# Patient Record
Sex: FEMALE | Race: WHITE | NOT HISPANIC OR LATINO | Employment: FULL TIME | URBAN - NONMETROPOLITAN AREA
[De-identification: names, ages, dates, MRNs, and addresses within clinical notes are randomized per-mention and may not be internally consistent; named-entity substitution may affect disease eponyms.]

---

## 2021-03-02 ENCOUNTER — OFFICE VISIT (OUTPATIENT)
Dept: PULMONOLOGY | Facility: CLINIC | Age: 46
End: 2021-03-02

## 2021-03-02 VITALS
TEMPERATURE: 97.3 F | HEART RATE: 113 BPM | BODY MASS INDEX: 44.15 KG/M2 | HEIGHT: 65 IN | OXYGEN SATURATION: 96 % | WEIGHT: 265 LBS | SYSTOLIC BLOOD PRESSURE: 120 MMHG | DIASTOLIC BLOOD PRESSURE: 88 MMHG

## 2021-03-02 DIAGNOSIS — G47.33 OSA (OBSTRUCTIVE SLEEP APNEA): ICD-10-CM

## 2021-03-02 DIAGNOSIS — E66.01 MORBIDLY OBESE (HCC): ICD-10-CM

## 2021-03-02 DIAGNOSIS — J44.9 CHRONIC OBSTRUCTIVE PULMONARY DISEASE, UNSPECIFIED COPD TYPE (HCC): Primary | ICD-10-CM

## 2021-03-02 PROCEDURE — 99203 OFFICE O/P NEW LOW 30 MIN: CPT | Performed by: NURSE PRACTITIONER

## 2021-03-02 PROCEDURE — 94664 DEMO&/EVAL PT USE INHALER: CPT | Performed by: NURSE PRACTITIONER

## 2021-03-02 RX ORDER — DULAGLUTIDE 4.5 MG/.5ML
4.5 INJECTION, SOLUTION SUBCUTANEOUS WEEKLY
COMMUNITY
Start: 2021-02-01 | End: 2022-10-24

## 2021-03-02 RX ORDER — SERTRALINE HYDROCHLORIDE 100 MG/1
100 TABLET, FILM COATED ORAL 2 TIMES DAILY
COMMUNITY
Start: 2021-02-03

## 2021-03-02 RX ORDER — SIMVASTATIN 20 MG
TABLET ORAL
COMMUNITY
Start: 2021-01-31 | End: 2022-01-10

## 2021-03-02 RX ORDER — HYDROCHLOROTHIAZIDE 25 MG/1
25 TABLET ORAL DAILY
COMMUNITY
Start: 2021-01-31

## 2021-03-02 RX ORDER — LISINOPRIL 5 MG/1
2.5 TABLET ORAL DAILY
COMMUNITY
Start: 2021-01-31

## 2021-03-02 RX ORDER — INSULIN GLARGINE 100 [IU]/ML
INJECTION, SOLUTION SUBCUTANEOUS
COMMUNITY
Start: 2020-12-21 | End: 2022-01-10

## 2021-03-02 RX ORDER — OXCARBAZEPINE 600 MG/1
600 TABLET, FILM COATED ORAL 2 TIMES DAILY
COMMUNITY
Start: 2021-02-03

## 2021-03-02 RX ORDER — OMEPRAZOLE 20 MG/1
CAPSULE, DELAYED RELEASE ORAL
COMMUNITY
Start: 2021-01-31 | End: 2022-01-10

## 2021-03-02 RX ORDER — ARIPIPRAZOLE 10 MG/1
10 TABLET ORAL DAILY
COMMUNITY
Start: 2021-02-03

## 2021-03-02 RX ORDER — GABAPENTIN 800 MG/1
800 TABLET ORAL 4 TIMES DAILY
COMMUNITY
Start: 2021-02-03

## 2021-03-02 RX ORDER — CLONAZEPAM 1 MG/1
1 TABLET ORAL 4 TIMES DAILY PRN
COMMUNITY
Start: 2021-02-03

## 2021-03-02 NOTE — PROGRESS NOTES
Do you smoke? Vape    Quit date? 2 weeks ago, but still vaping    Lung Function Test? yes  Chest X-Ray? yes    CT Chest? no Allergy Test? no    Family hx of Lung disease or Lung Cancer?no    If FHx is posivitive for lung cancer, what is the relationship of the family member? NA    Shortness of breath? yes    How far can you walk without getting short of breath? On any kind of exertion    Any coughing? yes    Any wheezing? yes    Acid Reflux? yes    Do you snore? yes    Daytime Fatigue? yes     Occupation? [employer]     Have you been exposed to any chemicals at your job? no    What inhalers are you currently using? Pro Air    Have you had the Influenza Vaccine? yes    Would you like to receive this Vaccine today? no    Have you had the Pneumonia Vaccine?  no  Would you like to receive this Vaccine today? no      Subjective    Ana Ortiz presents for the following COPD and Sleep Apnea  .    History of Present Illness     Ms. Ortiz is a 45 year old female with a medical history significant for diabetes, hyperlipidemia, COPD, hypertension and JOANN.    She reports that she was diagnosed with sleep apnea about 4 years ago and started on CPAP.  She states that she has not used her CPAP in several years as her insurance ran out.  She states that she was also diagnosed with COPD about 5 years ago.  Reports that she is currently using albuterol about 4 times a day.  She states she is experiencing shortness of breath, cough and intermittent wheezing.  She also complains of fatigue and daytime sleepiness.  She states that she quit smoking about 2 weeks ago.  Previously she smoked about 2 packs/day for 25 years.        Review of Systems   Constitutional: Positive for fatigue. Negative for activity change, appetite change, chills and unexpected weight change.   HENT: Negative for congestion, postnasal drip and rhinorrhea.    Respiratory: Positive for cough, shortness of breath and wheezing. Negative for apnea  and chest tightness.    Cardiovascular: Negative for chest pain, palpitations and leg swelling.   Gastrointestinal: Negative for nausea.   Musculoskeletal: Negative for gait problem.   Skin: Negative for pallor.   Allergic/Immunologic: Negative for environmental allergies.   Neurological: Negative for syncope.   Psychiatric/Behavioral: Negative for confusion. The patient is not nervous/anxious.        Active Problems:  Problem List Items Addressed This Visit        Endocrine and Metabolic    Morbidly obese (CMS/HCC)      Other Visit Diagnoses     Chronic obstructive pulmonary disease, unspecified COPD type (CMS/HCC)    -  Primary    Relevant Medications    ProAir  (90 Base) MCG/ACT inhaler    JOANN (obstructive sleep apnea)              Past Medical History:  Past Medical History:   Diagnosis Date   • Diabetes mellitus (CMS/HCC)    • Hyperlipemia    • Hypertension    • JOANN (obstructive sleep apnea)    • Vulvar cancer (CMS/HCC)        Family History:  Family History   Problem Relation Age of Onset   • Cancer Mother    • Parkinsonism Father    • Diabetes Father    • Cancer Maternal Aunt    • Diabetes Paternal Aunt    • Diabetes Paternal Grandmother        Social History:  Social History     Tobacco Use   • Smoking status: Former Smoker     Packs/day: 2.00     Types: Cigarettes     Quit date: 2021     Years since quittin.0   • Smokeless tobacco: Never Used   Substance Use Topics   • Alcohol use: Never       Current Medications:  Current Outpatient Medications   Medication Sig Dispense Refill   • ARIPiprazole (ABILIFY) 10 MG tablet      • clonazePAM (KlonoPIN) 1 MG tablet      • gabapentin (NEURONTIN) 800 MG tablet      • hydroCHLOROthiazide (HYDRODIURIL) 25 MG tablet      • Insulin Glargine (BASAGLAR KWIKPEN) 100 UNIT/ML injection pen INJECT 10 UNITS SUBCUTANEOUSLY EVERY DAY AT BEDTIME     • lisinopril (PRINIVIL,ZESTRIL) 5 MG tablet      • omeprazole (priLOSEC) 20 MG capsule      • OXcarbazepine  "(TRILEPTAL) 600 MG tablet      • ProAir  (90 Base) MCG/ACT inhaler      • sertraline (ZOLOFT) 100 MG tablet      • simvastatin (ZOCOR) 20 MG tablet      • Trulicity 4.5 MG/0.5ML solution pen-injector        No current facility-administered medications for this visit.       Allergies:  Allergies   Allergen Reactions   • Codeine Nausea Only       Vitals:  /88   Pulse 113   Temp 97.3 °F (36.3 °C) (Temporal)   Ht 165.1 cm (65\")   Wt 120 kg (265 lb)   SpO2 96%   BMI 44.10 kg/m²     Imaging:    Imaging Results (Most Recent)     None          Pulmonary Functions Testing Results:    No results found for: FEV1, FVC, BVM9VSG, TLC, DLCO    No results found for this or any previous visit.    Objective   Physical Exam     GENERAL APPEARANCE: Well developed, well nourished, alert and cooperative, and appears to be in no acute distress.    HEAD: normocephalic. Atraumatic.    EYES: PERRL, EOMI. Vision is grossly intact.    THROAT: Oral cavity and pharynx normal. No inflammation, swelling, exudate, or lesions.     NECK: Neck supple.  No thyromegaly.    CARDIAC: Normal S1 and S2. No S3, S4 or murmurs. Rhythm is regular.     RESPIRATORY:Bilateral air entry positive. Bilateral diminished breath sounds. No wheezing, crackles or rhonchi noted.    GI: Positive bowel sounds. Soft, nondistended, nontender.     MUSCULOSKELETAL: No significant deformity or joint abnormality. No edema. Peripheral pulses intact. No varicosities.    NEUROLOGICAL: Strength and sensation symmetric and intact throughout.     PSYCHIATRIC: The mental examination revealed the patient was oriented to person, place, and time.     Assessment/Plan        COPD, unspecified:  -Ordered PFT to assess lung function.  -Will start her on Breztri, 2 puff once daily.  Inhaler education provided.       - Inhaler technique demonstration/discussion:  I have extensively discussed the steps.  In summary, the steps were discussed in the following order.Patient was " advised to rinse the mouth after steroid inhalation to prevent fungal mucositis.  · Remove the cap from the inhaler and shake well.    · Breathe out all the way.    · Place the mouthpiece of the inhaler between your teeth and seal your lips tightly around it.    · As you start to breathe in slowly, press down on the canister one time.   · Keep breathing in as slowly and deeply as you can.    · It should take about 5 seconds for you to completely breathe in.    · Hold your breath for 10 seconds(count to 10 slowly) to allow the medication to reach the airways of the lung.    · Repeat the above steps for each puff.    · Wait about 1 minute between the puffs.    · Replaced the cap on the inhaler when finished.      -Continue albuterol every 4 hours as needed.          JOANN:  -Will order a Home sleep study valuate for sleep apnea.  -Patient's Body mass index is 44.1 kg/m². BMI is above normal parameters. Recommendations include: exercise counseling and nutrition counseling.  - Patient was educated on positive airway pressure treatment.  As per CMS guidelines, more than 4 hours on 70% of observed nights is considered adherence. Patient was strongly encouraged to use CPAP as much as possible during sleep as more CPAP use is equal to more benefit. Use of heated humidification in positive airway pressure treatment to improve the adherence to the device.  In case of claustrophobia, we will provide the patient cognitive behavioral therapy and desensitization. Oral appliances use will be discussed with the patient in case of mild to moderate sleep apnea or if the patient with severe disease fail positive airway pressure treatment.       The patient was extensively educated on the consequences of untreated obstructive sleep apnea namely cardiovascular/metabolic disorder, neurocognitive deficit, daytime sleepiness, motor vehicle accidents, depression, mood disorders and reduced quality of life.  At the end of conversation, the  patient voices understanding of the disease process and treatment modality.  Patient also understands the risk of untreated obstructive sleep apnea and benefit benefits of the treatment.    Counseling time was greater than 10 minutes.            She reports that she quit smoking about 2 weeks ago.  Congratulated her on this achievement.                    ICD-10-CM ICD-9-CM   1. Chronic obstructive pulmonary disease, unspecified COPD type (CMS/[facility])  J44.9 496   2. Morbidly obese (CMS/[facility])  E66.01 278.01   3. JOANN (obstructive sleep apnea)  G47.33 327.23       Return in about 3 months (around 6/2/2021).

## 2021-03-08 PROBLEM — E66.01 MORBIDLY OBESE: Status: ACTIVE | Noted: 2021-03-08

## 2021-03-29 DIAGNOSIS — Z01.818 OTHER SPECIFIED PRE-OPERATIVE EXAMINATION: Primary | ICD-10-CM

## 2021-03-30 ENCOUNTER — APPOINTMENT (OUTPATIENT)
Dept: RESPIRATORY THERAPY | Facility: HOSPITAL | Age: 46
End: 2021-03-30

## 2021-03-31 DIAGNOSIS — G47.33 OSA (OBSTRUCTIVE SLEEP APNEA): Primary | ICD-10-CM

## 2021-04-09 ENCOUNTER — APPOINTMENT (OUTPATIENT)
Dept: LAB | Facility: HOSPITAL | Age: 46
End: 2021-04-09

## 2021-04-09 ENCOUNTER — APPOINTMENT (OUTPATIENT)
Dept: RESPIRATORY THERAPY | Facility: HOSPITAL | Age: 46
End: 2021-04-09

## 2021-04-20 ENCOUNTER — APPOINTMENT (OUTPATIENT)
Dept: RESPIRATORY THERAPY | Facility: HOSPITAL | Age: 46
End: 2021-04-20

## 2021-04-28 ENCOUNTER — APPOINTMENT (OUTPATIENT)
Dept: RESPIRATORY THERAPY | Facility: HOSPITAL | Age: 46
End: 2021-04-28

## 2021-05-17 ENCOUNTER — APPOINTMENT (OUTPATIENT)
Dept: RESPIRATORY THERAPY | Facility: HOSPITAL | Age: 46
End: 2021-05-17

## 2021-06-07 ENCOUNTER — HOSPITAL ENCOUNTER (EMERGENCY)
Facility: HOSPITAL | Age: 46
Discharge: HOME | End: 2021-06-07
Attending: EMERGENCY MEDICINE | Admitting: EMERGENCY MEDICINE

## 2021-06-07 VITALS
HEIGHT: 65 IN | HEART RATE: 103 BPM | SYSTOLIC BLOOD PRESSURE: 129 MMHG | TEMPERATURE: 98 F | BODY MASS INDEX: 41.65 KG/M2 | DIASTOLIC BLOOD PRESSURE: 86 MMHG | RESPIRATION RATE: 20 BRPM | OXYGEN SATURATION: 94 % | WEIGHT: 250 LBS

## 2021-06-07 DIAGNOSIS — R73.9 HYPERGLYCEMIA: Primary | ICD-10-CM

## 2021-06-07 DIAGNOSIS — R53.1 GENERAL WEAKNESS: ICD-10-CM

## 2021-06-07 LAB
6-ACETYL MORPHINE: NEGATIVE
ALBUMIN SERPL-MCNC: 4.13 G/DL (ref 3.5–5.2)
ALBUMIN/GLOB SERPL: 1.1 G/DL
ALP SERPL-CCNC: 134 U/L (ref 39–117)
ALT SERPL W P-5'-P-CCNC: 19 U/L (ref 1–33)
AMPHET+METHAMPHET UR QL: NEGATIVE
ANION GAP SERPL CALCULATED.3IONS-SCNC: 14 MMOL/L (ref 5–15)
AST SERPL-CCNC: 15 U/L (ref 1–32)
BARBITURATES UR QL SCN: NEGATIVE
BASOPHILS # BLD AUTO: 0.04 10*3/MM3 (ref 0–0.2)
BASOPHILS NFR BLD AUTO: 0.3 % (ref 0–1.5)
BENZODIAZ UR QL SCN: NEGATIVE
BILIRUB SERPL-MCNC: 0.2 MG/DL (ref 0–1.2)
BILIRUB UR QL STRIP: NEGATIVE
BILIRUB UR QL STRIP: NEGATIVE
BUN SERPL-MCNC: 14 MG/DL (ref 6–20)
BUN/CREAT SERPL: 16.7 (ref 7–25)
BUPRENORPHINE SERPL-MCNC: NEGATIVE NG/ML
CALCIUM SPEC-SCNC: 9.4 MG/DL (ref 8.6–10.5)
CANNABINOIDS SERPL QL: POSITIVE
CHLORIDE SERPL-SCNC: 92 MMOL/L (ref 98–107)
CK SERPL-CCNC: 60 U/L (ref 20–180)
CLARITY UR: CLEAR
CLARITY UR: CLEAR
CO2 SERPL-SCNC: 27 MMOL/L (ref 22–29)
COCAINE UR QL: NEGATIVE
COLOR UR: YELLOW
COLOR UR: YELLOW
CREAT SERPL-MCNC: 0.84 MG/DL (ref 0.57–1)
CRP SERPL-MCNC: 1.79 MG/DL (ref 0–0.5)
DEPRECATED RDW RBC AUTO: 44.4 FL (ref 37–54)
EOSINOPHIL # BLD AUTO: 0.13 10*3/MM3 (ref 0–0.4)
EOSINOPHIL NFR BLD AUTO: 1.1 % (ref 0.3–6.2)
ERYTHROCYTE [DISTWIDTH] IN BLOOD BY AUTOMATED COUNT: 13.2 % (ref 12.3–15.4)
ERYTHROCYTE [SEDIMENTATION RATE] IN BLOOD: 13 MM/HR (ref 0–20)
ETHANOL BLD-MCNC: <10 MG/DL (ref 0–10)
ETHANOL UR QL: <0.01 %
GFR SERPL CREATININE-BSD FRML MDRD: 73 ML/MIN/1.73
GLOBULIN UR ELPH-MCNC: 3.7 GM/DL
GLUCOSE BLDC GLUCOMTR-MCNC: 310 MG/DL (ref 70–130)
GLUCOSE BLDC GLUCOMTR-MCNC: 456 MG/DL (ref 70–130)
GLUCOSE SERPL-MCNC: 333 MG/DL (ref 65–99)
GLUCOSE UR STRIP-MCNC: ABNORMAL MG/DL
GLUCOSE UR STRIP-MCNC: ABNORMAL MG/DL
HCT VFR BLD AUTO: 47.3 % (ref 34–46.6)
HGB BLD-MCNC: 15.6 G/DL (ref 12–15.9)
HGB UR QL STRIP.AUTO: NEGATIVE
HGB UR QL STRIP.AUTO: NEGATIVE
HOLD SPECIMEN: NORMAL
HOLD SPECIMEN: NORMAL
IMM GRANULOCYTES # BLD AUTO: 0.06 10*3/MM3 (ref 0–0.05)
IMM GRANULOCYTES NFR BLD AUTO: 0.5 % (ref 0–0.5)
KETONES UR QL STRIP: NEGATIVE
KETONES UR QL STRIP: NEGATIVE
LEUKOCYTE ESTERASE UR QL STRIP.AUTO: NEGATIVE
LEUKOCYTE ESTERASE UR QL STRIP.AUTO: NEGATIVE
LIPASE SERPL-CCNC: 30 U/L (ref 13–60)
LYMPHOCYTES # BLD AUTO: 2.83 10*3/MM3 (ref 0.7–3.1)
LYMPHOCYTES NFR BLD AUTO: 24.3 % (ref 19.6–45.3)
MAGNESIUM SERPL-MCNC: 1.5 MG/DL (ref 1.6–2.6)
MCH RBC QN AUTO: 30.3 PG (ref 26.6–33)
MCHC RBC AUTO-ENTMCNC: 33 G/DL (ref 31.5–35.7)
MCV RBC AUTO: 91.8 FL (ref 79–97)
METHADONE UR QL SCN: NEGATIVE
MONOCYTES # BLD AUTO: 0.6 10*3/MM3 (ref 0.1–0.9)
MONOCYTES NFR BLD AUTO: 5.1 % (ref 5–12)
NEUTROPHILS NFR BLD AUTO: 68.7 % (ref 42.7–76)
NEUTROPHILS NFR BLD AUTO: 8 10*3/MM3 (ref 1.7–7)
NITRITE UR QL STRIP: NEGATIVE
NITRITE UR QL STRIP: NEGATIVE
NRBC BLD AUTO-RTO: 0 /100 WBC (ref 0–0.2)
NT-PROBNP SERPL-MCNC: 16.6 PG/ML (ref 0–450)
OPIATES UR QL: NEGATIVE
OXYCODONE UR QL SCN: NEGATIVE
PCP UR QL SCN: NEGATIVE
PH UR STRIP.AUTO: 6 [PH] (ref 5–8)
PH UR STRIP.AUTO: 6 [PH] (ref 5–8)
PHOSPHATE SERPL-MCNC: 3.7 MG/DL (ref 2.5–4.5)
PLATELET # BLD AUTO: 248 10*3/MM3 (ref 140–450)
PMV BLD AUTO: 10.3 FL (ref 6–12)
POTASSIUM SERPL-SCNC: 3.5 MMOL/L (ref 3.5–5.2)
PROT SERPL-MCNC: 7.8 G/DL (ref 6–8.5)
PROT UR QL STRIP: NEGATIVE
PROT UR QL STRIP: NEGATIVE
RBC # BLD AUTO: 5.15 10*6/MM3 (ref 3.77–5.28)
SODIUM SERPL-SCNC: 133 MMOL/L (ref 136–145)
SP GR UR STRIP: >1.03 (ref 1–1.03)
SP GR UR STRIP: >1.03 (ref 1–1.03)
T4 FREE SERPL-MCNC: 0.98 NG/DL (ref 0.93–1.7)
TROPONIN T SERPL-MCNC: <0.01 NG/ML (ref 0–0.03)
TSH SERPL DL<=0.05 MIU/L-ACNC: 1.61 UIU/ML (ref 0.27–4.2)
UROBILINOGEN UR QL STRIP: ABNORMAL
UROBILINOGEN UR QL STRIP: ABNORMAL
WBC # BLD AUTO: 11.66 10*3/MM3 (ref 3.4–10.8)
WHOLE BLOOD HOLD SPECIMEN: NORMAL

## 2021-06-07 PROCEDURE — 84443 ASSAY THYROID STIM HORMONE: CPT | Performed by: EMERGENCY MEDICINE

## 2021-06-07 PROCEDURE — 84100 ASSAY OF PHOSPHORUS: CPT | Performed by: EMERGENCY MEDICINE

## 2021-06-07 PROCEDURE — 85652 RBC SED RATE AUTOMATED: CPT | Performed by: EMERGENCY MEDICINE

## 2021-06-07 PROCEDURE — 85025 COMPLETE CBC W/AUTO DIFF WBC: CPT | Performed by: EMERGENCY MEDICINE

## 2021-06-07 PROCEDURE — 99283 EMERGENCY DEPT VISIT LOW MDM: CPT

## 2021-06-07 PROCEDURE — 82550 ASSAY OF CK (CPK): CPT | Performed by: EMERGENCY MEDICINE

## 2021-06-07 PROCEDURE — 82962 GLUCOSE BLOOD TEST: CPT

## 2021-06-07 PROCEDURE — 80307 DRUG TEST PRSMV CHEM ANLYZR: CPT | Performed by: EMERGENCY MEDICINE

## 2021-06-07 PROCEDURE — 86140 C-REACTIVE PROTEIN: CPT | Performed by: EMERGENCY MEDICINE

## 2021-06-07 PROCEDURE — 80053 COMPREHEN METABOLIC PANEL: CPT | Performed by: EMERGENCY MEDICINE

## 2021-06-07 PROCEDURE — 83690 ASSAY OF LIPASE: CPT | Performed by: EMERGENCY MEDICINE

## 2021-06-07 PROCEDURE — 83735 ASSAY OF MAGNESIUM: CPT | Performed by: EMERGENCY MEDICINE

## 2021-06-07 PROCEDURE — 81003 URINALYSIS AUTO W/O SCOPE: CPT | Performed by: EMERGENCY MEDICINE

## 2021-06-07 PROCEDURE — 82077 ASSAY SPEC XCP UR&BREATH IA: CPT | Performed by: EMERGENCY MEDICINE

## 2021-06-07 PROCEDURE — 84439 ASSAY OF FREE THYROXINE: CPT | Performed by: EMERGENCY MEDICINE

## 2021-06-07 PROCEDURE — 83880 ASSAY OF NATRIURETIC PEPTIDE: CPT | Performed by: EMERGENCY MEDICINE

## 2021-06-07 PROCEDURE — 84484 ASSAY OF TROPONIN QUANT: CPT | Performed by: EMERGENCY MEDICINE

## 2021-06-07 RX ORDER — SODIUM CHLORIDE 0.9 % (FLUSH) 0.9 %
10 SYRINGE (ML) INJECTION AS NEEDED
Status: DISCONTINUED | OUTPATIENT
Start: 2021-06-07 | End: 2021-06-08 | Stop reason: HOSPADM

## 2021-06-08 NOTE — ED NOTES
Report to Juliane Brand triage nurse at this time, pt remains awaiting in er lobby while awaiting er room placement due to high pt volume in er, pt stable, nad noted, pt was instructed to notify staff of any changes and pt verb understanding     Arelis Wilder RN  06/07/21 2015

## 2021-06-20 NOTE — ED PROVIDER NOTES
Subjective     History provided by:  Patient   used: No    Weakness - Generalized  Severity:  Mild  Onset quality:  Gradual  Timing:  Constant  Progression:  Worsening  Chronicity:  New  Context: not alcohol use, not allergies, not change in medication, not decreased sleep, not dehydration, not drug use, not increased activity, not pinched nerve, not recent infection, not stress and not urinary tract infection    Context comment:  A glucose reading at home, cannot get it to come down  Relieved by:  Nothing  Worsened by:  Nothing  Ineffective treatments:  None tried  Associated symptoms: no abdominal pain, no anorexia, no aphasia, no arthralgias, no ataxia, no chest pain, no cough, no diarrhea, no difficulty walking, no dizziness, no drooling, no dysphagia, no dysuria, no numbness in extremities, no falls, no fever, no foul-smelling urine, no frequency, no headaches, no hematochezia, no lethargy, no loss of consciousness, no melena, no myalgias, no nausea, no near-syncope, no seizures, no sensory-motor deficit, no shortness of breath, no stroke symptoms, no syncope, no urgency, no vision change and no vomiting    Risk factors: diabetes    Risk factors: no anemia, no congestive heart failure, no coronary artery disease, no excessive menstruation, no family hx of stroke, no heart disease, no neurologic disease, no new medications and no recent stressors        Review of Systems   Constitutional: Negative for activity change, appetite change, chills, diaphoresis, fatigue and fever.   HENT: Negative for congestion, drooling, ear pain and sore throat.    Eyes: Negative for redness.   Respiratory: Negative for cough, chest tightness, shortness of breath and wheezing.    Cardiovascular: Negative for chest pain, palpitations, leg swelling, syncope and near-syncope.   Gastrointestinal: Negative for abdominal pain, anorexia, diarrhea, dysphagia, hematochezia, melena, nausea and vomiting.   Genitourinary:  Negative for dysuria, frequency and urgency.   Musculoskeletal: Negative for arthralgias, back pain, falls, myalgias and neck pain.   Skin: Negative for pallor, rash and wound.   Neurological: Positive for light-headedness. Negative for dizziness, seizures, loss of consciousness, speech difficulty, weakness and headaches.   Psychiatric/Behavioral: Negative for agitation, behavioral problems, confusion and decreased concentration.   All other systems reviewed and are negative.      Past Medical History:   Diagnosis Date   • Diabetes mellitus (CMS/HCC)    • Hyperlipemia    • Hypertension    • JOANN (obstructive sleep apnea)    • Vulvar cancer (CMS/HCC)        Allergies   Allergen Reactions   • Codeine Nausea Only       Past Surgical History:   Procedure Laterality Date   • HYSTERECTOMY     • MASTECTOMY         Family History   Problem Relation Age of Onset   • Cancer Mother    • Parkinsonism Father    • Diabetes Father    • Cancer Maternal Aunt    • Diabetes Paternal Aunt    • Diabetes Paternal Grandmother        Social History     Socioeconomic History   • Marital status:      Spouse name: Not on file   • Number of children: Not on file   • Years of education: Not on file   • Highest education level: Not on file   Tobacco Use   • Smoking status: Former Smoker     Packs/day: 2.00     Types: Cigarettes     Quit date: 2021     Years since quittin.3   • Smokeless tobacco: Never Used   Vaping Use   • Vaping Use: Every day   • Substances: Nicotine   • Devices: Refillable tank   Substance and Sexual Activity   • Alcohol use: Never   • Drug use: Never   • Sexual activity: Defer           Objective   Physical Exam  Vitals and nursing note reviewed.   Constitutional:       General: She is not in acute distress.     Appearance: Normal appearance. She is well-developed. She is not toxic-appearing or diaphoretic.   HENT:      Head: Normocephalic and atraumatic.      Right Ear: External ear normal.      Left Ear:  External ear normal.      Nose: Nose normal.      Mouth/Throat:      Pharynx: No oropharyngeal exudate.      Tonsils: No tonsillar exudate.   Eyes:      General: Lids are normal.      Conjunctiva/sclera: Conjunctivae normal.      Pupils: Pupils are equal, round, and reactive to light.   Neck:      Thyroid: No thyromegaly.   Cardiovascular:      Rate and Rhythm: Normal rate and regular rhythm.      Pulses: Normal pulses.      Heart sounds: Normal heart sounds, S1 normal and S2 normal.   Pulmonary:      Effort: Pulmonary effort is normal. No tachypnea or respiratory distress.      Breath sounds: Normal breath sounds. No decreased breath sounds, wheezing or rales.   Chest:      Chest wall: No tenderness.   Abdominal:      General: Bowel sounds are normal. There is no distension.      Palpations: Abdomen is soft.      Tenderness: There is no abdominal tenderness. There is no guarding or rebound.   Musculoskeletal:         General: No tenderness or deformity. Normal range of motion.      Cervical back: Full passive range of motion without pain, normal range of motion and neck supple.   Lymphadenopathy:      Cervical: No cervical adenopathy.   Skin:     General: Skin is warm and dry.      Coloration: Skin is not pale.      Findings: No erythema or rash.   Neurological:      Mental Status: She is alert and oriented to person, place, and time.      GCS: GCS eye subscore is 4. GCS verbal subscore is 5. GCS motor subscore is 6.      Cranial Nerves: No cranial nerve deficit.      Sensory: No sensory deficit.   Psychiatric:         Speech: Speech normal.         Behavior: Behavior normal.         Thought Content: Thought content normal.         Judgment: Judgment normal.         Procedures           ED Course  ED Course as of Jun 19 2154   Sat Jun 19, 2021   2153 Patient eloped from the emergency department.    [ES]      ED Course User Index  [ES] Faustino Ellis MD                                            MDM  Number of Diagnoses or Management Options  General weakness: new and requires workup  Hyperglycemia: new and requires workup     Amount and/or Complexity of Data Reviewed  Clinical lab tests: reviewed and ordered  Tests in the radiology section of CPT®: ordered and reviewed  Tests in the medicine section of CPT®: ordered and reviewed  Review and summarize past medical records: yes  Independent visualization of images, tracings, or specimens: yes    Risk of Complications, Morbidity, and/or Mortality  Presenting problems: moderate  Diagnostic procedures: moderate  Management options: moderate    Patient Progress  Patient progress: stable      Final diagnoses:   Hyperglycemia   General weakness       ED Disposition  ED Disposition     ED Disposition Condition Comment    Eloped            No follow-up provider specified.       Medication List      No changes were made to your prescriptions during this visit.          Faustino Ellis MD  06/19/21 [id number]

## 2021-06-24 ENCOUNTER — HOSPITAL ENCOUNTER (OUTPATIENT)
Dept: RESPIRATORY THERAPY | Facility: HOSPITAL | Age: 46
Discharge: HOME | End: 2021-06-24
Admitting: NURSE PRACTITIONER

## 2021-06-24 DIAGNOSIS — J44.9 CHRONIC OBSTRUCTIVE PULMONARY DISEASE, UNSPECIFIED COPD TYPE (HCC): ICD-10-CM

## 2021-06-24 PROCEDURE — 94729 DIFFUSING CAPACITY: CPT

## 2021-06-24 PROCEDURE — 94729 DIFFUSING CAPACITY: CPT | Performed by: INTERNAL MEDICINE

## 2021-06-24 PROCEDURE — 94726 PLETHYSMOGRAPHY LUNG VOLUMES: CPT | Performed by: INTERNAL MEDICINE

## 2021-06-24 PROCEDURE — 94060 EVALUATION OF WHEEZING: CPT

## 2021-06-24 PROCEDURE — 94060 EVALUATION OF WHEEZING: CPT | Performed by: INTERNAL MEDICINE

## 2021-06-24 PROCEDURE — 94640 AIRWAY INHALATION TREATMENT: CPT

## 2021-06-24 PROCEDURE — 94726 PLETHYSMOGRAPHY LUNG VOLUMES: CPT

## 2021-06-24 RX ORDER — ALBUTEROL SULFATE 2.5 MG/3ML
2.5 SOLUTION RESPIRATORY (INHALATION) ONCE
Status: COMPLETED | OUTPATIENT
Start: 2021-06-24 | End: 2021-06-24

## 2021-06-24 RX ADMIN — ALBUTEROL SULFATE 2.5 MG: 2.5 SOLUTION RESPIRATORY (INHALATION) at 10:29

## 2021-07-07 NOTE — PROGRESS NOTES
Notified the patient of her PFT results.  Will start her on stiolto as Breztri is not covered on her insurance.

## 2021-07-29 ENCOUNTER — OFFICE VISIT (OUTPATIENT)
Dept: PULMONOLOGY | Facility: CLINIC | Age: 46
End: 2021-07-29

## 2021-07-29 VITALS
DIASTOLIC BLOOD PRESSURE: 82 MMHG | OXYGEN SATURATION: 90 % | BODY MASS INDEX: 40.48 KG/M2 | SYSTOLIC BLOOD PRESSURE: 128 MMHG | HEIGHT: 65 IN | HEART RATE: 101 BPM | TEMPERATURE: 97.5 F | WEIGHT: 243 LBS

## 2021-07-29 DIAGNOSIS — F17.210 CIGARETTE NICOTINE DEPENDENCE WITHOUT COMPLICATION: ICD-10-CM

## 2021-07-29 DIAGNOSIS — E66.01 MORBIDLY OBESE (HCC): ICD-10-CM

## 2021-07-29 DIAGNOSIS — J44.9 CHRONIC OBSTRUCTIVE PULMONARY DISEASE, UNSPECIFIED COPD TYPE (HCC): Primary | ICD-10-CM

## 2021-07-29 DIAGNOSIS — G47.33 OSA (OBSTRUCTIVE SLEEP APNEA): ICD-10-CM

## 2021-07-29 PROCEDURE — 99407 BEHAV CHNG SMOKING > 10 MIN: CPT | Performed by: NURSE PRACTITIONER

## 2021-07-29 PROCEDURE — 99214 OFFICE O/P EST MOD 30 MIN: CPT | Performed by: NURSE PRACTITIONER

## 2021-07-29 PROCEDURE — 94618 PULMONARY STRESS TESTING: CPT | Performed by: NURSE PRACTITIONER

## 2021-07-29 RX ORDER — BUDESONIDE, GLYCOPYRROLATE, AND FORMOTEROL FUMARATE 160; 9; 4.8 UG/1; UG/1; UG/1
2 AEROSOL, METERED RESPIRATORY (INHALATION) 2 TIMES DAILY
Qty: 10.2 G | Refills: 11 | Status: SHIPPED | OUTPATIENT
Start: 2021-07-29 | End: 2022-01-10

## 2021-07-29 NOTE — PROGRESS NOTES
"Chief Complaint  COPD and Sleep Apnea    Subjective          Ana Ortiz presents to [facility]  History of Present Illness     Ms. Ortiz is a 45 year old female with a medical history significant for diabetes, hyperlipidemia, hypertension, COPD, and sleep apnea.    She presents today for a routine follow up on COPD and sleep apnea.  She states that she has been doing well since her last visit.  She reports that she has been complain with her cpap nightly.  She states that she has felt much better since she started using the cpap.  She also reports improvement in her shortness of breath with use of Breztri. He is also using albuterol every 4 hours as needed. She does tell me that she started back smoking and is currently smoking about half pack per day.      Objective   Vital Signs:   /82 (BP Location: Left arm, Patient Position: Sitting)   Pulse 101   Temp 97.5 °F (36.4 °C)   Ht 165.1 cm (65\")   Wt 110 kg (243 lb)   SpO2 90%   BMI 40.44 kg/m²     Physical Exam     GENERAL APPEARANCE: Well developed, well nourished, alert and cooperative, and appears to be in no acute distress.    HEAD: normocephalic. Atraumatic.    EYES: PERRL, EOMI. Vision is grossly intact.    THROAT: Oral cavity and pharynx normal. No inflammation, swelling, exudate, or lesions.     NECK: Neck supple.  No thyromegaly.    CARDIAC: Normal S1 and S2. No S3, S4 or murmurs. Rhythm is regular.     RESPIRATORY:Bilateral air entry positive. Bilateral diminished breath sounds. No wheezing, crackles or rhonchi noted.    GI: Positive bowel sounds. Soft, nondistended, nontender.     MUSCULOSKELETAL: No significant deformity or joint abnormality. No edema. Peripheral pulses intact. No varicosities.    NEUROLOGICAL: Strength and sensation symmetric and intact throughout.     PSYCHIATRIC: The mental examination revealed the patient was oriented to person, place, and time.     Result Review " :   The following data was reviewed by: CHAZ Erwin on 07/29/2021:  Common labs    Common Labsle 6/7/21 6/7/21    2107 2107   Glucose  333 (A)   BUN  14   Creatinine  0.84   eGFR Non African Am  73   Sodium  133 (A)   Potassium  3.5   Chloride  92 (A)   Calcium  9.4   Albumin  4.13   Total Bilirubin  0.2   Alkaline Phosphatase  134 (A)   AST (SGOT)  15   ALT (SGPT)  19   WBC 11.66 (A)    Hemoglobin 15.6    Hematocrit 47.3 (A)    Platelets 248    (A) Abnormal value            Data reviewed: PFT and sleep study          Assessment and Plan    Diagnoses and all orders for this visit:    1. Chronic obstructive pulmonary disease, unspecified COPD type (CMS/HCC) (Primary)    2. Morbidly obese (CMS/HCC)    3. JOANN (obstructive sleep apnea)    4. Cigarette nicotine dependence without complication    Other orders  -     Budeson-Glycopyrrol-Formoterol (Breztri Aerosphere) 160-9-4.8 MCG/ACT aerosol inhaler; Inhale 2 puffs 2 (Two) Times a Day.  Dispense: 10.2 g; Refill: 11        COPD, unspecified:  -PFT showed no obstruction with no significant bronchodilator response. Significant air trapping was noted.  -We will continue Breztri twice daily.  -We will continue albuterol every 4 hours.    -6-minute walk test was completed in office. Oxygen saturation did not drop below 90%. No indication for supplemental oxygen during ambulation at this time.    JOANN:  -Sleep study showed severe sleep apnea.  -She was started on AutoPap at night in has been compliant with use nightly. She states that she is feeling much better since starting to use Pap therapy.  -Patient's Body mass index is 40.44 kg/m². indicating that she is morbidly obese (BMI > 40 or > 35 with obesity - related health condition). Obesity-related health conditions include the following: obstructive sleep apnea, hypertension, diabetes mellitus and dyslipidemias. Obesity is unchanged. BMI is is above average; BMI management plan is completed. We discussed  portion control and increasing exercise..  - Patient was educated on positive airway pressure treatment.  As per CMS guidelines, more than 4 hours on 70% of observed nights is considered adherence. Patient was strongly encouraged to use CPAP as much as possible during sleep as more CPAP use is equal to more benefit. Use of heated humidification in positive airway pressure treatment to improve the adherence to the device.  In case of claustrophobia, we will provide the patient cognitive behavioral therapy and desensitization. Oral appliances use will be discussed with the patient in case of mild to moderate sleep apnea or if the patient with severe disease fail positive airway pressure treatment.       The patient was extensively educated on the consequences of untreated obstructive sleep apnea namely cardiovascular/metabolic disorder, neurocognitive deficit, daytime sleepiness, motor vehicle accidents, depression, mood disorders and reduced quality of life.  At the end of conversation, the patient voices understanding of the disease process and treatment modality.  Patient also understands the risk of untreated obstructive sleep apnea and benefit benefits of the treatment.    Counseling time was greater than 10 minutes.      Current smoker:  She is currently smoking about half pack per day.    Ana Ortiz  reports that she has been smoking cigarettes. She has been smoking about 0.50 packs per day. She has never used smokeless tobacco.. I have educated her on the risk of diseases from using tobacco products such as cancer, COPD and heart disease.     I advised her to quit and she is willing to quit. We have discussed the following method/s for tobacco cessation:  Counseling OTC Cessation Products.  Together we have set a quit date for 1 month from today.  She will follow up with me in a few months or sooner to check on her progress.    I spent >10 minutes counseling the patient.           Follow Up   Return in  about 6 months (around 1/29/2022).  Patient was given instructions and counseling regarding her condition or for health maintenance advice. Please see specific information pulled into the AVS if appropriate.

## 2021-11-10 ENCOUNTER — TRANSCRIBE ORDERS (OUTPATIENT)
Dept: ADMINISTRATIVE | Facility: HOSPITAL | Age: 46
End: 2021-11-10

## 2021-11-10 ENCOUNTER — HOSPITAL ENCOUNTER (OUTPATIENT)
Dept: GENERAL RADIOLOGY | Facility: HOSPITAL | Age: 46
Discharge: HOME | End: 2021-11-10
Admitting: PHYSICIAN ASSISTANT

## 2021-11-10 DIAGNOSIS — M25.562 LEFT KNEE PAIN, UNSPECIFIED CHRONICITY: ICD-10-CM

## 2021-11-10 DIAGNOSIS — M25.562 LEFT KNEE PAIN, UNSPECIFIED CHRONICITY: Primary | ICD-10-CM

## 2021-11-10 PROCEDURE — 73562 X-RAY EXAM OF KNEE 3: CPT

## 2021-11-10 PROCEDURE — 73562 X-RAY EXAM OF KNEE 3: CPT | Performed by: RADIOLOGY

## 2022-01-06 DIAGNOSIS — J44.9 CHRONIC OBSTRUCTIVE PULMONARY DISEASE, UNSPECIFIED COPD TYPE: Primary | ICD-10-CM

## 2022-01-06 RX ORDER — TIOTROPIUM BROMIDE AND OLODATEROL 3.124; 2.736 UG/1; UG/1
2 SPRAY, METERED RESPIRATORY (INHALATION) DAILY
COMMUNITY
Start: 2021-11-27 | End: 2022-01-06 | Stop reason: SDUPTHER

## 2022-01-06 RX ORDER — TIOTROPIUM BROMIDE AND OLODATEROL 3.124; 2.736 UG/1; UG/1
2 SPRAY, METERED RESPIRATORY (INHALATION) DAILY
Qty: 1 EACH | Refills: 5 | Status: SHIPPED | OUTPATIENT
Start: 2022-01-06 | End: 2022-07-27

## 2022-01-10 ENCOUNTER — APPOINTMENT (OUTPATIENT)
Dept: GENERAL RADIOLOGY | Facility: HOSPITAL | Age: 47
End: 2022-01-10

## 2022-01-10 ENCOUNTER — APPOINTMENT (OUTPATIENT)
Dept: CT IMAGING | Facility: HOSPITAL | Age: 47
End: 2022-01-10

## 2022-01-10 ENCOUNTER — HOSPITAL ENCOUNTER (INPATIENT)
Facility: HOSPITAL | Age: 47
LOS: 3 days | Discharge: HOME | End: 2022-01-13
Attending: EMERGENCY MEDICINE | Admitting: INTERNAL MEDICINE

## 2022-01-10 DIAGNOSIS — J96.02 ACUTE RESPIRATORY FAILURE WITH HYPOXIA AND HYPERCAPNIA: Primary | ICD-10-CM

## 2022-01-10 DIAGNOSIS — J96.01 ACUTE RESPIRATORY FAILURE WITH HYPOXIA AND HYPERCAPNIA: Primary | ICD-10-CM

## 2022-01-10 DIAGNOSIS — J44.1 COPD EXACERBATION: ICD-10-CM

## 2022-01-10 DIAGNOSIS — L02.212 CUTANEOUS ABSCESS OF BACK EXCLUDING BUTTOCKS: ICD-10-CM

## 2022-01-10 LAB
A-A DO2: 96.1 MMHG (ref 0–300)
ALBUMIN SERPL-MCNC: 3.14 G/DL (ref 3.5–5.2)
ALBUMIN SERPL-MCNC: 3.21 G/DL (ref 3.5–5.2)
ALBUMIN/GLOB SERPL: 0.8 G/DL
ALBUMIN/GLOB SERPL: 0.8 G/DL
ALP SERPL-CCNC: 123 U/L (ref 39–117)
ALP SERPL-CCNC: 137 U/L (ref 39–117)
ALT SERPL W P-5'-P-CCNC: 18 U/L (ref 1–33)
ALT SERPL W P-5'-P-CCNC: 18 U/L (ref 1–33)
ANION GAP SERPL CALCULATED.3IONS-SCNC: 13.4 MMOL/L (ref 5–15)
ANION GAP SERPL CALCULATED.3IONS-SCNC: 15.7 MMOL/L (ref 5–15)
APTT PPP: 34.3 SECONDS (ref 25.5–35.4)
ARTERIAL PATENCY WRIST A: ABNORMAL
AST SERPL-CCNC: 17 U/L (ref 1–32)
AST SERPL-CCNC: 17 U/L (ref 1–32)
ATMOSPHERIC PRESS: 737 MMHG
B PARAPERT DNA SPEC QL NAA+PROBE: NOT DETECTED
B PERT DNA SPEC QL NAA+PROBE: NOT DETECTED
BASE EXCESS BLDA CALC-SCNC: 7 MMOL/L (ref 0–2)
BASOPHILS # BLD AUTO: 0.02 10*3/MM3 (ref 0–0.2)
BASOPHILS # BLD AUTO: 0.05 10*3/MM3 (ref 0–0.2)
BASOPHILS NFR BLD AUTO: 0.1 % (ref 0–1.5)
BASOPHILS NFR BLD AUTO: 0.3 % (ref 0–1.5)
BDY SITE: ABNORMAL
BILIRUB SERPL-MCNC: 0.2 MG/DL (ref 0–1.2)
BILIRUB SERPL-MCNC: <0.2 MG/DL (ref 0–1.2)
BODY TEMPERATURE: 0 C
BUN SERPL-MCNC: 14 MG/DL (ref 6–20)
BUN SERPL-MCNC: 14 MG/DL (ref 6–20)
BUN/CREAT SERPL: 19.2 (ref 7–25)
BUN/CREAT SERPL: 19.4 (ref 7–25)
C PNEUM DNA NPH QL NAA+NON-PROBE: NOT DETECTED
CALCIUM SPEC-SCNC: 9.1 MG/DL (ref 8.6–10.5)
CALCIUM SPEC-SCNC: 9.1 MG/DL (ref 8.6–10.5)
CHLORIDE SERPL-SCNC: 93 MMOL/L (ref 98–107)
CHLORIDE SERPL-SCNC: 94 MMOL/L (ref 98–107)
CO2 BLDA-SCNC: 32.6 MMOL/L (ref 22–33)
CO2 SERPL-SCNC: 26.3 MMOL/L (ref 22–29)
CO2 SERPL-SCNC: 27.6 MMOL/L (ref 22–29)
COHGB MFR BLD: 7.9 % (ref 0–5)
CREAT SERPL-MCNC: 0.72 MG/DL (ref 0.57–1)
CREAT SERPL-MCNC: 0.73 MG/DL (ref 0.57–1)
CRP SERPL-MCNC: 33.31 MG/DL (ref 0–0.5)
D DIMER PPP FEU-MCNC: 2.3 MCGFEU/ML (ref 0–0.5)
D-LACTATE SERPL-SCNC: 1.5 MMOL/L (ref 0.5–2)
DEPRECATED RDW RBC AUTO: 46.5 FL (ref 37–54)
DEPRECATED RDW RBC AUTO: 47.3 FL (ref 37–54)
EOSINOPHIL # BLD AUTO: 0.01 10*3/MM3 (ref 0–0.4)
EOSINOPHIL # BLD AUTO: 0.1 10*3/MM3 (ref 0–0.4)
EOSINOPHIL NFR BLD AUTO: 0.1 % (ref 0.3–6.2)
EOSINOPHIL NFR BLD AUTO: 0.6 % (ref 0.3–6.2)
ERYTHROCYTE [DISTWIDTH] IN BLOOD BY AUTOMATED COUNT: 14.5 % (ref 12.3–15.4)
ERYTHROCYTE [DISTWIDTH] IN BLOOD BY AUTOMATED COUNT: 14.5 % (ref 12.3–15.4)
FLUAV SUBTYP SPEC NAA+PROBE: NOT DETECTED
FLUBV RNA ISLT QL NAA+PROBE: NOT DETECTED
GAS FLOW AIRWAY: 2 LPM
GFR SERPL CREATININE-BSD FRML MDRD: 86 ML/MIN/1.73
GFR SERPL CREATININE-BSD FRML MDRD: 87 ML/MIN/1.73
GLOBULIN UR ELPH-MCNC: 4.2 GM/DL
GLOBULIN UR ELPH-MCNC: 4.2 GM/DL
GLUCOSE BLDC GLUCOMTR-MCNC: 356 MG/DL (ref 70–130)
GLUCOSE SERPL-MCNC: 308 MG/DL (ref 65–99)
GLUCOSE SERPL-MCNC: 335 MG/DL (ref 65–99)
HADV DNA SPEC NAA+PROBE: NOT DETECTED
HCO3 BLDA-SCNC: 31.3 MMOL/L (ref 20–26)
HCOV 229E RNA SPEC QL NAA+PROBE: NOT DETECTED
HCOV HKU1 RNA SPEC QL NAA+PROBE: NOT DETECTED
HCOV NL63 RNA SPEC QL NAA+PROBE: NOT DETECTED
HCOV OC43 RNA SPEC QL NAA+PROBE: NOT DETECTED
HCT VFR BLD AUTO: 35.6 % (ref 34–46.6)
HCT VFR BLD AUTO: 37.8 % (ref 34–46.6)
HCT VFR BLD CALC: 38.7 % (ref 38–51)
HGB BLD-MCNC: 11.7 G/DL (ref 12–15.9)
HGB BLD-MCNC: 12.6 G/DL (ref 12–15.9)
HGB BLDA-MCNC: 12.6 G/DL (ref 13.5–17.5)
HMPV RNA NPH QL NAA+NON-PROBE: NOT DETECTED
HOLD SPECIMEN: NORMAL
HOLD SPECIMEN: NORMAL
HPIV1 RNA ISLT QL NAA+PROBE: NOT DETECTED
HPIV2 RNA SPEC QL NAA+PROBE: NOT DETECTED
HPIV3 RNA NPH QL NAA+PROBE: NOT DETECTED
HPIV4 P GENE NPH QL NAA+PROBE: NOT DETECTED
IMM GRANULOCYTES # BLD AUTO: 0.08 10*3/MM3 (ref 0–0.05)
IMM GRANULOCYTES # BLD AUTO: 0.11 10*3/MM3 (ref 0–0.05)
IMM GRANULOCYTES NFR BLD AUTO: 0.6 % (ref 0–0.5)
IMM GRANULOCYTES NFR BLD AUTO: 0.7 % (ref 0–0.5)
INHALED O2 CONCENTRATION: 28 %
INR PPP: 1.06 (ref 0.9–1.1)
LYMPHOCYTES # BLD AUTO: 1.05 10*3/MM3 (ref 0.7–3.1)
LYMPHOCYTES # BLD AUTO: 1.67 10*3/MM3 (ref 0.7–3.1)
LYMPHOCYTES NFR BLD AUTO: 10.1 % (ref 19.6–45.3)
LYMPHOCYTES NFR BLD AUTO: 7.7 % (ref 19.6–45.3)
Lab: ABNORMAL
Lab: ABNORMAL
M PNEUMO IGG SER IA-ACNC: NOT DETECTED
MAGNESIUM SERPL-MCNC: 1.3 MG/DL (ref 1.6–2.6)
MCH RBC QN AUTO: 29.5 PG (ref 26.6–33)
MCH RBC QN AUTO: 29.5 PG (ref 26.6–33)
MCHC RBC AUTO-ENTMCNC: 32.9 G/DL (ref 31.5–35.7)
MCHC RBC AUTO-ENTMCNC: 33.3 G/DL (ref 31.5–35.7)
MCV RBC AUTO: 88.5 FL (ref 79–97)
MCV RBC AUTO: 89.9 FL (ref 79–97)
METHGB BLD QL: 0.1 % (ref 0–3)
MODALITY: ABNORMAL
MONOCYTES # BLD AUTO: 0.43 10*3/MM3 (ref 0.1–0.9)
MONOCYTES # BLD AUTO: 0.58 10*3/MM3 (ref 0.1–0.9)
MONOCYTES NFR BLD AUTO: 3.1 % (ref 5–12)
MONOCYTES NFR BLD AUTO: 3.5 % (ref 5–12)
MYOGLOBIN SERPL-MCNC: <21 NG/ML (ref 25–58)
NEUTROPHILS NFR BLD AUTO: 12.08 10*3/MM3 (ref 1.7–7)
NEUTROPHILS NFR BLD AUTO: 14.07 10*3/MM3 (ref 1.7–7)
NEUTROPHILS NFR BLD AUTO: 84.8 % (ref 42.7–76)
NEUTROPHILS NFR BLD AUTO: 88.4 % (ref 42.7–76)
NOTE: ABNORMAL
NOTIFIED BY: ABNORMAL
NOTIFIED WHO: ABNORMAL
NRBC BLD AUTO-RTO: 0 /100 WBC (ref 0–0.2)
NRBC BLD AUTO-RTO: 0.1 /100 WBC (ref 0–0.2)
NT-PROBNP SERPL-MCNC: 2439 PG/ML (ref 0–450)
OXYHGB MFR BLDV: 81.1 % (ref 94–99)
PCO2 BLDA: 42.2 MM HG (ref 35–45)
PCO2 TEMP ADJ BLD: ABNORMAL MM[HG]
PH BLDA: 7.48 PH UNITS (ref 7.35–7.45)
PH, TEMP CORRECTED: ABNORMAL
PLATELET # BLD AUTO: 306 10*3/MM3 (ref 140–450)
PLATELET # BLD AUTO: 353 10*3/MM3 (ref 140–450)
PMV BLD AUTO: 9.7 FL (ref 6–12)
PMV BLD AUTO: 9.8 FL (ref 6–12)
PO2 BLDA: 49.8 MM HG (ref 83–108)
PO2 TEMP ADJ BLD: ABNORMAL MM[HG]
POTASSIUM SERPL-SCNC: 3.3 MMOL/L (ref 3.5–5.2)
POTASSIUM SERPL-SCNC: 3.6 MMOL/L (ref 3.5–5.2)
PROT SERPL-MCNC: 7.3 G/DL (ref 6–8.5)
PROT SERPL-MCNC: 7.4 G/DL (ref 6–8.5)
PROTHROMBIN TIME: 14.2 SECONDS (ref 12.8–14.5)
QT INTERVAL: 360 MS
QTC INTERVAL: 480 MS
RBC # BLD AUTO: 3.96 10*6/MM3 (ref 3.77–5.28)
RBC # BLD AUTO: 4.27 10*6/MM3 (ref 3.77–5.28)
RHINOVIRUS RNA SPEC NAA+PROBE: NOT DETECTED
RSV RNA NPH QL NAA+NON-PROBE: NOT DETECTED
SAO2 % BLDCOA: 88.1 % (ref 94–99)
SARS-COV-2 RNA NPH QL NAA+NON-PROBE: NOT DETECTED
SODIUM SERPL-SCNC: 135 MMOL/L (ref 136–145)
SODIUM SERPL-SCNC: 135 MMOL/L (ref 136–145)
TROPONIN T SERPL-MCNC: <0.01 NG/ML (ref 0–0.03)
TROPONIN T SERPL-MCNC: <0.01 NG/ML (ref 0–0.03)
TSH SERPL DL<=0.05 MIU/L-ACNC: 3.55 UIU/ML (ref 0.27–4.2)
VENTILATOR MODE: ABNORMAL
WBC NRBC COR # BLD: 13.67 10*3/MM3 (ref 3.4–10.8)
WBC NRBC COR # BLD: 16.58 10*3/MM3 (ref 3.4–10.8)
WHOLE BLOOD HOLD SPECIMEN: NORMAL
WHOLE BLOOD HOLD SPECIMEN: NORMAL

## 2022-01-10 PROCEDURE — 25010000002 CEFTRIAXONE PER 250 MG: Performed by: EMERGENCY MEDICINE

## 2022-01-10 PROCEDURE — 82962 GLUCOSE BLOOD TEST: CPT

## 2022-01-10 PROCEDURE — 0 IOPAMIDOL PER 1 ML: Performed by: EMERGENCY MEDICINE

## 2022-01-10 PROCEDURE — 82805 BLOOD GASES W/O2 SATURATION: CPT

## 2022-01-10 PROCEDURE — 0202U NFCT DS 22 TRGT SARS-COV-2: CPT | Performed by: EMERGENCY MEDICINE

## 2022-01-10 PROCEDURE — 25010000002 PROCHLORPERAZINE 10 MG/2ML SOLUTION: Performed by: EMERGENCY MEDICINE

## 2022-01-10 PROCEDURE — 85025 COMPLETE CBC W/AUTO DIFF WBC: CPT | Performed by: EMERGENCY MEDICINE

## 2022-01-10 PROCEDURE — 83880 ASSAY OF NATRIURETIC PEPTIDE: CPT | Performed by: EMERGENCY MEDICINE

## 2022-01-10 PROCEDURE — 25010000002 FUROSEMIDE PER 20 MG: Performed by: EMERGENCY MEDICINE

## 2022-01-10 PROCEDURE — 99223 1ST HOSP IP/OBS HIGH 75: CPT | Performed by: INTERNAL MEDICINE

## 2022-01-10 PROCEDURE — 85025 COMPLETE CBC W/AUTO DIFF WBC: CPT | Performed by: INTERNAL MEDICINE

## 2022-01-10 PROCEDURE — 71275 CT ANGIOGRAPHY CHEST: CPT | Performed by: RADIOLOGY

## 2022-01-10 PROCEDURE — 94640 AIRWAY INHALATION TREATMENT: CPT

## 2022-01-10 PROCEDURE — 84443 ASSAY THYROID STIM HORMONE: CPT | Performed by: EMERGENCY MEDICINE

## 2022-01-10 PROCEDURE — 71045 X-RAY EXAM CHEST 1 VIEW: CPT

## 2022-01-10 PROCEDURE — 93005 ELECTROCARDIOGRAM TRACING: CPT | Performed by: EMERGENCY MEDICINE

## 2022-01-10 PROCEDURE — 80053 COMPREHEN METABOLIC PANEL: CPT | Performed by: INTERNAL MEDICINE

## 2022-01-10 PROCEDURE — 85379 FIBRIN DEGRADATION QUANT: CPT | Performed by: EMERGENCY MEDICINE

## 2022-01-10 PROCEDURE — 82375 ASSAY CARBOXYHB QUANT: CPT

## 2022-01-10 PROCEDURE — 80053 COMPREHEN METABOLIC PANEL: CPT | Performed by: EMERGENCY MEDICINE

## 2022-01-10 PROCEDURE — 83605 ASSAY OF LACTIC ACID: CPT | Performed by: EMERGENCY MEDICINE

## 2022-01-10 PROCEDURE — 83874 ASSAY OF MYOGLOBIN: CPT | Performed by: EMERGENCY MEDICINE

## 2022-01-10 PROCEDURE — 36600 WITHDRAWAL OF ARTERIAL BLOOD: CPT

## 2022-01-10 PROCEDURE — 71045 X-RAY EXAM CHEST 1 VIEW: CPT | Performed by: RADIOLOGY

## 2022-01-10 PROCEDURE — 83050 HGB METHEMOGLOBIN QUAN: CPT

## 2022-01-10 PROCEDURE — 25010000002 DEXAMETHASONE PER 1 MG: Performed by: EMERGENCY MEDICINE

## 2022-01-10 PROCEDURE — 94799 UNLISTED PULMONARY SVC/PX: CPT

## 2022-01-10 PROCEDURE — 84484 ASSAY OF TROPONIN QUANT: CPT | Performed by: EMERGENCY MEDICINE

## 2022-01-10 PROCEDURE — 99284 EMERGENCY DEPT VISIT MOD MDM: CPT

## 2022-01-10 PROCEDURE — 85610 PROTHROMBIN TIME: CPT | Performed by: EMERGENCY MEDICINE

## 2022-01-10 PROCEDURE — 63710000001 INSULIN ASPART PER 5 UNITS: Performed by: INTERNAL MEDICINE

## 2022-01-10 PROCEDURE — 36415 COLL VENOUS BLD VENIPUNCTURE: CPT

## 2022-01-10 PROCEDURE — 71275 CT ANGIOGRAPHY CHEST: CPT

## 2022-01-10 PROCEDURE — 93010 ELECTROCARDIOGRAM REPORT: CPT | Performed by: INTERNAL MEDICINE

## 2022-01-10 PROCEDURE — 85730 THROMBOPLASTIN TIME PARTIAL: CPT | Performed by: EMERGENCY MEDICINE

## 2022-01-10 PROCEDURE — 86140 C-REACTIVE PROTEIN: CPT | Performed by: EMERGENCY MEDICINE

## 2022-01-10 PROCEDURE — 84484 ASSAY OF TROPONIN QUANT: CPT | Performed by: INTERNAL MEDICINE

## 2022-01-10 PROCEDURE — 83735 ASSAY OF MAGNESIUM: CPT | Performed by: INTERNAL MEDICINE

## 2022-01-10 PROCEDURE — 87040 BLOOD CULTURE FOR BACTERIA: CPT | Performed by: EMERGENCY MEDICINE

## 2022-01-10 RX ORDER — POTASSIUM CHLORIDE 20 MEQ/1
40 TABLET, EXTENDED RELEASE ORAL ONCE
Status: COMPLETED | OUTPATIENT
Start: 2022-01-11 | End: 2022-01-11

## 2022-01-10 RX ORDER — GABAPENTIN 400 MG/1
800 CAPSULE ORAL 4 TIMES DAILY
Status: CANCELLED | OUTPATIENT
Start: 2022-01-10

## 2022-01-10 RX ORDER — GUAIFENESIN 600 MG/1
600 TABLET, EXTENDED RELEASE ORAL EVERY 12 HOURS SCHEDULED
Status: DISCONTINUED | OUTPATIENT
Start: 2022-01-11 | End: 2022-01-13 | Stop reason: HOSPADM

## 2022-01-10 RX ORDER — OMEPRAZOLE 40 MG/1
40 CAPSULE, DELAYED RELEASE ORAL DAILY
COMMUNITY

## 2022-01-10 RX ORDER — FUROSEMIDE 10 MG/ML
40 INJECTION INTRAMUSCULAR; INTRAVENOUS ONCE
Status: COMPLETED | OUTPATIENT
Start: 2022-01-10 | End: 2022-01-10

## 2022-01-10 RX ORDER — IBUPROFEN 800 MG/1
800 TABLET ORAL EVERY 8 HOURS PRN
COMMUNITY

## 2022-01-10 RX ORDER — CLONAZEPAM 0.5 MG/1
1 TABLET ORAL 4 TIMES DAILY PRN
Status: CANCELLED | OUTPATIENT
Start: 2022-01-10 | End: 2022-01-17

## 2022-01-10 RX ORDER — POTASSIUM CHLORIDE 20 MEQ/1
40 TABLET, EXTENDED RELEASE ORAL ONCE
Status: COMPLETED | OUTPATIENT
Start: 2022-01-10 | End: 2022-01-10

## 2022-01-10 RX ORDER — DEXAMETHASONE SODIUM PHOSPHATE 10 MG/ML
10 INJECTION INTRAMUSCULAR; INTRAVENOUS ONCE
Status: COMPLETED | OUTPATIENT
Start: 2022-01-10 | End: 2022-01-10

## 2022-01-10 RX ORDER — ARIPIPRAZOLE 10 MG/1
10 TABLET ORAL DAILY
Status: CANCELLED | OUTPATIENT
Start: 2022-01-11

## 2022-01-10 RX ORDER — TRAZODONE HYDROCHLORIDE 50 MG/1
100 TABLET ORAL NIGHTLY
COMMUNITY

## 2022-01-10 RX ORDER — NICOTINE 21 MG/24HR
1 PATCH, TRANSDERMAL 24 HOURS TRANSDERMAL
Status: DISCONTINUED | OUTPATIENT
Start: 2022-01-11 | End: 2022-01-13 | Stop reason: HOSPADM

## 2022-01-10 RX ORDER — SODIUM CHLORIDE 0.9 % (FLUSH) 0.9 %
10 SYRINGE (ML) INJECTION AS NEEDED
Status: DISCONTINUED | OUTPATIENT
Start: 2022-01-10 | End: 2022-01-13 | Stop reason: HOSPADM

## 2022-01-10 RX ORDER — NITROGLYCERIN 0.4 MG/1
0.4 TABLET SUBLINGUAL
Status: DISCONTINUED | OUTPATIENT
Start: 2022-01-10 | End: 2022-01-13 | Stop reason: HOSPADM

## 2022-01-10 RX ORDER — INSULIN GLARGINE 100 [IU]/ML
50 INJECTION, SOLUTION SUBCUTANEOUS NIGHTLY
COMMUNITY

## 2022-01-10 RX ORDER — ATORVASTATIN CALCIUM 20 MG/1
20 TABLET, FILM COATED ORAL NIGHTLY
Status: CANCELLED | OUTPATIENT
Start: 2022-01-10

## 2022-01-10 RX ORDER — TRAZODONE HYDROCHLORIDE 50 MG/1
100 TABLET ORAL NIGHTLY
Status: CANCELLED | OUTPATIENT
Start: 2022-01-10

## 2022-01-10 RX ORDER — SODIUM CHLORIDE 0.9 % (FLUSH) 0.9 %
10 SYRINGE (ML) INJECTION EVERY 12 HOURS SCHEDULED
Status: DISCONTINUED | OUTPATIENT
Start: 2022-01-10 | End: 2022-01-13 | Stop reason: HOSPADM

## 2022-01-10 RX ORDER — ALBUTEROL SULFATE 90 UG/1
2 AEROSOL, METERED RESPIRATORY (INHALATION)
Status: COMPLETED | OUTPATIENT
Start: 2022-01-10 | End: 2022-01-10

## 2022-01-10 RX ORDER — PROCHLORPERAZINE EDISYLATE 5 MG/ML
5 INJECTION INTRAMUSCULAR; INTRAVENOUS EVERY 8 HOURS PRN
Status: DISCONTINUED | OUTPATIENT
Start: 2022-01-10 | End: 2022-01-13 | Stop reason: HOSPADM

## 2022-01-10 RX ORDER — HEPARIN SODIUM 5000 [USP'U]/ML
5000 INJECTION, SOLUTION INTRAVENOUS; SUBCUTANEOUS EVERY 12 HOURS SCHEDULED
Status: DISCONTINUED | OUTPATIENT
Start: 2022-01-11 | End: 2022-01-13 | Stop reason: HOSPADM

## 2022-01-10 RX ORDER — OXCARBAZEPINE 300 MG/1
600 TABLET, FILM COATED ORAL 2 TIMES DAILY
Status: CANCELLED | OUTPATIENT
Start: 2022-01-10

## 2022-01-10 RX ORDER — PROCHLORPERAZINE EDISYLATE 5 MG/ML
2.5 INJECTION INTRAMUSCULAR; INTRAVENOUS ONCE
Status: COMPLETED | OUTPATIENT
Start: 2022-01-10 | End: 2022-01-10

## 2022-01-10 RX ORDER — IPRATROPIUM BROMIDE AND ALBUTEROL SULFATE 2.5; .5 MG/3ML; MG/3ML
3 SOLUTION RESPIRATORY (INHALATION) ONCE
Status: COMPLETED | OUTPATIENT
Start: 2022-01-10 | End: 2022-01-10

## 2022-01-10 RX ORDER — SIMVASTATIN 40 MG
40 TABLET ORAL NIGHTLY
COMMUNITY

## 2022-01-10 RX ORDER — IPRATROPIUM BROMIDE AND ALBUTEROL SULFATE 2.5; .5 MG/3ML; MG/3ML
3 SOLUTION RESPIRATORY (INHALATION)
Status: DISCONTINUED | OUTPATIENT
Start: 2022-01-11 | End: 2022-01-13 | Stop reason: HOSPADM

## 2022-01-10 RX ORDER — HYDROCHLOROTHIAZIDE 25 MG/1
25 TABLET ORAL DAILY
Status: CANCELLED | OUTPATIENT
Start: 2022-01-11

## 2022-01-10 RX ORDER — DEXTROSE MONOHYDRATE 25 G/50ML
25 INJECTION, SOLUTION INTRAVENOUS
Status: DISCONTINUED | OUTPATIENT
Start: 2022-01-10 | End: 2022-01-13 | Stop reason: HOSPADM

## 2022-01-10 RX ORDER — NICOTINE POLACRILEX 4 MG
15 LOZENGE BUCCAL
Status: DISCONTINUED | OUTPATIENT
Start: 2022-01-10 | End: 2022-01-13 | Stop reason: HOSPADM

## 2022-01-10 RX ORDER — IBUPROFEN 400 MG/1
800 TABLET ORAL EVERY 8 HOURS PRN
Status: CANCELLED | OUTPATIENT
Start: 2022-01-10

## 2022-01-10 RX ORDER — PANTOPRAZOLE SODIUM 40 MG/1
40 TABLET, DELAYED RELEASE ORAL EVERY MORNING
Status: CANCELLED | OUTPATIENT
Start: 2022-01-11

## 2022-01-10 RX ORDER — ALBUTEROL SULFATE 90 UG/1
2 AEROSOL, METERED RESPIRATORY (INHALATION) EVERY 4 HOURS PRN
Status: CANCELLED | OUTPATIENT
Start: 2022-01-10

## 2022-01-10 RX ORDER — LISINOPRIL 2.5 MG/1
2.5 TABLET ORAL DAILY
Status: CANCELLED | OUTPATIENT
Start: 2022-01-10

## 2022-01-10 RX ADMIN — IOPAMIDOL 85 ML: 755 INJECTION, SOLUTION INTRAVENOUS at 17:44

## 2022-01-10 RX ADMIN — HEPARIN SODIUM 5000 UNITS: 5000 INJECTION INTRAVENOUS; SUBCUTANEOUS at 22:57

## 2022-01-10 RX ADMIN — POTASSIUM CHLORIDE 40 MEQ: 20 TABLET, EXTENDED RELEASE ORAL at 16:17

## 2022-01-10 RX ADMIN — ALBUTEROL SULFATE 2 PUFF: 90 AEROSOL, METERED RESPIRATORY (INHALATION) at 13:20

## 2022-01-10 RX ADMIN — INSULIN ASPART 8 UNITS: 100 INJECTION, SOLUTION INTRAVENOUS; SUBCUTANEOUS at 22:57

## 2022-01-10 RX ADMIN — PROCHLORPERAZINE EDISYLATE 2.5 MG: 5 INJECTION, SOLUTION INTRAMUSCULAR; INTRAVENOUS at 18:37

## 2022-01-10 RX ADMIN — SODIUM CHLORIDE 2 G: 9 INJECTION, SOLUTION INTRAVENOUS at 16:17

## 2022-01-10 RX ADMIN — DOXYCYCLINE 100 MG: 100 INJECTION, POWDER, LYOPHILIZED, FOR SOLUTION INTRAVENOUS at 16:18

## 2022-01-10 RX ADMIN — ALBUTEROL SULFATE 2 PUFF: 90 AEROSOL, METERED RESPIRATORY (INHALATION) at 13:40

## 2022-01-10 RX ADMIN — FUROSEMIDE 40 MG: 10 INJECTION, SOLUTION INTRAVENOUS at 16:17

## 2022-01-10 RX ADMIN — SODIUM CHLORIDE, PRESERVATIVE FREE 10 ML: 5 INJECTION INTRAVENOUS at 22:57

## 2022-01-10 RX ADMIN — DEXAMETHASONE SODIUM PHOSPHATE 10 MG: 10 INJECTION INTRAMUSCULAR; INTRAVENOUS at 13:23

## 2022-01-10 RX ADMIN — IPRATROPIUM BROMIDE AND ALBUTEROL SULFATE 3 ML: .5; 3 SOLUTION RESPIRATORY (INHALATION) at 15:45

## 2022-01-10 RX ADMIN — ALBUTEROL SULFATE 2 PUFF: 90 AEROSOL, METERED RESPIRATORY (INHALATION) at 13:30

## 2022-01-10 NOTE — ED PROVIDER NOTES
"Subjective   46-year-old female who reports history of COPD, not on home oxygen, presents complaining with 3 to 4-day history of worsening shortness of breath, has become severe and intolerable today.  Her cough has not been productive.  She reports an abscess on her mid back that has been present for 3 to 4 days as well.  Her family has been \"squeezing pus out of it\".  She denies fever, chills, chest pain, hemoptysis, abdominal pain, vomiting, syncope or near syncope, focal numbness or weakness, other symptoms or other complaints.          Review of Systems   All other systems reviewed and are negative.      Past Medical History:   Diagnosis Date   • Diabetes mellitus (CMS/HCC)    • Hyperlipemia    • Hypertension    • JOANN (obstructive sleep apnea)    • Vulvar cancer (CMS/HCC)        Allergies   Allergen Reactions   • Codeine Nausea Only       Past Surgical History:   Procedure Laterality Date   • HYSTERECTOMY     • MASTECTOMY         Family History   Problem Relation Age of Onset   • Cancer Mother    • Parkinsonism Father    • Diabetes Father    • Cancer Maternal Aunt    • Diabetes Paternal Aunt    • Diabetes Paternal Grandmother        Social History     Socioeconomic History   • Marital status:    Tobacco Use   • Smoking status: Current Every Day Smoker     Packs/day: 0.50     Types: Cigarettes     Last attempt to quit: 2021     Years since quittin.8   • Smokeless tobacco: Never Used   Vaping Use   • Vaping Use: Some days   • Substances: Nicotine, Flavoring   • Devices: Refillable tank   Substance and Sexual Activity   • Alcohol use: Never   • Drug use: Never   • Sexual activity: Defer           Objective   Physical Exam  Vitals and nursing note reviewed.   Constitutional:       General: She is in acute distress.      Appearance: Normal appearance. She is well-developed. She is ill-appearing.      Comments: Patient is in mild to moderate respiratory distress.   HENT:      Head: Normocephalic and " atraumatic.   Eyes:      General: No scleral icterus.     Pupils: Pupils are equal, round, and reactive to light.   Neck:      Trachea: No tracheal deviation.   Cardiovascular:      Rate and Rhythm: Regular rhythm. Tachycardia present.   Pulmonary:      Effort: Respiratory distress present.      Breath sounds: Decreased breath sounds, wheezing and rhonchi present.      Comments: Mildly diminished breath sounds throughout, wheezes and rhonchi in all fields.  Chest:      Chest wall: No tenderness.   Abdominal:      General: Bowel sounds are normal.      Palpations: Abdomen is soft.      Tenderness: There is no abdominal tenderness. There is no guarding or rebound.   Musculoskeletal:         General: No tenderness. Normal range of motion.      Cervical back: Normal range of motion and neck supple. No rigidity or tenderness.      Right lower leg: No tenderness. No edema.      Left lower leg: No tenderness. No edema.   Skin:     General: Skin is warm and dry.      Capillary Refill: Capillary refill takes less than 2 seconds.      Coloration: Skin is not pale.      Comments: Mid back, lower thoracic midline, approximately 4 x 6 cm area of erythema, mild swelling and induration.  This is not fluctuant.  There is no drainage.   Neurological:      General: No focal deficit present.      Mental Status: She is alert and oriented to person, place, and time.      GCS: GCS eye subscore is 4. GCS verbal subscore is 5. GCS motor subscore is 6.      Motor: No abnormal muscle tone.   Psychiatric:         Behavior: Behavior normal.         Procedures           ED Course  ED Course as of 01/10/22 1936   Mon Don 10, 2022   1322 ABG noted.  Oxygen increased to 4 L per nasal cannula. [CM]   1409 O2 sat currently 93%, on 4 L of oxygen per nasal cannula.  Work-up in progress. [CM]   1936 XR Chest 1 View  IMPRESSION:  Bilateral airspace disease, likely representing a combination of  pneumonia and CHF [ES]   1936 CT Chest Pulmonary  Embolism  IMPRESSION:  1. No evidence of a pulmonary embolus  2. Diffuse airspace disease bilaterally which may represent edema and/or  combination of pneumonia and CHF.  3. Hazy density in the subcutaneous tissues of the posterior chest wall  on image 108 of the axial series. Possibly contusion. Recommend clinical  correlation.. [ES]   1936 ECG 12 Lead  Vent. Rate : 107 BPM     Atrial Rate : 107 BPM     P-R Int : 142 ms          QRS Dur :  92 ms      QT Int : 360 ms       P-R-T Axes :  48  -6  76 degrees     QTc Int : 480 ms     Sinus tachycardia  Possible Anterior infarct , age undetermined  Left atrial enlargement  Abnormal ECG  No previous ECGs available [ES]      ED Course User Index  [CM] David Dumont MD  [ES] Faustino Ellis MD                                                 MDM  Number of Diagnoses or Management Options  Acute respiratory failure with hypoxia and hypercapnia (HCC): new and requires workup  COPD exacerbation (HCC): new and requires workup     Amount and/or Complexity of Data Reviewed  Clinical lab tests: ordered and reviewed  Tests in the radiology section of CPT®: ordered and reviewed  Tests in the medicine section of CPT®: ordered and reviewed  Review and summarize past medical records: yes  Discuss the patient with other providers: yes  Independent visualization of images, tracings, or specimens: yes    Risk of Complications, Morbidity, and/or Mortality  Presenting problems: moderate  Diagnostic procedures: moderate  Management options: moderate    Patient Progress  Patient progress: stable      Final diagnoses:   Acute respiratory failure with hypoxia and hypercapnia (HCC)   COPD exacerbation (HCC)       ED Disposition  ED Disposition     ED Disposition Condition Comment    Decision to Admit            No follow-up provider specified.       Medication List      ASK your doctor about these medications    insulin glargine 100 UNIT/ML injection  Commonly known as: LANTUS,  SEMGLEE  Ask about: Which instructions should I use?     omeprazole 40 MG capsule  Commonly known as: priLOSEC  Ask about: Which instructions should I use?     simvastatin 40 MG tablet  Commonly known as: ZOCOR  Ask about: Which instructions should I use?             Faustino Ellis MD  01/10/22 1936

## 2022-01-11 ENCOUNTER — APPOINTMENT (OUTPATIENT)
Dept: CARDIOLOGY | Facility: HOSPITAL | Age: 47
End: 2022-01-11

## 2022-01-11 ENCOUNTER — ANESTHESIA EVENT (OUTPATIENT)
Dept: PERIOP | Facility: HOSPITAL | Age: 47
End: 2022-01-11

## 2022-01-11 ENCOUNTER — ANESTHESIA (OUTPATIENT)
Dept: PERIOP | Facility: HOSPITAL | Age: 47
End: 2022-01-11

## 2022-01-11 PROBLEM — L02.212 CUTANEOUS ABSCESS OF BACK EXCLUDING BUTTOCKS: Status: ACTIVE | Noted: 2022-01-10

## 2022-01-11 LAB
BH CV ECHO MEAS - % IVS THICK: -4.5 %
BH CV ECHO MEAS - % LVPW THICK: -10.6 %
BH CV ECHO MEAS - ACS: 2.3 CM
BH CV ECHO MEAS - AO MAX PG: 9.7 MMHG
BH CV ECHO MEAS - AO MEAN PG: 5 MMHG
BH CV ECHO MEAS - AO ROOT AREA (BSA CORRECTED): 1.5
BH CV ECHO MEAS - AO ROOT AREA: 8.6 CM^2
BH CV ECHO MEAS - AO ROOT DIAM: 3.3 CM
BH CV ECHO MEAS - AO V2 MAX: 156 CM/SEC
BH CV ECHO MEAS - AO V2 MEAN: 101 CM/SEC
BH CV ECHO MEAS - AO V2 VTI: 29.5 CM
BH CV ECHO MEAS - BSA(HAYCOCK): 2.4 M^2
BH CV ECHO MEAS - BSA: 2.2 M^2
BH CV ECHO MEAS - BZI_BMI: 44.1 KILOGRAMS/M^2
BH CV ECHO MEAS - BZI_METRIC_HEIGHT: 165.1 CM
BH CV ECHO MEAS - BZI_METRIC_WEIGHT: 120.2 KG
BH CV ECHO MEAS - EDV(CUBED): 81.2 ML
BH CV ECHO MEAS - EDV(MOD-SP4): 102 ML
BH CV ECHO MEAS - EDV(TEICH): 84.4 ML
BH CV ECHO MEAS - EF(CUBED): 69.8 %
BH CV ECHO MEAS - EF(MOD-SP4): 72.9 %
BH CV ECHO MEAS - EF(TEICH): 61.7 %
BH CV ECHO MEAS - ESV(CUBED): 24.5 ML
BH CV ECHO MEAS - ESV(MOD-SP4): 27.6 ML
BH CV ECHO MEAS - ESV(TEICH): 32.3 ML
BH CV ECHO MEAS - FS: 32.9 %
BH CV ECHO MEAS - IVS/LVPW: 0.84
BH CV ECHO MEAS - IVSD: 1.1 CM
BH CV ECHO MEAS - IVSS: 1.1 CM
BH CV ECHO MEAS - LA DIMENSION: 3.7 CM
BH CV ECHO MEAS - LA/AO: 1.1
BH CV ECHO MEAS - LV DIASTOLIC VOL/BSA (35-75): 45.8 ML/M^2
BH CV ECHO MEAS - LV MASS(C)D: 190.1 GRAMS
BH CV ECHO MEAS - LV MASS(C)DI: 85.3 GRAMS/M^2
BH CV ECHO MEAS - LV MASS(C)S: 93.5 GRAMS
BH CV ECHO MEAS - LV MASS(C)SI: 41.9 GRAMS/M^2
BH CV ECHO MEAS - LV SYSTOLIC VOL/BSA (12-30): 12.4 ML/M^2
BH CV ECHO MEAS - LVIDD: 4.3 CM
BH CV ECHO MEAS - LVIDS: 2.9 CM
BH CV ECHO MEAS - LVLD AP4: 7.2 CM
BH CV ECHO MEAS - LVLS AP4: 5.8 CM
BH CV ECHO MEAS - LVOT AREA (M): 4.2 CM^2
BH CV ECHO MEAS - LVOT AREA: 4.2 CM^2
BH CV ECHO MEAS - LVOT DIAM: 2.3 CM
BH CV ECHO MEAS - LVPWD: 1.3 CM
BH CV ECHO MEAS - LVPWS: 1.2 CM
BH CV ECHO MEAS - MV A MAX VEL: 62.1 CM/SEC
BH CV ECHO MEAS - MV E MAX VEL: 92.5 CM/SEC
BH CV ECHO MEAS - MV E/A: 1.5
BH CV ECHO MEAS - PA ACC TIME: 0.09 SEC
BH CV ECHO MEAS - PA PR(ACCEL): 37.6 MMHG
BH CV ECHO MEAS - SI(AO): 113.2 ML/M^2
BH CV ECHO MEAS - SI(CUBED): 25.4 ML/M^2
BH CV ECHO MEAS - SI(MOD-SP4): 33.4 ML/M^2
BH CV ECHO MEAS - SI(TEICH): 23.4 ML/M^2
BH CV ECHO MEAS - SV(AO): 252.3 ML
BH CV ECHO MEAS - SV(CUBED): 56.7 ML
BH CV ECHO MEAS - SV(MOD-SP4): 74.4 ML
BH CV ECHO MEAS - SV(TEICH): 52.1 ML
GLUCOSE BLDC GLUCOMTR-MCNC: 158 MG/DL (ref 70–130)
GLUCOSE BLDC GLUCOMTR-MCNC: 196 MG/DL (ref 70–130)
GLUCOSE BLDC GLUCOMTR-MCNC: 234 MG/DL (ref 70–130)
GLUCOSE BLDC GLUCOMTR-MCNC: 239 MG/DL (ref 70–130)
GLUCOSE BLDC GLUCOMTR-MCNC: 264 MG/DL (ref 70–130)
QT INTERVAL: 422 MS
QTC INTERVAL: 493 MS
S PNEUM AG SPEC QL LA: NEGATIVE
TROPONIN T SERPL-MCNC: <0.01 NG/ML (ref 0–0.03)
TROPONIN T SERPL-MCNC: <0.01 NG/ML (ref 0–0.03)

## 2022-01-11 PROCEDURE — 25010000002 FENTANYL CITRATE (PF) 50 MCG/ML SOLUTION: Performed by: NURSE ANESTHETIST, CERTIFIED REGISTERED

## 2022-01-11 PROCEDURE — 63710000001 INSULIN DETEMIR PER 5 UNITS: Performed by: SURGERY

## 2022-01-11 PROCEDURE — 93005 ELECTROCARDIOGRAM TRACING: CPT | Performed by: INTERNAL MEDICINE

## 2022-01-11 PROCEDURE — 87147 CULTURE TYPE IMMUNOLOGIC: CPT | Performed by: SURGERY

## 2022-01-11 PROCEDURE — 93010 ELECTROCARDIOGRAM REPORT: CPT | Performed by: INTERNAL MEDICINE

## 2022-01-11 PROCEDURE — 25010000002 CEFTRIAXONE PER 250 MG: Performed by: SURGERY

## 2022-01-11 PROCEDURE — 63710000001 INSULIN DETEMIR PER 5 UNITS: Performed by: INTERNAL MEDICINE

## 2022-01-11 PROCEDURE — 94640 AIRWAY INHALATION TREATMENT: CPT

## 2022-01-11 PROCEDURE — 63710000001 INSULIN ASPART PER 5 UNITS: Performed by: INTERNAL MEDICINE

## 2022-01-11 PROCEDURE — 25010000002 HEPARIN (PORCINE) PER 1000 UNITS: Performed by: SURGERY

## 2022-01-11 PROCEDURE — 25010000002 ONDANSETRON PER 1 MG: Performed by: NURSE ANESTHETIST, CERTIFIED REGISTERED

## 2022-01-11 PROCEDURE — 84484 ASSAY OF TROPONIN QUANT: CPT | Performed by: INTERNAL MEDICINE

## 2022-01-11 PROCEDURE — 0W9K0ZZ DRAINAGE OF UPPER BACK, OPEN APPROACH: ICD-10-PCS | Performed by: SURGERY

## 2022-01-11 PROCEDURE — 87186 SC STD MICRODIL/AGAR DIL: CPT | Performed by: SURGERY

## 2022-01-11 PROCEDURE — 63710000001 INSULIN ASPART PER 5 UNITS: Performed by: SURGERY

## 2022-01-11 PROCEDURE — 87205 SMEAR GRAM STAIN: CPT | Performed by: SURGERY

## 2022-01-11 PROCEDURE — 93306 TTE W/DOPPLER COMPLETE: CPT

## 2022-01-11 PROCEDURE — 82962 GLUCOSE BLOOD TEST: CPT

## 2022-01-11 PROCEDURE — 25010000002 HEPARIN (PORCINE) PER 1000 UNITS: Performed by: INTERNAL MEDICINE

## 2022-01-11 PROCEDURE — 87070 CULTURE OTHR SPECIMN AEROBIC: CPT | Performed by: SURGERY

## 2022-01-11 PROCEDURE — 25010000002 PROPOFOL 10 MG/ML EMULSION: Performed by: NURSE ANESTHETIST, CERTIFIED REGISTERED

## 2022-01-11 PROCEDURE — 94799 UNLISTED PULMONARY SVC/PX: CPT

## 2022-01-11 PROCEDURE — 25010000002 MIDAZOLAM PER 1 MG: Performed by: NURSE ANESTHETIST, CERTIFIED REGISTERED

## 2022-01-11 PROCEDURE — 93306 TTE W/DOPPLER COMPLETE: CPT | Performed by: SPECIALIST

## 2022-01-11 PROCEDURE — 99221 1ST HOSP IP/OBS SF/LOW 40: CPT | Performed by: SURGERY

## 2022-01-11 PROCEDURE — 10060 I&D ABSCESS SIMPLE/SINGLE: CPT | Performed by: SURGERY

## 2022-01-11 PROCEDURE — 99232 SBSQ HOSP IP/OBS MODERATE 35: CPT | Performed by: STUDENT IN AN ORGANIZED HEALTH CARE EDUCATION/TRAINING PROGRAM

## 2022-01-11 PROCEDURE — 87899 AGENT NOS ASSAY W/OPTIC: CPT | Performed by: INTERNAL MEDICINE

## 2022-01-11 RX ORDER — KETOROLAC TROMETHAMINE 30 MG/ML
30 INJECTION, SOLUTION INTRAMUSCULAR; INTRAVENOUS EVERY 6 HOURS PRN
Status: DISCONTINUED | OUTPATIENT
Start: 2022-01-11 | End: 2022-01-11

## 2022-01-11 RX ORDER — ARIPIPRAZOLE 10 MG/1
10 TABLET ORAL DAILY
Status: DISCONTINUED | OUTPATIENT
Start: 2022-01-11 | End: 2022-01-13 | Stop reason: HOSPADM

## 2022-01-11 RX ORDER — ALBUTEROL SULFATE 2.5 MG/3ML
2.5 SOLUTION RESPIRATORY (INHALATION) EVERY 6 HOURS PRN
Status: DISCONTINUED | OUTPATIENT
Start: 2022-01-11 | End: 2022-01-13 | Stop reason: HOSPADM

## 2022-01-11 RX ORDER — KETAMINE HYDROCHLORIDE 100 MG/ML
INJECTION INTRAMUSCULAR; INTRAVENOUS AS NEEDED
Status: DISCONTINUED | OUTPATIENT
Start: 2022-01-11 | End: 2022-01-11 | Stop reason: SURG

## 2022-01-11 RX ORDER — PROMETHAZINE HYDROCHLORIDE 25 MG/1
25 TABLET ORAL EVERY 6 HOURS PRN
Status: DISCONTINUED | OUTPATIENT
Start: 2022-01-11 | End: 2022-01-13 | Stop reason: HOSPADM

## 2022-01-11 RX ORDER — ONDANSETRON 2 MG/ML
INJECTION INTRAMUSCULAR; INTRAVENOUS AS NEEDED
Status: DISCONTINUED | OUTPATIENT
Start: 2022-01-11 | End: 2022-01-11 | Stop reason: SURG

## 2022-01-11 RX ORDER — BUPIVACAINE HYDROCHLORIDE AND EPINEPHRINE 2.5; 5 MG/ML; UG/ML
INJECTION, SOLUTION EPIDURAL; INFILTRATION; INTRACAUDAL; PERINEURAL AS NEEDED
Status: DISCONTINUED | OUTPATIENT
Start: 2022-01-11 | End: 2022-01-11 | Stop reason: HOSPADM

## 2022-01-11 RX ORDER — SODIUM CHLORIDE, SODIUM LACTATE, POTASSIUM CHLORIDE, CALCIUM CHLORIDE 600; 310; 30; 20 MG/100ML; MG/100ML; MG/100ML; MG/100ML
100 INJECTION, SOLUTION INTRAVENOUS ONCE AS NEEDED
Status: DISCONTINUED | OUTPATIENT
Start: 2022-01-11 | End: 2022-01-11

## 2022-01-11 RX ORDER — PROPOFOL 10 MG/ML
VIAL (ML) INTRAVENOUS AS NEEDED
Status: DISCONTINUED | OUTPATIENT
Start: 2022-01-11 | End: 2022-01-11 | Stop reason: SURG

## 2022-01-11 RX ORDER — MEPERIDINE HYDROCHLORIDE 25 MG/ML
12.5 INJECTION INTRAMUSCULAR; INTRAVENOUS; SUBCUTANEOUS
Status: DISCONTINUED | OUTPATIENT
Start: 2022-01-11 | End: 2022-01-11

## 2022-01-11 RX ORDER — PANTOPRAZOLE SODIUM 40 MG/1
40 TABLET, DELAYED RELEASE ORAL EVERY MORNING
Status: DISCONTINUED | OUTPATIENT
Start: 2022-01-11 | End: 2022-01-13 | Stop reason: HOSPADM

## 2022-01-11 RX ORDER — MIDAZOLAM HYDROCHLORIDE 1 MG/ML
INJECTION INTRAMUSCULAR; INTRAVENOUS AS NEEDED
Status: DISCONTINUED | OUTPATIENT
Start: 2022-01-11 | End: 2022-01-11 | Stop reason: SURG

## 2022-01-11 RX ORDER — LISINOPRIL 2.5 MG/1
2.5 TABLET ORAL DAILY
Status: DISCONTINUED | OUTPATIENT
Start: 2022-01-11 | End: 2022-01-13 | Stop reason: HOSPADM

## 2022-01-11 RX ORDER — IBUPROFEN 800 MG/1
800 TABLET ORAL EVERY 8 HOURS PRN
Status: DISCONTINUED | OUTPATIENT
Start: 2022-01-11 | End: 2022-01-13 | Stop reason: HOSPADM

## 2022-01-11 RX ORDER — FAMOTIDINE 10 MG/ML
INJECTION, SOLUTION INTRAVENOUS AS NEEDED
Status: DISCONTINUED | OUTPATIENT
Start: 2022-01-11 | End: 2022-01-11 | Stop reason: SURG

## 2022-01-11 RX ORDER — OXYCODONE HYDROCHLORIDE AND ACETAMINOPHEN 5; 325 MG/1; MG/1
1 TABLET ORAL ONCE AS NEEDED
Status: DISCONTINUED | OUTPATIENT
Start: 2022-01-11 | End: 2022-01-11

## 2022-01-11 RX ORDER — OXCARBAZEPINE 300 MG/1
600 TABLET, FILM COATED ORAL 2 TIMES DAILY
Status: DISCONTINUED | OUTPATIENT
Start: 2022-01-11 | End: 2022-01-13 | Stop reason: HOSPADM

## 2022-01-11 RX ORDER — MAGNESIUM HYDROXIDE 1200 MG/15ML
LIQUID ORAL AS NEEDED
Status: DISCONTINUED | OUTPATIENT
Start: 2022-01-11 | End: 2022-01-11 | Stop reason: HOSPADM

## 2022-01-11 RX ORDER — SODIUM CHLORIDE, SODIUM LACTATE, POTASSIUM CHLORIDE, CALCIUM CHLORIDE 600; 310; 30; 20 MG/100ML; MG/100ML; MG/100ML; MG/100ML
125 INJECTION, SOLUTION INTRAVENOUS ONCE
Status: COMPLETED | OUTPATIENT
Start: 2022-01-11 | End: 2022-01-11

## 2022-01-11 RX ORDER — GLYCOPYRROLATE 0.2 MG/ML
INJECTION INTRAMUSCULAR; INTRAVENOUS AS NEEDED
Status: DISCONTINUED | OUTPATIENT
Start: 2022-01-11 | End: 2022-01-11 | Stop reason: SURG

## 2022-01-11 RX ORDER — SODIUM CHLORIDE 0.9 % (FLUSH) 0.9 %
10 SYRINGE (ML) INJECTION AS NEEDED
Status: DISCONTINUED | OUTPATIENT
Start: 2022-01-11 | End: 2022-01-11 | Stop reason: HOSPADM

## 2022-01-11 RX ORDER — MIDAZOLAM HYDROCHLORIDE 1 MG/ML
1 INJECTION INTRAMUSCULAR; INTRAVENOUS
Status: DISCONTINUED | OUTPATIENT
Start: 2022-01-11 | End: 2022-01-11 | Stop reason: HOSPADM

## 2022-01-11 RX ORDER — LIDOCAINE HYDROCHLORIDE 20 MG/ML
INJECTION, SOLUTION INFILTRATION; PERINEURAL AS NEEDED
Status: DISCONTINUED | OUTPATIENT
Start: 2022-01-11 | End: 2022-01-11 | Stop reason: SURG

## 2022-01-11 RX ORDER — DROPERIDOL 2.5 MG/ML
0.62 INJECTION, SOLUTION INTRAMUSCULAR; INTRAVENOUS ONCE AS NEEDED
Status: DISCONTINUED | OUTPATIENT
Start: 2022-01-11 | End: 2022-01-11

## 2022-01-11 RX ORDER — IPRATROPIUM BROMIDE AND ALBUTEROL SULFATE 2.5; .5 MG/3ML; MG/3ML
3 SOLUTION RESPIRATORY (INHALATION) ONCE AS NEEDED
Status: DISCONTINUED | OUTPATIENT
Start: 2022-01-11 | End: 2022-01-11

## 2022-01-11 RX ORDER — ATORVASTATIN CALCIUM 20 MG/1
20 TABLET, FILM COATED ORAL DAILY
Status: DISCONTINUED | OUTPATIENT
Start: 2022-01-11 | End: 2022-01-13 | Stop reason: HOSPADM

## 2022-01-11 RX ORDER — ONDANSETRON 2 MG/ML
4 INJECTION INTRAMUSCULAR; INTRAVENOUS AS NEEDED
Status: DISCONTINUED | OUTPATIENT
Start: 2022-01-11 | End: 2022-01-11

## 2022-01-11 RX ORDER — FENTANYL CITRATE 50 UG/ML
50 INJECTION, SOLUTION INTRAMUSCULAR; INTRAVENOUS
Status: DISCONTINUED | OUTPATIENT
Start: 2022-01-11 | End: 2022-01-11

## 2022-01-11 RX ORDER — SODIUM CHLORIDE, SODIUM LACTATE, POTASSIUM CHLORIDE, CALCIUM CHLORIDE 600; 310; 30; 20 MG/100ML; MG/100ML; MG/100ML; MG/100ML
INJECTION, SOLUTION INTRAVENOUS CONTINUOUS PRN
Status: DISCONTINUED | OUTPATIENT
Start: 2022-01-11 | End: 2022-01-11 | Stop reason: SURG

## 2022-01-11 RX ORDER — SODIUM CHLORIDE 0.9 % (FLUSH) 0.9 %
10 SYRINGE (ML) INJECTION EVERY 12 HOURS SCHEDULED
Status: DISCONTINUED | OUTPATIENT
Start: 2022-01-11 | End: 2022-01-11 | Stop reason: HOSPADM

## 2022-01-11 RX ORDER — HYDROCODONE BITARTRATE AND ACETAMINOPHEN 5; 325 MG/1; MG/1
1 TABLET ORAL EVERY 6 HOURS PRN
Status: DISCONTINUED | OUTPATIENT
Start: 2022-01-11 | End: 2022-01-13 | Stop reason: HOSPADM

## 2022-01-11 RX ORDER — GABAPENTIN 400 MG/1
800 CAPSULE ORAL EVERY 6 HOURS SCHEDULED
Status: DISCONTINUED | OUTPATIENT
Start: 2022-01-11 | End: 2022-01-13 | Stop reason: HOSPADM

## 2022-01-11 RX ORDER — ACETAMINOPHEN 160 MG
TABLET,DISINTEGRATING ORAL AS NEEDED
Status: DISCONTINUED | OUTPATIENT
Start: 2022-01-11 | End: 2022-01-11 | Stop reason: HOSPADM

## 2022-01-11 RX ORDER — TRAZODONE HYDROCHLORIDE 50 MG/1
100 TABLET ORAL NIGHTLY
Status: DISCONTINUED | OUTPATIENT
Start: 2022-01-11 | End: 2022-01-13 | Stop reason: HOSPADM

## 2022-01-11 RX ORDER — CLONAZEPAM 1 MG/1
1 TABLET ORAL 4 TIMES DAILY PRN
Status: DISCONTINUED | OUTPATIENT
Start: 2022-01-11 | End: 2022-01-13 | Stop reason: HOSPADM

## 2022-01-11 RX ORDER — FENTANYL CITRATE 50 UG/ML
INJECTION, SOLUTION INTRAMUSCULAR; INTRAVENOUS AS NEEDED
Status: DISCONTINUED | OUTPATIENT
Start: 2022-01-11 | End: 2022-01-11 | Stop reason: SURG

## 2022-01-11 RX ADMIN — SODIUM CHLORIDE 2 G: 9 INJECTION, SOLUTION INTRAVENOUS at 16:01

## 2022-01-11 RX ADMIN — TRAZODONE HYDROCHLORIDE 100 MG: 50 TABLET ORAL at 01:56

## 2022-01-11 RX ADMIN — ATORVASTATIN CALCIUM 20 MG: 20 TABLET, FILM COATED ORAL at 12:01

## 2022-01-11 RX ADMIN — KETAMINE HYDROCHLORIDE 5 MG: 100 INJECTION INTRAMUSCULAR; INTRAVENOUS at 10:21

## 2022-01-11 RX ADMIN — HYDROCODONE BITARTRATE AND ACETAMINOPHEN 1 TABLET: 5; 325 TABLET ORAL at 12:04

## 2022-01-11 RX ADMIN — IBUPROFEN 800 MG: 800 TABLET, FILM COATED ORAL at 19:57

## 2022-01-11 RX ADMIN — GLYCOPYRROLATE 0.2 MG: 0.2 INJECTION, SOLUTION INTRAMUSCULAR; INTRAVENOUS at 10:21

## 2022-01-11 RX ADMIN — PROPOFOL 25 MG: 10 INJECTION, EMULSION INTRAVENOUS at 10:38

## 2022-01-11 RX ADMIN — KETAMINE HYDROCHLORIDE 5 MG: 100 INJECTION INTRAMUSCULAR; INTRAVENOUS at 10:38

## 2022-01-11 RX ADMIN — SODIUM CHLORIDE, POTASSIUM CHLORIDE, SODIUM LACTATE AND CALCIUM CHLORIDE: 600; 310; 30; 20 INJECTION, SOLUTION INTRAVENOUS at 10:16

## 2022-01-11 RX ADMIN — HEPARIN SODIUM 5000 UNITS: 5000 INJECTION INTRAVENOUS; SUBCUTANEOUS at 19:50

## 2022-01-11 RX ADMIN — PANTOPRAZOLE SODIUM 40 MG: 40 TABLET, DELAYED RELEASE ORAL at 01:57

## 2022-01-11 RX ADMIN — PROPOFOL 25 MG: 10 INJECTION, EMULSION INTRAVENOUS at 10:48

## 2022-01-11 RX ADMIN — IPRATROPIUM BROMIDE AND ALBUTEROL SULFATE 3 ML: .5; 3 SOLUTION RESPIRATORY (INHALATION) at 06:48

## 2022-01-11 RX ADMIN — MIDAZOLAM 2 MG: 1 INJECTION INTRAMUSCULAR; INTRAVENOUS at 10:21

## 2022-01-11 RX ADMIN — INSULIN DETEMIR 50 UNITS: 100 INJECTION, SOLUTION SUBCUTANEOUS at 19:56

## 2022-01-11 RX ADMIN — GUAIFENESIN 600 MG: 600 TABLET, EXTENDED RELEASE ORAL at 00:54

## 2022-01-11 RX ADMIN — IPRATROPIUM BROMIDE AND ALBUTEROL SULFATE 3 ML: .5; 3 SOLUTION RESPIRATORY (INHALATION) at 01:01

## 2022-01-11 RX ADMIN — OXCARBAZEPINE 600 MG: 300 TABLET, FILM COATED ORAL at 19:57

## 2022-01-11 RX ADMIN — LIDOCAINE HYDROCHLORIDE 60 MG: 20 INJECTION, SOLUTION INFILTRATION; PERINEURAL at 10:21

## 2022-01-11 RX ADMIN — IBUPROFEN 800 MG: 800 TABLET, FILM COATED ORAL at 01:56

## 2022-01-11 RX ADMIN — GABAPENTIN 800 MG: 400 CAPSULE ORAL at 12:01

## 2022-01-11 RX ADMIN — POTASSIUM CHLORIDE 40 MEQ: 1500 TABLET, EXTENDED RELEASE ORAL at 00:54

## 2022-01-11 RX ADMIN — INSULIN DETEMIR 50 UNITS: 100 INJECTION, SOLUTION SUBCUTANEOUS at 02:06

## 2022-01-11 RX ADMIN — INSULIN ASPART 2 UNITS: 100 INJECTION, SOLUTION INTRAVENOUS; SUBCUTANEOUS at 12:03

## 2022-01-11 RX ADMIN — INSULIN ASPART 2 UNITS: 100 INJECTION, SOLUTION INTRAVENOUS; SUBCUTANEOUS at 17:07

## 2022-01-11 RX ADMIN — GABAPENTIN 800 MG: 400 CAPSULE ORAL at 23:48

## 2022-01-11 RX ADMIN — SERTRALINE 50 MG: 50 TABLET, FILM COATED ORAL at 19:58

## 2022-01-11 RX ADMIN — CLONAZEPAM 1 MG: 1 TABLET ORAL at 19:51

## 2022-01-11 RX ADMIN — KETAMINE HYDROCHLORIDE 5 MG: 100 INJECTION INTRAMUSCULAR; INTRAVENOUS at 10:45

## 2022-01-11 RX ADMIN — GUAIFENESIN 600 MG: 600 TABLET, EXTENDED RELEASE ORAL at 19:57

## 2022-01-11 RX ADMIN — OXCARBAZEPINE 600 MG: 300 TABLET, FILM COATED ORAL at 01:56

## 2022-01-11 RX ADMIN — DOXYCYCLINE 100 MG: 100 INJECTION, POWDER, LYOPHILIZED, FOR SOLUTION INTRAVENOUS at 01:57

## 2022-01-11 RX ADMIN — DOXYCYCLINE 100 MG: 100 INJECTION, POWDER, LYOPHILIZED, FOR SOLUTION INTRAVENOUS at 16:01

## 2022-01-11 RX ADMIN — SODIUM CHLORIDE, PRESERVATIVE FREE 10 ML: 5 INJECTION INTRAVENOUS at 19:51

## 2022-01-11 RX ADMIN — PROPOFOL 25 MG: 10 INJECTION, EMULSION INTRAVENOUS at 10:45

## 2022-01-11 RX ADMIN — FAMOTIDINE 20 MG: 10 INJECTION INTRAVENOUS at 10:21

## 2022-01-11 RX ADMIN — PROPOFOL 25 MG: 10 INJECTION, EMULSION INTRAVENOUS at 10:41

## 2022-01-11 RX ADMIN — GABAPENTIN 800 MG: 400 CAPSULE ORAL at 17:07

## 2022-01-11 RX ADMIN — INSULIN ASPART 4 UNITS: 100 INJECTION, SOLUTION INTRAVENOUS; SUBCUTANEOUS at 08:00

## 2022-01-11 RX ADMIN — Medication 1 PATCH: at 00:54

## 2022-01-11 RX ADMIN — GABAPENTIN 800 MG: 400 CAPSULE ORAL at 01:56

## 2022-01-11 RX ADMIN — KETAMINE HYDROCHLORIDE 5 MG: 100 INJECTION INTRAMUSCULAR; INTRAVENOUS at 10:41

## 2022-01-11 RX ADMIN — FENTANYL CITRATE 50 MCG: 50 INJECTION INTRAMUSCULAR; INTRAVENOUS at 10:44

## 2022-01-11 RX ADMIN — SODIUM CHLORIDE, POTASSIUM CHLORIDE, SODIUM LACTATE AND CALCIUM CHLORIDE 125 ML/HR: 600; 310; 30; 20 INJECTION, SOLUTION INTRAVENOUS at 09:59

## 2022-01-11 RX ADMIN — TRAZODONE HYDROCHLORIDE 100 MG: 50 TABLET ORAL at 19:58

## 2022-01-11 RX ADMIN — KETAMINE HYDROCHLORIDE 5 MG: 100 INJECTION INTRAMUSCULAR; INTRAVENOUS at 10:32

## 2022-01-11 RX ADMIN — PROPOFOL 25 MG: 10 INJECTION, EMULSION INTRAVENOUS at 10:21

## 2022-01-11 RX ADMIN — PROPOFOL 25 MG: 10 INJECTION, EMULSION INTRAVENOUS at 10:32

## 2022-01-11 RX ADMIN — INSULIN ASPART 6 UNITS: 100 INJECTION, SOLUTION INTRAVENOUS; SUBCUTANEOUS at 19:51

## 2022-01-11 RX ADMIN — ONDANSETRON 4 MG: 2 INJECTION INTRAMUSCULAR; INTRAVENOUS at 10:52

## 2022-01-11 RX ADMIN — KETAMINE HYDROCHLORIDE 5 MG: 100 INJECTION INTRAMUSCULAR; INTRAVENOUS at 10:48

## 2022-01-11 RX ADMIN — ARIPIPRAZOLE 10 MG: 10 TABLET ORAL at 12:00

## 2022-01-11 RX ADMIN — Medication 1 PATCH: at 21:23

## 2022-01-11 RX ADMIN — FENTANYL CITRATE 50 MCG: 50 INJECTION INTRAMUSCULAR; INTRAVENOUS at 10:21

## 2022-01-11 RX ADMIN — IPRATROPIUM BROMIDE AND ALBUTEROL SULFATE 3 ML: .5; 3 SOLUTION RESPIRATORY (INHALATION) at 12:51

## 2022-01-11 RX ADMIN — IPRATROPIUM BROMIDE AND ALBUTEROL SULFATE 3 ML: .5; 3 SOLUTION RESPIRATORY (INHALATION) at 18:33

## 2022-01-11 RX ADMIN — HYDROCODONE BITARTRATE AND ACETAMINOPHEN 1 TABLET: 5; 325 TABLET ORAL at 17:43

## 2022-01-11 NOTE — ANESTHESIA POSTPROCEDURE EVALUATION
Patient: Ana Ortiz    Procedure Summary     Date: 01/11/22 Room / Location:  [facility] 01 /  [facility]    Anesthesia Start: 1016 Anesthesia Stop: 1058    Procedure: INCISION AND DRAINAGE BACK (N/A Abdomen) Diagnosis:       Cutaneous abscess of back excluding buttocks      (Cutaneous abscess of back excluding buttocks [L02.212])    Surgeons: Tamera Saez MD Provider: Prabhjot Maguire MD    Anesthesia Type: general ASA Status: 4          Anesthesia Type: general    Vitals  Vitals Value Taken Time   /72 01/11/22 1059   Temp 97 °F (36.1 °C) 01/11/22 1059   Pulse 99 01/11/22 1059   Resp 19 01/11/22 1059   SpO2 92 % 01/11/22 1059           Post Anesthesia Care and Evaluation    Patient location during evaluation: PACU  Patient participation: complete - patient participated  Level of consciousness: awake  Pain score: 0  Pain management: adequate  Airway patency: patent  Anesthetic complications: No anesthetic complications  PONV Status: none  Cardiovascular status: acceptable  Respiratory status: acceptable  Hydration status: acceptable

## 2022-01-11 NOTE — OP NOTE
Incision and Drainage back abscess    Surgeon:  Tamera Saez M.D., F.A.CJ CarlosS.    Assistant:  none    Pre-op:  Abscess (    Post-op:  Same    Anesthesia: Gen.    Indications: Back abscess       Procedure Details   After obtaining informed consent and receiving preoperative antibiotics and with venous compression boots in place, the patient was taken to the operating room and placed under anesthesia.  Incision and drainage was performed and the wound was irrigated with peroxide.  Location: Mid back just to the right   of midline  Findings: Large abscess cavity with greater than 50 cc of pus  Culture: Done in OR  Drain/Packing: Quarter inch Penrose drain and half-inch Nu Gauze      Estimated Blood Loss:  minimal    Blood administered:  None    Drains: see above    Grafts and Implants: None    Specimens:   ID Type Source Tests Collected by Time   1 (Not marked as sent) : mid back abscess Wound Back, Upper WOUND CULTURE Tamera Saez MD 1/11/2022 1046              Complications:  none           Disposition: PACU - hemodynamically stable.           Condition: stable

## 2022-01-11 NOTE — ANESTHESIA PREPROCEDURE EVALUATION
Anesthesia Evaluation     Patient summary reviewed and Nursing notes reviewed   no history of anesthetic complications:  NPO Solid Status: > 8 hours  NPO Liquid Status: > 8 hours           Airway   Mallampati: III  TM distance: >3 FB  Neck ROM: full  Difficult intubation highly probable  Dental    (+) poor dentition    Pulmonary     breath sounds clear to auscultation  (+) pneumonia stable , a smoker Current Abstained day of surgery, COPD (Currently requiring supplemental oxygen via NC ), sleep apnea (Severe Sleep Apnea), decreased breath sounds,   (-) rhonchi  Cardiovascular - normal exam    ECG reviewed    (+) hypertension, hyperlipidemia,     ROS comment: Prolonged QT    Neuro/Psych- negative ROS  GI/Hepatic/Renal/Endo    (+) obesity, morbid obesity,  diabetes mellitus type 2 poorly controlled using insulin,     Musculoskeletal (-) negative ROS    Abdominal   (+) obese,    Substance History - negative use     OB/GYN negative ob/gyn ROS         Other      history of cancer remission        Phys Exam Other: Mid Thoracic level abscess              Anesthesia Plan    ASA 4     general     intravenous induction     Anesthetic plan, all risks, benefits, and alternatives have been provided, discussed and informed consent has been obtained with: patient.  Use of blood products discussed with patient  Consented to blood products.       Lab Results   Component Value Date    WBC 13.67 (H) 01/10/2022    HGB 11.7 (L) 01/10/2022    HCT 35.6 01/10/2022    MCV 89.9 01/10/2022     01/10/2022     \  Lab Results   Component Value Date    GLUCOSE 335 (H) 01/10/2022    BUN 14 01/10/2022    CREATININE 0.73 01/10/2022    EGFRIFNONA 86 01/10/2022    BCR 19.2 01/10/2022    K 3.6 01/10/2022    CO2 27.6 01/10/2022    CALCIUM 9.1 01/10/2022    ALBUMIN 3.14 (L) 01/10/2022    AST 17 01/10/2022    ALT 18 01/10/2022

## 2022-01-11 NOTE — CONSULTS
Consulting physician:  Dr. Saez    Referring physician: hospitalist    Date of consultation: 22     Chief complaint back abscess    Subjective     Patient is a morbidly obese 46 y.o. female who was admitted on 1/10/2022 with pneumonia.  She was found to have a back abscess and surgical consult was placed.  Patient states that has been present for 2 weeks and it is sore.  It did drain for about 3 days but has subsequently stopped      Review of Systems  Review of Systems - General ROS: negative for - weight loss  Psychological ROS: negative for - behavioral disorder  Ophthalmic ROS: negative for - dry eyes  ENT ROS: negative for - vertigo or vocal changes  Hematological and Lymphatic ROS: negative for - swollen lymph nodes, DVT, PE.   Respiratory ROS: Positive for sputum changes  Cardiovascular ROS: negative for - irregular heartbeat or murmur  Gastrointestinal ROS: negative for - blood in stools or change in stools  Genitourinary ROS: negative for - hematuria or incontinence  Musculoskeletal ROS: negative for - gait disturbance      History  Past Medical History:   Diagnosis Date   • COPD (chronic obstructive pulmonary disease) (HCC)    • Diabetes mellitus (HCC)    • Hyperlipemia    • Hypertension    • JOANN (obstructive sleep apnea)    • Vulvar cancer (HCC)      Past Surgical History:   Procedure Laterality Date   • HYSTERECTOMY     • MASTECTOMY       Family History   Problem Relation Age of Onset   • Cancer Mother    • Parkinsonism Father    • Diabetes Father    • Cancer Maternal Aunt    • Diabetes Paternal Aunt    • Diabetes Paternal Grandmother      Social History     Tobacco Use   • Smoking status: Current Every Day Smoker     Packs/day: 0.50     Types: Cigarettes     Last attempt to quit: 2021     Years since quittin.9   • Smokeless tobacco: Never Used   Vaping Use   • Vaping Use: Some days   • Substances: Nicotine, Flavoring   • Devices: Kanari   Substance Use Topics   • Alcohol use:  Never   • Drug use: Never     Medications Prior to Admission   Medication Sig Dispense Refill Last Dose   • ARIPiprazole (ABILIFY) 10 MG tablet Take 10 mg by mouth Daily.   1/10/2022 at Unknown time   • clonazePAM (KlonoPIN) 1 MG tablet Take 1 mg by mouth 4 (Four) Times a Day As Needed for Anxiety.   1/10/2022 at Unknown time   • gabapentin (NEURONTIN) 800 MG tablet Take 800 mg by mouth 4 (Four) Times a Day.   1/10/2022 at Unknown time   • hydroCHLOROthiazide (HYDRODIURIL) 25 MG tablet Take 25 mg by mouth Daily.   1/10/2022 at Unknown time   • insulin glargine (LANTUS, SEMGLEE) 100 UNIT/ML injection Inject 50 Units under the skin into the appropriate area as directed Every Night.   1/9/2022 at Unknown time   • lisinopril (PRINIVIL,ZESTRIL) 5 MG tablet Take 2.5 mg by mouth Daily.   1/10/2022 at Unknown time   • omeprazole (priLOSEC) 40 MG capsule Take 40 mg by mouth Daily.   1/10/2022 at Unknown time   • OXcarbazepine (TRILEPTAL) 600 MG tablet Take 600 mg by mouth 2 (Two) Times a Day.   1/10/2022 at Unknown time   • sertraline (ZOLOFT) 100 MG tablet Take 100 mg by mouth 2 (Two) Times a Day.   1/10/2022 at Unknown time   • simvastatin (ZOCOR) 40 MG tablet Take 40 mg by mouth Every Night.   1/9/2022 at Unknown time   • Stiolto Respimat 2.5-2.5 MCG/ACT aerosol solution inhaler Inhale 2 puffs Daily. 1 each 5 1/10/2022 at Unknown time   • traZODone (DESYREL) 50 MG tablet Take 100 mg by mouth Every Night.   1/9/2022 at Unknown time   • ibuprofen (ADVIL,MOTRIN) 800 MG tablet Take 800 mg by mouth Every 8 (Eight) Hours As Needed for Mild Pain .   Unknown at Unknown time   • ProAir  (90 Base) MCG/ACT inhaler Inhale 2 puffs Every 4 (Four) Hours As Needed for Wheezing or Shortness of Air.   Unknown at Unknown time   • Trulicity 4.5 MG/0.5ML solution pen-injector Inject 4.5 mg under the skin into the appropriate area as directed 1 (One) Time Per Week. Prior to [facility] Admission, Patient was on:  Takes on mondays    1/10/2022     Allergies:  Codeine    Objective     Vital Signs  Temp:  [97.6 °F (36.4 °C)-98.9 °F (37.2 °C)] 97.6 °F (36.4 °C)  Heart Rate:  [] 93  Resp:  [18-24] 22  BP: (101-137)/(49-89) 121/73    Physical Exam:  General:  This is a WD WN morbidly obese in no acute distress  Vital signs: Stable, afebrile  HEENT exam:  WNL. Sclerae are anicteric.  EOMI  Neck:  Supple, FROM.  No JVD.  Trachea midline  Lungs:  Respiratory effort normal.  Left-sided expiratory wheezes  Skin: Upper back just to the right of the midline is a large indurated mass consistent with an infected sebaceous cyst  Results Review:   Results from last 7 days   Lab Units 01/11/22  0439 01/10/22  2258 01/10/22  1357   TROPONIN T ng/mL <0.010 <0.010 <0.010     Results from last 7 days   Lab Units 01/10/22  2258 01/10/22  1357 01/10/22  1335 01/10/22  1325   CRP mg/dL  --  33.31*  --   --    LACTATE mmol/L  --   --  1.5  --    WBC 10*3/mm3 13.67*  --   --  16.58*   HEMOGLOBIN g/dL 11.7*  --   --  12.6   HEMATOCRIT % 35.6  --   --  37.8   PLATELETS 10*3/mm3 306  --   --  353   INR   --   --   --  1.06     Results from last 7 days   Lab Units 01/10/22  1320   PH, ARTERIAL pH units 7.478*   PO2 ART mm Hg 49.8*   PCO2, ARTERIAL mm Hg 42.2   HCO3 ART mmol/L 31.3*     Results from last 7 days   Lab Units 01/10/22  2258 01/10/22  1357   SODIUM mmol/L 135* 135*   POTASSIUM mmol/L 3.6 3.3*   MAGNESIUM mg/dL  --  1.3*   CHLORIDE mmol/L 94* 93*   CO2 mmol/L 27.6 26.3   BUN mg/dL 14 14   CREATININE mg/dL 0.73 0.72   EGFR IF NONAFRICN AM mL/min/1.73 86 87   CALCIUM mg/dL 9.1 9.1   GLUCOSE mg/dL 335* 308*   ALBUMIN g/dL 3.14* 3.21*   BILIRUBIN mg/dL <0.2 0.2   ALK PHOS U/L 123* 137*   AST (SGOT) U/L 17 17   ALT (SGPT) U/L 18 18   Estimated Creatinine Clearance: 125 mL/min (by C-G formula based on SCr of 0.73 mg/dL).  No results found for: AMMONIA      No results found for: BLOODCX  No results found for: URINECX  No results found for: WOUNDCX  No results found  for: STOOLCX    Imaging:  Imaging Results (Last 24 Hours)     Procedure Component Value Units Date/Time    CT Chest Pulmonary Embolism [[id number]] Collected: 01/10/22 1759     Updated: 01/10/22 1803    Narrative:      CT CHEST PULMONARY EMBOLISM-     CLINICAL INDICATION: Acute hypoxic respiratory failure, elevated D-dimer        COMPARISON: Chest x-ray 01/10/2022      PROCEDURE: Thin cut axial images were acquired through the pulmonary  vessels during the rapid infusion of IV contrast.     Additional 3-D reformatted images obtained via post-processing for  improved diagnostic accuracy and procedural planning.     Radiation dose reduction techniques were utilized per ALARA protocol.  Automated exposure control was initiated through either or Zumper or  Press software packages by  protocol.           FINDINGS: Today's study demonstrates opacification of the central  pulmonary vessels.   There are no filling defects.   There is no truncation.     No evidence of a pulmonary embolus.     There is diffuse bilateral airspace disease which may represent a  combination of congestion and pneumonia. Covid pneumonia may certainly  have this appearance..     There is no mediastinal lymph node enlargement     Trace left pleural effusion     Subcutaneous tissues in the midline of the posterior inferior chest wall  shows abnormal hazy density which could represent contusion or hematoma.  Recommend clinical correlation.       Impression:      1. No evidence of a pulmonary embolus  2. Diffuse airspace disease bilaterally which may represent edema and/or  combination of pneumonia and CHF.  3. Hazy density in the subcutaneous tissues of the posterior chest wall  on image 108 of the axial series. Possibly contusion. Recommend clinical  correlation..     This report was finalized on 1/10/2022 6:01 PM by Dr. Filemon Ellsworth MD.       XR Chest 1 View [[id number]] Collected: 01/10/22 1449     Updated: 01/10/22 1452     Narrative:      XR CHEST 1 VW-     CLINICAL INDICATION: soa        COMPARISON: None available      TECHNIQUE: Single frontal view of the chest.     FINDINGS:     Bilateral airspace disease  The cardiac silhouette is normal. The pulmonary vasculature is  unremarkable.  There is no evidence of an acute osseous abnormality.   There are no suspicious-appearing parenchymal soft tissue nodules.          Impression:      Bilateral airspace disease, likely representing a combination of  pneumonia and CHF     This report was finalized on 1/10/2022 2:50 PM by Dr. Filemon Ellsworth MD.               Impression:  Patient Active Problem List   Diagnosis Code   • Morbidly obese ([facility]) E66.01   • Acute respiratory failure with hypoxia and hypercapnia ([facility]) J96.01, J96.02     Impression: Infected sebaceous cyst of back    Plan:  Proceed with surgical incision and drainage      Discussion:  Due to respiratory issues anticipate drainage could be done under MAC anesthesia    Tamera Saez MD  01/11/22  08:32 EST    Time: Time spent:    Please note that portions of this note were completed with a voice recognition program.

## 2022-01-11 NOTE — CASE MANAGEMENT/SOCIAL WORK
Discharge Planning Assessment   [place]     Patient Name: Ana Ortiz  MRN: [id number]  Today's Date: 1/11/2022    Admit Date: 1/10/2022       Discharge Plan     Row Name 01/11/22 1604       Plan    Plan Pt admitted on 1/10/21.  Case Management placed consult for SS.  SS attempted to contact pt without success.  SS will continue attempts at contact to assess needs as per CM consult request.             BORA GardnerW

## 2022-01-11 NOTE — H&P
[facility]   HISTORY AND PHYSICAL    Patient Name: Ana Ortiz  : 1975  MRN: [id number]  Primary Care Physician:  Kasia Fu PA  Date of admission: 1/10/2022    Subjective   Subjective     Chief Complaint: Shortness of breath    History of Present Illness The patient is a 47 yo female with PMHx significant for COPD, JOANN on CPAP, HTN and Diabetes Mellitus who presents to the ED complaining of worsening shortness of air x 3-4 days. She also reports a thick productive sputum that is yellow-green in color. She reported an episode of sharp chest pain that was non-radiating and without associated symptoms (has since resolved). She reports significant nausea; no vomiting, abdominal pain and/or diarrhea. No fever and/or chills. She reports a 2 week history of lesion on mid back/abscess that has been draining for the past few days. Reports previous abscesses in her groin area.     Review of Systems     Personal History     Past Medical History:   Diagnosis Date   • COPD (chronic obstructive pulmonary disease) (HCC)    • Diabetes mellitus (HCC)    • Hyperlipemia    • Hypertension    • JOANN (obstructive sleep apnea)    • Vulvar cancer (HCC)        Past Surgical History:   Procedure Laterality Date   • HYSTERECTOMY     • MASTECTOMY         Family History: family history includes Cancer in her maternal aunt and mother; Diabetes in her father, paternal aunt, and paternal grandmother; Parkinsonism in her father. Otherwise pertinent FHx was reviewed and not pertinent to current issue.    Social History:  reports that she has been smoking cigarettes. She has been smoking about 0.50 packs per day. She has never used smokeless tobacco. She reports that she does not drink alcohol and does not use drugs.    Home Medications:  ARIPiprazole, Dulaglutide, OXcarbazepine, albuterol sulfate HFA, clonazePAM, gabapentin, hydroCHLOROthiazide, ibuprofen, insulin glargine, lisinopril, omeprazole, sertraline,  simvastatin, tiotropium bromide-olodaterol, and traZODone    Allergies:  Allergies   Allergen Reactions   • Codeine Nausea Only       Objective    Objective     Vitals:   Temp:  [98.1 °F (36.7 °C)] 98.1 °F (36.7 °C)  Heart Rate:  [] 90  Resp:  [20-24] 20  BP: (101-137)/(49-89) 109/89  Flow (L/min):  [4] 4    Physical Exam  Constitutional:       General: She is not in acute distress.     Appearance: She is well-developed. She is morbidly obese.      Interventions: Nasal cannula in place.   HENT:      Head: Normocephalic and atraumatic.   Eyes:      Conjunctiva/sclera: Conjunctivae normal.   Neck:      Trachea: No tracheal deviation.   Cardiovascular:      Rate and Rhythm: Normal rate and regular rhythm.      Pulses:           Dorsalis pedis pulses are 2+ on the right side and 2+ on the left side.      Heart sounds: No murmur heard.  No friction rub. No gallop.    Pulmonary:      Effort: No respiratory distress.      Breath sounds: Examination of the right-lower field reveals decreased breath sounds. Examination of the left-lower field reveals decreased breath sounds and rhonchi. Decreased breath sounds and rhonchi present. No wheezing or rales.   Abdominal:      General: Bowel sounds are normal. There is no distension.      Palpations: Abdomen is soft.      Tenderness: There is no abdominal tenderness. There is no guarding.   Musculoskeletal:      Right lower leg: No edema.      Left lower leg: No edema.   Skin:     General: Skin is warm and dry.      Findings: Abscess (thoracic spine with redness but not warm to touch, induration noted. no active drainage.) present. No erythema or rash.   Neurological:      Mental Status: She is alert and oriented to person, place, and time.      Cranial Nerves: No cranial nerve deficit.          Result Review    Result Review:  I have personally reviewed the results from the time of this admission to 1/10/2022 22:28 EST and agree with these findings:  [x]  Laboratory  []   Microbiology  [x]  Radiology  [x]  EKG/Telemetry   []  Cardiology/Vascular   []  Pathology  []  Old records  []  Other:  Most notable findings include: ABG with PaO2 49.8 with an oxygen saturation of 88.1%.  proBNP 2439.  WBC 16.58 with 84.8% neutrophils.  CMP with glucose 308, potassium 3.3.  C-RP 33.31.    EKG has been personally reviewed (currently a pending cardiology interpretation), however, per my view the patient has sinus tachycardia with nonspecific ST changes in lead II, V5 and V6; QTC prolonged at 480 ms    CT scan of the chest with PE protocol revealed diffuse airspace disease bilaterally which may represent edema and/or combination of pneumonia and CHF.  Patient with a hazy density in the subcutaneous tissues of the posterior chest wall that may relate to a contusion.    Assessment/Plan   Assessment / Plan     Brief Patient Summary:  Ana Ortiz is a 46 y.o. female who presents with shortness of air in the setting of non-oxygen dependent COPD, JOANN compliant with CPAP and morbid obesity    Assessment/Plan:  -Severe sepsis with leukocytosis (16.58), elevated CRP (33.31), tachycardia and acute hypoxic respiratory failure, present on admission and due to bilateral pneumonia  -Acute hypoxic respiratory failure, may be multifactorial and related to above +/- acute CHF exacerbation with preserved EF  -Elevated pro-BNP  -COPD with acute exacerbation  -Tobacco use    Patient to be admitted to the telemetry unit. Continue with supplemental oxygen as needed and titrate for saturations greater than 90%.  Continue with intravenous Rocephin and doxycycline for now.  I have requested a respiratory culture and testing for Streptococcus.    Will begin scheduled nebulized inhalants and provide a nicotine patch. Will hold on steroids for now as patient without active wheezing on exam. Begin Mucinex due to report of thick productive sputum.    Obtain an echocardiogram. Patient received 40 mg IV Lasix x1 in the ED. I  will hold on further Lasix for now as patient is without significant edema or rales on exam. Will trend her Troponin T levels Q6H x  3.    -Prolonged QTc 480 ms  -Acute hypokalemia    Supplement the patient's potassium. Obtain a magnesium level. Monitor on telemetry. Repeat EKG in a.m. to follow on QTc. Limit other medications that may further prolong her QTc.     -Concern for posterior midthoracic abscess with history of previous abscesses per patient report    Continue with Doxycycline as noted above. Will request nursing staff to cover with Mepilex. Will   Consult general surgery in a.m. for consideration of drainage.     Chronic medical problems:  -Morbid obesity, complicates all aspects of care  -JOANN compliant with CPAP: Patient did not bring home CPAP unit. States she would prefer to try the supplemental oxygen for tonight but is agreeable to use one of our CPAP machines if needed.  -Diabetes mellitus type II with hyperglycemia: Patient has been started on Accu checks in sliding scale insulin as needed.  -Essential hypertension, currently controlled: I'm currently awaiting her home medication list. Plan to resume antihypertensive medications as appropriate.  -Generalized anxiety/depression: Plan to resume home anxiolytics; once medications confirmed, may need to hold or reduce dose of certain antipsychotic medications if they are known to further prolong her QTC.  -GERD: Continue PPI.      DVT prophylaxis:  Subcutaneous heparin    CODE STATUS:    Code Status (Patient has no pulse and is not breathing): CPR (Attempt to Resuscitate)  Medical Interventions (Patient has pulse or is breathing): Full Support    Admission Status:  I believe this patient meets inpatient status.    Rosa Mishra,

## 2022-01-11 NOTE — PLAN OF CARE
Problem: Adult Inpatient Plan of Care  Goal: Plan of Care Review  Outcome: Ongoing, Progressing  Goal: Patient-Specific Goal (Individualized)  Outcome: Ongoing, Progressing  Goal: Absence of Hospital-Acquired Illness or Injury  Outcome: Ongoing, Progressing  Intervention: Prevent Skin Injury  Recent Flowsheet Documentation  Taken 1/10/2022 2255 by Kym Painter, RN  Skin Protection: adhesive use limited  Intervention: Prevent Infection  Recent Flowsheet Documentation  Taken 1/10/2022 2255 by Kym Painter RN  Infection Prevention: rest/sleep promoted  Goal: Optimal Comfort and Wellbeing  Outcome: Ongoing, Progressing  Intervention: Provide Person-Centered Care  Recent Flowsheet Documentation  Taken 1/10/2022 2255 by Kym Painter, RN  Trust Relationship/Rapport:   care explained   choices provided   emotional support provided   empathic listening provided   questions answered   questions encouraged   reassurance provided   thoughts/feelings acknowledged  Goal: Readiness for Transition of Care  Outcome: Ongoing, Progressing  Intervention: Mutually Develop Transition Plan  Recent Flowsheet Documentation  Taken 1/10/2022 2250 by Kym Painter, RN  Transportation Anticipated: family or friend will provide  Patient/Family Anticipated Services at Transition: none  Patient/Family Anticipates Transition to: home  Taken 1/10/2022 2249 by Kym Painter, RN  Equipment Currently Used at Home: cpap

## 2022-01-11 NOTE — PLAN OF CARE
Goal Outcome Evaluation:           Progress: improving   Pt resting in bed with no acute distress noted. Abscess on pt's back drained by surgical staff this AM. Dressing remains in place, dry and intact. VSS. Pt voices no concerns or complaints at this time

## 2022-01-12 LAB
ANION GAP SERPL CALCULATED.3IONS-SCNC: 13.6 MMOL/L (ref 5–15)
BASOPHILS # BLD AUTO: 0.02 10*3/MM3 (ref 0–0.2)
BASOPHILS NFR BLD AUTO: 0.2 % (ref 0–1.5)
BUN SERPL-MCNC: 20 MG/DL (ref 6–20)
BUN/CREAT SERPL: 23.8 (ref 7–25)
CALCIUM SPEC-SCNC: 9.2 MG/DL (ref 8.6–10.5)
CHLORIDE SERPL-SCNC: 99 MMOL/L (ref 98–107)
CO2 SERPL-SCNC: 27.4 MMOL/L (ref 22–29)
CREAT SERPL-MCNC: 0.84 MG/DL (ref 0.57–1)
DEPRECATED RDW RBC AUTO: 50.9 FL (ref 37–54)
EOSINOPHIL # BLD AUTO: 0.19 10*3/MM3 (ref 0–0.4)
EOSINOPHIL NFR BLD AUTO: 2 % (ref 0.3–6.2)
ERYTHROCYTE [DISTWIDTH] IN BLOOD BY AUTOMATED COUNT: 14.6 % (ref 12.3–15.4)
GFR SERPL CREATININE-BSD FRML MDRD: 73 ML/MIN/1.73
GLUCOSE BLDC GLUCOMTR-MCNC: 142 MG/DL (ref 70–130)
GLUCOSE BLDC GLUCOMTR-MCNC: 169 MG/DL (ref 70–130)
GLUCOSE BLDC GLUCOMTR-MCNC: 200 MG/DL (ref 70–130)
GLUCOSE BLDC GLUCOMTR-MCNC: 214 MG/DL (ref 70–130)
GLUCOSE BLDC GLUCOMTR-MCNC: 221 MG/DL (ref 70–130)
GLUCOSE SERPL-MCNC: 179 MG/DL (ref 65–99)
HBA1C MFR BLD: 10.4 % (ref 4.8–5.6)
HCT VFR BLD AUTO: 33.3 % (ref 34–46.6)
HGB BLD-MCNC: 10.5 G/DL (ref 12–15.9)
IMM GRANULOCYTES # BLD AUTO: 0.1 10*3/MM3 (ref 0–0.05)
IMM GRANULOCYTES NFR BLD AUTO: 1 % (ref 0–0.5)
LYMPHOCYTES # BLD AUTO: 2.49 10*3/MM3 (ref 0.7–3.1)
LYMPHOCYTES NFR BLD AUTO: 25.9 % (ref 19.6–45.3)
MAGNESIUM SERPL-MCNC: 1.6 MG/DL (ref 1.6–2.6)
MCH RBC QN AUTO: 29.7 PG (ref 26.6–33)
MCHC RBC AUTO-ENTMCNC: 31.5 G/DL (ref 31.5–35.7)
MCV RBC AUTO: 94.1 FL (ref 79–97)
MONOCYTES # BLD AUTO: 0.51 10*3/MM3 (ref 0.1–0.9)
MONOCYTES NFR BLD AUTO: 5.3 % (ref 5–12)
NEUTROPHILS NFR BLD AUTO: 6.3 10*3/MM3 (ref 1.7–7)
NEUTROPHILS NFR BLD AUTO: 65.6 % (ref 42.7–76)
NRBC BLD AUTO-RTO: 0 /100 WBC (ref 0–0.2)
PLATELET # BLD AUTO: 300 10*3/MM3 (ref 140–450)
PMV BLD AUTO: 10.2 FL (ref 6–12)
POTASSIUM SERPL-SCNC: 3.3 MMOL/L (ref 3.5–5.2)
POTASSIUM SERPL-SCNC: 4.4 MMOL/L (ref 3.5–5.2)
RBC # BLD AUTO: 3.54 10*6/MM3 (ref 3.77–5.28)
SODIUM SERPL-SCNC: 140 MMOL/L (ref 136–145)
WBC NRBC COR # BLD: 9.61 10*3/MM3 (ref 3.4–10.8)

## 2022-01-12 PROCEDURE — 83735 ASSAY OF MAGNESIUM: CPT | Performed by: PHYSICIAN ASSISTANT

## 2022-01-12 PROCEDURE — 25010000002 MAGNESIUM SULFATE IN D5W 1G/100ML (PREMIX) 1-5 GM/100ML-% SOLUTION: Performed by: PHYSICIAN ASSISTANT

## 2022-01-12 PROCEDURE — 99024 POSTOP FOLLOW-UP VISIT: CPT | Performed by: SURGERY

## 2022-01-12 PROCEDURE — 80048 BASIC METABOLIC PNL TOTAL CA: CPT | Performed by: STUDENT IN AN ORGANIZED HEALTH CARE EDUCATION/TRAINING PROGRAM

## 2022-01-12 PROCEDURE — 63710000001 INSULIN ASPART PER 5 UNITS: Performed by: SURGERY

## 2022-01-12 PROCEDURE — 83036 HEMOGLOBIN GLYCOSYLATED A1C: CPT | Performed by: PHYSICIAN ASSISTANT

## 2022-01-12 PROCEDURE — 99232 SBSQ HOSP IP/OBS MODERATE 35: CPT | Performed by: PHYSICIAN ASSISTANT

## 2022-01-12 PROCEDURE — 82962 GLUCOSE BLOOD TEST: CPT

## 2022-01-12 PROCEDURE — 25010000002 HEPARIN (PORCINE) PER 1000 UNITS: Performed by: SURGERY

## 2022-01-12 PROCEDURE — 94799 UNLISTED PULMONARY SVC/PX: CPT

## 2022-01-12 PROCEDURE — 63710000001 INSULIN DETEMIR PER 5 UNITS: Performed by: SURGERY

## 2022-01-12 PROCEDURE — 63710000001 INSULIN ASPART PER 5 UNITS: Performed by: PHYSICIAN ASSISTANT

## 2022-01-12 PROCEDURE — 85025 COMPLETE CBC W/AUTO DIFF WBC: CPT | Performed by: STUDENT IN AN ORGANIZED HEALTH CARE EDUCATION/TRAINING PROGRAM

## 2022-01-12 PROCEDURE — 25010000002 CEFTRIAXONE PER 250 MG: Performed by: SURGERY

## 2022-01-12 PROCEDURE — 84132 ASSAY OF SERUM POTASSIUM: CPT | Performed by: STUDENT IN AN ORGANIZED HEALTH CARE EDUCATION/TRAINING PROGRAM

## 2022-01-12 RX ORDER — POTASSIUM CHLORIDE 7.45 MG/ML
10 INJECTION INTRAVENOUS
Status: DISCONTINUED | OUTPATIENT
Start: 2022-01-12 | End: 2022-01-13 | Stop reason: HOSPADM

## 2022-01-12 RX ORDER — MAGNESIUM SULFATE 1 G/100ML
1 INJECTION INTRAVENOUS AS NEEDED
Status: DISCONTINUED | OUTPATIENT
Start: 2022-01-12 | End: 2022-01-13 | Stop reason: HOSPADM

## 2022-01-12 RX ORDER — POTASSIUM CHLORIDE 1.5 G/1.77G
40 POWDER, FOR SOLUTION ORAL AS NEEDED
Status: DISCONTINUED | OUTPATIENT
Start: 2022-01-12 | End: 2022-01-13 | Stop reason: HOSPADM

## 2022-01-12 RX ORDER — MAGNESIUM SULFATE HEPTAHYDRATE 40 MG/ML
2 INJECTION, SOLUTION INTRAVENOUS AS NEEDED
Status: DISCONTINUED | OUTPATIENT
Start: 2022-01-12 | End: 2022-01-13 | Stop reason: HOSPADM

## 2022-01-12 RX ORDER — POTASSIUM CHLORIDE 750 MG/1
40 CAPSULE, EXTENDED RELEASE ORAL AS NEEDED
Status: DISCONTINUED | OUTPATIENT
Start: 2022-01-12 | End: 2022-01-13 | Stop reason: HOSPADM

## 2022-01-12 RX ORDER — POTASSIUM CHLORIDE 20 MEQ/1
40 TABLET, EXTENDED RELEASE ORAL EVERY 4 HOURS
Status: COMPLETED | OUTPATIENT
Start: 2022-01-12 | End: 2022-01-12

## 2022-01-12 RX ORDER — MAGNESIUM SULFATE 1 G/100ML
1 INJECTION INTRAVENOUS ONCE
Status: COMPLETED | OUTPATIENT
Start: 2022-01-12 | End: 2022-01-12

## 2022-01-12 RX ADMIN — ATORVASTATIN CALCIUM 20 MG: 20 TABLET, FILM COATED ORAL at 08:30

## 2022-01-12 RX ADMIN — INSULIN ASPART 2 UNITS: 100 INJECTION, SOLUTION INTRAVENOUS; SUBCUTANEOUS at 08:31

## 2022-01-12 RX ADMIN — ARIPIPRAZOLE 10 MG: 10 TABLET ORAL at 08:30

## 2022-01-12 RX ADMIN — INSULIN ASPART 4 UNITS: 100 INJECTION, SOLUTION INTRAVENOUS; SUBCUTANEOUS at 19:51

## 2022-01-12 RX ADMIN — IBUPROFEN 800 MG: 800 TABLET, FILM COATED ORAL at 03:01

## 2022-01-12 RX ADMIN — DOXYCYCLINE 100 MG: 100 INJECTION, POWDER, LYOPHILIZED, FOR SOLUTION INTRAVENOUS at 03:01

## 2022-01-12 RX ADMIN — IPRATROPIUM BROMIDE AND ALBUTEROL SULFATE 3 ML: .5; 3 SOLUTION RESPIRATORY (INHALATION) at 18:47

## 2022-01-12 RX ADMIN — SERTRALINE 50 MG: 50 TABLET, FILM COATED ORAL at 08:30

## 2022-01-12 RX ADMIN — OXCARBAZEPINE 600 MG: 300 TABLET, FILM COATED ORAL at 08:30

## 2022-01-12 RX ADMIN — IBUPROFEN 800 MG: 800 TABLET, FILM COATED ORAL at 15:22

## 2022-01-12 RX ADMIN — HYDROCODONE BITARTRATE AND ACETAMINOPHEN 1 TABLET: 5; 325 TABLET ORAL at 20:11

## 2022-01-12 RX ADMIN — OXCARBAZEPINE 600 MG: 300 TABLET, FILM COATED ORAL at 19:50

## 2022-01-12 RX ADMIN — GUAIFENESIN 600 MG: 600 TABLET, EXTENDED RELEASE ORAL at 08:30

## 2022-01-12 RX ADMIN — GABAPENTIN 800 MG: 400 CAPSULE ORAL at 05:27

## 2022-01-12 RX ADMIN — GUAIFENESIN 600 MG: 600 TABLET, EXTENDED RELEASE ORAL at 19:49

## 2022-01-12 RX ADMIN — INSULIN ASPART 4 UNITS: 100 INJECTION, SOLUTION INTRAVENOUS; SUBCUTANEOUS at 17:33

## 2022-01-12 RX ADMIN — IPRATROPIUM BROMIDE AND ALBUTEROL SULFATE 3 ML: .5; 3 SOLUTION RESPIRATORY (INHALATION) at 13:20

## 2022-01-12 RX ADMIN — GABAPENTIN 800 MG: 400 CAPSULE ORAL at 11:13

## 2022-01-12 RX ADMIN — Medication 1 PATCH: at 08:32

## 2022-01-12 RX ADMIN — CLONAZEPAM 1 MG: 1 TABLET ORAL at 18:16

## 2022-01-12 RX ADMIN — TRAZODONE HYDROCHLORIDE 100 MG: 50 TABLET ORAL at 19:50

## 2022-01-12 RX ADMIN — PANTOPRAZOLE SODIUM 40 MG: 40 TABLET, DELAYED RELEASE ORAL at 05:27

## 2022-01-12 RX ADMIN — DOXYCYCLINE 100 MG: 100 INJECTION, POWDER, LYOPHILIZED, FOR SOLUTION INTRAVENOUS at 18:15

## 2022-01-12 RX ADMIN — POTASSIUM CHLORIDE 40 MEQ: 1500 TABLET, EXTENDED RELEASE ORAL at 15:21

## 2022-01-12 RX ADMIN — IPRATROPIUM BROMIDE AND ALBUTEROL SULFATE 3 ML: .5; 3 SOLUTION RESPIRATORY (INHALATION) at 06:56

## 2022-01-12 RX ADMIN — INSULIN ASPART 4 UNITS: 100 INJECTION, SOLUTION INTRAVENOUS; SUBCUTANEOUS at 11:13

## 2022-01-12 RX ADMIN — MAGNESIUM SULFATE HEPTAHYDRATE 1 G: 1 INJECTION, SOLUTION INTRAVENOUS at 15:21

## 2022-01-12 RX ADMIN — IPRATROPIUM BROMIDE AND ALBUTEROL SULFATE 3 ML: .5; 3 SOLUTION RESPIRATORY (INHALATION) at 00:40

## 2022-01-12 RX ADMIN — SERTRALINE 50 MG: 50 TABLET, FILM COATED ORAL at 19:50

## 2022-01-12 RX ADMIN — CLONAZEPAM 1 MG: 1 TABLET ORAL at 03:01

## 2022-01-12 RX ADMIN — GABAPENTIN 800 MG: 400 CAPSULE ORAL at 23:51

## 2022-01-12 RX ADMIN — LISINOPRIL 2.5 MG: 2.5 TABLET ORAL at 08:30

## 2022-01-12 RX ADMIN — INSULIN DETEMIR 50 UNITS: 100 INJECTION, SOLUTION SUBCUTANEOUS at 19:51

## 2022-01-12 RX ADMIN — HEPARIN SODIUM 5000 UNITS: 5000 INJECTION INTRAVENOUS; SUBCUTANEOUS at 19:51

## 2022-01-12 RX ADMIN — SODIUM CHLORIDE, PRESERVATIVE FREE 10 ML: 5 INJECTION INTRAVENOUS at 08:31

## 2022-01-12 RX ADMIN — POTASSIUM CHLORIDE 40 MEQ: 1500 TABLET, EXTENDED RELEASE ORAL at 11:13

## 2022-01-12 RX ADMIN — SODIUM CHLORIDE 2 G: 9 INJECTION, SOLUTION INTRAVENOUS at 17:33

## 2022-01-12 RX ADMIN — GABAPENTIN 800 MG: 400 CAPSULE ORAL at 17:33

## 2022-01-12 RX ADMIN — HEPARIN SODIUM 5000 UNITS: 5000 INJECTION INTRAVENOUS; SUBCUTANEOUS at 08:31

## 2022-01-12 RX ADMIN — INSULIN ASPART 5 UNITS: 100 INJECTION, SOLUTION INTRAVENOUS; SUBCUTANEOUS at 17:33

## 2022-01-12 NOTE — CASE MANAGEMENT/SOCIAL WORK
"Discharge Planning Assessment  [facility]     Patient Name: Ana Ortiz  MRN: [id number]  Today's Date: 1/12/2022    Admit Date: 1/10/2022     Discharge Needs Assessment     Row Name 01/12/22 0732       Living Environment    Lives With alone    Current Living Arrangements home/apartment/condo    Duration at Residence lives @ [address] [place], [place]    Primary Care Provided by self    Family Caregiver if Needed child(peri), adult; friend(s)    Family Caregiver Names Dtr-Gail Stevenson and friendRochelle    Quality of Family Relationships supportive    Able to Return to Prior Arrangements yes       Resource/Environmental Concerns    Transportation Concerns car, none       Transition Planning    Patient/Family Anticipates Transition to home    Transportation Anticipated family or friend will provide       Discharge Needs Assessment    Readmission Within the Last 30 Days no previous admission in last 30 days    Equipment Currently Used at Home glucometer    Concerns to be Addressed no discharge needs identified; denies needs/concerns at this time    Equipment Needed After Discharge none               Discharge Plan     Row Name 01/12/22 0738       Plan    Plan CM spoke with pt via phone. She is very pleasant to speak with. Pt lives at home alone, indep with care, employed at [employer] in [place], [place]. She has glucometer at home and no other DME. Pt reports she had cpap in past but it was recently picked up in Sept/Oct 2021 by unknown DME provider d/t  \"not enough hours used at night\" pt follows up with Cassi Morales.office and has f/u appt. in Feb,2022. Pt follows with Kasia Fu-PCP,   Pt has [employer] Medicaid and denies any issues with med copays. Pt  feels friendRochelle, may be able  to assist her with any wound care needed post dc. Pt enc. to report to staff if HH is felt needed at [place]. Pt plans to return back home with friendShlomo, providing transportation at [place]. CM will " follow & assist as needed.    Patient/Family in Agreement with Plan yes    Row Name 01/12/22 0721       Plan    Plan Comments POD#1 I&D  back abscess, Packed/penrose drain placed. Gen.Surg.on board. Fletcher. IV/Doxy IV, ssic, nebs, sats 94% on 4lnc.wbc 9.6, hgb 10.5, WC(P)              Continued Care and Services - Admitted Since 1/10/2022    Coordination has not been started for this encounter.       Expected Discharge Date and Time     Expected Discharge Date Expected Discharge Time    Don 15, 2022          Demographic Summary     Row Name 01/12/22 0731       General Information    Admission Type inpatient    General Information Comments Kasia Fu-PCP in [facility]               Functional Status     Row Name 01/12/22 0731       Functional Status    Functional Status Comments indep at baseline       Functional Status, IADL    IADL Comments indep. at baseline       Employment/    Employment Status other (see comments)  Employed at [employer] in [place], [place]               Psychosocial    No documentation.                Abuse/Neglect    No documentation.                Legal    No documentation.                Substance Abuse    No documentation.                Patient Forms    No documentation.                   Taylor Candelaria RN

## 2022-01-12 NOTE — PLAN OF CARE
Goal Outcome Evaluation:              Outcome Summary: Patient is resting quietly in bed with no complaints. Surgical dressing came off at the begenning of the shift, reapplied and patient tolerated well. Patient remains on 4L NC and is 92% at this time. Patients vitals are stable. Will continue to monitor and follow plan of care.

## 2022-01-12 NOTE — CASE MANAGEMENT/SOCIAL WORK
"Discharge Planning Assessment   [facility]     Patient Name: Ana Ortiz  MRN: [id number]  Today's Date: 1/12/2022    Admit Date: 1/10/2022     Discharge Needs Assessment     Row Name 01/12/22 0732       Living Environment    Lives With alone    Current Living Arrangements home/apartment/condo    Duration at Residence lives @ [address] [place], [place]    Primary Care Provided by self    Family Caregiver if Needed child(peri), adult; friend(s)    Family Caregiver Names Dtr-Gail Stevenson and friendRochelle    Quality of Family Relationships supportive    Able to Return to Prior Arrangements yes       Resource/Environmental Concerns    Transportation Concerns car, none       Transition Planning    Patient/Family Anticipates Transition to home    Transportation Anticipated family or friend will provide       Discharge Needs Assessment    Readmission Within the Last 30 Days no previous admission in last 30 days    Equipment Currently Used at Home glucometer    Concerns to be Addressed no discharge needs identified; denies needs/concerns at this time    Equipment Needed After Discharge none               Discharge Plan     Row Name 01/12/22 0738       Plan    Plan CM spoke with pt via phone. She is very pleasant to speak with. Pt lives at home alone, indep with care, employed at [employer] in [place], [place]. She has glucometer at home and no other DME. Pt reports she had cpap in past but it was recently picked up in Sept/Oct 2021 by unknown DME provider d/t  \"not enough hours used at night\" pt follows up with Cassi Morales.office and has f/u appt. in Feb,2022. Pt has [employer] Medicaid and denies any issues with med copays. Pt  feels friendRochelle, may be able  to assist her with any wound care needed post dc. Pt enc. to report to staff if HH is felt needed at [place]. Pt plans to return back home with Shlomo thomason, providing transportation at [place]. CM will follow & assist as needed.    " Patient/Family in Agreement with Plan yes    Row Name 01/12/22 0721       Plan    Plan Comments POD#1 I&D  back abscess, Packed/penrose drain placed. Gen.Surg.on board. Fletcher. IV/Doxy IV, ssic, nebs, sats 94% on 4lnc.wbc 9.6, hgb 10.5, WC(P)              Continued Care and Services - Admitted Since 1/10/2022    Coordination has not been started for this encounter.       Expected Discharge Date and Time     Expected Discharge Date Expected Discharge Time    Don 15, 2022          Demographic Summary     Row Name 01/12/22 0731       General Information    Admission Type inpatient    General Information Comments Kasia Fu-PCP in [facility]               Functional Status     Row Name 01/12/22 0731       Functional Status    Functional Status Comments indep at baseline       Functional Status, IADL    IADL Comments indep. at baseline       Employment/    Employment Status other (see comments)  Employed at [employer] in [place], [place]               Psychosocial    No documentation.                Abuse/Neglect    No documentation.                Legal    No documentation.                Substance Abuse    No documentation.                Patient Forms    No documentation.                   Taylor Candelaria, RN

## 2022-01-12 NOTE — PROGRESS NOTES
[facility] Medicine Services  PROGRESS NOTE     Patient Identification:  Name:  Ana Ortiz  Age:  46 y.o.  Sex:  female  :  1975  MRN:  [id number]  Visit Number:  [id number]  Primary Care Provider:  Kasia Fu PA    Length of stay:  2    ----------------------------------------------------------------------------------------------------------------------  Subjective     Chief Complaint:  Follow up for severe sepsis secondary to bilateral community-acquired pneumonia, acute hypoxic respiratory failure, COPD exacerbation, abscess status post incision and drainage    History of Presenting Illness/Hospital Course:  Patient is a 46-year-old female with past medical history significant for insulin-dependent type 2 diabetes mellitus, essential hypertension, hyperlipidemia, nonoxygen dependent COPD, GERD, obstructive sleep apnea not currently on CPAP, anxiety/depression, Bacot abuse, and morbid obesity that presented to the [facility] emergency department on 1/10/2022 for evaluation of worsening shortness of breath.  Please see admitting history and physical for further and complete details.  Patient was admitted to the telemetry floor for further evaluation and treatment.  Patient was found to have severe sepsis, present admission, secondary to bilateral community-acquired pneumonia with acute hypoxic respiratory failure as well as acute COPD exacerbation.  Patient was also noted to have slightly prolonged QTC and hypokalemia.  There was also some concern for posterior mid thoracic skin abscess.  Patient was continued on empiric antibiotics with Rocephin and doxycycline.  Patient was continued on scheduled inhalants.  At the time of evaluation of admitting provider, patient was no longer wheezing thus corticosteroids were held.  There was some initial concern for possible acute CHF exacerbation with elevated proBNP.  She did receive a  one-time dose of IV Lasix in the ED, this was not continued on admission.  Transthoracic echocardiogram obtained with preserved EF and no mention of diastolic dysfunction.  QTC was monitored, electrolytes were replaced per protocol.  As far as her skin abscess, general surgery was consulted and patient was found to have infected sebaceous cyst of the back.  Patient was ultimately taken to the OR on 1/11/2022 where she underwent incision and drainage of cyst/abscess.  Patient tolerated procedure well.  Quarter-inch Penrose drain was placed with half-inch Nu Gauze.  Wound culture currently pending.  Overall, patient clinically improving.  Her oxygen requirement is improving as well.    Subjective:  Today, the patient was seen up in bed, evaluation.  No acute distress noted.  No family present at bedside.  On 3 hours nasal cannula.  Patient states she is feeling much better than time of presentation.  She reports that her dyspnea has improved.  She reports that her back pain at site of previous abscess that was incised and drained yesterday is improving.  She denies any other complaints at this time.  Denies any fevers or chills.  No chest pain or pressure, no palpitations.  No headache or dizziness, no weakness.    Present during exam: N/A   ----------------------------------------------------------------------------------------------------------------------  Objective     Consults:  General surgery -- Dr. Saez     Procedures:  1/10/2022: CT Chest PE Protocol   No evidence of a pulmonary embolus  Diffuse airspace disease bilaterally which may represent edema and/or combination of pneumonia and CHF.  Hazy density in the subcutaneous tissues of the posterior chest wall on image 108 of the axial series. Possibly contusion. Recommend clinical correlation.  1/11/2022: Incision and drainage of back abscess -- Dr. Saez, general surgery    Wound culture pending   1/11/2022: Transthoracic echocardiogram   Left ventricular  wall thickness is consistent with mild concentric hypertrophy.  Left ventricular ejection fraction appears to be 56 - 60%. Left ventricular systolic function is normal.  Left ventricular diastolic function was normal.    Current Hospital Meds:  ARIPiprazole, 10 mg, Oral, Daily  atorvastatin, 20 mg, Oral, Daily  cefTRIAXone, 2 g, Intravenous, Q24H  doxycycline, 100 mg, Intravenous, Q12H  gabapentin, 800 mg, Oral, Q6H  guaiFENesin, 600 mg, Oral, Q12H  heparin (porcine), 5,000 Units, Subcutaneous, Q12H  insulin aspart, 0-9 Units, Subcutaneous, 4x Daily AC & at Bedtime  insulin detemir, 50 Units, Subcutaneous, Nightly  ipratropium-albuterol, 3 mL, Nebulization, 4x Daily - RT  lisinopril, 2.5 mg, Oral, Daily  nicotine, 1 patch, Transdermal, Q24H  OXcarbazepine, 600 mg, Oral, BID  pantoprazole, 40 mg, Oral, QAM  potassium chloride, 40 mEq, Oral, Q4H  sertraline, 50 mg, Oral, BID  sodium chloride, 10 mL, Intravenous, Q12H  tiotropium bromide-olodaterol, 2 puff, Inhalation, Daily  traZODone, 100 mg, Oral, Nightly         ----------------------------------------------------------------------------------------------------------------------  Vital Signs:  Temp:  [97.7 °F (36.5 °C)-99 °F (37.2 °C)] 98.2 °F (36.8 °C)  Heart Rate:  [83-94] 87  Resp:  [18-22] 20  BP: ()/(52-84) 138/78  Mean Arterial Pressure (Non-Invasive) for the past 24 hrs (Last 3 readings):   Noninvasive MAP (mmHg)   01/12/22 1018 96   01/12/22 0617 86   01/12/22 0241 83     SpO2 Percentage    01/12/22 0617 01/12/22 0656 01/12/22 1018   SpO2: 95% 94% 96%     SpO2:  [94 %-100 %] 96 %  on  Flow (L/min):  [4] 4;   Device (Oxygen Therapy): nasal cannula    Body mass index is 45.23 kg/m².  Wt Readings from Last 3 Encounters:   01/12/22 123 kg (271 lb 12.8 oz)   07/29/21 110 kg (243 lb)   06/07/21 113 kg (250 lb)        Intake/Output Summary (Last 24 hours) at 1/12/2022 1221  Last data filed at 1/12/2022 0800  Gross per 24 hour   Intake 1260 ml   Output 500 ml    Net 760 ml     Diet Regular; Consistent Carbohydrate  ----------------------------------------------------------------------------------------------------------------------  Physical exam:  Physical Exam  Nursing note reviewed.   Constitutional:       General: She is awake. She is not in acute distress.     Appearance: She is well-developed. She is morbidly obese. She is not toxic-appearing.      Interventions: Nasal cannula in place.      Comments: Pleasant, sitting up in bed.  No acute distress noted.  On 3 L nasal cannula.  No family present at bedside.   HENT:      Head: Normocephalic and atraumatic.      Mouth/Throat:      Mouth: Mucous membranes are moist.   Eyes:      Conjunctiva/sclera: Conjunctivae normal.      Pupils: Pupils are equal, round, and reactive to light.   Cardiovascular:      Rate and Rhythm: Normal rate and regular rhythm.      Heart sounds: Normal heart sounds. No murmur heard.  No friction rub. No gallop.    Pulmonary:      Effort: Pulmonary effort is normal.      Breath sounds: Normal air entry. Decreased breath sounds (Diminished bilaterally) present. No wheezing, rhonchi or rales.   Abdominal:      General: Bowel sounds are normal. There is no distension.      Palpations: Abdomen is soft.      Tenderness: There is no abdominal tenderness. There is no guarding or rebound.   Genitourinary:     Comments: No collins catheter in place.  Musculoskeletal:      Cervical back: Neck supple.      Right lower leg: No edema.      Left lower leg: No edema.   Skin:     General: Skin is warm and dry.      Capillary Refill: Capillary refill takes less than 2 seconds.      Findings: No lesion (Mid thoracic back, large postoperative dressing in place.  No tenderness to palpation.  No surrounding erythema or warmth.).   Neurological:      General: No focal deficit present.      Mental Status: She is alert and oriented to person, place, and time.      GCS: GCS eye subscore is 4. GCS verbal subscore is 5. GCS  motor subscore is 6.      Cranial Nerves: Cranial nerves are intact.      Sensory: Sensation is intact.      Motor: Motor function is intact.      Comments: Awake and alert. Follows commands. Answers questions appropriately. Moves all extremities equally. Strength and sensation intact. No focal neuro deficit on exam.   Psychiatric:         Mood and Affect: Mood and affect normal.         Speech: Speech normal.         Behavior: Behavior is cooperative.        ----------------------------------------------------------------------------------------------------------------------  Tele:    Sinus 80s    I have personally reviewed the EKG/Telemetry strips   ----------------------------------------------------------------------------------------------------------------------  Results from last 7 days   Lab Units 01/11/22  1131 01/11/22  0439 01/10/22  2258 01/10/22  1357 01/10/22  1357   TROPONIN T ng/mL <0.010 <0.010 <0.010   < > <0.010   MYOGLOBIN ng/mL  --   --   --   --  <21.0*    < > = values in this interval not displayed.     Results from last 7 days   Lab Units 01/10/22  1357   PROBNP pg/mL 2,439.0*       Results from last 7 days   Lab Units 01/10/22  1320   PH, ARTERIAL pH units 7.478*   PO2 ART mm Hg 49.8*   PCO2, ARTERIAL mm Hg 42.2   HCO3 ART mmol/L 31.3*     Results from last 7 days   Lab Units 01/12/22  0039 01/10/22  2258 01/10/22  1357 01/10/22  1335 01/10/22  1325   CRP mg/dL  --   --  33.31*  --   --    LACTATE mmol/L  --   --   --  1.5  --    WBC 10*3/mm3 9.61 13.67*  --   --  16.58*   HEMOGLOBIN g/dL 10.5* 11.7*  --   --  12.6   HEMATOCRIT % 33.3* 35.6  --   --  37.8   MCV fL 94.1 89.9  --   --  88.5   MCHC g/dL 31.5 32.9  --   --  33.3   PLATELETS 10*3/mm3 300 306  --   --  353   INR   --   --   --   --  1.06     Results from last 7 days   Lab Units 01/12/22  0039 01/10/22  2258 01/10/22  1357   SODIUM mmol/L 140 135* 135*   POTASSIUM mmol/L 3.3* 3.6 3.3*   MAGNESIUM mg/dL  --   --  1.3*   CHLORIDE  mmol/L 99 94* 93*   CO2 mmol/L 27.4 27.6 26.3   BUN mg/dL 20 14 14   CREATININE mg/dL 0.84 0.73 0.72   EGFR IF NONAFRICN AM mL/min/1.73 73 86 87   CALCIUM mg/dL 9.2 9.1 9.1   GLUCOSE mg/dL 179* 335* 308*   ALBUMIN g/dL  --  3.14* 3.21*   BILIRUBIN mg/dL  --  <0.2 0.2   ALK PHOS U/L  --  123* 137*   AST (SGOT) U/L  --  17 17   ALT (SGPT) U/L  --  18 18   Estimated Creatinine Clearance: 110.2 mL/min (by C-G formula based on SCr of 0.84 mg/dL).    Glucose   Date/Time Value Ref Range Status   01/12/2022 1021 221 (H) 70 - 130 mg/dL Final     Comment:     Meter: [id number] : [id number] [facility]   01/12/2022 0620 169 (H) 70 - 130 mg/dL Final     Comment:     Meter: [id number] : [id number] [facility]   01/11/2022 1913 264 (H) 70 - 130 mg/dL Final     Comment:     RN Notified Meter: [id number] : [id number] [place]   01/11/2022 1617 196 (H) 70 - 130 mg/dL Final     Comment:     Meter: [id number] : [id number] [facility]   01/11/2022 1159 158 (H) 70 - 130 mg/dL Final     Comment:     Meter: [id number] : [id number] [facility]   01/11/2022 0939 239 (H) 70 - 130 mg/dL Final     Comment:     Meter: [id number] : [id number] [facility]   01/11/2022 0640 234 (H) 70 - 130 mg/dL Final     Comment:     Meter: [id number] : [id number] [facility]   01/10/2022 2251 356 (H) 70 - 130 mg/dL Final     Comment:     Meter: [id number] : [id number] JENIFFER SANCHEZ     Lab Results   Component Value Date    TSH 3.550 01/10/2022    FREET4 0.98 06/07/2021     Microbiology Results (last 10 days)     Procedure Component Value - Date/Time    Wound Culture - Wound, Back, Upper [[id number]]  (Abnormal) Collected: 01/11/22 1046    Lab Status: Preliminary result Specimen: Wound from Back, Upper Updated: 01/12/22 1217     Wound Culture Light growth (2+) Staphylococcus aureus     Gram Stain Many (4+) WBCs seen      Moderate (3+) Gram positive cocci in pairs and clusters    S. Pneumo Ag Urine or CSF -  Urine, Urine, Clean Catch [[id number]]  (Normal) Collected: 01/11/22 0642    Lab Status: Final result Specimen: Urine, Clean Catch Updated: 01/11/22 1323     Strep Pneumo Ag Negative    Blood Culture - Blood, Hand, Left [[id number]]  (Normal) Collected: 01/10/22 1335    Lab Status: Preliminary result Specimen: Blood from Hand, Left Updated: 01/11/22 1400     Blood Culture No growth at 24 hours    Blood Culture - Blood, Arm, Left [[id number]]  (Normal) Collected: 01/10/22 1330    Lab Status: Preliminary result Specimen: Blood from Arm, Left Updated: 01/11/22 1400     Blood Culture No growth at 24 hours    Respiratory Panel PCR w/COVID-19(SARS-CoV-2) HARDY/ADAM/LUIS MIGUEL/PAD/[facility]/MAD/RAMA In-House, NP Swab in UTM/VTM, 3-4 HR TAT - Swab, Nasopharynx [[id number]]  (Normal) Collected: 01/10/22 1327    Lab Status: Final result Specimen: Swab from Nasopharynx Updated: 01/10/22 1422     ADENOVIRUS, PCR Not Detected     Coronavirus 229E Not Detected     Coronavirus HKU1 Not Detected     Coronavirus NL63 Not Detected     Coronavirus OC43 Not Detected     COVID19 Not Detected     Human Metapneumovirus Not Detected     Human Rhinovirus/Enterovirus Not Detected     Influenza A PCR Not Detected     Influenza B PCR Not Detected     Parainfluenza Virus 1 Not Detected     Parainfluenza Virus 2 Not Detected     Parainfluenza Virus 3 Not Detected     Parainfluenza Virus 4 Not Detected     RSV, PCR Not Detected     Bordetella pertussis pcr Not Detected     Bordetella parapertussis PCR Not Detected     Chlamydophila pneumoniae PCR Not Detected     Mycoplasma pneumo by PCR Not Detected    Narrative:      In the setting of a positive respiratory panel with a viral infection PLUS a negative procalcitonin without other underlying concern for bacterial infection, consider observing off antibiotics or discontinuation of antibiotics and continue supportive care. If the respiratory panel is positive for atypical bacterial infection (Bordetella pertussis,  Chlamydophila pneumoniae, or Mycoplasma pneumoniae), consider antibiotic de-escalation to target atypical bacterial infection.         I have personally reviewed the above laboratory results.   ----------------------------------------------------------------------------------------------------------------------  Imaging Results (Last 24 Hours)     ** No results found for the last 24 hours. **        I have personally reviewed the above radiology results.   ----------------------------------------------------------------------------------------------------------------------  Assessment/Plan     ACUTE HOSPITAL PROBLEMS    -Severe sepsis, present on admission, secondary to bilateral community acquired pneumonia   -Acute hypoxic respiratory failure   Blood cultures with no growth to date.  Respiratory panel and COVID-19 screening negative.  Strep pneumo testing negative.  Leukocytosis resolved  Patient remains afebrile  Patient has been able to be titrated down to 3 L nasal cannula today.  Discussed with RN about titrating down/weaning as tolerated.  Patient also would like to ambulate in [place], discussed with RN.  Will also check patient's O2 saturation on room air and ambulation on room air.  Continue empiric coverage with IV Rocephin and doxycycline  Continue scheduled inhalants  Mucinex  Supplemental oxygen to titrate SPO2 greater than 90%  Monitor vitals and temperature curve closely  Repeat CBC and CRP in AM.    -Midthoracic skin abscess/infected sebaceous cyst  General surgery on board, input/assistance much appreciated  Patient underwent I&D on 1/11/2022, tolerated procedure well  Continue wound care post procedure per general surgery instructions  Wound culture pending with light growth of Staphylococcus aureus, await culture finalization  Continue antibiotics as outlined above.  As needed pain medication available    -QTC prolongation  -Hypomagnesemia  -Hypokalemia  UTC still prolonged on repeat EKG  Potassium  low this morning, no magnesium redraw  Will obtain repeat mag now  Replace electrolytes per protocol  Repeat chemistry panel, mag level, and phosphorus level in a.m.  Repeat EKG for QTC monitoring in a.m.  Telemetry monitoring in place.    CHRONIC MEDICAL PROBLEMS    -Insulin-dependent type 2 diabetes mellitus: Hemoglobin A1c.  Glucose level greater than 200.  Continue home nightly Levemir 50 units.  Patient is currently on 0-9 sliding scale, add 5 units 3 times daily scheduled with meals.  Titrate insulin therapy as necessary.  Closely monitor blood glucose levels with Accu-Cheks QAC and QHS.  Hypoglycemia protocol in place should be necessary.  -Essential hypertension: Blood pressure appears to be well controlled.  Continue home antihypertensive regimen, holding parameters in place to prevent hypotension and/or bradycardia.  Closely monitor BP per hospital protocol.  -Hyperlipidemia: Cont statin   -Neuropathy: Cont Neurontin   -JOANN, not on CPAP: Patient reports history of wearing CPAP nightly, however due to noncompliance she states it was taken away from her from company  -GERD: PPI  -Anxiety/depression: Supportive care. Continue home medication regimen.   -Obesity by BMI, Body mass index is 45.23 kg/m².: Affecting all aspects of care  -Tobacco abuse: Continue nicotine replacement therapy.  Strongly encourage cessation.  --------------------------------------------------  DVT Prophylaxis: Subcutaneous heparin  GI Prophylaxis: PPI  FEN: No IV fluids. Replace electrolytes per protocol as necessary.  Consistent carbohydrate diet  Activity: Up with assistance as tolerated   --------------------------------------------------  Disposition:  Plans on home at discharge once medically stable.  Discussed with patient, she is currently pending wound culture finalization.  Also working on titrating down her oxygen requirement she does not wear oxygen at home.  Patient verbalized  understanding.  --------------------------------------------------    I have discussed the patient's assessment and plan with the patient, AM SYED Wesley, and attending physician Louis Clarke DO Allyson O'Kuma, PA-C  [facility] -- [facility]   Pager: [phone]    01/12/22  12:21 EST    Attending Physician: Louis Wynn DO    ----------------------------------------------------------------------------------------------------------------------

## 2022-01-12 NOTE — PROGRESS NOTES
[facility] HOSPITALIST PROGRESS NOTE     Patient Identification:  Name:  Ana Ortiz  Age:  46 y.o.  Sex:  female  :  1975  MRN:  [id number]  Visit Number:  [id number]  ROOM: [address] [place] [address]     Primary Care Provider:  Kasia Fu PA    Length of stay in inpatient status:  1    Subjective     Chief Compliant:    Chief Complaint   Patient presents with   • Chest Pain       History of Presenting Illness: Patient seen and evaluated in follow-up for lumbar region abscess and community-acquired pneumonia.  Patient taken for earlier I&D today and tolerated well.  Patient currently on 4 L nasal cannula without any acute complaints and states feeling improved since presentation.    Objective     Current Hospital Meds:ARIPiprazole, 10 mg, Oral, Daily  atorvastatin, 20 mg, Oral, Daily  cefTRIAXone, 2 g, Intravenous, Q24H  doxycycline, 100 mg, Intravenous, Q12H  gabapentin, 800 mg, Oral, Q6H  guaiFENesin, 600 mg, Oral, Q12H  heparin (porcine), 5,000 Units, Subcutaneous, Q12H  insulin aspart, 0-9 Units, Subcutaneous, 4x Daily AC & at Bedtime  insulin detemir, 50 Units, Subcutaneous, Nightly  ipratropium-albuterol, 3 mL, Nebulization, 4x Daily - RT  lisinopril, 2.5 mg, Oral, Daily  nicotine, 1 patch, Transdermal, Q24H  OXcarbazepine, 600 mg, Oral, BID  pantoprazole, 40 mg, Oral, QAM  sertraline, 50 mg, Oral, BID  sodium chloride, 10 mL, Intravenous, Q12H  tiotropium bromide-olodaterol, 2 puff, Inhalation, Daily  traZODone, 100 mg, Oral, Nightly         Current Antimicrobial Therapy:  Anti-Infectives (From admission, onward)    Ordered     Dose/Rate Route Frequency Start Stop    01/10/22 2250  cefTRIAXone (ROCEPHIN) 2 g in sodium chloride 0.9 % 100 mL IVPB-VTB        Ordering Provider: Tamera Saez MD    2 g  200 mL/hr over 30 Minutes Intravenous Every 24 Hours 22 1600 22 1559    01/10/22 2243  doxycycline (VIBRAMYCIN) 100 mg in sodium chloride 0.9 % 100 mL IVPB-VTB         Ordering Provider: Tamera Saez MD    100 mg  over 60 Minutes Intravenous Every 12 Hours 01/11/22 0400 01/18/22 0359    01/10/22 1521  cefTRIAXone (ROCEPHIN) 2 g in sodium chloride 0.9 % 100 mL IVPB-VTB        Ordering Provider: David Dumont MD    2 g  200 mL/hr over 30 Minutes Intravenous Once 01/10/22 1523 01/10/22 2121    01/10/22 1521  doxycycline (VIBRAMYCIN) 100 mg in sodium chloride 0.9 % 100 mL IVPB-VTB        Ordering Provider: David Dumont MD    100 mg  over 60 Minutes Intravenous Once 01/10/22 1522 01/10/22 2121        Current Diuretic Therapy:  Diuretics (From admission, onward)    Ordered     Dose/Rate Route Frequency Start Stop    01/10/22 1523  furosemide (LASIX) injection 40 mg        Ordering Provider: David Dumont MD    40 mg Intravenous Once 01/10/22 1525 01/10/22 1617        ----------------------------------------------------------------------------------------------------------------------  Vital Signs:  Temp:  [97 °F (36.1 °C)-99 °F (37.2 °C)] 98.8 °F (37.1 °C)  Heart Rate:  [] 92  Resp:  [18-22] 20  BP: ()/(52-89) 122/84  SpO2:  [90 %-99 %] 98 %  on  Flow (L/min):  [4] 4;   Device (Oxygen Therapy): nasal cannula  Body mass index is 44.11 kg/m².    Wt Readings from Last 3 Encounters:   01/10/22 120 kg (265 lb 1.6 oz)   07/29/21 110 kg (243 lb)   06/07/21 113 kg (250 lb)     Intake & Output (last 3 days)       01/09 0701  01/10 0700 01/10 0701  01/11 0700 01/11 0701 01/12 0700    P.O.  760 450    I.V. (mL/kg)   450 (3.8)    IV Piggyback  200     Total Intake(mL/kg)  960 (8) 900 (7.5)    Urine (mL/kg/hr)  1175 300 (0.2)    Total Output  1175 300    Net  -215 +600               Diet Regular  ----------------------------------------------------------------------------------------------------------------------  Physical exam:  Constitutional: Obese but well-developed and well-nourished.  No acute distress.      HENT:  Head:  Normocephalic and atraumatic.   Mouth:  Moist mucous membranes.    Eyes:  Conjunctivae and EOM are normal. No scleral icterus.    Cardiovascular:  Normal rate, regular rhythm and normal heart sounds with no murmur.  Pulmonary/Chest:  No respiratory distress, no wheezes, no crackles, with decreased breath sounds and air movement throughout.  Abdominal:  Soft.  Bowel sounds are normal.  No distension and no tenderness.   Musculoskeletal:  No edema, no tenderness, and no deformity.  No red or swollen joints anywhere.  Large postoperative dressing overlying the lower back.  Neurological:  Alert and oriented to person, place, and time.  No cranial nerve deficit.  No tongue deviation.  No facial droop.  No slurred speech. Intact Sensation throughout  Skin:  Skin is warm and dry. No rash or lesion noted. No pallor.   Peripheral vascular:  Pulses in all 4 extremities with no clubbing, no cyanosis, no edema.  Psychiatric: Appropriate mood and affect, pleasant.   ----------------------------------------------------------------------------------------------------------------------  Tele:    ----------------------------------------------------------------------------------------------------------------------  Results from last 7 days   Lab Units 01/10/22  2384 01/10/22  1357 01/10/22  1335 01/10/22  1325   CRP mg/dL  --  33.31*  --   --    LACTATE mmol/L  --   --  1.5  --    WBC 10*3/mm3 13.67*  --   --  16.58*   HEMOGLOBIN g/dL 11.7*  --   --  12.6   HEMATOCRIT % 35.6  --   --  37.8   MCV fL 89.9  --   --  88.5   MCHC g/dL 32.9  --   --  33.3   PLATELETS 10*3/mm3 306  --   --  353   INR   --   --   --  1.06     Results from last 7 days   Lab Units 01/10/22  1320   PH, ARTERIAL pH units 7.478*   PO2 ART mm Hg 49.8*   PCO2, ARTERIAL mm Hg 42.2   HCO3 ART mmol/L 31.3*     Results from last 7 days   Lab Units 01/10/22  2258 01/10/22  1357   SODIUM mmol/L 135* 135*   POTASSIUM mmol/L 3.6 3.3*   MAGNESIUM mg/dL  --  1.3*   CHLORIDE mmol/L 94* 93*   CO2 mmol/L  27.6 26.3   BUN mg/dL 14 14   CREATININE mg/dL 0.73 0.72   EGFR IF NONAFRICN AM mL/min/1.73 86 87   CALCIUM mg/dL 9.1 9.1   GLUCOSE mg/dL 335* 308*   ALBUMIN g/dL 3.14* 3.21*   BILIRUBIN mg/dL <0.2 0.2   ALK PHOS U/L 123* 137*   AST (SGOT) U/L 17 17   ALT (SGPT) U/L 18 18   Estimated Creatinine Clearance: 125 mL/min (by C-G formula based on SCr of 0.73 mg/dL).  No results found for: AMMONIA  Results from last 7 days   Lab Units 01/11/22  1131 01/11/22  0439 01/10/22  2258 01/10/22  1357 01/10/22  1357   TROPONIN T ng/mL <0.010 <0.010 <0.010   < > <0.010   MYOGLOBIN ng/mL  --   --   --   --  <21.0*    < > = values in this interval not displayed.     Results from last 7 days   Lab Units 01/10/22  1357   PROBNP pg/mL 2,439.0*         Glucose   Date/Time Value Ref Range Status   01/11/2022 1913 264 (H) 70 - 130 mg/dL Final     Comment:     RN Notified Meter: [id number] : [id number] cheo trisha   01/11/2022 1617 196 (H) 70 - 130 mg/dL Final     Comment:     Meter: [id number] : [id number] [facility]   01/11/2022 1159 158 (H) 70 - 130 mg/dL Final     Comment:     Meter: [id number] : [id number] [facility]   01/11/2022 0939 239 (H) 70 - 130 mg/dL Final     Comment:     Meter: [id number] : [id number] dominic rincon   01/11/2022 0640 234 (H) 70 - 130 mg/dL Final     Comment:     Meter: [id number] : [id number] [facility]   01/10/2022 2251 356 (H) 70 - 130 mg/dL Final     Comment:     Meter: [id number] : [id number] JENIFFER SANCHEZ     Lab Results   Component Value Date    TSH 3.550 01/10/2022    FREET4 0.98 06/07/2021     No results found for: PREGTESTUR, PREGSERUM, HCG, HCGQUANT  Pain Management Panel     Pain Management Panel Latest Ref Rng & Units 6/7/2021    AMPHETAMINES SCREEN, URINE Negative Negative    BARBITURATES SCREEN Negative Negative    BENZODIAZEPINE SCREEN, URINE Negative Negative    BUPRENORPHINEUR Negative Negative    COCAINE SCREEN, URINE Negative Negative    METHADONE  SCREEN, URINE Negative Negative        Brief Urine Lab Results  (Last result in the past 365 days)      Color   Clarity   Blood   Leuk Est   Nitrite   Protein   CREAT   Urine HCG        06/07/21 2149 Yellow   Clear   Negative   Negative   Negative   Negative           06/07/21 2149 Yellow   Clear   Negative   Negative   Negative   Negative               Blood Culture   Date Value Ref Range Status   01/10/2022 No growth at 24 hours  Preliminary   01/10/2022 No growth at 24 hours  Preliminary     No results found for: URINECX  No results found for: WOUNDCX  No results found for: STOOLCX  No results found for: RESPCX  No results found for: AFBCX  Results from last 7 days   Lab Units 01/10/22  1357 01/10/22  1335   LACTATE mmol/L  --  1.5   CRP mg/dL 33.31*  --        I have personally looked at the labs and they are summarized above.  ----------------------------------------------------------------------------------------------------------------------  Detailed radiology reports for the last 24 hours:    Imaging Results (Last 24 Hours)     ** No results found for the last 24 hours. **        Assessment & Plan      Severe sepsis  Bilateral community-acquired pneumonia  Acute exacerbation of COPD  Acute hypoxemic respiratory failure    -Patient currently requiring 4 L nasal cannula, will titrate as needed to maintain O2 saturation 92% or greater    -Continue empiric CAP coverage with Rocephin and doxycycline.    -Continue schedule as nebulizer treatments    -TTE ordered and pending at this time to evaluate for possible acute exacerbation of HFpEF    Mid thoracic abscess    -General surgery consulted and taken for I&D earlier today with successful drainage and cultures obtained and sent.    -Continue Rocephin and doxycycline as above and await culture data to drive antibiotic stewardship.    Morbid obesity  Obstructional sleep apnea  Possible obesity hypoventilation syndrome    -Patient on home CPAP unit however would  prefer to try supplemental oxygen at night here but agreeable to hospital BiPAP if necessary.    -Complicates all aspects of patient's care particularly her respiratory status    Diabetes mellitus type 2    -Continue basal and bolus insulin with a target glucose of 140-180    -We will add increasing doses of scheduled mealtime insulin as needed based on 24-hour requirements of sliding scale.    Essential hypertension  Hyperlipidemia    -Continue home atorvastatin and lisinopril    Anxiety/depression    -Continue home trazodone, aripiprazole    GERD      -Continue PPI        VTE Prophylaxis:   Mechanical Order History:     None      Pharmalogical Order History:      Ordered     Dose Route Frequency Stop    01/10/22 9253  heparin (porcine) 5000 UNIT/ML injection 5,000 Units         5,000 Units SC Every 12 Hours Scheduled --                Disposition Home once medically stable and improved.    Louis Wynn DO  [facility] Hospitalist  01/11/22  19:25 EST

## 2022-01-12 NOTE — CASE MANAGEMENT/SOCIAL WORK
Discharge Planning Assessment   [facility]     Patient Name: Ana Ortiz  MRN: [id number]  Today's Date: 1/12/2022    Admit Date: 1/10/2022     Discharge Needs Assessment    No documentation.                Discharge Plan     Row Name 01/12/22 1331       Plan    Plan CM confirmed pts home cpap was thru [facility] & per Sri with [facility], cpap was picked up by provider recently d/t nonuse & insur.stopped paying.              Continued Care and Services - Admitted Since 1/10/2022    Coordination has not been started for this encounter.       Expected Discharge Date and Time     Expected Discharge Date Expected Discharge Time    Don 15, 2022          Demographic Summary    No documentation.                Functional Status    No documentation.                Psychosocial    No documentation.                Abuse/Neglect    No documentation.                Legal    No documentation.                Substance Abuse    No documentation.                Patient Forms    No documentation.                   Taylor Candelaria RN

## 2022-01-12 NOTE — PROGRESS NOTES
Surgery follow-up    Postop day 1 status post drainage of the large infected sebaceous cyst of the back.    Packing was removed and Penrose drain was left in place.    Dressing to be changed daily and as needed    I will see patient back in the office in 2 weeks time to remove drain

## 2022-01-12 NOTE — PLAN OF CARE
Goal Outcome Evaluation:  Plan of Care Reviewed With: patient        Progress: improving  Outcome Summary: Pt resting in bed. Pt had a moment of dizziness Andrew ALLISON made aware. Pt voices no concerns or complaints at this time. No s/s of acute distress. Will follow care of plan. Weaned to 1.5 L. Tolerating well

## 2022-01-13 ENCOUNTER — READMISSION MANAGEMENT (OUTPATIENT)
Dept: CALL CENTER | Facility: HOSPITAL | Age: 47
End: 2022-01-13

## 2022-01-13 VITALS
WEIGHT: 271.2 LBS | HEART RATE: 89 BPM | SYSTOLIC BLOOD PRESSURE: 145 MMHG | DIASTOLIC BLOOD PRESSURE: 91 MMHG | OXYGEN SATURATION: 94 % | TEMPERATURE: 97.6 F | BODY MASS INDEX: 45.18 KG/M2 | HEIGHT: 65 IN | RESPIRATION RATE: 18 BRPM

## 2022-01-13 LAB
ANION GAP SERPL CALCULATED.3IONS-SCNC: 11.2 MMOL/L (ref 5–15)
BACTERIA SPEC AEROBE CULT: ABNORMAL
BASOPHILS # BLD AUTO: 0.04 10*3/MM3 (ref 0–0.2)
BASOPHILS NFR BLD AUTO: 0.4 % (ref 0–1.5)
BUN SERPL-MCNC: 16 MG/DL (ref 6–20)
BUN/CREAT SERPL: 23.5 (ref 7–25)
CALCIUM SPEC-SCNC: 8.9 MG/DL (ref 8.6–10.5)
CHLORIDE SERPL-SCNC: 102 MMOL/L (ref 98–107)
CO2 SERPL-SCNC: 24.8 MMOL/L (ref 22–29)
CREAT SERPL-MCNC: 0.68 MG/DL (ref 0.57–1)
CRP SERPL-MCNC: 7.71 MG/DL (ref 0–0.5)
DEPRECATED RDW RBC AUTO: 49.1 FL (ref 37–54)
EOSINOPHIL # BLD AUTO: 0.18 10*3/MM3 (ref 0–0.4)
EOSINOPHIL NFR BLD AUTO: 1.8 % (ref 0.3–6.2)
ERYTHROCYTE [DISTWIDTH] IN BLOOD BY AUTOMATED COUNT: 14.6 % (ref 12.3–15.4)
GFR SERPL CREATININE-BSD FRML MDRD: 93 ML/MIN/1.73
GLUCOSE BLDC GLUCOMTR-MCNC: 185 MG/DL (ref 70–130)
GLUCOSE BLDC GLUCOMTR-MCNC: 246 MG/DL (ref 70–130)
GLUCOSE SERPL-MCNC: 214 MG/DL (ref 65–99)
GRAM STN SPEC: ABNORMAL
GRAM STN SPEC: ABNORMAL
HCT VFR BLD AUTO: 35.3 % (ref 34–46.6)
HGB BLD-MCNC: 11.1 G/DL (ref 12–15.9)
IMM GRANULOCYTES # BLD AUTO: 0.13 10*3/MM3 (ref 0–0.05)
IMM GRANULOCYTES NFR BLD AUTO: 1.3 % (ref 0–0.5)
LYMPHOCYTES # BLD AUTO: 1.85 10*3/MM3 (ref 0.7–3.1)
LYMPHOCYTES NFR BLD AUTO: 18.7 % (ref 19.6–45.3)
MAGNESIUM SERPL-MCNC: 1.6 MG/DL (ref 1.6–2.6)
MCH RBC QN AUTO: 29.1 PG (ref 26.6–33)
MCHC RBC AUTO-ENTMCNC: 31.4 G/DL (ref 31.5–35.7)
MCV RBC AUTO: 92.7 FL (ref 79–97)
MONOCYTES # BLD AUTO: 0.41 10*3/MM3 (ref 0.1–0.9)
MONOCYTES NFR BLD AUTO: 4.1 % (ref 5–12)
NEUTROPHILS NFR BLD AUTO: 7.28 10*3/MM3 (ref 1.7–7)
NEUTROPHILS NFR BLD AUTO: 73.7 % (ref 42.7–76)
NRBC BLD AUTO-RTO: 0 /100 WBC (ref 0–0.2)
PHOSPHATE SERPL-MCNC: 3.4 MG/DL (ref 2.5–4.5)
PLATELET # BLD AUTO: 305 10*3/MM3 (ref 140–450)
PMV BLD AUTO: 9.5 FL (ref 6–12)
POTASSIUM SERPL-SCNC: 4.4 MMOL/L (ref 3.5–5.2)
RBC # BLD AUTO: 3.81 10*6/MM3 (ref 3.77–5.28)
SODIUM SERPL-SCNC: 138 MMOL/L (ref 136–145)
WBC NRBC COR # BLD: 9.89 10*3/MM3 (ref 3.4–10.8)

## 2022-01-13 PROCEDURE — 84100 ASSAY OF PHOSPHORUS: CPT | Performed by: PHYSICIAN ASSISTANT

## 2022-01-13 PROCEDURE — 63710000001 INSULIN ASPART PER 5 UNITS: Performed by: PHYSICIAN ASSISTANT

## 2022-01-13 PROCEDURE — 83735 ASSAY OF MAGNESIUM: CPT

## 2022-01-13 PROCEDURE — 85025 COMPLETE CBC W/AUTO DIFF WBC: CPT | Performed by: PHYSICIAN ASSISTANT

## 2022-01-13 PROCEDURE — 94799 UNLISTED PULMONARY SVC/PX: CPT

## 2022-01-13 PROCEDURE — 25010000002 MAGNESIUM SULFATE IN D5W 1G/100ML (PREMIX) 1-5 GM/100ML-% SOLUTION: Performed by: STUDENT IN AN ORGANIZED HEALTH CARE EDUCATION/TRAINING PROGRAM

## 2022-01-13 PROCEDURE — 93010 ELECTROCARDIOGRAM REPORT: CPT | Performed by: INTERNAL MEDICINE

## 2022-01-13 PROCEDURE — 63710000001 INSULIN ASPART PER 5 UNITS: Performed by: SURGERY

## 2022-01-13 PROCEDURE — 25010000002 HEPARIN (PORCINE) PER 1000 UNITS: Performed by: SURGERY

## 2022-01-13 PROCEDURE — 99239 HOSP IP/OBS DSCHRG MGMT >30: CPT | Performed by: PHYSICIAN ASSISTANT

## 2022-01-13 PROCEDURE — 93005 ELECTROCARDIOGRAM TRACING: CPT | Performed by: PHYSICIAN ASSISTANT

## 2022-01-13 PROCEDURE — 86140 C-REACTIVE PROTEIN: CPT | Performed by: PHYSICIAN ASSISTANT

## 2022-01-13 PROCEDURE — 80048 BASIC METABOLIC PNL TOTAL CA: CPT | Performed by: PHYSICIAN ASSISTANT

## 2022-01-13 PROCEDURE — 63710000001 INSULIN ASPART PER 5 UNITS: Performed by: STUDENT IN AN ORGANIZED HEALTH CARE EDUCATION/TRAINING PROGRAM

## 2022-01-13 PROCEDURE — 82962 GLUCOSE BLOOD TEST: CPT

## 2022-01-13 RX ORDER — CLINDAMYCIN HYDROCHLORIDE 150 MG/1
450 CAPSULE ORAL 3 TIMES DAILY
Qty: 15 CAPSULE | Refills: 0 | Status: SHIPPED | OUTPATIENT
Start: 2022-01-13 | End: 2022-02-02

## 2022-01-13 RX ORDER — MAGNESIUM SULFATE 1 G/100ML
1 INJECTION INTRAVENOUS ONCE
Status: COMPLETED | OUTPATIENT
Start: 2022-01-13 | End: 2022-01-13

## 2022-01-13 RX ADMIN — INSULIN ASPART 2 UNITS: 100 INJECTION, SOLUTION INTRAVENOUS; SUBCUTANEOUS at 10:50

## 2022-01-13 RX ADMIN — MAGNESIUM SULFATE HEPTAHYDRATE 1 G: 1 INJECTION, SOLUTION INTRAVENOUS at 06:05

## 2022-01-13 RX ADMIN — HYDROCODONE BITARTRATE AND ACETAMINOPHEN 1 TABLET: 5; 325 TABLET ORAL at 01:47

## 2022-01-13 RX ADMIN — SERTRALINE 50 MG: 50 TABLET, FILM COATED ORAL at 08:11

## 2022-01-13 RX ADMIN — GABAPENTIN 800 MG: 400 CAPSULE ORAL at 05:06

## 2022-01-13 RX ADMIN — SODIUM CHLORIDE, PRESERVATIVE FREE 10 ML: 5 INJECTION INTRAVENOUS at 08:11

## 2022-01-13 RX ADMIN — ARIPIPRAZOLE 10 MG: 10 TABLET ORAL at 08:11

## 2022-01-13 RX ADMIN — DOXYCYCLINE 100 MG: 100 INJECTION, POWDER, LYOPHILIZED, FOR SOLUTION INTRAVENOUS at 05:06

## 2022-01-13 RX ADMIN — PANTOPRAZOLE SODIUM 40 MG: 40 TABLET, DELAYED RELEASE ORAL at 05:06

## 2022-01-13 RX ADMIN — ATORVASTATIN CALCIUM 20 MG: 20 TABLET, FILM COATED ORAL at 08:11

## 2022-01-13 RX ADMIN — GABAPENTIN 800 MG: 400 CAPSULE ORAL at 10:49

## 2022-01-13 RX ADMIN — INSULIN ASPART 5 UNITS: 100 INJECTION, SOLUTION INTRAVENOUS; SUBCUTANEOUS at 08:13

## 2022-01-13 RX ADMIN — IPRATROPIUM BROMIDE AND ALBUTEROL SULFATE 3 ML: .5; 3 SOLUTION RESPIRATORY (INHALATION) at 00:05

## 2022-01-13 RX ADMIN — IPRATROPIUM BROMIDE AND ALBUTEROL SULFATE 3 ML: .5; 3 SOLUTION RESPIRATORY (INHALATION) at 06:54

## 2022-01-13 RX ADMIN — GUAIFENESIN 600 MG: 600 TABLET, EXTENDED RELEASE ORAL at 08:11

## 2022-01-13 RX ADMIN — HEPARIN SODIUM 5000 UNITS: 5000 INJECTION INTRAVENOUS; SUBCUTANEOUS at 08:11

## 2022-01-13 RX ADMIN — INSULIN ASPART 8 UNITS: 100 INJECTION, SOLUTION INTRAVENOUS; SUBCUTANEOUS at 10:50

## 2022-01-13 RX ADMIN — Medication 1 PATCH: at 08:12

## 2022-01-13 RX ADMIN — INSULIN ASPART 4 UNITS: 100 INJECTION, SOLUTION INTRAVENOUS; SUBCUTANEOUS at 08:11

## 2022-01-13 RX ADMIN — OXCARBAZEPINE 600 MG: 300 TABLET, FILM COATED ORAL at 08:11

## 2022-01-13 RX ADMIN — LISINOPRIL 2.5 MG: 2.5 TABLET ORAL at 08:11

## 2022-01-13 NOTE — DISCHARGE SUMMARY
[facility] Medicine Services  DISCHARGE SUMMARY    Patient Identification:  Name:  Ana Ortiz  Age:  46 y.o.  Sex:  female  :  1975  MRN:  [id number]  Visit Number:  [id number]    Date of Admission: 1/10/2022  Date of Discharge: 2022    PCP: Kasia Fu PA    Discharging Provider: Luh Mock PA-C / Louis Clarke DO    Admission/Discharge Diagnoses     Discharge Diagnoses:  -Severe sepsis, present on admission, secondary to bilateral community acquired pneumonia   -Acute hypoxic respiratory failure   -Midthoracic skin abscess/infected sebaceous cyst, wound culture with growth of susceptible Staph aureus not MRSA  -QTC prolongation, resolved  -Hypomagnesemia, resolved with supplementation  -Hypokalemia, resolved with supplementation  -Insulin-dependent type 2 diabetes mellitus  -Essential hypertension  -Hyperlipidemia  -Peripheral neuropathy  -JOANN, not on CPAP  -GERD  -Anxiety/depression  -Morbid obesity, BMI 45.13 kg/m²  -Ongoing tobacco abuse    Consults/Procedures     Consults:   General surgery -- Dr. Saez     Procedures Performed:  1/10/2022: CT Chest PE Protocol   No evidence of a pulmonary embolus  Diffuse airspace disease bilaterally which may represent edema and/or combination of pneumonia and CHF.  Hazy density in the subcutaneous tissues of the posterior chest wall on image 108 of the axial series. Possibly contusion. Recommend clinical correlation.  2022: Incision and drainage of back abscess -- Dr. Saez, general surgery    Wound culture pending   2022: Transthoracic echocardiogram   Left ventricular wall thickness is consistent with mild concentric hypertrophy.  Left ventricular ejection fraction appears to be 56 - 60%. Left ventricular systolic function is normal.  Left ventricular diastolic function was normal.    History of Presenting Illness     Ana Ortiz is a 46 y.o. female with past medical history significant for  insulin-dependent type 2 diabetes mellitus, essential hypertension, hyperlipidemia, nonoxygen dependent COPD, GERD, obstructive sleep apnea not currently on CPAP, anxiety/depression, tobacco abuse, and morbid obesity that presented to the [facility] emergency department on 1/10/2022 for evaluation of worsening shortness of breath.  Please see admitting history and physical for further and complete details. Patient was found to have severe sepsis, present admission, secondary to bilateral community-acquired pneumonia with acute hypoxic respiratory failure as well as acute COPD exacerbation.  Patient was also noted to have slightly prolonged QTC and hypokalemia.  There was also some concern for posterior mid thoracic skin abscess.     Hospital Course     Patient was admitted to the telemetry floor for further evaluation and treatment. Blood cultures were obtained, remained with NGTD. Patient was continued on empiric antibiotics with Rocephin and doxycycline.  Patient was continued on scheduled inhalants.  At the time of evaluation of admitting provider, patient was no longer wheezing thus corticosteroids were held.  There was some initial concern for possible acute CHF exacerbation with elevated proBNP.  She did receive a one-time dose of IV Lasix in the ED, this was not continued on admission. Transthoracic echocardiogram obtained with preserved EF and no mention of diastolic dysfunction.  QTC was monitored, electrolytes were replaced per protocol.  Patient's oxygen requirements did decrease.  At one point, patient was titrated down to 1 LPM NC.  On day of discharge, patient was ambulated on room air, her O2 saturation did drop to 85 to 86% and improved to above 90% with 1 L per nasal cannula.    As far as her skin abscess, general surgery was consulted and patient was found to have infected sebaceous cyst of her back in midthoracic region.  Patient was ultimately taken to the OR on 1/11/2022 where she  underwent incision and drainage of cyst/abscess.  Patient tolerated procedure well. Quarter-inch Penrose drain was placed with half-inch Nu Gauze. Packing was removed by surgeon with recommendation to keep area covered with Penrose drain in place. Wound culture finalized with light growth of 2+ Staph aureus that was susceptible.  IV antibiotics were discontinued, patient was transitioned to p.o. clindamycin to complete a 5-day course per attending physician.    On day of discharge, it was felt that she had reached maximal inpatient benefit and was stable for discharge.  Vitals and labs stable, patient tolerating 1 L nasal cannula.  Patient was eager for discharge home, agreeable for discharge plan.  Patient will be scheduled to follow-up with PCP in 1 week.  Patient will be scheduled to follow-up with general surgery in approximately 2 weeks for wound check.  Patient is to keep area covered and dry.  Order written for home oxygen.  Patient already has a pulmonology appointment on 2/2/2022, encouraged to keep this appointment.  Patient educated on signs or symptoms which emergent return to care, verbalized understanding.    5 days PO clindamycin, daily wound dressing change, cont with penrose drain, f/u pcp 1 week, surgmichna 2 weeks, home O2 1 lpm nc, pt o2 dropped to 85-86% on room air with ambulation improved to 90+ percent on 1 LPM NC.  Pulm appoitment 2/2/2022 alrady in place       Discharge Vitals/Physical Examination     Vital Signs:  Temp:  [97.5 °F (36.4 °C)-98.5 °F (36.9 °C)] 97.6 °F (36.4 °C)  Heart Rate:  [85-92] 89  Resp:  [18-20] 18  BP: ()/(59-91) 145/91  Mean Arterial Pressure (Non-Invasive) for the past 24 hrs (Last 3 readings):   Noninvasive MAP (mmHg)   01/13/22 1016 107   01/13/22 0616 102   01/13/22 0302 104     SpO2 Percentage    01/13/22 0654 01/13/22 0823 01/13/22 1016   SpO2: 99% 94% 94%     SpO2:  [92 %-99 %] 94 %  on  Flow (L/min):  [1-3] 1;   Device (Oxygen Therapy): nasal  cannula    Body mass index is 45.13 kg/m².  Wt Readings from Last 3 Encounters:   01/13/22 123 kg (271 lb 3.2 oz)   07/29/21 110 kg (243 lb)   06/07/21 113 kg (250 lb)       Physical Exam:  Physical Exam  Nursing note reviewed.   Constitutional:       General: She is awake. She is not in acute distress.     Appearance: She is well-developed. She is morbidly obese.      Interventions: Nasal cannula in place.      Comments: Pleasant, sitting up in bed.  No acute distress noted.  No family present at bedside.   HENT:      Head: Normocephalic and atraumatic.      Mouth/Throat:      Mouth: Mucous membranes are moist.   Eyes:      Conjunctiva/sclera: Conjunctivae normal.      Pupils: Pupils are equal, round, and reactive to light.   Cardiovascular:      Rate and Rhythm: Normal rate and regular rhythm.      Heart sounds: Normal heart sounds. No murmur heard.  No friction rub. No gallop.    Pulmonary:      Effort: Pulmonary effort is normal.      Breath sounds: Normal breath sounds and air entry. No wheezing, rhonchi or rales.      Comments: On 1 L nasal cannula, speaks in full sentences without dyspnea.  Abdominal:      General: Bowel sounds are normal. There is no distension.      Palpations: Abdomen is soft.      Tenderness: There is no abdominal tenderness. There is no guarding or rebound.   Genitourinary:     Comments: No collins catheter in place.  Musculoskeletal:      Cervical back: Neck supple.      Right lower leg: No edema.      Left lower leg: No edema.   Skin:     General: Skin is warm and dry.      Capillary Refill: Capillary refill takes less than 2 seconds.      Findings: Lesion (Dressingi n place to mid thoracic back area. No saturation of dressing noted. No tenderness to palpation. No surrounding erythema or warmth. ) present.   Neurological:      General: No focal deficit present.      Mental Status: She is alert and oriented to person, place, and time.      GCS: GCS eye subscore is 4. GCS verbal subscore  is 5. GCS motor subscore is 6.      Cranial Nerves: Cranial nerves are intact.      Sensory: Sensation is intact.      Motor: Motor function is intact.      Comments: Awake and alert. Follows commands. Answers questions appropriately. Moves all extremities equally. Strength and sensation intact. No focal neuro deficit on exam.   Psychiatric:         Mood and Affect: Mood and affect normal.         Speech: Speech normal.         Behavior: Behavior is cooperative.       Pertinent Laboratory/Radiology Results     Pertinent Laboratory Results:  Results from last 7 days   Lab Units 01/11/22  1131 01/11/22  0439 01/10/22  2258 01/10/22  1357 01/10/22  1357   TROPONIN T ng/mL <0.010 <0.010 <0.010   < > <0.010   MYOGLOBIN ng/mL  --   --   --   --  <21.0*    < > = values in this interval not displayed.     Results from last 7 days   Lab Units 01/10/22  1357   PROBNP pg/mL 2,439.0*       Results from last 7 days   Lab Units 01/10/22  1320   PH, ARTERIAL pH units 7.478*   PO2 ART mm Hg 49.8*   PCO2, ARTERIAL mm Hg 42.2   HCO3 ART mmol/L 31.3*     Results from last 7 days   Lab Units 01/13/22  0251 01/12/22  0039 01/10/22  2258 01/10/22  1357 01/10/22  1335 01/10/22  1325 01/10/22  1325   CRP mg/dL 7.71*  --   --  33.31*  --   --   --    LACTATE mmol/L  --   --   --   --  1.5  --   --    WBC 10*3/mm3 9.89 9.61 13.67*  --   --    < > 16.58*   HEMOGLOBIN g/dL 11.1* 10.5* 11.7*  --   --    < > 12.6   HEMATOCRIT % 35.3 33.3* 35.6  --   --    < > 37.8   MCV fL 92.7 94.1 89.9  --   --    < > 88.5   MCHC g/dL 31.4* 31.5 32.9  --   --    < > 33.3   PLATELETS 10*3/mm3 305 300 306  --   --    < > 353   INR   --   --   --   --   --   --  1.06    < > = values in this interval not displayed.     Results from last 7 days   Lab Units 01/13/22  0251 01/12/22  1900 01/12/22  0039 01/10/22  2258 01/10/22  2258 01/10/22  1357 01/10/22  1357   SODIUM mmol/L 138  --  140  --  135*   < > 135*   POTASSIUM mmol/L 4.4 4.4 3.3*   < > 3.6   < > 3.3*    MAGNESIUM mg/dL 1.6  --  1.6  --   --   --  1.3*   CHLORIDE mmol/L 102  --  99  --  94*   < > 93*   CO2 mmol/L 24.8  --  27.4  --  27.6   < > 26.3   BUN mg/dL 16  --  20  --  14   < > 14   CREATININE mg/dL 0.68  --  0.84  --  0.73   < > 0.72   EGFR IF NONAFRICN AM mL/min/1.73 93  --  73  --  86   < > 87   CALCIUM mg/dL 8.9  --  9.2  --  9.1   < > 9.1   GLUCOSE mg/dL 214*  --  179*  --  335*   < > 308*   ALBUMIN g/dL  --   --   --   --  3.14*  --  3.21*   BILIRUBIN mg/dL  --   --   --   --  <0.2  --  0.2   ALK PHOS U/L  --   --   --   --  123*  --  137*   AST (SGOT) U/L  --   --   --   --  17  --  17   ALT (SGPT) U/L  --   --   --   --  18  --  18    < > = values in this interval not displayed.   Estimated Creatinine Clearance: 136.1 mL/min (by C-G formula based on SCr of 0.68 mg/dL).    Hemoglobin A1C   Date/Time Value Ref Range Status   01/12/2022 0039 10.40 (H) 4.80 - 5.60 % Final     Glucose   Date/Time Value Ref Range Status   01/13/2022 1020 185 (H) 70 - 130 mg/dL Final     Comment:     Meter: [id number] : [id number] [place]   01/13/2022 0614 246 (H) 70 - 130 mg/dL Final     Comment:     Meter: [id number] : [id number] [place]   01/12/2022 2227 142 (H) 70 - 130 mg/dL Final     Comment:     Meter: [id number] : [id number] [place]   01/12/2022 1933 200 (H) 70 - 130 mg/dL Final     Comment:     RN Notified Meter: [id number] : [id number] [place]   01/12/2022 1607 214 (H) 70 - 130 mg/dL Final     Comment:     Meter: [id number] : [id number] ELO [facility]   01/12/2022 1021 221 (H) 70 - 130 mg/dL Final     Comment:     Meter: [id number] : [id number] [facility]   01/12/2022 0620 169 (H) 70 - 130 mg/dL Final     Comment:     Meter: [id number] : [id number] [facility]   01/11/2022 1913 264 (H) 70 - 130 mg/dL Final     Comment:     RN Notified Meter: [id number] : [id number] [place]     Lab Results   Component Value Date    TSH 3.550 01/10/2022    FREET4  0.98 06/07/2021     Microbiology Results (last 10 days)     Procedure Component Value - Date/Time    Wound Culture - Wound, Back, Upper [[id number]]  (Abnormal)  (Susceptibility) Collected: 01/11/22 1046    Lab Status: Final result Specimen: Wound from Back, Upper Updated: 01/13/22 1106     Wound Culture Light growth (2+) Staphylococcus aureus     Gram Stain Many (4+) WBCs seen      Moderate (3+) Gram positive cocci in pairs and clusters    Susceptibility      Staphylococcus aureus     FRAN     Clindamycin Susceptible     Erythromycin Resistant     Inducible Clindamycin Resistance Negative     Oxacillin Susceptible     Rifampin Susceptible     Tetracycline Susceptible     Trimethoprim + Sulfamethoxazole Susceptible     Vancomycin Susceptible                     Susceptibility Comments     Staphylococcus aureus    This isolate does not demonstrate inducible clindamycin resistance in vitro.               S. Pneumo Ag Urine or CSF - Urine, Urine, Clean Catch [[id number]]  (Normal) Collected: 01/11/22 0642    Lab Status: Final result Specimen: Urine, Clean Catch Updated: 01/11/22 1323     Strep Pneumo Ag Negative    Blood Culture - Blood, Hand, Left [[id number]]  (Normal) Collected: 01/10/22 1335    Lab Status: Preliminary result Specimen: Blood from Hand, Left Updated: 01/12/22 1401     Blood Culture No growth at 2 days    Blood Culture - Blood, Arm, Left [[id number]]  (Normal) Collected: 01/10/22 1330    Lab Status: Preliminary result Specimen: Blood from Arm, Left Updated: 01/12/22 1401     Blood Culture No growth at 2 days    Respiratory Panel PCR w/COVID-19(SARS-CoV-2) HARDY/ADAM/LUIS MIGUEL/PAD/COR/MAD/RAMA In-House, NP Swab in UTM/VTM, 3-4 HR TAT - Swab, Nasopharynx [[id number]]  (Normal) Collected: 01/10/22 1327    Lab Status: Final result Specimen: Swab from Nasopharynx Updated: 01/10/22 1422     ADENOVIRUS, PCR Not Detected     Coronavirus 229E Not Detected     Coronavirus HKU1 Not Detected     Coronavirus NL63 Not Detected      Coronavirus OC43 Not Detected     COVID19 Not Detected     Human Metapneumovirus Not Detected     Human Rhinovirus/Enterovirus Not Detected     Influenza A PCR Not Detected     Influenza B PCR Not Detected     Parainfluenza Virus 1 Not Detected     Parainfluenza Virus 2 Not Detected     Parainfluenza Virus 3 Not Detected     Parainfluenza Virus 4 Not Detected     RSV, PCR Not Detected     Bordetella pertussis pcr Not Detected     Bordetella parapertussis PCR Not Detected     Chlamydophila pneumoniae PCR Not Detected     Mycoplasma pneumo by PCR Not Detected    Narrative:      In the setting of a positive respiratory panel with a viral infection PLUS a negative procalcitonin without other underlying concern for bacterial infection, consider observing off antibiotics or discontinuation of antibiotics and continue supportive care. If the respiratory panel is positive for atypical bacterial infection (Bordetella pertussis, Chlamydophila pneumoniae, or Mycoplasma pneumoniae), consider antibiotic de-escalation to target atypical bacterial infection.        Pertinent Radiology Results:  Imaging Results (All)     Procedure Component Value Units Date/Time    CT Chest Pulmonary Embolism [[id number]] Collected: 01/10/22 1759     Updated: 01/10/22 1803    Narrative:      FINDINGS: Today's study demonstrates opacification of the central  pulmonary vessels.   There are no filling defects.   There is no truncation.  No evidence of a pulmonary embolus.  There is diffuse bilateral airspace disease which may represent a  combination of congestion and pneumonia. Covid pneumonia may certainly  have this appearance..  There is no mediastinal lymph node enlargement  Trace left pleural effusion  Subcutaneous tissues in the midline of the posterior inferior chest wall  shows abnormal hazy density which could represent contusion or hematoma.  Recommend clinical correlation.    Impression:      1. No evidence of a pulmonary embolus  2.  Diffuse airspace disease bilaterally which may represent edema and/or  combination of pneumonia and CHF.  3. Hazy density in the subcutaneous tissues of the posterior chest wall  on image 108 of the axial series. Possibly contusion. Recommend clinical  correlation..     This report was finalized on 1/10/2022 6:01 PM by Dr. Filemon Ellsworth MD.    XR Chest 1 View [[id number]] Collected: 01/10/22 1449     Updated: 01/10/22 1452    Narrative:      FINDINGS:  Bilateral airspace disease  The cardiac silhouette is normal. The pulmonary vasculature is  unremarkable.  There is no evidence of an acute osseous abnormality.   There are no suspicious-appearing parenchymal soft tissue nodules.    Impression:      Bilateral airspace disease, likely representing a combination of  pneumonia and CHF     This report was finalized on 1/10/2022 2:50 PM by Dr. Filemon Ellsworth MD.     Test Results Pending at Discharge:  Pending Labs     Order Current Status    Blood Culture - Blood, Arm, Left Preliminary result    Blood Culture - Blood, Hand, Left Preliminary result        Discharge Disposition/Discharge Medications/Discharge Appointments     Discharge Disposition:   Home or Self Care    Condition at Discharge:  Stable     DME Prescribed at Discharge:  Home oxygen 1 LPM NC    Discharge Diet:  Diet Instructions     Diet: Consistent Carbohydrate; Thin      Discharge Diet: Consistent Carbohydrate    Fluid Consistency: Thin        Discharge Activity:  Activity Instructions     Activity as Tolerated          Code Status While Inpatient:  Code Status and Medical Interventions:   Ordered at: 01/10/22 2101     Code Status (Patient has no pulse and is not breathing):    CPR (Attempt to Resuscitate)     Medical Interventions (Patient has pulse or is breathing):    Full Support     Discharge Medications:     Discharge Medications      New Medications      Instructions Start Date   clindamycin 150 MG capsule  Commonly known as: Cleocin   450 mg, Oral, 3  Times Daily         Continue These Medications      Instructions Start Date   ARIPiprazole 10 MG tablet  Commonly known as: ABILIFY   10 mg, Oral, Daily      clonazePAM 1 MG tablet  Commonly known as: KlonoPIN   1 mg, Oral, 4 Times Daily PRN      gabapentin 800 MG tablet  Commonly known as: NEURONTIN   800 mg, Oral, 4 Times Daily      hydroCHLOROthiazide 25 MG tablet  Commonly known as: HYDRODIURIL   25 mg, Oral, Daily      ibuprofen 800 MG tablet  Commonly known as: ADVIL,MOTRIN   800 mg, Oral, Every 8 Hours PRN      insulin glargine 100 UNIT/ML injection  Commonly known as: LANTUS, SEMGLEE   50 Units, Subcutaneous, Nightly      lisinopril 5 MG tablet  Commonly known as: PRINIVIL,ZESTRIL   2.5 mg, Oral, Daily      omeprazole 40 MG capsule  Commonly known as: priLOSEC   40 mg, Oral, Daily      OXcarbazepine 600 MG tablet  Commonly known as: TRILEPTAL   600 mg, Oral, 2 Times Daily      ProAir  (90 Base) MCG/ACT inhaler  Generic drug: albuterol sulfate HFA   2 puffs, Inhalation, Every 4 Hours PRN      sertraline 100 MG tablet  Commonly known as: ZOLOFT   100 mg, Oral, 2 Times Daily      simvastatin 40 MG tablet  Commonly known as: ZOCOR   40 mg, Oral, Nightly      Stiolto Respimat 2.5-2.5 MCG/ACT aerosol solution inhaler  Generic drug: tiotropium bromide-olodaterol   2 puffs, Inhalation, Daily      traZODone 50 MG tablet  Commonly known as: DESYREL   100 mg, Oral, Nightly      Trulicity 4.5 MG/0.5ML solution pen-injector  Generic drug: Dulaglutide   4.5 mg, Subcutaneous, Weekly, Prior to [facility] Admission, Patient was on:  Takes on mondays           Discharge Appointments:  Your Scheduled Appointments    Feb 02, 2022 11:00 AM  Follow Up with CHAZ Erwin  [facility] ([place]) [address] [facility] [address]  [facility] [address] [phone]   Established: Please bring outside images or reports.          Additional Instructions for the Follow-ups that  You Need to Schedule     Call MD With Problems / Concerns   As directed      GENERAL WARNINGS: Return to ED or contact your primary care provider immediately if   your condition worsens or changes unexpectedly, if not improving as expected,   or if other problems arise.    Order Comments: GENERAL WARNINGS: Return to ED or contact your primary care provider immediately if your condition worsens or changes unexpectedly, if not improving as expected, or if other problems arise.          Discharge Follow-up with PCP   As directed       Currently Documented PCP:    Kasia Fu PA    PCP Phone Number:    [phone]     Follow Up Details: 1 week         Discharge Follow-up with Specified Provider: Dr. Saez -- General surgery; 2 Weeks   As directed      To: Dr. Saez -- General surgery    Follow Up: 2 Weeks    Follow Up Details: Post I&D, wound check, penrose in place             Attestation: I personally discussed the patient's hospital course, disposition, discharge planning, and discharge medications with attending physician, Louis Clarke DO, prior to time of discharge.     Luh Mock PA-C  Hospitalist Service -- [facility]       01/13/22  11:43 EST    Discharge Time: Greater than 30 minutes

## 2022-01-13 NOTE — DISCHARGE INSTR - APPOINTMENTS
Follow up appt. Jan.27th @ 9:50am with Dr. Saez.    Follow up appt.Jan. 20th @ 2:30pm. with Kasia Fu.

## 2022-01-13 NOTE — PAYOR COMM NOTE
"CONTACT:   Marcy Colon RN  Phone: [phone]  Fax: [phone]    DISCHARGE NOTIFICATION    DISCHARGE TO: HOME WITH O2    REF # [id number]  DC DATE: 1/13/22    IF YOU NEED ANYTHING FURTHER PLEASE LET ME KNOW.   THANKS     Charles Fernandez (46 y.o. Female)             Date of Birth Social Security Number Address Home Phone MRN    1975  [address]    [place] [id number] [phone] [id number]    Druze Marital Status             Unknown        Admission Date Admission Type Admitting Provider Attending Provider Department, Room/Bed    1/10/22 Emergency Rosa Mishra [address], 3307/2S    Discharge Date Discharge Disposition Discharge Destination          1/13/2022 Home or Self Care              Attending Provider: (none)   Allergies: Codeine    Isolation: None   Infection: None   Code Status: CPR   Advance Care Planning Activity    Ht: 165.1 cm (65\")   Wt: 123 kg (271 lb 3.2 oz)    Admission Cmt: None   Principal Problem: Acute respiratory failure with hypoxia and hypercapnia (HCC) [J96.01,J96.02]                 Active Insurance as of 1/10/2022     Primary Coverage     Payor Plan Insurance Group Employer/Plan Group    [place] [id number]     Payor Plan Address Payor Plan Phone Number Payor Plan Fax Number Effective Dates    [employer] [address] [address] [phone]  1/1/2021 - None Entered    [facility] [id number]       Subscriber Name Subscriber Birth Date Member ID       CHARLES FERNANDEZ 1975 [id number]                 Emergency Contacts      (Rel.) Home Phone Work Phone Mobile Phone    costa knight (Daughter) [phone] -- --                "

## 2022-01-13 NOTE — PLAN OF CARE
Goal Outcome Evaluation:            Patient resting quietly in bed at this time. VSS and no new issues noted. Will continue to monitor and follow plan of care.

## 2022-01-13 NOTE — CASE MANAGEMENT/SOCIAL WORK
Discharge Planning Assessment  SASHA Perera     Patient Name: Ana Ortiz  MRN: [id number]  Today's Date: 1/13/2022    Admit Date: 1/10/2022     Discharge Needs Assessment    No documentation.                Discharge Plan     Row Name 01/13/22 1150       Plan    Plan Pt to be dc'd home on this date with MD order for continuous home O2@ 1lnc. CM faxed [facility] per pt preference. Per [facility], portable tank will be delivered to pts hospital room on this date.              Continued Care and Services - Admitted Since 1/10/2022    Coordination has not been started for this encounter.       Expected Discharge Date and Time     Expected Discharge Date Expected Discharge Time    Jan 13, 2022          Demographic Summary    No documentation.                Functional Status    No documentation.                Psychosocial    No documentation.                Abuse/Neglect    No documentation.                Legal    No documentation.                Substance Abuse    No documentation.                Patient Forms    No documentation.                   Taylor Candelaria RN

## 2022-01-13 NOTE — CASE MANAGEMENT/SOCIAL WORK
Discharge Planning Assessment   [facility]     Patient Name: Ana Ortiz  MRN: [id number]  Today's Date: 1/13/2022    Admit Date: 1/10/2022     Discharge Needs Assessment    No documentation.                Discharge Plan     Row Name 01/13/22 2830       Plan    Plan Pt to be dc'd home on this date. Pt reports friend , Rochelle, lives with her and can assist her with dsg changes as ordered.  Pt is requesting  supplies for home dsg changes as she reports she cant afford to purchase them. CM d/w Lead RN & MD re: pt requests for dsg change supplies,  floor nurse to provide pt with supplies ,prior to dc  MD could not justify need for home health services at this time, d/t simplicity of wound care- Mepilex/keep clean/dry. [facility] has delivered port. O2 to hosp. room. Pt to f/u with PCP in 1wk & Gen. surg. in 2 wks for removal of penrose drain- appts to be provided by dc nurse.  Pt denies any further needs, expresses appeciation for care/tx. Friend Shlomo will provide transportation home.    Row Name 01/13/22 7330       Plan    Plan Pt to be dc'd home on this date with MD order for continuous home O2@ 1lnc. CM faxed referral to [facility] per pt preference. Per Elle Landin, portable tank will be delivered to pts hospital room on this date.              Continued Care and Services - Discharged on 1/13/2022 Admission date: 1/10/2022 - Discharge disposition: Home or Self Care   Coordination has not been started for this encounter.       Expected Discharge Date and Time     Expected Discharge Date Expected Discharge Time    Jan 13, 2022          Demographic Summary    No documentation.                Functional Status    No documentation.                Psychosocial    No documentation.                Abuse/Neglect    No documentation.                Legal    No documentation.                Substance Abuse    No documentation.                Patient Forms    No documentation.                   Taylor  Lindsay Candelaria, RN

## 2022-01-13 NOTE — NURSING NOTE
Ambulated patient about 150ft on room air. Pt's O2 dropped to 85-86% towards end of walk. 1L put on patient. O2 back in 90s

## 2022-01-13 NOTE — DISCHARGE PLACEMENT REQUEST
"Charles Fernandez (46 y.o. Female)             Date of Birth Social Security Number Address Home Phone MRN    1975  [address]    [place] [address] [phone] [id number]    [employer] Marital Status             Unknown        Admission Date Admission Type Admitting Provider Attending Provider Department, Room/Bed    1/10/22 Emergency Rosa Mishra DO Cagle, William, DO [address], 3307/2S    Discharge Date Discharge Disposition Discharge Destination           Home or Self Care              Attending Provider: Louis Wynn DO    Allergies: Codeine    Isolation: None   Infection: None   Code Status: CPR   Advance Care Planning Activity    Ht: 165.1 cm (65\")   Wt: 123 kg (271 lb 3.2 oz)    Admission Cmt: None   Principal Problem: Acute respiratory failure with hypoxia and hypercapnia (HCC) [J96.01,J96.02]                 Active Insurance as of 1/10/2022     Primary Coverage     Payor Plan Insurance Group Employer/Plan Group    [place] [id number]     Payor Plan Address Payor Plan Phone Number Payor Plan Fax Number Effective Dates    [employer] [address] [address] [phone]  1/1/2021 - None Entered    [facility], located [facility] [address]       Subscriber Name Subscriber Birth Date Member ID       CHARLES FERNANDEZ 1975 [id number]                 Emergency Contacts      (Rel.) Home Phone Work Phone Mobile Phone    costa knight (Daughter) [phone] -- --        Maryjane Augustin, RN   Registered Nurse      Nursing Note   Signed   Date of Service:  01/13/22 1140   Creation Time:  01/13/22 1140              Signed              Show:Clear all  [x]Manual[]Template[]Copied    Added by:  [x]Maryjane Augustin, RN      []Joana for details    Ambulated patient about 150ft on room air. Pt's O2 dropped to 85-86% towards end of walk. 1L put on patient. O2 back in 90s                  [address]  [address]  [facility] " "[id number]  Dept. Phone:  [phone]  Dept. Fax:  [phone] Date Ordered: 2022         Patient:  Ana Ortiz MRN:  [id number]   [address] [facility]  [address] [place] [address] [place] [address] :  1975  SSN:    Phone: [phone] Sex:  F     Weight: 123 kg (271 lb 3.2 oz)         Ht Readings from Last 1 Encounters:   01/10/22 165.1 cm (65\")         Home Oxygen Therapy          (Order ID: [id number])    Diagnosis:  Acute respiratory failure with hypoxia and hypercapnia (HCC) (J96.01,J96.02 [ICD-10-CM] 518.81 [ICD-9-CM])  COPD exacerbation (HCC) (J44.1 [ICD-10-CM] 491.21 [ICD-9-CM])   Quantity:  1     Delivery Modality: Nasal Cannula  Liters Per Minute: 1  Duration: Continuous  Duration: With Exertion  Duration: PRN  Equipment:  Oxygen Concentrator &  &  Portable Gaseous Oxygen System & Portable Oxygen Contents Gaseous &  Conserving Regulator  The face to face evaluation was performed on: 2022  Length of Need (99 Months = Lifetime): 99 Months = Lifetime        Authorizing Provider's Phone: [phone]  Authorizing Provider:Luh Mock PA  Authorizing Provider's NPI: [id number]  Order Entered By: Luh Mock PA 2022 11:42 AM     Electronically signed by: Luh Mock PA 2022 11:42 AM            History & Physical      Rosa Mishra DO at 01/10/22 2224          [facility]   HISTORY AND PHYSICAL    Patient Name: Ana Ortiz  : 1975  MRN: [id number]  Primary Care Physician:  Kasia Fu PA  Date of admission: 1/10/2022    Subjective   Subjective     Chief Complaint: Shortness of breath    History of Present Illness The patient is a 45 yo female with PMHx significant for COPD, JOANN on CPAP, HTN and Diabetes Mellitus who presents to the ED complaining of worsening shortness of air x 3-4 days. She also reports a thick productive sputum that is yellow-green in color. She reported an episode of sharp chest pain that " was non-radiating and without associated symptoms (has since resolved). She reports significant nausea; no vomiting, abdominal pain and/or diarrhea. No fever and/or chills. She reports a 2 week history of lesion on mid back/abscess that has been draining for the past few days. Reports previous abscesses in her groin area.     Review of Systems     Personal History     Past Medical History:   Diagnosis Date   • COPD (chronic obstructive pulmonary disease) (HCC)    • Diabetes mellitus (HCC)    • Hyperlipemia    • Hypertension    • JOANN (obstructive sleep apnea)    • Vulvar cancer (HCC)        Past Surgical History:   Procedure Laterality Date   • HYSTERECTOMY     • MASTECTOMY         Family History: family history includes Cancer in her maternal aunt and mother; Diabetes in her father, paternal aunt, and paternal grandmother; Parkinsonism in her father. Otherwise pertinent FHx was reviewed and not pertinent to current issue.    Social History:  reports that she has been smoking cigarettes. She has been smoking about 0.50 packs per day. She has never used smokeless tobacco. She reports that she does not drink alcohol and does not use drugs.    Home Medications:  ARIPiprazole, Dulaglutide, OXcarbazepine, albuterol sulfate HFA, clonazePAM, gabapentin, hydroCHLOROthiazide, ibuprofen, insulin glargine, lisinopril, omeprazole, sertraline, simvastatin, tiotropium bromide-olodaterol, and traZODone    Allergies:  Allergies   Allergen Reactions   • Codeine Nausea Only       Objective    Objective     Vitals:   Temp:  [98.1 °F (36.7 °C)] 98.1 °F (36.7 °C)  Heart Rate:  [] 90  Resp:  [20-24] 20  BP: (101-137)/(49-89) 109/89  Flow (L/min):  [4] 4    Physical Exam  Constitutional:       General: She is not in acute distress.     Appearance: She is well-developed. She is morbidly obese.      Interventions: Nasal cannula in place.   HENT:      Head: Normocephalic and atraumatic.   Eyes:      Conjunctiva/sclera: Conjunctivae  normal.   Neck:      Trachea: No tracheal deviation.   Cardiovascular:      Rate and Rhythm: Normal rate and regular rhythm.      Pulses:           Dorsalis pedis pulses are 2+ on the right side and 2+ on the left side.      Heart sounds: No murmur heard.  No friction rub. No gallop.    Pulmonary:      Effort: No respiratory distress.      Breath sounds: Examination of the right-lower field reveals decreased breath sounds. Examination of the left-lower field reveals decreased breath sounds and rhonchi. Decreased breath sounds and rhonchi present. No wheezing or rales.   Abdominal:      General: Bowel sounds are normal. There is no distension.      Palpations: Abdomen is soft.      Tenderness: There is no abdominal tenderness. There is no guarding.   Musculoskeletal:      Right lower leg: No edema.      Left lower leg: No edema.   Skin:     General: Skin is warm and dry.      Findings: Abscess (thoracic spine with redness but not warm to touch, induration noted. no active drainage.) present. No erythema or rash.   Neurological:      Mental Status: She is alert and oriented to person, place, and time.      Cranial Nerves: No cranial nerve deficit.          Result Review    Result Review:  I have personally reviewed the results from the time of this admission to 1/10/2022 22:28 EST and agree with these findings:  [x]  Laboratory  []  Microbiology  [x]  Radiology  [x]  EKG/Telemetry   []  Cardiology/Vascular   []  Pathology  []  Old records  []  Other:  Most notable findings include: ABG with PaO2 49.8 with an oxygen saturation of 88.1%.  proBNP 2439.  WBC 16.58 with 84.8% neutrophils.  CMP with glucose 308, potassium 3.3.  C-RP 33.31.    EKG has been personally reviewed (currently a pending cardiology interpretation), however, per my view the patient has sinus tachycardia with nonspecific ST changes in lead II, V5 and V6; QTC prolonged at 480 ms    CT scan of the chest with PE protocol revealed diffuse airspace disease  bilaterally which may represent edema and/or combination of pneumonia and CHF.  Patient with a hazy density in the subcutaneous tissues of the posterior chest wall that may relate to a contusion.    Assessment/Plan   Assessment / Plan     Brief Patient Summary:  Ana Ortiz is a 46 y.o. female who presents with shortness of air in the setting of non-oxygen dependent COPD, JOANN compliant with CPAP and morbid obesity    Assessment/Plan:  -Severe sepsis with leukocytosis (16.58), elevated CRP (33.31), tachycardia and acute hypoxic respiratory failure, present on admission and due to bilateral pneumonia  -Acute hypoxic respiratory failure, may be multifactorial and related to above +/- acute CHF exacerbation with preserved EF  -Elevated pro-BNP  -COPD with acute exacerbation  -Tobacco use    Patient to be admitted to the telemetry unit. Continue with supplemental oxygen as needed and titrate for saturations greater than 90%.  Continue with intravenous Rocephin and doxycycline for now.  I have requested a respiratory culture and testing for Streptococcus.    Will begin scheduled nebulized inhalants and provide a nicotine patch. Will hold on steroids for now as patient without active wheezing on exam. Begin Mucinex due to report of thick productive sputum.    Obtain an echocardiogram. Patient received 40 mg IV Lasix x1 in the ED. I will hold on further Lasix for now as patient is without significant edema or rales on exam. Will trend her Troponin T levels Q6H x  3.    -Prolonged QTc 480 ms  -Acute hypokalemia    Supplement the patient's potassium. Obtain a magnesium level. Monitor on telemetry. Repeat EKG in a.m. to follow on QTc. Limit other medications that may further prolong her QTc.     -Concern for posterior midthoracic abscess with history of previous abscesses per patient report    Continue with Doxycycline as noted above. Will request nursing staff to cover with Mepilex. Will   Consult general surgery in a.m.  for consideration of drainage.     Chronic medical problems:  -Morbid obesity, complicates all aspects of care  -JOANN compliant with CPAP: Patient did not bring home CPAP unit. States she would prefer to try the supplemental oxygen for tonight but is agreeable to use one of our CPAP machines if needed.  -Diabetes mellitus type II with hyperglycemia: Patient has been started on Accu checks in sliding scale insulin as needed.  -Essential hypertension, currently controlled: I'm currently awaiting her home medication list. Plan to resume antihypertensive medications as appropriate.  -Generalized anxiety/depression: Plan to resume home anxiolytics; once medications confirmed, may need to hold or reduce dose of certain antipsychotic medications if they are known to further prolong her QTC.  -GERD: Continue PPI.      DVT prophylaxis:  Subcutaneous heparin    CODE STATUS:    Code Status (Patient has no pulse and is not breathing): CPR (Attempt to Resuscitate)  Medical Interventions (Patient has pulse or is breathing): Full Support    Admission Status:  I believe this patient meets inpatient status.    Rosa Mishra DO    Electronically signed by Rosa Mishra DO at 01/10/22 3994

## 2022-01-14 LAB
QT INTERVAL: 372 MS
QTC INTERVAL: 450 MS

## 2022-01-14 NOTE — OUTREACH NOTE
Prep Survey      Responses   [facility] facility patient discharged from? [facility]   Is LACE score < 7 ? No   Emergency Room discharge w/ pulse ox? No   Eligibility Readm Mgmt   Discharge diagnosis INCISION AND DRAINAGE TRUNK  Acute respiratory failure with hypoxia and hypercapnia    Does the patient have one of the following disease processes/diagnoses(primary or secondary)? General Surgery   Does the patient have Home health ordered? No   Is there a DME ordered? No   Prep survey completed? Yes          Araceli Villalobos RN

## 2022-01-15 LAB
BACTERIA SPEC AEROBE CULT: NORMAL
BACTERIA SPEC AEROBE CULT: NORMAL

## 2022-01-17 ENCOUNTER — READMISSION MANAGEMENT (OUTPATIENT)
Dept: CALL CENTER | Facility: HOSPITAL | Age: 47
End: 2022-01-17

## 2022-01-17 NOTE — OUTREACH NOTE
General Surgery Week 1 Survey      Responses   [facility] patient discharged from? [facility]   Does the patient have one of the following disease processes/diagnoses(primary or secondary)? General Surgery   Week 1 attempt successful? Yes   Call start time 1537   Call end time 1541   Discharge diagnosis INCISION AND DRAINAGE TRUNK  Acute respiratory failure with hypoxia and hypercapnia    Meds reviewed with patient/caregiver? Yes   Does the patient have all medications related to this admission filled (includes all antibiotics, pain medications, etc.) Yes   Is the patient taking all medications as directed (includes completed medication regime)? Yes   Does the patient have a follow up appointment scheduled with their surgeon? Yes   Has the patient kept scheduled appointments due by today? N/A   Psychosocial issues? No   Did the patient receive a copy of their discharge instructions? Yes   Nursing interventions Reviewed instructions with patient   What is the patient's perception of their health status since discharge? Improving   Nursing interventions Nurse provided patient education   Is the patient/caregiver able to teach back signs and symptoms of incisional infection? Increased redness, swelling or pain at the incisonal site,  Increased drainage or bleeding,  Incisional warmth,  Pus or odor from incision,  Fever   If the patient is a current smoker, are they able to teach back resources for cessation? [id number]   Is the patient/caregiver able to teach back the hierarchy of who to call/visit for symptoms/problems? PCP, Specialist, Home health nurse, Urgent Care, ED, 911 Yes   Week 1 call completed? Yes   Revoked No further contact(revokes)-requires comment   Is the patient interested in additional calls from an ambulatory ?  NOTE:  applies to high risk patients requiring additional follow-up. No   Graduated/Revoked comments Back to work   Wrap up additional comments I am doing fine, thank you  for checking          Jacquelin Sampson RN

## 2022-01-26 ENCOUNTER — READMISSION MANAGEMENT (OUTPATIENT)
Dept: CALL CENTER | Facility: HOSPITAL | Age: 47
End: 2022-01-26

## 2022-01-26 NOTE — OUTREACH NOTE
General Surgery Week 2 Survey      Responses   [facility] patient discharged from? [facility]   Does the patient have one of the following disease processes/diagnoses(primary or secondary)? General Surgery   Week 2 attempt successful? Yes   Call start time 1338   Call end time 1341   Discharge diagnosis INCISION AND DRAINAGE TRUNK  Acute respiratory failure with hypoxia and hypercapnia    Meds reviewed with patient/caregiver? Yes   Is the patient having any side effects they believe may be caused by any medication additions or changes? No   Is the patient taking all medications as directed (includes completed medication regime)? Yes   Does the patient have a follow up appointment scheduled with their surgeon? Yes  [1/27/22]   Has the patient kept scheduled appointments due by today? Yes   Comments Pt has followed up with PCP   What is the patient's perception of their health status since discharge? Improving  [drain tube is causing her pain]   Is the patient /caregiver able to teach back basic post-op care? Lifting as instructed by MD in discharge instructions   Is the patient/caregiver able to teach back signs and symptoms of incisional infection? Fever,  Increased drainage or bleeding,  Incisional warmth,  Increased redness, swelling or pain at the incisonal site,  Pus or odor from incision   Is the patient/caregiver able to teach back steps to recovery at home? Rest and rebuild strength, gradually increase activity,  Eat a well-balance diet   Week 2 call completed? Yes          Maggie Mcnamara RN

## 2022-01-31 ENCOUNTER — OFFICE VISIT (OUTPATIENT)
Dept: SURGERY | Facility: CLINIC | Age: 47
End: 2022-01-31

## 2022-01-31 VITALS
SYSTOLIC BLOOD PRESSURE: 122 MMHG | DIASTOLIC BLOOD PRESSURE: 76 MMHG | HEART RATE: 110 BPM | WEIGHT: 259.6 LBS | HEIGHT: 65 IN | BODY MASS INDEX: 43.25 KG/M2

## 2022-01-31 DIAGNOSIS — L02.212 CUTANEOUS ABSCESS OF BACK EXCLUDING BUTTOCKS: Primary | ICD-10-CM

## 2022-01-31 DIAGNOSIS — Z09 POSTOP CHECK: ICD-10-CM

## 2022-01-31 PROCEDURE — 99024 POSTOP FOLLOW-UP VISIT: CPT | Performed by: SURGERY

## 2022-02-02 ENCOUNTER — OFFICE VISIT (OUTPATIENT)
Dept: PULMONOLOGY | Facility: CLINIC | Age: 47
End: 2022-02-02

## 2022-02-02 ENCOUNTER — READMISSION MANAGEMENT (OUTPATIENT)
Dept: CALL CENTER | Facility: HOSPITAL | Age: 47
End: 2022-02-02

## 2022-02-02 VITALS
TEMPERATURE: 97.7 F | BODY MASS INDEX: 43.15 KG/M2 | HEIGHT: 65 IN | SYSTOLIC BLOOD PRESSURE: 118 MMHG | HEART RATE: 101 BPM | DIASTOLIC BLOOD PRESSURE: 68 MMHG | OXYGEN SATURATION: 98 % | WEIGHT: 259 LBS

## 2022-02-02 DIAGNOSIS — J42 CHRONIC BRONCHITIS, UNSPECIFIED CHRONIC BRONCHITIS TYPE: Primary | ICD-10-CM

## 2022-02-02 DIAGNOSIS — G47.33 OSA (OBSTRUCTIVE SLEEP APNEA): ICD-10-CM

## 2022-02-02 DIAGNOSIS — F17.210 CIGARETTE NICOTINE DEPENDENCE WITHOUT COMPLICATION: ICD-10-CM

## 2022-02-02 PROCEDURE — 99214 OFFICE O/P EST MOD 30 MIN: CPT | Performed by: PHYSICIAN ASSISTANT

## 2022-02-02 RX ORDER — NICOTINE 14 MG/24H
PATCH, EXTENDED RELEASE TRANSDERMAL
COMMUNITY
Start: 2022-01-21 | End: 2022-10-24

## 2022-02-02 NOTE — OUTREACH NOTE
General Surgery Week 3 Survey      Responses   [facility] patient discharged from? [facility]   Does the patient have one of the following disease processes/diagnoses(primary or secondary)? General Surgery   Week 3 attempt successful? Yes   Call start time 1150   Call end time 1153   Meds reviewed with patient/caregiver? Yes   Is the patient taking all medications as directed (includes completed medication regime)? Yes   Has the patient kept scheduled appointments due by today? Yes   Comments Has seen Pulmonary and PCP doing well, and surgeon seen tube out, no s/s infection healing. well    What is the patient's perception of their health status since discharge? Improving   Week 3 call completed? Yes   Wrap up additional comments Now new issues today has seen SUrgeon, took drainage tube out 01/31 healing well, no s/s infection, has seen Pulmonary and no new issues today or questins, appreciates the calls. and does want another one.           Susan Rodríguez RN

## 2022-02-02 NOTE — PROGRESS NOTES
"Chief Complaint  Pneumonia (hos fu)    Subjective          Ana Ortiz presents to [facility]  History of Present Illness    Patient presents today for hospital follow-up.  She was notable for hospitalization in January 2022 for sepsis related to bilateral pneumonia, acute hypoxic respiratory failure, skin abscess, QTC prolongation that resolved, and multiple electrolyte abnormalities.  She is feeling much improved since the acute illness.  She was discharged home on supplemental oxygen on 1-2 L/min.  No acute symptoms today.  She notes significant improvement of shortness of breath on inhaler use.  Currently using Stiolto as breztri was not available by insurance.  She was using the CPAP device after her sleep study revealed severe sleep apnea.  However, the device was collected due to not meeting the usage requirements during her recent illness.  She is notable for former smoking history with recent cessation in 2021.  She smoked approximately 25 years with 1/2 pack/day average.      Objective   Vital Signs:   /68 (BP Location: Left arm, Patient Position: Sitting)   Pulse 101   Temp 97.7 °F (36.5 °C)   Ht 165.1 cm (65\")   Wt 117 kg (259 lb)   SpO2 98%   BMI 43.10 kg/m²     Physical Exam  Vitals reviewed.   Constitutional:       General: She is not in acute distress.     Appearance: She is well-developed. She is not diaphoretic.   HENT:      Head: Normocephalic and atraumatic.   Neck:      Thyroid: No thyromegaly.   Cardiovascular:      Rate and Rhythm: Normal rate and regular rhythm.      Heart sounds: Normal heart sounds, S1 normal and S2 normal.   Pulmonary:      Effort: Pulmonary effort is normal.      Breath sounds: No wheezing, rhonchi or rales.   Neurological:      Mental Status: She is alert and oriented to person, place, and time.   Psychiatric:         Behavior: Behavior normal.        Result Review :   The following data was reviewed by: " Neena Fulton PA-C on 02/02/2022:    Recent ABG reviewed.    Reviewed CT chest from January 2020.    Reviewed PFT from 2021.  No obstruction, DLCO mildly reduced.  Air trapping noted.    Home sleep study results reviewed.  Notable for severe sleep apnea.    Echocardiogram January 2022 reviewed.      Assessment and Plan    Diagnoses and all orders for this visit:    1. Chronic bronchitis, unspecified chronic bronchitis type (HCC) (Primary)    2. JOANN (obstructive sleep apnea)    3. Cigarette nicotine dependence without complication      Chronic bronchitis:  · PFT reviewed, only notable for air trapping.  · Continue albuterol inhaler as needed  · Continue Stiolto 2 puffs daily  we will consider escalation to include inhaled steroid as needed.      Obstructive sleep apnea:   Patient diagnosed with severe sleep apnea.  Machine collected as she was unable to use for the required hours due to acute illness.  · Order replaced for autoPAP device      Former smoker:   Cessation in February 2021, history of approximately 25 years with 1/2 average pack per day.   · Decreasing use   Independently, and interested in complete cessation at this time.    Counseling limited.      Follow Up   Return in about 6 months (around 8/2/2022), or as needed, for Next scheduled follow up.  Patient was given instructions and counseling regarding her condition or for health maintenance advice. Please see specific information pulled into the AVS if appropriate.

## 2022-02-04 PROBLEM — J42 CHRONIC BRONCHITIS: Status: ACTIVE | Noted: 2022-02-04

## 2022-02-04 PROBLEM — G47.33 OSA (OBSTRUCTIVE SLEEP APNEA): Status: ACTIVE | Noted: 2022-02-04

## 2022-02-04 PROBLEM — F17.210 CIGARETTE NICOTINE DEPENDENCE WITHOUT COMPLICATION: Status: ACTIVE | Noted: 2022-02-04

## 2022-02-10 ENCOUNTER — READMISSION MANAGEMENT (OUTPATIENT)
Dept: CALL CENTER | Facility: HOSPITAL | Age: 47
End: 2022-02-10

## 2022-02-10 NOTE — OUTREACH NOTE
General Surgery Week 4 Survey      Responses   [facility] patient discharged from? [facility]   Does the patient have one of the following disease processes/diagnoses(primary or secondary)? General Surgery   Week 4 attempt successful? No   Rescheduled Revoked          Isatu Garcia RN

## 2022-07-27 DIAGNOSIS — J44.9 CHRONIC OBSTRUCTIVE PULMONARY DISEASE, UNSPECIFIED COPD TYPE: ICD-10-CM

## 2022-07-27 RX ORDER — TIOTROPIUM BROMIDE AND OLODATEROL 3.124; 2.736 UG/1; UG/1
SPRAY, METERED RESPIRATORY (INHALATION)
Qty: 4 G | Refills: 0 | Status: SHIPPED | OUTPATIENT
Start: 2022-07-27 | End: 2022-10-24

## 2022-10-24 ENCOUNTER — OFFICE VISIT (OUTPATIENT)
Dept: PULMONOLOGY | Facility: CLINIC | Age: 47
End: 2022-10-24

## 2022-10-24 DIAGNOSIS — F17.210 CIGARETTE NICOTINE DEPENDENCE WITHOUT COMPLICATION: ICD-10-CM

## 2022-10-24 DIAGNOSIS — G47.33 OSA (OBSTRUCTIVE SLEEP APNEA): ICD-10-CM

## 2022-10-24 DIAGNOSIS — J20.8 ACUTE BRONCHITIS DUE TO OTHER SPECIFIED ORGANISMS: Primary | ICD-10-CM

## 2022-10-24 PROCEDURE — 99442 PR PHYS/QHP TELEPHONE EVALUATION 11-20 MIN: CPT | Performed by: PHYSICIAN ASSISTANT

## 2022-10-24 RX ORDER — BUDESONIDE, GLYCOPYRROLATE, AND FORMOTEROL FUMARATE 160; 9; 4.8 UG/1; UG/1; UG/1
2 AEROSOL, METERED RESPIRATORY (INHALATION) 2 TIMES DAILY
Qty: 1 EACH | Refills: 8 | Status: SHIPPED | OUTPATIENT
Start: 2022-10-24 | End: 2022-10-24

## 2022-10-24 RX ORDER — PREDNISONE 10 MG/1
TABLET ORAL
Qty: 31 TABLET | Refills: 0 | Status: SHIPPED | OUTPATIENT
Start: 2022-10-24 | End: 2023-03-15

## 2022-10-24 RX ORDER — CETIRIZINE HYDROCHLORIDE 10 MG/1
10 TABLET ORAL DAILY
COMMUNITY
Start: 2022-09-29

## 2022-10-24 RX ORDER — SEMAGLUTIDE 1.34 MG/ML
INJECTION, SOLUTION SUBCUTANEOUS
COMMUNITY
Start: 2022-10-11

## 2022-10-24 RX ORDER — FLUTICASONE PROPIONATE 50 MCG
SPRAY, SUSPENSION (ML) NASAL
COMMUNITY
Start: 2022-10-04

## 2022-10-24 RX ORDER — GUAIFENESIN 200 MG/10ML
200 LIQUID ORAL 3 TIMES DAILY PRN
Qty: 300 ML | Refills: 1 | Status: SHIPPED | OUTPATIENT
Start: 2022-10-24 | End: 2022-10-25 | Stop reason: SDUPTHER

## 2022-10-24 RX ORDER — BUDESONIDE AND FORMOTEROL FUMARATE DIHYDRATE 160; 4.5 UG/1; UG/1
2 AEROSOL RESPIRATORY (INHALATION)
Qty: 1 EACH | Refills: 12 | Status: SHIPPED | OUTPATIENT
Start: 2022-10-24

## 2022-10-24 RX ORDER — DOXYCYCLINE HYCLATE 100 MG/1
100 CAPSULE ORAL 2 TIMES DAILY
Qty: 10 CAPSULE | Refills: 0 | Status: SHIPPED | OUTPATIENT
Start: 2022-10-24 | End: 2022-10-29

## 2022-10-24 NOTE — PROGRESS NOTES
"You have chosen to receive care through a telephone visit. Do you consent to use a telephone visit for your medical care today? Yes      Chief Complaint  Bronchitis, Cough, Shortness of Breath, and Wheezing    Subjective        Ana Ortiz presents to [facility] PULMONARY & CRITICAL CARE MEDICINE  History of Present Illness    Mrs. Ortiz presents today via telephone for follow up of chronic bronchitis, JOANN, and former smoking history. She has also been notable for hospitalization for pneumonia in the past (not since last year). She states that she has been without her maintenance inhaler for several months.  Over the last few days, she has been notable for increased chest congestion, shortness of breath, increased coughing and is concerned that she may be developing pneumonia again.  She has not been moving as much over the last few days due to the acute illness.  She was previously notable for sleep apnea, but she was collected due to minimal use.  She states that she has now sleeping longer duration after starting trazodone, but never received a new device after it was reordered.  Upon contacting her [employer] company as the order was placed several months ago, they stated that they tried to contact her on several occasions but could not reach her for set up.  She has been using oxygen at nighttime on 1 L and occasionally during the daytime if saturation dips into the low 90s and notable for shortness of breath.  She required the supplemental oxygen previously after hospitalization for pneumonia at least 1 year ago, and had been only using this on occasion and at nighttime since the sleep she was collected.  Continues with occasional cigarette use, less than 1 pack/day.    Objective   Vital Signs:  There were no vitals taken for this visit.  Estimated body mass index is 43.1 kg/m² as calculated from the following:    Height as of 2/2/22: 165.1 cm (65\").    Weight as of 2/2/22: 117 kg (259 " lb).      Physical Exam   Not completed due to telephone encounter.      Result Review :  The following data was reviewed by: Neena Fulton PA-C on 10/24/2022:    Reviewed the CT chest from January 2022.  Reviewed the PFT from June 2021.  Reviewed the echo from 2022.  Sleep study completed in March 2021.     Assessment and Plan   Diagnoses and all orders for this visit:    1. Acute bronchitis due to other specified organisms (Primary)    2. JOANN (obstructive sleep apnea)  -     Detailed AutoPAP Order    3. Cigarette nicotine dependence without complication    Other orders  -     doxycycline (VIBRAMYCIN) 100 MG capsule; Take 1 capsule by mouth 2 (Two) Times a Day for 5 days.  Dispense: 10 capsule; Refill: 0  -     predniSONE (DELTASONE) 10 MG tablet; Take 4 tabs daily x 3 days, then take 3 tabs daily x 3 days, then take 2 tabs daily x 3 days, then take 1 tab daily x 3 days  Dispense: 31 tablet; Refill: 0  -     budesonide-formoterol (Symbicort) 160-4.5 MCG/ACT inhaler; Inhale 2 puffs 2 (Two) Times a Day.  Dispense: 1 each; Refill: 12  -     Discontinue: Budeson-Glycopyrrol-Formoterol (Breztri Aerosphere) 160-9-4.8 MCG/ACT aerosol inhaler; Inhale 2 puffs 2 (Two) Times a Day.  Dispense: 1 each; Refill: 8  -     tiotropium (Spiriva HandiHaler) 18 MCG per inhalation capsule; Place 1 capsule into inhaler and inhale Daily.  Dispense: 30 capsule; Refill: 8  -     guaifenesin (ROBITUSSIN) 100 MG/5ML liquid; Take 10 mL by mouth 3 (Three) Times a Day As Needed for Cough or Congestion for up to 10 days.  Dispense: 300 mL; Refill: 1        Acute on chronic bronchitis:  • Previous PFT reviewed, only notable for air trapping.  May consider repeat at the next visit, due to acute symptoms.,  • Continue albuterol inhaler as needed  • We will try escalating therapy from [facility] (have been out of the inhaler for several months, ? If contacted for refill)  • Starting on Symbicort 160 mcg 2 puffs twice daily   advised to rinse orally  after use  • Starting on Spiriva HandiHaler 1 capsule daily (insurance prefers over Respimat version, and needs something acutely)  • Ordered 5 day course doxycyline  • Prednisone taper pack.  • Ordered Remdesivir    Will consider prior authorization for breztri in the future if this changes well-tolerated.      Obstructive sleep apnea:   Patient diagnosed with severe sleep apnea.  Machine collected as she was unable to use for the required hours due to acute illness.  Now, reports improved duration of sleep.  However, had not received the machine after it was reordered as [employer] was unable to contact her.  • Reordered autoPAP device  • Until this is received, using 1 L via nasal cannula at nighttime        Chronic hypoxic respiratory failure:  Received supplemental oxygen after previous hospitalization for pneumonia.  May no longer need at this time, but had not been able to recently reassess again.  Has been using supplemental oxygen with saturations in the low 90s.  · Recommended only using supplemental oxygen if saturation was 89% or lower.   · Can continue 1 L at nighttime until autopap received (should not be that long between receiving if they are able to reach her again, so no need for overnight pulse oximetry testing).           Cigarette dependence:    History of approximately 25 years with 1/2 average pack per day. Continues with estimated 1/2 ppd.   · Not interested in cessation at this time.         Follow Up   Return in about 2 months (around 12/24/2022), or if symptoms worsen or fail to improve, for Recheck.  Patient was given instructions and counseling regarding her condition or for health maintenance advice. Please see specific information pulled into the AVS if appropriate.       This visit has been rescheduled as a phone visit to comply with patient safety concerns in accordance with CDC recommendations. Total time of discussion was 11 minutes.

## 2022-10-25 DIAGNOSIS — J42 CHRONIC BRONCHITIS, UNSPECIFIED CHRONIC BRONCHITIS TYPE: Primary | ICD-10-CM

## 2022-10-25 RX ORDER — GUAIFENESIN 200 MG/10ML
200 LIQUID ORAL 3 TIMES DAILY PRN
Qty: 300 ML | Refills: 1 | Status: SHIPPED | OUTPATIENT
Start: 2022-10-25 | End: 2022-11-04

## 2023-02-28 ENCOUNTER — TRANSCRIBE ORDERS (OUTPATIENT)
Dept: ADMINISTRATIVE | Facility: HOSPITAL | Age: 48
End: 2023-02-28
Payer: MEDICAID

## 2023-02-28 DIAGNOSIS — M79.604 RIGHT LEG PAIN: Primary | ICD-10-CM

## 2023-03-03 ENCOUNTER — TRANSCRIBE ORDERS (OUTPATIENT)
Dept: ADMINISTRATIVE | Facility: HOSPITAL | Age: 48
End: 2023-03-03
Payer: MEDICAID

## 2023-03-03 DIAGNOSIS — M79.605 BILATERAL LEG PAIN: Primary | ICD-10-CM

## 2023-03-03 DIAGNOSIS — M79.604 BILATERAL LEG PAIN: Primary | ICD-10-CM

## 2023-03-15 ENCOUNTER — OFFICE VISIT (OUTPATIENT)
Dept: PULMONOLOGY | Facility: CLINIC | Age: 48
End: 2023-03-15
Payer: MEDICAID

## 2023-03-15 VITALS
OXYGEN SATURATION: 97 % | WEIGHT: 250 LBS | HEIGHT: 65 IN | HEART RATE: 103 BPM | DIASTOLIC BLOOD PRESSURE: 90 MMHG | TEMPERATURE: 97.7 F | SYSTOLIC BLOOD PRESSURE: 122 MMHG | BODY MASS INDEX: 41.65 KG/M2

## 2023-03-15 DIAGNOSIS — E66.01 MORBIDLY OBESE: ICD-10-CM

## 2023-03-15 DIAGNOSIS — G47.33 OSA (OBSTRUCTIVE SLEEP APNEA): ICD-10-CM

## 2023-03-15 DIAGNOSIS — J42 CHRONIC BRONCHITIS, UNSPECIFIED CHRONIC BRONCHITIS TYPE: Primary | ICD-10-CM

## 2023-03-15 DIAGNOSIS — J96.11 CHRONIC RESPIRATORY FAILURE WITH HYPOXIA: ICD-10-CM

## 2023-03-15 DIAGNOSIS — F17.210 CIGARETTE NICOTINE DEPENDENCE WITHOUT COMPLICATION: ICD-10-CM

## 2023-03-15 PROCEDURE — 99406 BEHAV CHNG SMOKING 3-10 MIN: CPT | Performed by: PHYSICIAN ASSISTANT

## 2023-03-15 PROCEDURE — 1159F MED LIST DOCD IN RCRD: CPT | Performed by: PHYSICIAN ASSISTANT

## 2023-03-15 PROCEDURE — 1160F RVW MEDS BY RX/DR IN RCRD: CPT | Performed by: PHYSICIAN ASSISTANT

## 2023-03-15 PROCEDURE — 99214 OFFICE O/P EST MOD 30 MIN: CPT | Performed by: PHYSICIAN ASSISTANT

## 2023-03-15 RX ORDER — PREDNISONE 20 MG/1
40 TABLET ORAL DAILY
Qty: 10 TABLET | Refills: 0 | OUTPATIENT
Start: 2023-03-15 | End: 2023-03-23

## 2023-03-15 RX ORDER — GUAIFENESIN 600 MG/1
1200 TABLET, EXTENDED RELEASE ORAL 2 TIMES DAILY
Qty: 40 TABLET | Refills: 0 | Status: SHIPPED | OUTPATIENT
Start: 2023-03-15 | End: 2023-03-25

## 2023-03-15 NOTE — PROGRESS NOTES
Subjective      Chief Complaint  COPD    Subjective      History of Present Illness  Ana Ortiz is a 47 y.o. female who presents today to [facility] PULMONARY & CRITICAL CARE MEDICINE for chronic bronchitis, JOANN, and former smoking history. This visit is a follow up appointment.     COPD:  Patient reports that her breathing is currently at baseline. She does report compliance with Symbicort and Spiriva Handihaler inhalers. She states that she does not require frequent use of her rescue inhaler. She reports that she does have a frequent productive cough with white and sometimes green tinged sputum and some chest tightness.     She reports that she does have improved sleep duration as she was recently started on Trazadone. She states that she has not received her AutoPAP machine that was ordered in October. I contacted [facility] and they attempted to contact patient 3 times but was never able to reach her. [facility] representative states that she will reach out to insurance company to see if new PA is required and will attempt reaching out to patient again.     She states that she has been attempting to quit smoking. She reports that she is now down to 4 cigarettes per day and she previously was smoking 1 1/2 packs per day. She has been using nicorette gum. She has a goal of quitting within 2 weeks.       Current Outpatient Medications:   •  ARIPiprazole (ABILIFY) 10 MG tablet, Take 1 tablet by mouth Daily., Disp: , Rfl:   •  budesonide-formoterol (Symbicort) 160-4.5 MCG/ACT inhaler, Inhale 2 puffs 2 (Two) Times a Day., Disp: 1 each, Rfl: 12  •  cetirizine (zyrTEC) 10 MG tablet, Take 1 tablet by mouth Daily., Disp: , Rfl:   •  clonazePAM (KlonoPIN) 1 MG tablet, Take 1 tablet by mouth 4 (Four) Times a Day As Needed for Anxiety., Disp: , Rfl:   •  fluticasone (FLONASE) 50 MCG/ACT nasal spray, USE 1 SPRAY(S) IN EACH NOSTRIL TWICE DAILY, Disp: , Rfl:   •  gabapentin (NEURONTIN) 800 MG  "tablet, Take 1 tablet by mouth 4 (Four) Times a Day., Disp: , Rfl:   •  hydroCHLOROthiazide (HYDRODIURIL) 25 MG tablet, Take 1 tablet by mouth Daily., Disp: , Rfl:   •  ibuprofen (ADVIL,MOTRIN) 800 MG tablet, Take 1 tablet by mouth Every 8 (Eight) Hours As Needed for Mild Pain., Disp: , Rfl:   •  insulin glargine (LANTUS, SEMGLEE) 100 UNIT/ML injection, Inject 50 Units under the skin into the appropriate area as directed Every Night., Disp: , Rfl:   •  lisinopril (PRINIVIL,ZESTRIL) 5 MG tablet, Take 2.5 mg by mouth Daily., Disp: , Rfl:   •  omeprazole (priLOSEC) 40 MG capsule, Take 1 capsule by mouth Daily., Disp: , Rfl:   •  OXcarbazepine (TRILEPTAL) 600 MG tablet, Take 1 tablet by mouth 2 (Two) Times a Day., Disp: , Rfl:   •  Ozempic, 1 MG/DOSE, 4 MG/3ML solution pen-injector, INJECT 1MG SUBCUTANEOUSLY ONCE A WEEK ON THE SAME DAY OF EACH WEEK, Disp: , Rfl:   •  ProAir  (90 Base) MCG/ACT inhaler, Inhale 2 puffs Every 4 (Four) Hours As Needed for Wheezing or Shortness of Air., Disp: , Rfl:   •  sertraline (ZOLOFT) 100 MG tablet, Take 1 tablet by mouth 2 (Two) Times a Day., Disp: , Rfl:   •  simvastatin (ZOCOR) 40 MG tablet, Take 1 tablet by mouth Every Night., Disp: , Rfl:   •  tiotropium (Spiriva HandiHaler) 18 MCG per inhalation capsule, Place 1 capsule into inhaler and inhale Daily., Disp: 30 capsule, Rfl: 8  •  traZODone (DESYREL) 50 MG tablet, Take 2 tablets by mouth Every Night., Disp: , Rfl:   •  guaiFENesin (Mucinex) 600 MG 12 hr tablet, Take 2 tablets by mouth 2 (Two) Times a Day for 10 days., Disp: 40 tablet, Rfl: 0  •  predniSONE (DELTASONE) 20 MG tablet, Take 2 tablets by mouth Daily., Disp: 10 tablet, Rfl: 0      Allergies   Allergen Reactions   • Codeine Nausea Only       Objective     Objective   Vital Signs:  /90   Pulse 103   Temp 97.7 °F (36.5 °C) (Temporal)   Ht 165.1 cm (65\")   Wt 113 kg (250 lb)   SpO2 97%   BMI 41.60 kg/m²   Estimated body mass index is 41.6 kg/m² as " "calculated from the following:    Height as of this encounter: 165.1 cm (65\").    Weight as of this encounter: 113 kg (250 lb).    Past Medical History:   Diagnosis Date   • Arthritis    • CHF (congestive heart failure) (HCC)    • COPD (chronic obstructive pulmonary disease) (HCC)    • Diabetes mellitus (HCC)    • Elevated cholesterol    • GERD (gastroesophageal reflux disease)    • Hyperlipemia    • Hypertension    • JOANN (obstructive sleep apnea)    • Vulvar cancer (HCC)      Past Surgical History:   Procedure Laterality Date   • HYSTERECTOMY     • INCISION AND DRAINAGE TRUNK N/A 1/11/2022    Procedure: INCISION AND DRAINAGE BACK;  Surgeon: Tamera Saez MD;  [facility];  Service: General;  Laterality: N/A;   • MASTECTOMY     • VULVA SURGERY      reconstruction due ca     Social History     Socioeconomic History   • Marital status:    Tobacco Use   • Smoking status: Every Day     Packs/day: 0.50     Types: Cigarettes   • Smokeless tobacco: Never   • Tobacco comments:     Appx: 4 cigarettes per day.   Vaping Use   • Vaping Use: Former   • Substances: Nicotine, Flavoring   • Devices: Refillable tank   Substance and Sexual Activity   • Alcohol use: Never   • Drug use: Never   • Sexual activity: Defer        Physical Exam  Constitutional:       Appearance: Normal appearance. She is obese.   HENT:      Head: Normocephalic and atraumatic.      Nose: Nose normal.      Mouth/Throat:      Mouth: Mucous membranes are moist.      Pharynx: Oropharynx is clear.   Eyes:      Extraocular Movements: Extraocular movements intact.      Conjunctiva/sclera: Conjunctivae normal.      Pupils: Pupils are equal, round, and reactive to light.   Cardiovascular:      Rate and Rhythm: Normal rate and regular rhythm.      Pulses: Normal pulses.      Heart sounds: Normal heart sounds. No murmur heard.    No friction rub. No gallop.   Pulmonary:      Effort: Pulmonary effort is normal. No respiratory distress.      Breath " sounds: Wheezing (bilateral expiratory wheezing) present. No rhonchi or rales.      Comments: Currently tolerating room air  Musculoskeletal:         General: Normal range of motion.      Cervical back: Normal range of motion.   Skin:     General: Skin is warm and dry.   Neurological:      General: No focal deficit present.      Mental Status: She is alert and oriented to person, place, and time. Mental status is at baseline.   Psychiatric:         Mood and Affect: Mood normal.         Behavior: Behavior normal.         Thought Content: Thought content normal.        Result Review :  The following labs and radiology results have been reviewed.    Lab Review:   Reviewed previous CT PE protocol from 01/2022  Reviewed PFT from 06/2021  Reviewed previous echo from 01/2022    Assessment / Plan         Assessment   Diagnoses and all orders for this visit:    1. Chronic bronchitis, unspecified chronic bronchitis type (HCC) (Primary)    2. JOANN (obstructive sleep apnea)    3. Morbidly obese (HCC)    4. Chronic respiratory failure with hypoxia (HCC)    5. Cigarette nicotine dependence without complication    Other orders  -     predniSONE (DELTASONE) 20 MG tablet; Take 2 tablets by mouth Daily.  Dispense: 10 tablet; Refill: 0  -     guaiFENesin (Mucinex) 600 MG 12 hr tablet; Take 2 tablets by mouth 2 (Two) Times a Day for 10 days.  Dispense: 40 tablet; Refill: 0    1. Chronic bronchitis with suspected acute exacerbation  · Continue ProAir inhaler as needed.   · Continue Symbicort 2 puffs daily.   · Continue Spiriva Handihaler daily.   · Ordered prednisone 40 mg x 5 days.   · Ordered Mucinex to assist with secretion clearance.   · May switch to Breztri in the future.   · May consider repeat PFT at next visit.     2. JOANN  3. Morbid obesity, BMI 41.60 kg/m2  · Previously had PAP machine collected by [employer] as she was unable to meet compliance requirements due to not being able to get enough sleep. She reports that her sleep  duration is now improved as she is now on Trazadone.   · Reports that she has not received PAP device from [facility]. Contacted [facility] and has been unable to contact her. [facility] representative is going to reach out to insurance company to see if new PA is required and attempt to contact patient.     4. Chronic hypoxic respiratory failure  · Uses on as needed basis for saturations 88% or lower.     5. Tobacco abuse  · History of smoking for approximately 25 years with 1 1/2 packs per day. Reports that she has cut down to 4 cigarettes per day using nicorette gum and plans to be quit in 2 weeks.   · Does not qualify for LDCT screening due to age.     Ana Ortiz  reports that she has been smoking cigarettes. She has been smoking an average of .5 packs per day. She has never used smokeless tobacco.. I have educated her on the risk of diseases from using tobacco products such as cancer, COPD and heart disease.     I advised her to quit and she is willing to quit. We have discussed the following method/s for tobacco cessation:  Prescription Medicaiton.  Together we have set a quit date for 2 weeks from today.  She will follow up with me in 6 months or sooner to check on her progress.    I spent 5 minutes counseling the patient.    Follow Up   Return in about 6 months (around 9/15/2023), or if symptoms worsen or fail to improve, for Next scheduled follow up.    Patient was given instructions and counseling regarding her condition or for health maintenance advice. Please see specific information pulled into the AVS if appropriate.       This document has been electronically signed by Tara Gonsales PA-C   March 15, 2023 15:36 EDT    Dictated Utilizing Dragon Dictation: Part of this note may be an electronic transcription/translation of spoken language to printed text using the Dragon Dictation System.

## 2023-03-23 ENCOUNTER — HOSPITAL ENCOUNTER (EMERGENCY)
Facility: HOSPITAL | Age: 48
Discharge: HOME | End: 2023-03-23
Attending: STUDENT IN AN ORGANIZED HEALTH CARE EDUCATION/TRAINING PROGRAM | Admitting: STUDENT IN AN ORGANIZED HEALTH CARE EDUCATION/TRAINING PROGRAM
Payer: MEDICAID

## 2023-03-23 ENCOUNTER — APPOINTMENT (OUTPATIENT)
Dept: GENERAL RADIOLOGY | Facility: HOSPITAL | Age: 48
End: 2023-03-23
Payer: MEDICAID

## 2023-03-23 ENCOUNTER — APPOINTMENT (OUTPATIENT)
Dept: CT IMAGING | Facility: HOSPITAL | Age: 48
End: 2023-03-23
Payer: MEDICAID

## 2023-03-23 VITALS
DIASTOLIC BLOOD PRESSURE: 72 MMHG | HEART RATE: 96 BPM | BODY MASS INDEX: 41.65 KG/M2 | TEMPERATURE: 97.1 F | SYSTOLIC BLOOD PRESSURE: 116 MMHG | HEIGHT: 65 IN | WEIGHT: 250 LBS | OXYGEN SATURATION: 96 % | RESPIRATION RATE: 16 BRPM

## 2023-03-23 DIAGNOSIS — J44.1 COPD EXACERBATION: Primary | ICD-10-CM

## 2023-03-23 LAB
A-A DO2: 38.2 MMHG (ref 0–300)
A-A DO2: 44.9 MMHG (ref 0–300)
ALBUMIN SERPL-MCNC: 3.8 G/DL (ref 3.5–5.2)
ALBUMIN/GLOB SERPL: 1.2 G/DL
ALP SERPL-CCNC: 105 U/L (ref 39–117)
ALT SERPL W P-5'-P-CCNC: 21 U/L (ref 1–33)
ANION GAP SERPL CALCULATED.3IONS-SCNC: 11.4 MMOL/L (ref 5–15)
ARTERIAL PATENCY WRIST A: ABNORMAL
ARTERIAL PATENCY WRIST A: ABNORMAL
AST SERPL-CCNC: 14 U/L (ref 1–32)
ATMOSPHERIC PRESS: 728 MMHG
ATMOSPHERIC PRESS: 729 MMHG
BASE EXCESS BLDA CALC-SCNC: 3.9 MMOL/L (ref 0–2)
BASE EXCESS BLDA CALC-SCNC: 4 MMOL/L (ref 0–2)
BASOPHILS # BLD AUTO: 0.05 10*3/MM3 (ref 0–0.2)
BASOPHILS NFR BLD AUTO: 0.4 % (ref 0–1.5)
BDY SITE: ABNORMAL
BDY SITE: ABNORMAL
BILIRUB SERPL-MCNC: <0.2 MG/DL (ref 0–1.2)
BUN SERPL-MCNC: 19 MG/DL (ref 6–20)
BUN/CREAT SERPL: 27.1 (ref 7–25)
CALCIUM SPEC-SCNC: 8.8 MG/DL (ref 8.6–10.5)
CHLORIDE SERPL-SCNC: 100 MMOL/L (ref 98–107)
CO2 BLDA-SCNC: 30.6 MMOL/L (ref 22–33)
CO2 BLDA-SCNC: 31.6 MMOL/L (ref 22–33)
CO2 SERPL-SCNC: 25.6 MMOL/L (ref 22–29)
COHGB MFR BLD: 6.4 % (ref 0–5)
COHGB MFR BLD: 8 % (ref 0–5)
CREAT SERPL-MCNC: 0.7 MG/DL (ref 0.57–1)
CRP SERPL-MCNC: 0.76 MG/DL (ref 0–0.5)
D-LACTATE SERPL-SCNC: 1.8 MMOL/L (ref 0.5–2)
DEPRECATED RDW RBC AUTO: 47.5 FL (ref 37–54)
EGFRCR SERPLBLD CKD-EPI 2021: 107.5 ML/MIN/1.73
EOSINOPHIL # BLD AUTO: 0.13 10*3/MM3 (ref 0–0.4)
EOSINOPHIL NFR BLD AUTO: 1.1 % (ref 0.3–6.2)
ERYTHROCYTE [DISTWIDTH] IN BLOOD BY AUTOMATED COUNT: 14 % (ref 12.3–15.4)
FLUAV RNA RESP QL NAA+PROBE: NOT DETECTED
FLUBV RNA ISLT QL NAA+PROBE: NOT DETECTED
GEN 5 2HR TROPONIN T REFLEX: 8 NG/L
GLOBULIN UR ELPH-MCNC: 3.1 GM/DL
GLUCOSE SERPL-MCNC: 279 MG/DL (ref 65–99)
HCO3 BLDA-SCNC: 29.2 MMOL/L (ref 20–26)
HCO3 BLDA-SCNC: 30.1 MMOL/L (ref 20–26)
HCT VFR BLD AUTO: 41.8 % (ref 34–46.6)
HCT VFR BLD CALC: 42.1 % (ref 38–51)
HCT VFR BLD CALC: 42.2 % (ref 38–51)
HGB BLD-MCNC: 13.7 G/DL (ref 12–15.9)
HGB BLDA-MCNC: 13.7 G/DL (ref 13.5–17.5)
HGB BLDA-MCNC: 13.8 G/DL (ref 13.5–17.5)
IMM GRANULOCYTES # BLD AUTO: 0.1 10*3/MM3 (ref 0–0.05)
IMM GRANULOCYTES NFR BLD AUTO: 0.8 % (ref 0–0.5)
INHALED O2 CONCENTRATION: 21 %
INHALED O2 CONCENTRATION: 21 %
LYMPHOCYTES # BLD AUTO: 2.35 10*3/MM3 (ref 0.7–3.1)
LYMPHOCYTES NFR BLD AUTO: 19.8 % (ref 19.6–45.3)
Lab: ABNORMAL
MCH RBC QN AUTO: 30.6 PG (ref 26.6–33)
MCHC RBC AUTO-ENTMCNC: 32.8 G/DL (ref 31.5–35.7)
MCV RBC AUTO: 93.5 FL (ref 79–97)
METHGB BLD QL: 0.3 % (ref 0–3)
METHGB BLD QL: 0.3 % (ref 0–3)
MODALITY: ABNORMAL
MODALITY: ABNORMAL
MONOCYTES # BLD AUTO: 0.48 10*3/MM3 (ref 0.1–0.9)
MONOCYTES NFR BLD AUTO: 4.1 % (ref 5–12)
NEUTROPHILS NFR BLD AUTO: 73.8 % (ref 42.7–76)
NEUTROPHILS NFR BLD AUTO: 8.73 10*3/MM3 (ref 1.7–7)
NOTE: ABNORMAL
NOTE: ABNORMAL
NOTIFIED BY: ABNORMAL
NOTIFIED BY: ABNORMAL
NOTIFIED WHO: ABNORMAL
NOTIFIED WHO: ABNORMAL
NRBC BLD AUTO-RTO: 0 /100 WBC (ref 0–0.2)
NT-PROBNP SERPL-MCNC: <36 PG/ML (ref 0–450)
OXYHGB MFR BLDV: 72.9 % (ref 94–99)
OXYHGB MFR BLDV: 83.6 % (ref 94–99)
PCO2 BLDA: 45.6 MM HG (ref 35–45)
PCO2 BLDA: 50.2 MM HG (ref 35–45)
PCO2 TEMP ADJ BLD: ABNORMAL MM[HG]
PCO2 TEMP ADJ BLD: ABNORMAL MM[HG]
PH BLDA: 7.39 PH UNITS (ref 7.35–7.45)
PH BLDA: 7.42 PH UNITS (ref 7.35–7.45)
PH, TEMP CORRECTED: ABNORMAL
PH, TEMP CORRECTED: ABNORMAL
PLATELET # BLD AUTO: 237 10*3/MM3 (ref 140–450)
PMV BLD AUTO: 9.8 FL (ref 6–12)
PO2 BLDA: 41.8 MM HG (ref 83–108)
PO2 BLDA: 53.3 MM HG (ref 83–108)
PO2 TEMP ADJ BLD: ABNORMAL MM[HG]
PO2 TEMP ADJ BLD: ABNORMAL MM[HG]
POTASSIUM SERPL-SCNC: 4.2 MMOL/L (ref 3.5–5.2)
PROT SERPL-MCNC: 6.9 G/DL (ref 6–8.5)
RBC # BLD AUTO: 4.47 10*6/MM3 (ref 3.77–5.28)
SAO2 % BLDCOA: 79.5 % (ref 94–99)
SAO2 % BLDCOA: 89.6 % (ref 94–99)
SARS-COV-2 RNA RESP QL NAA+PROBE: NOT DETECTED
SODIUM SERPL-SCNC: 137 MMOL/L (ref 136–145)
TROPONIN T DELTA: 1 NG/L
TROPONIN T SERPL HS-MCNC: 7 NG/L
VENTILATOR MODE: ABNORMAL
VENTILATOR MODE: ABNORMAL
WBC NRBC COR # BLD: 11.84 10*3/MM3 (ref 3.4–10.8)

## 2023-03-23 PROCEDURE — 71045 X-RAY EXAM CHEST 1 VIEW: CPT

## 2023-03-23 PROCEDURE — 71275 CT ANGIOGRAPHY CHEST: CPT | Performed by: RADIOLOGY

## 2023-03-23 PROCEDURE — 87040 BLOOD CULTURE FOR BACTERIA: CPT | Performed by: PHYSICIAN ASSISTANT

## 2023-03-23 PROCEDURE — 86140 C-REACTIVE PROTEIN: CPT | Performed by: PHYSICIAN ASSISTANT

## 2023-03-23 PROCEDURE — 36600 WITHDRAWAL OF ARTERIAL BLOOD: CPT

## 2023-03-23 PROCEDURE — 71045 X-RAY EXAM CHEST 1 VIEW: CPT | Performed by: RADIOLOGY

## 2023-03-23 PROCEDURE — 94640 AIRWAY INHALATION TREATMENT: CPT

## 2023-03-23 PROCEDURE — 80053 COMPREHEN METABOLIC PANEL: CPT | Performed by: PHYSICIAN ASSISTANT

## 2023-03-23 PROCEDURE — 82375 ASSAY CARBOXYHB QUANT: CPT

## 2023-03-23 PROCEDURE — 84484 ASSAY OF TROPONIN QUANT: CPT | Performed by: PHYSICIAN ASSISTANT

## 2023-03-23 PROCEDURE — 87636 SARSCOV2 & INF A&B AMP PRB: CPT | Performed by: PHYSICIAN ASSISTANT

## 2023-03-23 PROCEDURE — 82805 BLOOD GASES W/O2 SATURATION: CPT

## 2023-03-23 PROCEDURE — 25010000002 CEFTRIAXONE PER 250 MG: Performed by: PHYSICIAN ASSISTANT

## 2023-03-23 PROCEDURE — 36415 COLL VENOUS BLD VENIPUNCTURE: CPT

## 2023-03-23 PROCEDURE — 93005 ELECTROCARDIOGRAM TRACING: CPT | Performed by: STUDENT IN AN ORGANIZED HEALTH CARE EDUCATION/TRAINING PROGRAM

## 2023-03-23 PROCEDURE — 25010000002 METHYLPREDNISOLONE PER 40 MG: Performed by: PHYSICIAN ASSISTANT

## 2023-03-23 PROCEDURE — 83605 ASSAY OF LACTIC ACID: CPT | Performed by: PHYSICIAN ASSISTANT

## 2023-03-23 PROCEDURE — 96375 TX/PRO/DX INJ NEW DRUG ADDON: CPT

## 2023-03-23 PROCEDURE — 83880 ASSAY OF NATRIURETIC PEPTIDE: CPT | Performed by: PHYSICIAN ASSISTANT

## 2023-03-23 PROCEDURE — 96365 THER/PROPH/DIAG IV INF INIT: CPT

## 2023-03-23 PROCEDURE — 94799 UNLISTED PULMONARY SVC/PX: CPT

## 2023-03-23 PROCEDURE — 99284 EMERGENCY DEPT VISIT MOD MDM: CPT

## 2023-03-23 PROCEDURE — 85025 COMPLETE CBC W/AUTO DIFF WBC: CPT | Performed by: PHYSICIAN ASSISTANT

## 2023-03-23 PROCEDURE — 83050 HGB METHEMOGLOBIN QUAN: CPT

## 2023-03-23 PROCEDURE — 25510000001 IOPAMIDOL PER 1 ML: Performed by: STUDENT IN AN ORGANIZED HEALTH CARE EDUCATION/TRAINING PROGRAM

## 2023-03-23 PROCEDURE — 71275 CT ANGIOGRAPHY CHEST: CPT

## 2023-03-23 RX ORDER — SODIUM CHLORIDE 0.9 % (FLUSH) 0.9 %
10 SYRINGE (ML) INJECTION AS NEEDED
Status: DISCONTINUED | OUTPATIENT
Start: 2023-03-23 | End: 2023-03-23 | Stop reason: HOSPADM

## 2023-03-23 RX ORDER — CEFDINIR 300 MG/1
300 CAPSULE ORAL 2 TIMES DAILY
Qty: 20 CAPSULE | Refills: 0 | Status: SHIPPED | OUTPATIENT
Start: 2023-03-23 | End: 2023-04-02

## 2023-03-23 RX ORDER — METHYLPREDNISOLONE SODIUM SUCCINATE 40 MG/ML
80 INJECTION, POWDER, LYOPHILIZED, FOR SOLUTION INTRAMUSCULAR; INTRAVENOUS ONCE
Status: COMPLETED | OUTPATIENT
Start: 2023-03-23 | End: 2023-03-23

## 2023-03-23 RX ORDER — IPRATROPIUM BROMIDE AND ALBUTEROL SULFATE 2.5; .5 MG/3ML; MG/3ML
3 SOLUTION RESPIRATORY (INHALATION) EVERY 4 HOURS PRN
Qty: 360 ML | Refills: 0 | Status: SHIPPED | OUTPATIENT
Start: 2023-03-23

## 2023-03-23 RX ORDER — IPRATROPIUM BROMIDE AND ALBUTEROL SULFATE 2.5; .5 MG/3ML; MG/3ML
3 SOLUTION RESPIRATORY (INHALATION) ONCE
Status: COMPLETED | OUTPATIENT
Start: 2023-03-23 | End: 2023-03-23

## 2023-03-23 RX ORDER — PREDNISONE 20 MG/1
40 TABLET ORAL DAILY
Qty: 6 TABLET | Refills: 0 | Status: SHIPPED | OUTPATIENT
Start: 2023-03-23 | End: 2023-03-26

## 2023-03-23 RX ADMIN — CEFTRIAXONE 1 G: 1 INJECTION, POWDER, FOR SOLUTION INTRAMUSCULAR; INTRAVENOUS at 15:02

## 2023-03-23 RX ADMIN — SODIUM CHLORIDE 500 ML: 9 INJECTION, SOLUTION INTRAVENOUS at 13:06

## 2023-03-23 RX ADMIN — IOPAMIDOL 80 ML: 755 INJECTION, SOLUTION INTRAVENOUS at 13:24

## 2023-03-23 RX ADMIN — IPRATROPIUM BROMIDE AND ALBUTEROL SULFATE 3 ML: 2.5; .5 SOLUTION RESPIRATORY (INHALATION) at 13:48

## 2023-03-23 RX ADMIN — METHYLPREDNISOLONE SODIUM SUCCINATE 80 MG: 40 INJECTION, POWDER, FOR SOLUTION INTRAMUSCULAR; INTRAVENOUS at 13:06

## 2023-03-23 RX ADMIN — IPRATROPIUM BROMIDE AND ALBUTEROL SULFATE 3 ML: 2.5; .5 SOLUTION RESPIRATORY (INHALATION) at 13:02

## 2023-03-23 NOTE — DISCHARGE INSTRUCTIONS
Please utilize your new medications and monitor your oxygen closely. Please utilize your oxygen at home at needed. Please follow up with your PCP in 2 days or return to ER if symptoms worsen.

## 2023-03-23 NOTE — ED PROVIDER NOTES
Subjective   History of Present Illness  This is a 47 year old female patient who presents to the ER with chief complaint of SOB. PMH significant for COPD on 2L of oxygen PRN, DM requiring insulin, GERD, HTN, HLD, CHF. Since yesterday, the patient has had increasing SOB with associated chest pain and pressure. She also has a dry cough. Denies fever, runny nose, congestion.         Review of Systems   Constitutional: Negative.  Negative for fever.   HENT: Negative.    Respiratory: Positive for cough and shortness of breath. Negative for apnea, choking, chest tightness, wheezing and stridor.    Cardiovascular: Positive for chest pain. Negative for palpitations and leg swelling.   Gastrointestinal: Negative.  Negative for abdominal pain.   Endocrine: Negative.    Genitourinary: Negative.  Negative for dysuria.   Skin: Negative.    Neurological: Negative.    Psychiatric/Behavioral: Negative.    All other systems reviewed and are negative.      Past Medical History:   Diagnosis Date   • Arthritis    • CHF (congestive heart failure) (HCC)    • COPD (chronic obstructive pulmonary disease) (HCC)    • Diabetes mellitus (HCC)    • Elevated cholesterol    • GERD (gastroesophageal reflux disease)    • Hyperlipemia    • Hypertension    • JOANN (obstructive sleep apnea)    • Vulvar cancer (HCC)        Allergies   Allergen Reactions   • Codeine Nausea Only       Past Surgical History:   Procedure Laterality Date   • HYSTERECTOMY     • INCISION AND DRAINAGE TRUNK N/A 1/11/2022    Procedure: INCISION AND DRAINAGE BACK;  Surgeon: Tamera Saez MD;  Location: [facility];  Service: General;  Laterality: N/A;   • MASTECTOMY     • VULVA SURGERY      reconstruction due ca       Family History   Problem Relation Age of Onset   • Cancer Mother    • Parkinsonism Father    • Diabetes Father    • Cancer Maternal Aunt    • Diabetes Paternal Aunt    • Diabetes Paternal Grandmother        Social History     Socioeconomic History   • Marital  status:    Tobacco Use   • Smoking status: Every Day     Packs/day: 0.50     Types: Cigarettes   • Smokeless tobacco: Never   • Tobacco comments:     Appx: 4 cigarettes per day.   Vaping Use   • Vaping Use: Former   • Substances: Nicotine, Flavoring   • Devices: Refillable tank   Substance and Sexual Activity   • Alcohol use: Never   • Drug use: Never   • Sexual activity: Defer           Objective   Physical Exam  Vitals and nursing note reviewed.   Constitutional:       General: She is not in acute distress.     Appearance: She is well-developed. She is not diaphoretic.   HENT:      Head: Normocephalic and atraumatic.      Right Ear: External ear normal.      Left Ear: External ear normal.      Nose: Nose normal.   Eyes:      Conjunctiva/sclera: Conjunctivae normal.      Pupils: Pupils are equal, round, and reactive to light.   Neck:      Vascular: No JVD.      Trachea: No tracheal deviation.   Cardiovascular:      Rate and Rhythm: Normal rate and regular rhythm.      Heart sounds: Normal heart sounds. No murmur heard.  Pulmonary:      Effort: Pulmonary effort is normal. No respiratory distress.      Breath sounds: Examination of the right-upper field reveals wheezing. Examination of the left-upper field reveals wheezing. Wheezing present. No decreased breath sounds, rhonchi or rales.   Abdominal:      General: Bowel sounds are normal.      Palpations: Abdomen is soft.      Tenderness: There is no abdominal tenderness.   Musculoskeletal:         General: No deformity. Normal range of motion.      Cervical back: Normal range of motion and neck supple.   Skin:     General: Skin is warm and dry.      Coloration: Skin is not pale.      Findings: No erythema or rash.   Neurological:      Mental Status: She is alert and oriented to person, place, and time.      Cranial Nerves: No cranial nerve deficit.   Psychiatric:         Behavior: Behavior normal.         Thought Content: Thought content normal.          Procedures        Results for orders placed or performed during the hospital encounter of 03/23/23   COVID-19 and FLU A/B PCR - Swab, Nasopharynx    Specimen: Nasopharynx; Swab   Result Value Ref Range    COVID19 Not Detected Not Detected - Ref. Range    Influenza A PCR Not Detected Not Detected    Influenza B PCR Not Detected Not Detected   Comprehensive Metabolic Panel    Specimen: Arm, Right; Blood   Result Value Ref Range    Glucose 279 (H) 65 - 99 mg/dL    BUN 19 6 - 20 mg/dL    Creatinine 0.70 0.57 - 1.00 mg/dL    Sodium 137 136 - 145 mmol/L    Potassium 4.2 3.5 - 5.2 mmol/L    Chloride 100 98 - 107 mmol/L    CO2 25.6 22.0 - 29.0 mmol/L    Calcium 8.8 8.6 - 10.5 mg/dL    Total Protein 6.9 6.0 - 8.5 g/dL    Albumin 3.8 3.5 - 5.2 g/dL    ALT (SGPT) 21 1 - 33 U/L    AST (SGOT) 14 1 - 32 U/L    Alkaline Phosphatase 105 39 - 117 U/L    Total Bilirubin <0.2 0.0 - 1.2 mg/dL    Globulin 3.1 gm/dL    A/G Ratio 1.2 g/dL    BUN/Creatinine Ratio 27.1 (H) 7.0 - 25.0    Anion Gap 11.4 5.0 - 15.0 mmol/L    eGFR 107.5 >60.0 mL/min/1.73   C-reactive Protein    Specimen: Arm, Right; Blood   Result Value Ref Range    C-Reactive Protein 0.76 (H) 0.00 - 0.50 mg/dL   Lactic Acid, Plasma    Specimen: Arm, Right; Blood   Result Value Ref Range    Lactate 1.8 0.5 - 2.0 mmol/L   High Sensitivity Troponin T    Specimen: Arm, Right; Blood   Result Value Ref Range    HS Troponin T 7 <10 ng/L   BNP    Specimen: Arm, Right; Blood   Result Value Ref Range    proBNP <36.0 0.0 - 450.0 pg/mL   CBC Auto Differential    Specimen: Arm, Right; Blood   Result Value Ref Range    WBC 11.84 (H) 3.40 - 10.80 10*3/mm3    RBC 4.47 3.77 - 5.28 10*6/mm3    Hemoglobin 13.7 12.0 - 15.9 g/dL    Hematocrit 41.8 34.0 - 46.6 %    MCV 93.5 79.0 - 97.0 fL    MCH 30.6 26.6 - 33.0 pg    MCHC 32.8 31.5 - 35.7 g/dL    RDW 14.0 12.3 - 15.4 %    RDW-SD 47.5 37.0 - 54.0 fl    MPV 9.8 6.0 - 12.0 fL    Platelets 237 140 - 450 10*3/mm3    Neutrophil % 73.8 42.7 -  76.0 %    Lymphocyte % 19.8 19.6 - 45.3 %    Monocyte % 4.1 (L) 5.0 - 12.0 %    Eosinophil % 1.1 0.3 - 6.2 %    Basophil % 0.4 0.0 - 1.5 %    Immature Grans % 0.8 (H) 0.0 - 0.5 %    Neutrophils, Absolute 8.73 (H) 1.70 - 7.00 10*3/mm3    Lymphocytes, Absolute 2.35 0.70 - 3.10 10*3/mm3    Monocytes, Absolute 0.48 0.10 - 0.90 10*3/mm3    Eosinophils, Absolute 0.13 0.00 - 0.40 10*3/mm3    Basophils, Absolute 0.05 0.00 - 0.20 10*3/mm3    Immature Grans, Absolute 0.10 (H) 0.00 - 0.05 10*3/mm3    nRBC 0.0 0.0 - 0.2 /100 WBC   Blood Gas, Arterial With Co-Ox    Specimen: Arterial Blood   Result Value Ref Range    Site Left Brachial     Osiel's Test N/A     pH, Arterial 7.386 7.350 - 7.450 pH units    pCO2, Arterial 50.2 (H) 35.0 - 45.0 mm Hg    pO2, Arterial 41.8 (C) 83.0 - 108.0 mm Hg    HCO3, Arterial 30.1 (H) 20.0 - 26.0 mmol/L    Base Excess, Arterial 4.0 (H) 0.0 - 2.0 mmol/L    O2 Saturation, Arterial 79.5 (L) 94.0 - 99.0 %    Hemoglobin, Blood Gas 13.7 13.5 - 17.5 g/dL    Hematocrit, Blood Gas 42.1 38.0 - 51.0 %    Oxyhemoglobin 72.9 (L) 94 - 99 %    Methemoglobin 0.30 0.00 - 3.00 %    Carboxyhemoglobin 8.0 (H) 0 - 5 %    A-a DO2 44.9 0.0 - 300.0 mmHg    CO2 Content 31.6 22 - 33 mmol/L    Barometric Pressure for Blood Gas 729 mmHg    Modality Room Air     FIO2 21 %    Ventilator Mode NA     Note      Notified Who ER PA AND RN     Notified By [id number]     Notified Time 03/23/2023 11:38     Collected by [id number]     pH, Temp Corrected      pCO2, Temperature Corrected      pO2, Temperature Corrected     High Sensitivity Troponin T 2Hr    Specimen: Arm, Right; Blood   Result Value Ref Range    HS Troponin T 8 <10 ng/L    Troponin T Delta 1 >=-4 - <+4 ng/L   Blood Gas, Arterial With Co-Ox    Specimen: Arterial Blood   Result Value Ref Range    Site Left Brachial     Osiel's Test N/A     pH, Arterial 7.415 7.350 - 7.450 pH units    pCO2, Arterial 45.6 (H) 35.0 - 45.0 mm Hg    pO2, Arterial 53.3 (C) 83.0 - 108.0 mm Hg    HCO3,  Arterial 29.2 (H) 20.0 - 26.0 mmol/L    Base Excess, Arterial 3.9 (H) 0.0 - 2.0 mmol/L    O2 Saturation, Arterial 89.6 (L) 94.0 - 99.0 %    Hemoglobin, Blood Gas 13.8 13.5 - 17.5 g/dL    Hematocrit, Blood Gas 42.2 38.0 - 51.0 %    Oxyhemoglobin 83.6 (L) 94 - 99 %    Methemoglobin 0.30 0.00 - 3.00 %    Carboxyhemoglobin 6.4 (H) 0 - 5 %    A-a DO2 38.2 0.0 - 300.0 mmHg    CO2 Content 30.6 22 - 33 mmol/L    Barometric Pressure for Blood Gas 728 mmHg    Modality Room Air     FIO2 21 %    Ventilator Mode NA     Note      Notified Who ER PA     Notified By [id number]     Notified Time 03/23/2023 14:34     Collected by [id number]     pH, Temp Corrected      pCO2, Temperature Corrected      pO2, Temperature Corrected     ECG 12 Lead Other; sob   Result Value Ref Range    QT Interval 344 ms    QTC Interval 441 ms         ED Course  ED Course as of 03/23/23 1538   Thu Mar 23, 2023   1238 ECG 12 Lead Dyspnea  Normal sinus rhythm ventricular rate 99  QRS 92 QTc 441  Electronically signed by Vivi Marie DO, 03/23/23, 12:38 PM EDT.   [LK]   1249 XR Chest 1 View  IMPRESSION:  1.  Coarsened interstitial markings noted throughout the lungs.  2.  Improved bilateral airspace disease since previous study.  3.  Cardiomegaly again noted.     This report was finalized on 3/23/2023 12:32 PM by Dr. Олег Aviles MD [MM]   3389 CT Angiogram Chest Pulmonary Embolism  IMPRESSION:  1.  No pulmonary embolism is identified.  Some of the distal pulmonary  arteries cannot be evaluated due to suboptimal opacification.  2.  Atelectasis in the lung bases.     This report was finalized on 3/23/2023 1:34 PM by Dr. Олег Aviles MD [MM]   4892 Patient feeling much better. Will be d/c home with rx for omnicef, duonebs, steroids. Will utilize her oxygen at home PRN. Will f/u with PCP in 2 days or return to ER if symptoms worsen.  [MM]      ED Course User Index  [LK] Vivi Marie DO  [MM] Sammi Vasquez, PA                                            Medical Decision Making    This is a 47 year old female patient who presents to the ER with chief complaint of SOB. PMH significant for COPD on 2L of oxygen PRN, DM requiring insulin, GERD, HTN, HLD, CHF. Since yesterday, the patient has had increasing SOB with associated chest pain and pressure. She also has a dry cough. Denies fever, runny nose, congestion.         COPD exacerbation (HCC): acute illness or injury  Amount and/or Complexity of Data Reviewed  Labs: ordered.  Radiology: ordered. Decision-making details documented in ED Course.  ECG/medicine tests: ordered. Decision-making details documented in ED Course.      Risk  Prescription drug management.          Final diagnoses:   COPD exacerbation (HCC)       ED Disposition  ED Disposition     ED Disposition   Discharge    Condition   Stable    Comment   --             Kasia Fu PA  [address] [facility] [address] [phone]    In 2 days           Medication List      New Prescriptions    cefdinir 300 MG capsule  Commonly known as: OMNICEF  Take 1 capsule by mouth 2 (Two) Times a Day for 10 days.     ipratropium-albuterol 0.5-2.5 mg/3 ml nebulizer  Commonly known as: DUO-NEB  Take 3 mL by nebulization Every 4 (Four) Hours As Needed for Wheezing.           Where to Get Your Medications      These medications were sent to [facility] [address] [place] [address] - [phone]  - [phone] [facility] [address] [address]    Phone: [phone]   · cefdinir 300 MG capsule  · ipratropium-albuterol 0.5-2.5 mg/3 ml nebulizer  · predniSONE 20 MG tablet          Sammi Vasquez PA  03/23/23 2545

## 2023-03-24 LAB
QT INTERVAL: 344 MS
QTC INTERVAL: 441 MS

## 2023-03-28 LAB
BACTERIA SPEC AEROBE CULT: NORMAL
BACTERIA SPEC AEROBE CULT: NORMAL

## 2023-03-29 DIAGNOSIS — J96.11 CHRONIC RESPIRATORY FAILURE WITH HYPOXIA: ICD-10-CM

## 2023-03-29 DIAGNOSIS — G47.33 OSA (OBSTRUCTIVE SLEEP APNEA): Primary | ICD-10-CM

## 2023-03-29 NOTE — PROGRESS NOTES
Patient noted to have significant episodes of hypoxia during sleep study in 2021 with saturations dropping to 66% and staying below 88% for 6 hours. Will order overnight pulse oximetry while wearing CPAP to evaluate if oxygen is also needed.     Patient also reports that she is out of portable oxygen tanks and supplies. Will send order to [employer] to request tanks and supplies to be delivered.

## 2023-04-04 DIAGNOSIS — G47.33 OSA (OBSTRUCTIVE SLEEP APNEA): Primary | ICD-10-CM

## 2023-04-04 NOTE — PROGRESS NOTES
Overnight pulse oximetry notable for significant hypoxia. Will send orders to DME company to use 2 L oxygen with CPAP device. Attempted to contact patient to notify her of results but couldn't get an answer.

## 2023-06-07 ENCOUNTER — TRANSCRIBE ORDERS (OUTPATIENT)
Dept: ADMINISTRATIVE | Facility: HOSPITAL | Age: 48
End: 2023-06-07
Payer: MEDICAID

## 2023-06-07 DIAGNOSIS — R06.02 SOB (SHORTNESS OF BREATH): Primary | ICD-10-CM

## 2023-07-05 PROBLEM — J96.11 CHRONIC RESPIRATORY FAILURE WITH HYPOXIA: Status: ACTIVE | Noted: 2023-07-05

## 2023-10-04 ENCOUNTER — SPECIALTY PHARMACY (OUTPATIENT)
Dept: PHARMACY | Facility: HOSPITAL | Age: 48
End: 2023-10-04
Payer: MEDICAID

## 2023-10-04 ENCOUNTER — OFFICE VISIT (OUTPATIENT)
Dept: ENDOCRINOLOGY | Facility: CLINIC | Age: 48
End: 2023-10-04
Payer: MEDICAID

## 2023-10-04 VITALS
OXYGEN SATURATION: 94 % | BODY MASS INDEX: 37.99 KG/M2 | HEART RATE: 89 BPM | DIASTOLIC BLOOD PRESSURE: 88 MMHG | WEIGHT: 228 LBS | HEIGHT: 65 IN | SYSTOLIC BLOOD PRESSURE: 126 MMHG

## 2023-10-04 DIAGNOSIS — E11.65 TYPE 2 DIABETES MELLITUS WITH HYPERGLYCEMIA, UNSPECIFIED WHETHER LONG TERM INSULIN USE: Primary | ICD-10-CM

## 2023-10-04 DIAGNOSIS — E11.65 TYPE 2 DIABETES MELLITUS WITH HYPERGLYCEMIA, WITH LONG-TERM CURRENT USE OF INSULIN: Primary | ICD-10-CM

## 2023-10-04 DIAGNOSIS — Z79.4 TYPE 2 DIABETES MELLITUS WITH HYPERGLYCEMIA, WITH LONG-TERM CURRENT USE OF INSULIN: Primary | ICD-10-CM

## 2023-10-04 PROBLEM — E11.9 DIABETES MELLITUS, TYPE II: Status: ACTIVE | Noted: 2023-10-04

## 2023-10-04 RX ORDER — LANCETS 33 GAUGE
EACH MISCELLANEOUS
Qty: 100 EACH | Refills: 5 | Status: SHIPPED | OUTPATIENT
Start: 2023-10-04

## 2023-10-04 RX ORDER — SEMAGLUTIDE 1.34 MG/ML
2 INJECTION, SOLUTION SUBCUTANEOUS WEEKLY
Qty: 3 ML | Refills: 5 | Status: SHIPPED | OUTPATIENT
Start: 2023-10-04

## 2023-10-04 RX ORDER — BLOOD-GLUCOSE METER
EACH MISCELLANEOUS
Qty: 1 EACH | Refills: 0 | Status: SHIPPED | OUTPATIENT
Start: 2023-10-04

## 2023-10-04 RX ORDER — INSULIN DEGLUDEC INJECTION 100 U/ML
60 INJECTION, SOLUTION SUBCUTANEOUS DAILY
Qty: 15 ML | Refills: 2 | Status: SHIPPED | OUTPATIENT
Start: 2023-10-04 | End: 2023-10-04 | Stop reason: ALTCHOICE

## 2023-10-04 RX ORDER — FLUCONAZOLE 150 MG/1
TABLET ORAL
Qty: 2 TABLET | Refills: 0 | Status: SHIPPED | OUTPATIENT
Start: 2023-10-04

## 2023-10-04 RX ORDER — GLIPIZIDE 5 MG/1
5 TABLET ORAL
Qty: 60 TABLET | Refills: 5 | Status: SHIPPED | OUTPATIENT
Start: 2023-10-04

## 2023-10-04 RX ORDER — INSULIN DETEMIR 100 [IU]/ML
60 INJECTION, SOLUTION SUBCUTANEOUS DAILY
Qty: 18 ML | Refills: 5 | Status: SHIPPED | OUTPATIENT
Start: 2023-10-04 | End: 2023-10-05 | Stop reason: SDUPTHER

## 2023-10-04 RX ORDER — PEN NEEDLE, DIABETIC 30 GX3/16"
1 NEEDLE, DISPOSABLE MISCELLANEOUS TAKE AS DIRECTED
Qty: 100 EACH | Refills: 5 | Status: SHIPPED | OUTPATIENT
Start: 2023-10-04

## 2023-10-04 RX ORDER — GLIPIZIDE 5 MG/1
5 TABLET ORAL
Qty: 30 TABLET | Refills: 2 | Status: SHIPPED | OUTPATIENT
Start: 2023-10-04 | End: 2023-10-04 | Stop reason: SDUPTHER

## 2023-10-04 RX ORDER — ARIPIPRAZOLE 10 MG/1
10 TABLET ORAL DAILY
COMMUNITY

## 2023-10-04 NOTE — PROGRESS NOTES
Tobacco Cessation Pharmacy Note  Average number of cigarettes or other tobacco/nicotine products per day:10  Number of Previous Quit Attempts: 1  Previous tobacco cessation medication attempts, failures, intolerances: Nicotine patches (patient reports these did not work for her), nicotine gum (worked in the past)    Patient has decreased number of cigarretes/day from 1 pack to 0.5 pack.     Wishes to quit smoking for her grandchildren.    Past Medical History:   Diagnosis Date    Arthritis     CHF (congestive heart failure)     COPD (chronic obstructive pulmonary disease)     Diabetes mellitus     Elevated cholesterol     GERD (gastroesophageal reflux disease)     Hyperlipemia     Hypertension     JOANN (obstructive sleep apnea)     Pneumonia     Vulvar cancer      Allergies   Allergen Reactions    Codeine Nausea Only    Metformin GI Intolerance     Extreme diarrhea     Current Outpatient Medications   Medication Sig Dispense Refill    albuterol sulfate HFA (Ventolin HFA) 108 (90 Base) MCG/ACT inhaler Inhale 2 puffs Every 4 (Four) Hours As Needed for Wheezing or Shortness of Air. 18 g 12    ARIPiprazole (ABILIFY) 10 MG tablet Take 1 tablet by mouth Daily.      budesonide-formoterol (Symbicort) 160-4.5 MCG/ACT inhaler Inhale 2 puffs 2 (Two) Times a Day. 1 each 8    cetirizine (zyrTEC) 10 MG tablet Take 1 tablet by mouth Daily.      clonazePAM (KlonoPIN) 1 MG tablet Take 1 tablet by mouth As Needed for Anxiety. Max 4 times a day.      fluticasone (FLONASE) 50 MCG/ACT nasal spray USE 1 SPRAY(S) IN EACH NOSTRIL TWICE DAILY      gabapentin (NEURONTIN) 800 MG tablet Take 1 tablet by mouth 4 (Four) Times a Day.      hydroCHLOROthiazide (HYDRODIURIL) 25 MG tablet Take 1 tablet by mouth Daily.      ibuprofen (ADVIL,MOTRIN) 800 MG tablet Take 1 tablet by mouth Every 8 (Eight) Hours As Needed for Mild Pain.      insulin glargine (LANTUS, SEMGLEE) 100 UNIT/ML injection Inject 80 Units under the skin into the appropriate area as  directed Every Night.      ipratropium-albuterol (DUO-NEB) 0.5-2.5 mg/3 ml nebulizer Take 3 mL by nebulization Every 4 (Four) Hours As Needed for Wheezing or Shortness of Air. 360 mL 8    Jardiance 10 MG tablet tablet Take 1 tablet by mouth Every Morning.      lisinopril (PRINIVIL,ZESTRIL) 5 MG tablet Take 0.5 tablets by mouth Daily.      omeprazole (priLOSEC) 20 MG capsule Take 1 capsule by mouth Daily. before a meal      ondansetron (ZOFRAN) 4 MG tablet TAKE 1 TO 2 TABLETS BY MOUTH THREE TIMES DAILY AS NEEDED FOR NAUSEA      OXcarbazepine (TRILEPTAL) 600 MG tablet Take 1 tablet by mouth 2 (Two) Times a Day.      Ozempic, 2 MG/DOSE, 8 MG/3ML solution pen-injector INJECT 2 MG SUBCUTANEOUSLY ONCE A WEEK ON THE SAME DAY EACH WEEK      sertraline (ZOLOFT) 100 MG tablet Take 1 tablet by mouth 2 (Two) Times a Day.      simvastatin (ZOCOR) 80 MG tablet Take 1 tablet by mouth every night at bedtime.      tiotropium (Spiriva HandiHaler) 18 MCG per inhalation capsule Place 1 capsule into inhaler and inhale Daily. 30 capsule 8    traZODone (DESYREL) 50 MG tablet Take 2 tablets by mouth Every Night.       No current facility-administered medications for this visit.       Assessment:     Readiness to quit:   Stage 2: Ready to quit in the next month      Plan:     1. Quit Date Planned: 10/08/2023  2. Behavioral options for quitting reviewed  3. Provided additional resources to help with tobacco cessation.    4. Medication options reviewed.  Risks, benefits, and side effects discussed and questions       have been answered.   5. Education was provided regarding: motivation to cease tobacco use, drug information on       the medication dispensed (including directions for use and adverse effects), nicotine       withdrawal symptoms, lifestyle modifications and techniques to prevent relapse.  7. Will notify provider (CHAZ Mcintosh) of desire to quit smoking and wish to utilize       nicotine gum since patient is seeing her  in office today.   8. Pharmacy will continue to follow patient and assist with cessation attempt as needed.       Thank you,    Dominga Garcia PharmD  Community PGY1 Pharmacy Resident  [facility]  10/04/23  09:31 EDT

## 2023-10-04 NOTE — PROGRESS NOTES
Specialty Pharmacy Patient Management Program  Endocrinology Initial Assessment       Ana Ortiz is a 47 y.o. female with Type 2 Diabetes seen by an Endocrinology provider and enrolled in the Endocrinology Patient Management program offered by [facility].  An initial outreach was conducted, including assessment of therapy appropriateness and specialty medication education for Ozempic, Insulin therapy, and Jardiance. Patient reports she was diagnosed with diabetes around 8 to 10 years ago. The patient was introduced to services offered by [facility], including: regular assessments, refill coordination, curbside pick-up or mail order delivery options, prior authorization maintenance, and financial assistance programs as applicable. The patient was also provided with contact information for the pharmacy team.     Patient is currently taking Ozempic 2 mg weekly, Insulin glargine 60 units nightly, and Jardiance 10 mg daily.     Patient checks blood sugar 23 times a day with readings > 300.   Patient reports/denies low BG <70.     Patient denies personal/family history of thyroid cancer and denies history of pancreatitis.    Patient reports issues with recurrent UTI/yeast infections. States yeast infections have been constant since she started Jardiance. Has tried OTC treatments with little success. PCP has previously sent in fluconazole which patient states was helpful. Yeast infections have returned.     Patient denies history of liver or kidney disease. Patient reports continued constipation with Ozempic (bowel movement every 4 days). Has not tried any OTC medications for this.   Patient states she drinks water constantly, so does not believe the constipation is related to dehydration.     In the past, the patient has tried:     Drug Dose Reason for Discontinuation Notes   Metformin  Diarrhea Lost 30 pounds while on this.    Glipizide  Alternative Therapy Was not  working   Trulicity  Insurance Issues Switched to Ozempic                                               Insurance Coverage & Financial Support  No changes (per patient insurance does not want to cover lantus or trulicity)    Relevant Past Medical History and Comorbidities  Relevant medical history and concomitant health conditions were discussed with the patient. The patient's chart has been reviewed for relevant past medical history and comorbid health conditions and updated as necessary.   Past Medical History:   Diagnosis Date    Arthritis     CHF (congestive heart failure)     COPD (chronic obstructive pulmonary disease)     Diabetes mellitus     Elevated cholesterol     GERD (gastroesophageal reflux disease)     Hyperlipemia     Hypertension     JOANN (obstructive sleep apnea)     Pneumonia     Vulvar cancer      Social History     Socioeconomic History    Marital status:    Tobacco Use    Smoking status: Every Day     Packs/day: 1.00     Years: 15.00     Pack years: 15.00     Types: Cigarettes     Start date: 1995     Last attempt to quit: 2021     Years since quittin.6     Passive exposure: Current    Smokeless tobacco: Never    Tobacco comments:     Appx: 4 cigarettes per day.   Vaping Use    Vaping Use: Former    Substances: Nicotine, Flavoring    Devices: Refillable tank   Substance and Sexual Activity    Alcohol use: Never    Drug use: Never    Sexual activity: Not Currently     Partners: Male     Birth control/protection: Condom       Problem list reviewed by Dominga Garcia RPH on 10/4/2023 at  8:28 AM    Allergies  Known allergies and reactions were discussed with the patient. The patient's chart has been reviewed for  allergy information and updated as necessary.   Allergies   Allergen Reactions    Codeine Nausea Only    Metformin GI Intolerance     Extreme diarrhea       Allergies reviewed by Dominga Garcia RPH on 10/4/2023 at  8:25 AM  Allergies reviewed by Dominga Garcia RPH  on 10/4/2023 at  8:25 AM    Relevant Laboratory Values    Lab Results   Component Value Date    HGBA1C 10.40 (H) 01/12/2022     Lab Results   Component Value Date    GLUCOSE 279 (H) 03/23/2023    CALCIUM 8.8 03/23/2023     03/23/2023    K 4.2 03/23/2023    CO2 25.6 03/23/2023     03/23/2023    BUN 19 03/23/2023    CREATININE 0.70 03/23/2023    EGFRIFNONA 93 01/13/2022    BCR 27.1 (H) 03/23/2023    ANIONGAP 11.4 03/23/2023     No results found for: CHOL, CHLPL, TRIG, HDL, LDL, LDLDIRECT      Current Medication List  This medication list has been reviewed with the patient and evaluated for any interactions or necessary modifications/recommendations, and updated to include all prescription medications, OTC medications, and supplements the patient is currently taking.  This list reflects what is contained in the patient's profile, which has also been marked as reviewed to communicate to other providers it is the most up to date version of the patient's current medication therapy.     Current Outpatient Medications:     albuterol sulfate HFA (Ventolin HFA) 108 (90 Base) MCG/ACT inhaler, Inhale 2 puffs Every 4 (Four) Hours As Needed for Wheezing or Shortness of Air., Disp: 18 g, Rfl: 12    ARIPiprazole (ABILIFY) 10 MG tablet, Take 1 tablet by mouth Daily., Disp: , Rfl:     budesonide-formoterol (Symbicort) 160-4.5 MCG/ACT inhaler, Inhale 2 puffs 2 (Two) Times a Day., Disp: 1 each, Rfl: 8    cetirizine (zyrTEC) 10 MG tablet, Take 1 tablet by mouth Daily., Disp: , Rfl:     clonazePAM (KlonoPIN) 1 MG tablet, Take 1 tablet by mouth As Needed for Anxiety. Max 4 times a day., Disp: , Rfl:     fluticasone (FLONASE) 50 MCG/ACT nasal spray, USE 1 SPRAY(S) IN EACH NOSTRIL TWICE DAILY, Disp: , Rfl:     gabapentin (NEURONTIN) 800 MG tablet, Take 1 tablet by mouth 4 (Four) Times a Day., Disp: , Rfl:     hydroCHLOROthiazide (HYDRODIURIL) 25 MG tablet, Take 1 tablet by mouth Daily., Disp: , Rfl:     ibuprofen (ADVIL,MOTRIN)  800 MG tablet, Take 1 tablet by mouth Every 8 (Eight) Hours As Needed for Mild Pain., Disp: , Rfl:     ipratropium-albuterol (DUO-NEB) 0.5-2.5 mg/3 ml nebulizer, Take 3 mL by nebulization Every 4 (Four) Hours As Needed for Wheezing or Shortness of Air., Disp: 360 mL, Rfl: 8    lisinopril (PRINIVIL,ZESTRIL) 5 MG tablet, Take 0.5 tablets by mouth Daily., Disp: , Rfl:     omeprazole (priLOSEC) 20 MG capsule, Take 1 capsule by mouth Daily. before a meal, Disp: , Rfl:     ondansetron (ZOFRAN) 4 MG tablet, TAKE 1 TO 2 TABLETS BY MOUTH THREE TIMES DAILY AS NEEDED FOR NAUSEA, Disp: , Rfl:     OXcarbazepine (TRILEPTAL) 600 MG tablet, Take 1 tablet by mouth 2 (Two) Times a Day., Disp: , Rfl:     sertraline (ZOLOFT) 100 MG tablet, Take 1 tablet by mouth 2 (Two) Times a Day., Disp: , Rfl:     simvastatin (ZOCOR) 80 MG tablet, Take 1 tablet by mouth every night at bedtime., Disp: , Rfl:     tiotropium (Spiriva HandiHaler) 18 MCG per inhalation capsule, Place 1 capsule into inhaler and inhale Daily., Disp: 30 capsule, Rfl: 8    traZODone (DESYREL) 50 MG tablet, Take 2 tablets by mouth Every Night., Disp: , Rfl:     empagliflozin (Jardiance) 10 MG tablet tablet, Take 1 tablet by mouth Daily., Disp: 30 tablet, Rfl: 5    fluconazole (Diflucan) 150 MG tablet, 1 tablet today, Repeat the dose in 4 days., Disp: 2 tablet, Rfl: 0    glipizide (Glucotrol) 5 MG tablet, Take 1 tablet by mouth 2 (Two) Times a Day Before Meals., Disp: 60 tablet, Rfl: 5    glucose blood test strip, Use as directed by provider to check blood sugar., Disp: 100 each, Rfl: 5    glucose monitor monitoring kit, Use to check blood sugar as directed by provider., Disp: 1 each, Rfl: 0    insulin detemir (Levemir FlexTouch) 100 UNIT/ML injection, Inject 60 Units under the skin into the appropriate area as directed Daily., Disp: 18 mL, Rfl: 5    Insulin Pen Needle (Pen Needles) 32G X 4 MM misc, Use 1 each Take As Directed. Use to inject insulin as directed by provider.,  Disp: 100 each, Rfl: 5    Lancets misc, Use to test blood sugar as directed by provider., Disp: 100 each, Rfl: 5    Semaglutide,0.25 or 0.5MG/DOS, (Ozempic, 0.25 or 0.5 MG/DOSE,) 2 MG/1.5ML solution pen-injector, Inject 2 mg under the skin into the appropriate area as directed 1 (One) Time Per Week., Disp: 3 mL, Rfl: 5    Medicines reviewed by Dominga Garcia [facility] on 10/4/2023 at  8:33 AM    Drug Interactions  No significant drug-drug interactions with diabetes medications expected according to literature.    Provider sent in script for fluconazole. Fluconazole may increase concentrations of simvastatin. Will monitor patient for adverse effects during fluconazole course.    Recommended Medications Assessment  Aspirin -  Not Taking Currently   Statin -  Currently Taking  (Simvastatin)  ACEi/ARB - Currently Taking  (Lisinopril)      Vaccination Status:   COVID 19: Yes (one [employer] vaccination)  Influenza: Yes (annually)  Pneumococcal: Yes (received two vaccinations)  Hep B: No (Patient does not believe she has ever received a Hep B vaccine)    Smoker? Yes (currently smoking 0.5 packs per day)  Patient states she is trying to quit smoking. States she promised her grandchildren she would stop smoking and improve her health. Previously tried the nicotine patches with minimal success. States the nicotine gum helped some and her insurance previously covered them.   Prefers the cinnamon flavor if possible.  Has decreased the number of cigarettes from 1 pack per day to 0.5 packs per day.  Reports the main trigger is stress/anxiety due to her work.    Reassessment Plan & Follow-Up  Patient's diabetes is uncontrolled with A1C of 10.6% (from April labs scanned in EPIC)  Medication Therapy Changes:  None discussed with patient   Related Plans, Therapy Recommendations or Therapy Problems to Be Addressed: Patient may benefit from additional diabetes medication. Patient would benefit from treatment of the yeast infection  (only used OTC therapies). Patient may need an adjustment of her insulin regimen. Patient interested in nicotine replacement with gum, will notify provider.   Provider sent in tresiba prescription (upon test claim, this requires a PA). Discussed with provider. Insurance prefers Levemir, will send updated script to patient's preferred pharmacy.  Will send updated scripts for medications, testing supplies and pen needles to the patient's usual pharmacy.   Discussed tobacco cessation (see other note)  Assess ASCVD 10-year risk at next visit.   Patient denies issues with affordability or adherence at this time.     Attestation  I attest the patient was actively involved in and has agreed to the above plan of care. If the prescribed therapy is at any point deemed not appropriate based on the current or future assessments, a consultation will be initiated with the patient's specialty care provider to determine the best course of action. The revised plan of therapy will be documented along with any required assessments and/or additional patient education provided.    Thank you,    Dominga Garcia, PharmD  Community PGY1 Pharmacy Resident  [facility]  10/04/23  10:23 EDT

## 2023-10-04 NOTE — PROGRESS NOTES
Chief Complaint   Patient presents with    Diabetes        Referring Provider  Kasia Fu,*     HPI   Ana Ortiz is a 47 y.o. female had concerns including Diabetes.    Seen as a new patient.  T2DM.    Diabetes was diagnosed [id number], she had abnormal labs, but was starting to feel ill as well.  Complications include neuropathy, no stroke/cardiovascular issues.  Last ophtho exam was [facility] in [place], [place].  Current medications for diabetes include Lantus 60 units QHS, Jardiance 10 mg QD, Ozempic 2 mg weekly.  Past meds: Metformin-diarrhea, glipizide-changed therapy d/t not working anymore, Trulicity-insurance issue  She checks her blood sugar 2-3 times per day.   Hypos: none  FSB+  PPD: 300+     She is having some s/sx's yeast infection, but has not had any treatment for this.  She is also having some increased constipation as well.    ACE/ARB:yes, Statin: yes  Labs:  A1C:10.6 (2023), 10.4 (2023)  Lipid Panel:utd  MELLO: utd    The following portions of the patient's history were reviewed and updated as appropriate: allergies, current medications, past family history, past medical history, past social history, past surgical history, and problem list.    Diet: she doesn't limit her diet much    Past Medical History:   Diagnosis Date    Arthritis     CHF (congestive heart failure)     COPD (chronic obstructive pulmonary disease)     Diabetes mellitus     Elevated cholesterol     GERD (gastroesophageal reflux disease)     Hyperlipemia     Hypertension     JOANN (obstructive sleep apnea)     Pneumonia     Vulvar cancer      Past Surgical History:   Procedure Laterality Date    HYSTERECTOMY      INCISION AND DRAINAGE TRUNK N/A 2022    Procedure: INCISION AND DRAINAGE BACK;  Surgeon: Tamera Saez MD;  Location: [facility];  Service: General;  Laterality: N/A;    MASTECTOMY      VULVA SURGERY      reconstruction due ca      Family History   Problem Relation Age of Onset     Cancer Mother     Parkinsonism Father     Diabetes Father     Cancer Maternal Aunt     Diabetes Paternal Aunt     Diabetes Paternal Grandmother       Social History     Socioeconomic History    Marital status:    Tobacco Use    Smoking status: Every Day     Packs/day: 1.00     Years: 15.00     Pack years: 15.00     Types: Cigarettes     Start date: 1995     Last attempt to quit: 2021     Years since quittin.6     Passive exposure: Current    Smokeless tobacco: Never    Tobacco comments:     Appx: 4 cigarettes per day.   Vaping Use    Vaping Use: Former    Substances: Nicotine, Flavoring    Devices: [employer] tank   Substance and Sexual Activity    Alcohol use: Never    Drug use: Never    Sexual activity: Not Currently     Partners: Male     Birth control/protection: Condom      Allergies   Allergen Reactions    Codeine Nausea Only    Metformin GI Intolerance     Extreme diarrhea      Current Outpatient Medications on File Prior to Visit   Medication Sig Dispense Refill    albuterol sulfate HFA (Ventolin HFA) 108 (90 Base) MCG/ACT inhaler Inhale 2 puffs Every 4 (Four) Hours As Needed for Wheezing or Shortness of Air. 18 g 12    budesonide-formoterol (Symbicort) 160-4.5 MCG/ACT inhaler Inhale 2 puffs 2 (Two) Times a Day. 1 each 8    cetirizine (zyrTEC) 10 MG tablet Take 1 tablet by mouth Daily.      clonazePAM (KlonoPIN) 1 MG tablet Take 1 tablet by mouth As Needed for Anxiety. Max 4 times a day.      fluticasone (FLONASE) 50 MCG/ACT nasal spray USE 1 SPRAY(S) IN EACH NOSTRIL TWICE DAILY      gabapentin (NEURONTIN) 800 MG tablet Take 1 tablet by mouth 4 (Four) Times a Day.      hydroCHLOROthiazide (HYDRODIURIL) 25 MG tablet Take 1 tablet by mouth Daily.      ibuprofen (ADVIL,MOTRIN) 800 MG tablet Take 1 tablet by mouth Every 8 (Eight) Hours As Needed for Mild Pain.      ipratropium-albuterol (DUO-NEB) 0.5-2.5 mg/3 ml nebulizer Take 3 mL by nebulization Every 4 (Four) Hours As Needed for  "Wheezing or Shortness of Air. 360 mL 8    lisinopril (PRINIVIL,ZESTRIL) 5 MG tablet Take 0.5 tablets by mouth Daily.      omeprazole (priLOSEC) 20 MG capsule Take 1 capsule by mouth Daily. before a meal      ondansetron (ZOFRAN) 4 MG tablet TAKE 1 TO 2 TABLETS BY MOUTH THREE TIMES DAILY AS NEEDED FOR NAUSEA      OXcarbazepine (TRILEPTAL) 600 MG tablet Take 1 tablet by mouth 2 (Two) Times a Day.      sertraline (ZOLOFT) 100 MG tablet Take 1 tablet by mouth 2 (Two) Times a Day.      simvastatin (ZOCOR) 80 MG tablet Take 1 tablet by mouth every night at bedtime.      tiotropium (Spiriva HandiHaler) 18 MCG per inhalation capsule Place 1 capsule into inhaler and inhale Daily. 30 capsule 8    traZODone (DESYREL) 50 MG tablet Take 2 tablets by mouth Every Night.      [DISCONTINUED] ARIPiprazole (ABILIFY) 10 MG tablet Take 1 tablet by mouth Daily.      [DISCONTINUED] insulin glargine (LANTUS, SEMGLEE) 100 UNIT/ML injection Inject 80 Units under the skin into the appropriate area as directed Every Night.      [DISCONTINUED] Jardiance 10 MG tablet tablet Take 1 tablet by mouth Every Morning.      [DISCONTINUED] Ozempic, 2 MG/DOSE, 8 MG/3ML solution pen-injector INJECT 2 MG SUBCUTANEOUSLY ONCE A WEEK ON THE SAME DAY EACH WEEK       No current facility-administered medications on file prior to visit.        Review of Systems   Constitutional:  Positive for fatigue and unexpected weight gain.   Eyes: Negative.    Gastrointestinal:  Positive for constipation.   Endocrine: Positive for polydipsia, polyphagia and polyuria.   Genitourinary:         Itching and burning.   Psychiatric/Behavioral:  Positive for sleep disturbance.    All other systems reviewed and are negative.     /88 (BP Location: Left arm, Patient Position: Sitting, Cuff Size: Large Adult)   Pulse 89   Ht 165.1 cm (65\")   Wt 103 kg (228 lb)   SpO2 94%   BMI 37.94 kg/m²      Physical Exam  Vitals reviewed.   Constitutional:       Appearance: Normal " appearance.   Eyes:      Extraocular Movements: Extraocular movements intact.   Cardiovascular:      Rate and Rhythm: Normal rate.   Pulmonary:      Effort: Pulmonary effort is normal.   Skin:     General: Skin is warm.   Neurological:      General: No focal deficit present.      Mental Status: She is alert and oriented to person, place, and time.   Psychiatric:         Mood and Affect: Mood normal.         Behavior: Behavior normal.         Thought Content: Thought content normal.         Judgment: Judgment normal.     CMP:  Lab Results   Component Value Date    BUN 19 03/23/2023    CREATININE 0.70 03/23/2023    EGFRIFNONA 93 01/13/2022    BCR 27.1 (H) 03/23/2023     03/23/2023    K 4.2 03/23/2023    CO2 25.6 03/23/2023    CALCIUM 8.8 03/23/2023    ALBUMIN 3.8 03/23/2023    BILITOT <0.2 03/23/2023    ALKPHOS 105 03/23/2023    AST 14 03/23/2023    ALT 21 03/23/2023     Lipid Panel:  No results found for: CHOL, TRIG, HDL, VLDL, LDL  HbA1c:  Lab Results   Component Value Date    HGBA1C 10.40 (H) 01/12/2022     Glucose:  Lab Results   Component Value Date    POCGLU 185 (H) 01/13/2022     Microalbumin:  No results found for: MALBCRERATIO  TSH:  Lab Results   Component Value Date    TSH 3.550 01/10/2022       Assessment and Plan    Diagnoses and all orders for this visit:    1. Type 2 diabetes mellitus with hyperglycemia, with long-term current use of insulin (Primary)  Assessment & Plan:  -Diabetes is above goal with A1c 10.4.  -Discussed dietary and exercise guidelines with patient.  Provided patient with dietary handout.  She is interested in diabetes education as well.  Will send referral for this.  -Discussed the importance of yearly eye exams.  -Discussed the importance of checking BG's regularly.    -Continue Ozempic 2 mg weekly.  Patient has no personal history of pancreatitis, no family history of MEN syndrome or medullary thyroid cancer. Possible side effects including nausea, bloating, other GI upset and  rarely pancreatitis were discussed. She was advised to call the office with any symptoms or concerns.   Discussed importance of increasing water intake and fiber in diet and taking stool softeners as needed to relieve constipation.  -Continue Jardiance 10 mg QD. Discussed the medication's MOA and that it may cause more frequent urination particularly upon starting the medication. Take one tablet in the morning with or without food. Encouraged to drink plenty of water as to not get dehydrated especially in warmer weather or with activity. Also discussed there may be an increased incidence of UTIs or yeast infections and to monitor for signs/symptoms. Will send in Diflucan to treat current yeast symptoms.  -Switch from Lantus to Levemir 60 units QHS.   -Start Glipizide 5 mg BID.  -S/S hypoglycemia reviewed with Rule of 15's advised.  -Follow-up in 1 month.     Orders:  -     Ambulatory Referral to Diabetic Education    Other orders  -     Discontinue: glipizide (GLUCOTROL) 5 MG tablet; Take 1 tablet by mouth 2 (Two) Times a Day Before Meals.  Dispense: 30 tablet; Refill: 2  -     fluconazole (Diflucan) 150 MG tablet; 1 tablet today, Repeat the dose in 4 days.  Dispense: 2 tablet; Refill: 0  -     Discontinue: insulin degludec (Tresiba FlexTouch) 100 UNIT/ML solution pen-injector injection; Inject 60 Units under the skin into the appropriate area as directed Daily.  Dispense: 15 mL; Refill: 2         Return in about 4 weeks (around 11/1/2023) for Follow-up appointment. The patient was instructed to contact the clinic with any interval questions or concerns.        This document has been electronically signed by CHAZ Mcintosh  October 4, 2023 13:10 EDT   Endocrinology    Please note that portions of this document were completed with a voice recognition program. Efforts were made to edit the dictations, but occasionally words are mis-transcribed.

## 2023-10-04 NOTE — ASSESSMENT & PLAN NOTE
-Diabetes is above goal with A1c 10.4.  -Discussed dietary and exercise guidelines with patient.  Provided patient with dietary handout.  She is interested in diabetes education as well.  Will send referral for this.  -Discussed the importance of yearly eye exams.  -Discussed the importance of checking BG's regularly.    -Continue Ozempic 2 mg weekly.  Patient has no personal history of pancreatitis, no family history of MEN syndrome or medullary thyroid cancer. Possible side effects including nausea, bloating, other GI upset and rarely pancreatitis were discussed. She was advised to call the office with any symptoms or concerns.   Discussed importance of increasing water intake and fiber in diet and taking stool softeners as needed to relieve constipation.  -Continue Jardiance 10 mg QD. Discussed the medication's MOA and that it may cause more frequent urination particularly upon starting the medication. Take one tablet in the morning with or without food. Encouraged to drink plenty of water as to not get dehydrated especially in warmer weather or with activity. Also discussed there may be an increased incidence of UTIs or yeast infections and to monitor for signs/symptoms. Will send in Diflucan to treat current yeast symptoms.  -Switch from Lantus to Levemir 60 units QHS.   -Start Glipizide 5 mg BID.  -S/S hypoglycemia reviewed with Rule of 15's advised.  -Follow-up in 1 month.

## 2023-10-05 ENCOUNTER — TELEPHONE (OUTPATIENT)
Dept: ENDOCRINOLOGY | Facility: CLINIC | Age: 48
End: 2023-10-05

## 2023-10-05 RX ORDER — INSULIN DETEMIR 100 [IU]/ML
60 INJECTION, SOLUTION SUBCUTANEOUS DAILY
Qty: 18 ML | Refills: 5 | Status: SHIPPED | OUTPATIENT
Start: 2023-10-05

## 2023-10-05 NOTE — TELEPHONE ENCOUNTER
PT CALLED STATING THAT THE INSULIN RC WE SENT IN YESTERDAY REQUIRES A PA. PT WAS UNSURE WHAT MEDICATION IT IS. SHE STATED PHARM TOLD HER WE COULD SEND SOMETHING ELSE IN. PT REQUESTED WE LOOK INTO THIS AND REACH OUT TO HER.

## 2023-10-30 ENCOUNTER — EDUCATION (OUTPATIENT)
Dept: DIABETES SERVICES | Facility: HOSPITAL | Age: 48
End: 2023-10-30
Payer: MEDICAID

## 2023-10-30 NOTE — CONSULTS
Diabetes Education  Assessment/Teaching    Patient Name:  Ana Ortiz  YOB: 1975  MRN: [id number]  Admit Date:  (Not on file)      Assessment Date:  10/30/2023      Flowsheet Row Most Recent Value   DM Education Needs    Frequency of Testing 3 times a day   Medication Insulin, Oral   Problem Solving Hypoglycemia, Hyperglycemia, Sick days, Signs, Symptoms, Treatment   Reducing Risks A1C testing, Cardiovascular, Neuropathy, Lipids, Blood pressure, Retinopathy, Eye exam, Dental exam, Foot care, Immunizations   Healthy Eating Basic meal plan provided   Physical Activity Walking   Physical Activity Frequency Regularly   Healthy Coping Appropriate   Discharge Plan Follow-up with endocrinolgoist   Motivation Moderate   Teaching Method Explanation, Discussion, Handouts   Patient Response Verbalized understanding          Patient is a 47 year old female with a past history of COPD, CHF, diabetes, HLD, HTN, JOANN.     Other Comments:  Healthy eating:  Patient states she will decrease carb intake and will try to eat healthier.   Being active:   Patient states she will walk at least 30 minutes three times a week and increase the times in two weeks if she can tolerate.   Monitoring:   Patient states that she is taking her blood glucose.3 times a day.   Medication:  Patient will take medications as prescribed. Patient was able to list her medications and the dose as prescribed.  Ozempic 2 mg weekly   Jardiance 10 mg QD.   Levemir 60 units QHS   Glipizide 5 mg BID.   Problem solving:  Patient states the signs and symptoms of hypo/hyperglycemia and the treatment for both.   Reducing risk:  Patient states she checks her feet nightly and does not walk without something to protect her feet. Patient states she goes to her follow up appointments and goes to the eye doctor and dentist when needed and on annual visits.   Healthy coping:  Patient has a good support team at home and is able to talk to them for help when  needed.   Patient received a call and was educated on diet, activity, checking blood glucose, taking medication as prescribed, checking feet daily and S/S of hypo/hyperglycemia. Patient was educated on sick rule days. A packet of information will be mailed, that includes ADA handouts, AADE 7 handout, fast food book and grocery shopping book for help when shopping along with office contact number for questions. Patient had no questions or concerns. Thank you.        Electronically signed by:  Joy Lee RN  10/30/23 17:11 EDT

## 2023-11-03 ENCOUNTER — OFFICE VISIT (OUTPATIENT)
Dept: ENDOCRINOLOGY | Facility: CLINIC | Age: 48
End: 2023-11-03
Payer: MEDICAID

## 2023-11-03 VITALS
HEART RATE: 99 BPM | HEIGHT: 65 IN | BODY MASS INDEX: 39.89 KG/M2 | SYSTOLIC BLOOD PRESSURE: 144 MMHG | DIASTOLIC BLOOD PRESSURE: 88 MMHG | OXYGEN SATURATION: 96 % | WEIGHT: 239.4 LBS

## 2023-11-03 DIAGNOSIS — E11.65 TYPE 2 DIABETES MELLITUS WITH HYPERGLYCEMIA, WITH LONG-TERM CURRENT USE OF INSULIN: Primary | ICD-10-CM

## 2023-11-03 DIAGNOSIS — Z79.4 TYPE 2 DIABETES MELLITUS WITH HYPERGLYCEMIA, WITH LONG-TERM CURRENT USE OF INSULIN: Primary | ICD-10-CM

## 2023-11-03 LAB
EXPIRATION DATE: ABNORMAL
GLUCOSE BLDC GLUCOMTR-MCNC: 155 MG/DL (ref 70–130)
Lab: ABNORMAL

## 2023-11-03 PROCEDURE — 82947 ASSAY GLUCOSE BLOOD QUANT: CPT | Performed by: NURSE PRACTITIONER

## 2023-11-03 PROCEDURE — 1159F MED LIST DOCD IN RCRD: CPT | Performed by: NURSE PRACTITIONER

## 2023-11-03 PROCEDURE — 82043 UR ALBUMIN QUANTITATIVE: CPT | Performed by: NURSE PRACTITIONER

## 2023-11-03 PROCEDURE — 82570 ASSAY OF URINE CREATININE: CPT | Performed by: NURSE PRACTITIONER

## 2023-11-03 PROCEDURE — 1160F RVW MEDS BY RX/DR IN RCRD: CPT | Performed by: NURSE PRACTITIONER

## 2023-11-03 PROCEDURE — 99214 OFFICE O/P EST MOD 30 MIN: CPT | Performed by: NURSE PRACTITIONER

## 2023-11-03 RX ORDER — GLIPIZIDE 5 MG/1
5 TABLET ORAL
Qty: 60 TABLET | Refills: 5 | Status: SHIPPED | OUTPATIENT
Start: 2023-11-03

## 2023-11-03 NOTE — PROGRESS NOTES
Chief Complaint   Patient presents with    Diabetes        Referring Provider  No ref. provider found     HPI   Ana Ortiz is a 47 y.o. female had concerns including Diabetes.    T2DM.    Diabetes was diagnosed [id number], she had abnormal labs, but was starting to feel ill as well.  Complications include neuropathy, no stroke/cardiovascular issues.  Last [facility]htho exam was [facility] in [place], [place].  Current medications for diabetes include Levemir 60 units QHS, Jardiance 10 mg QD, Ozempic 2 mg weekly, Glipizide 5 mg QD.  Past meds: Metformin-diarrhea, glipizide-changed therapy d/t not working anymore, Trulicity-insurance issue  She checks her blood sugar 2-3 times per day.   Hypos: none  FSB-150  PPD: 200's     She is having some s/sx's yeast infection, but has not had any treatment for this.  She is also having some increased constipation as well.    ACE/ARB:yes, Statin: yes  Labs:  A1C:10.6 (2023), 10.4 (2023)  Lipid Panel:utd  MELLO: utd    The following portions of the patient's history were reviewed and updated as appropriate: allergies, current medications, past family history, past medical history, past social history, past surgical history, and problem list.    Diet: she doesn't limit her diet much    Past Medical History:   Diagnosis Date    Arthritis     CHF (congestive heart failure)     COPD (chronic obstructive pulmonary disease)     Diabetes mellitus     Elevated cholesterol     GERD (gastroesophageal reflux disease)     Hyperlipemia     Hypertension     JOANN (obstructive sleep apnea)     Pneumonia     Vulvar cancer      Past Surgical History:   Procedure Laterality Date    HYSTERECTOMY      INCISION AND DRAINAGE TRUNK N/A 2022    Procedure: INCISION AND DRAINAGE BACK;  Surgeon: Tamera Saez MD;  Location: [facility];  Service: General;  Laterality: N/A;    MASTECTOMY      VULVA SURGERY      reconstruction due ca      Family History   Problem Relation Age of Onset     Cancer Mother     Parkinsonism Father     Diabetes Father     Cancer Maternal Aunt     Diabetes Paternal Aunt     Diabetes Paternal Grandmother       Social History     Socioeconomic History    Marital status:    Tobacco Use    Smoking status: Every Day     Packs/day: 1.00     Years: 15.00     Additional pack years: 0.00     Total pack years: 15.00     Types: Cigarettes     Start date: 1995     Last attempt to quit: 2021     Years since quittin.7     Passive exposure: Current    Smokeless tobacco: Never    Tobacco comments:     Appx: 4 cigarettes per day.   Vaping Use    Vaping Use: Former    Substances: Nicotine, Flavoring    Devices: [employer] tank   Substance and Sexual Activity    Alcohol use: Never    Drug use: Never    Sexual activity: Not Currently     Partners: Male     Birth control/protection: Condom      Allergies   Allergen Reactions    Codeine Nausea Only    Metformin GI Intolerance     Extreme diarrhea      Current Outpatient Medications on File Prior to Visit   Medication Sig Dispense Refill    albuterol sulfate HFA (Ventolin HFA) 108 (90 Base) MCG/ACT inhaler Inhale 2 puffs Every 4 (Four) Hours As Needed for Wheezing or Shortness of Air. 18 g 12    ARIPiprazole (ABILIFY) 10 MG tablet Take 1 tablet by mouth Daily.      budesonide-formoterol (Symbicort) 160-4.5 MCG/ACT inhaler Inhale 2 puffs 2 (Two) Times a Day. 1 each 8    cetirizine (zyrTEC) 10 MG tablet Take 1 tablet by mouth Daily.      clonazePAM (KlonoPIN) 1 MG tablet Take 1 tablet by mouth As Needed for Anxiety. Max 4 times a day.      empagliflozin (Jardiance) 10 MG tablet tablet Take 1 tablet by mouth Daily. 30 tablet 5    fluconazole (Diflucan) 150 MG tablet 1 tablet today, Repeat the dose in 4 days. 2 tablet 0    fluticasone (FLONASE) 50 MCG/ACT nasal spray USE 1 SPRAY(S) IN EACH NOSTRIL TWICE DAILY      gabapentin (NEURONTIN) 800 MG tablet Take 1 tablet by mouth 4 (Four) Times a Day.      glucose blood test strip  Use as directed by provider to check blood sugar. 100 each 5    Blood Glucose Monitoring Suppl (ONE TOUCH ULTRA 2) w/Device kit Use to check blood sugar as directed by provider. 1 each 0    hydroCHLOROthiazide (HYDRODIURIL) 25 MG tablet Take 1 tablet by mouth Daily.      ibuprofen (ADVIL,MOTRIN) 800 MG tablet Take 1 tablet by mouth Every 8 (Eight) Hours As Needed for Mild Pain.      insulin detemir (Levemir FlexTouch) 100 UNIT/ML injection Inject 60 Units under the skin into the appropriate area as directed Daily. 18 mL 5    Insulin Pen Needle (Pen Needles) 32G X 4 MM misc Use to inject insulin as directed by provider. 100 each 5    ipratropium-albuterol (DUO-NEB) 0.5-2.5 mg/3 ml nebulizer Take 3 mL by nebulization Every 4 (Four) Hours As Needed for Wheezing or Shortness of Air. 360 mL 8    Lancets (OneTouch Delica Plus [id number]) misc Use to test blood sugar as directed by provider. 100 each 5    lisinopril (PRINIVIL,ZESTRIL) 5 MG tablet Take 0.5 tablets by mouth Daily.      nicotine polacrilex (NICORETTE) 2 MG gum Use 1 piece of gum every 1-2 hours as needed for smoking cessation. Use a minimum of 9 pieces per day. Do not use more than 24 pieces of gum/day. 170 each 1    omeprazole (priLOSEC) 20 MG capsule Take 1 capsule by mouth Daily. before a meal      ondansetron (ZOFRAN) 4 MG tablet TAKE 1 TO 2 TABLETS BY MOUTH THREE TIMES DAILY AS NEEDED FOR NAUSEA      OXcarbazepine (TRILEPTAL) 600 MG tablet Take 1 tablet by mouth 2 (Two) Times a Day.      sertraline (ZOLOFT) 100 MG tablet Take 1 tablet by mouth 2 (Two) Times a Day.      simvastatin (ZOCOR) 80 MG tablet Take 1 tablet by mouth every night at bedtime.      tiotropium (Spiriva HandiHaler) 18 MCG per inhalation capsule Place 1 capsule into inhaler and inhale Daily. 30 capsule 8    traZODone (DESYREL) 50 MG tablet Take 2 tablets by mouth Every Night.       No current facility-administered medications on file prior to visit.        Review of Systems  "  Constitutional:  Positive for fatigue and unexpected weight gain.   Eyes: Negative.    Gastrointestinal:  Positive for constipation.   Endocrine: Positive for polydipsia, polyphagia and polyuria.   Genitourinary:         Itching and burning.   Psychiatric/Behavioral:  Positive for sleep disturbance.    All other systems reviewed and are negative.       /88 (BP Location: Right arm, Patient Position: Sitting, Cuff Size: Adult)   Pulse 99   Ht 165.1 cm (65\")   Wt 109 kg (239 lb 6.4 oz)   SpO2 96%   BMI 39.84 kg/m²      Physical Exam  Vitals reviewed.   Constitutional:       Appearance: Normal appearance.   Eyes:      Extraocular Movements: Extraocular movements intact.   Cardiovascular:      Rate and Rhythm: Normal rate.      Pulses:           Dorsalis pedis pulses are 2+ on the right side and 2+ on the left side.        Posterior tibial pulses are 2+ on the right side and 2+ on the left side.   Pulmonary:      Effort: Pulmonary effort is normal.   Feet:      Right foot:      Protective Sensation: 10 sites tested.  10 sites sensed.      Skin integrity: Callus and dry skin present.      Toenail Condition: Right toenails are abnormally thick and long.      Left foot:      Protective Sensation: 10 sites tested.  10 sites sensed.      Skin integrity: Callus and dry skin present.      Toenail Condition: Left toenails are abnormally thick and long.      Comments: Diabetic Foot Exam Performed and Monofilament Test Performed     Skin:     General: Skin is warm.   Neurological:      General: No focal deficit present.      Mental Status: She is alert and oriented to person, place, and time.   Psychiatric:         Mood and Affect: Mood normal.         Behavior: Behavior normal.         Thought Content: Thought content normal.         Judgment: Judgment normal.       CMP:  Lab Results   Component Value Date    BUN 19 03/23/2023    CREATININE 0.70 03/23/2023    EGFRIFNONA 93 01/13/2022    BCR 27.1 (H) 03/23/2023    NA " "137 03/23/2023    K 4.2 03/23/2023    CO2 25.6 03/23/2023    CALCIUM 8.8 03/23/2023    ALBUMIN 3.8 03/23/2023    BILITOT <0.2 03/23/2023    ALKPHOS 105 03/23/2023    AST 14 03/23/2023    ALT 21 03/23/2023     Lipid Panel:  No results found for: \"CHOL\", \"TRIG\", \"HDL\", \"VLDL\", \"LDL\"  HbA1c:  Lab Results   Component Value Date    HGBA1C 10.40 (H) 01/12/2022     Glucose:  Lab Results   Component Value Date    POCGLU 155 (A) 11/03/2023     Microalbumin:  Lab Results   Component Value Date    MALBCRERATIO 23 11/03/2023     TSH:  Lab Results   Component Value Date    TSH 3.550 01/10/2022       Assessment and Plan    Diagnoses and all orders for this visit:    1. Type 2 diabetes mellitus with hyperglycemia, with long-term current use of insulin (Primary)  Assessment & Plan:  -Diabetes is above goal with A1c 10.4.  -Discussed dietary and exercise guidelines with patient.  Provided patient with dietary handout.  She is interested in diabetes education as well.   -Discussed the importance of yearly eye exams.  -Discussed the importance of checking BG's regularly.    -Continue Ozempic 2 mg weekly.  Patient has no personal history of pancreatitis, no family history of MEN syndrome or medullary thyroid cancer. Possible side effects including nausea, bloating, other GI upset and rarely pancreatitis were discussed. She was advised to call the office with any symptoms or concerns.   Discussed importance of increasing water intake and fiber in diet and taking stool softeners as needed to relieve constipation.  -Continue Jardiance 10 mg QD. Discussed the medication's MOA and that it may cause more frequent urination particularly upon starting the medication. Take one tablet in the morning with or without food. Encouraged to drink plenty of water as to not get dehydrated especially in warmer weather or with activity. Also discussed there may be an increased incidence of UTIs or yeast infections and to monitor for signs/symptoms. Will " send in Diflucan to treat current yeast symptoms.  -Continue Levemir 60 units QHS.   -Increase Glipizide 5 mg BID.  -S/S hypoglycemia reviewed with Rule of 15's advised.  -Follow-up in 2 months.     Orders:  -     POC Glucose, Blood  -     glipizide (Glucotrol) 5 MG tablet; Take 1 tablet by mouth 2 (Two) Times a Day Before Meals.  Dispense: 60 tablet; Refill: 5  -     Microalbumin / Creatinine Urine Ratio - Urine, Clean Catch         Return in about 2 months (around 1/3/2024) for Follow-up appointment, A1C. The patient was instructed to contact the clinic with any interval questions or concerns.        This document has been electronically signed by CHAZ Mcintosh  November 8, 2023 14:11 EST   Endocrinology    Please note that portions of this document were completed with a voice recognition program. Efforts were made to edit the dictations, but occasionally words are mis-transcribed.

## 2023-11-04 LAB
ALBUMIN/CREAT UR: 23 MG/G CREAT (ref 0–29)
CREAT UR-MCNC: 51.9 MG/DL
MICROALBUMIN UR-MCNC: 11.9 UG/ML

## 2023-11-06 ENCOUNTER — TELEPHONE (OUTPATIENT)
Dept: ENDOCRINOLOGY | Facility: CLINIC | Age: 48
End: 2023-11-06
Payer: MEDICAID

## 2023-11-06 RX ORDER — DULAGLUTIDE 4.5 MG/.5ML
4.5 INJECTION, SOLUTION SUBCUTANEOUS WEEKLY
Qty: 2 ML | Refills: 2 | Status: SHIPPED | OUTPATIENT
Start: 2023-11-06

## 2023-11-06 NOTE — TELEPHONE ENCOUNTER
Patients pharmacy is out of Ozempic. She has tried a couple more pharmacy but they are out of stock as well. She is unable to come get a sample so she would like switched until stock becomes available again.

## 2023-11-08 NOTE — ASSESSMENT & PLAN NOTE
-Diabetes is above goal with A1c 10.4.  -Discussed dietary and exercise guidelines with patient.  Provided patient with dietary handout.  She is interested in diabetes education as well.   -Discussed the importance of yearly eye exams.  -Discussed the importance of checking BG's regularly.    -Continue Ozempic 2 mg weekly.  Patient has no personal history of pancreatitis, no family history of MEN syndrome or medullary thyroid cancer. Possible side effects including nausea, bloating, other GI upset and rarely pancreatitis were discussed. She was advised to call the office with any symptoms or concerns.   Discussed importance of increasing water intake and fiber in diet and taking stool softeners as needed to relieve constipation.  -Continue Jardiance 10 mg QD. Discussed the medication's MOA and that it may cause more frequent urination particularly upon starting the medication. Take one tablet in the morning with or without food. Encouraged to drink plenty of water as to not get dehydrated especially in warmer weather or with activity. Also discussed there may be an increased incidence of UTIs or yeast infections and to monitor for signs/symptoms. Will send in Diflucan to treat current yeast symptoms.  -Continue Levemir 60 units QHS.   -Increase Glipizide 5 mg BID.  -S/S hypoglycemia reviewed with Rule of 15's advised.  -Follow-up in 2 months.

## 2023-11-14 ENCOUNTER — PRIOR AUTHORIZATION (OUTPATIENT)
Dept: ENDOCRINOLOGY | Facility: CLINIC | Age: 48
End: 2023-11-14
Payer: MEDICAID

## 2023-11-14 NOTE — TELEPHONE ENCOUNTER
PA required for Trulicity. PA was submitted and approved on 11-08-23.     PA reference # [id number]    this request is approved from 11- to 11-    Tina Mcmahan MA  11/14/23  14:15 EST

## 2023-12-13 ENCOUNTER — HOSPITAL ENCOUNTER (OUTPATIENT)
Dept: GENERAL RADIOLOGY | Facility: HOSPITAL | Age: 48
Discharge: HOME | End: 2023-12-13
Payer: MEDICAID

## 2023-12-13 ENCOUNTER — TRANSCRIBE ORDERS (OUTPATIENT)
Dept: ADMINISTRATIVE | Facility: HOSPITAL | Age: 48
End: 2023-12-13
Payer: MEDICAID

## 2023-12-13 DIAGNOSIS — J44.9 CHRONIC OBSTRUCTIVE PULMONARY DISEASE, UNSPECIFIED COPD TYPE: ICD-10-CM

## 2023-12-13 DIAGNOSIS — J44.9 CHRONIC OBSTRUCTIVE PULMONARY DISEASE, UNSPECIFIED COPD TYPE: Primary | ICD-10-CM

## 2023-12-13 PROCEDURE — 71046 X-RAY EXAM CHEST 2 VIEWS: CPT

## 2024-01-08 ENCOUNTER — OFFICE VISIT (OUTPATIENT)
Dept: ENDOCRINOLOGY | Facility: CLINIC | Age: 49
End: 2024-01-08
Payer: MEDICAID

## 2024-01-08 VITALS
HEIGHT: 65 IN | HEART RATE: 109 BPM | DIASTOLIC BLOOD PRESSURE: 90 MMHG | WEIGHT: 229 LBS | OXYGEN SATURATION: 98 % | SYSTOLIC BLOOD PRESSURE: 136 MMHG | BODY MASS INDEX: 38.15 KG/M2

## 2024-01-08 DIAGNOSIS — E11.65 TYPE 2 DIABETES MELLITUS WITH HYPERGLYCEMIA, WITH LONG-TERM CURRENT USE OF INSULIN: Primary | ICD-10-CM

## 2024-01-08 DIAGNOSIS — Z79.4 TYPE 2 DIABETES MELLITUS WITH HYPERGLYCEMIA, WITH LONG-TERM CURRENT USE OF INSULIN: Primary | ICD-10-CM

## 2024-01-08 PROCEDURE — 1160F RVW MEDS BY RX/DR IN RCRD: CPT | Performed by: NURSE PRACTITIONER

## 2024-01-08 PROCEDURE — 99214 OFFICE O/P EST MOD 30 MIN: CPT | Performed by: NURSE PRACTITIONER

## 2024-01-08 PROCEDURE — 1159F MED LIST DOCD IN RCRD: CPT | Performed by: NURSE PRACTITIONER

## 2024-01-08 RX ORDER — PEN NEEDLE, DIABETIC 30 GX3/16"
1 NEEDLE, DISPOSABLE MISCELLANEOUS TAKE AS DIRECTED
Qty: 100 EACH | Refills: 5 | Status: SHIPPED | OUTPATIENT
Start: 2024-01-08

## 2024-01-08 RX ORDER — DULAGLUTIDE 4.5 MG/.5ML
4.5 INJECTION, SOLUTION SUBCUTANEOUS WEEKLY
Qty: 2 ML | Refills: 2 | Status: SHIPPED | OUTPATIENT
Start: 2024-01-08

## 2024-01-08 RX ORDER — INSULIN DETEMIR 100 [IU]/ML
60 INJECTION, SOLUTION SUBCUTANEOUS DAILY
Qty: 18 ML | Refills: 5 | Status: SHIPPED | OUTPATIENT
Start: 2024-01-08

## 2024-01-08 RX ORDER — GLIPIZIDE 5 MG/1
5 TABLET ORAL
Qty: 60 TABLET | Refills: 5 | Status: SHIPPED | OUTPATIENT
Start: 2024-01-08

## 2024-01-08 NOTE — PROGRESS NOTES
Chief Complaint   Patient presents with    Diabetes        Referring Provider  No ref. provider found     HPI   Ana Ortiz is a 48 y.o. female had concerns including Diabetes.    T2DM.    Diabetes was diagnosed [id number], she had abnormal labs, but was starting to feel ill as well.  Complications include neuropathy, no stroke/cardiovascular issues.  [facility]htho exam was [facility] in [place], [place].  Current medications for diabetes include Levemir 60 units QHS, Jardiance 10 mg QD, Trulicity 4.5 mg weekly, Glipizide 5 mg BID.  Past meds: Metformin-diarrhea, glipizide-changed therapy d/t not working anymore, Trulicity-insurance issue  She checks her blood sugar 2-3 times per day.   Hypos: none  FSB-150  PPD: <200     She is noting improvements to her symptoms and BG's.  She is doing well.    ACE/ARB:yes, Statin: yes  Labs:  A1C:10.6 (2023), 10.4 (2023), 8.7 (2023)  Lipid Panel:utd  MELLO: utd    The following portions of the patient's history were reviewed and updated as appropriate: allergies, current medications, past family history, past medical history, past social history, past surgical history, and problem list.    Diet: she doesn't limit her diet much    Past Medical History:   Diagnosis Date    Arthritis     CHF (congestive heart failure)     COPD (chronic obstructive pulmonary disease)     Diabetes mellitus     Elevated cholesterol     GERD (gastroesophageal reflux disease)     Hyperlipemia     Hypertension     JOANN (obstructive sleep apnea)     Pneumonia     Vulvar cancer      Past Surgical History:   Procedure Laterality Date    HYSTERECTOMY      INCISION AND DRAINAGE TRUNK N/A 2022    Procedure: INCISION AND DRAINAGE BACK;  Surgeon: Tamera Saez MD;  Location: [facility];  Service: General;  Laterality: N/A;    MASTECTOMY      VULVA SURGERY      reconstruction due ca      Family History   Problem Relation Age of Onset    Cancer Mother     Parkinsonism Father      Diabetes Father     Cancer Maternal Aunt     Diabetes Paternal Aunt     Diabetes Paternal Grandmother       Social History     Socioeconomic History    Marital status:    Tobacco Use    Smoking status: Every Day     Packs/day: 1.00     Years: 15.00     Additional pack years: 0.00     Total pack years: 15.00     Types: Cigarettes     Start date: 1995     Last attempt to quit: 2021     Years since quittin.8     Passive exposure: Current    Smokeless tobacco: Never    Tobacco comments:     Appx: 4 cigarettes per day.   Vaping Use    Vaping Use: Former    Substances: Nicotine, Flavoring    Devices: Refillable tank   Substance and Sexual Activity    Alcohol use: Never    Drug use: Never    Sexual activity: Not Currently     Partners: Male     Birth control/protection: Condom      Allergies   Allergen Reactions    Codeine Nausea Only    Metformin GI Intolerance     Extreme diarrhea      Current Outpatient Medications on File Prior to Visit   Medication Sig Dispense Refill    albuterol sulfate HFA (Ventolin HFA) 108 (90 Base) MCG/ACT inhaler Inhale 2 puffs Every 4 (Four) Hours As Needed for Wheezing or Shortness of Air. 18 g 12    ARIPiprazole (ABILIFY) 10 MG tablet Take 1 tablet by mouth Daily.      budesonide-formoterol (Symbicort) 160-4.5 MCG/ACT inhaler Inhale 2 puffs 2 (Two) Times a Day. 1 each 8    cetirizine (zyrTEC) 10 MG tablet Take 1 tablet by mouth Daily.      clonazePAM (KlonoPIN) 1 MG tablet Take 1 tablet by mouth As Needed for Anxiety. Max 4 times a day.      fluconazole (Diflucan) 150 MG tablet 1 tablet today, Repeat the dose in 4 days. 2 tablet 0    fluticasone (FLONASE) 50 MCG/ACT nasal spray USE 1 SPRAY(S) IN EACH NOSTRIL TWICE DAILY      gabapentin (NEURONTIN) 800 MG tablet Take 1 tablet by mouth 4 (Four) Times a Day.      glucose blood test strip Use as directed by provider to check blood sugar. 100 each 5    Blood Glucose Monitoring Suppl (ONE TOUCH ULTRA 2) w/Device kit Use to  check blood sugar as directed by provider. 1 each 0    hydroCHLOROthiazide (HYDRODIURIL) 25 MG tablet Take 1 tablet by mouth Daily.      ibuprofen (ADVIL,MOTRIN) 800 MG tablet Take 1 tablet by mouth Every 8 (Eight) Hours As Needed for Mild Pain.      ipratropium-albuterol (DUO-NEB) 0.5-2.5 mg/3 ml nebulizer Take 3 mL by nebulization Every 4 (Four) Hours As Needed for Wheezing or Shortness of Air. 360 mL 8    Lancets (OneTouch Delica Plus [id number]) misc Use to test blood sugar as directed by provider. 100 each 5    lisinopril (PRINIVIL,ZESTRIL) 5 MG tablet Take 0.5 tablets by mouth Daily.      nicotine polacrilex (NICORETTE) 2 MG gum Use 1 piece of gum every 1-2 hours as needed for smoking cessation. Use a minimum of 9 pieces per day. Do not use more than 24 pieces of gum/day. 170 each 1    omeprazole (priLOSEC) 20 MG capsule Take 1 capsule by mouth Daily. before a meal      ondansetron (ZOFRAN) 4 MG tablet TAKE 1 TO 2 TABLETS BY MOUTH THREE TIMES DAILY AS NEEDED FOR NAUSEA      OXcarbazepine (TRILEPTAL) 600 MG tablet Take 1 tablet by mouth 2 (Two) Times a Day.      sertraline (ZOLOFT) 100 MG tablet Take 1 tablet by mouth 2 (Two) Times a Day.      simvastatin (ZOCOR) 80 MG tablet Take 1 tablet by mouth every night at bedtime.      tiotropium (Spiriva HandiHaler) 18 MCG per inhalation capsule Place 1 capsule into inhaler and inhale Daily. 30 capsule 8    traZODone (DESYREL) 50 MG tablet Take 2 tablets by mouth Every Night.      [DISCONTINUED] Dulaglutide (Trulicity) 4.5 MG/0.5ML solution pen-injector Inject 0.5 mL under the skin into the appropriate area as directed 1 (One) Time Per Week. 2 mL 2    [DISCONTINUED] empagliflozin (Jardiance) 10 MG tablet tablet Take 1 tablet by mouth Daily. 30 tablet 5    [DISCONTINUED] glipizide (Glucotrol) 5 MG tablet Take 1 tablet by mouth 2 (Two) Times a Day Before Meals. 60 tablet 5    [DISCONTINUED] insulin detemir (Levemir FlexTouch) 100 UNIT/ML injection Inject 60 Units under  "the skin into the appropriate area as directed Daily. 18 mL 5    [DISCONTINUED] Insulin Pen Needle (Pen Needles) 32G X 4 MM misc Use to inject insulin as directed by provider. 100 each 5     No current facility-administered medications on file prior to visit.        Review of Systems   Constitutional:  Positive for fatigue and unexpected weight gain.   Eyes: Negative.    Gastrointestinal:  Positive for constipation.   Endocrine: Positive for polydipsia, polyphagia and polyuria.   Genitourinary:         Itching and burning.   Psychiatric/Behavioral:  Positive for sleep disturbance.    All other systems reviewed and are negative.    /90 (BP Location: Left arm, Patient Position: Sitting, Cuff Size: Large Adult)   Pulse 109   Ht 165.1 cm (65\")   Wt 104 kg (229 lb)   SpO2 98%   BMI 38.11 kg/m²      Physical Exam  Vitals reviewed.   Constitutional:       Appearance: Normal appearance.   Eyes:      Extraocular Movements: Extraocular movements intact.   Cardiovascular:      Rate and Rhythm: Normal rate.   Pulmonary:      Effort: Pulmonary effort is normal.   Skin:     General: Skin is warm.   Neurological:      General: No focal deficit present.      Mental Status: She is alert and oriented to person, place, and time.   Psychiatric:         Mood and Affect: Mood normal.         Behavior: Behavior normal.         Thought Content: Thought content normal.         Judgment: Judgment normal.       CMP:  Lab Results   Component Value Date    BUN 19 03/23/2023    CREATININE 0.70 03/23/2023    EGFRIFNONA 93 01/13/2022    BCR 27.1 (H) 03/23/2023     03/23/2023    K 4.2 03/23/2023    CO2 25.6 03/23/2023    CALCIUM 8.8 03/23/2023    ALBUMIN 3.8 03/23/2023    BILITOT <0.2 03/23/2023    ALKPHOS 105 03/23/2023    AST 14 03/23/2023    ALT 21 03/23/2023     Lipid Panel:  No results found for: \"CHOL\", \"TRIG\", \"HDL\", \"VLDL\", \"LDL\"  HbA1c:  Lab Results   Component Value Date    HGBA1C 10.40 (H) 01/12/2022     Glucose:  Lab " Results   Component Value Date    POCGLU 155 (A) 11/03/2023     Microalbumin:  Lab Results   Component Value Date    MALBCRERATIO 23 11/03/2023     TSH:  Lab Results   Component Value Date    TSH 3.550 01/10/2022       Assessment and Plan    Diagnoses and all orders for this visit:    1. Type 2 diabetes mellitus with hyperglycemia, with long-term current use of insulin (Primary)  Assessment & Plan:  -Diabetes is improving with A1c 8.7.  -Discussed dietary and exercise guidelines with patient.    -Discussed the importance of yearly eye exams.  -Discussed the importance of checking BG's regularly.    -Continue Ozempic 2 mg weekly.  Patient has no personal history of pancreatitis, no family history of MEN syndrome or medullary thyroid cancer. Possible side effects including nausea, bloating, other GI upset and rarely pancreatitis were discussed. She was advised to call the office with any symptoms or concerns.   Discussed importance of increasing water intake and fiber in diet and taking stool softeners as needed to relieve constipation.  -Continue Jardiance 10 mg QD. Discussed the medication's MOA and that it may cause more frequent urination particularly upon starting the medication. Take one tablet in the morning with or without food. Encouraged to drink plenty of water as to not get dehydrated especially in warmer weather or with activity. Also discussed there may be an increased incidence of UTIs or yeast infections and to monitor for signs/symptoms. Will send in Diflucan to treat current yeast symptoms.  -Continue Levemir 60 units QHS.   -Continue Glipizide 5 mg BID.  -S/S hypoglycemia reviewed with Rule of 15's advised.  -Follow-up in 3 months.     Orders:  -     Dulaglutide (Trulicity) 4.5 MG/0.5ML solution pen-injector; Inject 0.5 mL under the skin into the appropriate area as directed 1 (One) Time Per Week.  Dispense: 2 mL; Refill: 2  -     empagliflozin (Jardiance) 10 MG tablet tablet; Take 1 tablet by mouth  Daily.  Dispense: 30 tablet; Refill: 5  -     glipizide (Glucotrol) 5 MG tablet; Take 1 tablet by mouth 2 (Two) Times a Day Before Meals.  Dispense: 60 tablet; Refill: 5  -     insulin detemir (Levemir FlexTouch) 100 UNIT/ML injection; Inject 60 Units under the skin into the appropriate area as directed Daily.  Dispense: 18 mL; Refill: 5  -     Insulin Pen Needle (Pen Needles) 32G X 4 MM misc; Use to inject insulin as directed by provider.  Dispense: 100 each; Refill: 5           Return in about 3 months (around 4/8/2024) for Follow-up appointment, A1C, Fasting Labs. The patient was instructed to contact the clinic with any interval questions or concerns.        This document has been electronically signed by CHAZ Mcintosh  January 8, 2024 14:50 EST   Endocrinology    Please note that portions of this document were completed with a voice recognition program. Efforts were made to edit the dictations, but occasionally words are mis-transcribed.

## 2024-02-02 ENCOUNTER — TELEPHONE (OUTPATIENT)
Dept: ENDOCRINOLOGY | Facility: CLINIC | Age: 49
End: 2024-02-02
Payer: MEDICAID

## 2024-02-02 RX ORDER — INSULIN GLARGINE 100 [IU]/ML
60 INJECTION, SOLUTION SUBCUTANEOUS DAILY
Qty: 30 ML | Refills: 3 | Status: SHIPPED | OUTPATIENT
Start: 2024-02-02

## 2024-02-02 NOTE — TELEPHONE ENCOUNTER
PHARMACY CALLED STATING THAT LEVEMIR IS NO LONGER AVAILABLE AND WOULD GEN TO HAVE AN ALTERNATIVE RX SENT IN.

## 2024-02-02 NOTE — TELEPHONE ENCOUNTER
Please contact patient.  I have sent in a prescription for Lantus 60 units daily to replace Levemir.  Of note, if this is not covered by her insurance she may need to reach out to her insurance provider to see what alternative they prefer.

## 2024-02-05 ENCOUNTER — OFFICE VISIT (OUTPATIENT)
Dept: PULMONOLOGY | Facility: CLINIC | Age: 49
End: 2024-02-05
Payer: MEDICAID

## 2024-02-05 VITALS
OXYGEN SATURATION: 96 % | HEIGHT: 65 IN | HEART RATE: 105 BPM | BODY MASS INDEX: 39.22 KG/M2 | WEIGHT: 235.4 LBS | SYSTOLIC BLOOD PRESSURE: 138 MMHG | TEMPERATURE: 98.3 F | DIASTOLIC BLOOD PRESSURE: 88 MMHG

## 2024-02-05 DIAGNOSIS — E66.01 MORBIDLY OBESE: ICD-10-CM

## 2024-02-05 DIAGNOSIS — F17.210 CIGARETTE NICOTINE DEPENDENCE WITHOUT COMPLICATION: ICD-10-CM

## 2024-02-05 DIAGNOSIS — J96.11 CHRONIC RESPIRATORY FAILURE WITH HYPOXIA: Primary | ICD-10-CM

## 2024-02-05 DIAGNOSIS — G47.33 OSA (OBSTRUCTIVE SLEEP APNEA): ICD-10-CM

## 2024-02-05 DIAGNOSIS — R06.02 SHORTNESS OF BREATH: Primary | ICD-10-CM

## 2024-02-05 DIAGNOSIS — J42 CHRONIC BRONCHITIS, UNSPECIFIED CHRONIC BRONCHITIS TYPE: ICD-10-CM

## 2024-02-05 PROCEDURE — 99214 OFFICE O/P EST MOD 30 MIN: CPT | Performed by: NURSE PRACTITIONER

## 2024-02-05 PROCEDURE — 1160F RVW MEDS BY RX/DR IN RCRD: CPT | Performed by: NURSE PRACTITIONER

## 2024-02-05 PROCEDURE — 1159F MED LIST DOCD IN RCRD: CPT | Performed by: NURSE PRACTITIONER

## 2024-02-05 RX ORDER — TIOTROPIUM BROMIDE 18 UG/1
1 CAPSULE ORAL; RESPIRATORY (INHALATION) DAILY
Qty: 30 CAPSULE | Refills: 8 | Status: SHIPPED | OUTPATIENT
Start: 2024-02-05

## 2024-02-05 RX ORDER — BUDESONIDE AND FORMOTEROL FUMARATE DIHYDRATE 160; 4.5 UG/1; UG/1
2 AEROSOL RESPIRATORY (INHALATION)
Qty: 1 EACH | Refills: 8 | Status: SHIPPED | OUTPATIENT
Start: 2024-02-05

## 2024-02-05 RX ORDER — CYCLOBENZAPRINE HCL 5 MG
TABLET ORAL
COMMUNITY
Start: 2023-12-08

## 2024-02-05 RX ORDER — IPRATROPIUM BROMIDE AND ALBUTEROL SULFATE 2.5; .5 MG/3ML; MG/3ML
3 SOLUTION RESPIRATORY (INHALATION) EVERY 4 HOURS PRN
Qty: 360 ML | Refills: 8 | Status: SHIPPED | OUTPATIENT
Start: 2024-02-05

## 2024-02-05 RX ORDER — ALBUTEROL SULFATE 90 UG/1
2 AEROSOL, METERED RESPIRATORY (INHALATION) EVERY 4 HOURS PRN
Qty: 18 G | Refills: 12 | Status: SHIPPED | OUTPATIENT
Start: 2024-02-05

## 2024-02-05 RX ORDER — MELOXICAM 7.5 MG/1
TABLET ORAL
COMMUNITY
Start: 2024-01-03

## 2024-02-05 NOTE — PROGRESS NOTES
"Chief Complaint  Bronchitis    Subjective        Ana Ortiz presents to [facility]  History of Present Illness      MS. Ortiz is a 48 year old female with a medical history significant arthritis, CHF, COPD, diabetes, GERD, hyperlipidemia, hypertension and JOANN.      She presents today for follow up on chronic bronchitis.  She states that she has been doing well since her last visit.  She reports that she has had no exacerbations.  She is currently taking Symbicort BID and Spiriva once daily.  She is also taking an albuterol inhaler and duonebs as needed.  She is compliant with cpap and supplemental oxygen at night.  She also is a current smoker.  She did not have her PFT or echo completed.     Objective   Vital Signs:  /88   Pulse 105   Temp 98.3 °F (36.8 °C)   Ht 165.1 cm (65\")   Wt 107 kg (235 lb 6.4 oz)   SpO2 96%   BMI 39.17 kg/m²   Estimated body mass index is 39.17 kg/m² as calculated from the following:    Height as of this encounter: 165.1 cm (65\").    Weight as of this encounter: 107 kg (235 lb 6.4 oz).       Class 2 Severe Obesity (BMI >=35 and <=39.9). Obesity-related health conditions include the following: hypertension, diabetes mellitus, dyslipidemias, and GERD. Obesity is unchanged. BMI is is above average; BMI management plan is completed. We discussed portion control and increasing exercise.      Physical Exam     GENERAL APPEARANCE: Well developed, well nourished, alert and cooperative, and appears to be in no acute distress.    HEAD: normocephalic. Atraumatic.    EYES: PERRL, EOMI. Vision is grossly intact.    THROAT: Oral cavity and pharynx normal. No inflammation, swelling, exudate, or lesions.     NECK: Neck supple.  No thyromegaly.    CARDIAC: Normal S1 and S2. No S3, S4 or murmurs. Rhythm is regular.     RESPIRATORY:Bilateral air entry positive. Bilateral diminished breath sounds. No wheezing, crackles or rhonchi " noted.    GI: Positive bowel sounds. Soft, nondistended, nontender.     MUSCULOSKELETAL: No significant deformity or joint abnormality. No edema. Peripheral pulses intact. No varicosities.    NEUROLOGICAL: Strength and sensation symmetric and intact throughout.     PSYCHIATRIC: The mental examination revealed the patient was oriented to person, place, and time.     Result Review :    The following data was reviewed by: CHAZ Erwin on 02/05/2024:  Common labs          3/23/2023    12:08 11/3/2023    09:51   Common Labs   Glucose 279     BUN 19     Creatinine 0.70     Sodium 137     Potassium 4.2     Chloride 100     Calcium 8.8     Albumin 3.8     Total Bilirubin <0.2     Alkaline Phosphatase 105     AST (SGOT) 14     ALT (SGPT) 21     WBC 11.84     Hemoglobin 13.7     Hematocrit 41.8     Platelets 237     Microalbumin, Urine  11.9                   Assessment and Plan     Diagnoses and all orders for this visit:    1. Chronic respiratory failure with hypoxia (Primary)    2. Chronic bronchitis, unspecified chronic bronchitis type  -     tiotropium (Spiriva HandiHaler) 18 MCG per inhalation capsule; Place 1 capsule into inhaler and inhale Daily.  Dispense: 30 capsule; Refill: 8  -     budesonide-formoterol (Symbicort) 160-4.5 MCG/ACT inhaler; Inhale 2 puffs 2 (Two) Times a Day.  Dispense: 1 each; Refill: 8    3. JOANN (obstructive sleep apnea)    4. Cigarette nicotine dependence without complication    5. Morbidly obese    Other orders  -     ipratropium-albuterol (DUO-NEB) 0.5-2.5 mg/3 ml nebulizer; Take 3 mL by nebulization Every 4 (Four) Hours As Needed for Wheezing or Shortness of Air.  Dispense: 360 mL; Refill: 8  -     albuterol sulfate HFA (Ventolin HFA) 108 (90 Base) MCG/ACT inhaler; Inhale 2 puffs Every 4 (Four) Hours As Needed for Wheezing or Shortness of Air.  Dispense: 18 g; Refill: 12        Needs refills     Chronic bronchitis:   Continue albuterol inhaler as needed.  Continue duonebs as  needed.  Continue Spiriva handihaler once daily.  Continue Symbicort  BID.    Reordered PFT and Echo.        Obstructive Sleep Apnea:   She is complaint with use of supplemental oxygen at 2 L and cpap nightly.  She is taking trazodone to help her sleep.      Chronic hypoxic respiratory failure:   Has supplemental oxygen to use as needed.    Cigarette dependence:   Does not qualify for LDCT at this time due to age.    Ana Ortiz  reports that she has been smoking cigarettes. She started smoking about 28 years ago. She has a 15.00 pack-year smoking history. She has been exposed to tobacco smoke. She has never used smokeless tobacco.. I have educated her on the risk of diseases from using tobacco products such as cancer, COPD, and heart disease.     I advised her to quit and she is not willing to quit.               Follow Up     Return in about 3 months (around 5/5/2024).  Patient was given instructions and counseling regarding her condition or for health maintenance advice. Please see specific information pulled into the AVS if appropriate.

## 2024-03-17 ENCOUNTER — APPOINTMENT (OUTPATIENT)
Dept: GENERAL RADIOLOGY | Facility: HOSPITAL | Age: 49
End: 2024-03-17
Payer: MEDICAID

## 2024-03-17 ENCOUNTER — APPOINTMENT (OUTPATIENT)
Dept: CT IMAGING | Facility: HOSPITAL | Age: 49
End: 2024-03-17
Payer: MEDICAID

## 2024-03-17 ENCOUNTER — HOSPITAL ENCOUNTER (INPATIENT)
Facility: HOSPITAL | Age: 49
LOS: 3 days | Discharge: HOME | End: 2024-03-20
Attending: STUDENT IN AN ORGANIZED HEALTH CARE EDUCATION/TRAINING PROGRAM | Admitting: HOSPITALIST
Payer: MEDICAID

## 2024-03-17 DIAGNOSIS — A41.9 SEPSIS, DUE TO UNSPECIFIED ORGANISM, UNSPECIFIED WHETHER ACUTE ORGAN DYSFUNCTION PRESENT: Primary | ICD-10-CM

## 2024-03-17 DIAGNOSIS — D64.9 ANEMIA, UNSPECIFIED TYPE: ICD-10-CM

## 2024-03-17 DIAGNOSIS — N12 PYELONEPHRITIS: ICD-10-CM

## 2024-03-17 PROBLEM — R65.20 SEVERE SEPSIS: Status: ACTIVE | Noted: 2024-03-17

## 2024-03-17 LAB
ABO GROUP BLD: NORMAL
ABO GROUP BLD: NORMAL
ALBUMIN SERPL-MCNC: 3.9 G/DL (ref 3.5–5.2)
ALBUMIN/GLOB SERPL: 1 G/DL
ALP SERPL-CCNC: 106 U/L (ref 39–117)
ALT SERPL W P-5'-P-CCNC: 23 U/L (ref 1–33)
ANION GAP SERPL CALCULATED.3IONS-SCNC: 18.9 MMOL/L (ref 5–15)
APTT PPP: 29.4 SECONDS (ref 26.5–34.5)
AST SERPL-CCNC: 22 U/L (ref 1–32)
BACTERIA UR QL AUTO: ABNORMAL /HPF
BASOPHILS # BLD AUTO: 0.01 10*3/MM3 (ref 0–0.2)
BASOPHILS NFR BLD AUTO: 0.1 % (ref 0–1.5)
BILIRUB SERPL-MCNC: 0.3 MG/DL (ref 0–1.2)
BILIRUB UR QL STRIP: NEGATIVE
BLD GP AB SCN SERPL QL: NEGATIVE
BUN SERPL-MCNC: 19 MG/DL (ref 6–20)
BUN/CREAT SERPL: 16.8 (ref 7–25)
CALCIUM SPEC-SCNC: 9.2 MG/DL (ref 8.6–10.5)
CHLORIDE SERPL-SCNC: 89 MMOL/L (ref 98–107)
CLARITY UR: CLEAR
CO2 SERPL-SCNC: 26.1 MMOL/L (ref 22–29)
COLOR UR: YELLOW
CREAT SERPL-MCNC: 1.13 MG/DL (ref 0.57–1)
CRP SERPL-MCNC: 22.66 MG/DL (ref 0–0.5)
D-LACTATE SERPL-SCNC: 1.3 MMOL/L (ref 0.5–2)
D-LACTATE SERPL-SCNC: 2.4 MMOL/L (ref 0.5–2)
DEPRECATED RDW RBC AUTO: 46.9 FL (ref 37–54)
EGFRCR SERPLBLD CKD-EPI 2021: 60.1 ML/MIN/1.73
EOSINOPHIL # BLD AUTO: 0.02 10*3/MM3 (ref 0–0.4)
EOSINOPHIL NFR BLD AUTO: 0.3 % (ref 0.3–6.2)
ERYTHROCYTE [DISTWIDTH] IN BLOOD BY AUTOMATED COUNT: 13.2 % (ref 12.3–15.4)
FERRITIN SERPL-MCNC: 512.7 NG/ML (ref 13–150)
GEN 5 2HR TROPONIN T REFLEX: 8 NG/L
GLOBULIN UR ELPH-MCNC: 3.9 GM/DL
GLUCOSE BLDC GLUCOMTR-MCNC: 140 MG/DL (ref 70–130)
GLUCOSE SERPL-MCNC: 203 MG/DL (ref 65–99)
GLUCOSE UR STRIP-MCNC: ABNORMAL MG/DL
HBA1C MFR BLD: 8.1 % (ref 4.8–5.6)
HCT VFR BLD AUTO: 20.7 % (ref 34–46.6)
HGB BLD-MCNC: 6.5 G/DL (ref 12–15.9)
HGB UR QL STRIP.AUTO: NEGATIVE
HOLD SPECIMEN: NORMAL
HYALINE CASTS UR QL AUTO: ABNORMAL /LPF
IMM GRANULOCYTES # BLD AUTO: 0.03 10*3/MM3 (ref 0–0.05)
IMM GRANULOCYTES NFR BLD AUTO: 0.4 % (ref 0–0.5)
INR PPP: 1 (ref 0.9–1.1)
IRON 24H UR-MRATE: 16 MCG/DL (ref 37–145)
IRON SATN MFR SERPL: 5 % (ref 20–50)
KETONES UR QL STRIP: ABNORMAL
LEUKOCYTE ESTERASE UR QL STRIP.AUTO: ABNORMAL
LIPASE SERPL-CCNC: 18 U/L (ref 13–60)
LYMPHOCYTES # BLD AUTO: 0.71 10*3/MM3 (ref 0.7–3.1)
LYMPHOCYTES NFR BLD AUTO: 10.2 % (ref 19.6–45.3)
MAGNESIUM SERPL-MCNC: 1.9 MG/DL (ref 1.6–2.6)
MCH RBC QN AUTO: 30.8 PG (ref 26.6–33)
MCHC RBC AUTO-ENTMCNC: 31.4 G/DL (ref 31.5–35.7)
MCV RBC AUTO: 98.1 FL (ref 79–97)
MONOCYTES # BLD AUTO: 0.65 10*3/MM3 (ref 0.1–0.9)
MONOCYTES NFR BLD AUTO: 9.3 % (ref 5–12)
NEUTROPHILS NFR BLD AUTO: 5.57 10*3/MM3 (ref 1.7–7)
NEUTROPHILS NFR BLD AUTO: 79.7 % (ref 42.7–76)
NITRITE UR QL STRIP: NEGATIVE
NRBC BLD AUTO-RTO: 0 /100 WBC (ref 0–0.2)
PH UR STRIP.AUTO: 6 [PH] (ref 5–8)
PLATELET # BLD AUTO: 98 10*3/MM3 (ref 140–450)
PMV BLD AUTO: 10.5 FL (ref 6–12)
POTASSIUM SERPL-SCNC: 3.1 MMOL/L (ref 3.5–5.2)
PROCALCITONIN SERPL-MCNC: 0.47 NG/ML (ref 0–0.25)
PROT SERPL-MCNC: 7.8 G/DL (ref 6–8.5)
PROT UR QL STRIP: ABNORMAL
PROTHROMBIN TIME: 13.7 SECONDS (ref 12.1–14.7)
RBC # BLD AUTO: 2.11 10*6/MM3 (ref 3.77–5.28)
RBC # UR STRIP: ABNORMAL /HPF
REF LAB TEST METHOD: ABNORMAL
RH BLD: POSITIVE
RH BLD: POSITIVE
SODIUM SERPL-SCNC: 134 MMOL/L (ref 136–145)
SP GR UR STRIP: >1.03 (ref 1–1.03)
SQUAMOUS #/AREA URNS HPF: ABNORMAL /HPF
T&S EXPIRATION DATE: NORMAL
TIBC SERPL-MCNC: 305 MCG/DL (ref 298–536)
TRANSFERRIN SERPL-MCNC: 205 MG/DL (ref 200–360)
TROPONIN T DELTA: -2 NG/L
TROPONIN T SERPL HS-MCNC: 10 NG/L
UROBILINOGEN UR QL STRIP: ABNORMAL
WBC # UR STRIP: ABNORMAL /HPF
WBC NRBC COR # BLD AUTO: 6.99 10*3/MM3 (ref 3.4–10.8)
WHOLE BLOOD HOLD COAG: NORMAL
WHOLE BLOOD HOLD SPECIMEN: NORMAL

## 2024-03-17 PROCEDURE — 87086 URINE CULTURE/COLONY COUNT: CPT | Performed by: HOSPITALIST

## 2024-03-17 PROCEDURE — 25810000003 SODIUM CHLORIDE 0.9 % SOLUTION: Performed by: HOSPITALIST

## 2024-03-17 PROCEDURE — 81001 URINALYSIS AUTO W/SCOPE: CPT | Performed by: STUDENT IN AN ORGANIZED HEALTH CARE EDUCATION/TRAINING PROGRAM

## 2024-03-17 PROCEDURE — 36415 COLL VENOUS BLD VENIPUNCTURE: CPT

## 2024-03-17 PROCEDURE — 74177 CT ABD & PELVIS W/CONTRAST: CPT

## 2024-03-17 PROCEDURE — 25010000002 CEFTRIAXONE PER 250 MG: Performed by: HOSPITALIST

## 2024-03-17 PROCEDURE — 99285 EMERGENCY DEPT VISIT HI MDM: CPT

## 2024-03-17 PROCEDURE — 82728 ASSAY OF FERRITIN: CPT | Performed by: HOSPITALIST

## 2024-03-17 PROCEDURE — 86900 BLOOD TYPING SEROLOGIC ABO: CPT | Performed by: STUDENT IN AN ORGANIZED HEALTH CARE EDUCATION/TRAINING PROGRAM

## 2024-03-17 PROCEDURE — 86850 RBC ANTIBODY SCREEN: CPT | Performed by: STUDENT IN AN ORGANIZED HEALTH CARE EDUCATION/TRAINING PROGRAM

## 2024-03-17 PROCEDURE — 71045 X-RAY EXAM CHEST 1 VIEW: CPT

## 2024-03-17 PROCEDURE — 86901 BLOOD TYPING SEROLOGIC RH(D): CPT | Performed by: STUDENT IN AN ORGANIZED HEALTH CARE EDUCATION/TRAINING PROGRAM

## 2024-03-17 PROCEDURE — 25010000002 METRONIDAZOLE 500 MG/100ML SOLUTION: Performed by: HOSPITALIST

## 2024-03-17 PROCEDURE — 83540 ASSAY OF IRON: CPT | Performed by: HOSPITALIST

## 2024-03-17 PROCEDURE — 84466 ASSAY OF TRANSFERRIN: CPT | Performed by: HOSPITALIST

## 2024-03-17 PROCEDURE — 87186 SC STD MICRODIL/AGAR DIL: CPT | Performed by: STUDENT IN AN ORGANIZED HEALTH CARE EDUCATION/TRAINING PROGRAM

## 2024-03-17 PROCEDURE — 36430 TRANSFUSION BLD/BLD COMPNT: CPT

## 2024-03-17 PROCEDURE — 82607 VITAMIN B-12: CPT | Performed by: HOSPITALIST

## 2024-03-17 PROCEDURE — 86140 C-REACTIVE PROTEIN: CPT | Performed by: HOSPITALIST

## 2024-03-17 PROCEDURE — 86923 COMPATIBILITY TEST ELECTRIC: CPT

## 2024-03-17 PROCEDURE — 84145 PROCALCITONIN (PCT): CPT | Performed by: STUDENT IN AN ORGANIZED HEALTH CARE EDUCATION/TRAINING PROGRAM

## 2024-03-17 PROCEDURE — 83690 ASSAY OF LIPASE: CPT | Performed by: STUDENT IN AN ORGANIZED HEALTH CARE EDUCATION/TRAINING PROGRAM

## 2024-03-17 PROCEDURE — 74177 CT ABD & PELVIS W/CONTRAST: CPT | Performed by: RADIOLOGY

## 2024-03-17 PROCEDURE — 25810000003 SEPSIS FLUID NS 0.9 % SOLUTION: Performed by: STUDENT IN AN ORGANIZED HEALTH CARE EDUCATION/TRAINING PROGRAM

## 2024-03-17 PROCEDURE — 25010000002 MAGNESIUM SULFATE 4 GM/100ML SOLUTION: Performed by: HOSPITALIST

## 2024-03-17 PROCEDURE — 85610 PROTHROMBIN TIME: CPT | Performed by: STUDENT IN AN ORGANIZED HEALTH CARE EDUCATION/TRAINING PROGRAM

## 2024-03-17 PROCEDURE — 80053 COMPREHEN METABOLIC PANEL: CPT | Performed by: STUDENT IN AN ORGANIZED HEALTH CARE EDUCATION/TRAINING PROGRAM

## 2024-03-17 PROCEDURE — 25010000002 PIPERACILLIN SOD-TAZOBACTAM PER 1 G: Performed by: STUDENT IN AN ORGANIZED HEALTH CARE EDUCATION/TRAINING PROGRAM

## 2024-03-17 PROCEDURE — 87040 BLOOD CULTURE FOR BACTERIA: CPT | Performed by: STUDENT IN AN ORGANIZED HEALTH CARE EDUCATION/TRAINING PROGRAM

## 2024-03-17 PROCEDURE — 86900 BLOOD TYPING SEROLOGIC ABO: CPT

## 2024-03-17 PROCEDURE — 25010000002 ONDANSETRON PER 1 MG: Performed by: STUDENT IN AN ORGANIZED HEALTH CARE EDUCATION/TRAINING PROGRAM

## 2024-03-17 PROCEDURE — 83605 ASSAY OF LACTIC ACID: CPT | Performed by: STUDENT IN AN ORGANIZED HEALTH CARE EDUCATION/TRAINING PROGRAM

## 2024-03-17 PROCEDURE — 82948 REAGENT STRIP/BLOOD GLUCOSE: CPT

## 2024-03-17 PROCEDURE — 25510000001 IOPAMIDOL 61 % SOLUTION: Performed by: STUDENT IN AN ORGANIZED HEALTH CARE EDUCATION/TRAINING PROGRAM

## 2024-03-17 PROCEDURE — 85025 COMPLETE CBC W/AUTO DIFF WBC: CPT | Performed by: STUDENT IN AN ORGANIZED HEALTH CARE EDUCATION/TRAINING PROGRAM

## 2024-03-17 PROCEDURE — 83735 ASSAY OF MAGNESIUM: CPT | Performed by: HOSPITALIST

## 2024-03-17 PROCEDURE — 71045 X-RAY EXAM CHEST 1 VIEW: CPT | Performed by: RADIOLOGY

## 2024-03-17 PROCEDURE — 87154 CUL TYP ID BLD PTHGN 6+ TRGT: CPT | Performed by: STUDENT IN AN ORGANIZED HEALTH CARE EDUCATION/TRAINING PROGRAM

## 2024-03-17 PROCEDURE — 99223 1ST HOSP IP/OBS HIGH 75: CPT | Performed by: HOSPITALIST

## 2024-03-17 PROCEDURE — 93005 ELECTROCARDIOGRAM TRACING: CPT | Performed by: STUDENT IN AN ORGANIZED HEALTH CARE EDUCATION/TRAINING PROGRAM

## 2024-03-17 PROCEDURE — 86901 BLOOD TYPING SEROLOGIC RH(D): CPT

## 2024-03-17 PROCEDURE — P9016 RBC LEUKOCYTES REDUCED: HCPCS

## 2024-03-17 PROCEDURE — 85730 THROMBOPLASTIN TIME PARTIAL: CPT | Performed by: STUDENT IN AN ORGANIZED HEALTH CARE EDUCATION/TRAINING PROGRAM

## 2024-03-17 PROCEDURE — 83036 HEMOGLOBIN GLYCOSYLATED A1C: CPT | Performed by: HOSPITALIST

## 2024-03-17 PROCEDURE — 84484 ASSAY OF TROPONIN QUANT: CPT | Performed by: STUDENT IN AN ORGANIZED HEALTH CARE EDUCATION/TRAINING PROGRAM

## 2024-03-17 RX ORDER — DEXTROSE MONOHYDRATE 25 G/50ML
25 INJECTION, SOLUTION INTRAVENOUS
Status: DISCONTINUED | OUTPATIENT
Start: 2024-03-17 | End: 2024-03-20 | Stop reason: HOSPADM

## 2024-03-17 RX ORDER — SODIUM CHLORIDE 0.9 % (FLUSH) 0.9 %
10 SYRINGE (ML) INJECTION AS NEEDED
Status: DISCONTINUED | OUTPATIENT
Start: 2024-03-17 | End: 2024-03-20 | Stop reason: HOSPADM

## 2024-03-17 RX ORDER — POTASSIUM CHLORIDE 1.5 G/1.58G
40 POWDER, FOR SOLUTION ORAL ONCE
Status: COMPLETED | OUTPATIENT
Start: 2024-03-17 | End: 2024-03-17

## 2024-03-17 RX ORDER — MAGNESIUM SULFATE HEPTAHYDRATE 40 MG/ML
4 INJECTION, SOLUTION INTRAVENOUS ONCE
Status: COMPLETED | OUTPATIENT
Start: 2024-03-17 | End: 2024-03-18

## 2024-03-17 RX ORDER — SODIUM CHLORIDE 9 MG/ML
100 INJECTION, SOLUTION INTRAVENOUS CONTINUOUS
Status: DISCONTINUED | OUTPATIENT
Start: 2024-03-17 | End: 2024-03-20 | Stop reason: HOSPADM

## 2024-03-17 RX ORDER — METRONIDAZOLE 500 MG/100ML
500 INJECTION, SOLUTION INTRAVENOUS EVERY 8 HOURS SCHEDULED
Qty: 1500 ML | Refills: 0 | Status: DISCONTINUED | OUTPATIENT
Start: 2024-03-17 | End: 2024-03-18

## 2024-03-17 RX ORDER — ONDANSETRON 2 MG/ML
4 INJECTION INTRAMUSCULAR; INTRAVENOUS EVERY 6 HOURS PRN
Status: DISCONTINUED | OUTPATIENT
Start: 2024-03-17 | End: 2024-03-20 | Stop reason: HOSPADM

## 2024-03-17 RX ORDER — ONDANSETRON 2 MG/ML
4 INJECTION INTRAMUSCULAR; INTRAVENOUS ONCE
Status: COMPLETED | OUTPATIENT
Start: 2024-03-17 | End: 2024-03-17

## 2024-03-17 RX ORDER — SODIUM CHLORIDE 0.9 % (FLUSH) 0.9 %
10 SYRINGE (ML) INJECTION EVERY 12 HOURS SCHEDULED
Status: DISCONTINUED | OUTPATIENT
Start: 2024-03-17 | End: 2024-03-20 | Stop reason: HOSPADM

## 2024-03-17 RX ORDER — BISACODYL 10 MG
10 SUPPOSITORY, RECTAL RECTAL DAILY PRN
Status: DISCONTINUED | OUTPATIENT
Start: 2024-03-17 | End: 2024-03-20 | Stop reason: HOSPADM

## 2024-03-17 RX ORDER — LIDOCAINE 4 G/G
2 PATCH TOPICAL
Status: DISCONTINUED | OUTPATIENT
Start: 2024-03-18 | End: 2024-03-20 | Stop reason: HOSPADM

## 2024-03-17 RX ORDER — NICOTINE POLACRILEX 4 MG
15 LOZENGE BUCCAL
Status: DISCONTINUED | OUTPATIENT
Start: 2024-03-17 | End: 2024-03-20 | Stop reason: HOSPADM

## 2024-03-17 RX ORDER — AMOXICILLIN 250 MG
2 CAPSULE ORAL 2 TIMES DAILY PRN
Status: DISCONTINUED | OUTPATIENT
Start: 2024-03-17 | End: 2024-03-20 | Stop reason: HOSPADM

## 2024-03-17 RX ORDER — LIDOCAINE 50 MG/G
1 PATCH TOPICAL EVERY 24 HOURS
COMMUNITY

## 2024-03-17 RX ORDER — INSULIN DETEMIR 100 [IU]/ML
INJECTION, SOLUTION SUBCUTANEOUS DAILY
COMMUNITY
End: 2024-03-17

## 2024-03-17 RX ORDER — SODIUM CHLORIDE 9 MG/ML
40 INJECTION, SOLUTION INTRAVENOUS AS NEEDED
Status: DISCONTINUED | OUTPATIENT
Start: 2024-03-17 | End: 2024-03-20 | Stop reason: HOSPADM

## 2024-03-17 RX ORDER — BISACODYL 5 MG/1
5 TABLET, DELAYED RELEASE ORAL DAILY PRN
Status: DISCONTINUED | OUTPATIENT
Start: 2024-03-17 | End: 2024-03-20 | Stop reason: HOSPADM

## 2024-03-17 RX ORDER — NITROGLYCERIN 0.4 MG/1
0.4 TABLET SUBLINGUAL
Status: DISCONTINUED | OUTPATIENT
Start: 2024-03-17 | End: 2024-03-20 | Stop reason: HOSPADM

## 2024-03-17 RX ORDER — GLUCAGON 1 MG/ML
1 KIT INJECTION
Status: DISCONTINUED | OUTPATIENT
Start: 2024-03-17 | End: 2024-03-20 | Stop reason: HOSPADM

## 2024-03-17 RX ORDER — ACETAMINOPHEN 325 MG/1
650 TABLET ORAL EVERY 6 HOURS PRN
Status: DISCONTINUED | OUTPATIENT
Start: 2024-03-17 | End: 2024-03-20 | Stop reason: HOSPADM

## 2024-03-17 RX ORDER — POLYETHYLENE GLYCOL 3350 17 G/17G
17 POWDER, FOR SOLUTION ORAL DAILY PRN
Status: DISCONTINUED | OUTPATIENT
Start: 2024-03-17 | End: 2024-03-20 | Stop reason: HOSPADM

## 2024-03-17 RX ORDER — INSULIN LISPRO 100 [IU]/ML
2-9 INJECTION, SOLUTION INTRAVENOUS; SUBCUTANEOUS
Status: DISCONTINUED | OUTPATIENT
Start: 2024-03-17 | End: 2024-03-20 | Stop reason: HOSPADM

## 2024-03-17 RX ADMIN — POTASSIUM CHLORIDE 40 MEQ: 1.5 POWDER, FOR SOLUTION ORAL at 17:52

## 2024-03-17 RX ADMIN — ACETAMINOPHEN 650 MG: 325 TABLET ORAL at 22:26

## 2024-03-17 RX ADMIN — ONDANSETRON 4 MG: 2 INJECTION INTRAMUSCULAR; INTRAVENOUS at 16:38

## 2024-03-17 RX ADMIN — METRONIDAZOLE 500 MG: 500 INJECTION, SOLUTION INTRAVENOUS at 21:20

## 2024-03-17 RX ADMIN — MAGNESIUM SULFATE HEPTAHYDRATE 4 G: 40 INJECTION, SOLUTION INTRAVENOUS at 22:27

## 2024-03-17 RX ADMIN — CEFTRIAXONE SODIUM 2000 MG: 2 INJECTION, POWDER, FOR SOLUTION INTRAMUSCULAR; INTRAVENOUS at 20:26

## 2024-03-17 RX ADMIN — Medication 10 ML: at 23:46

## 2024-03-17 RX ADMIN — PIPERACILLIN SODIUM AND TAZOBACTAM SODIUM 3.38 G: 3; .375 INJECTION, POWDER, LYOPHILIZED, FOR SOLUTION INTRAVENOUS at 17:03

## 2024-03-17 RX ADMIN — SODIUM CHLORIDE 100 ML/HR: 9 INJECTION, SOLUTION INTRAVENOUS at 21:21

## 2024-03-17 RX ADMIN — SODIUM CHLORIDE 2286 ML: 9 INJECTION, SOLUTION INTRAVENOUS at 16:37

## 2024-03-17 RX ADMIN — IOPAMIDOL 70 ML: 612 INJECTION, SOLUTION INTRAVENOUS at 17:53

## 2024-03-17 NOTE — ED PROVIDER NOTES
Subjective   History of Present Illness  48-year-old female with past medical history of congestive heart failure, COPD, diabetes, cancer, and hypertension presents to the ER with primary complaint of increasing abdominal pain in the right lower quadrant.  Confirmed associated nausea with vomiting.  No hematemesis.  No hematochezia.  No obvious black tarry stool.  No obvious aggravating alleviating factors.  Patient fever at home of 105 degrees.  Blood pressure guarded on arrival.      Review of Systems   Gastrointestinal:  Positive for abdominal pain.   All other systems reviewed and are negative.      Past Medical History:   Diagnosis Date    Arthritis     CHF (congestive heart failure)     COPD (chronic obstructive pulmonary disease)     Diabetes mellitus     Elevated cholesterol     GERD (gastroesophageal reflux disease)     Hyperlipemia     Hypertension     JOANN (obstructive sleep apnea)     Pneumonia     Vulvar cancer        Allergies   Allergen Reactions    Codeine Nausea Only    Metformin GI Intolerance     Extreme diarrhea       Past Surgical History:   Procedure Laterality Date    HYSTERECTOMY      INCISION AND DRAINAGE TRUNK N/A 1/11/2022    Procedure: INCISION AND DRAINAGE BACK;  Surgeon: Tamera Saez MD;  Location: [facility];  Service: General;  Laterality: N/A;    MASTECTOMY      VULVA SURGERY      reconstruction due ca       Family History   Problem Relation Age of Onset    Cancer Mother     Hypertension Mother     Parkinsonism Father     Diabetes Father     Hypertension Father     Cancer Maternal Aunt     Diabetes Paternal Aunt     Diabetes Paternal Grandmother     Cancer Maternal Aunt     Diabetes Brother        Social History     Socioeconomic History    Marital status:    Tobacco Use    Smoking status: Every Day     Current packs/day: 1.00     Average packs/day: 1 pack/day for 28.8 years (28.8 ttl pk-yrs)     Types: Cigarettes     Start date: 5/18/1995     Passive exposure: Current     Smokeless tobacco: Never    Tobacco comments:     8-10 per day    Vaping Use    Vaping status: Former    Substances: Nicotine, Flavoring    Devices: Refillable tank   Substance and Sexual Activity    Alcohol use: Never    Drug use: Never    Sexual activity: Not Currently     Partners: Male     Birth control/protection: Condom           Objective   Physical Exam  Constitutional:       General: She is not in acute distress.     Appearance: Normal appearance. She is not ill-appearing.   HENT:      Head: Normocephalic and atraumatic.      Right Ear: External ear normal.      Left Ear: External ear normal.      Nose: Nose normal.      Mouth/Throat:      Mouth: Mucous membranes are moist.   Eyes:      Extraocular Movements: Extraocular movements intact.      Pupils: Pupils are equal, round, and reactive to light.   Cardiovascular:      Rate and Rhythm: Regular rhythm. Tachycardia present.      Heart sounds: No murmur heard.  Pulmonary:      Effort: Pulmonary effort is normal. No respiratory distress.      Breath sounds: Normal breath sounds. No wheezing.   Abdominal:      General: Bowel sounds are normal.      Palpations: Abdomen is soft.      Tenderness: There is no abdominal tenderness.   Musculoskeletal:         General: No deformity or signs of injury. Normal range of motion.      Cervical back: Normal range of motion and neck supple.   Skin:     General: Skin is warm and dry.      Findings: No erythema.   Neurological:      General: No focal deficit present.      Mental Status: She is alert and oriented to person, place, and time. Mental status is at baseline.      Cranial Nerves: No cranial nerve deficit.   Psychiatric:         Mood and Affect: Mood normal.         Behavior: Behavior normal.         Thought Content: Thought content normal.         Procedures  CT Abdomen Pelvis With Contrast   Final Result   Impression:       Left retroperitoneal perinephric fat stranding.  Findings are   nonspecific and may  represent pyelonephritis.       Mild hepatosplenomegaly.       Incidentally noted 1.7 cm right adrenal nodule.  A 12-month follow-up CT   of the abdomen, adrenal mass protocol, is recommended.                       This report was finalized on 3/17/2024 6:40 PM by Angelique Bland MD.          XR Chest 1 View   Final Result       1.  Slight prominence to the heart size   2.  Coarsened bronchovascular pattern to the lungs   3.  No lobar consolidation or edema.   4.  No pleural effusion or pneumothorax.       This report was finalized on 3/17/2024 5:49 PM by Kenny Baca MD.            Results for orders placed or performed during the hospital encounter of 03/17/24   Comprehensive Metabolic Panel    Specimen: Blood   Result Value Ref Range    Glucose 203 (H) 65 - 99 mg/dL    BUN 19 6 - 20 mg/dL    Creatinine 1.13 (H) 0.57 - 1.00 mg/dL    Sodium 134 (L) 136 - 145 mmol/L    Potassium 3.1 (L) 3.5 - 5.2 mmol/L    Chloride 89 (L) 98 - 107 mmol/L    CO2 26.1 22.0 - 29.0 mmol/L    Calcium 9.2 8.6 - 10.5 mg/dL    Total Protein 7.8 6.0 - 8.5 g/dL    Albumin 3.9 3.5 - 5.2 g/dL    ALT (SGPT) 23 1 - 33 U/L    AST (SGOT) 22 1 - 32 U/L    Alkaline Phosphatase 106 39 - 117 U/L    Total Bilirubin 0.3 0.0 - 1.2 mg/dL    Globulin 3.9 gm/dL    A/G Ratio 1.0 g/dL    BUN/Creatinine Ratio 16.8 7.0 - 25.0    Anion Gap 18.9 (H) 5.0 - 15.0 mmol/L    eGFR 60.1 >60.0 mL/min/1.73   Lipase    Specimen: Blood   Result Value Ref Range    Lipase 18 13 - 60 U/L   Urinalysis With Microscopic If Indicated (No Culture) - Urine, Clean Catch    Specimen: Urine, Clean Catch   Result Value Ref Range    Color, UA Yellow Yellow, Straw    Appearance, UA Clear Clear    pH, UA 6.0 5.0 - 8.0    Specific Gravity, UA >1.030 (H) 1.005 - 1.030    Glucose, UA >=1000 mg/dL (3+) (A) Negative    Ketones, UA Trace (A) Negative    Bilirubin, UA Negative Negative    Blood, UA Negative Negative    Protein, UA Trace (A) Negative    Leuk Esterase, UA Small (1+) (A) Negative     Nitrite, UA Negative Negative    Urobilinogen, UA 0.2 E.U./dL 0.2 - 1.0 E.U./dL   CBC Auto Differential    Specimen: Blood   Result Value Ref Range    WBC 6.99 3.40 - 10.80 10*3/mm3    RBC 2.11 (L) 3.77 - 5.28 10*6/mm3    Hemoglobin 6.5 (C) 12.0 - 15.9 g/dL    Hematocrit 20.7 (C) 34.0 - 46.6 %    MCV 98.1 (H) 79.0 - 97.0 fL    MCH 30.8 26.6 - 33.0 pg    MCHC 31.4 (L) 31.5 - 35.7 g/dL    RDW 13.2 12.3 - 15.4 %    RDW-SD 46.9 37.0 - 54.0 fl    MPV 10.5 6.0 - 12.0 fL    Platelets 98 (L) 140 - 450 10*3/mm3    Neutrophil % 79.7 (H) 42.7 - 76.0 %    Lymphocyte % 10.2 (L) 19.6 - 45.3 %    Monocyte % 9.3 5.0 - 12.0 %    Eosinophil % 0.3 0.3 - 6.2 %    Basophil % 0.1 0.0 - 1.5 %    Immature Grans % 0.4 0.0 - 0.5 %    Neutrophils, Absolute 5.57 1.70 - 7.00 10*3/mm3    Lymphocytes, Absolute 0.71 0.70 - 3.10 10*3/mm3    Monocytes, Absolute 0.65 0.10 - 0.90 10*3/mm3    Eosinophils, Absolute 0.02 0.00 - 0.40 10*3/mm3    Basophils, Absolute 0.01 0.00 - 0.20 10*3/mm3    Immature Grans, Absolute 0.03 0.00 - 0.05 10*3/mm3    nRBC 0.0 0.0 - 0.2 /100 WBC   Protime-INR    Specimen: Blood   Result Value Ref Range    Protime 13.7 12.1 - 14.7 Seconds    INR 1.00 0.90 - 1.10   aPTT    Specimen: Blood   Result Value Ref Range    PTT 29.4 26.5 - 34.5 seconds   Lactic Acid, Plasma    Specimen: Blood   Result Value Ref Range    Lactate 2.4 (C) 0.5 - 2.0 mmol/L   Procalcitonin    Specimen: Blood   Result Value Ref Range    Procalcitonin 0.47 (H) 0.00 - 0.25 ng/mL   High Sensitivity Troponin T    Specimen: Blood   Result Value Ref Range    HS Troponin T 10 <14 ng/L   Urinalysis, Microscopic Only - Urine, Clean Catch    Specimen: Urine, Clean Catch   Result Value Ref Range    RBC, UA 0-2 None Seen, 0-2 /HPF    WBC, UA 21-50 (A) None Seen, 0-2 /HPF    Bacteria, UA None Seen None Seen /HPF    Squamous Epithelial Cells, UA 0-2 None Seen, 0-2 /HPF    Hyaline Casts, UA 3-6 None Seen /LPF    Methodology Automated Microscopy    [place] Urine Culture  Tube - Urine, Clean Catch    Specimen: Urine, Clean Catch   Result Value Ref Range    Extra Tube Hold for add-ons.    ECG 12 Lead Other; Sepsis   Result Value Ref Range    QT Interval 382 ms    QTC Interval 467 ms   Prepare RBC, 2 Units   Result Value Ref Range    Product Code V4430Y20     Unit Number [id number]     UNIT  ABO O     UNIT  RH POS     Crossmatch Interpretation Compatible     Dispense Status IS     Blood Expiration Date 202404102359     Blood Type Barcode 5100     Product Code X7770X90     Unit Number [id number]     UNIT  ABO O     UNIT  RH POS     Crossmatch Interpretation Compatible     Dispense Status XM     Blood Expiration Date 202404182359     Blood Type Barcode 5100    Type & Screen    Specimen: Blood   Result Value Ref Range    ABO Type O     RH type Positive     Antibody Screen Negative     T&S Expiration Date 3/20/2024 11:59:59 PM    ABO RH Specimen Verification    Specimen: Blood   Result Value Ref Range    ABO Type O     RH type Positive    Green Top (Gel)   Result Value Ref Range    Extra Tube Hold for add-ons.    Lavender Top   Result Value Ref Range    Extra Tube hold for add-on    Gold Top - SST   Result Value Ref Range    Extra Tube Hold for add-ons.    Light Blue Top   Result Value Ref Range    Extra Tube Hold for add-ons.                 ED Course  ED Course as of 03/17/24 1930   Sun Mar 17, 2024   1742 EKG notes sinus rhythm.  No acute ST elevation.  90 bpm.  QTc 467.  Electronically signed by John Gonzalez DO, 03/17/24, 5:42 PM EDT.   [SF]   1847 Care of this patient was transferred to the next attending physician at shift change.  Complete discussion of presentation, labs, imaging, care, and expected course occurred during transition of providers.  Vitals stable at transfer of care.    Electronically signed by John Gonzalez DO, 03/17/24, 6:47 PM EDT.   [SF]   1926 Case discussed with Dr. Agarwal.  He is admitting patient to the hospitalist service. [CM]   1929 Last  echocardiogram on file from January 2022:    Interpretation Summary    · Left ventricular wall thickness is consistent with mild concentric hypertrophy.  · Left ventricular ejection fraction appears to be 56 - 60%. Left ventricular systolic function is normal.  · Left ventricular diastolic function was normal.   [CM]      ED Course User Index  [CM] David Dumont MD  [SF] John Gonzalez DO                                             Medical Decision Making  Problems Addressed:  Anemia, unspecified type: complicated acute illness or injury  Pyelonephritis: complicated acute illness or injury  Sepsis, due to unspecified organism, unspecified whether acute organ dysfunction present: complicated acute illness or injury    Amount and/or Complexity of Data Reviewed  Labs: ordered.  Radiology: ordered.  ECG/medicine tests: ordered.    Risk  Prescription drug management.  Decision regarding hospitalization.        Final diagnoses:   Sepsis, due to unspecified organism, unspecified whether acute organ dysfunction present   Pyelonephritis   Anemia, unspecified type       ED Disposition  ED Disposition       ED Disposition   Decision to Admit    Condition   --    Comment   --               Please note that portions of this note were completed with a voice recognition program.                David Dumont MD  03/18/24 1465

## 2024-03-18 ENCOUNTER — APPOINTMENT (OUTPATIENT)
Dept: CARDIOLOGY | Facility: HOSPITAL | Age: 49
End: 2024-03-18
Payer: MEDICAID

## 2024-03-18 LAB
027 TOXIN: NORMAL
ADV 40+41 DNA STL QL NAA+NON-PROBE: NOT DETECTED
ALBUMIN SERPL-MCNC: 3.4 G/DL (ref 3.5–5.2)
ALBUMIN/GLOB SERPL: 1 G/DL
ALP SERPL-CCNC: 93 U/L (ref 39–117)
ALT SERPL W P-5'-P-CCNC: 19 U/L (ref 1–33)
ANION GAP SERPL CALCULATED.3IONS-SCNC: 19.5 MMOL/L (ref 5–15)
AST SERPL-CCNC: 16 U/L (ref 1–32)
ASTRO TYP 1-8 RNA STL QL NAA+NON-PROBE: NOT DETECTED
BACTERIA BLD CULT: ABNORMAL
BACTERIA SPEC AEROBE CULT: NORMAL
BASOPHILS # BLD AUTO: 0.06 10*3/MM3 (ref 0–0.2)
BASOPHILS NFR BLD AUTO: 0.4 % (ref 0–1.5)
BH CV ECHO MEAS - ACS: 1.7 CM
BH CV ECHO MEAS - AO MAX PG: 16.6 MMHG
BH CV ECHO MEAS - AO MEAN PG: 9 MMHG
BH CV ECHO MEAS - AO ROOT DIAM: 3.1 CM
BH CV ECHO MEAS - AO V2 MAX: 204 CM/SEC
BH CV ECHO MEAS - AO V2 VTI: 35.7 CM
BH CV ECHO MEAS - EDV(CUBED): 79.5 ML
BH CV ECHO MEAS - EDV(MOD-SP4): 84.3 ML
BH CV ECHO MEAS - EF(MOD-SP4): 53.4 %
BH CV ECHO MEAS - ESV(CUBED): 19.7 ML
BH CV ECHO MEAS - ESV(MOD-SP4): 39.3 ML
BH CV ECHO MEAS - FS: 37.2 %
BH CV ECHO MEAS - IVS/LVPW: 1.1 CM
BH CV ECHO MEAS - IVSD: 1.1 CM
BH CV ECHO MEAS - LA DIMENSION: 4.1 CM
BH CV ECHO MEAS - LAT PEAK E' VEL: 7.2 CM/SEC
BH CV ECHO MEAS - LV DIASTOLIC VOL/BSA (35-75): 40 CM2
BH CV ECHO MEAS - LV MASS(C)D: 152.6 GRAMS
BH CV ECHO MEAS - LV SYSTOLIC VOL/BSA (12-30): 18.7 CM2
BH CV ECHO MEAS - LVIDD: 4.3 CM
BH CV ECHO MEAS - LVIDS: 2.7 CM
BH CV ECHO MEAS - LVOT AREA: 3.5 CM2
BH CV ECHO MEAS - LVOT DIAM: 2.1 CM
BH CV ECHO MEAS - LVPWD: 1 CM
BH CV ECHO MEAS - MED PEAK E' VEL: 7 CM/SEC
BH CV ECHO MEAS - MV A MAX VEL: 94.3 CM/SEC
BH CV ECHO MEAS - MV E MAX VEL: 92.4 CM/SEC
BH CV ECHO MEAS - MV E/A: 0.98
BH CV ECHO MEAS - PA ACC TIME: 0.12 SEC
BH CV ECHO MEAS - RAP SYSTOLE: 10 MMHG
BH CV ECHO MEAS - RVSP: 33.6 MMHG
BH CV ECHO MEAS - SI(MOD-SP4): 21.4 ML/M2
BH CV ECHO MEAS - SV(MOD-SP4): 45 ML
BH CV ECHO MEAS - TAPSE (>1.6): 3 CM
BH CV ECHO MEAS - TR MAX PG: 23.6 MMHG
BH CV ECHO MEAS - TR MAX VEL: 243 CM/SEC
BH CV ECHO MEASUREMENTS AVERAGE E/E' RATIO: 13.01
BILIRUB SERPL-MCNC: 0.3 MG/DL (ref 0–1.2)
BOTTLE TYPE: ABNORMAL
BUN SERPL-MCNC: 15 MG/DL (ref 6–20)
BUN/CREAT SERPL: 18.1 (ref 7–25)
C CAYETANENSIS DNA STL QL NAA+NON-PROBE: NOT DETECTED
C COLI+JEJ+UPSA DNA STL QL NAA+NON-PROBE: NOT DETECTED
C DIFF TOX GENS STL QL NAA+PROBE: NEGATIVE
CALCIUM SPEC-SCNC: 8.6 MG/DL (ref 8.6–10.5)
CHLORIDE SERPL-SCNC: 92 MMOL/L (ref 98–107)
CO2 SERPL-SCNC: 20.5 MMOL/L (ref 22–29)
CREAT SERPL-MCNC: 0.83 MG/DL (ref 0.57–1)
CRP SERPL-MCNC: 20.01 MG/DL (ref 0–0.5)
CRYPTOSP DNA STL QL NAA+NON-PROBE: NOT DETECTED
DEPRECATED RDW RBC AUTO: 45.4 FL (ref 37–54)
E HISTOLYT DNA STL QL NAA+NON-PROBE: NOT DETECTED
EAEC PAA PLAS AGGR+AATA ST NAA+NON-PRB: NOT DETECTED
EC STX1+STX2 GENES STL QL NAA+NON-PROBE: NOT DETECTED
EGFRCR SERPLBLD CKD-EPI 2021: 87.1 ML/MIN/1.73
EOSINOPHIL # BLD AUTO: 0.01 10*3/MM3 (ref 0–0.4)
EOSINOPHIL NFR BLD AUTO: 0.1 % (ref 0.3–6.2)
EPEC EAE GENE STL QL NAA+NON-PROBE: NOT DETECTED
ERYTHROCYTE [DISTWIDTH] IN BLOOD BY AUTOMATED COUNT: 13.4 % (ref 12.3–15.4)
ETEC LTA+ST1A+ST1B TOX ST NAA+NON-PROBE: NOT DETECTED
G LAMBLIA DNA STL QL NAA+NON-PROBE: NOT DETECTED
GLOBULIN UR ELPH-MCNC: 3.5 GM/DL
GLUCOSE BLDC GLUCOMTR-MCNC: 153 MG/DL (ref 70–130)
GLUCOSE BLDC GLUCOMTR-MCNC: 162 MG/DL (ref 70–130)
GLUCOSE BLDC GLUCOMTR-MCNC: 164 MG/DL (ref 70–130)
GLUCOSE BLDC GLUCOMTR-MCNC: 195 MG/DL (ref 70–130)
GLUCOSE SERPL-MCNC: 215 MG/DL (ref 65–99)
HCT VFR BLD AUTO: 40 % (ref 34–46.6)
HCT VFR BLD AUTO: 40.7 % (ref 34–46.6)
HGB BLD-MCNC: 13.5 G/DL (ref 12–15.9)
HGB BLD-MCNC: 13.8 G/DL (ref 12–15.9)
IMM GRANULOCYTES # BLD AUTO: 0.08 10*3/MM3 (ref 0–0.05)
IMM GRANULOCYTES NFR BLD AUTO: 0.5 % (ref 0–0.5)
LEFT ATRIUM VOLUME INDEX: 17.7 ML/M2
LYMPHOCYTES # BLD AUTO: 0.85 10*3/MM3 (ref 0.7–3.1)
LYMPHOCYTES NFR BLD AUTO: 5.6 % (ref 19.6–45.3)
MAGNESIUM SERPL-MCNC: 2 MG/DL (ref 1.6–2.6)
MAGNESIUM SERPL-MCNC: 2.4 MG/DL (ref 1.6–2.6)
MCH RBC QN AUTO: 30.3 PG (ref 26.6–33)
MCHC RBC AUTO-ENTMCNC: 33.2 G/DL (ref 31.5–35.7)
MCV RBC AUTO: 91.3 FL (ref 79–97)
MONOCYTES # BLD AUTO: 1.25 10*3/MM3 (ref 0.1–0.9)
MONOCYTES NFR BLD AUTO: 8.2 % (ref 5–12)
NEUTROPHILS NFR BLD AUTO: 12.97 10*3/MM3 (ref 1.7–7)
NEUTROPHILS NFR BLD AUTO: 85.2 % (ref 42.7–76)
NOROVIRUS GI+II RNA STL QL NAA+NON-PROBE: NOT DETECTED
NRBC BLD AUTO-RTO: 0 /100 WBC (ref 0–0.2)
P SHIGELLOIDES DNA STL QL NAA+NON-PROBE: NOT DETECTED
PLATELET # BLD AUTO: 157 10*3/MM3 (ref 140–450)
PMV BLD AUTO: 10.1 FL (ref 6–12)
POTASSIUM SERPL-SCNC: 2.6 MMOL/L (ref 3.5–5.2)
POTASSIUM SERPL-SCNC: 3.6 MMOL/L (ref 3.5–5.2)
PROT SERPL-MCNC: 6.9 G/DL (ref 6–8.5)
QT INTERVAL: 382 MS
QTC INTERVAL: 467 MS
RBC # BLD AUTO: 4.46 10*6/MM3 (ref 3.77–5.28)
RVA RNA STL QL NAA+NON-PROBE: NOT DETECTED
S ENT+BONG DNA STL QL NAA+NON-PROBE: NOT DETECTED
SAPO I+II+IV+V RNA STL QL NAA+NON-PROBE: NOT DETECTED
SHIGELLA SP+EIEC IPAH ST NAA+NON-PROBE: NOT DETECTED
SODIUM SERPL-SCNC: 132 MMOL/L (ref 136–145)
TSH SERPL DL<=0.05 MIU/L-ACNC: 1.44 UIU/ML (ref 0.27–4.2)
V CHOL+PARA+VUL DNA STL QL NAA+NON-PROBE: NOT DETECTED
V CHOLERAE DNA STL QL NAA+NON-PROBE: NOT DETECTED
VIT B12 BLD-MCNC: 334 PG/ML (ref 211–946)
WBC NRBC COR # BLD AUTO: 15.22 10*3/MM3 (ref 3.4–10.8)
Y ENTEROCOL DNA STL QL NAA+NON-PROBE: NOT DETECTED

## 2024-03-18 PROCEDURE — 82948 REAGENT STRIP/BLOOD GLUCOSE: CPT

## 2024-03-18 PROCEDURE — 84443 ASSAY THYROID STIM HORMONE: CPT | Performed by: INTERNAL MEDICINE

## 2024-03-18 PROCEDURE — 83735 ASSAY OF MAGNESIUM: CPT | Performed by: HOSPITALIST

## 2024-03-18 PROCEDURE — 94664 DEMO&/EVAL PT USE INHALER: CPT

## 2024-03-18 PROCEDURE — 25010000002 METRONIDAZOLE 500 MG/100ML SOLUTION: Performed by: HOSPITALIST

## 2024-03-18 PROCEDURE — 80053 COMPREHEN METABOLIC PANEL: CPT | Performed by: HOSPITALIST

## 2024-03-18 PROCEDURE — 86140 C-REACTIVE PROTEIN: CPT | Performed by: HOSPITALIST

## 2024-03-18 PROCEDURE — 94761 N-INVAS EAR/PLS OXIMETRY MLT: CPT

## 2024-03-18 PROCEDURE — 63710000001 INSULIN LISPRO (HUMAN) PER 5 UNITS: Performed by: HOSPITALIST

## 2024-03-18 PROCEDURE — 85025 COMPLETE CBC W/AUTO DIFF WBC: CPT | Performed by: HOSPITALIST

## 2024-03-18 PROCEDURE — 93306 TTE W/DOPPLER COMPLETE: CPT | Performed by: INTERNAL MEDICINE

## 2024-03-18 PROCEDURE — 99233 SBSQ HOSP IP/OBS HIGH 50: CPT | Performed by: INTERNAL MEDICINE

## 2024-03-18 PROCEDURE — 83735 ASSAY OF MAGNESIUM: CPT | Performed by: INTERNAL MEDICINE

## 2024-03-18 PROCEDURE — 25010000002 CEFTRIAXONE PER 250 MG: Performed by: HOSPITALIST

## 2024-03-18 PROCEDURE — 25810000003 SODIUM CHLORIDE 0.9 % SOLUTION: Performed by: HOSPITALIST

## 2024-03-18 PROCEDURE — 94799 UNLISTED PULMONARY SVC/PX: CPT

## 2024-03-18 PROCEDURE — 82746 ASSAY OF FOLIC ACID SERUM: CPT | Performed by: HOSPITALIST

## 2024-03-18 PROCEDURE — 94640 AIRWAY INHALATION TREATMENT: CPT

## 2024-03-18 PROCEDURE — 63710000001 INSULIN GLARGINE PER 5 UNITS: Performed by: HOSPITALIST

## 2024-03-18 PROCEDURE — 85018 HEMOGLOBIN: CPT | Performed by: INTERNAL MEDICINE

## 2024-03-18 PROCEDURE — 87507 IADNA-DNA/RNA PROBE TQ 12-25: CPT | Performed by: HOSPITALIST

## 2024-03-18 PROCEDURE — 84132 ASSAY OF SERUM POTASSIUM: CPT | Performed by: HOSPITALIST

## 2024-03-18 PROCEDURE — 93306 TTE W/DOPPLER COMPLETE: CPT

## 2024-03-18 PROCEDURE — 87493 C DIFF AMPLIFIED PROBE: CPT | Performed by: HOSPITALIST

## 2024-03-18 PROCEDURE — 85014 HEMATOCRIT: CPT | Performed by: INTERNAL MEDICINE

## 2024-03-18 RX ORDER — SERTRALINE HYDROCHLORIDE 100 MG/1
200 TABLET, FILM COATED ORAL DAILY
COMMUNITY

## 2024-03-18 RX ORDER — POTASSIUM CHLORIDE 20 MEQ/1
40 TABLET, EXTENDED RELEASE ORAL EVERY 4 HOURS
Status: COMPLETED | OUTPATIENT
Start: 2024-03-18 | End: 2024-03-18

## 2024-03-18 RX ORDER — OXCARBAZEPINE 600 MG/1
600 TABLET, FILM COATED ORAL 2 TIMES DAILY
COMMUNITY

## 2024-03-18 RX ORDER — GLIPIZIDE 5 MG/1
5 TABLET ORAL
Status: CANCELLED | OUTPATIENT
Start: 2024-03-18

## 2024-03-18 RX ORDER — ATORVASTATIN CALCIUM 40 MG/1
40 TABLET, FILM COATED ORAL DAILY
Status: CANCELLED | OUTPATIENT
Start: 2024-03-18

## 2024-03-18 RX ORDER — PANTOPRAZOLE SODIUM 40 MG/1
40 TABLET, DELAYED RELEASE ORAL
Status: CANCELLED | OUTPATIENT
Start: 2024-03-18

## 2024-03-18 RX ORDER — CLONAZEPAM 0.5 MG/1
1 TABLET ORAL 4 TIMES DAILY
Status: DISCONTINUED | OUTPATIENT
Start: 2024-03-18 | End: 2024-03-20 | Stop reason: HOSPADM

## 2024-03-18 RX ORDER — TRAZODONE HYDROCHLORIDE 50 MG/1
100 TABLET ORAL NIGHTLY PRN
Status: DISCONTINUED | OUTPATIENT
Start: 2024-03-18 | End: 2024-03-20 | Stop reason: HOSPADM

## 2024-03-18 RX ORDER — ARIPIPRAZOLE 10 MG/1
10 TABLET ORAL DAILY
Status: DISCONTINUED | OUTPATIENT
Start: 2024-03-18 | End: 2024-03-20 | Stop reason: HOSPADM

## 2024-03-18 RX ORDER — PANTOPRAZOLE SODIUM 40 MG/1
40 TABLET, DELAYED RELEASE ORAL
Status: DISCONTINUED | OUTPATIENT
Start: 2024-03-19 | End: 2024-03-20 | Stop reason: HOSPADM

## 2024-03-18 RX ORDER — GABAPENTIN 400 MG/1
800 CAPSULE ORAL EVERY 6 HOURS SCHEDULED
Status: DISCONTINUED | OUTPATIENT
Start: 2024-03-18 | End: 2024-03-20 | Stop reason: HOSPADM

## 2024-03-18 RX ORDER — IPRATROPIUM BROMIDE AND ALBUTEROL SULFATE 2.5; .5 MG/3ML; MG/3ML
3 SOLUTION RESPIRATORY (INHALATION) EVERY 6 HOURS PRN
COMMUNITY

## 2024-03-18 RX ORDER — OXCARBAZEPINE 300 MG/1
600 TABLET, FILM COATED ORAL 2 TIMES DAILY
Status: CANCELLED | OUTPATIENT
Start: 2024-03-18

## 2024-03-18 RX ORDER — GABAPENTIN 800 MG/1
800 TABLET ORAL 4 TIMES DAILY
COMMUNITY

## 2024-03-18 RX ORDER — CLONAZEPAM 0.5 MG/1
1 TABLET ORAL 4 TIMES DAILY
Status: CANCELLED | OUTPATIENT
Start: 2024-03-18

## 2024-03-18 RX ORDER — OMEPRAZOLE 20 MG/1
40 CAPSULE, DELAYED RELEASE ORAL DAILY
COMMUNITY

## 2024-03-18 RX ORDER — FLUTICASONE PROPIONATE 50 MCG
2 SPRAY, SUSPENSION (ML) NASAL DAILY PRN
Status: CANCELLED | OUTPATIENT
Start: 2024-03-18

## 2024-03-18 RX ORDER — TRAZODONE HYDROCHLORIDE 50 MG/1
100 TABLET ORAL NIGHTLY PRN
Status: CANCELLED | OUTPATIENT
Start: 2024-03-18

## 2024-03-18 RX ORDER — ALBUTEROL SULFATE 2.5 MG/3ML
2.5 SOLUTION RESPIRATORY (INHALATION) EVERY 4 HOURS PRN
Status: DISCONTINUED | OUTPATIENT
Start: 2024-03-18 | End: 2024-03-20 | Stop reason: HOSPADM

## 2024-03-18 RX ORDER — IBUPROFEN 800 MG/1
800 TABLET ORAL EVERY 8 HOURS PRN
Status: CANCELLED | OUTPATIENT
Start: 2024-03-18

## 2024-03-18 RX ORDER — CLONAZEPAM 1 MG/1
1 TABLET ORAL 4 TIMES DAILY
COMMUNITY

## 2024-03-18 RX ORDER — GABAPENTIN 400 MG/1
800 CAPSULE ORAL EVERY 6 HOURS
Status: CANCELLED | OUTPATIENT
Start: 2024-03-18

## 2024-03-18 RX ORDER — OXCARBAZEPINE 300 MG/1
600 TABLET, FILM COATED ORAL 2 TIMES DAILY
Status: DISCONTINUED | OUTPATIENT
Start: 2024-03-18 | End: 2024-03-20 | Stop reason: HOSPADM

## 2024-03-18 RX ORDER — FLUTICASONE PROPIONATE 50 MCG
2 SPRAY, SUSPENSION (ML) NASAL DAILY PRN
Status: DISCONTINUED | OUTPATIENT
Start: 2024-03-18 | End: 2024-03-20 | Stop reason: HOSPADM

## 2024-03-18 RX ORDER — ARIPIPRAZOLE 10 MG/1
10 TABLET ORAL DAILY
COMMUNITY

## 2024-03-18 RX ORDER — BUDESONIDE AND FORMOTEROL FUMARATE DIHYDRATE 160; 4.5 UG/1; UG/1
2 AEROSOL RESPIRATORY (INHALATION)
Status: DISCONTINUED | OUTPATIENT
Start: 2024-03-18 | End: 2024-03-20 | Stop reason: HOSPADM

## 2024-03-18 RX ORDER — LIDOCAINE 4 G/G
1 PATCH TOPICAL EVERY 24 HOURS
Status: CANCELLED | OUTPATIENT
Start: 2024-03-18

## 2024-03-18 RX ORDER — IPRATROPIUM BROMIDE AND ALBUTEROL SULFATE 2.5; .5 MG/3ML; MG/3ML
3 SOLUTION RESPIRATORY (INHALATION) EVERY 6 HOURS PRN
Status: CANCELLED | OUTPATIENT
Start: 2024-03-18

## 2024-03-18 RX ORDER — ATORVASTATIN CALCIUM 40 MG/1
40 TABLET, FILM COATED ORAL NIGHTLY
Status: DISCONTINUED | OUTPATIENT
Start: 2024-03-18 | End: 2024-03-20 | Stop reason: HOSPADM

## 2024-03-18 RX ORDER — CETIRIZINE HYDROCHLORIDE 10 MG/1
10 TABLET ORAL DAILY
Status: CANCELLED | OUTPATIENT
Start: 2024-03-18

## 2024-03-18 RX ORDER — TRAZODONE HYDROCHLORIDE 50 MG/1
100 TABLET ORAL NIGHTLY PRN
COMMUNITY

## 2024-03-18 RX ORDER — CETIRIZINE HYDROCHLORIDE 10 MG/1
10 TABLET ORAL DAILY
Status: DISCONTINUED | OUTPATIENT
Start: 2024-03-18 | End: 2024-03-20 | Stop reason: HOSPADM

## 2024-03-18 RX ORDER — BUDESONIDE AND FORMOTEROL FUMARATE DIHYDRATE 160; 4.5 UG/1; UG/1
2 AEROSOL RESPIRATORY (INHALATION)
Status: CANCELLED | OUTPATIENT
Start: 2024-03-18

## 2024-03-18 RX ORDER — ARIPIPRAZOLE 10 MG/1
10 TABLET ORAL DAILY
Status: CANCELLED | OUTPATIENT
Start: 2024-03-18

## 2024-03-18 RX ORDER — IPRATROPIUM BROMIDE AND ALBUTEROL SULFATE 2.5; .5 MG/3ML; MG/3ML
3 SOLUTION RESPIRATORY (INHALATION) EVERY 6 HOURS PRN
Status: DISCONTINUED | OUTPATIENT
Start: 2024-03-18 | End: 2024-03-20 | Stop reason: HOSPADM

## 2024-03-18 RX ORDER — LISINOPRIL 2.5 MG/1
2.5 TABLET ORAL DAILY
Status: CANCELLED | OUTPATIENT
Start: 2024-03-18

## 2024-03-18 RX ORDER — HYDROCHLOROTHIAZIDE 25 MG/1
25 TABLET ORAL EVERY MORNING
Status: CANCELLED | OUTPATIENT
Start: 2024-03-18

## 2024-03-18 RX ORDER — POTASSIUM CHLORIDE 20 MEQ/1
40 TABLET, EXTENDED RELEASE ORAL EVERY 4 HOURS
Qty: 4 TABLET | Refills: 0 | Status: COMPLETED | OUTPATIENT
Start: 2024-03-18 | End: 2024-03-18

## 2024-03-18 RX ADMIN — Medication 10 ML: at 08:27

## 2024-03-18 RX ADMIN — BUDESONIDE AND FORMOTEROL FUMARATE DIHYDRATE 2 PUFF: 160; 4.5 AEROSOL RESPIRATORY (INHALATION) at 22:11

## 2024-03-18 RX ADMIN — OXCARBAZEPINE 600 MG: 300 TABLET, FILM COATED ORAL at 20:12

## 2024-03-18 RX ADMIN — ARIPIPRAZOLE 10 MG: 10 TABLET ORAL at 20:12

## 2024-03-18 RX ADMIN — POTASSIUM CHLORIDE 40 MEQ: 1500 TABLET, EXTENDED RELEASE ORAL at 20:12

## 2024-03-18 RX ADMIN — Medication 10 ML: at 20:13

## 2024-03-18 RX ADMIN — ATORVASTATIN CALCIUM 40 MG: 40 TABLET, FILM COATED ORAL at 20:12

## 2024-03-18 RX ADMIN — CLONAZEPAM 1 MG: 0.5 TABLET ORAL at 20:12

## 2024-03-18 RX ADMIN — SERTRALINE 200 MG: 50 TABLET, FILM COATED ORAL at 20:12

## 2024-03-18 RX ADMIN — ACETAMINOPHEN 650 MG: 325 TABLET ORAL at 08:26

## 2024-03-18 RX ADMIN — EMPAGLIFLOZIN 10 MG: 10 TABLET, FILM COATED ORAL at 20:12

## 2024-03-18 RX ADMIN — SODIUM CHLORIDE 100 ML/HR: 9 INJECTION, SOLUTION INTRAVENOUS at 08:15

## 2024-03-18 RX ADMIN — INSULIN LISPRO 2 UNITS: 100 INJECTION, SOLUTION INTRAVENOUS; SUBCUTANEOUS at 08:27

## 2024-03-18 RX ADMIN — POTASSIUM CHLORIDE 40 MEQ: 1500 TABLET, EXTENDED RELEASE ORAL at 08:27

## 2024-03-18 RX ADMIN — METRONIDAZOLE 500 MG: 500 INJECTION, SOLUTION INTRAVENOUS at 14:02

## 2024-03-18 RX ADMIN — TRAZODONE HYDROCHLORIDE 100 MG: 50 TABLET ORAL at 23:54

## 2024-03-18 RX ADMIN — TIOTROPIUM BROMIDE INHALATION SPRAY 2 PUFF: 3.12 SPRAY, METERED RESPIRATORY (INHALATION) at 22:11

## 2024-03-18 RX ADMIN — POTASSIUM CHLORIDE 40 MEQ: 1500 TABLET, EXTENDED RELEASE ORAL at 17:06

## 2024-03-18 RX ADMIN — INSULIN LISPRO 2 UNITS: 100 INJECTION, SOLUTION INTRAVENOUS; SUBCUTANEOUS at 11:45

## 2024-03-18 RX ADMIN — INSULIN GLARGINE 20 UNITS: 100 INJECTION, SOLUTION SUBCUTANEOUS at 08:27

## 2024-03-18 RX ADMIN — METRONIDAZOLE 500 MG: 500 INJECTION, SOLUTION INTRAVENOUS at 05:23

## 2024-03-18 RX ADMIN — GABAPENTIN 800 MG: 400 CAPSULE ORAL at 20:12

## 2024-03-18 RX ADMIN — LIDOCAINE 2 PATCH: 4 PATCH TOPICAL at 02:09

## 2024-03-18 RX ADMIN — POTASSIUM CHLORIDE 40 MEQ: 1500 TABLET, EXTENDED RELEASE ORAL at 11:28

## 2024-03-18 RX ADMIN — POTASSIUM CHLORIDE 40 MEQ: 1500 TABLET, EXTENDED RELEASE ORAL at 02:49

## 2024-03-18 RX ADMIN — CEFTRIAXONE SODIUM 2000 MG: 2 INJECTION, POWDER, FOR SOLUTION INTRAMUSCULAR; INTRAVENOUS at 20:13

## 2024-03-18 RX ADMIN — CETIRIZINE HYDROCHLORIDE 10 MG: 10 TABLET, FILM COATED ORAL at 20:13

## 2024-03-18 RX ADMIN — INSULIN LISPRO 2 UNITS: 100 INJECTION, SOLUTION INTRAVENOUS; SUBCUTANEOUS at 17:06

## 2024-03-18 RX ADMIN — INSULIN LISPRO 2 UNITS: 100 INJECTION, SOLUTION INTRAVENOUS; SUBCUTANEOUS at 20:11

## 2024-03-18 NOTE — PLAN OF CARE
Goal Outcome Evaluation:  Plan of Care Reviewed With: patient           Outcome Evaluation: VSS during this shift. Hgb stable with no need for replacement. Potassium being replaced. Has ambulated several times during this shift. No significant changes noted during this shift.

## 2024-03-18 NOTE — H&P
"Hospitalist History and Physical        Patient Identification  Name: Ana Ortiz  Age/Sex: 48 y.o. female  :  1975        MRN: [id number]  Visit Number: [id number]  Admit Date: 3/17/2024   PCP: Santa Mcmahon APRN          Chief complaint nausea, vomiting, abdominal pain, diarrhea, fever    History of Present Illness:  Patient is a 48 y.o. female with history of COPD on 2L NC at night only, arthritis, CHF documented though patient denies and ECHO in her chart shows no systolic or diastolic dysfunction, insulin dependent type II DM, HTN, HLD, JOANN and vulvar cancer s/p hysterectomy, who presents with reports of nausea, vomiting, abdominal pain and diarrhea for the last 4 days. She reports minimal PO intake as a result. She denies hematochezia, melena or hematemesis. She reports her stool has looked \"white and infected\" and her emesis has been mostly clear. She also reports epigastric and suprapubic abdominal pain, as well as fever up to 103 F at home. Upon further questioning, she does report some dysuria and cloudy, foul smelling urine as well. She denies sick contacts. She denies any recent antibiotics. She denies starting any new medication recently. In the ED, patient was hypotensive on arrival at 81/69 but BP improved with IV fluid resuscitation, currently systolic 90s-110s. HR has been as high as 97 and RR was 22 upon arrival but is now 18. O2 saturation has been mid 90s on RA and she has been afebrile. Labs showed low K+ 3.1, mildly low Na 134 and low Cl 89, bicarb 26, anion gap 18.9, BUN 9, Cr 1.13 (previously 0.70), glucose 203, LFTs normal, A1c 8.10, CRP 22, lactate 2.4, lipase normal, procal 0.47, INR 1.0, WBC 6.99, hemoglobin 6.5 (previously 13 in 3/23), platelets 98 (prev 237), 79% neutrophils. UA showed >1000 glucose, trace ketones, small leuk esterase, 21-50 WBC, but no bacteria seen. Urine and blood cultures are pending. CXR showed slightly cardiomegaly and coarsened bronchovascular " pattern to the lungs, but no pneumonia or edema. CT abdomen/pelvis showed left retroperitoneal perinephric fat stranding that is nonspecific and may represent pyelonephritis, mild hepatosplenomegaly, and incidentally noted 1.7 com right adrenal nodule. Patient is being admitted to the PCU for further workup and management.     Review of Systems  Review of Systems   Constitutional:  Positive for activity change, appetite change, chills, fatigue and fever.   HENT:  Negative for congestion, postnasal drip, rhinorrhea, sinus pressure, sinus pain and sore throat.    Eyes:  Negative for photophobia and visual disturbance.   Respiratory:  Positive for shortness of breath. Negative for cough and wheezing.    Cardiovascular:  Negative for chest pain, palpitations and leg swelling.   Gastrointestinal:  Positive for abdominal pain, diarrhea, nausea and vomiting. Negative for abdominal distention and constipation.   Endocrine: Negative for polyphagia and polyuria.   Genitourinary:  Positive for dysuria. Negative for difficulty urinating, flank pain, frequency and hematuria.   Musculoskeletal:  Positive for arthralgias and back pain. Negative for joint swelling, myalgias, neck pain and neck stiffness.   Skin:  Negative for color change, pallor, rash and wound.   Neurological:  Positive for weakness (generalized). Negative for dizziness, tremors, seizures, syncope, light-headedness, numbness and headaches.   Hematological:  Negative for adenopathy. Does not bruise/bleed easily.   Psychiatric/Behavioral:  Negative for agitation, behavioral problems and confusion.        History  Past Medical History:   Diagnosis Date    Arthritis     CHF (congestive heart failure)     COPD (chronic obstructive pulmonary disease)     Diabetes mellitus     Elevated cholesterol     GERD (gastroesophageal reflux disease)     Hyperlipemia     Hypertension     JOANN (obstructive sleep apnea)     Pneumonia     Vulvar cancer      Past Surgical History:    Procedure Laterality Date    HYSTERECTOMY      INCISION AND DRAINAGE TRUNK N/A 1/11/2022    Procedure: INCISION AND DRAINAGE BACK;  Surgeon: Tamera Saez MD;  Location: [facility];  Service: General;  Laterality: N/A;    MASTECTOMY      VULVA SURGERY      reconstruction due ca     Family History   Problem Relation Age of Onset    Cancer Mother     Hypertension Mother     Parkinsonism Father     Diabetes Father     Hypertension Father     Cancer Maternal Aunt     Diabetes Paternal Aunt     Diabetes Paternal Grandmother     Cancer Maternal Aunt     Diabetes Brother      Social History     Tobacco Use    Smoking status: Every Day     Current packs/day: 1.00     Average packs/day: 1 pack/day for 28.8 years (28.8 ttl pk-yrs)     Types: Cigarettes     Start date: 5/18/1995     Passive exposure: Current    Smokeless tobacco: Never    Tobacco comments:     8-10 per day    Vaping Use    Vaping status: Former    Substances: Nicotine, Flavoring    Devices: Refillable tank   Substance Use Topics    Alcohol use: Never    Drug use: Never     (Not in a hospital admission)    Allergies:  Codeine and Metformin    Objective     Vital Signs  Temp:  [98.2 °F (36.8 °C)-98.5 °F (36.9 °C)] 98.5 °F (36.9 °C)  Heart Rate:  [86-97] 93  Resp:  [18-22] 18  BP: ()/(52-77) 96/60  Body mass index is 38.61 kg/m².    Physical Exam:  Physical Exam  Constitutional:       General: She is not in acute distress.     Appearance: She is obese. She is ill-appearing.   HENT:      Head: Normocephalic and atraumatic.      Right Ear: External ear normal.      Left Ear: External ear normal.      Nose: Nose normal.      Mouth/Throat:      Mouth: Mucous membranes are dry.      Pharynx: Oropharynx is clear.   Eyes:      Conjunctiva/sclera: Conjunctivae normal.      Pupils: Pupils are equal, round, and reactive to light.   Cardiovascular:      Rate and Rhythm: Regular rhythm. Tachycardia present.      Pulses: Normal pulses.      Heart sounds: Normal  heart sounds. No murmur heard.  Pulmonary:      Effort: Pulmonary effort is normal. No respiratory distress.      Breath sounds: Normal breath sounds. No wheezing or rales.   Abdominal:      General: Abdomen is flat. Bowel sounds are normal. There is no distension.      Palpations: Abdomen is soft.      Tenderness: There is abdominal tenderness (primarily epigastric region and suprapubic regions. No flank tenderness.).   Musculoskeletal:         General: Normal range of motion.      Cervical back: Normal range of motion and neck supple. No tenderness.      Right lower leg: No edema.      Left lower leg: No edema.   Lymphadenopathy:      Cervical: No cervical adenopathy.   Skin:     General: Skin is warm and dry.      Capillary Refill: Capillary refill takes less than 2 seconds.      Coloration: Skin is not jaundiced.      Findings: No bruising or lesion.   Neurological:      General: No focal deficit present.      Mental Status: She is alert and oriented to person, place, and time.   Psychiatric:         Mood and Affect: Mood normal.         Behavior: Behavior normal.       SEPTIC SHOCK FOCUSED EXAM ATTESTATION    I attest that I have reassessed tissue perfusion after the fluid bolus given.    Joe Agarwal MD  03/17/24  23:55 EDT     Results Review:       Lab Results:  Results from last 7 days   Lab Units 03/17/24  1653   WBC 10*3/mm3 6.99   HEMOGLOBIN g/dL 6.5*   PLATELETS 10*3/mm3 98*     Results from last 7 days   Lab Units 03/17/24  1653   CRP mg/dL 22.66*     Results from last 7 days   Lab Units 03/17/24  1653   SODIUM mmol/L 134*   POTASSIUM mmol/L 3.1*   CHLORIDE mmol/L 89*   CO2 mmol/L 26.1   BUN mg/dL 19   CREATININE mg/dL 1.13*   CALCIUM mg/dL 9.2   GLUCOSE mg/dL 203*     Results from last 7 days   Lab Units 03/17/24  1653   MAGNESIUM mg/dL 1.9     Hemoglobin A1C   Date Value Ref Range Status   03/17/2024 8.10 (H) 4.80 - 5.60 % Final     Results from last 7 days   Lab Units 03/17/24  1653    BILIRUBIN mg/dL 0.3   ALK PHOS U/L 106   AST (SGOT) U/L 22   ALT (SGPT) U/L 23     Results from last 7 days   Lab Units 03/17/24  1653   HSTROP T ng/L 10         Results from last 7 days   Lab Units 03/17/24  1653   INR  1.00           I have reviewed the patient's laboratory results.    Imaging:  Imaging Results (Last 72 Hours)       Procedure Component Value Units Date/Time    CT Abdomen Pelvis With Contrast [[id number]] Collected: 03/17/24 1840     Updated: 03/17/24 1843    Narrative:      Comparison: None     Modality:  CT Abdomen and Pelvis With Contrast     Technique: CT scan of the abdomen and pelvis has been done with  administration of intravenous contrast media.     Findings:     Lung base: Unremarkable.     Liver: The liver is enlarged at 23.4 cm.  No mass lesion or  calcification in the hepatic parenchyma.     Gallbladder and Biliary tree: No biliary dilatation.  Unremarkable  gallbladder.     Pancreas: No masses or ductal dilatation.     Spleen: The spleen is enlarged at 14.5 cm.     Adrenal Glands: A 1.7 cm right adrenal nodule is noted.     Kidneys and Ureters: No stones, masses or hydronephrosis.  Left  perinephric fat stranding is noted     Urinary Bladder: A small focus of air is seen in the urinary bladder,  correlate with instrumentation history.     Peritoneum and Retroperitoneum: No lymphadenopahy.  No ascites or free  air.  A small fat-containing umbilical hernia is seen.     Vessels: Unremarkable.     Bowel: No wall thickening or bowel dilatation.  Normal appendix.     Pelvis: Unremarkable.      Bones and Soft Tissues: No lytic or sclerotic bony lesion.  The  extra-abdominal and paraspinal soft tissues are normal.       Impression:      Impression:     Left retroperitoneal perinephric fat stranding.  Findings are  nonspecific and may represent pyelonephritis.     Mild hepatosplenomegaly.     Incidentally noted 1.7 cm right adrenal nodule.  A 12-month follow-up CT  of the abdomen, adrenal  mass protocol, is recommended.                 This report was finalized on 3/17/2024 6:40 PM by Angelique Bland MD.       XR Chest 1 View [[id number]] Collected: 03/17/24 1748     Updated: 03/17/24 1752    Narrative:      PROCEDURE: Portable chest x-ray examination performed on March 17, 2024.  Single view. Upright position.     HISTORY: Nausea and vomiting.     COMPARISON: None.     FINDINGS:     Heart size is slightly enlarged.  Coarsened bronchovascular pattern to the lungs.  No lobar consolidation or edema. No pleural effusion or pneumothorax.  No fracture or foreign body.  Mild hypoinflated lungs.       Impression:         1.  Slight prominence to the heart size  2.  Coarsened bronchovascular pattern to the lungs  3.  No lobar consolidation or edema.  4.  No pleural effusion or pneumothorax.     This report was finalized on 3/17/2024 5:49 PM by Kenny Baca MD.               I have personally reviewed the patient's radiologic imaging.        EKG:   Normal sinus rhythm, HR 90, QTc 467  Possible Left atrial enlargement  Nonspecific T wave abnormality  Prolonged QT  Abnormal ECG  When compared with ECG of 23-MAR-2023 11:18,  No significant change was found    I have personally reviewed the patient's EKG. No overt ST changes appreciated.         Assessment & Plan     - Severe sepsis, present on admission with tachycardia, tachypnea, lactic acidosis and hypotension that responded to IV fluid resuscitation, suspect secondary to UTI with left pyelonephritis though intra-abdominal infection also on differential with reports of ongoing diarrhea. Treat empirically with IV rocephin and flagyl. Continue to trend CRP. Follow up urine and blood culture results. Stool studies ordered; instructed patient to let RN know when she is ready to have another bowel movement (reports she already had one loose stool since arrival to the ED). Continue IV fluid resuscitation. Will make tylenol available PRN for pain and zofran  available PRN for nausea. Admit to PCU for close monitoring.   - Anemia, macrocytic, mild thrombocytopenia, presumed acute: source unclear at this time. Denies overt blood loss. CT abdomen/pelvis does mention hepatosplenomegaly so could be due to splenic sequestration. Check iron studies, B12 and folate levels. Receiving 2 units pRBC now. Platelets are mildly low as well. PT and INR are normal. Continue to monitor closely. May need upper and lower endoscopy in near future as part of anemia workup.   -  Renal insufficiency: cr 1.17, up from baseline around 0.70. Suspect pre-renal azotemia from GI fluid losses. Monitor response to IV fluid resuscitation.   - Type II DM, insulin dependent: A1c 8.10 indicating control is inadequate. Monitor accuchecks, decrease home lantus since PO intake has been poor for last 4 days, but make SSI available PRN. Adjust insulin regimen accordingly moving forward.  - HTN/HLD: again, initially hypotensive but BP responded to IV fluids. Hold BP meds for now and monitor.   - COPD: appears compensated at this time. Review home meds once reconciled. Nightly oxygen therapy ordered as she uses at home.  - Reports of CHF: mild cardiomegaly noted on CXR, but clinically does not appear to be in CHF. ECHO from  1/2022 shows normal systolic and diastolic function. Update ECHO in the morning.   - Incidentally noted 1.7 cm right adrenal nodule.  A 12-month follow-up CT of the abdomen, adrenal mass protocol, is recommended.    DVT Prophylaxis: SCDs    Estimated Length of Stay >2 midnights    I discussed the patient's findings, assessment and plan with the patient and ED provider Dr Dumont.    Patient is high risk due severe sepsis, UTI/pyelonephritis, possible intra-abdominal infection, acute renal insufficiency, acute macrocytic anemia and thrombocytopenia    Joe Agarwal MD  03/17/24  20:03 EDT

## 2024-03-18 NOTE — CASE MANAGEMENT/SOCIAL WORK
Discharge Planning Assessment   [place]     Patient Name: Ana Ortiz  MRN: [id number]  Today's Date: 3/17/2024    Admit Date: 3/17/2024    Plan: Pt lives at home alone and plans to return home at discharge pt states she provides own transportation but has someone to help if needed. Pcp is Santa Mcmahon, she uses [facility] and has [employer]. Pt is independent with adl's and use a cane prn. Pt does not have home health. Pt uses o2 2 liters at HS, cpap and nebs from [facility]. Pt denies any needs at this time.   Discharge Needs Assessment       Row Name 03/17/24 1959       Living Environment    People in Home alone    Current Living Arrangements home    Primary Care Provided by self    Able to Return to Prior Arrangements yes       Resource/Environmental Concerns    Resource/Environmental Concerns none       Transition Planning    Patient/Family Anticipates Transition to home    Patient/Family Anticipated Services at Transition none    Transportation Anticipated car, drives self;family or friend will provide       Discharge Needs Assessment    Readmission Within the Last 30 Days no previous admission in last 30 days    Equipment Currently Used at Home cane, straight;oxygen;cpap;nebulizer    Concerns to be Addressed no discharge needs identified;denies needs/concerns at this time    Anticipated Changes Related to Illness none    Equipment Needed After Discharge none                   Discharge Plan       Row Name 03/17/24 2000       Plan    Plan Pt lives at home alone and plans to return home at discharge pt states she provides own transportation but has someone to help if needed. Pcp is Santa Mcmahon, she uses [facility] and has [employer]. Pt is independent with adl's and use a cane prn. Pt does not have home health. Pt uses o2 2 liters at HS, cpap and nebs from [facility]. Pt denies any needs at this time.    Patient/Family in Agreement with Plan yes                  Continued Care and Services -  Admitted Since 3/17/2024    No active coordination exists for this encounter.       Expected Discharge Date and Time       Expected Discharge Date Expected Discharge Time    Mar 19, 2024            Demographic Summary       Row Name 03/17/24 1959       General Information    Admission Type inpatient    Arrived From home    Referral Source emergency department    Reason for Consult discharge planning                  Kylie Reyes RN

## 2024-03-18 NOTE — PROGRESS NOTES
[facility] HOSPITALIST PROGRESS NOTE     Patient Identification:  Name:  Ana Ortiz  Age:  48 y.o.  Sex:  female  :  1975  MRN:  [id number]  Visit Number:  [id number]  ROOM: [address]     Primary Care Provider:  Santa Mcmahon APRN    Length of stay in inpatient status:  1    Subjective     Chief Compliant:    Chief Complaint   Patient presents with    Nausea    Vomiting       History of Presenting Illness:    Patient denied any new complaints. Reports feeling much better compared to presentation. No family bedside.     ROS:  Otherwise 10 point ROS negative other than documented above in HPI.     Objective     Current Hospital Meds:cefTRIAXone, 2,000 mg, Intravenous, Q24H  insulin glargine, 20 Units, Subcutaneous, Daily  insulin lispro, 2-9 Units, Subcutaneous, 4x Daily AC & at Bedtime  Lidocaine, 2 patch, Transdermal, Q24H  potassium chloride ER, 40 mEq, Oral, Q4H  sodium chloride, 10 mL, Intravenous, Q12H  sodium chloride, 10 mL, Intravenous, Q12H    sodium chloride, 100 mL/hr, Last Rate: 100 mL/hr (24 0815)        Current Antimicrobial Therapy:  Anti-Infectives (From admission, onward)      Ordered     Dose/Rate Route Frequency Start Stop    24 1929  cefTRIAXone (ROCEPHIN) 2,000 mg in sodium chloride 0.9 % 100 mL IVPB-VTB        Ordering Provider: Joe Agarwal MD    2,000 mg  200 mL/hr over 30 Minutes Intravenous Every 24 Hours 24 [id number]  piperacillin-tazobactam (ZOSYN) IVPB 3.375 g IVPB in 100 mL NS (VTB)        Ordering Provider: John Gonzalez DO    3.375 g  over 30 Minutes Intravenous Once 24 1650 24 1744          Current Diuretic Therapy:  Diuretics (From admission, onward)      None          ----------------------------------------------------------------------------------------------------------------------  Vital Signs:  Temp:  [98.2 °F (36.8 °C)-98.8 °F (37.1 °C)] 98.8 °F (37.1 °C)  Heart Rate:   [] 83  Resp:  [16-28] 22  BP: ()/(60-97) 104/69  SpO2:  [91 %-98 %] 97 %  on  Flow (L/min):  [2] 2;   Device (Oxygen Therapy): room air  Body mass index is 38.61 kg/m².    Wt Readings from Last 3 Encounters:   03/18/24 105 kg (232 lb)   02/05/24 107 kg (235 lb 6.4 oz)   01/08/24 104 kg (229 lb)     Intake & Output (last 3 days)         03/15 0701  03/16 0700 03/16 0701  03/17 0700 03/17 0701  03/18 0700 03/18 0701  03/19 0700    P.O.    960    I.V. (mL/kg)    986.4 (9.4)    Blood   300     Total Intake(mL/kg)   300 (2.9) 1946.4 (18.5)    Urine (mL/kg/hr)   1000 400 (0.3)    Stool   0 0    Total Output   1000 400    Net   -700 +1546.4            Urine Unmeasured Occurrence   1 x 1 x    Stool Unmeasured Occurrence   2 x 3 x          Diet: Diabetic; Consistent Carbohydrate; Fluid Consistency: Thin (IDDSI 0)  ----------------------------------------------------------------------------------------------------------------------  Physical exam:  Constitutional:  Well-developed and well-nourished.  No respiratory distress.      HENT:  Head:  Normocephalic and atraumatic.  Mouth:  Moist mucous membranes.    Eyes:  Conjunctivae and EOM are normal. No scleral icterus.    Neck:  Neck supple.  No JVD present.    Cardiovascular:  Normal rate, regular rhythm and normal heart sounds with no murmur.  Pulmonary/Chest:  No respiratory distress, no wheezes, no crackles, with normal breath sounds and good air movement.  Abdominal:  Soft.  Bowel sounds are normal.  No distension and no tenderness.   Musculoskeletal:  No edema, no tenderness, and no deformity.  No red or swollen joints anywhere.    Neurological:  Alert and oriented to person, place, and time.  No cranial nerve deficit.  No tongue deviation.  No facial droop.  No slurred speech.   Skin:  Skin is warm and dry. No rash noted. No pallor.   Peripheral vascular:  Pulses in all 4 extremities with no clubbing, no cyanosis, no  "edema.  ----------------------------------------------------------------------------------------------------------------------  Tele:    ----------------------------------------------------------------------------------------------------------------------  Results from last 7 days   Lab Units 03/18/24  1155 03/18/24 0104 03/17/24  1927 03/17/24  1653   CRP mg/dL  --  20.01*  --  22.66*   LACTATE mmol/L  --   --  1.3 2.4*   WBC 10*3/mm3  --  15.22*  --  6.99   HEMOGLOBIN g/dL 13.8 13.5  --  6.5*   HEMATOCRIT % 40.0 40.7  --  20.7*   MCV fL  --  91.3  --  98.1*   MCHC g/dL  --  33.2  --  31.4*   PLATELETS 10*3/mm3  --  157  --  98*   INR   --   --   --  1.00         Results from last 7 days   Lab Units 03/18/24  1448 03/18/24 0104 03/17/24  1653   SODIUM mmol/L  --  132* 134*   POTASSIUM mmol/L 3.6 2.6* 3.1*   MAGNESIUM mg/dL 2.0 2.4 1.9   CHLORIDE mmol/L  --  92* 89*   CO2 mmol/L  --  20.5* 26.1   BUN mg/dL  --  15 19   CREATININE mg/dL  --  0.83 1.13*   CALCIUM mg/dL  --  8.6 9.2   GLUCOSE mg/dL  --  215* 203*   ALBUMIN g/dL  --  3.4* 3.9   BILIRUBIN mg/dL  --  0.3 0.3   ALK PHOS U/L  --  93 106   AST (SGOT) U/L  --  16 22   ALT (SGPT) U/L  --  19 23   Estimated Creatinine Clearance: 99.7 mL/min (by C-G formula based on SCr of 0.83 mg/dL).  No results found for: \"AMMONIA\"  Results from last 7 days   Lab Units 03/17/24  1927 03/17/24  1653   HSTROP T ng/L 8 10             Hemoglobin A1C   Date/Time Value Ref Range Status   03/17/2024 1653 8.10 (H) 4.80 - 5.60 % Final     Glucose   Date/Time Value Ref Range Status   03/18/2024 1557 164 (H) 70 - 130 mg/dL Final   03/18/2024 1127 162 (H) 70 - 130 mg/dL Final   03/18/2024 0729 153 (H) 70 - 130 mg/dL Final   03/17/2024 2105 140 (H) 70 - 130 mg/dL Final     Lab Results   Component Value Date    TSH 1.440 03/18/2024    FREET4 0.98 06/07/2021     No results found for: \"PREGTESTUR\", \"PREGSERUM\", \"HCG\", \"HCGQUANT\"  Pain Management Panel  More data may exist         Latest " "Ref Rng & Units 11/3/2023 6/7/2021   Pain Management Panel   Creatinine, Urine Not Estab. mg/dL 51.9  -   Amphetamine, Urine Qual Negative - Negative    Barbiturates Screen, Urine Negative - Negative    Benzodiazepine Screen, Urine Negative - Negative    Buprenorphine, Screen, Urine Negative - Negative    Cocaine Screen, Urine Negative - Negative    Methadone Screen , Urine Negative - Negative      Brief Urine Lab Results  (Last result in the past 365 days)        Color   Clarity   Blood   Leuk Est   Nitrite   Protein   CREAT   Urine HCG        03/17/24 1829 Yellow   Clear   Negative   Small (1+)   Negative   Trace                 Blood Culture   Date Value Ref Range Status   03/17/2024 No growth at 24 hours  Preliminary   03/17/2024 Abnormal Stain (C)  Preliminary     Urine Culture   Date Value Ref Range Status   03/17/2024 25,000 CFU/mL Mixed Carri Isolated  Final     No results found for: \"WOUNDCX\"  No results found for: \"STOOLCX\"  No results found for: \"RESPCX\"  No results found for: \"AFBCX\"  Results from last 7 days   Lab Units 03/18/24  0104 03/17/24  1927 03/17/24  1653   PROCALCITONIN ng/mL  --   --  0.47*   LACTATE mmol/L  --  1.3 2.4*   CRP mg/dL 20.01*  --  22.66*       I have personally looked at the labs and they are summarized above.  ----------------------------------------------------------------------------------------------------------------------  Detailed radiology reports for the last 24 hours:    Imaging Results (Last 24 Hours)       Procedure Component Value Units Date/Time    CT Abdomen Pelvis With Contrast [[id number]] Collected: 03/17/24 1840     Updated: 03/17/24 1843    Narrative:      Comparison: None     Modality:  CT Abdomen and Pelvis With Contrast     Technique: CT scan of the abdomen and pelvis has been done with  administration of intravenous contrast media.     Findings:     Lung base: Unremarkable.     Liver: The liver is enlarged at 23.4 cm.  No mass lesion or  calcification in " the hepatic parenchyma.     Gallbladder and Biliary tree: No biliary dilatation.  Unremarkable  gallbladder.     Pancreas: No masses or ductal dilatation.     Spleen: The spleen is enlarged at 14.5 cm.     Adrenal Glands: A 1.7 cm right adrenal nodule is noted.     Kidneys and Ureters: No stones, masses or hydronephrosis.  Left  perinephric fat stranding is noted     Urinary Bladder: A small focus of air is seen in the urinary bladder,  correlate with instrumentation history.     Peritoneum and Retroperitoneum: No lymphadenopahy.  No ascites or free  air.  A small fat-containing umbilical hernia is seen.     Vessels: Unremarkable.     Bowel: No wall thickening or bowel dilatation.  Normal appendix.     Pelvis: Unremarkable.      Bones and Soft Tissues: No lytic or sclerotic bony lesion.  The  extra-abdominal and paraspinal soft tissues are normal.       Impression:      Impression:     Left retroperitoneal perinephric fat stranding.  Findings are  nonspecific and may represent pyelonephritis.     Mild hepatosplenomegaly.     Incidentally noted 1.7 cm right adrenal nodule.  A 12-month follow-up CT  of the abdomen, adrenal mass protocol, is recommended.                 This report was finalized on 3/17/2024 6:40 PM by Angelique Bland MD.             Assessment & Plan    #Sepsis 2/2 left sided pyelonephritis  #E. Coli bacteremia  - Even though urine showed normal lisette in setting of contamination , still most likely source   - Stop flagyl, continue ceftriaxone.   - Sensitivities pending   - TTE pending     #Anemia?  - Hbg on presentation 6.5. No history of anemia. NO reported bleeding. Repeat after just 1 unit stable at 13.5. Suspect lab error.     #Severe Hypokalemia  - Mag wnl  - Replace per protocol    #Diarrhea  - Likely explains hypokalemia  - C-diff and PCR and negative      Chronic medical problems  #DM II- A1C: 8.1%, SSI. Titrate as needed. Currently reasonably controlled.   #HTN/HLD- Continue home regien    #COPD- No wheezing on exam  #CHF? Compensated  #Incidental adrenal nodule     Code status: Full     Dispo: Pending clinical improvement.     VTE Prophylaxis:   Mechanical Order History:        Ordered        03/17/24 2105  Place Sequential Compression Device  Once            03/17/24 2105  Maintain Sequential Compression Device  Continuous                          Pharmalogical Order History:       None              Dawson Yanes MD  [facility] Hospitalist  03/18/24  18:29 EDT

## 2024-03-18 NOTE — PLAN OF CARE
Goal Outcome Evaluation:  Plan of Care Reviewed With: patient           Outcome Evaluation: Pt rcvd 1 un PRBC this shift. Resting calmly in bed. VSS. On 2L NC while sleeping. Ambulated to BSC this shift. No new needs noted at this time.         Problem: Adult Inpatient Plan of Care  Goal: Plan of Care Review  Outcome: Ongoing, Progressing  Flowsheets (Taken 3/18/2024 0354)  Plan of Care Reviewed With: patient  Outcome Evaluation: Pt rcvd 1 un PRBC this shift. Resting calmly in bed. VSS. On 2L NC while sleeping. Ambulated to BSC this shift. No new needs noted at this time.  Goal: Patient-Specific Goal (Individualized)  Outcome: Ongoing, Progressing  Goal: Absence of Hospital-Acquired Illness or Injury  Outcome: Ongoing, Progressing  Intervention: Identify and Manage Fall Risk  Recent Flowsheet Documentation  Taken 3/18/2024 0300 by Avis Jamison RN  Safety Promotion/Fall Prevention:   safety round/check completed   room organization consistent   nonskid shoes/slippers when out of bed   lighting adjusted   fall prevention program maintained   clutter free environment maintained   assistive device/personal items within reach   activity supervised  Taken 3/18/2024 0100 by Avis Jamison RN  Safety Promotion/Fall Prevention:   safety round/check completed   room organization consistent   nonskid shoes/slippers when out of bed   lighting adjusted   fall prevention program maintained   clutter free environment maintained   assistive device/personal items within reach   activity supervised  Intervention: Prevent Skin Injury  Recent Flowsheet Documentation  Taken 3/18/2024 0200 by Avis Jamison, RN  Skin Protection:   adhesive use limited   pulse oximeter probe site changed   tubing/devices free from skin contact   transparent dressing maintained  Taken 3/17/2024 2200 by Avis Jamison, RN  Skin Protection:   adhesive use limited   pulse oximeter probe site changed   tubing/devices free from skin contact    transparent dressing maintained   skin-to-skin areas padded  Intervention: Prevent and Manage VTE (Venous Thromboembolism) Risk  Recent Flowsheet Documentation  Taken 3/18/2024 0200 by Avis Jamison RN  VTE Prevention/Management:   bilateral   sequential compression devices on  Range of Motion: active ROM (range of motion) encouraged  Taken 3/17/2024 2200 by Avis Jaimson RN  VTE Prevention/Management: (see MAR) other (see comments)  Range of Motion: active ROM (range of motion) encouraged  Intervention: Prevent Infection  Recent Flowsheet Documentation  Taken 3/18/2024 0300 by Avis Jamison RN  Infection Prevention:   hand hygiene promoted   rest/sleep promoted   single patient room provided  Taken 3/18/2024 0100 by Avis Jamison RN  Infection Prevention:   hand hygiene promoted   rest/sleep promoted   single patient room provided  Goal: Optimal Comfort and Wellbeing  Outcome: Ongoing, Progressing  Intervention: Provide Person-Centered Care  Recent Flowsheet Documentation  Taken 3/18/2024 0200 by Avis Jamison RN  Trust Relationship/Rapport:   care explained   choices provided   thoughts/feelings acknowledged   reassurance provided  Taken 3/17/2024 2200 by Avis Jamison RN  Trust Relationship/Rapport:   care explained   choices provided   thoughts/feelings acknowledged   reassurance provided  Goal: Readiness for Transition of Care  Outcome: Ongoing, Progressing  Intervention: Mutually Develop Transition Plan  Recent Flowsheet Documentation  Taken 3/17/2024 2115 by Avis Jamison RN  Equipment Currently Used at Home:   oxygen   nebulizer   cpap   cane, straight     Problem: Adjustment to Illness (Sepsis/Septic Shock)  Goal: Optimal Coping  Outcome: Ongoing, Progressing  Intervention: Optimize Psychosocial Adjustment to Illness  Recent Flowsheet Documentation  Taken 3/18/2024 0200 by Avis Jamison RN  Family/Support System Care:   support provided   self-care encouraged  Taken 3/17/2024  2200 by Avis Jamison, RN  Family/Support System Care:   support provided   self-care encouraged     Problem: Bleeding (Sepsis/Septic Shock)  Goal: Absence of Bleeding  Outcome: Ongoing, Progressing     Problem: Glycemic Control Impaired (Sepsis/Septic Shock)  Goal: Blood Glucose Level Within Desired Range  Outcome: Ongoing, Progressing     Problem: Infection Progression (Sepsis/Septic Shock)  Goal: Absence of Infection Signs and Symptoms  Outcome: Ongoing, Progressing  Intervention: Initiate Sepsis Management  Recent Flowsheet Documentation  Taken 3/18/2024 0300 by Avis Jamison, RN  Infection Prevention:   hand hygiene promoted   rest/sleep promoted   single patient room provided  Taken 3/18/2024 0100 by Avis Jamison, RN  Infection Prevention:   hand hygiene promoted   rest/sleep promoted   single patient room provided     Problem: Nutrition Impaired (Sepsis/Septic Shock)  Goal: Optimal Nutrition Intake  Outcome: Ongoing, Progressing

## 2024-03-19 LAB
ALBUMIN SERPL-MCNC: 3.1 G/DL (ref 3.5–5.2)
ALBUMIN/GLOB SERPL: 0.9 G/DL
ALP SERPL-CCNC: 105 U/L (ref 39–117)
ALT SERPL W P-5'-P-CCNC: 24 U/L (ref 1–33)
ANION GAP SERPL CALCULATED.3IONS-SCNC: 13.6 MMOL/L (ref 5–15)
AST SERPL-CCNC: 20 U/L (ref 1–32)
BILIRUB SERPL-MCNC: 0.2 MG/DL (ref 0–1.2)
BUN SERPL-MCNC: 14 MG/DL (ref 6–20)
BUN/CREAT SERPL: 24.1 (ref 7–25)
CALCIUM SPEC-SCNC: 8.7 MG/DL (ref 8.6–10.5)
CHLORIDE SERPL-SCNC: 99 MMOL/L (ref 98–107)
CO2 SERPL-SCNC: 20.4 MMOL/L (ref 22–29)
CREAT SERPL-MCNC: 0.58 MG/DL (ref 0.57–1)
DEPRECATED RDW RBC AUTO: 47.4 FL (ref 37–54)
EGFRCR SERPLBLD CKD-EPI 2021: 111.8 ML/MIN/1.73
ERYTHROCYTE [DISTWIDTH] IN BLOOD BY AUTOMATED COUNT: 13.8 % (ref 12.3–15.4)
FOLATE SERPL-MCNC: 11 NG/ML (ref 4.78–24.2)
GLOBULIN UR ELPH-MCNC: 3.3 GM/DL
GLUCOSE BLDC GLUCOMTR-MCNC: 174 MG/DL (ref 70–130)
GLUCOSE BLDC GLUCOMTR-MCNC: 216 MG/DL (ref 70–130)
GLUCOSE BLDC GLUCOMTR-MCNC: 237 MG/DL (ref 70–130)
GLUCOSE BLDC GLUCOMTR-MCNC: 253 MG/DL (ref 70–130)
GLUCOSE SERPL-MCNC: 171 MG/DL (ref 65–99)
HCT VFR BLD AUTO: 37.2 % (ref 34–46.6)
HGB BLD-MCNC: 12.1 G/DL (ref 12–15.9)
LYMPHOCYTES # BLD MANUAL: 1.06 10*3/MM3 (ref 0.7–3.1)
LYMPHOCYTES NFR BLD MANUAL: 10 % (ref 5–12)
MAGNESIUM SERPL-MCNC: 1.8 MG/DL (ref 1.6–2.6)
MCH RBC QN AUTO: 30.4 PG (ref 26.6–33)
MCHC RBC AUTO-ENTMCNC: 32.5 G/DL (ref 31.5–35.7)
MCV RBC AUTO: 93.5 FL (ref 79–97)
MONOCYTES # BLD: 1.33 10*3/MM3 (ref 0.1–0.9)
NEUTROPHILS # BLD AUTO: 10.91 10*3/MM3 (ref 1.7–7)
NEUTROPHILS NFR BLD MANUAL: 75 % (ref 42.7–76)
NEUTS BAND NFR BLD MANUAL: 7 % (ref 0–5)
PLAT MORPH BLD: NORMAL
PLATELET # BLD AUTO: 164 10*3/MM3 (ref 140–450)
PMV BLD AUTO: 10 FL (ref 6–12)
POTASSIUM SERPL-SCNC: 3.9 MMOL/L (ref 3.5–5.2)
POTASSIUM SERPL-SCNC: 3.9 MMOL/L (ref 3.5–5.2)
PROT SERPL-MCNC: 6.4 G/DL (ref 6–8.5)
RBC # BLD AUTO: 3.98 10*6/MM3 (ref 3.77–5.28)
RBC MORPH BLD: NORMAL
SCAN SLIDE: NORMAL
SODIUM SERPL-SCNC: 133 MMOL/L (ref 136–145)
VARIANT LYMPHS NFR BLD MANUAL: 8 % (ref 19.6–45.3)
WBC NRBC COR # BLD AUTO: 13.3 10*3/MM3 (ref 3.4–10.8)

## 2024-03-19 PROCEDURE — 94664 DEMO&/EVAL PT USE INHALER: CPT

## 2024-03-19 PROCEDURE — 83735 ASSAY OF MAGNESIUM: CPT | Performed by: INTERNAL MEDICINE

## 2024-03-19 PROCEDURE — 82948 REAGENT STRIP/BLOOD GLUCOSE: CPT

## 2024-03-19 PROCEDURE — 94761 N-INVAS EAR/PLS OXIMETRY MLT: CPT

## 2024-03-19 PROCEDURE — 63710000001 INSULIN LISPRO (HUMAN) PER 5 UNITS: Performed by: HOSPITALIST

## 2024-03-19 PROCEDURE — 63710000001 INSULIN GLARGINE PER 5 UNITS: Performed by: HOSPITALIST

## 2024-03-19 PROCEDURE — 94799 UNLISTED PULMONARY SVC/PX: CPT

## 2024-03-19 PROCEDURE — 25010000002 CEFTRIAXONE PER 250 MG: Performed by: HOSPITALIST

## 2024-03-19 PROCEDURE — 85025 COMPLETE CBC W/AUTO DIFF WBC: CPT | Performed by: INTERNAL MEDICINE

## 2024-03-19 PROCEDURE — 25010000002 MAGNESIUM SULFATE 4 GM/100ML SOLUTION: Performed by: INTERNAL MEDICINE

## 2024-03-19 PROCEDURE — 80053 COMPREHEN METABOLIC PANEL: CPT | Performed by: INTERNAL MEDICINE

## 2024-03-19 PROCEDURE — 99233 SBSQ HOSP IP/OBS HIGH 50: CPT | Performed by: INTERNAL MEDICINE

## 2024-03-19 PROCEDURE — 25810000003 SODIUM CHLORIDE 0.9 % SOLUTION: Performed by: HOSPITALIST

## 2024-03-19 PROCEDURE — 84132 ASSAY OF SERUM POTASSIUM: CPT | Performed by: INTERNAL MEDICINE

## 2024-03-19 PROCEDURE — 85007 BL SMEAR W/DIFF WBC COUNT: CPT | Performed by: INTERNAL MEDICINE

## 2024-03-19 RX ORDER — LOPERAMIDE HYDROCHLORIDE 2 MG/1
2 CAPSULE ORAL 4 TIMES DAILY PRN
Status: DISCONTINUED | OUTPATIENT
Start: 2024-03-19 | End: 2024-03-20 | Stop reason: HOSPADM

## 2024-03-19 RX ORDER — MAGNESIUM SULFATE HEPTAHYDRATE 40 MG/ML
4 INJECTION, SOLUTION INTRAVENOUS ONCE
Status: COMPLETED | OUTPATIENT
Start: 2024-03-19 | End: 2024-03-19

## 2024-03-19 RX ADMIN — CLONAZEPAM 1 MG: 0.5 TABLET ORAL at 20:09

## 2024-03-19 RX ADMIN — ARIPIPRAZOLE 10 MG: 10 TABLET ORAL at 09:08

## 2024-03-19 RX ADMIN — CLONAZEPAM 1 MG: 0.5 TABLET ORAL at 09:07

## 2024-03-19 RX ADMIN — GABAPENTIN 800 MG: 400 CAPSULE ORAL at 06:02

## 2024-03-19 RX ADMIN — CLONAZEPAM 1 MG: 0.5 TABLET ORAL at 11:56

## 2024-03-19 RX ADMIN — INSULIN LISPRO 4 UNITS: 100 INJECTION, SOLUTION INTRAVENOUS; SUBCUTANEOUS at 20:11

## 2024-03-19 RX ADMIN — INSULIN LISPRO 2 UNITS: 100 INJECTION, SOLUTION INTRAVENOUS; SUBCUTANEOUS at 17:13

## 2024-03-19 RX ADMIN — LOPERAMIDE HYDROCHLORIDE 2 MG: 2 CAPSULE ORAL at 06:02

## 2024-03-19 RX ADMIN — PANTOPRAZOLE SODIUM 40 MG: 40 TABLET, DELAYED RELEASE ORAL at 06:02

## 2024-03-19 RX ADMIN — LOPERAMIDE HYDROCHLORIDE 2 MG: 2 CAPSULE ORAL at 20:10

## 2024-03-19 RX ADMIN — BUDESONIDE AND FORMOTEROL FUMARATE DIHYDRATE 2 PUFF: 160; 4.5 AEROSOL RESPIRATORY (INHALATION) at 19:02

## 2024-03-19 RX ADMIN — OXCARBAZEPINE 600 MG: 300 TABLET, FILM COATED ORAL at 20:09

## 2024-03-19 RX ADMIN — OXCARBAZEPINE 600 MG: 300 TABLET, FILM COATED ORAL at 09:08

## 2024-03-19 RX ADMIN — ATORVASTATIN CALCIUM 40 MG: 40 TABLET, FILM COATED ORAL at 20:09

## 2024-03-19 RX ADMIN — SERTRALINE 200 MG: 50 TABLET, FILM COATED ORAL at 09:08

## 2024-03-19 RX ADMIN — GABAPENTIN 800 MG: 400 CAPSULE ORAL at 11:56

## 2024-03-19 RX ADMIN — BUDESONIDE AND FORMOTEROL FUMARATE DIHYDRATE 2 PUFF: 160; 4.5 AEROSOL RESPIRATORY (INHALATION) at 07:27

## 2024-03-19 RX ADMIN — CETIRIZINE HYDROCHLORIDE 10 MG: 10 TABLET, FILM COATED ORAL at 09:08

## 2024-03-19 RX ADMIN — ACETAMINOPHEN 650 MG: 325 TABLET ORAL at 17:13

## 2024-03-19 RX ADMIN — Medication 10 ML: at 20:10

## 2024-03-19 RX ADMIN — INSULIN LISPRO 6 UNITS: 100 INJECTION, SOLUTION INTRAVENOUS; SUBCUTANEOUS at 12:07

## 2024-03-19 RX ADMIN — MAGNESIUM SULFATE HEPTAHYDRATE 4 G: 40 INJECTION, SOLUTION INTRAVENOUS at 04:16

## 2024-03-19 RX ADMIN — SODIUM CHLORIDE 100 ML/HR: 9 INJECTION, SOLUTION INTRAVENOUS at 09:06

## 2024-03-19 RX ADMIN — INSULIN GLARGINE 20 UNITS: 100 INJECTION, SOLUTION SUBCUTANEOUS at 09:23

## 2024-03-19 RX ADMIN — EMPAGLIFLOZIN 10 MG: 10 TABLET, FILM COATED ORAL at 09:08

## 2024-03-19 RX ADMIN — Medication 10 ML: at 09:09

## 2024-03-19 RX ADMIN — INSULIN LISPRO 4 UNITS: 100 INJECTION, SOLUTION INTRAVENOUS; SUBCUTANEOUS at 09:05

## 2024-03-19 RX ADMIN — CLONAZEPAM 1 MG: 0.5 TABLET ORAL at 17:13

## 2024-03-19 RX ADMIN — Medication 10 ML: at 09:08

## 2024-03-19 RX ADMIN — LIDOCAINE 2 PATCH: 4 PATCH TOPICAL at 09:22

## 2024-03-19 RX ADMIN — GABAPENTIN 800 MG: 400 CAPSULE ORAL at 17:13

## 2024-03-19 RX ADMIN — LOPERAMIDE HYDROCHLORIDE 2 MG: 2 CAPSULE ORAL at 12:07

## 2024-03-19 RX ADMIN — CEFTRIAXONE SODIUM 2000 MG: 2 INJECTION, POWDER, FOR SOLUTION INTRAMUSCULAR; INTRAVENOUS at 20:09

## 2024-03-19 NOTE — PLAN OF CARE
Goal Outcome Evaluation:  Plan of Care Reviewed With: patient        Progress: improving  Outcome Evaluation: Patient alert, oriented. Pt on 2L NC while napping. Vital signs stable at this time. Pt ambulated to bathroom. Call light within reach.

## 2024-03-19 NOTE — PLAN OF CARE
Goal Outcome Evaluation:  Plan of Care Reviewed With: patient        Progress: improving  Outcome Evaluation: VSS. 2LNC while sleeping. Magnesium replaced per protocol. Pt ambulated to bathroom multiple times during shift. No complaints of pain or discomfort. Call light within reach.

## 2024-03-19 NOTE — PROGRESS NOTES
[facility] HOSPITALIST PROGRESS NOTE     Patient Identification:  Name:  Ana Ortiz  Age:  48 y.o.  Sex:  female  :  1975  MRN:  [id number]  Visit Number:  [id number]  ROOM: [address]     Primary Care Provider:  Santa Mcmahon APRN    Length of stay in inpatient status:  2    Subjective     Chief Compliant:    Chief Complaint   Patient presents with    Nausea    Vomiting       History of Presenting Illness:    Patient reports feeling much better. Hopeful to go home soon. No family bedsie.     ROS:  Otherwise 10 point ROS negative other than documented above in HPI.     Objective     Current Hospital Meds:ARIPiprazole, 10 mg, Oral, Daily  atorvastatin, 40 mg, Oral, Nightly  budesonide-formoterol, 2 puff, Inhalation, BID - RT  cefTRIAXone, 2,000 mg, Intravenous, Q24H  cetirizine, 10 mg, Oral, Daily  clonazePAM, 1 mg, Oral, 4x Daily  empagliflozin, 10 mg, Oral, Daily  gabapentin, 800 mg, Oral, Q6H  insulin glargine, 20 Units, Subcutaneous, Daily  insulin lispro, 2-9 Units, Subcutaneous, 4x Daily AC & at Bedtime  Lidocaine, 2 patch, Transdermal, Q24H  OXcarbazepine, 600 mg, Oral, BID  pantoprazole, 40 mg, Oral, Q AM  sertraline, 200 mg, Oral, Daily  sodium chloride, 10 mL, Intravenous, Q12H  sodium chloride, 10 mL, Intravenous, Q12H  tiotropium bromide monohydrate, 2 puff, Inhalation, Daily - RT    sodium chloride, 100 mL/hr, Last Rate: 100 mL/hr (24 0906)        Current Antimicrobial Therapy:  Anti-Infectives (From admission, onward)      Ordered     Dose/Rate Route Frequency Start Stop    24 192  cefTRIAXone (ROCEPHIN) 2,000 mg in sodium chloride 0.9 % 100 mL IVPB-VTB        Ordering Provider: Joe Agarwal MD    2,000 mg  200 mL/hr over 30 Minutes Intravenous Every 24 Hours 24 1634  piperacillin-tazobactam (ZOSYN) IVPB 3.375 g IVPB in 100 mL NS (VTB)        Ordering Provider: John Gonzalez DO    3.375 g  over 30 Minutes  Intravenous Once 03/17/24 1650 03/17/24 1744          Current Diuretic Therapy:  Diuretics (From admission, onward)      None          ----------------------------------------------------------------------------------------------------------------------  Vital Signs:  Temp:  [98 °F (36.7 °C)-98.8 °F (37.1 °C)] 98.4 °F (36.9 °C)  Heart Rate:  [] 100  Resp:  [12-22] 20  BP: ()/(65-91) 124/91  SpO2:  [89 %-100 %] 96 %  on  Flow (L/min):  [2] 2;   Device (Oxygen Therapy): room air  Body mass index is 39.56 kg/m².    Wt Readings from Last 3 Encounters:   03/19/24 108 kg (237 lb 11.2 oz)   02/05/24 107 kg (235 lb 6.4 oz)   01/08/24 104 kg (229 lb)     Intake & Output (last 3 days)         03/16 0701  03/17 0700 03/17 0701  03/18 0700 03/18 0701  03/19 0700 03/19 0701  03/20 0700    P.O.   960 450    I.V. (mL/kg)   986.4 (9.1) 1762.3 (16.3)    Blood  300      Total Intake(mL/kg)  300 (2.9) 1946.4 (18) 2212.3 (20.5)    Urine (mL/kg/hr)  1000 400 (0.2) 500 (0.6)    Stool  0 0 0    Total Output  1000 400 500    Net  -700 +1546.4 +1712.3            Urine Unmeasured Occurrence  1 x 4 x 3 x    Stool Unmeasured Occurrence  2 x 3 x 1 x          Diet: Diabetic; Consistent Carbohydrate; Fluid Consistency: Thin (IDDSI 0)  ----------------------------------------------------------------------------------------------------------------------  Physical exam:  Constitutional:  Well-developed and well-nourished.  No respiratory distress.      HENT:  Head:  Normocephalic and atraumatic.  Mouth:  Moist mucous membranes.    Eyes:  Conjunctivae and EOM are normal. No scleral icterus.    Neck:  Neck supple.  No JVD present.    Cardiovascular:  Normal rate, regular rhythm and normal heart sounds with no murmur.  Pulmonary/Chest:  No respiratory distress, no wheezes, no crackles, with normal breath sounds and good air movement.  Abdominal:  Soft.  Bowel sounds are normal.  No distension and no tenderness.   Musculoskeletal:  No edema,  "no tenderness, and no deformity.  No red or swollen joints anywhere.    Neurological:  Alert and oriented to person, place, and time.  No cranial nerve deficit.  No tongue deviation.  No facial droop.  No slurred speech.   Skin:  Skin is warm and dry. No rash noted. No pallor.   Peripheral vascular:  Pulses in all 4 extremities with no clubbing, no cyanosis, no edema.  ----------------------------------------------------------------------------------------------------------------------  Tele:    ----------------------------------------------------------------------------------------------------------------------  Results from last 7 days   Lab Units 03/19/24  0139 03/18/24  1155 03/18/24  0104 03/17/24  1927 03/17/24  1653   CRP mg/dL  --   --  20.01*  --  22.66*   LACTATE mmol/L  --   --   --  1.3 2.4*   WBC 10*3/mm3 13.30*  --  15.22*  --  6.99   HEMOGLOBIN g/dL 12.1 13.8 13.5  --  6.5*   HEMATOCRIT % 37.2 40.0 40.7  --  20.7*   MCV fL 93.5  --  91.3  --  98.1*   MCHC g/dL 32.5  --  33.2  --  31.4*   PLATELETS 10*3/mm3 164  --  157  --  98*   INR   --   --   --   --  1.00         Results from last 7 days   Lab Units 03/19/24  0139 03/18/24  1448 03/18/24  0104 03/17/24  1653   SODIUM mmol/L 133*  --  132* 134*   POTASSIUM mmol/L 3.9  3.9 3.6 2.6* 3.1*   MAGNESIUM mg/dL 1.8 2.0 2.4 1.9   CHLORIDE mmol/L 99  --  92* 89*   CO2 mmol/L 20.4*  --  20.5* 26.1   BUN mg/dL 14  --  15 19   CREATININE mg/dL 0.58  --  0.83 1.13*   CALCIUM mg/dL 8.7  --  8.6 9.2   GLUCOSE mg/dL 171*  --  215* 203*   ALBUMIN g/dL 3.1*  --  3.4* 3.9   BILIRUBIN mg/dL 0.2  --  0.3 0.3   ALK PHOS U/L 105  --  93 106   AST (SGOT) U/L 20  --  16 22   ALT (SGPT) U/L 24  --  19 23   Estimated Creatinine Clearance: 144.9 mL/min (by C-G formula based on SCr of 0.58 mg/dL).  No results found for: \"AMMONIA\"  Results from last 7 days   Lab Units 03/17/24  1927 03/17/24  1653   HSTROP T ng/L 8 10             Hemoglobin A1C   Date/Time Value Ref Range " "Status   03/17/2024 1653 8.10 (H) 4.80 - 5.60 % Final     Glucose   Date/Time Value Ref Range Status   03/19/2024 1155 253 (H) 70 - 130 mg/dL Final   03/19/2024 0632 237 (H) 70 - 130 mg/dL Final   03/18/2024 2010 195 (H) 70 - 130 mg/dL Final   03/18/2024 1557 164 (H) 70 - 130 mg/dL Final   03/18/2024 1127 162 (H) 70 - 130 mg/dL Final   03/18/2024 0729 153 (H) 70 - 130 mg/dL Final   03/17/2024 2105 140 (H) 70 - 130 mg/dL Final     Lab Results   Component Value Date    TSH 1.440 03/18/2024    FREET4 0.98 06/07/2021     No results found for: \"PREGTESTUR\", \"PREGSERUM\", \"HCG\", \"HCGQUANT\"  Pain Management Panel  More data may exist         Latest Ref Rng & Units 11/3/2023 6/7/2021   Pain Management Panel   Creatinine, Urine Not Estab. mg/dL 51.9  -   Amphetamine, Urine Qual Negative - Negative    Barbiturates Screen, Urine Negative - Negative    Benzodiazepine Screen, Urine Negative - Negative    Buprenorphine, Screen, Urine Negative - Negative    Cocaine Screen, Urine Negative - Negative    Methadone Screen , Urine Negative - Negative      Brief Urine Lab Results  (Last result in the past 365 days)        Color   Clarity   Blood   Leuk Est   Nitrite   Protein   CREAT   Urine HCG        03/17/24 1829 Yellow   Clear   Negative   Small (1+)   Negative   Trace                 Blood Culture   Date Value Ref Range Status   03/17/2024 No growth at 24 hours  Preliminary   03/17/2024 Gram Negative Bacilli (C)  Preliminary     Urine Culture   Date Value Ref Range Status   03/17/2024 25,000 CFU/mL Mixed Carri Isolated  Final     No results found for: \"WOUNDCX\"  No results found for: \"STOOLCX\"  No results found for: \"RESPCX\"  No results found for: \"AFBCX\"  Results from last 7 days   Lab Units 03/18/24  0104 03/17/24  1927 03/17/24  1653   PROCALCITONIN ng/mL  --   --  0.47*   LACTATE mmol/L  --  1.3 2.4*   CRP mg/dL 20.01*  --  22.66*       I have personally looked at the labs and they are summarized " above.  ----------------------------------------------------------------------------------------------------------------------  Detailed radiology reports for the last 24 hours:    Imaging Results (Last 24 Hours)       ** No results found for the last 24 hours. **          Assessment & Plan    #Sepsis 2/2 left sided pyelonephritis  #E. Coli bacteremia  - Even though urine showed normal lisette in setting of contamination , still most likely source   - Stopped flagyl, continue ceftriaxone.   - Sensitivities pending   - TTE normal LVEF. No evidence of endocarditis.      #Anemia?  - Hbg on presentation 6.5. No history of anemia. NO reported bleeding. Repeat after just 1 unit stable at 13.5. Suspect lab error.      #Severe Hypokalemia  - Mag wnl  - Replace per protocol     #Diarrhea  - Likely explains hypokalemia  - C-diff and PCR and negative       Chronic medical problems  #DM II- A1C: 8.1%, SSI. Titrate as needed. Currently reasonably controlled.   #HTN/HLD- Continue home regien   #COPD- No wheezing on exam  #CHF? Compensated  #Incidental adrenal nodule      Code status: Full      Dispo: Pending clinical improvement.     VTE Prophylaxis:   Mechanical Order History:        Ordered        03/17/24 2105  Place Sequential Compression Device  Once            03/17/24 2105  Maintain Sequential Compression Device  Continuous                          Pharmalogical Order History:       None              Dawson Yanes MD  [facility] Hospitalist  03/19/24  15:25 EDT

## 2024-03-20 ENCOUNTER — READMISSION MANAGEMENT (OUTPATIENT)
Dept: CALL CENTER | Facility: HOSPITAL | Age: 49
End: 2024-03-20
Payer: MEDICAID

## 2024-03-20 VITALS
OXYGEN SATURATION: 92 % | HEART RATE: 93 BPM | SYSTOLIC BLOOD PRESSURE: 116 MMHG | DIASTOLIC BLOOD PRESSURE: 91 MMHG | HEIGHT: 65 IN | BODY MASS INDEX: 41.3 KG/M2 | WEIGHT: 247.9 LBS | TEMPERATURE: 98.4 F | RESPIRATION RATE: 18 BRPM

## 2024-03-20 LAB
BACTERIA SPEC AEROBE CULT: ABNORMAL
GLUCOSE BLDC GLUCOMTR-MCNC: 178 MG/DL (ref 70–130)
GRAM STN SPEC: ABNORMAL
ISOLATED FROM: ABNORMAL
MAGNESIUM SERPL-MCNC: 1.9 MG/DL (ref 1.6–2.6)

## 2024-03-20 PROCEDURE — 63710000001 INSULIN GLARGINE PER 5 UNITS: Performed by: INTERNAL MEDICINE

## 2024-03-20 PROCEDURE — 63710000001 INSULIN LISPRO (HUMAN) PER 5 UNITS: Performed by: INTERNAL MEDICINE

## 2024-03-20 PROCEDURE — 83735 ASSAY OF MAGNESIUM: CPT | Performed by: INTERNAL MEDICINE

## 2024-03-20 PROCEDURE — 94799 UNLISTED PULMONARY SVC/PX: CPT

## 2024-03-20 PROCEDURE — 82948 REAGENT STRIP/BLOOD GLUCOSE: CPT

## 2024-03-20 PROCEDURE — 94761 N-INVAS EAR/PLS OXIMETRY MLT: CPT

## 2024-03-20 PROCEDURE — 99239 HOSP IP/OBS DSCHRG MGMT >30: CPT | Performed by: INTERNAL MEDICINE

## 2024-03-20 PROCEDURE — 25810000003 SODIUM CHLORIDE 0.9 % SOLUTION: Performed by: INTERNAL MEDICINE

## 2024-03-20 PROCEDURE — 25010000002 MAGNESIUM SULFATE 4 GM/100ML SOLUTION: Performed by: INTERNAL MEDICINE

## 2024-03-20 PROCEDURE — 94660 CPAP INITIATION&MGMT: CPT

## 2024-03-20 PROCEDURE — 94664 DEMO&/EVAL PT USE INHALER: CPT

## 2024-03-20 RX ORDER — CEFDINIR 300 MG/1
300 CAPSULE ORAL 2 TIMES DAILY
Qty: 14 CAPSULE | Refills: 0 | Status: SHIPPED | OUTPATIENT
Start: 2024-03-20 | End: 2024-03-27

## 2024-03-20 RX ORDER — MAGNESIUM SULFATE HEPTAHYDRATE 40 MG/ML
4 INJECTION, SOLUTION INTRAVENOUS ONCE
Status: COMPLETED | OUTPATIENT
Start: 2024-03-20 | End: 2024-03-20

## 2024-03-20 RX ADMIN — ARIPIPRAZOLE 10 MG: 10 TABLET ORAL at 08:18

## 2024-03-20 RX ADMIN — PANTOPRAZOLE SODIUM 40 MG: 40 TABLET, DELAYED RELEASE ORAL at 05:45

## 2024-03-20 RX ADMIN — CETIRIZINE HYDROCHLORIDE 10 MG: 10 TABLET, FILM COATED ORAL at 08:18

## 2024-03-20 RX ADMIN — LOPERAMIDE HYDROCHLORIDE 2 MG: 2 CAPSULE ORAL at 08:18

## 2024-03-20 RX ADMIN — BUDESONIDE AND FORMOTEROL FUMARATE DIHYDRATE 2 PUFF: 160; 4.5 AEROSOL RESPIRATORY (INHALATION) at 07:30

## 2024-03-20 RX ADMIN — OXCARBAZEPINE 600 MG: 300 TABLET, FILM COATED ORAL at 08:18

## 2024-03-20 RX ADMIN — EMPAGLIFLOZIN 10 MG: 10 TABLET, FILM COATED ORAL at 08:18

## 2024-03-20 RX ADMIN — GABAPENTIN 800 MG: 400 CAPSULE ORAL at 05:45

## 2024-03-20 RX ADMIN — Medication 10 ML: at 08:18

## 2024-03-20 RX ADMIN — LIDOCAINE 2 PATCH: 4 PATCH TOPICAL at 08:17

## 2024-03-20 RX ADMIN — INSULIN GLARGINE 30 UNITS: 100 INJECTION, SOLUTION SUBCUTANEOUS at 08:18

## 2024-03-20 RX ADMIN — INSULIN LISPRO 2 UNITS: 100 INJECTION, SOLUTION INTRAVENOUS; SUBCUTANEOUS at 08:18

## 2024-03-20 RX ADMIN — SODIUM CHLORIDE 100 ML/HR: 9 INJECTION, SOLUTION INTRAVENOUS at 07:22

## 2024-03-20 RX ADMIN — CLONAZEPAM 1 MG: 0.5 TABLET ORAL at 08:17

## 2024-03-20 RX ADMIN — MAGNESIUM SULFATE HEPTAHYDRATE 4 G: 40 INJECTION, SOLUTION INTRAVENOUS at 05:45

## 2024-03-20 RX ADMIN — TIOTROPIUM BROMIDE INHALATION SPRAY 2 PUFF: 3.12 SPRAY, METERED RESPIRATORY (INHALATION) at 07:31

## 2024-03-20 RX ADMIN — SERTRALINE 200 MG: 50 TABLET, FILM COATED ORAL at 08:18

## 2024-03-20 RX ADMIN — Medication 10 ML: at 08:19

## 2024-03-20 NOTE — PLAN OF CARE
Goal Outcome Evaluation:  Plan of Care Reviewed With: patient        Progress: improving  Outcome Evaluation: VSS. Pt alert and pleasant. PRN immodium given per pt request. Pt wore CPAP during the night. No complaints of pain. Call light within reach.

## 2024-03-20 NOTE — DISCHARGE SUMMARY
"    [facility] HOSPITALISTS DISCHARGE SUMMARY    Patient Identification:  Name:  Ana Ortiz  Age:  48 y.o.  Sex:  female  :  1975  MRN:  [id number]  Visit Number:  [id number]    Date of Admission: 3/17/2024  Date of Discharge:  3/20/2024    PCP: Santa Mcmahon APRN    DISCHARGE DIAGNOSIS  #Sepsis 2/2 left sided pyelonephritis  #E. Coli bacteremia  #Suspected pseudo-anemia on labs   #Diarrheal illness     Chronic medical problems  #DM II- A1C: 8.1%  #HTN/HLD  #COPD  #CHF?   #Incidental adrenal nodule     CONSULTS   None    PROCEDURES PERFORMED  None    HOSPITAL COURSE  Patient is a 48 y.o. female presented on 3/17 to [facility] complaining of N/V/D, fever, suprapubic discomfort.  Please see the admitting history and physical for further details.      Ms. Ortiz is our 49 yo F with hx COPD/JOANN on 2L NC at night only w/ CPAP, arthritis, CHF documented though patient denies and ECHO in her chart shows no systolic or diastolic dysfunction, insulin dependent type II DM, HTN, HLD, and vulvar cancer s/p hysterectomy who presented with 4 days of worsening suprapubic pain, N/V/D. Patient found to be septic in the ED. Urine with pyuria and CT scan revealing \"left retroperitoneal perinephric fat stranding. Findings are nonspecific and may represent pyelonephritis.\" Even though urine showed normal lisette in setting of contamination , still likely source. TTE unrevealing. Pateint has been on ceftriaxone while inpatient. Symptoms have resolved and patient has been persistently afebrile. Sensitivities returned and sensitive to cephlosporin therapy. Will deescalate to omnicef for patient to complete 10 total days. Patient to f//u with PCP in 1 week. A 12-month follow-up CT of the abdomen, adrenal mass protocol, is recommended for incidentally found adrenal nodule. Of note, patient was given a unit of blood for initial hemoglobin of 6.5 but has since persistently been over 13 making me suspect " of the accuracy of initial reading. No signs of bleeding while inpatient. Patient stable for discharge and agreeable to plan.     VITAL SIGNS:  Temp:  [97.7 °F (36.5 °C)-98.4 °F (36.9 °C)] 98.4 °F (36.9 °C)  Heart Rate:  [] 93  Resp:  [15-24] 18  BP: (105-136)/() 116/91  SpO2:  [87 %-99 %] 92 %  on  Flow (L/min):  [2] 2;   Device (Oxygen Therapy): room air    Body mass index is 41.25 kg/m².  Wt Readings from Last 3 Encounters:   03/20/24 112 kg (247 lb 14.4 oz)   02/05/24 107 kg (235 lb 6.4 oz)   01/08/24 104 kg (229 lb)       PHYSICAL EXAM:  Constitutional:  Well-developed and well-nourished.  No respiratory distress.  Obese.     HENT:  Head:  Normocephalic and atraumatic.  Mouth:  Moist mucous membranes.    Eyes:  Conjunctivae and EOM are normal.  Pupils are equal, round, and reactive to light.  No scleral icterus.    Cardiovascular:  Normal rate, regular rhythm and normal heart sounds with no murmur.  Pulmonary/Chest:  No respiratory distress, no wheezes, no crackles, with normal breath sounds and good air movement.  Abdominal:  Soft.  Bowel sounds are normal.  No distension and no tenderness.   Musculoskeletal:  No edema, no tenderness, and no deformity.  No red or swollen joints anywhere.    Neurological:  Alert and oriented to person, place, and time.  No gross neurological deficit.   Skin:  Skin is warm and dry. No rash noted. No pallor.   Peripheral vascular:  Strong pulses in all 4 extremities with no clubbing, no cyanosis, no edema.    DISCHARGE DISPOSITION   Stable    DISCHARGE MEDICATIONS:     Discharge Medications        New Medications        Instructions Start Date   cefdinir 300 MG capsule  Commonly known as: OMNICEF   300 mg, Oral, 2 Times Daily             Continue These Medications        Instructions Start Date   albuterol sulfate  (90 Base) MCG/ACT inhaler  Commonly known as: Ventolin HFA   2 puffs, Inhalation, Every 4 Hours PRN      ARIPiprazole 10 MG tablet  Commonly known  as: ABILIFY   10 mg, Oral, Daily      Aspirin-Acetaminophen 500-325 MG pack   1 packet, Oral, 2 Times Daily PRN      budesonide-formoterol 160-4.5 MCG/ACT inhaler  Commonly known as: Symbicort   2 puffs, Inhalation, 2 Times Daily - RT      cetirizine 10 MG tablet  Commonly known as: zyrTEC   10 mg, Oral, Daily      clonazePAM 1 MG tablet  Commonly known as: KlonoPIN   1 mg, Oral, 4 Times Daily      empagliflozin 10 MG tablet tablet  Commonly known as: Jardiance   10 mg, Oral, Daily      fluticasone 50 MCG/ACT nasal spray  Commonly known as: FLONASE   2 sprays into the nostril(s) as directed by provider Daily As Needed for Rhinitis or Allergies.      gabapentin 800 MG tablet  Commonly known as: NEURONTIN   800 mg, Oral, 4 Times Daily      glipizide 5 MG tablet  Commonly known as: Glucotrol   5 mg, Oral, 2 Times Daily Before Meals      hydroCHLOROthiazide 25 MG tablet   25 mg, Oral, Every Morning      ibuprofen 800 MG tablet  Commonly known as: ADVIL,MOTRIN   800 mg, Oral, Every 8 Hours PRN      ipratropium-albuterol 0.5-2.5 mg/3 ml nebulizer  Commonly known as: DUO-NEB   3 mL, Nebulization, Every 6 Hours PRN      Lantus SoloStar 100 UNIT/ML injection pen  Generic drug: Insulin Glargine   60 Units, Subcutaneous, Daily      lidocaine 5 %  Commonly known as: LIDODERM   1 patch, Transdermal, Every 24 Hours, Remove & Discard patch within 12 hours or as directed by MD      lisinopril 5 MG tablet  Commonly known as: PRINIVIL,ZESTRIL   2.5 mg, Oral, Daily      omeprazole 20 MG capsule  Commonly known as: priLOSEC   40 mg, Oral, Daily      OXcarbazepine 600 MG tablet  Commonly known as: TRILEPTAL   600 mg, Oral, 2 Times Daily      sertraline 100 MG tablet  Commonly known as: ZOLOFT   200 mg, Oral, Daily      simvastatin 80 MG tablet  Commonly known as: ZOCOR   80 mg, Oral, Every Night at Bedtime      tiotropium 18 MCG per inhalation capsule  Commonly known as: Spiriva HandiHaler   1 capsule, Inhalation, Daily      traZODone  50 MG tablet  Commonly known as: DESYREL   100 mg, Oral, Nightly PRN      Trulicity 4.5 MG/0.5ML solution pen-injector  Generic drug: Dulaglutide   4.5 mg, Subcutaneous, Weekly                  Follow-up Information       Santa Mcmahon, [facility] Follow up in 1 week(s).    Specialty: Family Medicine  Contact information:  [address] [place] [id number] [phone]                              TEST  RESULTS PENDING AT DISCHARGE  Pending Labs       Order Current Status    Blood Culture - Blood, Arm, Left Preliminary result             CODE STATUS  Code Status and Medical Interventions:   Ordered at: 03/17/24 1936     Code Status (Patient has no pulse and is not breathing):    CPR (Attempt to Resuscitate)     Medical Interventions (Patient has pulse or is breathing):    Full Support       Dawson Yanes MD  [facility] Hospitalist  03/20/24  09:59 EDT    Please note that this discharge summary required more than 30 minutes to complete.

## 2024-03-21 LAB
BH BB BLOOD EXPIRATION DATE: NORMAL
BH BB BLOOD EXPIRATION DATE: NORMAL
BH BB BLOOD TYPE BARCODE: 5100
BH BB BLOOD TYPE BARCODE: 5100
BH BB DISPENSE STATUS: NORMAL
BH BB DISPENSE STATUS: NORMAL
BH BB PRODUCT CODE: NORMAL
BH BB PRODUCT CODE: NORMAL
BH BB UNIT NUMBER: NORMAL
BH BB UNIT NUMBER: NORMAL
CROSSMATCH INTERPRETATION: NORMAL
CROSSMATCH INTERPRETATION: NORMAL
UNIT  ABO: NORMAL
UNIT  ABO: NORMAL
UNIT  RH: NORMAL
UNIT  RH: NORMAL

## 2024-03-21 NOTE — OUTREACH NOTE
Prep Survey      Flowsheet Row Responses   [facility] facility patient discharged from? [facility]   Is LACE score < 7 ? No   Eligibility Readm Mgmt   Discharge diagnosis Severe sepsis   Does the patient have one of the following disease processes/diagnoses(primary or secondary)? Sepsis   Does the patient have Home health ordered? No   Is there a DME ordered? No   Prep survey completed? Yes            Laurita MOSHER - Registered Nurse

## 2024-03-22 LAB — BACTERIA SPEC AEROBE CULT: NORMAL

## 2024-03-22 NOTE — PROGRESS NOTES
Enter Query Response Below      Query Response: Lactic acidosis was clinically significant as it supported diagnosis of severe sepsis (along with hypotension that responded to IV fluid resuscitation).              If applicable, please update the problem list.   Patient: Ana Ortiz        : 1975  Account: [id number]           Admit Date:         How to Respond to this query:       a. Click New Note     b. Answer query within the yellow box.                c. Update the Problem List, if applicable.      If you have any questions about this query contact me at: [email or web address]     Dr. Agarwal,     48yr female noted with documentation of lactic acidosis with lactate-2.4 with IV fluids given.       Please clarify the following:    Lactic acidosis-clinically significant  Lactic acidosis-clinically insignificant  Other- specify ___  Unable to determine        By submitting this query, we are merely seeking further clarification of documentation to accurately reflect all conditions that you are monitoring, evaluating, treating or that extend the hospitalization or utilize additional resources of care. Please utilize your independent clinical judgment when addressing the question(s) above.     This query and your response, once completed, will be entered into the legal medical record.    Sincerely,  Edda MENDOZA Rn  Clinical Documentation Integrity Program

## 2024-03-25 ENCOUNTER — READMISSION MANAGEMENT (OUTPATIENT)
Dept: CALL CENTER | Facility: HOSPITAL | Age: 49
End: 2024-03-25
Payer: MEDICAID

## 2024-03-25 NOTE — OUTREACH NOTE
Sepsis Week 1 Survey      Flowsheet Row Responses   [facility] patient discharged from? [facility]   Does the patient have one of the following disease processes/diagnoses(primary or secondary)? Sepsis   Week 1 attempt successful? Yes   Call start time 1718   Call end time 1720   Discharge diagnosis Severe sepsis   Meds reviewed with patient/caregiver? Yes   Is the patient having any side effects they believe may be caused by any medication additions or changes? No   Does the patient have all medications related to this admission filled (includes all antibiotics, inhalers, nebulizers,steroids,etc.) Yes   Is the patient taking all medications as directed (includes completed medication regime)? Yes   Does the patient have a primary care provider?  Yes   Does the patient have an appointment with their PCP within 7 days of discharge? Yes   Has the patient kept scheduled appointments due by today? Yes   Psychosocial issues? No   Did the patient receive a copy of their discharge instructions? Yes   Nursing interventions Reviewed instructions with patient   What is the patient's perception of their health status since discharge? Same   Nursing interventions Nurse provided patient education   Is the patient/caregiver able to teach back TIME? T emperature - higher or lower than normal, I nfection - may have signs and symptoms of an infection, M ental Decline - confused, sleepy, difficult to arouse, E xtremely Ill - severe pain, discomfort, shortness of breath   Nursing interventions Nurse provided reassurance to patient   Is patient/caregiver able to teach back steps to recovery at home? Set small, achievable goals for return to baseline health   If the patient is a current smoker, are they able to teach back resources for cessation? [id number]   Is the patient/caregiver able to teach back the hierarchy of who to call/visit for symptoms/problems? PCP, Specialist, Home health nurse, Urgent Care, ED, 911 Yes   Week 1 call  completed? Yes   Graduated Yes   Is the patient interested in additional calls from an ambulatory ? No   Would this patient benefit from a Referral to [facility] Social Work? No   Wrap up additional comments back to work,   Call end time 1720            JESSICA COLEY - Registered Nurse

## 2024-04-02 ENCOUNTER — PRIOR AUTHORIZATION (OUTPATIENT)
Dept: ENDOCRINOLOGY | Facility: CLINIC | Age: 49
End: 2024-04-02
Payer: MEDICAID

## 2024-04-02 NOTE — TELEPHONE ENCOUNTER
PA started on CMM for Lantus.    CHARLES FERNANDEZ (Key: [id number])  Rx #: [id number]  Need Help? Call us at [phone]  Outcome  Additional Information Required  Member should be able to get the drug/product without a PA at this time.  Drug  Lantus SoloStar 100UNIT/ML pen-injectors  ePA cloud logo  Form  [employer] [place] Medicaid ePA Form 2017 NCPDP  Original Claim Info  75

## 2024-04-16 ENCOUNTER — OFFICE VISIT (OUTPATIENT)
Dept: ENDOCRINOLOGY | Facility: CLINIC | Age: 49
End: 2024-04-16
Payer: MEDICAID

## 2024-04-16 VITALS
BODY MASS INDEX: 41.22 KG/M2 | HEIGHT: 65 IN | WEIGHT: 247.4 LBS | OXYGEN SATURATION: 94 % | HEART RATE: 107 BPM | DIASTOLIC BLOOD PRESSURE: 78 MMHG | SYSTOLIC BLOOD PRESSURE: 114 MMHG

## 2024-04-16 DIAGNOSIS — E11.65 TYPE 2 DIABETES MELLITUS WITH HYPERGLYCEMIA, WITH LONG-TERM CURRENT USE OF INSULIN: Primary | ICD-10-CM

## 2024-04-16 DIAGNOSIS — Z79.4 TYPE 2 DIABETES MELLITUS WITH HYPERGLYCEMIA, WITH LONG-TERM CURRENT USE OF INSULIN: Primary | ICD-10-CM

## 2024-04-16 PROCEDURE — 1160F RVW MEDS BY RX/DR IN RCRD: CPT | Performed by: NURSE PRACTITIONER

## 2024-04-16 PROCEDURE — 99214 OFFICE O/P EST MOD 30 MIN: CPT | Performed by: NURSE PRACTITIONER

## 2024-04-16 PROCEDURE — 3052F HG A1C>EQUAL 8.0%<EQUAL 9.0%: CPT | Performed by: NURSE PRACTITIONER

## 2024-04-16 PROCEDURE — 1159F MED LIST DOCD IN RCRD: CPT | Performed by: NURSE PRACTITIONER

## 2024-04-16 RX ORDER — SEMAGLUTIDE 2.68 MG/ML
2 INJECTION, SOLUTION SUBCUTANEOUS WEEKLY
Qty: 3 ML | Refills: 2 | Status: SHIPPED | OUTPATIENT
Start: 2024-04-16

## 2024-04-16 RX ORDER — ACYCLOVIR 400 MG/1
1 TABLET ORAL
Qty: 3 EACH | Refills: 5 | Status: SHIPPED | OUTPATIENT
Start: 2024-04-16

## 2024-04-16 RX ORDER — ACYCLOVIR 400 MG/1
1 TABLET ORAL TAKE AS DIRECTED
Qty: 1 EACH | Refills: 0 | Status: SHIPPED | OUTPATIENT
Start: 2024-04-16

## 2024-04-16 NOTE — PROGRESS NOTES
" Specialty Pharmacy Patient Management Program  Endocrinology Medication Education     Pharmacy was consulted by Endocrinology provider to provide medication education to patient for Dexcom G7.     Education Provided for DM medications:  The patient has been provided with the following education and any applicable administration techniques (i.e. self-injection) have been demonstrated for the therapies indicated. All questions and concerns have been addressed prior to the patient receiving the medication, and the patient has verbalized understanding of the education and any materials provided.  Additional patient education shall be provided and documented upon request by the patient, provider or payer.      Dexcom G7 Patient Education  Educated patient on using Dexcom G7 device to monitor BG:   Patient is using  to monitor blood sugars.     Sensors:  Educated on application process: clean area with alcohol beforehand. Unscrew the lid on the applicator to expose the sensor. Press the applicator against the skin on the back of the upper arm and press the button to apply the sensor.   Must be changed every 10 days. One month supply will include three sensors.  Sensor is placed on the back of upper arm. Sensor placement is important and you will want to change your insertion site with each sensor.    Using the same site too often might not allow the skin to heal, causing scarring or skin irritation.  Choose a site:  At least 3 inches from any insulin pump infusion set or injection site  Away from waistbands, scarring, tattoos, irritation, and bones  Unlikely to be bumped, pushed, or laid on while sleeping  Once the sensor is placed, press \"start new sensor\" in jazmyne on smart phone or on .   Each new sensor will take 1 hour to warm up and begin providing blood sugar readings.      /Jazmyne on Smart Phone:  Will be used to alert you when blood sugar is low or high and to obtain blood sugar readings.  "     Attestation  I attest the patient was actively involved in and has agreed to the above plan of care. If the prescribed therapy is at any point deemed not appropriate based on the current or future assessments, a consultation will be initiated with the patient's specialty care provider to determine the best course of action. The revised plan of therapy will be documented along with any required assessments and/or additional patient education provided.     Thank you,     Darline Espinosa, PharmD, [facility]  Clinical Specialty Pharmacist, Endocrinology  02/14/24  09:38 EST

## 2024-04-16 NOTE — PROGRESS NOTES
Chief Complaint   Patient presents with    Diabetes        Referring Provider  No ref. provider found     HPI   Ana Ortiz is a 48 y.o. female had concerns including Diabetes.    T2DM.    Patient reports that since her last visit, she has been in the hospital with sepsis in the bloodstream due to UTI.  She reports that she does not feel like the Trulicity is helping with her blood sugar control and would like to switch back to Ozempic.  She does report that she has increased constipation and nausea with the use of Trulicity.    Diabetes:  Diabetes was diagnosed [id number], she had abnormal labs, but was starting to feel ill as well.  Complications include neuropathy, no stroke/cardiovascular issues.  Last [facility]htho exam was [facility] in [place], [place].  Current medications for diabetes include Levemir 60 units QHS, Jardiance 10 mg QD, Trulicity 4.5 mg weekly, Glipizide 5 mg BID.  Past meds: Metformin-diarrhea, glipizide-changed therapy d/t not working anymore, Trulicity-constipation/nausea, Ozempic-insurance coverage  She checks her blood sugar 2-3 times per day.   Hypos: none  FSB-199  PPD: 200's    ACE/ARB:yes, Statin: yes  Labs:  A1C:10.6 (2023), 10.4 (2023), 8.7 (2023)  Lipid Panel:utd  MELLO: utd    The following portions of the patient's history were reviewed and updated as appropriate: allergies, current medications, past family history, past medical history, past social history, past surgical history, and problem list.    Diet: she doesn't limit her diet much    Past Medical History:   Diagnosis Date    Arthritis     CHF (congestive heart failure)     COPD (chronic obstructive pulmonary disease)     Diabetes mellitus     Elevated cholesterol     GERD (gastroesophageal reflux disease)     Hyperlipemia     Hypertension     JOANN (obstructive sleep apnea)     Pneumonia     Vulvar cancer      Past Surgical History:   Procedure Laterality Date    HYSTERECTOMY      INCISION AND DRAINAGE  TRUNK N/A 1/11/2022    Procedure: INCISION AND DRAINAGE BACK;  Surgeon: Tamera Saez MD;  Location:  [facility];  Service: General;  Laterality: N/A;    MASTECTOMY      VULVA SURGERY      reconstruction due ca      Family History   Problem Relation Age of Onset    Cancer Mother     Hypertension Mother     Parkinsonism Father     Diabetes Father     Hypertension Father     Cancer Maternal Aunt     Diabetes Paternal Aunt     Diabetes Paternal Grandmother     Cancer Maternal Aunt     Diabetes Brother       Social History     Socioeconomic History    Marital status:    Tobacco Use    Smoking status: Every Day     Current packs/day: 1.50     Average packs/day: 1.4 packs/day for 28.9 years (41.6 ttl pk-yrs)     Types: Cigarettes     Start date: 5/18/1995     Passive exposure: Current    Smokeless tobacco: Never    Tobacco comments:     8-10 per day    Vaping Use    Vaping status: Former    Substances: Nicotine, Flavoring    Devices: Refillable tank   Substance and Sexual Activity    Alcohol use: Never    Drug use: Never    Sexual activity: Not Currently     Partners: Male     Birth control/protection: Condom      Allergies   Allergen Reactions    Codeine Nausea Only    Metformin GI Intolerance     Extreme diarrhea      Current Outpatient Medications on File Prior to Visit   Medication Sig Dispense Refill    albuterol sulfate HFA (Ventolin HFA) 108 (90 Base) MCG/ACT inhaler Inhale 2 puffs Every 4 (Four) Hours As Needed for Wheezing or Shortness of Air. 18 g 12    ARIPiprazole (ABILIFY) 10 MG tablet Take 1 tablet by mouth Daily.      Aspirin-Acetaminophen 500-325 MG pack Take 1 packet by mouth 2 (Two) Times a Day As Needed (Pain/Headache).      budesonide-formoterol (Symbicort) 160-4.5 MCG/ACT inhaler Inhale 2 puffs 2 (Two) Times a Day. 1 each 8    cetirizine (zyrTEC) 10 MG tablet Take 1 tablet by mouth Daily.      clonazePAM (KlonoPIN) 1 MG tablet Take 1 tablet by mouth 4 (Four) Times a Day.      empagliflozin  (Jardiance) 10 MG tablet tablet Take 1 tablet by mouth Daily. 30 tablet 5    fluticasone (FLONASE) 50 MCG/ACT nasal spray 2 sprays into the nostril(s) as directed by provider Daily As Needed for Rhinitis or Allergies.      gabapentin (NEURONTIN) 800 MG tablet Take 1 tablet by mouth 4 (Four) Times a Day.      glipizide (Glucotrol) 5 MG tablet Take 1 tablet by mouth 2 (Two) Times a Day Before Meals. 60 tablet 5    hydroCHLOROthiazide (HYDRODIURIL) 25 MG tablet Take 1 tablet by mouth Every Morning.      ibuprofen (ADVIL,MOTRIN) 800 MG tablet Take 1 tablet by mouth Every 8 (Eight) Hours As Needed (Pain/Inflammation).      ipratropium-albuterol (DUO-NEB) 0.5-2.5 mg/3 ml nebulizer Take 3 mL by nebulization Every 6 (Six) Hours As Needed for Wheezing or Shortness of Air.      lidocaine (LIDODERM) 5 % Place 1 patch on the skin as directed by provider Daily. Remove & Discard patch within 12 hours or as directed by MD      lisinopril (PRINIVIL,ZESTRIL) 5 MG tablet Take 0.5 tablets by mouth Daily.      omeprazole (priLOSEC) 20 MG capsule Take 2 capsules by mouth Daily.      OXcarbazepine (TRILEPTAL) 600 MG tablet Take 1 tablet by mouth 2 (Two) Times a Day.      sertraline (ZOLOFT) 100 MG tablet Take 2 tablets by mouth Daily.      simvastatin (ZOCOR) 80 MG tablet Take 1 tablet by mouth every night at bedtime.      tiotropium (Spiriva HandiHaler) 18 MCG per inhalation capsule Place 1 capsule into inhaler and inhale Daily. 30 capsule 8    traZODone (DESYREL) 50 MG tablet Take 2 tablets by mouth At Night As Needed for Sleep.      [DISCONTINUED] Dulaglutide (Trulicity) 4.5 MG/0.5ML solution pen-injector Inject 0.5 mL under the skin into the appropriate area as directed 1 (One) Time Per Week. 2 mL 2    [DISCONTINUED] Insulin Glargine (Lantus SoloStar) 100 UNIT/ML injection pen Inject 60 Units under the skin into the appropriate area as directed Daily. 30 mL 3     No current facility-administered medications on file prior to visit.     "    Review of Systems   Constitutional:  Positive for fatigue and unexpected weight gain.   Eyes: Negative.    Gastrointestinal:  Positive for constipation.   Endocrine: Positive for polydipsia, polyphagia and polyuria.   Genitourinary:         Itching and burning.   Psychiatric/Behavioral:  Positive for sleep disturbance.    All other systems reviewed and are negative.    /78 (BP Location: Right arm, Patient Position: Sitting, Cuff Size: Large Adult)   Pulse 107   Ht 165.1 cm (65\")   Wt 112 kg (247 lb 6.4 oz)   SpO2 94%   BMI 41.17 kg/m²      Physical Exam  Vitals reviewed.   Constitutional:       Appearance: Normal appearance.   Eyes:      Extraocular Movements: Extraocular movements intact.   Cardiovascular:      Rate and Rhythm: Normal rate.   Pulmonary:      Effort: Pulmonary effort is normal.   Skin:     General: Skin is warm.   Neurological:      General: No focal deficit present.      Mental Status: She is alert and oriented to person, place, and time.   Psychiatric:         Mood and Affect: Mood normal.         Behavior: Behavior normal.         Thought Content: Thought content normal.         Judgment: Judgment normal.       CMP:  Lab Results   Component Value Date    BUN 14 03/19/2024    CREATININE 0.58 03/19/2024    EGFRIFNONA 93 01/13/2022    BCR 24.1 03/19/2024     (L) 03/19/2024    K 3.9 03/19/2024    K 3.9 03/19/2024    CO2 20.4 (L) 03/19/2024    CALCIUM 8.7 03/19/2024    ALBUMIN 3.1 (L) 03/19/2024    BILITOT 0.2 03/19/2024    ALKPHOS 105 03/19/2024    AST 20 03/19/2024    ALT 24 03/19/2024     Lipid Panel:  No results found for: \"CHOL\", \"TRIG\", \"HDL\", \"VLDL\", \"LDL\"  HbA1c:  Lab Results   Component Value Date    HGBA1C 8.10 (H) 03/17/2024    HGBA1C 10.40 (H) 01/12/2022     Glucose:  Lab Results   Component Value Date    POCGLU 178 (H) 03/20/2024     Microalbumin:  Lab Results   Component Value Date    MALBCRERATIO 23 11/03/2023     TSH:  Lab Results   Component Value Date    TSH " 1.440 03/18/2024       Assessment and Plan    Diagnoses and all orders for this visit:    1. Type 2 diabetes mellitus with hyperglycemia, with long-term current use of insulin (Primary)  Assessment & Plan:  -Diabetes is improving with A1c down to 8.1.  -Discussed dietary and exercise guidelines with patient.    -Discussed the importance of yearly eye exams.  -Discussed the importance of checking BG's regularly.  Gets the use of CGM with patient.  Will send in prescription for Dexcom G7.  -Switch Trulicity to Ozempic 2 mg weekly.  Patient has no personal history of pancreatitis, no family history of MEN syndrome or medullary thyroid cancer. Possible side effects including nausea, bloating, other GI upset and rarely pancreatitis were discussed. She was advised to call the office with any symptoms or concerns.   Discussed importance of increasing water intake and fiber in diet and taking stool softeners as needed to relieve constipation.  -Discontinue Jardiance.   -Increase Levemir 70 units QHS.   -Continue Glipizide 5 mg BID.  -S/S hypoglycemia reviewed with Rule of 15's advised.  -Follow-up in 3 months.       Other orders  -     Continuous Blood Gluc  (Dexcom G7 ) device; Use 1 each Take As Directed.  Dispense: 1 each; Refill: 0  -     Continuous Blood Gluc Sensor (Dexcom G7 Sensor) misc; Use 1 each Every 10 (Ten) Days.  Dispense: 3 each; Refill: 5  -     Semaglutide, 2 MG/DOSE, (Ozempic, 2 MG/DOSE,) 8 MG/3ML solution pen-injector; Inject 2 mg under the skin into the appropriate area as directed 1 (One) Time Per Week.  Dispense: 3 mL; Refill: 2  -     Insulin Glargine (LANTUS SOLOSTAR) 100 UNIT/ML injection pen; Inject 70 Units under the skin into the appropriate area as directed Every Night.  Dispense: 30 mL; Refill: 2             Return in about 3 months (around 7/16/2024) for Follow-up appointment, A1C. The patient was instructed to contact the clinic with any interval questions or  concerns.        This document has been electronically signed by CHAZ Mcintosh  April 16, 2024 09:47 EDT   Endocrinology    Please note that portions of this document were completed with a voice recognition program. Efforts were made to edit the dictations, but occasionally words are mis-transcribed.

## 2024-04-16 NOTE — ASSESSMENT & PLAN NOTE
-Diabetes is improving with A1c down to 8.1.  -Discussed dietary and exercise guidelines with patient.    -Discussed the importance of yearly eye exams.  -Discussed the importance of checking BG's regularly.  Gets the use of CGM with patient.  Will send in prescription for Dexcom G7.  -Switch Trulicity to Ozempic 2 mg weekly.  Patient has no personal history of pancreatitis, no family history of MEN syndrome or medullary thyroid cancer. Possible side effects including nausea, bloating, other GI upset and rarely pancreatitis were discussed. She was advised to call the office with any symptoms or concerns.   Discussed importance of increasing water intake and fiber in diet and taking stool softeners as needed to relieve constipation.  -Discontinue Jardiance.   -Increase Levemir 70 units QHS.   -Continue Glipizide 5 mg BID.  -S/S hypoglycemia reviewed with Rule of 15's advised.  -Follow-up in 3 months.

## 2024-05-03 DIAGNOSIS — Z79.4 TYPE 2 DIABETES MELLITUS WITH HYPERGLYCEMIA, WITH LONG-TERM CURRENT USE OF INSULIN: ICD-10-CM

## 2024-05-03 DIAGNOSIS — E11.65 TYPE 2 DIABETES MELLITUS WITH HYPERGLYCEMIA, WITH LONG-TERM CURRENT USE OF INSULIN: ICD-10-CM

## 2024-05-06 RX ORDER — DULAGLUTIDE 4.5 MG/.5ML
INJECTION, SOLUTION SUBCUTANEOUS
Qty: 2 ML | Refills: 1 | Status: SHIPPED | OUTPATIENT
Start: 2024-05-06

## 2024-05-24 ENCOUNTER — HOSPITAL ENCOUNTER (EMERGENCY)
Facility: HOSPITAL | Age: 49
Discharge: HOME | End: 2024-05-24
Attending: STUDENT IN AN ORGANIZED HEALTH CARE EDUCATION/TRAINING PROGRAM
Payer: MEDICAID

## 2024-05-24 ENCOUNTER — APPOINTMENT (OUTPATIENT)
Dept: MRI IMAGING | Facility: HOSPITAL | Age: 49
End: 2024-05-24
Payer: MEDICAID

## 2024-05-24 ENCOUNTER — APPOINTMENT (OUTPATIENT)
Dept: CT IMAGING | Facility: HOSPITAL | Age: 49
End: 2024-05-24
Payer: MEDICAID

## 2024-05-24 VITALS
SYSTOLIC BLOOD PRESSURE: 114 MMHG | OXYGEN SATURATION: 92 % | DIASTOLIC BLOOD PRESSURE: 78 MMHG | HEIGHT: 65 IN | TEMPERATURE: 98.7 F | BODY MASS INDEX: 38.15 KG/M2 | RESPIRATION RATE: 24 BRPM | HEART RATE: 97 BPM | WEIGHT: 229 LBS

## 2024-05-24 DIAGNOSIS — E83.42 HYPOMAGNESEMIA: ICD-10-CM

## 2024-05-24 DIAGNOSIS — E87.6 HYPOKALEMIA: ICD-10-CM

## 2024-05-24 DIAGNOSIS — N12 PYELONEPHRITIS: Primary | ICD-10-CM

## 2024-05-24 LAB
ALBUMIN SERPL-MCNC: 4 G/DL (ref 3.5–5.2)
ALBUMIN/GLOB SERPL: 1.1 G/DL
ALP SERPL-CCNC: 109 U/L (ref 39–117)
ALT SERPL W P-5'-P-CCNC: 19 U/L (ref 1–33)
ANION GAP SERPL CALCULATED.3IONS-SCNC: 14.4 MMOL/L (ref 5–15)
AST SERPL-CCNC: 12 U/L (ref 1–32)
BACTERIA UR QL AUTO: ABNORMAL /HPF
BASOPHILS # BLD AUTO: 0.02 10*3/MM3 (ref 0–0.2)
BASOPHILS NFR BLD AUTO: 0.1 % (ref 0–1.5)
BILIRUB SERPL-MCNC: 0.3 MG/DL (ref 0–1.2)
BILIRUB UR QL STRIP: NEGATIVE
BUN SERPL-MCNC: 16 MG/DL (ref 6–20)
BUN/CREAT SERPL: 15.7 (ref 7–25)
CALCIUM SPEC-SCNC: 9.1 MG/DL (ref 8.6–10.5)
CHLORIDE SERPL-SCNC: 90 MMOL/L (ref 98–107)
CLARITY UR: ABNORMAL
CO2 SERPL-SCNC: 26.6 MMOL/L (ref 22–29)
COLOR UR: ABNORMAL
CREAT SERPL-MCNC: 1.02 MG/DL (ref 0.57–1)
D-LACTATE SERPL-SCNC: 0.9 MMOL/L (ref 0.5–2)
DEPRECATED RDW RBC AUTO: 45.3 FL (ref 37–54)
EGFRCR SERPLBLD CKD-EPI 2021: 68 ML/MIN/1.73
EOSINOPHIL # BLD AUTO: 0.03 10*3/MM3 (ref 0–0.4)
EOSINOPHIL NFR BLD AUTO: 0.2 % (ref 0.3–6.2)
ERYTHROCYTE [DISTWIDTH] IN BLOOD BY AUTOMATED COUNT: 14.2 % (ref 12.3–15.4)
GLOBULIN UR ELPH-MCNC: 3.5 GM/DL
GLUCOSE SERPL-MCNC: 176 MG/DL (ref 65–99)
GLUCOSE UR STRIP-MCNC: NEGATIVE MG/DL
HCG SERPL QL: NEGATIVE
HCT VFR BLD AUTO: 35.6 % (ref 34–46.6)
HGB BLD-MCNC: 12.3 G/DL (ref 12–15.9)
HGB UR QL STRIP.AUTO: ABNORMAL
HOLD SPECIMEN: NORMAL
HYALINE CASTS UR QL AUTO: ABNORMAL /LPF
IMM GRANULOCYTES # BLD AUTO: 0.07 10*3/MM3 (ref 0–0.05)
IMM GRANULOCYTES NFR BLD AUTO: 0.5 % (ref 0–0.5)
KETONES UR QL STRIP: ABNORMAL
LEUKOCYTE ESTERASE UR QL STRIP.AUTO: ABNORMAL
LIPASE SERPL-CCNC: 12 U/L (ref 13–60)
LYMPHOCYTES # BLD AUTO: 1.18 10*3/MM3 (ref 0.7–3.1)
LYMPHOCYTES NFR BLD AUTO: 8.5 % (ref 19.6–45.3)
MAGNESIUM SERPL-MCNC: 1.3 MG/DL (ref 1.6–2.6)
MCH RBC QN AUTO: 30.3 PG (ref 26.6–33)
MCHC RBC AUTO-ENTMCNC: 34.6 G/DL (ref 31.5–35.7)
MCV RBC AUTO: 87.7 FL (ref 79–97)
MONOCYTES # BLD AUTO: 0.86 10*3/MM3 (ref 0.1–0.9)
MONOCYTES NFR BLD AUTO: 6.2 % (ref 5–12)
NEUTROPHILS NFR BLD AUTO: 11.75 10*3/MM3 (ref 1.7–7)
NEUTROPHILS NFR BLD AUTO: 84.5 % (ref 42.7–76)
NITRITE UR QL STRIP: POSITIVE
NRBC BLD AUTO-RTO: 0 /100 WBC (ref 0–0.2)
PH UR STRIP.AUTO: 5.5 [PH] (ref 5–8)
PLATELET # BLD AUTO: 221 10*3/MM3 (ref 140–450)
PMV BLD AUTO: 9.7 FL (ref 6–12)
POTASSIUM SERPL-SCNC: 2.7 MMOL/L (ref 3.5–5.2)
PROT SERPL-MCNC: 7.5 G/DL (ref 6–8.5)
PROT UR QL STRIP: ABNORMAL
RBC # BLD AUTO: 4.06 10*6/MM3 (ref 3.77–5.28)
RBC # UR STRIP: ABNORMAL /HPF
REF LAB TEST METHOD: ABNORMAL
SODIUM SERPL-SCNC: 131 MMOL/L (ref 136–145)
SP GR UR STRIP: 1.02 (ref 1–1.03)
SQUAMOUS #/AREA URNS HPF: ABNORMAL /HPF
UROBILINOGEN UR QL STRIP: ABNORMAL
WBC # UR STRIP: ABNORMAL /HPF
WBC NRBC COR # BLD AUTO: 13.91 10*3/MM3 (ref 3.4–10.8)
WHOLE BLOOD HOLD COAG: NORMAL
WHOLE BLOOD HOLD SPECIMEN: NORMAL

## 2024-05-24 PROCEDURE — 36415 COLL VENOUS BLD VENIPUNCTURE: CPT

## 2024-05-24 PROCEDURE — 81001 URINALYSIS AUTO W/SCOPE: CPT | Performed by: STUDENT IN AN ORGANIZED HEALTH CARE EDUCATION/TRAINING PROGRAM

## 2024-05-24 PROCEDURE — 25810000003 SODIUM CHLORIDE 0.9 % SOLUTION: Performed by: NURSE PRACTITIONER

## 2024-05-24 PROCEDURE — 87086 URINE CULTURE/COLONY COUNT: CPT | Performed by: STUDENT IN AN ORGANIZED HEALTH CARE EDUCATION/TRAINING PROGRAM

## 2024-05-24 PROCEDURE — 74181 MRI ABDOMEN W/O CONTRAST: CPT

## 2024-05-24 PROCEDURE — 84703 CHORIONIC GONADOTROPIN ASSAY: CPT | Performed by: STUDENT IN AN ORGANIZED HEALTH CARE EDUCATION/TRAINING PROGRAM

## 2024-05-24 PROCEDURE — 96376 TX/PRO/DX INJ SAME DRUG ADON: CPT

## 2024-05-24 PROCEDURE — 74177 CT ABD & PELVIS W/CONTRAST: CPT

## 2024-05-24 PROCEDURE — 83690 ASSAY OF LIPASE: CPT | Performed by: STUDENT IN AN ORGANIZED HEALTH CARE EDUCATION/TRAINING PROGRAM

## 2024-05-24 PROCEDURE — 83605 ASSAY OF LACTIC ACID: CPT | Performed by: STUDENT IN AN ORGANIZED HEALTH CARE EDUCATION/TRAINING PROGRAM

## 2024-05-24 PROCEDURE — 96375 TX/PRO/DX INJ NEW DRUG ADDON: CPT

## 2024-05-24 PROCEDURE — 85025 COMPLETE CBC W/AUTO DIFF WBC: CPT | Performed by: STUDENT IN AN ORGANIZED HEALTH CARE EDUCATION/TRAINING PROGRAM

## 2024-05-24 PROCEDURE — 87077 CULTURE AEROBIC IDENTIFY: CPT | Performed by: STUDENT IN AN ORGANIZED HEALTH CARE EDUCATION/TRAINING PROGRAM

## 2024-05-24 PROCEDURE — 80053 COMPREHEN METABOLIC PANEL: CPT | Performed by: STUDENT IN AN ORGANIZED HEALTH CARE EDUCATION/TRAINING PROGRAM

## 2024-05-24 PROCEDURE — 83735 ASSAY OF MAGNESIUM: CPT | Performed by: NURSE PRACTITIONER

## 2024-05-24 PROCEDURE — 25010000002 HYDROMORPHONE PER 4 MG: Performed by: NURSE PRACTITIONER

## 2024-05-24 PROCEDURE — 99285 EMERGENCY DEPT VISIT HI MDM: CPT

## 2024-05-24 PROCEDURE — 87040 BLOOD CULTURE FOR BACTERIA: CPT | Performed by: STUDENT IN AN ORGANIZED HEALTH CARE EDUCATION/TRAINING PROGRAM

## 2024-05-24 PROCEDURE — 74181 MRI ABDOMEN W/O CONTRAST: CPT | Performed by: RADIOLOGY

## 2024-05-24 PROCEDURE — 25010000002 CEFTRIAXONE PER 250 MG: Performed by: NURSE PRACTITIONER

## 2024-05-24 PROCEDURE — 74177 CT ABD & PELVIS W/CONTRAST: CPT | Performed by: RADIOLOGY

## 2024-05-24 PROCEDURE — 25010000002 ONDANSETRON PER 1 MG: Performed by: NURSE PRACTITIONER

## 2024-05-24 PROCEDURE — 96365 THER/PROPH/DIAG IV INF INIT: CPT

## 2024-05-24 PROCEDURE — 25510000001 IOPAMIDOL 61 % SOLUTION: Performed by: STUDENT IN AN ORGANIZED HEALTH CARE EDUCATION/TRAINING PROGRAM

## 2024-05-24 PROCEDURE — 87186 SC STD MICRODIL/AGAR DIL: CPT | Performed by: STUDENT IN AN ORGANIZED HEALTH CARE EDUCATION/TRAINING PROGRAM

## 2024-05-24 RX ORDER — NALOXONE HYDROCHLORIDE 4 MG/.1ML
SPRAY NASAL
Qty: 2 EACH | Refills: 0 | Status: SHIPPED | OUTPATIENT
Start: 2024-05-24

## 2024-05-24 RX ORDER — ONDANSETRON 2 MG/ML
4 INJECTION INTRAMUSCULAR; INTRAVENOUS ONCE
Status: COMPLETED | OUTPATIENT
Start: 2024-05-24 | End: 2024-05-24

## 2024-05-24 RX ORDER — SODIUM CHLORIDE 0.9 % (FLUSH) 0.9 %
10 SYRINGE (ML) INJECTION AS NEEDED
Status: DISCONTINUED | OUTPATIENT
Start: 2024-05-24 | End: 2024-05-24 | Stop reason: HOSPADM

## 2024-05-24 RX ORDER — CEFDINIR 300 MG/1
300 CAPSULE ORAL 2 TIMES DAILY
Qty: 14 CAPSULE | Refills: 0 | Status: SHIPPED | OUTPATIENT
Start: 2024-05-24 | End: 2024-05-31

## 2024-05-24 RX ORDER — POTASSIUM CHLORIDE 20 MEQ/1
40 TABLET, EXTENDED RELEASE ORAL ONCE
Status: COMPLETED | OUTPATIENT
Start: 2024-05-24 | End: 2024-05-24

## 2024-05-24 RX ORDER — HYDROCODONE BITARTRATE AND ACETAMINOPHEN 5; 325 MG/1; MG/1
1 TABLET ORAL EVERY 6 HOURS PRN
Qty: 12 TABLET | Refills: 0 | Status: SHIPPED | OUTPATIENT
Start: 2024-05-24

## 2024-05-24 RX ORDER — ONDANSETRON 4 MG/1
4 TABLET, FILM COATED ORAL EVERY 6 HOURS
Qty: 10 TABLET | Refills: 0 | Status: SHIPPED | OUTPATIENT
Start: 2024-05-24

## 2024-05-24 RX ORDER — HYDROMORPHONE HYDROCHLORIDE 1 MG/ML
0.5 INJECTION, SOLUTION INTRAMUSCULAR; INTRAVENOUS; SUBCUTANEOUS ONCE
Status: COMPLETED | OUTPATIENT
Start: 2024-05-24 | End: 2024-05-24

## 2024-05-24 RX ORDER — MAGNESIUM OXIDE 400 MG/1
400 TABLET ORAL DAILY
Qty: 3 TABLET | Refills: 0 | Status: SHIPPED | OUTPATIENT
Start: 2024-05-24 | End: 2024-05-27

## 2024-05-24 RX ADMIN — HYDROMORPHONE HYDROCHLORIDE 0.5 MG: 1 INJECTION, SOLUTION INTRAMUSCULAR; INTRAVENOUS; SUBCUTANEOUS at 12:57

## 2024-05-24 RX ADMIN — SODIUM CHLORIDE 1000 ML: 9 INJECTION, SOLUTION INTRAVENOUS at 14:36

## 2024-05-24 RX ADMIN — IOPAMIDOL 80 ML: 612 INJECTION, SOLUTION INTRAVENOUS at 14:10

## 2024-05-24 RX ADMIN — POTASSIUM CHLORIDE 40 MEQ: 1500 TABLET, EXTENDED RELEASE ORAL at 15:22

## 2024-05-24 RX ADMIN — SODIUM CHLORIDE 2000 MG: 9 INJECTION, SOLUTION INTRAVENOUS at 14:37

## 2024-05-24 RX ADMIN — POTASSIUM CHLORIDE 40 MEQ: 1500 TABLET, EXTENDED RELEASE ORAL at 17:09

## 2024-05-24 RX ADMIN — ONDANSETRON 4 MG: 2 INJECTION INTRAMUSCULAR; INTRAVENOUS at 12:56

## 2024-05-24 RX ADMIN — HYDROMORPHONE HYDROCHLORIDE 0.5 MG: 1 INJECTION, SOLUTION INTRAMUSCULAR; INTRAVENOUS; SUBCUTANEOUS at 16:52

## 2024-05-24 NOTE — DISCHARGE INSTRUCTIONS
Follow-up with primary care provider.    Return to the emergency room for any worsening symptoms.    If not improving in 2 to 3 days if unable to follow-up immediately return to the emergency room for further elevation.

## 2024-05-24 NOTE — ED NOTES
Patient states she drove herself to the ED, she states that she does not know of anyone that she can call to give her a ride home due to having pain medication. Patient says she will try to find someone to drive her home.

## 2024-05-24 NOTE — ED PROVIDER NOTES
Subjective   History of Present Illness  Patient is a 48-year-old female presents today complaining of flank pain and dysuria.  Pain.  Patient reports    Dysuria      Review of Systems   Constitutional: Negative.    HENT: Negative.     Eyes: Negative.    Respiratory: Negative.     Cardiovascular: Negative.    Gastrointestinal: Negative.    Endocrine: Negative.    Genitourinary:  Positive for dysuria.   Musculoskeletal: Negative.    Skin: Negative.    Allergic/Immunologic: Negative.    Neurological: Negative.    Hematological: Negative.    Psychiatric/Behavioral: Negative.         Past Medical History:   Diagnosis Date    Arthritis     CHF (congestive heart failure)     COPD (chronic obstructive pulmonary disease)     Diabetes mellitus     Elevated cholesterol     GERD (gastroesophageal reflux disease)     Hyperlipemia     Hypertension     JOANN (obstructive sleep apnea)     Pneumonia     Vulvar cancer        Allergies   Allergen Reactions    Codeine Nausea Only    Metformin GI Intolerance     Extreme diarrhea       Past Surgical History:   Procedure Laterality Date    HYSTERECTOMY      INCISION AND DRAINAGE TRUNK N/A 1/11/2022    Procedure: INCISION AND DRAINAGE BACK;  Surgeon: Tamera Saez MD;  Location: [facility];  Service: General;  Laterality: N/A;    MASTECTOMY      VULVA SURGERY      reconstruction due ca       Family History   Problem Relation Age of Onset    Cancer Mother     Hypertension Mother     Parkinsonism Father     Diabetes Father     Hypertension Father     Cancer Maternal Aunt     Diabetes Paternal Aunt     Diabetes Paternal Grandmother     Cancer Maternal Aunt     Diabetes Brother        Social History     Socioeconomic History    Marital status:    Tobacco Use    Smoking status: Every Day     Current packs/day: 1.50     Average packs/day: 1.4 packs/day for 29.0 years (41.7 ttl pk-yrs)     Types: Cigarettes     Start date: 5/18/1995     Passive exposure: Current    Smokeless tobacco:  Never    Tobacco comments:     8-10 per day    Vaping Use    Vaping status: Former    Substances: Nicotine, Flavoring    Devices: Refillable tank   Substance and Sexual Activity    Alcohol use: Never    Drug use: Never    Sexual activity: Not Currently     Partners: Male     Birth control/protection: Condom           Objective   Physical Exam  Vitals and nursing note reviewed.   Constitutional:       Appearance: She is well-developed.   HENT:      Head: Normocephalic.      Right Ear: External ear normal.      Left Ear: External ear normal.   Eyes:      Conjunctiva/sclera: Conjunctivae normal.      Pupils: Pupils are equal, round, and reactive to light.   Cardiovascular:      Rate and Rhythm: Normal rate and regular rhythm.      Heart sounds: Normal heart sounds.   Pulmonary:      Effort: Pulmonary effort is normal.      Breath sounds: Normal breath sounds.   Abdominal:      General: Bowel sounds are normal.      Palpations: Abdomen is soft.   Musculoskeletal:         General: Normal range of motion.      Cervical back: Normal range of motion and neck supple.   Skin:     General: Skin is warm and dry.      Capillary Refill: Capillary refill takes less than 2 seconds.   Neurological:      Mental Status: She is alert and oriented to person, place, and time.   Psychiatric:         Behavior: Behavior normal.         Thought Content: Thought content normal.         Procedures           ED Course  ED Course as of 05/25/24 2148   Fri May 24, 2024   1642 Patient declines admission. Asks to go home. Reports feeling better. Able to tolerate PO. Denies pain or nausea currently. Discussed with Dr. Wan. Advises that patient may be discharged.  [CAROLINA]      ED Course User Index  [CAROLINA] Rufino Crawley, CHAZ                                             Medical Decision Making  Problems Addressed:  Hypokalemia: complicated acute illness or injury  Hypomagnesemia: complicated acute illness or injury  Pyelonephritis: complicated  acute illness or injury    Amount and/or Complexity of Data Reviewed  Labs: ordered.  Radiology: ordered.    Risk  OTC drugs.  Prescription drug management.        Final diagnoses:   Pyelonephritis   Hypokalemia   Hypomagnesemia       ED Disposition  ED Disposition       ED Disposition   Discharge    Condition   Stable    Comment   --               Santa Mcmahon, APRN  [address] [facility] [address]  [phone]    Schedule an appointment as soon as possible for a visit   For further evaluation         Medication List        New Prescriptions      cefdinir 300 MG capsule  Commonly known as: OMNICEF  Take 1 capsule by mouth 2 (Two) Times a Day for 7 days.     HYDROcodone-acetaminophen 5-325 MG per tablet  Commonly known as: NORCO  Take 1 tablet by mouth Every 6 (Six) Hours As Needed for Severe Pain.     magnesium oxide 400 MG tablet  Commonly known as: MAG-OX  Take 1 tablet by mouth Daily for 3 days.     naloxone 4 MG/0.1ML nasal spray  Commonly known as: NARCAN  Call 911. Don't prime. Elora in 1 nostril for overdose. Repeat in 2-3 minutes in other nostril if no or minimal breathing/responsiveness.     ondansetron 4 MG tablet  Commonly known as: ZOFRAN  Take 1 tablet by mouth Every 6 (Six) Hours. PRN Nausea/vomiting               Where to Get Your Medications        These medications were sent to [facility] [place], [place] - [address] - [phone] [facility] [phone]   [address] [place] [address]      Phone: [phone]   cefdinir 300 MG capsule  HYDROcodone-acetaminophen 5-325 MG per tablet  magnesium oxide 400 MG tablet  naloxone 4 MG/0.1ML nasal spray  ondansetron 4 MG tablet            Rufino Crawley, APRN  05/25/24 1131

## 2024-05-24 NOTE — ED TRIAGE NOTES
MEDICAL SCREENING:    Reason for Visit: recent uti, back pain    Patient initially seen in triage.  The patient was advised further evaluation and diagnostic testing will be needed, some of the treatment and testing will be initiated in the lobby in order to begin the process.  The patient will be returned to the waiting area for the time being and possibly be re-assessed by a subsequent ED provider.  The patient will be brought back to the treatment area in as timely manner as possible.

## 2024-05-26 LAB — BACTERIA SPEC AEROBE CULT: ABNORMAL

## 2024-05-29 LAB
BACTERIA SPEC AEROBE CULT: NORMAL
BACTERIA SPEC AEROBE CULT: NORMAL

## 2024-05-30 ENCOUNTER — OFFICE VISIT (OUTPATIENT)
Dept: PULMONOLOGY | Facility: CLINIC | Age: 49
End: 2024-05-30
Payer: MEDICAID

## 2024-05-30 VITALS
BODY MASS INDEX: 36.99 KG/M2 | WEIGHT: 222 LBS | DIASTOLIC BLOOD PRESSURE: 78 MMHG | OXYGEN SATURATION: 93 % | HEIGHT: 65 IN | SYSTOLIC BLOOD PRESSURE: 112 MMHG | HEART RATE: 106 BPM | TEMPERATURE: 98.6 F

## 2024-05-30 DIAGNOSIS — J96.11 CHRONIC RESPIRATORY FAILURE WITH HYPOXIA: Primary | ICD-10-CM

## 2024-05-30 DIAGNOSIS — E66.01 MORBIDLY OBESE: ICD-10-CM

## 2024-05-30 DIAGNOSIS — J42 CHRONIC BRONCHITIS, UNSPECIFIED CHRONIC BRONCHITIS TYPE: ICD-10-CM

## 2024-05-30 DIAGNOSIS — F17.210 CIGARETTE NICOTINE DEPENDENCE WITHOUT COMPLICATION: ICD-10-CM

## 2024-05-30 DIAGNOSIS — G47.33 OSA (OBSTRUCTIVE SLEEP APNEA): ICD-10-CM

## 2024-05-30 RX ORDER — LANOLIN ALCOHOL/MO/W.PET/CERES
1 CREAM (GRAM) TOPICAL DAILY
COMMUNITY
Start: 2024-05-25

## 2024-05-30 RX ORDER — DOXYCYCLINE HYCLATE 100 MG/1
100 CAPSULE ORAL 2 TIMES DAILY
Qty: 20 CAPSULE | Refills: 0 | Status: SHIPPED | OUTPATIENT
Start: 2024-05-30

## 2024-05-30 RX ORDER — SEMAGLUTIDE 2.68 MG/ML
INJECTION, SOLUTION SUBCUTANEOUS
COMMUNITY
Start: 2024-05-22

## 2024-05-30 RX ORDER — POLYETHYLENE GLYCOL 3350 17 G/17G
POWDER, FOR SOLUTION ORAL
COMMUNITY
Start: 2024-04-18

## 2024-05-30 RX ORDER — MELOXICAM 7.5 MG/1
1 TABLET ORAL EVERY 12 HOURS SCHEDULED
COMMUNITY
Start: 2024-04-04

## 2024-05-30 RX ORDER — PREDNISONE 20 MG/1
40 TABLET ORAL DAILY
Qty: 10 TABLET | Refills: 0 | Status: SHIPPED | OUTPATIENT
Start: 2024-05-30

## 2024-05-30 RX ORDER — CYCLOBENZAPRINE HCL 5 MG
TABLET ORAL
COMMUNITY
Start: 2024-05-25

## 2024-05-30 NOTE — PROGRESS NOTES
"Chief Complaint  COPD    Subjective        Ana Ortiz presents to [facility]  History of Present Illness    Ms. Ortiz is a 48 year old male with a medical history significant for arthritis, CHF, COPD, diabetes, GERD, hyperlipidemia, hypertension, and JOANN.    She presents today for follow up on COPD.  She reports that she is not feeling well today and that she is smothering more than usual.  She states that she has good days and bad days with her COPD.  She tells me that she has been in the hospital due to sepsis from a UTI. She is currently taking Spiriva once daily and Symbicort BID.  She is also taking albuterol inhaler and neb treatments as needed. She is compliant with use  of cpap and supplemental oxygen at night.  He is also taking Trazodone for insomnia.  She remains a current smoker.          Objective   Vital Signs:  /78   Pulse 106   Temp 98.6 °F (37 °C)   Ht 165.1 cm (65\")   Wt 101 kg (222 lb)   SpO2 93%   BMI 36.94 kg/m²   Estimated body mass index is 36.94 kg/m² as calculated from the following:    Height as of this encounter: 165.1 cm (65\").    Weight as of this encounter: 101 kg (222 lb).               Physical Exam     GENERAL APPEARANCE: Well developed, well nourished, alert and cooperative, and appears to be in no acute distress.    HEAD: normocephalic. Atraumatic.    EYES: PERRL, EOMI. Vision is grossly intact.    THROAT: Oral cavity and pharynx normal. No inflammation, swelling, exudate, or lesions.     NECK: Neck supple.  No thyromegaly.    CARDIAC: Normal S1 and S2. No S3, S4 or murmurs. Rhythm is regular.     RESPIRATORY:Bilateral air entry positive. Bilateral diminished breath sounds. No wheezing, crackles or rhonchi noted.    GI: Positive bowel sounds. Soft, nondistended, nontender.     MUSCULOSKELETAL: No significant deformity or joint abnormality. No edema. Peripheral pulses intact. No varicosities.    NEUROLOGICAL: " Strength and sensation symmetric and intact throughout.     PSYCHIATRIC: The mental examination revealed the patient was oriented to person, place, and time.     Result Review :    The following data was reviewed by: CHAZ Erwin on 05/30/2024:  Common labs          3/18/2024    01:04 3/18/2024    11:55 3/18/2024    14:48 3/19/2024    01:39 5/24/2024    12:52   Common Labs   Glucose 215    171  176    BUN 15    14  16    Creatinine 0.83    0.58  1.02    Sodium 132    133  131    Potassium 2.6   3.6  3.9     3.9  2.7    Chloride 92    99  90    Calcium 8.6    8.7  9.1    Albumin 3.4    3.1  4.0    Total Bilirubin 0.3    0.2  0.3    Alkaline Phosphatase 93    105  109    AST (SGOT) 16    20  12    ALT (SGPT) 19    24  19    WBC 15.22    13.30  13.91    Hemoglobin 13.5  13.8   12.1  12.3    Hematocrit 40.7  40.0   37.2  35.6    Platelets 157    164  221                   Assessment and Plan     Diagnoses and all orders for this visit:    1. Chronic respiratory failure with hypoxia (Primary)    2. Chronic bronchitis, unspecified chronic bronchitis type  -     tiotropium (Spiriva HandiHaler) 18 MCG per inhalation capsule; Place 1 capsule into inhaler and inhale Daily.  Dispense: 30 capsule; Refill: 8  -     budesonide-formoterol (Symbicort) 160-4.5 MCG/ACT inhaler; Inhale 2 puffs 2 (Two) Times a Day.  Dispense: 1 each; Refill: 8    3. JOANN (obstructive sleep apnea)    4. Cigarette nicotine dependence without complication    5. Morbidly obese    Other orders  -     predniSONE (DELTASONE) 20 MG tablet; Take 2 tablets by mouth Daily.  Dispense: 10 tablet; Refill: 0  -     doxycycline (VIBRAMYCIN) 100 MG capsule; Take 1 capsule by mouth 2 (Two) Times a Day.  Dispense: 20 capsule; Refill: 0  -     ipratropium-albuterol (DUO-NEB) 0.5-2.5 mg/3 ml nebulizer; Take 3 mL by nebulization Every 6 (Six) Hours As Needed for Wheezing or Shortness of Air.  Dispense: 360 mL; Refill: 5  -     albuterol sulfate HFA (Ventolin  HFA) 108 (90 Base) MCG/ACT inhaler; Inhale 2 puffs Every 4 (Four) Hours As Needed for Wheezing or Shortness of Air.  Dispense: 18 g; Refill: 12        Continue albuterol inhaler as needed.  Continue duonebs as needed.  Continue Spiriva once daily.  Continue Symbicort BID.    Will send in refills.    She is compliant with use of supplemental oxygen and cpap nightly.  She is also taking Trazodone nightly.        Does not qualify for LDCT at this time due to age.      Ana Ortiz  reports that she has been smoking cigarettes. She started smoking about 29 years ago. She has a 41.7 pack-year smoking history. She has been exposed to tobacco smoke. She has never used smokeless tobacco. I have educated her on the risk of diseases from using tobacco products such as cancer, COPD, and heart disease.     I advised her to quit and she is not willing to quit.    Sent in antibiotics and prednisone.    Discussed diet and exercise for weight loss.       Follow Up     Return in about 6 months (around 11/30/2024).  Patient was given instructions and counseling regarding her condition or for health maintenance advice. Please see specific information pulled into the AVS if appropriate.

## 2024-05-31 RX ORDER — ALBUTEROL SULFATE 90 UG/1
2 AEROSOL, METERED RESPIRATORY (INHALATION) EVERY 4 HOURS PRN
Qty: 18 G | Refills: 12 | Status: SHIPPED | OUTPATIENT
Start: 2024-05-31

## 2024-05-31 RX ORDER — TIOTROPIUM BROMIDE 18 UG/1
1 CAPSULE ORAL; RESPIRATORY (INHALATION) DAILY
Qty: 30 CAPSULE | Refills: 8 | Status: SHIPPED | OUTPATIENT
Start: 2024-05-31

## 2024-05-31 RX ORDER — BUDESONIDE AND FORMOTEROL FUMARATE DIHYDRATE 160; 4.5 UG/1; UG/1
2 AEROSOL RESPIRATORY (INHALATION)
Qty: 1 EACH | Refills: 8 | Status: SHIPPED | OUTPATIENT
Start: 2024-05-31

## 2024-05-31 RX ORDER — IPRATROPIUM BROMIDE AND ALBUTEROL SULFATE 2.5; .5 MG/3ML; MG/3ML
3 SOLUTION RESPIRATORY (INHALATION) EVERY 6 HOURS PRN
Qty: 360 ML | Refills: 5 | Status: SHIPPED | OUTPATIENT
Start: 2024-05-31

## 2024-07-08 RX ORDER — SEMAGLUTIDE 2.68 MG/ML
INJECTION, SOLUTION SUBCUTANEOUS
Qty: 3 ML | Refills: 2 | Status: SHIPPED | OUTPATIENT
Start: 2024-07-08

## 2024-08-29 RX ORDER — SEMAGLUTIDE 2.68 MG/ML
2 INJECTION, SOLUTION SUBCUTANEOUS WEEKLY
Qty: 3 ML | Refills: 2 | Status: SHIPPED | OUTPATIENT
Start: 2024-08-29

## 2024-08-29 RX ORDER — ACYCLOVIR 400 MG/1
1 TABLET ORAL
Qty: 3 EACH | Refills: 5 | Status: SHIPPED | OUTPATIENT
Start: 2024-08-29

## 2024-08-30 ENCOUNTER — HOSPITAL ENCOUNTER (EMERGENCY)
Facility: HOSPITAL | Age: 49
Discharge: HOME | End: 2024-08-30
Attending: STUDENT IN AN ORGANIZED HEALTH CARE EDUCATION/TRAINING PROGRAM
Payer: MEDICAID

## 2024-08-30 ENCOUNTER — APPOINTMENT (OUTPATIENT)
Dept: CT IMAGING | Facility: HOSPITAL | Age: 49
End: 2024-08-30
Payer: MEDICAID

## 2024-08-30 VITALS
HEART RATE: 101 BPM | HEIGHT: 65 IN | SYSTOLIC BLOOD PRESSURE: 124 MMHG | TEMPERATURE: 97.8 F | BODY MASS INDEX: 43.32 KG/M2 | DIASTOLIC BLOOD PRESSURE: 84 MMHG | WEIGHT: 260 LBS | RESPIRATION RATE: 16 BRPM | OXYGEN SATURATION: 92 %

## 2024-08-30 DIAGNOSIS — E11.65 HYPERGLYCEMIA DUE TO DIABETES MELLITUS: ICD-10-CM

## 2024-08-30 DIAGNOSIS — R10.9 ABDOMINAL PAIN, UNSPECIFIED ABDOMINAL LOCATION: Primary | ICD-10-CM

## 2024-08-30 LAB
A-A DO2: 47.2 MMHG (ref 0–300)
ACETONE BLD QL: NEGATIVE
ALBUMIN SERPL-MCNC: 3.8 G/DL (ref 3.5–5.2)
ALBUMIN/GLOB SERPL: 1.2 G/DL
ALP SERPL-CCNC: 114 U/L (ref 39–117)
ALT SERPL W P-5'-P-CCNC: 17 U/L (ref 1–33)
ANION GAP SERPL CALCULATED.3IONS-SCNC: 12.2 MMOL/L (ref 5–15)
ARTERIAL PATENCY WRIST A: ABNORMAL
AST SERPL-CCNC: 11 U/L (ref 1–32)
ATMOSPHERIC PRESS: 729 MMHG
BASE EXCESS BLDA CALC-SCNC: 0.6 MMOL/L (ref 0–2)
BASOPHILS # BLD AUTO: 0.04 10*3/MM3 (ref 0–0.2)
BASOPHILS NFR BLD AUTO: 0.3 % (ref 0–1.5)
BDY SITE: ABNORMAL
BILIRUB SERPL-MCNC: <0.2 MG/DL (ref 0–1.2)
BILIRUB UR QL STRIP: NEGATIVE
BUN SERPL-MCNC: 11 MG/DL (ref 6–20)
BUN/CREAT SERPL: 16.7 (ref 7–25)
CALCIUM SPEC-SCNC: 8.8 MG/DL (ref 8.6–10.5)
CHLORIDE SERPL-SCNC: 98 MMOL/L (ref 98–107)
CLARITY UR: CLEAR
CO2 BLDA-SCNC: 27 MMOL/L (ref 22–33)
CO2 SERPL-SCNC: 23.8 MMOL/L (ref 22–29)
COHGB MFR BLD: 11.6 % (ref 0–5)
COLOR UR: YELLOW
CREAT SERPL-MCNC: 0.66 MG/DL (ref 0.57–1)
DEPRECATED RDW RBC AUTO: 45.9 FL (ref 37–54)
EGFRCR SERPLBLD CKD-EPI 2021: 108.4 ML/MIN/1.73
EOSINOPHIL # BLD AUTO: 0.13 10*3/MM3 (ref 0–0.4)
EOSINOPHIL NFR BLD AUTO: 1.1 % (ref 0.3–6.2)
ERYTHROCYTE [DISTWIDTH] IN BLOOD BY AUTOMATED COUNT: 13.5 % (ref 12.3–15.4)
GLOBULIN UR ELPH-MCNC: 3.2 GM/DL
GLUCOSE BLDC GLUCOMTR-MCNC: 279 MG/DL (ref 70–130)
GLUCOSE SERPL-MCNC: 249 MG/DL (ref 65–99)
GLUCOSE UR STRIP-MCNC: ABNORMAL MG/DL
HCO3 BLDA-SCNC: 25.7 MMOL/L (ref 20–26)
HCT VFR BLD AUTO: 39.9 % (ref 34–46.6)
HCT VFR BLD CALC: 41.5 % (ref 38–51)
HGB BLD-MCNC: 13.4 G/DL (ref 12–15.9)
HGB BLDA-MCNC: 13.5 G/DL (ref 13.5–17.5)
HGB UR QL STRIP.AUTO: NEGATIVE
HOLD SPECIMEN: NORMAL
HOLD SPECIMEN: NORMAL
IMM GRANULOCYTES # BLD AUTO: 0.06 10*3/MM3 (ref 0–0.05)
IMM GRANULOCYTES NFR BLD AUTO: 0.5 % (ref 0–0.5)
INHALED O2 CONCENTRATION: 21 %
KETONES UR QL STRIP: NEGATIVE
LEUKOCYTE ESTERASE UR QL STRIP.AUTO: NEGATIVE
LIPASE SERPL-CCNC: 29 U/L (ref 13–60)
LYMPHOCYTES # BLD AUTO: 1.89 10*3/MM3 (ref 0.7–3.1)
LYMPHOCYTES NFR BLD AUTO: 16.5 % (ref 19.6–45.3)
Lab: ABNORMAL
Lab: ABNORMAL
MCH RBC QN AUTO: 30.9 PG (ref 26.6–33)
MCHC RBC AUTO-ENTMCNC: 33.6 G/DL (ref 31.5–35.7)
MCV RBC AUTO: 91.9 FL (ref 79–97)
METHGB BLD QL: 0.2 % (ref 0–3)
MODALITY: ABNORMAL
MONOCYTES # BLD AUTO: 0.51 10*3/MM3 (ref 0.1–0.9)
MONOCYTES NFR BLD AUTO: 4.5 % (ref 5–12)
NEUTROPHILS NFR BLD AUTO: 77.1 % (ref 42.7–76)
NEUTROPHILS NFR BLD AUTO: 8.81 10*3/MM3 (ref 1.7–7)
NITRITE UR QL STRIP: NEGATIVE
NOTIFIED BY: ABNORMAL
NOTIFIED WHO: ABNORMAL
NRBC BLD AUTO-RTO: 0 /100 WBC (ref 0–0.2)
OXYHGB MFR BLDV: 77.7 % (ref 94–99)
PCO2 BLDA: 42.1 MM HG (ref 35–45)
PCO2 TEMP ADJ BLD: ABNORMAL MM[HG]
PH BLDA: 7.39 PH UNITS (ref 7.35–7.45)
PH UR STRIP.AUTO: 6 [PH] (ref 5–8)
PH, TEMP CORRECTED: ABNORMAL
PLATELET # BLD AUTO: 226 10*3/MM3 (ref 140–450)
PMV BLD AUTO: 9.5 FL (ref 6–12)
PO2 BLDA: 48.4 MM HG (ref 83–108)
PO2 TEMP ADJ BLD: ABNORMAL MM[HG]
POTASSIUM SERPL-SCNC: 3.5 MMOL/L (ref 3.5–5.2)
PROT SERPL-MCNC: 7 G/DL (ref 6–8.5)
PROT UR QL STRIP: NEGATIVE
RBC # BLD AUTO: 4.34 10*6/MM3 (ref 3.77–5.28)
SAO2 % BLDCOA: 88.1 % (ref 94–99)
SODIUM SERPL-SCNC: 134 MMOL/L (ref 136–145)
SP GR UR STRIP: 1.01 (ref 1–1.03)
UROBILINOGEN UR QL STRIP: ABNORMAL
VENTILATOR MODE: ABNORMAL
WBC NRBC COR # BLD AUTO: 11.44 10*3/MM3 (ref 3.4–10.8)
WHOLE BLOOD HOLD COAG: NORMAL
WHOLE BLOOD HOLD SPECIMEN: NORMAL

## 2024-08-30 PROCEDURE — 96374 THER/PROPH/DIAG INJ IV PUSH: CPT

## 2024-08-30 PROCEDURE — 96375 TX/PRO/DX INJ NEW DRUG ADDON: CPT

## 2024-08-30 PROCEDURE — 36600 WITHDRAWAL OF ARTERIAL BLOOD: CPT

## 2024-08-30 PROCEDURE — 81003 URINALYSIS AUTO W/O SCOPE: CPT | Performed by: PHYSICIAN ASSISTANT

## 2024-08-30 PROCEDURE — 85025 COMPLETE CBC W/AUTO DIFF WBC: CPT | Performed by: PHYSICIAN ASSISTANT

## 2024-08-30 PROCEDURE — 25010000002 MORPHINE PER 10 MG: Performed by: STUDENT IN AN ORGANIZED HEALTH CARE EDUCATION/TRAINING PROGRAM

## 2024-08-30 PROCEDURE — 74177 CT ABD & PELVIS W/CONTRAST: CPT

## 2024-08-30 PROCEDURE — 99285 EMERGENCY DEPT VISIT HI MDM: CPT

## 2024-08-30 PROCEDURE — 80053 COMPREHEN METABOLIC PANEL: CPT | Performed by: PHYSICIAN ASSISTANT

## 2024-08-30 PROCEDURE — 25810000003 SODIUM CHLORIDE 0.9 % SOLUTION: Performed by: PHYSICIAN ASSISTANT

## 2024-08-30 PROCEDURE — 83050 HGB METHEMOGLOBIN QUAN: CPT

## 2024-08-30 PROCEDURE — 82375 ASSAY CARBOXYHB QUANT: CPT

## 2024-08-30 PROCEDURE — 74177 CT ABD & PELVIS W/CONTRAST: CPT | Performed by: RADIOLOGY

## 2024-08-30 PROCEDURE — 25510000001 IOPAMIDOL 61 % SOLUTION: Performed by: STUDENT IN AN ORGANIZED HEALTH CARE EDUCATION/TRAINING PROGRAM

## 2024-08-30 PROCEDURE — 83690 ASSAY OF LIPASE: CPT | Performed by: PHYSICIAN ASSISTANT

## 2024-08-30 PROCEDURE — 82948 REAGENT STRIP/BLOOD GLUCOSE: CPT

## 2024-08-30 PROCEDURE — 82009 KETONE BODYS QUAL: CPT | Performed by: PHYSICIAN ASSISTANT

## 2024-08-30 PROCEDURE — 82805 BLOOD GASES W/O2 SATURATION: CPT

## 2024-08-30 PROCEDURE — 25010000002 ONDANSETRON PER 1 MG: Performed by: STUDENT IN AN ORGANIZED HEALTH CARE EDUCATION/TRAINING PROGRAM

## 2024-08-30 RX ORDER — SODIUM CHLORIDE 0.9 % (FLUSH) 0.9 %
10 SYRINGE (ML) INJECTION AS NEEDED
Status: DISCONTINUED | OUTPATIENT
Start: 2024-08-30 | End: 2024-08-30 | Stop reason: HOSPADM

## 2024-08-30 RX ORDER — ONDANSETRON 4 MG/1
4 TABLET, ORALLY DISINTEGRATING ORAL EVERY 6 HOURS PRN
Qty: 12 TABLET | Refills: 0 | Status: ON HOLD | OUTPATIENT
Start: 2024-08-30

## 2024-08-30 RX ORDER — ONDANSETRON 2 MG/ML
4 INJECTION INTRAMUSCULAR; INTRAVENOUS ONCE
Status: COMPLETED | OUTPATIENT
Start: 2024-08-30 | End: 2024-08-30

## 2024-08-30 RX ORDER — IOPAMIDOL 612 MG/ML
100 INJECTION, SOLUTION INTRAVASCULAR
Status: COMPLETED | OUTPATIENT
Start: 2024-08-30 | End: 2024-08-30

## 2024-08-30 RX ADMIN — MORPHINE SULFATE 4 MG: 4 INJECTION, SOLUTION INTRAMUSCULAR; INTRAVENOUS at 12:01

## 2024-08-30 RX ADMIN — SODIUM CHLORIDE 2000 ML: 9 INJECTION, SOLUTION INTRAVENOUS at 12:01

## 2024-08-30 RX ADMIN — ONDANSETRON 4 MG: 2 INJECTION INTRAMUSCULAR; INTRAVENOUS at 12:01

## 2024-08-30 RX ADMIN — IOPAMIDOL 70 ML: 612 INJECTION, SOLUTION INTRAVENOUS at 14:22

## 2024-08-30 NOTE — ED PROVIDER NOTES
Subjective   History of Present Illness  48-year-old female who presents to the emergency department with complaint of elevated blood sugar.  Patient states has had abdominal discomfort.  Patient states has had bloating.  Patient also complains of bilateral lower extremity pain.    History provided by:  Patient   used: No    Hyperglycemia  Blood sugar level PTA:  500  Severity:  Moderate  Onset quality:  Gradual  Duration:  2 days  Timing:  Intermittent  Progression:  Worsening  Chronicity:  New  Time since last antidiabetic medication:  2 days  Context: not change in medication and not insulin pump use    Relieved by:  Nothing  Ineffective treatments:  None tried  Associated symptoms: abdominal pain    Associated symptoms: no dehydration, no fatigue, no fever, no increased thirst, no shortness of breath, no syncope and no vomiting    Risk factors: no family hx of diabetes, no hx of DKA, no obesity, no pancreatic disease and no pregnancy    Abdominal Pain  Pain location:  Generalized  Pain quality: aching and cramping    Pain radiates to:  Does not radiate  Pain severity:  Moderate  Duration:  2 days  Timing:  Intermittent  Progression:  Worsening  Chronicity:  New  Context: not awakening from sleep, not eating, not laxative use, not previous surgeries, not recent illness, not recent sexual activity, not retching, not sick contacts, not suspicious food intake and not trauma    Worsened by:  Nothing  Ineffective treatments:  None tried  Associated symptoms: no chills, no diarrhea, no fatigue, no fever, no hematuria, no shortness of breath and no vomiting    Risk factors: no alcohol abuse, no aspirin use, has not had multiple surgeries and no NSAID use        Review of Systems   Constitutional: Negative.  Negative for appetite change, chills, fatigue and fever.   HENT: Negative.  Negative for dental problem, facial swelling, mouth sores, nosebleeds, postnasal drip, sinus pressure and sinus pain.     Eyes: Negative.  Negative for photophobia, pain and redness.   Respiratory: Negative.  Negative for shortness of breath.    Cardiovascular:  Negative for syncope.   Gastrointestinal:  Positive for abdominal distention and abdominal pain. Negative for diarrhea and vomiting.   Endocrine: Negative.  Negative for heat intolerance, polydipsia and polyphagia.   Genitourinary: Negative.  Negative for flank pain, frequency, hematuria, menstrual problem and pelvic pain.   Musculoskeletal:  Positive for arthralgias. Negative for gait problem, joint swelling and neck pain.   Skin: Negative.  Negative for rash and wound.   Neurological: Negative.  Negative for seizures, speech difficulty, light-headedness and numbness.   Hematological: Negative.  Negative for adenopathy.   Psychiatric/Behavioral: Negative.  Negative for decreased concentration and self-injury. The patient is not nervous/anxious and is not hyperactive.    All other systems reviewed and are negative.      Past Medical History:   Diagnosis Date    Arthritis     CHF (congestive heart failure)     COPD (chronic obstructive pulmonary disease)     Diabetes mellitus     Elevated cholesterol     GERD (gastroesophageal reflux disease)     Hyperlipemia     Hypertension     JOANN (obstructive sleep apnea)     Pneumonia     Vulvar cancer        Allergies   Allergen Reactions    Codeine Nausea Only    Metformin GI Intolerance     Extreme diarrhea       Past Surgical History:   Procedure Laterality Date    HYSTERECTOMY      INCISION AND DRAINAGE TRUNK N/A 1/11/2022    Procedure: INCISION AND DRAINAGE BACK;  Surgeon: Tamera Saez MD;  Location: [facility];  Service: General;  Laterality: N/A;    MASTECTOMY      VULVA SURGERY      reconstruction due ca       Family History   Problem Relation Age of Onset    Cancer Mother     Hypertension Mother     Parkinsonism Father     Diabetes Father     Hypertension Father     Cancer Maternal Aunt     Diabetes Paternal Aunt      Diabetes Paternal Grandmother     Cancer Maternal Aunt     Diabetes Brother        Social History     Socioeconomic History    Marital status:    Tobacco Use    Smoking status: Every Day     Current packs/day: 1.50     Average packs/day: 1.4 packs/day for 29.3 years (42.1 ttl pk-yrs)     Types: Cigarettes     Start date: 5/18/1995     Passive exposure: Current    Smokeless tobacco: Never    Tobacco comments:     8-10 per day    Vaping Use    Vaping status: Former    Substances: Nicotine, Flavoring    Devices: Refillable tank   Substance and Sexual Activity    Alcohol use: Never    Drug use: Never    Sexual activity: Not Currently     Partners: Male     Birth control/protection: Condom           Objective   Physical Exam  Vitals and nursing note reviewed.   Constitutional:       General: She is not in acute distress.     Appearance: Normal appearance. She is well-developed and normal weight. She is not ill-appearing or toxic-appearing.   HENT:      Head: Normocephalic and atraumatic.      Nose: Nose normal.      Mouth/Throat:      Mouth: Mucous membranes are moist.      Pharynx: Oropharynx is clear. No pharyngeal swelling or oropharyngeal exudate.   Eyes:      General: No scleral icterus.     Extraocular Movements: Extraocular movements intact.      Conjunctiva/sclera: Conjunctivae normal.      Pupils: Pupils are equal, round, and reactive to light.   Cardiovascular:      Rate and Rhythm: Normal rate and regular rhythm.      Heart sounds: Normal heart sounds. No murmur heard.     No gallop.   Pulmonary:      Effort: Pulmonary effort is normal. No respiratory distress.      Breath sounds: Normal breath sounds. No stridor. No wheezing or rhonchi.   Chest:      Chest wall: No tenderness.   Abdominal:      General: Abdomen is flat and protuberant. Bowel sounds are normal. There is no distension or abdominal bruit. There are no signs of injury.      Palpations: Abdomen is soft. There is no shifting dullness, fluid  wave, hepatomegaly or splenomegaly.      Tenderness: There is generalized abdominal tenderness. There is guarding and rebound.      Hernia: No hernia is present. There is no hernia in the umbilical area, ventral area, left inguinal area, right femoral area, left femoral area or right inguinal area.   Genitourinary:     Adnexa: Right adnexa normal and left adnexa normal.        Right: No mass, tenderness or fullness.          Left: No mass or tenderness.     Musculoskeletal:         General: Normal range of motion.      Cervical back: Normal range of motion and neck supple.   Skin:     General: Skin is warm and dry.      Capillary Refill: Capillary refill takes less than 2 seconds.      Coloration: Skin is not cyanotic, jaundiced, mottled or pale.      Findings: No rash.   Neurological:      General: No focal deficit present.      Mental Status: She is alert and oriented to person, place, and time.      Cranial Nerves: No cranial nerve deficit.      Motor: No weakness.   Psychiatric:         Mood and Affect: Mood normal. Mood is not anxious or depressed.         Behavior: Behavior normal.         Thought Content: Thought content normal.         Judgment: Judgment normal.         Procedures           ED Course  ED Course as of 08/30/24 1956   Fri Aug 30, 2024   1538    IMPRESSION:     1. Fatty infiltration of the liver     2. Other findings as discussed above.         []   1653 She states she feels much better at this time will be discharged home. []      ED Course User Index  [] Yaw Thomas PA-C                                             Medical Decision Making  Problems Addressed:  Abdominal pain, unspecified abdominal location: complicated acute illness or injury  Hyperglycemia due to diabetes mellitus: complicated acute illness or injury    Amount and/or Complexity of Data Reviewed  Labs: ordered.  Radiology: ordered.    Risk  Prescription drug management.        Final diagnoses:   Abdominal pain,  unspecified abdominal location   Hyperglycemia due to diabetes mellitus       ED Disposition  ED Disposition       ED Disposition   Discharge    Condition   Stable    Comment   --               Hayden Espinoza PA-C  [address]  [facility] [id number] [phone]    Call in 1 day           Medication List        New Prescriptions      ondansetron ODT 4 MG disintegrating tablet  Commonly known as: ZOFRAN-ODT  Take 1 tablet by mouth Every 6 (Six) Hours As Needed for Nausea or Vomiting.               Where to Get Your Medications        These medications were sent to [facility], [place] - [address] - [phone]  - [phone] FX  [address], [facility] [address]      Phone: [phone]   ondansetron ODT 4 MG disintegrating tablet            Yaw Thomas PA-C  08/30/24 1956

## 2024-09-08 ENCOUNTER — HOSPITAL ENCOUNTER (INPATIENT)
Facility: HOSPITAL | Age: 49
LOS: 4 days | Discharge: HOME | DRG: 871 | End: 2024-09-13
Attending: STUDENT IN AN ORGANIZED HEALTH CARE EDUCATION/TRAINING PROGRAM | Admitting: INTERNAL MEDICINE
Payer: MEDICAID

## 2024-09-08 DIAGNOSIS — R53.81 DEBILITY: ICD-10-CM

## 2024-09-08 DIAGNOSIS — J96.01 ACUTE HYPOXIC RESPIRATORY FAILURE: Primary | ICD-10-CM

## 2024-09-08 DIAGNOSIS — J96.11 CHRONIC RESPIRATORY FAILURE WITH HYPOXIA: ICD-10-CM

## 2024-09-08 DIAGNOSIS — I10 ESSENTIAL HYPERTENSION: ICD-10-CM

## 2024-09-08 DIAGNOSIS — J18.9 MULTIFOCAL PNEUMONIA: ICD-10-CM

## 2024-09-08 PROCEDURE — 93005 ELECTROCARDIOGRAM TRACING: CPT | Performed by: EMERGENCY MEDICINE

## 2024-09-08 PROCEDURE — 99285 EMERGENCY DEPT VISIT HI MDM: CPT

## 2024-09-08 PROCEDURE — 36600 WITHDRAWAL OF ARTERIAL BLOOD: CPT

## 2024-09-08 PROCEDURE — 82805 BLOOD GASES W/O2 SATURATION: CPT

## 2024-09-08 PROCEDURE — 82375 ASSAY CARBOXYHB QUANT: CPT

## 2024-09-08 PROCEDURE — 83050 HGB METHEMOGLOBIN QUAN: CPT

## 2024-09-08 RX ORDER — SODIUM CHLORIDE 0.9 % (FLUSH) 0.9 %
10 SYRINGE (ML) INJECTION AS NEEDED
Status: DISCONTINUED | OUTPATIENT
Start: 2024-09-08 | End: 2024-09-13 | Stop reason: HOSPADM

## 2024-09-08 RX ORDER — IPRATROPIUM BROMIDE AND ALBUTEROL SULFATE 2.5; .5 MG/3ML; MG/3ML
3 SOLUTION RESPIRATORY (INHALATION) ONCE
Status: COMPLETED | OUTPATIENT
Start: 2024-09-09 | End: 2024-09-09

## 2024-09-09 ENCOUNTER — APPOINTMENT (OUTPATIENT)
Dept: CT IMAGING | Facility: HOSPITAL | Age: 49
DRG: 871 | End: 2024-09-09
Payer: MEDICAID

## 2024-09-09 ENCOUNTER — APPOINTMENT (OUTPATIENT)
Dept: GENERAL RADIOLOGY | Facility: HOSPITAL | Age: 49
DRG: 871 | End: 2024-09-09
Payer: MEDICAID

## 2024-09-09 ENCOUNTER — APPOINTMENT (OUTPATIENT)
Dept: ULTRASOUND IMAGING | Facility: HOSPITAL | Age: 49
DRG: 871 | End: 2024-09-09
Payer: MEDICAID

## 2024-09-09 PROBLEM — J18.9 SEPSIS DUE TO PNEUMONIA: Status: ACTIVE | Noted: 2024-09-09

## 2024-09-09 PROBLEM — A41.9 SEPSIS DUE TO PNEUMONIA: Status: ACTIVE | Noted: 2024-09-09

## 2024-09-09 LAB
A-A DO2: 172.3 MMHG (ref 0–300)
A-A DO2: 40.8 MMHG (ref 0–300)
ALBUMIN SERPL-MCNC: 3.6 G/DL (ref 3.5–5.2)
ALBUMIN SERPL-MCNC: 3.7 G/DL (ref 3.5–5.2)
ALBUMIN/GLOB SERPL: 0.9 G/DL
ALBUMIN/GLOB SERPL: 1 G/DL
ALP SERPL-CCNC: 106 U/L (ref 39–117)
ALP SERPL-CCNC: 96 U/L (ref 39–117)
ALT SERPL W P-5'-P-CCNC: 11 U/L (ref 1–33)
ALT SERPL W P-5'-P-CCNC: 14 U/L (ref 1–33)
AMPHET+METHAMPHET UR QL: NEGATIVE
AMPHETAMINES UR QL: NEGATIVE
ANION GAP SERPL CALCULATED.3IONS-SCNC: 10.6 MMOL/L (ref 5–15)
ANION GAP SERPL CALCULATED.3IONS-SCNC: 9.8 MMOL/L (ref 5–15)
APTT PPP: 30 SECONDS (ref 26.5–34.5)
ARTERIAL PATENCY WRIST A: ABNORMAL
ARTERIAL PATENCY WRIST A: POSITIVE
AST SERPL-CCNC: 17 U/L (ref 1–32)
AST SERPL-CCNC: 20 U/L (ref 1–32)
ATMOSPHERIC PRESS: 730 MMHG
ATMOSPHERIC PRESS: 731 MMHG
B PARAPERT DNA SPEC QL NAA+PROBE: NOT DETECTED
B PERT DNA SPEC QL NAA+PROBE: NOT DETECTED
BARBITURATES UR QL SCN: NEGATIVE
BASE EXCESS BLDA CALC-SCNC: 5.1 MMOL/L (ref 0–2)
BASE EXCESS BLDA CALC-SCNC: 5.6 MMOL/L (ref 0–2)
BASOPHILS # BLD AUTO: 0.04 10*3/MM3 (ref 0–0.2)
BASOPHILS # BLD AUTO: 0.06 10*3/MM3 (ref 0–0.2)
BASOPHILS NFR BLD AUTO: 0.2 % (ref 0–1.5)
BASOPHILS NFR BLD AUTO: 0.3 % (ref 0–1.5)
BDY SITE: ABNORMAL
BDY SITE: ABNORMAL
BENZODIAZ UR QL SCN: NEGATIVE
BILIRUB SERPL-MCNC: <0.2 MG/DL (ref 0–1.2)
BILIRUB SERPL-MCNC: <0.2 MG/DL (ref 0–1.2)
BILIRUB UR QL STRIP: NEGATIVE
BUN SERPL-MCNC: 13 MG/DL (ref 6–20)
BUN SERPL-MCNC: 14 MG/DL (ref 6–20)
BUN/CREAT SERPL: 16.7 (ref 7–25)
BUN/CREAT SERPL: 20.6 (ref 7–25)
BUPRENORPHINE SERPL-MCNC: NEGATIVE NG/ML
C PNEUM DNA NPH QL NAA+NON-PROBE: NOT DETECTED
CALCIUM SPEC-SCNC: 8.8 MG/DL (ref 8.6–10.5)
CALCIUM SPEC-SCNC: 8.9 MG/DL (ref 8.6–10.5)
CANNABINOIDS SERPL QL: POSITIVE
CHLORIDE SERPL-SCNC: 93 MMOL/L (ref 98–107)
CHLORIDE SERPL-SCNC: 97 MMOL/L (ref 98–107)
CLARITY UR: CLEAR
CO2 BLDA-SCNC: 30.5 MMOL/L (ref 22–33)
CO2 BLDA-SCNC: 31.1 MMOL/L (ref 22–33)
CO2 SERPL-SCNC: 26.4 MMOL/L (ref 22–29)
CO2 SERPL-SCNC: 27.2 MMOL/L (ref 22–29)
COCAINE UR QL: NEGATIVE
COHGB MFR BLD: 1.8 % (ref 0–5)
COHGB MFR BLD: 7.3 % (ref 0–5)
COLOR UR: YELLOW
CREAT SERPL-MCNC: 0.68 MG/DL (ref 0.57–1)
CREAT SERPL-MCNC: 0.78 MG/DL (ref 0.57–1)
D DIMER PPP FEU-MCNC: 0.69 MCGFEU/ML (ref 0–0.5)
D-LACTATE SERPL-SCNC: 1.4 MMOL/L (ref 0.5–2)
DEPRECATED RDW RBC AUTO: 45.6 FL (ref 37–54)
DEPRECATED RDW RBC AUTO: 47.1 FL (ref 37–54)
EGFRCR SERPLBLD CKD-EPI 2021: 107.6 ML/MIN/1.73
EGFRCR SERPLBLD CKD-EPI 2021: 93.8 ML/MIN/1.73
EOSINOPHIL # BLD AUTO: 0.01 10*3/MM3 (ref 0–0.4)
EOSINOPHIL # BLD AUTO: 0.04 10*3/MM3 (ref 0–0.4)
EOSINOPHIL NFR BLD AUTO: 0 % (ref 0.3–6.2)
EOSINOPHIL NFR BLD AUTO: 0.2 % (ref 0.3–6.2)
ERYTHROCYTE [DISTWIDTH] IN BLOOD BY AUTOMATED COUNT: 13.6 % (ref 12.3–15.4)
ERYTHROCYTE [DISTWIDTH] IN BLOOD BY AUTOMATED COUNT: 13.8 % (ref 12.3–15.4)
FENTANYL UR-MCNC: NEGATIVE NG/ML
FLUAV SUBTYP SPEC NAA+PROBE: NOT DETECTED
FLUBV RNA ISLT QL NAA+PROBE: NOT DETECTED
GAS FLOW AIRWAY: 15 LPM
GAS FLOW AIRWAY: 5 LPM
GEN 5 2HR TROPONIN T REFLEX: 11 NG/L
GLOBULIN UR ELPH-MCNC: 3.7 GM/DL
GLOBULIN UR ELPH-MCNC: 3.8 GM/DL
GLUCOSE BLDC GLUCOMTR-MCNC: 313 MG/DL (ref 70–130)
GLUCOSE BLDC GLUCOMTR-MCNC: 341 MG/DL (ref 70–130)
GLUCOSE BLDC GLUCOMTR-MCNC: 349 MG/DL (ref 70–130)
GLUCOSE SERPL-MCNC: 282 MG/DL (ref 65–99)
GLUCOSE SERPL-MCNC: 328 MG/DL (ref 65–99)
GLUCOSE UR STRIP-MCNC: ABNORMAL MG/DL
HADV DNA SPEC NAA+PROBE: NOT DETECTED
HBA1C MFR BLD: 8.1 % (ref 4.8–5.6)
HCO3 BLDA-SCNC: 29.3 MMOL/L (ref 20–26)
HCO3 BLDA-SCNC: 29.8 MMOL/L (ref 20–26)
HCOV 229E RNA SPEC QL NAA+PROBE: NOT DETECTED
HCOV HKU1 RNA SPEC QL NAA+PROBE: NOT DETECTED
HCOV NL63 RNA SPEC QL NAA+PROBE: NOT DETECTED
HCOV OC43 RNA SPEC QL NAA+PROBE: NOT DETECTED
HCT VFR BLD AUTO: 35 % (ref 34–46.6)
HCT VFR BLD AUTO: 37 % (ref 34–46.6)
HCT VFR BLD CALC: 35.1 % (ref 38–51)
HCT VFR BLD CALC: 38.2 % (ref 38–51)
HGB BLD-MCNC: 11.5 G/DL (ref 12–15.9)
HGB BLD-MCNC: 12.6 G/DL (ref 12–15.9)
HGB BLDA-MCNC: 11.5 G/DL (ref 13.5–17.5)
HGB BLDA-MCNC: 12.5 G/DL (ref 13.5–17.5)
HGB UR QL STRIP.AUTO: NEGATIVE
HMPV RNA NPH QL NAA+NON-PROBE: NOT DETECTED
HOLD SPECIMEN: NORMAL
HOLD SPECIMEN: NORMAL
HPIV1 RNA ISLT QL NAA+PROBE: NOT DETECTED
HPIV2 RNA SPEC QL NAA+PROBE: NOT DETECTED
HPIV3 RNA NPH QL NAA+PROBE: NOT DETECTED
HPIV4 P GENE NPH QL NAA+PROBE: NOT DETECTED
IMM GRANULOCYTES # BLD AUTO: 0.18 10*3/MM3 (ref 0–0.05)
IMM GRANULOCYTES # BLD AUTO: 0.28 10*3/MM3 (ref 0–0.05)
IMM GRANULOCYTES NFR BLD AUTO: 1 % (ref 0–0.5)
IMM GRANULOCYTES NFR BLD AUTO: 1.3 % (ref 0–0.5)
INHALED O2 CONCENTRATION: 40 %
INR PPP: 1 (ref 0.9–1.1)
KETONES UR QL STRIP: NEGATIVE
LEUKOCYTE ESTERASE UR QL STRIP.AUTO: NEGATIVE
LYMPHOCYTES # BLD AUTO: 1.08 10*3/MM3 (ref 0.7–3.1)
LYMPHOCYTES # BLD AUTO: 1.12 10*3/MM3 (ref 0.7–3.1)
LYMPHOCYTES NFR BLD AUTO: 5.2 % (ref 19.6–45.3)
LYMPHOCYTES NFR BLD AUTO: 6.3 % (ref 19.6–45.3)
Lab: ABNORMAL
M PNEUMO IGG SER IA-ACNC: NOT DETECTED
MAGNESIUM SERPL-MCNC: 1.5 MG/DL (ref 1.6–2.6)
MCH RBC QN AUTO: 30.3 PG (ref 26.6–33)
MCH RBC QN AUTO: 31 PG (ref 26.6–33)
MCHC RBC AUTO-ENTMCNC: 32.9 G/DL (ref 31.5–35.7)
MCHC RBC AUTO-ENTMCNC: 34.1 G/DL (ref 31.5–35.7)
MCV RBC AUTO: 90.9 FL (ref 79–97)
MCV RBC AUTO: 92.3 FL (ref 79–97)
METHADONE UR QL SCN: NEGATIVE
METHGB BLD QL: 0.2 % (ref 0–3)
METHGB BLD QL: 0.4 % (ref 0–3)
MODALITY: ABNORMAL
MODALITY: ABNORMAL
MONOCYTES # BLD AUTO: 0.39 10*3/MM3 (ref 0.1–0.9)
MONOCYTES # BLD AUTO: 0.45 10*3/MM3 (ref 0.1–0.9)
MONOCYTES NFR BLD AUTO: 2.2 % (ref 5–12)
MONOCYTES NFR BLD AUTO: 2.2 % (ref 5–12)
MRSA DNA SPEC QL NAA+PROBE: NORMAL
NEUTROPHILS NFR BLD AUTO: 16.04 10*3/MM3 (ref 1.7–7)
NEUTROPHILS NFR BLD AUTO: 18.99 10*3/MM3 (ref 1.7–7)
NEUTROPHILS NFR BLD AUTO: 90.1 % (ref 42.7–76)
NEUTROPHILS NFR BLD AUTO: 91 % (ref 42.7–76)
NITRITE UR QL STRIP: NEGATIVE
NOTIFIED BY: ABNORMAL
NOTIFIED WHO: ABNORMAL
NRBC BLD AUTO-RTO: 0 /100 WBC (ref 0–0.2)
NRBC BLD AUTO-RTO: 0 /100 WBC (ref 0–0.2)
NT-PROBNP SERPL-MCNC: 221.6 PG/ML (ref 0–450)
OPIATES UR QL: NEGATIVE
OXYCODONE UR QL SCN: NEGATIVE
OXYHGB MFR BLDV: 85.8 % (ref 94–99)
OXYHGB MFR BLDV: 87 % (ref 94–99)
PCO2 BLDA: 38.5 MM HG (ref 35–45)
PCO2 BLDA: 43.3 MM HG (ref 35–45)
PCO2 TEMP ADJ BLD: ABNORMAL MM[HG]
PCO2 TEMP ADJ BLD: ABNORMAL MM[HG]
PCP UR QL SCN: NEGATIVE
PH BLDA: 7.45 PH UNITS (ref 7.35–7.45)
PH BLDA: 7.49 PH UNITS (ref 7.35–7.45)
PH UR STRIP.AUTO: 6.5 [PH] (ref 5–8)
PH, TEMP CORRECTED: ABNORMAL
PH, TEMP CORRECTED: ABNORMAL
PLATELET # BLD AUTO: 306 10*3/MM3 (ref 140–450)
PLATELET # BLD AUTO: 323 10*3/MM3 (ref 140–450)
PMV BLD AUTO: 9.1 FL (ref 6–12)
PMV BLD AUTO: 9.2 FL (ref 6–12)
PO2 BLDA: 53.7 MM HG (ref 83–108)
PO2 BLDA: 59 MM HG (ref 83–108)
PO2 TEMP ADJ BLD: ABNORMAL MM[HG]
PO2 TEMP ADJ BLD: ABNORMAL MM[HG]
POTASSIUM SERPL-SCNC: 3.5 MMOL/L (ref 3.5–5.2)
POTASSIUM SERPL-SCNC: 3.9 MMOL/L (ref 3.5–5.2)
PROCALCITONIN SERPL-MCNC: 0.07 NG/ML (ref 0–0.25)
PROT SERPL-MCNC: 7.4 G/DL (ref 6–8.5)
PROT SERPL-MCNC: 7.4 G/DL (ref 6–8.5)
PROT UR QL STRIP: NEGATIVE
PROTHROMBIN TIME: 13.3 SECONDS (ref 12.1–14.7)
RBC # BLD AUTO: 3.79 10*6/MM3 (ref 3.77–5.28)
RBC # BLD AUTO: 4.07 10*6/MM3 (ref 3.77–5.28)
RHINOVIRUS RNA SPEC NAA+PROBE: NOT DETECTED
RSV RNA NPH QL NAA+NON-PROBE: NOT DETECTED
S PNEUM AG SPEC QL LA: NEGATIVE
SAO2 % BLDCOA: 89 % (ref 94–99)
SAO2 % BLDCOA: 92.8 % (ref 94–99)
SARS-COV-2 RNA NPH QL NAA+NON-PROBE: NOT DETECTED
SODIUM SERPL-SCNC: 130 MMOL/L (ref 136–145)
SODIUM SERPL-SCNC: 134 MMOL/L (ref 136–145)
SP GR UR STRIP: 1.02 (ref 1–1.03)
TRICYCLICS UR QL SCN: NEGATIVE
TROPONIN T DELTA: 1 NG/L
TROPONIN T SERPL HS-MCNC: 10 NG/L
UROBILINOGEN UR QL STRIP: ABNORMAL
VENTILATOR MODE: ABNORMAL
VENTILATOR MODE: ABNORMAL
WBC NRBC COR # BLD AUTO: 17.81 10*3/MM3 (ref 3.4–10.8)
WBC NRBC COR # BLD AUTO: 20.87 10*3/MM3 (ref 3.4–10.8)
WHOLE BLOOD HOLD COAG: NORMAL
WHOLE BLOOD HOLD SPECIMEN: NORMAL

## 2024-09-09 PROCEDURE — 71275 CT ANGIOGRAPHY CHEST: CPT

## 2024-09-09 PROCEDURE — 94799 UNLISTED PULMONARY SVC/PX: CPT

## 2024-09-09 PROCEDURE — 71045 X-RAY EXAM CHEST 1 VIEW: CPT | Performed by: RADIOLOGY

## 2024-09-09 PROCEDURE — 36415 COLL VENOUS BLD VENIPUNCTURE: CPT

## 2024-09-09 PROCEDURE — 94664 DEMO&/EVAL PT USE INHALER: CPT

## 2024-09-09 PROCEDURE — 25810000003 LACTATED RINGERS SOLUTION: Performed by: STUDENT IN AN ORGANIZED HEALTH CARE EDUCATION/TRAINING PROGRAM

## 2024-09-09 PROCEDURE — 25510000001 IOPAMIDOL PER 1 ML: Performed by: STUDENT IN AN ORGANIZED HEALTH CARE EDUCATION/TRAINING PROGRAM

## 2024-09-09 PROCEDURE — 76536 US EXAM OF HEAD AND NECK: CPT

## 2024-09-09 PROCEDURE — 25010000002 KETOROLAC TROMETHAMINE PER 15 MG: Performed by: STUDENT IN AN ORGANIZED HEALTH CARE EDUCATION/TRAINING PROGRAM

## 2024-09-09 PROCEDURE — 25010000002 CEFEPIME PER 500 MG: Performed by: INTERNAL MEDICINE

## 2024-09-09 PROCEDURE — 84484 ASSAY OF TROPONIN QUANT: CPT | Performed by: EMERGENCY MEDICINE

## 2024-09-09 PROCEDURE — 25810000003 SODIUM CHLORIDE 0.9 % SOLUTION 250 ML FLEX CONT: Performed by: STUDENT IN AN ORGANIZED HEALTH CARE EDUCATION/TRAINING PROGRAM

## 2024-09-09 PROCEDURE — 25010000002 VANCOMYCIN 5 G RECONSTITUTED SOLUTION: Performed by: STUDENT IN AN ORGANIZED HEALTH CARE EDUCATION/TRAINING PROGRAM

## 2024-09-09 PROCEDURE — 99222 1ST HOSP IP/OBS MODERATE 55: CPT | Performed by: INTERNAL MEDICINE

## 2024-09-09 PROCEDURE — 71045 X-RAY EXAM CHEST 1 VIEW: CPT

## 2024-09-09 PROCEDURE — 85730 THROMBOPLASTIN TIME PARTIAL: CPT | Performed by: STUDENT IN AN ORGANIZED HEALTH CARE EDUCATION/TRAINING PROGRAM

## 2024-09-09 PROCEDURE — 80053 COMPREHEN METABOLIC PANEL: CPT | Performed by: STUDENT IN AN ORGANIZED HEALTH CARE EDUCATION/TRAINING PROGRAM

## 2024-09-09 PROCEDURE — 83735 ASSAY OF MAGNESIUM: CPT | Performed by: STUDENT IN AN ORGANIZED HEALTH CARE EDUCATION/TRAINING PROGRAM

## 2024-09-09 PROCEDURE — 36600 WITHDRAWAL OF ARTERIAL BLOOD: CPT

## 2024-09-09 PROCEDURE — 25810000003 SODIUM CHLORIDE 0.9 % SOLUTION: Performed by: STUDENT IN AN ORGANIZED HEALTH CARE EDUCATION/TRAINING PROGRAM

## 2024-09-09 PROCEDURE — 83050 HGB METHEMOGLOBIN QUAN: CPT

## 2024-09-09 PROCEDURE — 83036 HEMOGLOBIN GLYCOSYLATED A1C: CPT | Performed by: INTERNAL MEDICINE

## 2024-09-09 PROCEDURE — 63710000001 INSULIN LISPRO (HUMAN) PER 5 UNITS: Performed by: INTERNAL MEDICINE

## 2024-09-09 PROCEDURE — 84145 PROCALCITONIN (PCT): CPT | Performed by: STUDENT IN AN ORGANIZED HEALTH CARE EDUCATION/TRAINING PROGRAM

## 2024-09-09 PROCEDURE — 82805 BLOOD GASES W/O2 SATURATION: CPT

## 2024-09-09 PROCEDURE — 99223 1ST HOSP IP/OBS HIGH 75: CPT | Performed by: INTERNAL MEDICINE

## 2024-09-09 PROCEDURE — 25010000002 METHYLPREDNISOLONE PER 40 MG: Performed by: INTERNAL MEDICINE

## 2024-09-09 PROCEDURE — 25010000002 HEPARIN (PORCINE) PER 1000 UNITS: Performed by: INTERNAL MEDICINE

## 2024-09-09 PROCEDURE — 85379 FIBRIN DEGRADATION QUANT: CPT | Performed by: STUDENT IN AN ORGANIZED HEALTH CARE EDUCATION/TRAINING PROGRAM

## 2024-09-09 PROCEDURE — 25010000002 FUROSEMIDE PER 20 MG: Performed by: INTERNAL MEDICINE

## 2024-09-09 PROCEDURE — 94640 AIRWAY INHALATION TREATMENT: CPT

## 2024-09-09 PROCEDURE — 85025 COMPLETE CBC W/AUTO DIFF WBC: CPT | Performed by: STUDENT IN AN ORGANIZED HEALTH CARE EDUCATION/TRAINING PROGRAM

## 2024-09-09 PROCEDURE — 87040 BLOOD CULTURE FOR BACTERIA: CPT | Performed by: STUDENT IN AN ORGANIZED HEALTH CARE EDUCATION/TRAINING PROGRAM

## 2024-09-09 PROCEDURE — 94761 N-INVAS EAR/PLS OXIMETRY MLT: CPT

## 2024-09-09 PROCEDURE — 71275 CT ANGIOGRAPHY CHEST: CPT | Performed by: RADIOLOGY

## 2024-09-09 PROCEDURE — P9612 CATHETERIZE FOR URINE SPEC: HCPCS

## 2024-09-09 PROCEDURE — 25010000002 AZITHROMYCIN PER 500 MG: Performed by: STUDENT IN AN ORGANIZED HEALTH CARE EDUCATION/TRAINING PROGRAM

## 2024-09-09 PROCEDURE — 25010000002 CEFTRIAXONE PER 250 MG: Performed by: STUDENT IN AN ORGANIZED HEALTH CARE EDUCATION/TRAINING PROGRAM

## 2024-09-09 PROCEDURE — 82948 REAGENT STRIP/BLOOD GLUCOSE: CPT

## 2024-09-09 PROCEDURE — 83880 ASSAY OF NATRIURETIC PEPTIDE: CPT | Performed by: INTERNAL MEDICINE

## 2024-09-09 PROCEDURE — 85610 PROTHROMBIN TIME: CPT | Performed by: STUDENT IN AN ORGANIZED HEALTH CARE EDUCATION/TRAINING PROGRAM

## 2024-09-09 PROCEDURE — 85025 COMPLETE CBC W/AUTO DIFF WBC: CPT | Performed by: INTERNAL MEDICINE

## 2024-09-09 PROCEDURE — 81003 URINALYSIS AUTO W/O SCOPE: CPT | Performed by: STUDENT IN AN ORGANIZED HEALTH CARE EDUCATION/TRAINING PROGRAM

## 2024-09-09 PROCEDURE — 0202U NFCT DS 22 TRGT SARS-COV-2: CPT | Performed by: STUDENT IN AN ORGANIZED HEALTH CARE EDUCATION/TRAINING PROGRAM

## 2024-09-09 PROCEDURE — 87641 MR-STAPH DNA AMP PROBE: CPT | Performed by: INTERNAL MEDICINE

## 2024-09-09 PROCEDURE — 82375 ASSAY CARBOXYHB QUANT: CPT

## 2024-09-09 PROCEDURE — 80053 COMPREHEN METABOLIC PANEL: CPT | Performed by: INTERNAL MEDICINE

## 2024-09-09 PROCEDURE — 80307 DRUG TEST PRSMV CHEM ANLYZR: CPT | Performed by: INTERNAL MEDICINE

## 2024-09-09 PROCEDURE — 25010000002 MAGNESIUM SULFATE IN D5W 1G/100ML (PREMIX) 1-5 GM/100ML-% SOLUTION: Performed by: STUDENT IN AN ORGANIZED HEALTH CARE EDUCATION/TRAINING PROGRAM

## 2024-09-09 PROCEDURE — 83605 ASSAY OF LACTIC ACID: CPT | Performed by: STUDENT IN AN ORGANIZED HEALTH CARE EDUCATION/TRAINING PROGRAM

## 2024-09-09 PROCEDURE — 94660 CPAP INITIATION&MGMT: CPT

## 2024-09-09 PROCEDURE — 92610 EVALUATE SWALLOWING FUNCTION: CPT

## 2024-09-09 RX ORDER — IPRATROPIUM BROMIDE AND ALBUTEROL SULFATE 2.5; .5 MG/3ML; MG/3ML
3 SOLUTION RESPIRATORY (INHALATION)
Status: DISCONTINUED | OUTPATIENT
Start: 2024-09-09 | End: 2024-09-09 | Stop reason: SDUPTHER

## 2024-09-09 RX ORDER — CLONAZEPAM 1 MG/1
1 TABLET ORAL 4 TIMES DAILY PRN
Status: DISCONTINUED | OUTPATIENT
Start: 2024-09-09 | End: 2024-09-13 | Stop reason: HOSPADM

## 2024-09-09 RX ORDER — BISACODYL 5 MG/1
5 TABLET, DELAYED RELEASE ORAL DAILY PRN
Status: DISCONTINUED | OUTPATIENT
Start: 2024-09-09 | End: 2024-09-13 | Stop reason: HOSPADM

## 2024-09-09 RX ORDER — ONDANSETRON 4 MG/1
4 TABLET, ORALLY DISINTEGRATING ORAL EVERY 6 HOURS PRN
Status: DISCONTINUED | OUTPATIENT
Start: 2024-09-09 | End: 2024-09-13 | Stop reason: HOSPADM

## 2024-09-09 RX ORDER — GLIPIZIDE 5 MG/1
5 TABLET ORAL
Status: CANCELLED | OUTPATIENT
Start: 2024-09-09

## 2024-09-09 RX ORDER — ACETYLCYSTEINE 200 MG/ML
3 SOLUTION ORAL; RESPIRATORY (INHALATION)
Status: DISCONTINUED | OUTPATIENT
Start: 2024-09-09 | End: 2024-09-09

## 2024-09-09 RX ORDER — FLUTICASONE PROPIONATE 50 MCG
2 SPRAY, SUSPENSION (ML) NASAL DAILY PRN
Status: DISCONTINUED | OUTPATIENT
Start: 2024-09-09 | End: 2024-09-13 | Stop reason: HOSPADM

## 2024-09-09 RX ORDER — SODIUM CHLORIDE 0.9 % (FLUSH) 0.9 %
10 SYRINGE (ML) INJECTION AS NEEDED
Status: DISCONTINUED | OUTPATIENT
Start: 2024-09-09 | End: 2024-09-13 | Stop reason: HOSPADM

## 2024-09-09 RX ORDER — ATORVASTATIN CALCIUM 40 MG/1
40 TABLET, FILM COATED ORAL DAILY
Status: DISCONTINUED | OUTPATIENT
Start: 2024-09-09 | End: 2024-09-13 | Stop reason: HOSPADM

## 2024-09-09 RX ORDER — MELOXICAM 7.5 MG/1
7.5 TABLET ORAL EVERY 12 HOURS SCHEDULED
Status: DISCONTINUED | OUTPATIENT
Start: 2024-09-09 | End: 2024-09-13 | Stop reason: HOSPADM

## 2024-09-09 RX ORDER — HYDROCHLOROTHIAZIDE 25 MG/1
25 TABLET ORAL EVERY MORNING
Status: DISCONTINUED | OUTPATIENT
Start: 2024-09-09 | End: 2024-09-13 | Stop reason: HOSPADM

## 2024-09-09 RX ORDER — ALBUTEROL SULFATE 0.83 MG/ML
2.5 SOLUTION RESPIRATORY (INHALATION) EVERY 6 HOURS PRN
Status: DISCONTINUED | OUTPATIENT
Start: 2024-09-09 | End: 2024-09-13 | Stop reason: HOSPADM

## 2024-09-09 RX ORDER — IOPAMIDOL 755 MG/ML
100 INJECTION, SOLUTION INTRAVASCULAR
Status: COMPLETED | OUTPATIENT
Start: 2024-09-09 | End: 2024-09-09

## 2024-09-09 RX ORDER — LIDOCAINE 4 G/G
1 PATCH TOPICAL
Status: DISCONTINUED | OUTPATIENT
Start: 2024-09-09 | End: 2024-09-13 | Stop reason: HOSPADM

## 2024-09-09 RX ORDER — LOPERAMIDE HCL 2 MG
2 CAPSULE ORAL 4 TIMES DAILY PRN
COMMUNITY

## 2024-09-09 RX ORDER — HYDROCHLOROTHIAZIDE 25 MG/1
25 TABLET ORAL EVERY MORNING
Status: CANCELLED | OUTPATIENT
Start: 2024-09-09

## 2024-09-09 RX ORDER — PANTOPRAZOLE SODIUM 40 MG/10ML
40 INJECTION, POWDER, LYOPHILIZED, FOR SOLUTION INTRAVENOUS
Status: DISCONTINUED | OUTPATIENT
Start: 2024-09-09 | End: 2024-09-13 | Stop reason: HOSPADM

## 2024-09-09 RX ORDER — FUROSEMIDE 10 MG/ML
40 INJECTION INTRAMUSCULAR; INTRAVENOUS ONCE
Status: COMPLETED | OUTPATIENT
Start: 2024-09-09 | End: 2024-09-09

## 2024-09-09 RX ORDER — KETOROLAC TROMETHAMINE 30 MG/ML
15 INJECTION, SOLUTION INTRAMUSCULAR; INTRAVENOUS ONCE
Status: COMPLETED | OUTPATIENT
Start: 2024-09-09 | End: 2024-09-09

## 2024-09-09 RX ORDER — BUDESONIDE AND FORMOTEROL FUMARATE DIHYDRATE 160; 4.5 UG/1; UG/1
2 AEROSOL RESPIRATORY (INHALATION)
Status: DISCONTINUED | OUTPATIENT
Start: 2024-09-09 | End: 2024-09-13 | Stop reason: HOSPADM

## 2024-09-09 RX ORDER — MAGNESIUM SULFATE 1 G/100ML
1 INJECTION INTRAVENOUS ONCE
Status: COMPLETED | OUTPATIENT
Start: 2024-09-09 | End: 2024-09-09

## 2024-09-09 RX ORDER — CYCLOBENZAPRINE HCL 10 MG
5 TABLET ORAL 3 TIMES DAILY PRN
Status: DISCONTINUED | OUTPATIENT
Start: 2024-09-09 | End: 2024-09-13 | Stop reason: HOSPADM

## 2024-09-09 RX ORDER — SODIUM CHLORIDE 0.9 % (FLUSH) 0.9 %
10 SYRINGE (ML) INJECTION EVERY 12 HOURS SCHEDULED
Status: DISCONTINUED | OUTPATIENT
Start: 2024-09-09 | End: 2024-09-13 | Stop reason: HOSPADM

## 2024-09-09 RX ORDER — IPRATROPIUM BROMIDE AND ALBUTEROL SULFATE 2.5; .5 MG/3ML; MG/3ML
3 SOLUTION RESPIRATORY (INHALATION)
Status: DISCONTINUED | OUTPATIENT
Start: 2024-09-09 | End: 2024-09-13 | Stop reason: HOSPADM

## 2024-09-09 RX ORDER — METHYLPREDNISOLONE SODIUM SUCCINATE 40 MG/ML
40 INJECTION, POWDER, LYOPHILIZED, FOR SOLUTION INTRAMUSCULAR; INTRAVENOUS EVERY 12 HOURS SCHEDULED
Status: DISCONTINUED | OUTPATIENT
Start: 2024-09-09 | End: 2024-09-10

## 2024-09-09 RX ORDER — GLUCAGON 1 MG/ML
1 KIT INJECTION
Status: DISCONTINUED | OUTPATIENT
Start: 2024-09-09 | End: 2024-09-13 | Stop reason: HOSPADM

## 2024-09-09 RX ORDER — GABAPENTIN 400 MG/1
800 CAPSULE ORAL 4 TIMES DAILY
Status: DISCONTINUED | OUTPATIENT
Start: 2024-09-09 | End: 2024-09-13 | Stop reason: HOSPADM

## 2024-09-09 RX ORDER — CETIRIZINE HYDROCHLORIDE 10 MG/1
10 TABLET ORAL DAILY
Status: DISCONTINUED | OUTPATIENT
Start: 2024-09-09 | End: 2024-09-13 | Stop reason: HOSPADM

## 2024-09-09 RX ORDER — NICOTINE POLACRILEX 4 MG
15 LOZENGE BUCCAL
Status: DISCONTINUED | OUTPATIENT
Start: 2024-09-09 | End: 2024-09-13 | Stop reason: HOSPADM

## 2024-09-09 RX ORDER — SODIUM CHLORIDE 9 MG/ML
40 INJECTION, SOLUTION INTRAVENOUS AS NEEDED
Status: DISCONTINUED | OUTPATIENT
Start: 2024-09-09 | End: 2024-09-13 | Stop reason: HOSPADM

## 2024-09-09 RX ORDER — HEPARIN SODIUM 5000 [USP'U]/ML
5000 INJECTION, SOLUTION INTRAVENOUS; SUBCUTANEOUS EVERY 12 HOURS SCHEDULED
Status: DISCONTINUED | OUTPATIENT
Start: 2024-09-09 | End: 2024-09-13 | Stop reason: HOSPADM

## 2024-09-09 RX ORDER — INSULIN LISPRO 100 [IU]/ML
2-9 INJECTION, SOLUTION INTRAVENOUS; SUBCUTANEOUS
Status: DISCONTINUED | OUTPATIENT
Start: 2024-09-09 | End: 2024-09-10

## 2024-09-09 RX ORDER — IBUPROFEN 400 MG/1
800 TABLET, FILM COATED ORAL EVERY 8 HOURS PRN
Status: CANCELLED | OUTPATIENT
Start: 2024-09-09

## 2024-09-09 RX ORDER — LOPERAMIDE HCL 2 MG
2 CAPSULE ORAL 4 TIMES DAILY PRN
Status: DISCONTINUED | OUTPATIENT
Start: 2024-09-09 | End: 2024-09-13 | Stop reason: HOSPADM

## 2024-09-09 RX ORDER — IPRATROPIUM BROMIDE AND ALBUTEROL SULFATE 2.5; .5 MG/3ML; MG/3ML
3 SOLUTION RESPIRATORY (INHALATION) EVERY 6 HOURS PRN
Status: CANCELLED | OUTPATIENT
Start: 2024-09-09

## 2024-09-09 RX ORDER — IBUPROFEN 800 MG/1
800 TABLET, FILM COATED ORAL EVERY 8 HOURS PRN
COMMUNITY

## 2024-09-09 RX ORDER — LISINOPRIL 2.5 MG/1
2.5 TABLET ORAL DAILY
Status: DISCONTINUED | OUTPATIENT
Start: 2024-09-09 | End: 2024-09-13 | Stop reason: HOSPADM

## 2024-09-09 RX ORDER — AMOXICILLIN 250 MG
2 CAPSULE ORAL 2 TIMES DAILY
Status: DISCONTINUED | OUTPATIENT
Start: 2024-09-09 | End: 2024-09-13 | Stop reason: HOSPADM

## 2024-09-09 RX ORDER — BISACODYL 10 MG
10 SUPPOSITORY, RECTAL RECTAL DAILY PRN
Status: DISCONTINUED | OUTPATIENT
Start: 2024-09-09 | End: 2024-09-13 | Stop reason: HOSPADM

## 2024-09-09 RX ORDER — POLYETHYLENE GLYCOL 3350 17 G/17G
17 POWDER, FOR SOLUTION ORAL DAILY PRN
Status: DISCONTINUED | OUTPATIENT
Start: 2024-09-09 | End: 2024-09-13 | Stop reason: HOSPADM

## 2024-09-09 RX ORDER — DEXTROSE MONOHYDRATE 25 G/50ML
25 INJECTION, SOLUTION INTRAVENOUS
Status: DISCONTINUED | OUTPATIENT
Start: 2024-09-09 | End: 2024-09-13 | Stop reason: HOSPADM

## 2024-09-09 RX ADMIN — VANCOMYCIN HYDROCHLORIDE 2500 MG: 5 INJECTION, POWDER, LYOPHILIZED, FOR SOLUTION INTRAVENOUS at 12:40

## 2024-09-09 RX ADMIN — AZITHROMYCIN DIHYDRATE 500 MG: 500 INJECTION, POWDER, LYOPHILIZED, FOR SOLUTION INTRAVENOUS at 01:51

## 2024-09-09 RX ADMIN — GABAPENTIN 800 MG: 400 CAPSULE ORAL at 17:23

## 2024-09-09 RX ADMIN — GABAPENTIN 800 MG: 400 CAPSULE ORAL at 11:22

## 2024-09-09 RX ADMIN — GABAPENTIN 800 MG: 400 CAPSULE ORAL at 20:27

## 2024-09-09 RX ADMIN — IPRATROPIUM BROMIDE AND ALBUTEROL SULFATE 3 ML: .5; 2.5 SOLUTION RESPIRATORY (INHALATION) at 00:06

## 2024-09-09 RX ADMIN — ATORVASTATIN CALCIUM 40 MG: 40 TABLET, FILM COATED ORAL at 11:20

## 2024-09-09 RX ADMIN — INSULIN LISPRO 7 UNITS: 100 INJECTION, SOLUTION INTRAVENOUS; SUBCUTANEOUS at 12:40

## 2024-09-09 RX ADMIN — CYCLOBENZAPRINE HYDROCHLORIDE 5 MG: 10 TABLET, FILM COATED ORAL at 17:23

## 2024-09-09 RX ADMIN — CLONAZEPAM 1 MG: 1 TABLET ORAL at 12:40

## 2024-09-09 RX ADMIN — Medication 10 ML: at 20:27

## 2024-09-09 RX ADMIN — HEPARIN SODIUM 5000 UNITS: 5000 INJECTION INTRAVENOUS; SUBCUTANEOUS at 20:27

## 2024-09-09 RX ADMIN — METHYLPREDNISOLONE SODIUM SUCCINATE 40 MG: 40 INJECTION, POWDER, FOR SOLUTION INTRAMUSCULAR; INTRAVENOUS at 20:27

## 2024-09-09 RX ADMIN — INSULIN LISPRO 7 UNITS: 100 INJECTION, SOLUTION INTRAVENOUS; SUBCUTANEOUS at 20:27

## 2024-09-09 RX ADMIN — KETOROLAC TROMETHAMINE 15 MG: 30 INJECTION, SOLUTION INTRAMUSCULAR; INTRAVENOUS at 01:14

## 2024-09-09 RX ADMIN — LISINOPRIL 2.5 MG: 2.5 TABLET ORAL at 11:20

## 2024-09-09 RX ADMIN — MAGNESIUM SULFATE IN DEXTROSE 1 G: 10 INJECTION, SOLUTION INTRAVENOUS at 03:45

## 2024-09-09 RX ADMIN — CLONAZEPAM 1 MG: 1 TABLET ORAL at 20:27

## 2024-09-09 RX ADMIN — NICOTINE 1 PATCH: 7 PATCH, EXTENDED RELEASE TRANSDERMAL at 11:21

## 2024-09-09 RX ADMIN — INSULIN LISPRO 7 UNITS: 100 INJECTION, SOLUTION INTRAVENOUS; SUBCUTANEOUS at 18:48

## 2024-09-09 RX ADMIN — FUROSEMIDE 40 MG: 10 INJECTION, SOLUTION INTRAMUSCULAR; INTRAVENOUS at 09:45

## 2024-09-09 RX ADMIN — METHYLPREDNISOLONE SODIUM SUCCINATE 40 MG: 40 INJECTION, POWDER, FOR SOLUTION INTRAMUSCULAR; INTRAVENOUS at 09:46

## 2024-09-09 RX ADMIN — IOPAMIDOL 80 ML: 755 INJECTION, SOLUTION INTRAVENOUS at 02:08

## 2024-09-09 RX ADMIN — PANTOPRAZOLE SODIUM 40 MG: 40 INJECTION, POWDER, FOR SOLUTION INTRAVENOUS at 09:46

## 2024-09-09 RX ADMIN — Medication 10 ML: at 09:46

## 2024-09-09 RX ADMIN — IPRATROPIUM BROMIDE AND ALBUTEROL SULFATE 3 ML: 2.5; .5 SOLUTION RESPIRATORY (INHALATION) at 13:43

## 2024-09-09 RX ADMIN — VANCOMYCIN HYDROCHLORIDE 1250 MG: 5 INJECTION, POWDER, LYOPHILIZED, FOR SOLUTION INTRAVENOUS at 23:55

## 2024-09-09 RX ADMIN — SODIUM CHLORIDE, POTASSIUM CHLORIDE, SODIUM LACTATE AND CALCIUM CHLORIDE 1000 ML: 600; 310; 30; 20 INJECTION, SOLUTION INTRAVENOUS at 01:51

## 2024-09-09 RX ADMIN — IPRATROPIUM BROMIDE AND ALBUTEROL SULFATE 3 ML: 2.5; .5 SOLUTION RESPIRATORY (INHALATION) at 19:18

## 2024-09-09 RX ADMIN — CEFEPIME 2000 MG: 2 INJECTION, POWDER, FOR SOLUTION INTRAVENOUS at 09:45

## 2024-09-09 RX ADMIN — CEFEPIME 2000 MG: 2 INJECTION, POWDER, FOR SOLUTION INTRAVENOUS at 17:23

## 2024-09-09 RX ADMIN — CETIRIZINE HYDROCHLORIDE 10 MG: 10 TABLET, FILM COATED ORAL at 11:20

## 2024-09-09 RX ADMIN — SODIUM CHLORIDE 2000 MG: 9 INJECTION, SOLUTION INTRAVENOUS at 01:15

## 2024-09-09 RX ADMIN — LIDOCAINE 1 PATCH: 4 PATCH TOPICAL at 09:45

## 2024-09-09 RX ADMIN — MELOXICAM 7.5 MG: 7.5 TABLET ORAL at 20:27

## 2024-09-09 RX ADMIN — HEPARIN SODIUM 5000 UNITS: 5000 INJECTION INTRAVENOUS; SUBCUTANEOUS at 09:45

## 2024-09-09 NOTE — PROGRESS NOTES
Patient seen and examined in follow up for sepsis due to bilateral community acquired pneumonia and acute on chronic hypoxic respiratory failure due to pneumonia and COPD. She was sitting in bed and appeared slightly dyspneic at rest. Diffuse wheezing noted on auscultation. Mild tachycardia with regular rhythm noted on cardiac auscultation. Pulmonology also evaluated patient, will follow up on recommendations when available. Continue IV steroids, nebulizer treatments, empiric antibiotics. Nicotine patch ordered.    Delia Jaramillo DO   09/09/24 11:01 EDT

## 2024-09-09 NOTE — PLAN OF CARE
Goal Outcome Evaluation:  Plan of Care Reviewed With: patient        Progress: improving                 Problem: Noninvasive Ventilation Acute  Goal: Effective Unassisted Ventilation and Oxygenation  Outcome: Ongoing, Progressing

## 2024-09-09 NOTE — ED PROVIDER NOTES
Subjective   History of Present Illness  This is a 48-year-old female with a past medical history of type 2 diabetes but no history of DKA as well as a past medical history of COPD on 2 L of oxygen at home at night only presenting presenting with a complaint of acute onset shortness of breath at 4 PM today which is approximately 7 hours prior to arrival.  She states that the acute onset of shortness of breath occurred while she was sitting around her home.  She notes that she took 2 albuterol breathing treatments states that the shortness of breath did not get better.  She not report any traumatic incident.  No chest pain no abdominal pain no leg swelling  Review of Systems    Past Medical History:   Diagnosis Date    Arthritis     CHF (congestive heart failure)     COPD (chronic obstructive pulmonary disease)     Diabetes mellitus     Elevated cholesterol     GERD (gastroesophageal reflux disease)     Hyperlipemia     Hypertension     JOANN (obstructive sleep apnea)     Pneumonia     Vulvar cancer        Allergies   Allergen Reactions    Codeine Nausea Only    Metformin GI Intolerance     Extreme diarrhea       Past Surgical History:   Procedure Laterality Date    HYSTERECTOMY      INCISION AND DRAINAGE TRUNK N/A 1/11/2022    Procedure: INCISION AND DRAINAGE BACK;  Surgeon: Tamera Saez MD;  Location: [facility];  Service: General;  Laterality: N/A;    MASTECTOMY      VULVA SURGERY      reconstruction due ca       Family History   Problem Relation Age of Onset    Cancer Mother     Hypertension Mother     Parkinsonism Father     Diabetes Father     Hypertension Father     Cancer Maternal Aunt     Diabetes Paternal Aunt     Diabetes Paternal Grandmother     Cancer Maternal Aunt     Diabetes Brother        Social History     Socioeconomic History    Marital status:    Tobacco Use    Smoking status: Every Day     Current packs/day: 1.50     Average packs/day: 1.4 packs/day for 29.3 years (42.2 ttl pk-yrs)      Types: Cigarettes     Start date: 5/18/1995     Passive exposure: Current    Smokeless tobacco: Never    Tobacco comments:     8-10 per day    Vaping Use    Vaping status: Former    Substances: Nicotine, Flavoring    Devices: Refillable tank   Substance and Sexual Activity    Alcohol use: Never    Drug use: Never    Sexual activity: Not Currently     Partners: Male     Birth control/protection: Condom           Objective   Physical Exam  Vitals and nursing note reviewed. Exam conducted with a chaperone present.   Constitutional:       General: She is not in acute distress.     Appearance: Normal appearance. She is obese. She is ill-appearing. She is not toxic-appearing or diaphoretic.      Interventions: She is not intubated.  HENT:      Head: Normocephalic and atraumatic.      Nose: Nose normal.      Mouth/Throat:      Pharynx: No oropharyngeal exudate or posterior oropharyngeal erythema.   Eyes:      Extraocular Movements: Extraocular movements intact.      Conjunctiva/sclera: Conjunctivae normal.      Pupils: Pupils are equal, round, and reactive to light.   Cardiovascular:      Rate and Rhythm: Regular rhythm. Tachycardia present.   Pulmonary:      Effort: Pulmonary effort is normal. Tachypnea and accessory muscle usage present. No respiratory distress. She is not intubated.      Breath sounds: No stridor. Examination of the right-upper field reveals decreased breath sounds. Examination of the right-middle field reveals decreased breath sounds. Examination of the right-lower field reveals decreased breath sounds. Decreased breath sounds present. No wheezing, rhonchi or rales.   Chest:      Chest wall: No mass or tenderness.   Abdominal:      General: Abdomen is flat. There is no distension.      Tenderness: There is no abdominal tenderness. There is no guarding or rebound.   Musculoskeletal:         General: No swelling, tenderness, deformity or signs of injury. Normal range of motion.      Cervical back: Normal  range of motion.   Skin:     General: Skin is warm and dry.      Capillary Refill: Capillary refill takes less than 2 seconds.      Findings: No erythema or rash.   Neurological:      General: No focal deficit present.      Mental Status: She is alert and oriented to person, place, and time. Mental status is at baseline.      Cranial Nerves: No cranial nerve deficit.      Sensory: No sensory deficit.      Motor: No weakness.   Psychiatric:         Mood and Affect: Mood normal.         Procedures  CT Angiogram Chest Pulmonary Embolism   Final Result       NEGATIVE FOR PULMONARY EMBOLISM.       DIFFUSE CRAZY PAVING OPACITIES IN THE LUNGS, RELATIVELY SPARING THE   PERIPHERY AND BASES, WHICH COULD BE DUE TO PULMONARY EDEMA, ARDS,   INFECTION, PULMONARY ALVEOLAR PROTEINOSIS.       RIGHT THYROID NODULE MEASURING 1.3 X 1.5 CM. FOLLOW UP ULTRASOUND IS   SUGGESTED.       This report was finalized on 9/9/2024 3:39 AM by Dhara Shaikh MD.          XR Chest 1 View   Final Result       1.  Enlarged heart size   2.  Multifocal pneumonia, left greater than right.   3.  Central pulmonary vascular congestion.   4.  Edema.   5.  No pleural effusion.   6.  No pneumothorax.   7.  Hypoinflated lungs.       This report was finalized on 9/9/2024 2:01 AM by Kenny Baca MD.            Results for orders placed or performed during the hospital encounter of 09/08/24   Respiratory Panel PCR w/COVID-19(SARS-CoV-2) [facility]IS M[facility]UEL/[facility] In-House, NP Swab in [place], 2 HR TAT - Swab, Nasopharynx    Specimen: Nasopharynx; Swab   Result Value Ref Range    ADENOVIRUS, PCR Not Detected Not Detected    Coronavirus 229E Not Detected Not Detected    Coronavirus HKU1 Not Detected Not Detected    Coronavirus NL63 Not Detected Not Detected    Coronavirus OC43 Not Detected Not Detected    COVID19 Not Detected Not Detected - Ref. Range    Human Metapneumovirus Not Detected Not Detected    Human Rhinovirus/Enterovirus Not Detected Not Detected     Influenza A PCR Not Detected Not Detected    Influenza B PCR Not Detected Not Detected    Parainfluenza Virus 1 Not Detected Not Detected    Parainfluenza Virus 2 Not Detected Not Detected    Parainfluenza Virus 3 Not Detected Not Detected    Parainfluenza Virus 4 Not Detected Not Detected    RSV, PCR Not Detected Not Detected    Bordetella pertussis pcr Not Detected Not Detected    Bordetella parapertussis PCR Not Detected Not Detected    Chlamydophila pneumoniae PCR Not Detected Not Detected    Mycoplasma pneumo by PCR Not Detected Not Detected   High Sensitivity Troponin T    Specimen: Blood   Result Value Ref Range    HS Troponin T 10 <14 ng/L   Comprehensive Metabolic Panel    Specimen: Blood   Result Value Ref Range    Glucose 328 (H) 65 - 99 mg/dL    BUN 13 6 - 20 mg/dL    Creatinine 0.78 0.57 - 1.00 mg/dL    Sodium 130 (L) 136 - 145 mmol/L    Potassium 3.5 3.5 - 5.2 mmol/L    Chloride 93 (L) 98 - 107 mmol/L    CO2 26.4 22.0 - 29.0 mmol/L    Calcium 8.9 8.6 - 10.5 mg/dL    Total Protein 7.4 6.0 - 8.5 g/dL    Albumin 3.7 3.5 - 5.2 g/dL    ALT (SGPT) 14 1 - 33 U/L    AST (SGOT) 17 1 - 32 U/L    Alkaline Phosphatase 106 39 - 117 U/L    Total Bilirubin <0.2 0.0 - 1.2 mg/dL    Globulin 3.7 gm/dL    A/G Ratio 1.0 g/dL    BUN/Creatinine Ratio 16.7 7.0 - 25.0    Anion Gap 10.6 5.0 - 15.0 mmol/L    eGFR 93.8 >60.0 mL/min/1.73   CBC Auto Differential    Specimen: Blood   Result Value Ref Range    WBC 20.87 (H) 3.40 - 10.80 10*3/mm3    RBC 4.07 3.77 - 5.28 10*6/mm3    Hemoglobin 12.6 12.0 - 15.9 g/dL    Hematocrit 37.0 34.0 - 46.6 %    MCV 90.9 79.0 - 97.0 fL    MCH 31.0 26.6 - 33.0 pg    MCHC 34.1 31.5 - 35.7 g/dL    RDW 13.6 12.3 - 15.4 %    RDW-SD 45.6 37.0 - 54.0 fl    MPV 9.1 6.0 - 12.0 fL    Platelets 323 140 - 450 10*3/mm3    Neutrophil % 91.0 (H) 42.7 - 76.0 %    Lymphocyte % 5.2 (L) 19.6 - 45.3 %    Monocyte % 2.2 (L) 5.0 - 12.0 %    Eosinophil % 0.0 (L) 0.3 - 6.2 %    Basophil % 0.3 0.0 - 1.5 %    Immature  Grans % 1.3 (H) 0.0 - 0.5 %    Neutrophils, Absolute 18.99 (H) 1.70 - 7.00 10*3/mm3    Lymphocytes, Absolute 1.08 0.70 - 3.10 10*3/mm3    Monocytes, Absolute 0.45 0.10 - 0.90 10*3/mm3    Eosinophils, Absolute 0.01 0.00 - 0.40 10*3/mm3    Basophils, Absolute 0.06 0.00 - 0.20 10*3/mm3    Immature Grans, Absolute 0.28 (H) 0.00 - 0.05 10*3/mm3    nRBC 0.0 0.0 - 0.2 /100 WBC   Protime-INR    Specimen: Blood   Result Value Ref Range    Protime 13.3 12.1 - 14.7 Seconds    INR 1.00 0.90 - 1.10   D-dimer, Quantitative    Specimen: Blood   Result Value Ref Range    D-Dimer, Quantitative 0.69 (H) 0.00 - 0.50 MCGFEU/mL   Magnesium    Specimen: Blood   Result Value Ref Range    Magnesium 1.5 (L) 1.6 - 2.6 mg/dL   Blood Gas, Arterial With Co-Ox    Specimen: Arterial Blood   Result Value Ref Range    Site Left Radial     Osiel's Test N/A     pH, Arterial 7.490 (H) 7.350 - 7.450 pH units    pCO2, Arterial 38.5 35.0 - 45.0 mm Hg    pO2, Arterial 59.0 (L) 83.0 - 108.0 mm Hg    HCO3, Arterial 29.3 (H) 20.0 - 26.0 mmol/L    Base Excess, Arterial 5.6 (H) 0.0 - 2.0 mmol/L    O2 Saturation, Arterial 92.8 (L) 94.0 - 99.0 %    Hemoglobin, Blood Gas 12.5 (L) 13.5 - 17.5 g/dL    Hematocrit, Blood Gas 38.2 38.0 - 51.0 %    Oxyhemoglobin 85.8 (L) 94 - 99 %    Methemoglobin 0.20 0.00 - 3.00 %    Carboxyhemoglobin 7.3 (H) 0 - 5 %    A-a DO2 172.3 0.0 - 300.0 mmHg    CO2 Content 30.5 22 - 33 mmol/L    Barometric Pressure for Blood Gas 731 mmHg    Modality Nasal Cannula     FIO2 40 %    Flow Rate 5.0 lpm    Ventilator Mode NA     Collected by [id number]     pH, Temp Corrected      pCO2, Temperature Corrected      pO2, Temperature Corrected     aPTT    Specimen: Arm, Left; Blood   Result Value Ref Range    PTT 30.0 26.5 - 34.5 seconds   Lactic Acid, Plasma    Specimen: Arm, Left; Blood   Result Value Ref Range    Lactate 1.4 0.5 - 2.0 mmol/L   Procalcitonin    Specimen: Arm, Left; Blood   Result Value Ref Range    Procalcitonin 0.07 0.00 - 0.25 ng/mL    Urinalysis With Microscopic If Indicated (No Culture) - Urine, Catheter    Specimen: Urine, Catheter   Result Value Ref Range    Color, UA Yellow Yellow, Straw    Appearance, UA Clear Clear    pH, UA 6.5 5.0 - 8.0    Specific Gravity, UA 1.021 1.005 - 1.030    Glucose, UA >=1000 mg/dL (3+) (A) Negative    Ketones, UA Negative Negative    Bilirubin, UA Negative Negative    Blood, UA Negative Negative    Protein, UA Negative Negative    Leuk Esterase, UA Negative Negative    Nitrite, UA Negative Negative    Urobilinogen, UA 0.2 E.U./dL 0.2 - 1.0 E.U./dL   High Sensitivity Troponin T 2Hr    Specimen: Blood   Result Value Ref Range    HS Troponin T 11 <14 ng/L    Troponin T Delta 1 >=-4 - <+4 ng/L   ECG 12 Lead Dyspnea   Result Value Ref Range    QT Interval 342 ms    QTC Interval 473 ms   Green Top (Gel)   Result Value Ref Range    Extra Tube Hold for add-ons.    Lavender Top   Result Value Ref Range    Extra Tube hold for add-on    Gold Top - SST   Result Value Ref Range    Extra Tube Hold for add-ons.    Light Blue Top   Result Value Ref Range    Extra Tube Hold for add-ons.                 ED Course  ED Course as of 09/09/24 0510   Mon Sep 09, 2024   0455 I assumed patient's care from Dr. Cordova at the end of his shift.  Please see his documentation above.  Hospitalist paged for admission.  Patient seems to be resting pretty comfortably, pulse ox 93% on 6 L of oxygen per nasal cannula.  She does not appear to be in acute distress at this time. [CM]   0509 Case discussed with Dr. Mishra.  She is admitting patient to the hospitalist service. [CM]      ED Course User Index  [CM] David Dumont MD                                             Medical Decision Making  Patient here with hypoxia, with decreased breath sounds on the right concerning for a spontaneous pneumothorax likely secondary to COPD.  Appears to be atraumatic in nature and not responsive to DuoNeb so I would low suspicion for COPD exacerbation as  I did not appreciate any wheezing on examination.  No signs of bilateral lower extremity edema it is concerning for a CHF exacerbation but the patient is obese with is difficult to tell for sure.  Patient does appear ill on examination she was tachycardic tachypneic and using accessory muscles.    Problems Addressed:  Acute hypoxic respiratory failure: complicated acute illness or injury    Amount and/or Complexity of Data Reviewed  Labs: ordered.  Radiology: ordered.  ECG/medicine tests: ordered.    Risk  OTC drugs.  Prescription drug management.        Final diagnoses:   Acute hypoxic respiratory failure   Multifocal pneumonia       ED Disposition  ED Disposition       ED Disposition   Decision to Admit    Condition   --    Comment   --               Please note that portions of this note were completed with a voice recognition program.                David Dumont MD  09/09/24 0549

## 2024-09-09 NOTE — THERAPY EVALUATION
Acute Care - Speech Language Pathology   Swallow Initial Evaluation [facility]  CLINICAL DYSPHAGIA ASSESSMENT     Patient Name: Ana Ortiz  : 1975  MRN: [id number]  Today's Date: 2024     Admit Date: 2024  Ana Ortiz  was seen at bedside this am on PCU-211 to assess safety/efficacy of swallowing fnx, determine safest/least restrictive diet tolerance. She has a PMH significant for chronic hypoxic respiratory failure with nocturnal supplemental oxygen use of 2 L/min nasal cannula, COPD, diabetes mellitus type 2, hypertension, JOANN, ongoing tobacco abuse, and morbid obesity.     Ms Ortiz presented to [facility] ED via EMS for evaluation of progressive dyspnea, cough, and hypoxia over the last 2 to 3 days. She is currently being treated for sepsis due to PNA, COPD exacerbation, and respiratory failure d/t PNA and COPD. Per imaging obtained upon admission, she is noted w/ incidental finding of right thyroid nodule w/ recommendations for f/u ultrasound.     Per EMR review, she is unfamiliar to SLP department of [facility]. She has passed the RN dysphagia screen w/o difficulty however per pt reports of frequent choking on solid foods at home she is currently NPO awaiting SLP evaluation.     Social History     Socioeconomic History    Marital status:    Tobacco Use    Smoking status: Every Day     Current packs/day: 1.50     Average packs/day: 1.4 packs/day for 29.3 years (42.2 ttl pk-yrs)     Types: Cigarettes     Start date: 1995     Passive exposure: Current    Smokeless tobacco: Never    Tobacco comments:     8-10 per day    Vaping Use    Vaping status: Former    Substances: Nicotine, Flavoring    Devices: Refillable tank   Substance and Sexual Activity    Alcohol use: Never    Drug use: Never    Sexual activity: Not Currently     Partners: Male     Birth control/protection: Condom      Imaging:  CT Angiogram Chest Pulmonary Embolism [[id number]] Wilfrido as Reviewed   Order Status: Completed Collected:  09/09/24 0334    Updated: 09/09/24 0342   Narrative:     CLINICAL HISTORY: Chest pain.     COMPARISON: None available.     TECHNIQUE: IV contrast was administered and helical images were acquired  through the chest.  Multiplanar reformats were generated.  Axial MIPS  were obtained.  Limited exposure control, adjustment of the mA and/or KV  according to patient size or use of iterative reconstruction technique  was utilized.     FINDINGS:  Contrast bolus timing is suboptimal to evaluate for pulmonary embolism.     Heart and mediastinal structures: No pulmonary arterial filling defect.  Normal mediastinal structures. Coronary artery calcifications are  present.     Lungs and airways: Diffuse crazy paving opacities in the lungs,  relatively sparing the periphery and involving predominately in the mid  and upper lung zones.  The central airways are patent..     Pleura: normal.     Lymph nodes: normal.     Musculoskeletal and chest wall: no acute abnormality.     Lower neck / upper abdomen: Heterogeneous nodule in the posterior aspect  of the right thyroid measuring 1.3 x 1.5 cm.      Impression:     NEGATIVE FOR PULMONARY EMBOLISM.     DIFFUSE CRAZY PAVING OPACITIES IN THE LUNGS, RELATIVELY SPARING THE  PERIPHERY AND BASES, WHICH COULD BE DUE TO PULMONARY EDEMA, ARDS,  INFECTION, PULMONARY ALVEOLAR PROTEINOSIS.     RIGHT THYROID NODULE MEASURING 1.3 X 1.5 CM. FOLLOW UP ULTRASOUND IS  SUGGESTED.     This report was finalized on 9/9/2024 3:39 AM by Dhara Shaikh MD.       XR Chest 1 View [[id number]] Wilfrido as Reviewed   Order Status: Completed Collected: 09/09/24 0200    Updated: 09/09/24 0203   Narrative:     PROCEDURE: Portable chest x-ray examination performed on September 8, 2024. Single view.     HISTORY: Shortness of breath. Chest pain.     COMPARISON: None.     FINDINGS:     Enlarged heart size. Edema.  Multifocal pneumonia, left greater than right.  Central pulmonary vascular congestion.  Slightly elevated left  "hemidiaphragm.  No pleural effusion.  No pneumothorax      Impression:     1.  Enlarged heart size  2.  Multifocal pneumonia, left greater than right.  3.  Central pulmonary vascular congestion.  4.  Edema.  5.  No pleural effusion.  6.  No pneumothorax.  7.  Hypoinflated lungs.     This report was finalized on 9/9/2024 2:01 AM by Kenny Baca MD.     Labs:   Latest Reference Range & Units 09/09/24 00:44 09/09/24 08:55   WBC 3.40 - 10.80 10*3/mm3 20.87 (H) 17.81 (H)   RBC 3.77 - 5.28 10*6/mm3 4.07 3.79   Hemoglobin 12.0 - 15.9 g/dL 12.6 11.5 (L)   Hematocrit 34.0 - 46.6 % 37.0 35.0   Platelets 140 - 450 10*3/mm3 323 306   RDW 12.3 - 15.4 % 13.6 13.8   MCV 79.0 - 97.0 fL 90.9 92.3   MCH 26.6 - 33.0 pg 31.0 30.3   MCHC 31.5 - 35.7 g/dL 34.1 32.9   MPV 6.0 - 12.0 fL 9.1 9.2   RDW-SD 37.0 - 54.0 fl 45.6 47.1   (H): Data is abnormally high  (L): Data is abnormally low    Diet Orders (active) (From admission, onward)       Start     Ordered    09/09/24 1200  DIET MESSAGE Please send extra gravy/sauce on the side for all meats.  Daily With Breakfast, Lunch & Dinner      Comments: Please send extra gravy/sauce on the side for all meats.    09/09/24 1126    09/09/24 1124  Diet: Diabetic, Regular/House; Consistent Carbohydrate; Texture: Soft to Chew (NDD 3); Soft to Chew: Ground Meat; Fluid Consistency: Thin (IDDSI 0)  Diet Effective Now        Comments: Meds whole in puree/thins.   Fully upright for all po intake.    09/09/24 1126                  Ms Ortiz is observed on bubble HFNC at 12L w/o complications.    Upon SLP entry to room, she is positioned slightly reclined in bed at ~45 degrees and reports feeling \"bad\". She denies any hx of swallowing difficulty until \"about the last month or two\". She reports this swallowing difficulty as feelings of food getting hung in her throat and that this primarily occurs w/ solids; particularly meats, dry breads, and cereals. She denies difficulty w/ mixed consistency soups. She " "reports this has caused her to avoid eating meats that are not already in small pieces and moistened as well as avoidance of most breads. She denies frequent respiratory illnesses or recurrent PNA. She denies difficulty w/ po medications taken whole.     Ms Ortiz was positioned upright and centered in bed to accept multiple po presentations of ice chips, solid cracker, puree, and thin liquids via spoon, cup, and straw.  She was able to self provide po trials. She reported hesitancy for solid cracker given it's dry and crunchy texture and this being similar to \"something I'd get choked on\". She accepts a small bite.     Facial/oral structures were symmetrical upon observation. Lingual protrusion revealed no deviation. Oral mucosa were slightly xerostomic, pink, and clean. Secretions were clear, thin, and well controlled. OROM/YUSUF was wfl to imitate oral postures. Gag is not assessed. Volitional cough was intact w/ adequate  intensity, clear in quality, non-productive. Voice was adequate in intensity, clear in quality w/ intelligible speech.    Upon po presentations, adequate bolus anticipation and acceptance w/ good labial seal for bolus clearance via spoon bowl, cup rim stability and suction via straw. Bolus formation, manipulation and control were wfl w/ rotary mastication pattern. A-p transit was timely w/o significant oral residue appreciated. No overt s/s aspiration before the swallow.      Pharyngeal swallow was timely w/ adequate hyolaryngeal elevation per palpation. No overt s/s aspiration evidenced across this evaluation. No silent aspiration suspected. Patient denied odynophagia.    Visit Dx:     ICD-10-CM ICD-9-CM   1. Acute hypoxic respiratory failure  J96.01 518.81   2. Multifocal pneumonia  J18.9 486     Patient Active Problem List   Diagnosis    Morbidly obese    Acute respiratory failure with hypoxia and hypercapnia    Cutaneous abscess of back excluding buttocks    JOANN (obstructive sleep apnea)    " Chronic bronchitis    Cigarette nicotine dependence without complication    Chronic respiratory failure with hypoxia    Diabetes mellitus, type II    Type 2 diabetes mellitus with hyperglycemia, with long-term current use of insulin    Severe sepsis    Sepsis due to pneumonia     Past Medical History:   Diagnosis Date    Arthritis     CHF (congestive heart failure)     COPD (chronic obstructive pulmonary disease)     Diabetes mellitus     Elevated cholesterol     GERD (gastroesophageal reflux disease)     Hyperlipemia     Hypertension     JOANN (obstructive sleep apnea)     Pneumonia     Vulvar cancer      Past Surgical History:   Procedure Laterality Date    HYSTERECTOMY      INCISION AND DRAINAGE TRUNK N/A 1/11/2022    Procedure: INCISION AND DRAINAGE BACK;  Surgeon: Tamera Saez MD;  Location: [facility];  Service: General;  Laterality: N/A;    MASTECTOMY      VULVA SURGERY      reconstruction due ca     Impression:     Ms Ortiz presented w/ a wfl oropharyngeal swallow w/o s/s aspiration and no s/s indicative of silent aspiration. No odynophagia reported. Per pt reported premorbid baseline po diet w/ meats already cut into small pieces and moistened, as well as avoidance of hard/crunchy textures, she is felt to most benefit from a modified po diet of mechanical soft textures, ground meats, and thin liquids at this time. She is recommended for additional gravy/sauce to be provided w/ all meats and medications to be presented whole in puree/thins or crushed PRN.     SLP Recommendation and Plan     1. Mechanical soft textures, ground meats, thin liquids.    2. Medicatoins whole in puree/thins. Crush PRN.   3. Upright and centered for all po intake  4. ZORA precautions.  5. Oral care protocol.  6. Additional sauce/gravy w/ all meats to aid w/ bolus manipulation    SLP to f/u for diet safety/tolerance and potential advancement.     D/w patient results and recommendations w/ verbal agreement.    D/w RN results and  recommendations w/ verbal agreement.    Thank you for allowing me to participate in the care of your patient-  Corrie Crews M.S., CCC-SLP        EDUCATION  The patient has been educated in the following areas:   Dysphagia (Swallowing Impairment) Modified Diet Instruction.        Time Calculation:    Time Calculation- SLP       Row Name 09/09/24 1130             Time Calculation- SLP    SLP Received On 09/09/24  -      SLP - Next Appointment 09/10/24  -                User Key  (r) = Recorded By, (t) = Taken By, (c) = Cosigned By      Initials Name Provider Type    Corrie Yang MS CCC-SLP Speech and Language Pathologist                    Therapy Charges for Today       Code Description Service Date Service Provider Modifiers Qty    [id number] HC ST EVAL ORAL PHARYNG SWALLOW 4 9/9/2024 Corrie Crews MS CCC-SLP GN 1                 Corrie Crews MS CCC-DEENA  9/9/2024

## 2024-09-09 NOTE — PLAN OF CARE
Problem: Adjustment to Illness (Sepsis/Septic Shock)  Goal: Optimal Coping  Outcome: Ongoing, Progressing  Intervention: Optimize Psychosocial Adjustment to Illness  Recent Flowsheet Documentation  Taken 9/9/2024 1400 by Kathleen Ahumada RN  Family/Support System Care:   presence promoted   support provided  Taken 9/9/2024 0830 by Kathleen Ahumada RN  Family/Support System Care: presence promoted     Problem: Bleeding (Sepsis/Septic Shock)  Goal: Absence of Bleeding  Outcome: Ongoing, Progressing     Problem: Glycemic Control Impaired (Sepsis/Septic Shock)  Goal: Blood Glucose Level Within Desired Range  Outcome: Ongoing, Progressing     Problem: Infection Progression (Sepsis/Septic Shock)  Goal: Absence of Infection Signs and Symptoms  Outcome: Ongoing, Progressing  Intervention: Initiate Sepsis Management  Recent Flowsheet Documentation  Taken 9/9/2024 1700 by Kathleen Ahumada RN  Infection Prevention:   single patient room provided   rest/sleep promoted   hand hygiene promoted  Taken 9/9/2024 1500 by Kathleen Ahumada RN  Infection Prevention:   single patient room provided   rest/sleep promoted   hand hygiene promoted  Taken 9/9/2024 1300 by Kathleen Ahumada RN  Infection Prevention:   single patient room provided   rest/sleep promoted   hand hygiene promoted  Taken 9/9/2024 1100 by Kathleen Ahumada RN  Infection Prevention:   single patient room provided   rest/sleep promoted   hand hygiene promoted  Taken 9/9/2024 0900 by Kathleen Ahumada RN  Infection Prevention:   single patient room provided   rest/sleep promoted   hand hygiene promoted  Taken 9/9/2024 0830 by Kathleen Ahumada RN  Infection Prevention:   single patient room provided   rest/sleep promoted   hand hygiene promoted  Intervention: Promote Recovery  Recent Flowsheet Documentation  Taken 9/9/2024 1700 by Kathleen Ahumada RN  Activity Management: bedrest  Taken 9/9/2024 1500 by Kathleen Ahumada RN  Activity Management: bedrest  Taken 9/9/2024 1400 by Brandyn  Kathleen, RN  Sleep/Rest Enhancement:   consistent schedule promoted   family presence promoted   relaxation techniques promoted  Taken 9/9/2024 1300 by Kathleen Ahumada RN  Activity Management: bedrest  Taken 9/9/2024 1100 by Kathleen Ahumada RN  Activity Management: bedrest  Taken 9/9/2024 0900 by Kathleen Ahumada RN  Activity Management: bedrest  Taken 9/9/2024 0830 by Kathleen Ahumada RN  Activity Management: activity encouraged     Problem: Nutrition Impaired (Sepsis/Septic Shock)  Goal: Optimal Nutrition Intake  Outcome: Ongoing, Progressing     Problem: Diabetes Comorbidity  Goal: Blood Glucose Level Within Targeted Range  Outcome: Ongoing, Progressing     Problem: Obstructive Sleep Apnea Risk or Actual Comorbidity Management  Goal: Unobstructed Breathing During Sleep  Outcome: Ongoing, Progressing     Problem: Adult Inpatient Plan of Care  Goal: Plan of Care Review  Outcome: Ongoing, Progressing  Flowsheets (Taken 9/9/2024 1826)  Outcome Evaluation: Pt is resting in bed, call light in reach, and bed alarm set. No new needs noted at this time.  Goal: Patient-Specific Goal (Individualized)  Outcome: Ongoing, Progressing  Goal: Absence of Hospital-Acquired Illness or Injury  Outcome: Ongoing, Progressing  Intervention: Identify and Manage Fall Risk  Recent Flowsheet Documentation  Taken 9/9/2024 1700 by Kathleen Ahumada RN  Safety Promotion/Fall Prevention:   activity supervised   assistive device/personal items within reach   clutter free environment maintained   fall prevention program maintained   safety round/check completed  Taken 9/9/2024 1500 by Kathleen Ahumada RN  Safety Promotion/Fall Prevention:   activity supervised   assistive device/personal items within reach   clutter free environment maintained   fall prevention program maintained   safety round/check completed  Taken 9/9/2024 1300 by Kathleen Ahumada RN  Safety Promotion/Fall Prevention:   activity supervised   assistive device/personal items within  reach   clutter free environment maintained   fall prevention program maintained   safety round/check completed  Taken 9/9/2024 1100 by Kathleen Ahumada RN  Safety Promotion/Fall Prevention:   activity supervised   assistive device/personal items within reach   clutter free environment maintained   fall prevention program maintained   safety round/check completed  Taken 9/9/2024 0900 by Kathleen Ahumada RN  Safety Promotion/Fall Prevention:   activity supervised   assistive device/personal items within reach   clutter free environment maintained   fall prevention program maintained   safety round/check completed  Taken 9/9/2024 0830 by Kathleen Ahumada RN  Safety Promotion/Fall Prevention:   activity supervised   assistive device/personal items within reach   clutter free environment maintained   fall prevention program maintained   safety round/check completed  Intervention: Prevent Skin Injury  Recent Flowsheet Documentation  Taken 9/9/2024 1400 by Kathleen Ahumada RN  Skin Protection:   adhesive use limited   incontinence pads utilized   protective footwear used   pulse oximeter probe site changed   transparent dressing maintained   tubing/devices free from skin contact  Intervention: Prevent and Manage VTE (Venous Thromboembolism) Risk  Recent Flowsheet Documentation  Taken 9/9/2024 1700 by Kathleen Ahumada RN  Activity Management: bedrest  Taken 9/9/2024 1500 by Kathleen Ahumada RN  Activity Management: bedrest  Taken 9/9/2024 1400 by Kathleen Ahumada RN  VTE Prevention/Management: (see MAR) other (see comments)  Range of Motion: active ROM (range of motion) encouraged  Taken 9/9/2024 1300 by Kathleen Ahumada RN  Activity Management: bedrest  Taken 9/9/2024 1100 by Kathleen Ahumada RN  Activity Management: bedrest  Taken 9/9/2024 0900 by Kathleen Ahumada RN  Activity Management: bedrest  Taken 9/9/2024 0830 by Kathleen Ahumada RN  Activity Management: activity encouraged  Intervention: Prevent Infection  Recent Flowsheet  Documentation  Taken 9/9/2024 1700 by Kathleen Ahumada RN  Infection Prevention:   single patient room provided   rest/sleep promoted   hand hygiene promoted  Taken 9/9/2024 1500 by Kathleen Ahumada RN  Infection Prevention:   single patient room provided   rest/sleep promoted   hand hygiene promoted  Taken 9/9/2024 1300 by Kathleen Ahumada RN  Infection Prevention:   single patient room provided   rest/sleep promoted   hand hygiene promoted  Taken 9/9/2024 1100 by Kathleen Ahumada RN  Infection Prevention:   single patient room provided   rest/sleep promoted   hand hygiene promoted  Taken 9/9/2024 0900 by Kathleen Ahumada RN  Infection Prevention:   single patient room provided   rest/sleep promoted   hand hygiene promoted  Taken 9/9/2024 0830 by Kathleen Ahumada RN  Infection Prevention:   single patient room provided   rest/sleep promoted   hand hygiene promoted  Goal: Optimal Comfort and Wellbeing  Outcome: Ongoing, Progressing  Intervention: Provide Person-Centered Care  Recent Flowsheet Documentation  Taken 9/9/2024 1400 by Kathleen Ahumada RN  Trust Relationship/Rapport:   care explained   choices provided   questions answered   questions encouraged   reassurance provided   thoughts/feelings acknowledged  Taken 9/9/2024 0830 by Kathleen Ahumada RN  Trust Relationship/Rapport:   care explained   choices provided   questions answered   questions encouraged   reassurance provided   thoughts/feelings acknowledged  Goal: Readiness for Transition of Care  Outcome: Ongoing, Progressing  Intervention: Mutually Develop Transition Plan  Recent Flowsheet Documentation  Taken 9/9/2024 1225 by Kathleen Ahumada RN  Equipment Currently Used at Home:   pulse ox   oxygen   cpap   glucometer     Problem: Noninvasive Ventilation Acute  Goal: Effective Unassisted Ventilation and Oxygenation  Outcome: Ongoing, Progressing   Goal Outcome Evaluation:              Outcome Evaluation: Pt is resting in bed, call light in reach, and bed alarm  set. No new needs noted at this time.

## 2024-09-09 NOTE — PROGRESS NOTES
Kinetics :    vancomycin  day 1    The patient has been evaluated for vancomycin therapy for pneumonia.   We will load the patient with vancomycin 2500mg x 1 and follow with 1250mg q 12 hrs to maximize the auc / trough level projections and monitor with you.

## 2024-09-09 NOTE — CONSULTS
"Diabetes Education  Assessment/Teaching    Patient Name:  Ana Ortiz  YOB: 1975  MRN: [id number]  Admit Date:  9/8/2024      Assessment Date:  9/9/2024  Flowsheet Row Most Recent Value   General Information     Height 165.1 cm (65\")   Height Method Stated   Weight 119 kg (263 lb 3.7 oz)   Weight Method Bed scale   Pregnancy Assessment    Diabetes History    Education Preferences    Nutrition Information    Assessment Topics    DM Goals             Flowsheet Row Most Recent Value   DM Education Needs    Meter Has own   Meter Type Other (comment)  [Dexcom]   Frequency of Testing Other (comment)  [CGM]   Medication Insulin, Oral   Problem Solving Hypoglycemia, Hyperglycemia, Signs, Sick days, Symptoms, Treatment   Reducing Risks Cardiovascular, Immunizations, Foot care, Dental exam, Eye exam, Blood pressure, Lipids, A1C testing, Neuropathy, Retinopathy   Healthy Eating Basic meal plan provided   Physical Activity Walking   Physical Activity Frequency Occasionally, Regularly   Healthy Coping Appropriate   Discharge Plan Follow-up with endocrinolgoist, Follow-up with PCP   Motivation Engaged, Moderate   Teaching Method Explanation, Discussion, Handouts   Patient Response Verbalized understanding              Other Comments:  A1C 8.1 Patient was educated and received AADE7 and ADA handouts on diet, activity, checking blood glucose, taking medication as prescribed, checking feet daily and S/S of hypo/hyperglycemia. Patient was educated on sick rule days. Patient had no questions or concerns. Please re-consult or call for concerns or questions. Thank you.        Electronically signed by:  Joy Lee RN  09/09/24 14:54 EDT  "

## 2024-09-09 NOTE — CONSULTS
Consult Note Pulmonary     Referring Provider: Dr. Rosa Mishra  Reason for Consultation: Acute hypoxic respiratory failure, pneumonia, COPD exacerbation      Chief complaint   Worsening cough, shortness of breath and sputum production for last 2 days.    History of present illness:    Patient is a 48-year-old morbidly obese female with past medical history significant for COPD, diabetes, obstructive sleep apnea, congestive heart failure and vulvar cancer, history of smoking.  Unfortunately still smokes at least 5 cigarettes a day.  Comes in with complaint of worsening cough associated with yellow to green-colored sputum and shortness of breath for last 2 days.    Patient reported feeling dizzy but denies chest pain and palpitation.  She denies leg pain and edema.    Denies any history of thromboembolic event.    She reported subjective fever and shaking chills.  Also reported poor appetite.  She is not very compliant with her CPAP.    She denies nausea, vomiting, diarrhea, hemoptysis, hematemesis, melena, bright red blood per rectum, joint stiffness, rash, photosensitivity.  Denies tick bite, recent sick contact exposure to birds and recent travel.      Review of Systems  As described in HPI    History  Past Medical History:   Diagnosis Date    Arthritis     CHF (congestive heart failure)     COPD (chronic obstructive pulmonary disease)     Diabetes mellitus     Elevated cholesterol     GERD (gastroesophageal reflux disease)     Hyperlipemia     Hypertension     JOANN (obstructive sleep apnea)     Pneumonia     Vulvar cancer    ,   Past Surgical History:   Procedure Laterality Date    HYSTERECTOMY      INCISION AND DRAINAGE TRUNK N/A 1/11/2022    Procedure: INCISION AND DRAINAGE BACK;  Surgeon: Tamera Saez MD;  Location: [facility];  Service: General;  Laterality: N/A;    MASTECTOMY      VULVA SURGERY      reconstruction due ca   ,   Family History   Problem Relation Age of Onset    Cancer Mother      Hypertension Mother     Parkinsonism Father     Diabetes Father     Hypertension Father     Cancer Maternal Aunt     Diabetes Paternal Aunt     Diabetes Paternal Grandmother     Cancer Maternal Aunt     Diabetes Brother    ,   Social History     Tobacco Use    Smoking status: Every Day     Current packs/day: 1.50     Average packs/day: 1.4 packs/day for 29.3 years (42.2 ttl pk-yrs)     Types: Cigarettes     Start date: 5/18/1995     Passive exposure: Current    Smokeless tobacco: Never    Tobacco comments:     8-10 per day    Vaping Use    Vaping status: Former    Substances: Nicotine, Flavoring    Devices: [employer]   Substance Use Topics    Alcohol use: Never    Drug use: Never   ,   Medications Prior to Admission   Medication Sig Dispense Refill Last Dose    albuterol sulfate HFA (Ventolin HFA) 108 (90 Base) MCG/ACT inhaler Inhale 2 puffs Every 4 (Four) Hours As Needed for Wheezing or Shortness of Air. 18 g 12 9/8/2024    budesonide-formoterol (Symbicort) 160-4.5 MCG/ACT inhaler Inhale 2 puffs 2 (Two) Times a Day. 1 each 8 9/8/2024    cetirizine (zyrTEC) 10 MG tablet Take 1 tablet by mouth Daily.   9/8/2024    clonazePAM (KlonoPIN) 1 MG tablet Take 1 tablet by mouth 4 (Four) Times a Day As Needed for Anxiety.   9/8/2024    cyclobenzaprine (FLEXERIL) 5 MG tablet Take 1 tablet by mouth 3 (Three) Times a Day As Needed for Muscle Spasms.   9/8/2024    fluticasone (FLONASE) 50 MCG/ACT nasal spray 2 sprays into the nostril(s) as directed by provider Daily As Needed for Rhinitis or Allergies.   9/8/2024    gabapentin (NEURONTIN) 800 MG tablet Take 1 tablet by mouth 4 (Four) Times a Day.   9/8/2024    glipizide (Glucotrol) 5 MG tablet Take 1 tablet by mouth 2 (Two) Times a Day Before Meals. 60 tablet 5 9/8/2024    hydroCHLOROthiazide (HYDRODIURIL) 25 MG tablet Take 1 tablet by mouth Every Morning.   9/8/2024    ibuprofen (ADVIL,MOTRIN) 800 MG tablet Take 1 tablet by mouth Every 8 (Eight) Hours As Needed for Mild  Pain.   9/8/2024    ipratropium-albuterol (DUO-NEB) 0.5-2.5 mg/3 ml nebulizer Take 3 mL by nebulization Every 6 (Six) Hours As Needed for Wheezing or Shortness of Air. 360 mL 5 9/8/2024    lidocaine (LIDODERM) 5 % Place 1 patch on the skin as directed by provider Daily. Remove & Discard patch within 12 hours or as directed by MD   9/8/2024 at 1400    lisinopril (PRINIVIL,ZESTRIL) 5 MG tablet Take 0.5 tablets by mouth Daily.   9/8/2024    loperamide (IMODIUM) 2 MG capsule Take 1 capsule by mouth 4 (Four) Times a Day As Needed for Diarrhea.   Past Week    meloxicam (MOBIC) 7.5 MG tablet Take 1 tablet by mouth Every 12 (Twelve) Hours.   9/8/2024    ondansetron ODT (ZOFRAN-ODT) 4 MG disintegrating tablet Take 1 tablet by mouth Every 6 (Six) Hours As Needed for Nausea or Vomiting. 12 tablet 0 Past Week    tiotropium (Spiriva HandiHaler) 18 MCG per inhalation capsule Place 1 capsule into inhaler and inhale Daily. 30 capsule 8 9/8/2024    Insulin Glargine (LANTUS SOLOSTAR) 100 UNIT/ML injection pen Inject 70 Units under the skin into the appropriate area as directed Every Night. 30 mL 2 9/7/2024    naloxone (NARCAN) 4 MG/0.1ML nasal spray Call 911. Don't prime. [place] in 1 nostril for overdose. Repeat in 2-3 minutes in other nostril if no or minimal breathing/responsiveness. 2 each 0 Unknown    Semaglutide, 2 MG/DOSE, (Ozempic, 2 MG/DOSE,) 8 MG/3ML solution pen-injector Inject 2 mg under the skin into the appropriate area as directed 1 (One) Time Per Week. 3 mL 2 8/27/2024    simvastatin (ZOCOR) 80 MG tablet Take 1 tablet by mouth every night at bedtime.   9/7/2024   , Scheduled Meds:  atorvastatin, 40 mg, Oral, Daily  [START ON 9/10/2024] azithromycin, 500 mg, Intravenous, Q24H  budesonide-formoterol, 2 puff, Inhalation, BID - RT  cefepime, 2,000 mg, Intravenous, Q8H  cetirizine, 10 mg, Oral, Daily  gabapentin, 800 mg, Oral, 4x Daily  heparin (porcine), 5,000 Units, Subcutaneous, Q12H  [Held by provider]  hydroCHLOROthiazide, 25 mg, Oral, QAM  insulin lispro, 2-9 Units, Subcutaneous, 4x Daily AC & at Bedtime  ipratropium-albuterol, 3 mL, Nebulization, 4x Daily - RT  Lidocaine, 1 patch, Transdermal, Q24H  lisinopril, 2.5 mg, Oral, Daily  meloxicam, 7.5 mg, Oral, Q12H  methylPREDNISolone sodium succinate, 40 mg, Intravenous, Q12H  nicotine, 1 patch, Transdermal, Q24H  pantoprazole, 40 mg, Intravenous, Q AM  senna-docusate sodium, 2 tablet, Oral, BID  sodium chloride, 10 mL, Intravenous, Q12H  vancomycin, 1,250 mg, Intravenous, Q12H  vancomycin, 2,500 mg, Intravenous, Once    , Continuous Infusions:  Pharmacy to dose vancomycin,      and Allergies:  Codeine and Metformin    Objective     Vital Signs   Temp:  [98.7 °F (37.1 °C)-98.8 °F (37.1 °C)] 98.7 °F (37.1 °C)  Heart Rate:  [100-116] 101  Resp:  [21-22] 22  BP: (112-135)/(79-94) 135/79    Physical Exam:          General-mild respiratory distress, able to complete sentences but uses accessory muscles on talking.    HEENT- pupils equally reactive to light, normal in size, no scleral icterus    Neck-supple, No JVD, no carotid bruit    Respiratory-bilateral air entry, diffuse wheezing with prolonged expiratory phase, basal rales.  No egophony    Cardiovascular-  Normal S1 and S2. No S3, S4 or murmurs.      GI-nontender nondistended bowel sounds positive    CNS-alert oriented x3, grossly nonfocal      Extremities-no clubbing and edema    Psychiatric-mood good, good eye contact,     Skin- no visible rash             Results Review:    LABS:    Lab Results   Component Value Date    GLUCOSE 282 (H) 09/09/2024    BUN 14 09/09/2024    CREATININE 0.68 09/09/2024    EGFRIFNONA 93 01/13/2022    BCR 20.6 09/09/2024    CO2 27.2 09/09/2024    CALCIUM 8.8 09/09/2024    ALBUMIN 3.6 09/09/2024    AST 20 09/09/2024    ALT 11 09/09/2024    WBC 17.81 (H) 09/09/2024    HGB 11.5 (L) 09/09/2024    HCT 35.0 09/09/2024    MCV 92.3 09/09/2024     09/09/2024     (L) 09/09/2024    K  3.9 09/09/2024    CL 97 (L) 09/09/2024    ANIONGAP 9.8 09/09/2024       Lab Results   Component Value Date    INR 1.00 09/09/2024    INR 1.00 03/17/2024    INR 1.06 01/10/2022    PROTIME 13.3 09/09/2024    PROTIME 13.7 03/17/2024    PROTIME 14.2 01/10/2022       Results from last 7 days   Lab Units 09/09/24  0050 09/09/24  0044   INR   --  1.00   APTT seconds 30.0  --           I reviewed the patient's new clinical results.  I reviewed the patient's new imaging results and agree with the interpretation.      Assessment & Plan   #1 acute hypoxic respiratory failure.    2.  Bilateral community-acquired pneumonia.    3.:  COPD exacerbation.    4.:  Sepsis due to above.    5.:  Morbid obesity on obstructive sleep apnea.    6.:  Diabetes.    7.:  Thyroid nodule.    CT of the chest reviewed.  Diffuse bilateral groundglass haziness with sparing of periphery.  Reported as crazy paving.    Patient has been on bank cefepime and Zithromax.  Continue systemic steroid.    Continue DuoNeb every 6 hours and every 4 hours as needed.    Would like to discontinue Mucomyst as it can worsen bronchospasm.  Will have her use BiPAP during sleep and as needed while awake with a setting of 20/6 with a backup rate of 20.  Adjust pressure to patient's comfort.                GI- PPI prophylaxis      Endrocrinology- Maintain Blood sugar 140 -180, may need insulin drip to maintain decent glycemic control.        DVT prophylaxis-  Really thankful to Dr. Rosa Mishra for having me participate in the care of this patient.  Total time spent 35 minutes  Daniel Washington MD     09/09/24 11:18 EDT

## 2024-09-09 NOTE — H&P
[facility]   HISTORY AND PHYSICAL    Patient Name: Ana Ortiz  : 1975  MRN: [id number]  Primary Care Physician:  Hayden Espinoza PA-C  Date of admission: 2024    Subjective   Subjective     Chief Complaint: Shortness of breath, cough, acute on chronic respiratory failure    History of Present Illness the patient is a 48-year-old female with past medical history significant for chronic hypoxic respiratory failure with nocturnal supplemental oxygen use of 2 L/min nasal cannula, COPD, diabetes mellitus type 2, hypertension, JOANN and morbid obesity who presents to the emergency department complaining of a 2 to 3-day history of progressive dyspnea, cough and hypoxia.    Patient seen and examined in .  The patient reports progressive dyspnea at rest and with exertion for the past 2 to 3 days.  She reports cough with occasional green sputum production.  Patient states that she typically does not have a cough.  The patient states that she has had subjective fevers, chills and night sweats.  She denies hemoptysis.  No unintentional weight loss.  The patient states that she has had some longstanding vomiting and diarrhea that is thought to be related to Ozempic use.  She states that she is a current smoker and currently smokes 5 cigarettes/day.    Patient states that she checked her oxygen level on her pulse oximeter on the evening of presentation to the emergency department and states that she was only saturating in the 70s via her 2 L/min nasal cannula.  The patient states that she contacted EMS and was brought to the ED.  She denies frequent episodes of pneumonia.  She denies any alcohol and/or illicit drug use.    Review of Systems   Constitutional:  Positive for chills, diaphoresis, fatigue and fever.   HENT:  Negative for congestion, hearing loss, tinnitus and trouble swallowing.    Eyes:  Negative for discharge and visual disturbance.   Respiratory:  Positive for cough and shortness of  breath. Negative for wheezing.    Cardiovascular:  Negative for chest pain, palpitations and leg swelling.   Gastrointestinal:  Positive for diarrhea, nausea and vomiting. Negative for abdominal pain and constipation.   Endocrine: Negative for polydipsia, polyphagia and polyuria.   Genitourinary:  Negative for dysuria, frequency and hematuria.   Musculoskeletal:  Negative for gait problem, myalgias and neck pain.   Skin:  Negative for rash.   Neurological:  Positive for weakness. Negative for dizziness, tremors, seizures, syncope and light-headedness.   Hematological:  Does not bruise/bleed easily.   Psychiatric/Behavioral:  Negative for agitation and confusion.       Personal History     Past Medical History:   Diagnosis Date    Arthritis     CHF (congestive heart failure)     COPD (chronic obstructive pulmonary disease)     Diabetes mellitus     Elevated cholesterol     GERD (gastroesophageal reflux disease)     Hyperlipemia     Hypertension     JOANN (obstructive sleep apnea)     Pneumonia     Vulvar cancer        Past Surgical History:   Procedure Laterality Date    HYSTERECTOMY      INCISION AND DRAINAGE TRUNK N/A 1/11/2022    Procedure: INCISION AND DRAINAGE BACK;  Surgeon: Tamera Saez MD;  Location: [facility];  Service: General;  Laterality: N/A;    MASTECTOMY      VULVA SURGERY      reconstruction due ca       Family History: family history includes Cancer in her maternal aunt, maternal aunt, and mother; Diabetes in her brother, father, paternal aunt, and paternal grandmother; Hypertension in her father and mother; Parkinsonism in her father.     Social History:  reports that she has been smoking cigarettes. She started smoking about 29 years ago. She has a 42.2 pack-year smoking history. She has been exposed to tobacco smoke. She has never used smokeless tobacco. She reports that she does not drink alcohol and does not use drugs.    Home Medications:  Insulin Glargine, Semaglutide (2 MG/DOSE),  albuterol sulfate HFA, budesonide-formoterol, cetirizine, clonazePAM, cyclobenzaprine, fluticasone, gabapentin, glipizide, hydroCHLOROthiazide, ibuprofen, ipratropium-albuterol, lidocaine, lisinopril, loperamide, meloxicam, naloxone, ondansetron ODT, simvastatin, and tiotropium    Allergies:  Allergies   Allergen Reactions    Codeine Nausea Only    Metformin GI Intolerance     Extreme diarrhea       Objective    Objective     Vitals:   Temp:  [98.8 °F (37.1 °C)] 98.8 °F (37.1 °C)  Heart Rate:  [113-116] 116  Resp:  [21-22] 22  BP: (112)/(81) 112/81  Flow (L/min):  [5-6] 6    Physical Exam  Constitutional:       General: She is in acute distress.      Appearance: She is well-developed. She is morbidly obese. She is ill-appearing and toxic-appearing.      Interventions: Nasal cannula in place.      Comments: O2 saturation 91-93% via 6LPM N/C   HENT:      Head: Normocephalic and atraumatic.   Eyes:      Conjunctiva/sclera: Conjunctivae normal.   Neck:      Trachea: No tracheal deviation.   Cardiovascular:      Rate and Rhythm: Regular rhythm. Tachycardia present.      Pulses:           Dorsalis pedis pulses are 2+ on the right side and 2+ on the left side.      Heart sounds: No murmur heard.     No friction rub. No gallop.   Pulmonary:      Effort: Tachypnea and accessory muscle usage present. No respiratory distress.      Breath sounds: Examination of the left-lower field reveals rales. Decreased breath sounds, wheezing, rhonchi and rales present.   Abdominal:      General: Abdomen is protuberant. Bowel sounds are decreased. There is no distension.      Palpations: Abdomen is soft.      Tenderness: There is no abdominal tenderness. There is no guarding.   Skin:     General: Skin is warm and dry.      Findings: No erythema or rash.   Neurological:      Mental Status: She is alert and oriented to person, place, and time.      Cranial Nerves: No cranial nerve deficit.         Result Review    Result Review:  I have  personally reviewed the results from the time of this admission to 9/9/2024 05:54 EDT and agree with these findings:  [x]  Laboratory list / accordion  []  Microbiology  [x]  Radiology  [x]  EKG/Telemetry   []  Cardiology/Vascular   []  Pathology  []  Old records  []  Other:  Most notable findings include:     EKG is currently pending official cardiology interpretation, however, per my view the patient has sinus tachycardia without acute ST-T changes; QTc is measured at 473 ms    Recent Labs     09/08/24  2357   PHART 7.490*   [id number] 38.5   PO2ART 59.0*   [id number] 29.3*   BASEEXCESS 5.6*     Results from last 7 days   Lab Units 09/09/24  0044   WBC 10*3/mm3 20.87*   HEMOGLOBIN g/dL 12.6   HEMATOCRIT % 37.0   PLATELETS 10*3/mm3 323     Results from last 7 days   Lab Units 09/09/24  0102   SODIUM mmol/L 130*   POTASSIUM mmol/L 3.5   CHLORIDE mmol/L 93*   CO2 mmol/L 26.4   BUN mg/dL 13   CREATININE mg/dL 0.78   CALCIUM mg/dL 8.9   BILIRUBIN mg/dL <0.2   ALK PHOS U/L 106   ALT (SGPT) U/L 14   AST (SGOT) U/L 17   GLUCOSE mg/dL 328*         CT chest with PE protocol (images reviewed):    FINDINGS:  Contrast bolus timing is suboptimal to evaluate for pulmonary embolism.     Heart and mediastinal structures: No pulmonary arterial filling defect.  Normal mediastinal structures. Coronary artery calcifications are  present.     Lungs and airways: Diffuse crazy paving opacities in the lungs,  relatively sparing the periphery and involving predominately in the mid  and upper lung zones.  The central airways are patent..     Pleura: normal.     Lymph nodes: normal.     Musculoskeletal and chest wall: no acute abnormality.     Lower neck / upper abdomen: Heterogeneous nodule in the posterior aspect  of the right thyroid measuring 1.3 x 1.5 cm.     IMPRESSION:     NEGATIVE FOR PULMONARY EMBOLISM.     DIFFUSE CRAZY PAVING OPACITIES IN THE LUNGS, RELATIVELY SPARING THE  PERIPHERY AND BASES, WHICH COULD BE DUE TO PULMONARY EDEMA,  ARDS,  INFECTION, PULMONARY ALVEOLAR PROTEINOSIS.     RIGHT THYROID NODULE MEASURING 1.3 X 1.5 CM. FOLLOW UP ULTRASOUND IS  SUGGESTED.    Assessment & Plan   Assessment / Plan     #Sepsis due to severe bilateral community-acquired pneumonia, treating for bacterial and/or atypical species, unspecified  #Acute COPD exacerbation  #Acute on chronic hypoxic respiratory failure due to pneumonia and COPD  #Super morbid obesity with BMI of 41.44 kg/m²  #Mild hypomagnesemia  #Incidentally noted right thyroid nodule measuring 1.3 x 1.5 cm with recommendations for a follow-up ultrasound  #Diabetes mellitus type 2, insulin-dependent and complicated by hyperglycemia  #Chronic HFpEF  -Patient will be admitted to the progressive care unit for close observation and management  -Continue supplemental oxygen and titrate for saturations 90 to 95%  -Admission WBC 20.85K  -I have requested a respiratory culture, MRSA swab and testing for strep pneumo  -Obtain baseline procalcitonin level  -The patient had a respiratory PCR panel performed which was negative  -Patient will be started on broad-spectrum IV antibiotics with pharmacy to dose vancomycin, cefepime and azithromycin  -Tailor antibiotics based on culture data results  -I will obtain a proBNP  -I Have consulted pulmonology for further evaluation and recommendations given appearance of CT imaging  -I have ordered a peripheral blood smear  -Repeat CBC in a.m. and monitor temperature curve  -Patient will receive 40 mg IV Lasix x 1 to assist with lung compliance  -I have started the patient on scheduled Mucomyst with nebulized inhalants  -Patient has been started on Solu-Medrol 40 mg IV twice daily  -Patient will be n.p.o. pending pulmonology evaluation and SLP consult  -Patient will be placed on the magnesium supplementation protocol with a repeat magnesium level in a.m.  -Will repeat the patient's chemistry panel in a.m.  -Obtain thyroid ultrasound for further evaluation  -Patient  will be started on the low-moderate dose sliding scale insulin for now while n.p.o.; plan to resume basal insulin once patient's home medication list has been reconciled by pharmacy  -Adjust sliding scale insulin based on ongoing blood glucose readings    VTE Prophylaxis:  SQH/PPI    CODE STATUS:    Code Status (Patient has no pulse and is not breathing): CPR (Attempt to Resuscitate)  Medical Interventions (Patient has pulse or is breathing): Full Support    Admission Status:  I believe this patient meets inpatient status.    Patient is considered a high risk patient due to: sepsis, acute on chronic respiratory failure, super morbid obesity    Case d/w patient at bedside.     Rosa Mishra, DO

## 2024-09-10 LAB
ANION GAP SERPL CALCULATED.3IONS-SCNC: 11.3 MMOL/L (ref 5–15)
BUN SERPL-MCNC: 14 MG/DL (ref 6–20)
BUN/CREAT SERPL: 20 (ref 7–25)
CALCIUM SPEC-SCNC: 8.9 MG/DL (ref 8.6–10.5)
CHLORIDE SERPL-SCNC: 97 MMOL/L (ref 98–107)
CO2 SERPL-SCNC: 23.7 MMOL/L (ref 22–29)
CREAT SERPL-MCNC: 0.7 MG/DL (ref 0.57–1)
DEPRECATED RDW RBC AUTO: 49.3 FL (ref 37–54)
EGFRCR SERPLBLD CKD-EPI 2021: 106.8 ML/MIN/1.73
ERYTHROCYTE [DISTWIDTH] IN BLOOD BY AUTOMATED COUNT: 13.9 % (ref 12.3–15.4)
GLUCOSE BLDC GLUCOMTR-MCNC: 277 MG/DL (ref 70–130)
GLUCOSE BLDC GLUCOMTR-MCNC: 291 MG/DL (ref 70–130)
GLUCOSE BLDC GLUCOMTR-MCNC: 366 MG/DL (ref 70–130)
GLUCOSE BLDC GLUCOMTR-MCNC: 379 MG/DL (ref 70–130)
GLUCOSE BLDC GLUCOMTR-MCNC: 411 MG/DL (ref 70–130)
GLUCOSE SERPL-MCNC: 402 MG/DL (ref 65–99)
HCT VFR BLD AUTO: 35.5 % (ref 34–46.6)
HGB BLD-MCNC: 11.3 G/DL (ref 12–15.9)
MAGNESIUM SERPL-MCNC: 2 MG/DL (ref 1.6–2.6)
MCH RBC QN AUTO: 31 PG (ref 26.6–33)
MCHC RBC AUTO-ENTMCNC: 31.8 G/DL (ref 31.5–35.7)
MCV RBC AUTO: 97.3 FL (ref 79–97)
PLATELET # BLD AUTO: 287 10*3/MM3 (ref 140–450)
PMV BLD AUTO: 9.7 FL (ref 6–12)
POTASSIUM SERPL-SCNC: 4.6 MMOL/L (ref 3.5–5.2)
QT INTERVAL: 342 MS
QTC INTERVAL: 473 MS
RBC # BLD AUTO: 3.65 10*6/MM3 (ref 3.77–5.28)
REF LAB TEST METHOD: NORMAL
SODIUM SERPL-SCNC: 132 MMOL/L (ref 136–145)
WBC NRBC COR # BLD AUTO: 19.66 10*3/MM3 (ref 3.4–10.8)

## 2024-09-10 PROCEDURE — 25010000002 VANCOMYCIN 5 G RECONSTITUTED SOLUTION: Performed by: STUDENT IN AN ORGANIZED HEALTH CARE EDUCATION/TRAINING PROGRAM

## 2024-09-10 PROCEDURE — 25010000002 METHYLPREDNISOLONE PER 125 MG: Performed by: INTERNAL MEDICINE

## 2024-09-10 PROCEDURE — 85027 COMPLETE CBC AUTOMATED: CPT | Performed by: STUDENT IN AN ORGANIZED HEALTH CARE EDUCATION/TRAINING PROGRAM

## 2024-09-10 PROCEDURE — 25810000003 SODIUM CHLORIDE 0.9 % SOLUTION: Performed by: STUDENT IN AN ORGANIZED HEALTH CARE EDUCATION/TRAINING PROGRAM

## 2024-09-10 PROCEDURE — 99291 CRITICAL CARE FIRST HOUR: CPT | Performed by: INTERNAL MEDICINE

## 2024-09-10 PROCEDURE — 25010000002 AZITHROMYCIN PER 500 MG: Performed by: INTERNAL MEDICINE

## 2024-09-10 PROCEDURE — 25010000002 CEFEPIME PER 500 MG: Performed by: INTERNAL MEDICINE

## 2024-09-10 PROCEDURE — 94799 UNLISTED PULMONARY SVC/PX: CPT

## 2024-09-10 PROCEDURE — 63710000001 INSULIN GLARGINE PER 5 UNITS: Performed by: INTERNAL MEDICINE

## 2024-09-10 PROCEDURE — 25010000002 METHYLPREDNISOLONE PER 40 MG: Performed by: INTERNAL MEDICINE

## 2024-09-10 PROCEDURE — 25010000002 FUROSEMIDE PER 20 MG: Performed by: INTERNAL MEDICINE

## 2024-09-10 PROCEDURE — 76536 US EXAM OF HEAD AND NECK: CPT | Performed by: RADIOLOGY

## 2024-09-10 PROCEDURE — 63710000001 INSULIN LISPRO (HUMAN) PER 5 UNITS: Performed by: STUDENT IN AN ORGANIZED HEALTH CARE EDUCATION/TRAINING PROGRAM

## 2024-09-10 PROCEDURE — 25010000002 HEPARIN (PORCINE) PER 1000 UNITS: Performed by: INTERNAL MEDICINE

## 2024-09-10 PROCEDURE — 94664 DEMO&/EVAL PT USE INHALER: CPT

## 2024-09-10 PROCEDURE — 80048 BASIC METABOLIC PNL TOTAL CA: CPT | Performed by: STUDENT IN AN ORGANIZED HEALTH CARE EDUCATION/TRAINING PROGRAM

## 2024-09-10 PROCEDURE — 63710000001 INSULIN LISPRO (HUMAN) PER 5 UNITS: Performed by: HOSPITALIST

## 2024-09-10 PROCEDURE — 99232 SBSQ HOSP IP/OBS MODERATE 35: CPT | Performed by: STUDENT IN AN ORGANIZED HEALTH CARE EDUCATION/TRAINING PROGRAM

## 2024-09-10 PROCEDURE — 82948 REAGENT STRIP/BLOOD GLUCOSE: CPT

## 2024-09-10 PROCEDURE — 63710000001 INSULIN GLARGINE PER 5 UNITS: Performed by: HOSPITALIST

## 2024-09-10 PROCEDURE — 92610 EVALUATE SWALLOWING FUNCTION: CPT

## 2024-09-10 PROCEDURE — 94660 CPAP INITIATION&MGMT: CPT

## 2024-09-10 PROCEDURE — 25810000003 SODIUM CHLORIDE 0.9 % SOLUTION 250 ML FLEX CONT: Performed by: INTERNAL MEDICINE

## 2024-09-10 PROCEDURE — 83735 ASSAY OF MAGNESIUM: CPT | Performed by: INTERNAL MEDICINE

## 2024-09-10 RX ORDER — INSULIN LISPRO 100 [IU]/ML
3-14 INJECTION, SOLUTION INTRAVENOUS; SUBCUTANEOUS
Status: DISCONTINUED | OUTPATIENT
Start: 2024-09-10 | End: 2024-09-10

## 2024-09-10 RX ORDER — INSULIN LISPRO 100 [IU]/ML
4-24 INJECTION, SOLUTION INTRAVENOUS; SUBCUTANEOUS
Status: DISCONTINUED | OUTPATIENT
Start: 2024-09-10 | End: 2024-09-13 | Stop reason: HOSPADM

## 2024-09-10 RX ORDER — POTASSIUM CHLORIDE 1.5 G/1.58G
40 POWDER, FOR SOLUTION ORAL 2 TIMES DAILY
Status: COMPLETED | OUTPATIENT
Start: 2024-09-10 | End: 2024-09-11

## 2024-09-10 RX ORDER — DEXTROSE MONOHYDRATE 25 G/50ML
25 INJECTION, SOLUTION INTRAVENOUS
Status: DISCONTINUED | OUTPATIENT
Start: 2024-09-10 | End: 2024-09-13 | Stop reason: HOSPADM

## 2024-09-10 RX ORDER — FUROSEMIDE 10 MG/ML
60 INJECTION INTRAMUSCULAR; INTRAVENOUS ONCE
Status: COMPLETED | OUTPATIENT
Start: 2024-09-10 | End: 2024-09-10

## 2024-09-10 RX ORDER — INSULIN LISPRO 100 [IU]/ML
10 INJECTION, SOLUTION INTRAVENOUS; SUBCUTANEOUS ONCE
Status: COMPLETED | OUTPATIENT
Start: 2024-09-10 | End: 2024-09-10

## 2024-09-10 RX ORDER — METHYLPREDNISOLONE SODIUM SUCCINATE 125 MG/2ML
125 INJECTION, POWDER, LYOPHILIZED, FOR SOLUTION INTRAMUSCULAR; INTRAVENOUS EVERY 12 HOURS SCHEDULED
Status: DISCONTINUED | OUTPATIENT
Start: 2024-09-10 | End: 2024-09-12

## 2024-09-10 RX ORDER — NICOTINE POLACRILEX 4 MG
15 LOZENGE BUCCAL
Status: DISCONTINUED | OUTPATIENT
Start: 2024-09-10 | End: 2024-09-13 | Stop reason: HOSPADM

## 2024-09-10 RX ADMIN — CLONAZEPAM 1 MG: 1 TABLET ORAL at 16:14

## 2024-09-10 RX ADMIN — METHYLPREDNISOLONE SODIUM SUCCINATE 125 MG: 125 INJECTION, POWDER, FOR SOLUTION INTRAMUSCULAR; INTRAVENOUS at 20:36

## 2024-09-10 RX ADMIN — ATORVASTATIN CALCIUM 40 MG: 40 TABLET, FILM COATED ORAL at 08:40

## 2024-09-10 RX ADMIN — INSULIN LISPRO 8 UNITS: 100 INJECTION, SOLUTION INTRAVENOUS; SUBCUTANEOUS at 06:38

## 2024-09-10 RX ADMIN — CEFEPIME 2000 MG: 2 INJECTION, POWDER, FOR SOLUTION INTRAVENOUS at 17:16

## 2024-09-10 RX ADMIN — GABAPENTIN 800 MG: 400 CAPSULE ORAL at 20:33

## 2024-09-10 RX ADMIN — METHYLPREDNISOLONE SODIUM SUCCINATE 40 MG: 40 INJECTION, POWDER, FOR SOLUTION INTRAMUSCULAR; INTRAVENOUS at 08:43

## 2024-09-10 RX ADMIN — POTASSIUM CHLORIDE 40 MEQ: 1.5 POWDER, FOR SOLUTION ORAL at 20:33

## 2024-09-10 RX ADMIN — HEPARIN SODIUM 5000 UNITS: 5000 INJECTION INTRAVENOUS; SUBCUTANEOUS at 08:41

## 2024-09-10 RX ADMIN — IPRATROPIUM BROMIDE AND ALBUTEROL SULFATE 3 ML: 2.5; .5 SOLUTION RESPIRATORY (INHALATION) at 06:53

## 2024-09-10 RX ADMIN — BUDESONIDE AND FORMOTEROL FUMARATE DIHYDRATE 2 PUFF: 160; 4.5 AEROSOL RESPIRATORY (INHALATION) at 18:42

## 2024-09-10 RX ADMIN — CEFEPIME 2000 MG: 2 INJECTION, POWDER, FOR SOLUTION INTRAVENOUS at 08:40

## 2024-09-10 RX ADMIN — INSULIN LISPRO 10 UNITS: 100 INJECTION, SOLUTION INTRAVENOUS; SUBCUTANEOUS at 03:35

## 2024-09-10 RX ADMIN — MELOXICAM 7.5 MG: 7.5 TABLET ORAL at 20:33

## 2024-09-10 RX ADMIN — CYCLOBENZAPRINE HYDROCHLORIDE 5 MG: 10 TABLET, FILM COATED ORAL at 16:14

## 2024-09-10 RX ADMIN — LIDOCAINE 1 PATCH: 4 PATCH TOPICAL at 08:41

## 2024-09-10 RX ADMIN — INSULIN GLARGINE 15 UNITS: 100 INJECTION, SOLUTION SUBCUTANEOUS at 22:10

## 2024-09-10 RX ADMIN — IPRATROPIUM BROMIDE AND ALBUTEROL SULFATE 3 ML: 2.5; .5 SOLUTION RESPIRATORY (INHALATION) at 12:43

## 2024-09-10 RX ADMIN — LISINOPRIL 2.5 MG: 2.5 TABLET ORAL at 08:42

## 2024-09-10 RX ADMIN — IPRATROPIUM BROMIDE AND ALBUTEROL SULFATE 3 ML: 2.5; .5 SOLUTION RESPIRATORY (INHALATION) at 00:21

## 2024-09-10 RX ADMIN — IPRATROPIUM BROMIDE AND ALBUTEROL SULFATE 3 ML: 2.5; .5 SOLUTION RESPIRATORY (INHALATION) at 18:42

## 2024-09-10 RX ADMIN — GABAPENTIN 800 MG: 400 CAPSULE ORAL at 11:51

## 2024-09-10 RX ADMIN — SENNOSIDES AND DOCUSATE SODIUM 2 TABLET: 50; 8.6 TABLET ORAL at 20:36

## 2024-09-10 RX ADMIN — GABAPENTIN 800 MG: 400 CAPSULE ORAL at 08:41

## 2024-09-10 RX ADMIN — PANTOPRAZOLE SODIUM 40 MG: 40 INJECTION, POWDER, FOR SOLUTION INTRAVENOUS at 05:16

## 2024-09-10 RX ADMIN — CEFEPIME 2000 MG: 2 INJECTION, POWDER, FOR SOLUTION INTRAVENOUS at 00:00

## 2024-09-10 RX ADMIN — Medication 10 ML: at 20:36

## 2024-09-10 RX ADMIN — INSULIN LISPRO 12 UNITS: 100 INJECTION, SOLUTION INTRAVENOUS; SUBCUTANEOUS at 22:10

## 2024-09-10 RX ADMIN — HEPARIN SODIUM 5000 UNITS: 5000 INJECTION INTRAVENOUS; SUBCUTANEOUS at 20:34

## 2024-09-10 RX ADMIN — INSULIN GLARGINE 10 UNITS: 100 INJECTION, SOLUTION SUBCUTANEOUS at 03:36

## 2024-09-10 RX ADMIN — INSULIN LISPRO 14 UNITS: 100 INJECTION, SOLUTION INTRAVENOUS; SUBCUTANEOUS at 17:23

## 2024-09-10 RX ADMIN — FUROSEMIDE 60 MG: 10 INJECTION, SOLUTION INTRAMUSCULAR; INTRAVENOUS at 16:15

## 2024-09-10 RX ADMIN — MELOXICAM 7.5 MG: 7.5 TABLET ORAL at 08:42

## 2024-09-10 RX ADMIN — NICOTINE 1 PATCH: 7 PATCH, EXTENDED RELEASE TRANSDERMAL at 08:45

## 2024-09-10 RX ADMIN — GABAPENTIN 800 MG: 400 CAPSULE ORAL at 17:16

## 2024-09-10 RX ADMIN — CLONAZEPAM 1 MG: 1 TABLET ORAL at 08:41

## 2024-09-10 RX ADMIN — Medication 10 ML: at 08:43

## 2024-09-10 RX ADMIN — CETIRIZINE HYDROCHLORIDE 10 MG: 10 TABLET, FILM COATED ORAL at 08:41

## 2024-09-10 RX ADMIN — VANCOMYCIN HYDROCHLORIDE 1250 MG: 5 INJECTION, POWDER, LYOPHILIZED, FOR SOLUTION INTRAVENOUS at 11:51

## 2024-09-10 RX ADMIN — INSULIN LISPRO 12 UNITS: 100 INJECTION, SOLUTION INTRAVENOUS; SUBCUTANEOUS at 11:59

## 2024-09-10 RX ADMIN — BUDESONIDE AND FORMOTEROL FUMARATE DIHYDRATE 2 PUFF: 160; 4.5 AEROSOL RESPIRATORY (INHALATION) at 06:53

## 2024-09-10 RX ADMIN — AZITHROMYCIN DIHYDRATE 500 MG: 500 INJECTION, POWDER, LYOPHILIZED, FOR SOLUTION INTRAVENOUS at 01:06

## 2024-09-10 NOTE — PROGRESS NOTES
Progress note Pulmonary and critical care-shortness of breath    Referring Provider: Dr. Rosa Mishra  Reason for Consultation: Acute hypoxic respiratory failure, pneumonia, COPD exacerbation      Subjective-all the labs images medications ins and outs and vitals reviewed.  Patient resting in bed comfortably.  Mild respiratory distress while having lunch.  Currently on high flow nasal cannula oxygen.  On 50 L and 55%.            Review of Systems  Positive for shortness of breath and cough.  Otherwise negative    History  Past Medical History:   Diagnosis Date    Arthritis     CHF (congestive heart failure)     COPD (chronic obstructive pulmonary disease)     Diabetes mellitus     Elevated cholesterol     GERD (gastroesophageal reflux disease)     Hyperlipemia     Hypertension     JOANN (obstructive sleep apnea)     Pneumonia     Vulvar cancer    ,   Past Surgical History:   Procedure Laterality Date    HYSTERECTOMY      INCISION AND DRAINAGE TRUNK N/A 1/11/2022    Procedure: INCISION AND DRAINAGE BACK;  Surgeon: Tamera Saez MD;  Location: [facility];  Service: General;  Laterality: N/A;    MASTECTOMY      VULVA SURGERY      reconstruction due ca   ,   Family History   Problem Relation Age of Onset    Cancer Mother     Hypertension Mother     Parkinsonism Father     Diabetes Father     Hypertension Father     Cancer Maternal Aunt     Diabetes Paternal Aunt     Diabetes Paternal Grandmother     Cancer Maternal Aunt     Diabetes Brother    ,   Social History     Tobacco Use    Smoking status: Every Day     Current packs/day: 1.50     Average packs/day: 1.4 packs/day for 29.3 years (42.2 ttl pk-yrs)     Types: Cigarettes     Start date: 5/18/1995     Passive exposure: Current    Smokeless tobacco: Never    Tobacco comments:     8-10 per day    Vaping Use    Vaping status: Former    Substances: Nicotine, Flavoring    Devices: Refillable tank   Substance Use Topics    Alcohol use: Never    Drug use: Never   ,    Medications Prior to Admission   Medication Sig Dispense Refill Last Dose    albuterol sulfate HFA (Ventolin HFA) 108 (90 Base) MCG/ACT inhaler Inhale 2 puffs Every 4 (Four) Hours As Needed for Wheezing or Shortness of Air. 18 g 12 9/8/2024    budesonide-formoterol (Symbicort) 160-4.5 MCG/ACT inhaler Inhale 2 puffs 2 (Two) Times a Day. 1 each 8 9/8/2024    cetirizine (zyrTEC) 10 MG tablet Take 1 tablet by mouth Daily.   9/8/2024    clonazePAM (KlonoPIN) 1 MG tablet Take 1 tablet by mouth 4 (Four) Times a Day As Needed for Anxiety.   9/8/2024    cyclobenzaprine (FLEXERIL) 5 MG tablet Take 1 tablet by mouth 3 (Three) Times a Day As Needed for Muscle Spasms.   9/8/2024    fluticasone (FLONASE) 50 MCG/ACT nasal spray 2 sprays into the nostril(s) as directed by provider Daily As Needed for Rhinitis or Allergies.   9/8/2024    gabapentin (NEURONTIN) 800 MG tablet Take 1 tablet by mouth 4 (Four) Times a Day.   9/8/2024    glipizide (Glucotrol) 5 MG tablet Take 1 tablet by mouth 2 (Two) Times a Day Before Meals. 60 tablet 5 9/8/2024    hydroCHLOROthiazide (HYDRODIURIL) 25 MG tablet Take 1 tablet by mouth Every Morning.   9/8/2024    ibuprofen (ADVIL,MOTRIN) 800 MG tablet Take 1 tablet by mouth Every 8 (Eight) Hours As Needed for Mild Pain.   9/8/2024    ipratropium-albuterol (DUO-NEB) 0.5-2.5 mg/3 ml nebulizer Take 3 mL by nebulization Every 6 (Six) Hours As Needed for Wheezing or Shortness of Air. 360 mL 5 9/8/2024    lidocaine (LIDODERM) 5 % Place 1 patch on the skin as directed by provider Daily. Remove & Discard patch within 12 hours or as directed by MD   9/8/2024 at 1400    lisinopril (PRINIVIL,ZESTRIL) 5 MG tablet Take 0.5 tablets by mouth Daily.   9/8/2024    loperamide (IMODIUM) 2 MG capsule Take 1 capsule by mouth 4 (Four) Times a Day As Needed for Diarrhea.   Past Week    meloxicam (MOBIC) 7.5 MG tablet Take 1 tablet by mouth Every 12 (Twelve) Hours.   9/8/2024    ondansetron ODT (ZOFRAN-ODT) 4 MG  disintegrating tablet Take 1 tablet by mouth Every 6 (Six) Hours As Needed for Nausea or Vomiting. 12 tablet 0 Past Week    tiotropium (Spiriva HandiHaler) 18 MCG per inhalation capsule Place 1 capsule into inhaler and inhale Daily. 30 capsule 8 9/8/2024    Insulin Glargine (LANTUS SOLOSTAR) 100 UNIT/ML injection pen Inject 70 Units under the skin into the appropriate area as directed Every Night. 30 mL 2 9/7/2024    naloxone (NARCAN) 4 MG/0.1ML nasal spray Call 911. Don't prime. [place] in 1 nostril for overdose. Repeat in 2-3 minutes in other nostril if no or minimal breathing/responsiveness. 2 each 0 Unknown    Semaglutide, 2 MG/DOSE, (Ozempic, 2 MG/DOSE,) 8 MG/3ML solution pen-injector Inject 2 mg under the skin into the appropriate area as directed 1 (One) Time Per Week. 3 mL 2 8/27/2024    simvastatin (ZOCOR) 80 MG tablet Take 1 tablet by mouth every night at bedtime.   9/7/2024   , Scheduled Meds:  atorvastatin, 40 mg, Oral, Daily  azithromycin, 500 mg, Intravenous, Q24H  budesonide-formoterol, 2 puff, Inhalation, BID - RT  cefepime, 2,000 mg, Intravenous, Q8H  cetirizine, 10 mg, Oral, Daily  gabapentin, 800 mg, Oral, 4x Daily  heparin (porcine), 5,000 Units, Subcutaneous, Q12H  [Held by provider] hydroCHLOROthiazide, 25 mg, Oral, QAM  insulin glargine, 10 Units, Subcutaneous, Nightly  insulin lispro, 3-14 Units, Subcutaneous, 4x Daily AC & at Bedtime  ipratropium-albuterol, 3 mL, Nebulization, 4x Daily - RT  Lidocaine, 1 patch, Transdermal, Q24H  lisinopril, 2.5 mg, Oral, Daily  meloxicam, 7.5 mg, Oral, Q12H  methylPREDNISolone sodium succinate, 40 mg, Intravenous, Q12H  nicotine, 1 patch, Transdermal, Q24H  pantoprazole, 40 mg, Intravenous, Q AM  senna-docusate sodium, 2 tablet, Oral, BID  sodium chloride, 10 mL, Intravenous, Q12H    , Continuous Infusions:      and Allergies:  Codeine and Metformin    Objective     Vital Signs   Temp:  [97.4 °F (36.3 °C)-99.4 °F (37.4 °C)] 99.4 °F (37.4 °C)  Heart Rate:   [] 107  Resp:  [10-34] 30  BP: (104-148)/(69-96) 137/88    Physical Exam:          General-mild respiratory distress, able to complete sentences but uses accessory muscles on talking.    HEENT- pupils equally reactive to light, normal in size, no scleral icterus    Neck-supple, No JVD, no carotid bruit    Respiratory-bilateral air entry, diffuse wheezing with prolonged expiratory phase, basal rales.  No egophony    Cardiovascular-  Normal S1 and S2. No S3, S4 or murmurs.      GI-nontender nondistended bowel sounds positive    CNS-alert oriented x3, grossly nonfocal      Extremities-no clubbing and edema    Psychiatric-mood good, good eye contact,     Skin- no visible rash             Results Review:    LABS:    Lab Results   Component Value Date    GLUCOSE 402 (C) 09/10/2024    BUN 14 09/10/2024    CREATININE 0.70 09/10/2024    EGFRIFNONA 93 01/13/2022    BCR 20.0 09/10/2024    CO2 23.7 09/10/2024    CALCIUM 8.9 09/10/2024    ALBUMIN 3.6 09/09/2024    AST 20 09/09/2024    ALT 11 09/09/2024    WBC 19.66 (H) 09/10/2024    HGB 11.3 (L) 09/10/2024    HCT 35.5 09/10/2024    MCV 97.3 (H) 09/10/2024     09/10/2024     (L) 09/10/2024    K 4.6 09/10/2024    CL 97 (L) 09/10/2024    ANIONGAP 11.3 09/10/2024       Lab Results   Component Value Date    INR 1.00 09/09/2024    INR 1.00 03/17/2024    INR 1.06 01/10/2022    PROTIME 13.3 09/09/2024    PROTIME 13.7 03/17/2024    PROTIME 14.2 01/10/2022       Results from last 7 days   Lab Units 09/09/24  0050 09/09/24  0044   INR   --  1.00   APTT seconds 30.0  --           I reviewed the patient's new clinical results.  I reviewed the patient's new imaging results and agree with the interpretation.     Latest Reference Range & Units 09/09/24 21:37   pH, Arterial 7.350 - 7.450 pH units 7.446   pCO2, Arterial 35.0 - 45.0 mm Hg 43.3   pO2, Arterial 83.0 - 108.0 mm Hg 53.7 (C)   HCO3, Arterial 20.0 - 26.0 mmol/L 29.8 (H)   Base Excess 0.0 - 2.0 mmol/L 5.1 (H)   O2  Saturation, Arterial 94.0 - 99.0 % 89.0 (L)   CO2 Content 22 - 33 mmol/L 31.1   A-a DO2 0.0 - 300.0 mmHg 40.8   Carboxyhemoglobin 0 - 5 % 1.8   Methemoglobin 0.00 - 3.00 % 0.40   Oxyhemoglobin 94 - 99 % 87.0 (L)   Hematocrit, Blood Gas 38.0 - 51.0 % 35.1 (L)   Hemoglobin, Blood Gas 13.5 - 17.5 g/dL 11.5 (L)   Site  Right Radial   Osiel's Test  Positive   Modality  HFNC   Flow Rate lpm 15.0   Ventilator Mode  NA   Barometric Pressure for Blood Gas mmHg 730   Notified By  [id number]   Notified Time  09/09/2024 21:43   Notified Who  MARK   Collected by  [id number]   (C): Data is critically low  (H): Data is abnormally high  (L): Data is abnormally low    Microbiology Results (last 10 days)       Procedure Component Value - Date/Time    S. Pneumo Ag Urine or CSF - Urine, Urine, Clean Catch [[id number]]  (Normal) Collected: 09/09/24 1504    Lab Status: Final result Specimen: Urine, Clean Catch Updated: 09/09/24 2343     Strep Pneumo Ag Negative    MRSA Screen, PCR (Inpatient) - Swab, Nares [[id number]]  (Normal) Collected: 09/09/24 0928    Lab Status: Final result Specimen: Swab from Nares Updated: 09/09/24 1059     MRSA PCR No MRSA Detected    Narrative:      The negative predictive value of this diagnostic test is high and should only be used to consider de-escalating anti-MRSA therapy. A positive result may indicate colonization with MRSA and must be correlated clinically.    Respiratory Panel PCR w/COVID-19(SARS-CoV-2) [facility]/LUIS MIGUEL/[facility] In-House, NP Swab in UTM/VTM, 2 HR TAT - Swab, Nasopharynx [[id number]]  (Normal) Collected: 09/09/24 0056    Lab Status: Final result Specimen: Swab from Nasopharynx Updated: 09/09/24 0146     ADENOVIRUS, PCR Not Detected     Coronavirus 229E Not Detected     Coronavirus HKU1 Not Detected     Coronavirus NL63 Not Detected     Coronavirus OC43 Not Detected     COVID19 Not Detected     Human Metapneumovirus Not Detected     Human Rhinovirus/Enterovirus Not Detected     Influenza A PCR  Not Detected     Influenza B PCR Not Detected     Parainfluenza Virus 1 Not Detected     Parainfluenza Virus 2 Not Detected     Parainfluenza Virus 3 Not Detected     Parainfluenza Virus 4 Not Detected     RSV, PCR Not Detected     Bordetella pertussis pcr Not Detected     Bordetella parapertussis PCR Not Detected     Chlamydophila pneumoniae PCR Not Detected     Mycoplasma pneumo by PCR Not Detected    Narrative:      In the setting of a positive respiratory panel with a viral infection PLUS a negative procalcitonin without other underlying concern for bacterial infection, consider observing off antibiotics or discontinuation of antibiotics and continue supportive care. If the respiratory panel is positive for atypical bacterial infection (Bordetella pertussis, Chlamydophila pneumoniae, or Mycoplasma pneumoniae), consider antibiotic de-escalation to target atypical bacterial infection.    Blood Culture - Blood, Arm, Left [[id number]]  (Normal) Collected: 09/09/24 0050    Lab Status: Preliminary result Specimen: Blood from Arm, Left Updated: 09/10/24 0100     Blood Culture No growth at 24 hours    Blood Culture - Blood, Arm, Left [[id number]]  (Normal) Collected: 09/09/24 0050    Lab Status: Preliminary result Specimen: Blood from Arm, Left Updated: 09/10/24 0100     Blood Culture No growth at 24 hours             Assessment & Plan         acute hypoxic respiratory failure-which could be due to pneumonia and interstitial lung disease.  Interstitial lung disease could be desquamative interstitial pneumonitis due to smoking.  Patient is currently critically ill due to acute hypoxic respiratory failure-on high amounts of oxygen currently needing 50 l of high flow nasal cannula oxygen and 55% FiO2    Patient is currently unstable for bronchoscopy.  Will increase the dose of steroids.  Increase from 40 IV twice daily to 125 mg IV twice daily.    Blood sugars are running high will change the dose of Lantus.    Will give  diuretics to improve lung compliance and diffusion capacity.    Results for orders placed during the hospital encounter of 03/17/24    Adult Transthoracic Echo Complete w/ Color, Spectral and Contrast if necessary per protocol    Interpretation Summary    Normal left ventricular cavity size and wall thickness noted. All left ventricular wall segments contract normally    Left ventricular ejection fraction appears to be 61 - 65%.    Left ventricular diastolic function is consistent with (grade I) impaired relaxation.    The aortic valve is structurally normal with no regurgitation or stenosis present.    The mitral valve is structurally normal with no regurgitation or significant stenosis present.    There is no evidence of pericardial effusion.       Bilateral community-acquired pneumonia.    Procalitonin Results:      Lab 09/09/24  0102   PROCALCITONIN 0.07   Continue current antibiotics.  Latest microbiology reviewed.  With procalcitonin being normal can de-escalate antibiotics.    COPD exacerbation-continue steroids.  Encouraged to quit smoking.    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  Smoker-continue nicotine patch        Morbid obesity on obstructive sleep apnea-continue NIPPV.  Settings adjusted.    Patient was getting short of breath on eating the lunch.  Advised her to drink Ensure.     Diabetes-continue to monitor blood sugars.  Adjusted insulin.  GI- PPI prophylaxis      Patient is currently critically ill due to hypoxic respiratory failure and requiring high amounts of oxygen.  Adjusted patient's medications and NIPPV settings.  Cc-31 mins  Results for orders placed during the hospital encounter of 03/17/24    Adult Transthoracic Echo Complete w/ Color, Spectral and Contrast if necessary per protocol    Interpretation Summary    Normal left ventricular cavity size and wall thickness noted. All left ventricular wall  segments contract normally    Left ventricular ejection fraction appears to be 61 - 65%.    Left ventricular diastolic function is consistent with (grade I) impaired relaxation.    The aortic valve is structurally normal with no regurgitation or stenosis present.    The mitral valve is structurally normal with no regurgitation or significant stenosis present.    There is no evidence of pericardial effusion.     Floyd Turcios MD     09/10/24 11:18 EDT

## 2024-09-10 NOTE — PROGRESS NOTES
Adult Nutrition  Assessment/PES    Patient Name:  Ana Ortiz  YOB: 1975  MRN: [id number]  Admit Date:  9/8/2024    Assessment Date:  9/10/2024    Comments:  MST    Po % at this time.    Encourage po and voice food prefs as able.    Will cont to follow and monitor     Reason for Assessment       Row Name 09/10/24 1407          Reason for Assessment    Reason For Assessment identified at risk by screening criteria     Diagnosis pulmonary disease  Sepsis, Acute Hypoxic Resp failure, Pnemonia, Acute COPD hx DM     Identified At Risk by Screening Criteria MST SCORE 2+                    Nutrition/Diet History       Row Name 09/10/24 1407          Nutrition/Diet History    Typical Intake (Food/Fluid/EN/PN) Called to room no answer.                    Labs/Tests/Procedures/Meds       Row Name 09/10/24 1407          Labs/Procedures/Meds    Lab Results Reviewed reviewed     Lab Results Comments glu 277-379 H        Diagnostic Tests/Procedures    Diagnostic Test/Procedure Reviewed reviewed     Diagnostic Test/Procedures Comments SLP        Medications    Pertinent Medications Reviewed reviewed     Pertinent Medications Comments HCTZ, solumedrol, humalog, lantus                      Estimated/Assessed Needs - Anthropometrics       Row Name 09/10/24 1408          Anthropometrics    Weight for Calculation 119 kg (262 lb 5.6 oz)     Additional Documentation --  noted gain 40# in past 3-4 months        Estimated/Assessed Needs    Additional Documentation Estimated Calorie Needs (Group);Fluid Requirements (Group);Protein Requirements (Group)        Estimated Calorie Needs    Estimated Calorie Requirement (kcal/day) 1692 kcal ( mifflin 1.2 minus 500 kcal for BMI >40)     Estimated Calorie Need Method [place] Jeor        Protein Requirements    Est Protein Requirement Amount (gms/kg) --  1.5 gm/kg IBW = 85 gm pro        Fluid Requirements    Estimated Fluid Requirement Method RDA Method  2192 ml (  mifflin 1.2)                    Nutrition Prescription Ordered       Row Name 09/10/24 1410          Nutrition Prescription PO    Current PO Diet Soft Texture     Texture Ground     Fluid Consistency Thin     Common Modifiers Consistent Carbohydrate                    Evaluation of Received Nutrient/Fluid Intake       Row Name 09/10/24 1410          Fluid Intake Evaluation    Oral Fluid (mL) 291  ave x 2        PO Evaluation    Number of Meals 2     % PO Intake 88                       Problem/Interventions:   Problem 1       Row Name 09/10/24 1410          Nutrition Diagnoses Problem 1    Problem 1 Other (comment)  Difficulty chewing related to AE COPD as evidenced by modified texture aid with po.                          Intervention Goal       Row Name 09/10/24 1413          Intervention Goal    General Meet nutritional needs for age/condition     PO Establish PO;PO intake (%)     PO Intake % 50 %                    Nutrition Intervention       Row Name 09/10/24 1413          Nutrition Intervention    RD/Tech Action Encourage intake;Follow Tx progress                      Education/Evaluation       Row Name 09/10/24 1413          Education    Education Education not appropriate at this time        Monitor/Evaluation    Monitor Per protocol;I&O;PO intake;Pertinent labs;Weight;Symptoms                     Electronically signed by:  Maria Esther Galindo RD  09/10/24 14:14 EDT

## 2024-09-10 NOTE — PLAN OF CARE
Problem: Adjustment to Illness (Sepsis/Septic Shock)  Goal: Optimal Coping  Outcome: Ongoing, Not Progressing  Intervention: Optimize Psychosocial Adjustment to Illness  Recent Flowsheet Documentation  Taken 9/10/2024 0200 by Ana María Ivan RN  Supportive Measures: active listening utilized  Family/Support System Care:   self-care encouraged   support provided  Taken 9/9/2024 2000 by Ana María Ivan RN  Supportive Measures: active listening utilized  Family/Support System Care:   self-care encouraged   support provided     Problem: Bleeding (Sepsis/Septic Shock)  Goal: Absence of Bleeding  Outcome: Ongoing, Not Progressing     Problem: Glycemic Control Impaired (Sepsis/Septic Shock)  Goal: Blood Glucose Level Within Desired Range  Outcome: Ongoing, Not Progressing     Problem: Infection Progression (Sepsis/Septic Shock)  Goal: Absence of Infection Signs and Symptoms  Outcome: Ongoing, Not Progressing  Intervention: Initiate Sepsis Management  Recent Flowsheet Documentation  Taken 9/10/2024 0300 by Ana María Ivan RN  Infection Prevention: single patient room provided  Taken 9/10/2024 0100 by Ana María Ivan RN  Infection Prevention: single patient room provided  Taken 9/9/2024 2300 by Ana María Ivan RN  Infection Prevention: single patient room provided  Taken 9/9/2024 2100 by Ana María Ivan RN  Infection Prevention: single patient room provided  Taken 9/9/2024 1900 by Ana María Ivan RN  Infection Prevention: single patient room provided  Intervention: Promote Recovery  Recent Flowsheet Documentation  Taken 9/10/2024 0200 by Ana María Ivan RN  Sleep/Rest Enhancement: regular sleep/rest pattern promoted  Taken 9/9/2024 2000 by Ana María Ivan RN  Sleep/Rest Enhancement: regular sleep/rest pattern promoted     Problem: Nutrition Impaired (Sepsis/Septic Shock)  Goal: Optimal Nutrition Intake  Outcome: Ongoing, Not Progressing     Problem: Diabetes Comorbidity  Goal: Blood Glucose Level Within Targeted Range  Outcome: Ongoing, Not  Progressing     Problem: Obstructive Sleep Apnea Risk or Actual Comorbidity Management  Goal: Unobstructed Breathing During Sleep  Outcome: Ongoing, Not Progressing     Problem: Adult Inpatient Plan of Care  Goal: Plan of Care Review  Outcome: Ongoing, Not Progressing  Flowsheets  Taken 9/10/2024 0309 by Ana María Ivan RN  Progress: declining  Taken 9/9/2024 1133 by Gabriela Balderas CRT  Plan of Care Reviewed With: patient  Goal: Patient-Specific Goal (Individualized)  Outcome: Ongoing, Not Progressing  Goal: Absence of Hospital-Acquired Illness or Injury  Outcome: Ongoing, Not Progressing  Intervention: Identify and Manage Fall Risk  Recent Flowsheet Documentation  Taken 9/10/2024 0300 by Ana María Ivan RN  Safety Promotion/Fall Prevention: safety round/check completed  Taken 9/10/2024 0100 by Ana María Ivan RN  Safety Promotion/Fall Prevention: safety round/check completed  Taken 9/9/2024 2300 by Ana María Ivan RN  Safety Promotion/Fall Prevention: safety round/check completed  Taken 9/9/2024 2100 by Ana María Ivan RN  Safety Promotion/Fall Prevention: safety round/check completed  Taken 9/9/2024 1900 by Ana María Ivan RN  Safety Promotion/Fall Prevention: safety round/check completed  Intervention: Prevent Skin Injury  Recent Flowsheet Documentation  Taken 9/10/2024 0200 by Ana María Ivan RN  Skin Protection: adhesive use limited  Taken 9/9/2024 2000 by Ana María Ivan RN  Skin Protection: adhesive use limited  Intervention: Prevent and Manage VTE (Venous Thromboembolism) Risk  Recent Flowsheet Documentation  Taken 9/10/2024 0200 by Ana María Ivan RN  VTE Prevention/Management: (heparin sq) other (see comments)  Taken 9/9/2024 2000 by Ana María Ivan RN  VTE Prevention/Management: (heparin sq) other (see comments)  Range of Motion: active ROM (range of motion) encouraged  Intervention: Prevent Infection  Recent Flowsheet Documentation  Taken 9/10/2024 0300 by Ana María Ivan RN  Infection Prevention: single patient room  provided  Taken 9/10/2024 0100 by Ana María Ivan RN  Infection Prevention: single patient room provided  Taken 9/9/2024 2300 by Ana María Ivan RN  Infection Prevention: single patient room provided  Taken 9/9/2024 2100 by Ana María Ivan RN  Infection Prevention: single patient room provided  Taken 9/9/2024 1900 by Ana María Ivan RN  Infection Prevention: single patient room provided  Goal: Optimal Comfort and Wellbeing  Outcome: Ongoing, Not Progressing  Intervention: Provide Person-Centered Care  Recent Flowsheet Documentation  Taken 9/10/2024 0200 by Ana María Ivan RN  Trust Relationship/Rapport:   care explained   choices provided   thoughts/feelings acknowledged  Taken 9/9/2024 2000 by Ana María Ivan RN  Trust Relationship/Rapport:   care explained   choices provided   thoughts/feelings acknowledged  Goal: Readiness for Transition of Care  Outcome: Ongoing, Not Progressing     Problem: Noninvasive Ventilation Acute  Goal: Effective Unassisted Ventilation and Oxygenation  Outcome: Ongoing, Not Progressing     Problem: Fall Injury Risk  Goal: Absence of Fall and Fall-Related Injury  Outcome: Ongoing, Not Progressing  Intervention: Identify and Manage Contributors  Recent Flowsheet Documentation  Taken 9/10/2024 0300 by Ana María Ivan RN  Medication Review/Management: medications reviewed  Taken 9/10/2024 0100 by Ana María Ivan RN  Medication Review/Management: medications reviewed  Taken 9/9/2024 2300 by Ana María Ivan RN  Medication Review/Management: medications reviewed  Taken 9/9/2024 2100 by Ana María Ivan RN  Medication Review/Management: medications reviewed  Taken 9/9/2024 1900 by Ana María Ivan RN  Medication Review/Management: medications reviewed  Intervention: Promote Injury-Free Environment  Recent Flowsheet Documentation  Taken 9/10/2024 0300 by Ana María Ivan RN  Safety Promotion/Fall Prevention: safety round/check completed  Taken 9/10/2024 0100 by Ana María Ivan RN  Safety Promotion/Fall Prevention: safety  round/check completed  Taken 9/9/2024 2300 by Ana María Ivan, RN  Safety Promotion/Fall Prevention: safety round/check completed  Taken 9/9/2024 2100 by Ana María Ivan, RN  Safety Promotion/Fall Prevention: safety round/check completed  Taken 9/9/2024 1900 by Ana María Ivan, RN  Safety Promotion/Fall Prevention: safety round/check completed   Goal Outcome Evaluation:           Progress: declining     Patient is in bed at this time. A&O. Patient currently on bipap. Patient denies questions or concerns at this time. Bed is low & locked w/ alarm set. Call light within reach. Plan of care ongoingl.

## 2024-09-10 NOTE — THERAPY RE-EVALUATION
"Acute Care - Speech Language Pathology   Swallow Re-Assessment  [facility]  CLINICAL DYSPHAGIA RE-ASSESSMENT     Patient Name: Ana Ortiz  : 1975  MRN: [id number]  Today's Date: 9/10/2024     Admit Date: 2024    Ana Ortiz  was seen at bedside this am on PCU-211 to assess safety/efficacy of swallowing fnx, determine safest/least restrictive diet tolerance.     She is s/p Clinical Dysphagia Assessment yesterday at which time she presented w/ a \"... wfl oropharyngeal swallow w/o s/s aspiration and no s/s indicative of silent aspiration. No odynophagia reported. Per pt reported premorbid baseline po diet w/ meats already cut into small pieces and moistened, as well as avoidance of hard/crunchy textures, she is felt to most benefit from a modified po diet of mechanical soft textures, ground meats, and thin liquids at this time. She is recommended for additional gravy/sauce to be provided w/ all meats and medications to be presented whole in puree/thins or crushed PRN\".     Per discussion w/ RN, pt has tolerated current modified po diet w/o complications. No concerns noted w/ medication administration.     Per EMR review, Ms Ortiz is noted w/ increase in O2 supports required this date and mild increase in leukocytosis. She continues afebrile.     Social History     Socioeconomic History    Marital status:    Tobacco Use    Smoking status: Every Day     Current packs/day: 1.50     Average packs/day: 1.4 packs/day for 29.3 years (42.2 ttl pk-yrs)     Types: Cigarettes     Start date: 1995     Passive exposure: Current    Smokeless tobacco: Never    Tobacco comments:     8-10 per day    Vaping Use    Vaping status: Former    Substances: Nicotine, Flavoring    Devices: Refillable tank   Substance and Sexual Activity    Alcohol use: Never    Drug use: Never    Sexual activity: Not Currently     Partners: Male     Birth control/protection: Condom      Imaging:  US Thyroid [[id number]] Wilfrido as " Reviewed   Order Status: Completed Collected: 09/10/24 0830    Updated: 09/10/24 0834   Narrative:     PROCEDURE:  US THYROID-     HISTORY: nodule noted on CT; J96.01-Acute respiratory failure with  hypoxia; J18.9-Pneumonia, unspecified organism     PROCEDURE: Multiple sonographic images of the thyroid were obtained.     COMPARISON: None     FINDINGS: The thyroid gland is normal in size and echotexture.     Right lobe: 4.1 x 1.8 x 2.0 cm. There is a subcentimeter colloid cyst in  the right lobe of the thyroid gland. No solid nodule is identified.     Left lobe: 4.4 x 1.9 x 1.9 cm. There is a 1.1 cm well-circumscribed  mildly hypoechoic nodule.     Isthmus: 0.3 cm. There are no discrete nodules      Impression:     1. No suspicious nodules in the right lobe of the thyroid gland.  2. 1.1 cm TI-RADS 4 nodule in the left lobe. A follow-up thyroid  ultrasound in 1 year is recommended.     This report was finalized on 9/10/2024 8:32 AM by Clint Bustamante M.D..       CT Angiogram Chest Pulmonary Embolism [[id number]] Wilfrido as Reviewed   Order Status: Completed Collected: 09/09/24 0334    Updated: 09/09/24 0342   Narrative:     CLINICAL HISTORY: Chest pain.     COMPARISON: None available.     TECHNIQUE: IV contrast was administered and helical images were acquired  through the chest.  Multiplanar reformats were generated.  Axial MIPS  were obtained.  Limited exposure control, adjustment of the mA and/or KV  according to patient size or use of iterative reconstruction technique  was utilized.     FINDINGS:  Contrast bolus timing is suboptimal to evaluate for pulmonary embolism.     Heart and mediastinal structures: No pulmonary arterial filling defect.  Normal mediastinal structures. Coronary artery calcifications are  present.     Lungs and airways: Diffuse crazy paving opacities in the lungs,  relatively sparing the periphery and involving predominately in the mid  and upper lung zones.  The central airways are  patent..     Pleura: normal.     Lymph nodes: normal.     Musculoskeletal and chest wall: no acute abnormality.     Lower neck / upper abdomen: Heterogeneous nodule in the posterior aspect  of the right thyroid measuring 1.3 x 1.5 cm.      Impression:     NEGATIVE FOR PULMONARY EMBOLISM.     DIFFUSE CRAZY PAVING OPACITIES IN THE LUNGS, RELATIVELY SPARING THE  PERIPHERY AND BASES, WHICH COULD BE DUE TO PULMONARY EDEMA, ARDS,  INFECTION, PULMONARY ALVEOLAR PROTEINOSIS.     RIGHT THYROID NODULE MEASURING 1.3 X 1.5 CM. FOLLOW UP ULTRASOUND IS  SUGGESTED.     This report was finalized on 9/9/2024 3:39 AM by Dhara Shaikh MD.       XR Chest 1 View [[id number]] Wilfrido as Reviewed   Order Status: Completed Collected: 09/09/24 0200    Updated: 09/09/24 0203   Narrative:     PROCEDURE: Portable chest x-ray examination performed on September 8, 2024. Single view.     HISTORY: Shortness of breath. Chest pain.     COMPARISON: None.     FINDINGS:     Enlarged heart size. Edema.  Multifocal pneumonia, left greater than right.  Central pulmonary vascular congestion.  Slightly elevated left hemidiaphragm.  No pleural effusion.  No pneumothorax      Impression:     1.  Enlarged heart size  2.  Multifocal pneumonia, left greater than right.  3.  Central pulmonary vascular congestion.  4.  Edema.  5.  No pleural effusion.  6.  No pneumothorax.  7.  Hypoinflated lungs.     This report was finalized on 9/9/2024 2:01 AM by Kenny Baca MD.     Labs:   Latest Reference Range & Units 09/09/24 00:44 09/09/24 08:55 09/10/24 02:05   WBC 3.40 - 10.80 10*3/mm3 20.87 (H) 17.81 (H) 19.66 (H)   RBC 3.77 - 5.28 10*6/mm3 4.07 3.79 3.65 (L)   Hemoglobin 12.0 - 15.9 g/dL 12.6 11.5 (L) 11.3 (L)   Hematocrit 34.0 - 46.6 % 37.0 35.0 35.5   Platelets 140 - 450 10*3/mm3 323 306 287   RDW 12.3 - 15.4 % 13.6 13.8 13.9   MCV 79.0 - 97.0 fL 90.9 92.3 97.3 (H)   MCH 26.6 - 33.0 pg 31.0 30.3 31.0   MCHC 31.5 - 35.7 g/dL 34.1 32.9 31.8   MPV 6.0 - 12.0 fL 9.1 9.2  9.7   RDW-SD 37.0 - 54.0 fl 45.6 47.1 49.3   (H): Data is abnormally high  (L): Data is abnormally low  Diet Orders (active) (From admission, onward)       Start     Ordered    09/09/24 1200  DIET MESSAGE Please send extra gravy/sauce on the side for all meats.  Daily With Breakfast, Lunch & Dinner      Comments: Please send extra gravy/sauce on the side for all meats.    09/09/24 1126    09/09/24 1124  Diet: Diabetic, Regular/House; Consistent Carbohydrate; Texture: Soft to Chew (NDD 3); Soft to Chew: Ground Meat; Fluid Consistency: Thin (IDDSI 0)  Diet Effective Now        Comments: Meds whole in puree/thins.   Fully upright for all po intake.    09/09/24 1126                  Ms Ortiz is observed on 54L [facility]. She is sitting up on edge of bed upon SLP entry. She has recently completed self-provision of oral care. SpO2 is noted at 92% upon SLP entry and prior to any po presentations.     Ms Ortiz was positioned upright and centered on edge of bed to accept multiple po presentations of solid cracker, puree, and thin liquids via spoon, cup, and straw.  She was able to self provide po trials.     Upon po presentations, adequate bolus anticipation and acceptance w/ good labial seal for bolus clearance via spoon bowl, cup rim stability and suction via straw. Bolus formation, manipulation and control were wfl w/ rotary mastication pattern. A-p transit was timely w/o significant oral residue appreciated. No overt s/s aspiration before the swallow.      Pharyngeal swallow was timely w/ adequate hyolaryngeal elevation per palpation. No overt s/s aspiration evidenced across this evaluation. No silent aspiration suspected. Patient denied odynophagia.    She independently demonstrates alternating solid and liquid presentations and slow presentation rate w/ pacing to allow for adequate breath/swallow coordination. She reports that she currently tends to fatigue during a meal and that she has to pace herself.     Visit Dx:      ICD-10-CM ICD-9-CM   1. Acute hypoxic respiratory failure  J96.01 518.81   2. Multifocal pneumonia  J18.9 486     Patient Active Problem List   Diagnosis    Morbidly obese    Acute respiratory failure with hypoxia and hypercapnia    Cutaneous abscess of back excluding buttocks    JOANN (obstructive sleep apnea)    Chronic bronchitis    Cigarette nicotine dependence without complication    Chronic respiratory failure with hypoxia    Diabetes mellitus, type II    Type 2 diabetes mellitus with hyperglycemia, with long-term current use of insulin    Severe sepsis    Sepsis due to pneumonia     Past Medical History:   Diagnosis Date    Arthritis     CHF (congestive heart failure)     COPD (chronic obstructive pulmonary disease)     Diabetes mellitus     Elevated cholesterol     GERD (gastroesophageal reflux disease)     Hyperlipemia     Hypertension     JOANN (obstructive sleep apnea)     Pneumonia     Vulvar cancer      Past Surgical History:   Procedure Laterality Date    HYSTERECTOMY      INCISION AND DRAINAGE TRUNK N/A 1/11/2022    Procedure: INCISION AND DRAINAGE BACK;  Surgeon: Tamera Saez MD;  Location: [facility];  Service: General;  Laterality: N/A;    MASTECTOMY      VULVA SURGERY      reconstruction due ca     Impression:     Ms Ortiz continues w/ a wfl oropharyngeal swallow w/o s/s aspiration and no s/s indicative of silent aspiration. Per her prior report of avoidance of hard/crunchy textures and preference for meats cut into small pieces and moistened as well as her current fatigueability and respiratory supports, she is felt to most benefit from continuation of her current modified po diet of mechanical soft textures, ground meats, and thin liquids.     SLP Recommendation and Plan     1. Mechanical soft textures, ground meats, thin liquids.    2. Medicatoins whole in puree/thins. Crush PRN.   3. Upright and centered for all po intake  4. ZORA precautions.  5. Oral care protocol.  6. Additional  sauce/gravy w/ all meats to aid w/ bolus manipulation    SLP to f/u for continued diet safety/tolerance and potential advancement.     D/w patient results and recommendations w/ verbal agreement.    D/w RN results and recommendations w/ verbal agreement.    Thank you for allowing me to participate in the care of your patient-  Corrie Crews M.S., CCC-SLP    SLP Diet Recommendation: soft to chew textures, ground, thin liquids (09/10/24 1120)  Recommended Precautions and Strategies: upright posture during/after eating, general aspiration precautions, fatigue precautions (09/10/24 1120)  SLP Rec. for Method of Medication Administration: meds whole, meds crushed, with thin liquids, with puree (09/10/24 1120)         SWALLOW EVALUATION (Last 72 Hours)       SLP Adult Swallow Evaluation       Row Name 09/10/24 1120                   Rehab Evaluation    Document Type re-evaluation  -JR           General Eating/Swallowing Observations    Respiratory Support Currently in Use high-flow nasal cannula  -JR        O2 Liters other (see comments)  54  -JR        Positioning During Eating upright 90 degree  sitting on edge of bed  -JR        Utensils Used spoon;straw  -JR        Consistencies Trialed soft to chew textures;thin liquids;pureed  -JR        Pre SpO2 (%) --  92%  -JR        Post SpO2 (%) --  96%  -JR           Recommendations    SLP Diet Recommendation soft to chew textures;ground;thin liquids  -        Recommended Precautions and Strategies upright posture during/after eating;general aspiration precautions;fatigue precautions  -JR        SLP Rec. for Method of Medication Administration meds whole;meds crushed;with thin liquids;with puree  -                  User Key  (r) = Recorded By, (t) = Taken By, (c) = Cosigned By      Initials Name Effective Dates    Corrie Yang, MS CCC-SLP 10/25/21 -                     EDUCATION  The patient has been educated in the following areas:   Dysphagia (Swallowing Impairment)  Modified Diet Instruction.        Time Calculation:    Time Calculation- SLP       Row Name 09/10/24 1125             Time Calculation- SLP    SLP - Next Appointment 09/11/24  -                User Key  (r) = Recorded By, (t) = Taken By, (c) = Cosigned By      Initials Name Provider Type    Corrie Yang MS CCC-SLP Speech and Language Pathologist                    Therapy Charges for Today       Code Description Service Date Service Provider Modifiers Qty    49189627620 HC ST EVAL ORAL PHARYNG SWALLOW 4 9/9/2024 Corrie Crews MS CCC-SLP GN 1    [id number] HC ST EVAL ORAL PHARYNG SWALLOW 4 9/10/2024 Corrie Crews MS CCC-SLP GN 1                 Corrie Crews MS CCC-DEENA  9/10/2024

## 2024-09-10 NOTE — PLAN OF CARE
Problem: Noninvasive Ventilation Acute  Goal: Effective Unassisted Ventilation and Oxygenation  Outcome: Ongoing, Progressing  Intervention: Monitor and Manage Noninvasive Ventilation  Recent Flowsheet Documentation  Taken 9/9/2024 2341 by Skylar Lan RRT  Airway/Ventilation Management:   airway patency maintained   calming measures promoted  NPPV/CPAP Maintenance:   mask adjusted   mask secure     Problem: Obstructive Sleep Apnea Risk or Actual Comorbidity Management  Goal: Unobstructed Breathing During Sleep  Intervention: Monitor and Manage Obstructive Sleep Apnea  Recent Flowsheet Documentation  Taken 9/9/2024 2341 by Skylar Lan RRT  NPPV/CPAP Maintenance:   mask adjusted   mask secure   Goal Outcome Evaluation:    Patient wearing bipap while sleeping

## 2024-09-10 NOTE — PLAN OF CARE
Problem: Adjustment to Illness (Sepsis/Septic Shock)  Goal: Optimal Coping  Outcome: Ongoing, Progressing  Intervention: Optimize Psychosocial Adjustment to Illness  Recent Flowsheet Documentation  Taken 9/10/2024 1400 by Kathleen Ahumada RN  Supportive Measures:   active listening utilized   relaxation techniques promoted  Family/Support System Care:   support provided   presence promoted  Taken 9/10/2024 0900 by Kathleen Ahumada RN  Supportive Measures:   active listening utilized   relaxation techniques promoted  Family/Support System Care:   support provided   presence promoted     Problem: Bleeding (Sepsis/Septic Shock)  Goal: Absence of Bleeding  Outcome: Ongoing, Progressing     Problem: Glycemic Control Impaired (Sepsis/Septic Shock)  Goal: Blood Glucose Level Within Desired Range  Outcome: Ongoing, Progressing     Problem: Infection Progression (Sepsis/Septic Shock)  Goal: Absence of Infection Signs and Symptoms  Outcome: Ongoing, Progressing  Intervention: Initiate Sepsis Management  Recent Flowsheet Documentation  Taken 9/10/2024 1700 by Kathleen Ahumada RN  Infection Prevention:   single patient room provided   rest/sleep promoted   hand hygiene promoted  Taken 9/10/2024 1500 by Kathleen Ahumada RN  Infection Prevention:   single patient room provided   rest/sleep promoted   hand hygiene promoted  Taken 9/10/2024 1300 by Kathleen Ahumada RN  Infection Prevention:   single patient room provided   rest/sleep promoted   hand hygiene promoted  Taken 9/10/2024 1100 by Kathleen Ahumada RN  Infection Prevention:   single patient room provided   rest/sleep promoted   hand hygiene promoted  Taken 9/10/2024 0900 by Kahtleen Ahumada RN  Infection Prevention:   single patient room provided   rest/sleep promoted   hand hygiene promoted  Taken 9/10/2024 0700 by Kathleen Ahumada RN  Infection Prevention:   single patient room provided   rest/sleep promoted   hand hygiene promoted  Intervention: Promote Recovery  Recent  Flowsheet Documentation  Taken 9/10/2024 1700 by Kathleen Ahumada RN  Activity Management: up in chair  Taken 9/10/2024 1500 by Kathleen Ahumada RN  Activity Management: activity encouraged  Taken 9/10/2024 1400 by Kathleen Ahumada RN  Sleep/Rest Enhancement:   consistent schedule promoted   family presence promoted   relaxation techniques promoted  Taken 9/10/2024 1300 by Kathleen Ahumada RN  Activity Management: activity encouraged  Taken 9/10/2024 1100 by Kathleen Ahumada RN  Activity Management: activity encouraged  Taken 9/10/2024 0900 by Kathleen Ahumada RN  Activity Management: activity encouraged  Sleep/Rest Enhancement:   consistent schedule promoted   relaxation techniques promoted  Taken 9/10/2024 0700 by Kathleen Ahumada RN  Activity Management: activity encouraged     Problem: Nutrition Impaired (Sepsis/Septic Shock)  Goal: Optimal Nutrition Intake  Outcome: Ongoing, Progressing     Problem: Diabetes Comorbidity  Goal: Blood Glucose Level Within Targeted Range  Outcome: Ongoing, Progressing     Problem: Obstructive Sleep Apnea Risk or Actual Comorbidity Management  Goal: Unobstructed Breathing During Sleep  Outcome: Ongoing, Progressing     Problem: Adult Inpatient Plan of Care  Goal: Plan of Care Review  Outcome: Ongoing, Progressing  Flowsheets (Taken 9/10/2024 1703)  Outcome Evaluation: Pt is resting in bed, call light in reach, and bed alarm set. No new needs noted at this time.  Goal: Patient-Specific Goal (Individualized)  Outcome: Ongoing, Progressing  Goal: Absence of Hospital-Acquired Illness or Injury  Outcome: Ongoing, Progressing  Intervention: Identify and Manage Fall Risk  Recent Flowsheet Documentation  Taken 9/10/2024 1700 by Kathleen Ahumada RN  Safety Promotion/Fall Prevention:   activity supervised   assistive device/personal items within reach   clutter free environment maintained   fall prevention program maintained   safety round/check completed  Taken 9/10/2024 1500 by Kathleen Ahumada  RN  Safety Promotion/Fall Prevention:   activity supervised   assistive device/personal items within reach   clutter free environment maintained   fall prevention program maintained   safety round/check completed  Taken 9/10/2024 1300 by Kathleen Ahumada RN  Safety Promotion/Fall Prevention:   activity supervised   assistive device/personal items within reach   clutter free environment maintained   fall prevention program maintained   safety round/check completed  Taken 9/10/2024 1100 by Kathleen Ahumada RN  Safety Promotion/Fall Prevention:   activity supervised   assistive device/personal items within reach   clutter free environment maintained   fall prevention program maintained   safety round/check completed  Taken 9/10/2024 0900 by Kathleen Ahumada RN  Safety Promotion/Fall Prevention:   activity supervised   assistive device/personal items within reach   clutter free environment maintained   fall prevention program maintained   safety round/check completed  Taken 9/10/2024 0700 by Kathleen Ahumada RN  Safety Promotion/Fall Prevention:   activity supervised   assistive device/personal items within reach   clutter free environment maintained   fall prevention program maintained   safety round/check completed  Intervention: Prevent Skin Injury  Recent Flowsheet Documentation  Taken 9/10/2024 1400 by Kathleen Ahumada RN  Skin Protection:   tubing/devices free from skin contact   transparent dressing maintained   adhesive use limited   incontinence pads utilized   protective footwear used   pulse oximeter probe site changed  Taken 9/10/2024 0900 by Kathleen Ahumada RN  Skin Protection:   adhesive use limited   incontinence pads utilized   protective footwear used   pulse oximeter probe site changed   transparent dressing maintained   tubing/devices free from skin contact  Intervention: Prevent and Manage VTE (Venous Thromboembolism) Risk  Recent Flowsheet Documentation  Taken 9/10/2024 1700 by Kathleen Ahumada  RN  Activity Management: up in chair  Taken 9/10/2024 1500 by Kathleen Ahumada RN  Activity Management: activity encouraged  Taken 9/10/2024 1400 by Kathleen Ahumada RN  VTE Prevention/Management: (see MAR) other (see comments)  Range of Motion: active ROM (range of motion) encouraged  Taken 9/10/2024 1300 by Kathleen Ahumada RN  Activity Management: activity encouraged  Taken 9/10/2024 1100 by Kathleen Ahumada RN  Activity Management: activity encouraged  Taken 9/10/2024 0900 by Kathleen Ahumada RN  Activity Management: activity encouraged  VTE Prevention/Management: (see MAR) other (see comments)  Range of Motion: active ROM (range of motion) encouraged  Taken 9/10/2024 0700 by Kathleen Ahumada RN  Activity Management: activity encouraged  Intervention: Prevent Infection  Recent Flowsheet Documentation  Taken 9/10/2024 1700 by Kathleen Ahumada RN  Infection Prevention:   single patient room provided   rest/sleep promoted   hand hygiene promoted  Taken 9/10/2024 1500 by Kathleen Ahumada RN  Infection Prevention:   single patient room provided   rest/sleep promoted   hand hygiene promoted  Taken 9/10/2024 1300 by Kathleen Ahumada RN  Infection Prevention:   single patient room provided   rest/sleep promoted   hand hygiene promoted  Taken 9/10/2024 1100 by Kathleen Ahumada RN  Infection Prevention:   single patient room provided   rest/sleep promoted   hand hygiene promoted  Taken 9/10/2024 0900 by Kathleen Ahumada RN  Infection Prevention:   single patient room provided   rest/sleep promoted   hand hygiene promoted  Taken 9/10/2024 0700 by Kathleen Ahumada RN  Infection Prevention:   single patient room provided   rest/sleep promoted   hand hygiene promoted  Goal: Optimal Comfort and Wellbeing  Outcome: Ongoing, Progressing  Intervention: Provide Person-Centered Care  Recent Flowsheet Documentation  Taken 9/10/2024 1400 by Kathleen Ahumada RN  Trust Relationship/Rapport:   care explained   choices provided   questions answered    questions encouraged   reassurance provided   thoughts/feelings acknowledged  Taken 9/10/2024 0900 by Kathleen Ahumada RN  Trust Relationship/Rapport:   care explained   choices provided   questions answered   questions encouraged   reassurance provided   thoughts/feelings acknowledged  Goal: Readiness for Transition of Care  Outcome: Ongoing, Progressing     Problem: Noninvasive Ventilation Acute  Goal: Effective Unassisted Ventilation and Oxygenation  Outcome: Ongoing, Progressing     Problem: Fall Injury Risk  Goal: Absence of Fall and Fall-Related Injury  Outcome: Ongoing, Progressing  Intervention: Identify and Manage Contributors  Recent Flowsheet Documentation  Taken 9/10/2024 1700 by Kathleen Ahumada RN  Medication Review/Management: medications reviewed  Taken 9/10/2024 1500 by Kathleen Ahumada RN  Medication Review/Management: medications reviewed  Taken 9/10/2024 1400 by Kathleen Ahumada RN  Medication Review/Management: medications reviewed  Taken 9/10/2024 1300 by Kathleen Ahumada RN  Medication Review/Management: medications reviewed  Taken 9/10/2024 1100 by Kathleen Ahumada RN  Medication Review/Management: medications reviewed  Taken 9/10/2024 0900 by Kathleen Ahumada RN  Medication Review/Management: medications reviewed  Taken 9/10/2024 0700 by Kathleen Ahumada RN  Medication Review/Management: medications reviewed  Intervention: Promote Injury-Free Environment  Recent Flowsheet Documentation  Taken 9/10/2024 1700 by Kathleen Ahumada RN  Safety Promotion/Fall Prevention:   activity supervised   assistive device/personal items within reach   clutter free environment maintained   fall prevention program maintained   safety round/check completed  Taken 9/10/2024 1500 by Kathleen Ahumada RN  Safety Promotion/Fall Prevention:   activity supervised   assistive device/personal items within reach   clutter free environment maintained   fall prevention program maintained   safety round/check completed  Taken  9/10/2024 1300 by Kathleen Ahumada RN  Safety Promotion/Fall Prevention:   activity supervised   assistive device/personal items within reach   clutter free environment maintained   fall prevention program maintained   safety round/check completed  Taken 9/10/2024 1100 by Kathleen Ahumada RN  Safety Promotion/Fall Prevention:   activity supervised   assistive device/personal items within reach   clutter free environment maintained   fall prevention program maintained   safety round/check completed  Taken 9/10/2024 0900 by Kathleen Ahumada RN  Safety Promotion/Fall Prevention:   activity supervised   assistive device/personal items within reach   clutter free environment maintained   fall prevention program maintained   safety round/check completed  Taken 9/10/2024 0700 by Kathleen Ahumada RN  Safety Promotion/Fall Prevention:   activity supervised   assistive device/personal items within reach   clutter free environment maintained   fall prevention program maintained   safety round/check completed   Goal Outcome Evaluation:              Outcome Evaluation: Pt is resting in bed, call light in reach, and bed alarm set. No new needs noted at this time.

## 2024-09-11 LAB
ANION GAP SERPL CALCULATED.3IONS-SCNC: 12.2 MMOL/L (ref 5–15)
BUN SERPL-MCNC: 18 MG/DL (ref 6–20)
BUN/CREAT SERPL: 22 (ref 7–25)
CALCIUM SPEC-SCNC: 9.2 MG/DL (ref 8.6–10.5)
CHLORIDE SERPL-SCNC: 94 MMOL/L (ref 98–107)
CO2 SERPL-SCNC: 24.8 MMOL/L (ref 22–29)
CREAT SERPL-MCNC: 0.82 MG/DL (ref 0.57–1)
DEPRECATED RDW RBC AUTO: 48.9 FL (ref 37–54)
EGFRCR SERPLBLD CKD-EPI 2021: 88.4 ML/MIN/1.73
ERYTHROCYTE [DISTWIDTH] IN BLOOD BY AUTOMATED COUNT: 13.7 % (ref 12.3–15.4)
GLUCOSE BLDC GLUCOMTR-MCNC: 258 MG/DL (ref 70–130)
GLUCOSE BLDC GLUCOMTR-MCNC: 341 MG/DL (ref 70–130)
GLUCOSE BLDC GLUCOMTR-MCNC: 391 MG/DL (ref 70–130)
GLUCOSE BLDC GLUCOMTR-MCNC: 461 MG/DL (ref 70–130)
GLUCOSE BLDC GLUCOMTR-MCNC: 464 MG/DL (ref 70–130)
GLUCOSE BLDC GLUCOMTR-MCNC: 537 MG/DL (ref 70–130)
GLUCOSE SERPL-MCNC: 514 MG/DL (ref 65–99)
HCT VFR BLD AUTO: 32.8 % (ref 34–46.6)
HGB BLD-MCNC: 10.5 G/DL (ref 12–15.9)
IGA1 MFR SER: 158 MG/DL (ref 70–400)
IGG1 SER-MCNC: 991 MG/DL (ref 700–1600)
IGM SERPL-MCNC: 135 MG/DL (ref 40–230)
LDH SERPL-CCNC: 440 U/L (ref 135–214)
MCH RBC QN AUTO: 30.6 PG (ref 26.6–33)
MCHC RBC AUTO-ENTMCNC: 32 G/DL (ref 31.5–35.7)
MCV RBC AUTO: 95.6 FL (ref 79–97)
PLATELET # BLD AUTO: 336 10*3/MM3 (ref 140–450)
PMV BLD AUTO: 9.4 FL (ref 6–12)
POTASSIUM SERPL-SCNC: 4.7 MMOL/L (ref 3.5–5.2)
RBC # BLD AUTO: 3.43 10*6/MM3 (ref 3.77–5.28)
SODIUM SERPL-SCNC: 131 MMOL/L (ref 136–145)
WBC NRBC COR # BLD AUTO: 12.63 10*3/MM3 (ref 3.4–10.8)

## 2024-09-11 PROCEDURE — 85027 COMPLETE CBC AUTOMATED: CPT | Performed by: STUDENT IN AN ORGANIZED HEALTH CARE EDUCATION/TRAINING PROGRAM

## 2024-09-11 PROCEDURE — 92610 EVALUATE SWALLOWING FUNCTION: CPT

## 2024-09-11 PROCEDURE — 63710000001 INSULIN LISPRO (HUMAN) PER 5 UNITS: Performed by: HOSPITALIST

## 2024-09-11 PROCEDURE — 99232 SBSQ HOSP IP/OBS MODERATE 35: CPT | Performed by: STUDENT IN AN ORGANIZED HEALTH CARE EDUCATION/TRAINING PROGRAM

## 2024-09-11 PROCEDURE — 94761 N-INVAS EAR/PLS OXIMETRY MLT: CPT

## 2024-09-11 PROCEDURE — 80048 BASIC METABOLIC PNL TOTAL CA: CPT | Performed by: STUDENT IN AN ORGANIZED HEALTH CARE EDUCATION/TRAINING PROGRAM

## 2024-09-11 PROCEDURE — 94799 UNLISTED PULMONARY SVC/PX: CPT

## 2024-09-11 PROCEDURE — 25810000003 SODIUM CHLORIDE 0.9 % SOLUTION 250 ML FLEX CONT: Performed by: INTERNAL MEDICINE

## 2024-09-11 PROCEDURE — 99291 CRITICAL CARE FIRST HOUR: CPT | Performed by: INTERNAL MEDICINE

## 2024-09-11 PROCEDURE — 63710000001 INSULIN GLARGINE PER 5 UNITS: Performed by: STUDENT IN AN ORGANIZED HEALTH CARE EDUCATION/TRAINING PROGRAM

## 2024-09-11 PROCEDURE — 82784 ASSAY IGA/IGD/IGG/IGM EACH: CPT | Performed by: STUDENT IN AN ORGANIZED HEALTH CARE EDUCATION/TRAINING PROGRAM

## 2024-09-11 PROCEDURE — 83520 IMMUNOASSAY QUANT NOS NONAB: CPT | Performed by: STUDENT IN AN ORGANIZED HEALTH CARE EDUCATION/TRAINING PROGRAM

## 2024-09-11 PROCEDURE — 63710000001 INSULIN LISPRO (HUMAN) PER 5 UNITS: Performed by: STUDENT IN AN ORGANIZED HEALTH CARE EDUCATION/TRAINING PROGRAM

## 2024-09-11 PROCEDURE — 25010000002 AZITHROMYCIN PER 500 MG: Performed by: INTERNAL MEDICINE

## 2024-09-11 PROCEDURE — 94660 CPAP INITIATION&MGMT: CPT

## 2024-09-11 PROCEDURE — 94664 DEMO&/EVAL PT USE INHALER: CPT

## 2024-09-11 PROCEDURE — 25010000002 METHYLPREDNISOLONE PER 125 MG: Performed by: INTERNAL MEDICINE

## 2024-09-11 PROCEDURE — 83615 LACTATE (LD) (LDH) ENZYME: CPT | Performed by: STUDENT IN AN ORGANIZED HEALTH CARE EDUCATION/TRAINING PROGRAM

## 2024-09-11 PROCEDURE — 86038 ANTINUCLEAR ANTIBODIES: CPT | Performed by: STUDENT IN AN ORGANIZED HEALTH CARE EDUCATION/TRAINING PROGRAM

## 2024-09-11 PROCEDURE — 63710000001 INSULIN GLARGINE PER 5 UNITS: Performed by: HOSPITALIST

## 2024-09-11 PROCEDURE — 25010000002 HEPARIN (PORCINE) PER 1000 UNITS: Performed by: INTERNAL MEDICINE

## 2024-09-11 PROCEDURE — 25010000002 CEFEPIME PER 500 MG: Performed by: INTERNAL MEDICINE

## 2024-09-11 PROCEDURE — 82948 REAGENT STRIP/BLOOD GLUCOSE: CPT

## 2024-09-11 PROCEDURE — 97162 PT EVAL MOD COMPLEX 30 MIN: CPT

## 2024-09-11 PROCEDURE — 25010000002 FUROSEMIDE PER 20 MG: Performed by: INTERNAL MEDICINE

## 2024-09-11 RX ORDER — INSULIN LISPRO 100 [IU]/ML
10 INJECTION, SOLUTION INTRAVENOUS; SUBCUTANEOUS ONCE
Status: COMPLETED | OUTPATIENT
Start: 2024-09-11 | End: 2024-09-11

## 2024-09-11 RX ORDER — ACETAMINOPHEN 325 MG/1
650 TABLET ORAL EVERY 6 HOURS PRN
Status: DISCONTINUED | OUTPATIENT
Start: 2024-09-11 | End: 2024-09-13 | Stop reason: HOSPADM

## 2024-09-11 RX ORDER — FUROSEMIDE 10 MG/ML
40 INJECTION INTRAMUSCULAR; INTRAVENOUS ONCE
Status: COMPLETED | OUTPATIENT
Start: 2024-09-11 | End: 2024-09-11

## 2024-09-11 RX ADMIN — INSULIN LISPRO 24 UNITS: 100 INJECTION, SOLUTION INTRAVENOUS; SUBCUTANEOUS at 18:23

## 2024-09-11 RX ADMIN — NICOTINE 1 PATCH: 7 PATCH, EXTENDED RELEASE TRANSDERMAL at 09:24

## 2024-09-11 RX ADMIN — SENNOSIDES AND DOCUSATE SODIUM 2 TABLET: 50; 8.6 TABLET ORAL at 09:23

## 2024-09-11 RX ADMIN — INSULIN GLARGINE 35 UNITS: 100 INJECTION, SOLUTION SUBCUTANEOUS at 19:52

## 2024-09-11 RX ADMIN — CEFEPIME 2000 MG: 2 INJECTION, POWDER, FOR SOLUTION INTRAVENOUS at 01:25

## 2024-09-11 RX ADMIN — LISINOPRIL 2.5 MG: 2.5 TABLET ORAL at 09:22

## 2024-09-11 RX ADMIN — ATORVASTATIN CALCIUM 40 MG: 40 TABLET, FILM COATED ORAL at 09:20

## 2024-09-11 RX ADMIN — IPRATROPIUM BROMIDE AND ALBUTEROL SULFATE 3 ML: 2.5; .5 SOLUTION RESPIRATORY (INHALATION) at 06:45

## 2024-09-11 RX ADMIN — CETIRIZINE HYDROCHLORIDE 10 MG: 10 TABLET, FILM COATED ORAL at 09:20

## 2024-09-11 RX ADMIN — GABAPENTIN 800 MG: 400 CAPSULE ORAL at 11:39

## 2024-09-11 RX ADMIN — INSULIN LISPRO 20 UNITS: 100 INJECTION, SOLUTION INTRAVENOUS; SUBCUTANEOUS at 09:21

## 2024-09-11 RX ADMIN — GABAPENTIN 800 MG: 400 CAPSULE ORAL at 09:21

## 2024-09-11 RX ADMIN — BUDESONIDE AND FORMOTEROL FUMARATE DIHYDRATE 2 PUFF: 160; 4.5 AEROSOL RESPIRATORY (INHALATION) at 18:28

## 2024-09-11 RX ADMIN — GABAPENTIN 800 MG: 400 CAPSULE ORAL at 18:05

## 2024-09-11 RX ADMIN — IPRATROPIUM BROMIDE AND ALBUTEROL SULFATE 3 ML: 2.5; .5 SOLUTION RESPIRATORY (INHALATION) at 00:15

## 2024-09-11 RX ADMIN — MELOXICAM 7.5 MG: 7.5 TABLET ORAL at 20:03

## 2024-09-11 RX ADMIN — LIDOCAINE 1 PATCH: 4 PATCH TOPICAL at 09:22

## 2024-09-11 RX ADMIN — POTASSIUM CHLORIDE 40 MEQ: 1.5 POWDER, FOR SOLUTION ORAL at 09:23

## 2024-09-11 RX ADMIN — BUDESONIDE AND FORMOTEROL FUMARATE DIHYDRATE 2 PUFF: 160; 4.5 AEROSOL RESPIRATORY (INHALATION) at 06:45

## 2024-09-11 RX ADMIN — METHYLPREDNISOLONE SODIUM SUCCINATE 125 MG: 125 INJECTION, POWDER, FOR SOLUTION INTRAMUSCULAR; INTRAVENOUS at 09:23

## 2024-09-11 RX ADMIN — Medication 10 ML: at 20:04

## 2024-09-11 RX ADMIN — CEFEPIME 2000 MG: 2 INJECTION, POWDER, FOR SOLUTION INTRAVENOUS at 09:20

## 2024-09-11 RX ADMIN — IPRATROPIUM BROMIDE AND ALBUTEROL SULFATE 3 ML: 2.5; .5 SOLUTION RESPIRATORY (INHALATION) at 13:31

## 2024-09-11 RX ADMIN — INSULIN LISPRO 12 UNITS: 100 INJECTION, SOLUTION INTRAVENOUS; SUBCUTANEOUS at 12:41

## 2024-09-11 RX ADMIN — Medication 10 ML: at 09:23

## 2024-09-11 RX ADMIN — CLONAZEPAM 1 MG: 1 TABLET ORAL at 18:06

## 2024-09-11 RX ADMIN — ACETAMINOPHEN 650 MG: 325 TABLET ORAL at 20:21

## 2024-09-11 RX ADMIN — METHYLPREDNISOLONE SODIUM SUCCINATE 125 MG: 125 INJECTION, POWDER, FOR SOLUTION INTRAMUSCULAR; INTRAVENOUS at 20:08

## 2024-09-11 RX ADMIN — INSULIN GLARGINE 5 UNITS: 100 INJECTION, SOLUTION SUBCUTANEOUS at 03:45

## 2024-09-11 RX ADMIN — FUROSEMIDE 40 MG: 10 INJECTION, SOLUTION INTRAMUSCULAR; INTRAVENOUS at 14:59

## 2024-09-11 RX ADMIN — INSULIN GLARGINE 20 UNITS: 100 INJECTION, SOLUTION SUBCUTANEOUS at 09:21

## 2024-09-11 RX ADMIN — AZITHROMYCIN DIHYDRATE 500 MG: 500 INJECTION, POWDER, LYOPHILIZED, FOR SOLUTION INTRAVENOUS at 01:25

## 2024-09-11 RX ADMIN — INSULIN LISPRO 10 UNITS: 100 INJECTION, SOLUTION INTRAVENOUS; SUBCUTANEOUS at 03:45

## 2024-09-11 RX ADMIN — HEPARIN SODIUM 5000 UNITS: 5000 INJECTION INTRAVENOUS; SUBCUTANEOUS at 09:21

## 2024-09-11 RX ADMIN — SENNOSIDES AND DOCUSATE SODIUM 2 TABLET: 50; 8.6 TABLET ORAL at 20:03

## 2024-09-11 RX ADMIN — HEPARIN SODIUM 5000 UNITS: 5000 INJECTION INTRAVENOUS; SUBCUTANEOUS at 20:03

## 2024-09-11 RX ADMIN — MELOXICAM 7.5 MG: 7.5 TABLET ORAL at 09:23

## 2024-09-11 RX ADMIN — IPRATROPIUM BROMIDE AND ALBUTEROL SULFATE 3 ML: 2.5; .5 SOLUTION RESPIRATORY (INHALATION) at 18:28

## 2024-09-11 RX ADMIN — INSULIN LISPRO 16 UNITS: 100 INJECTION, SOLUTION INTRAVENOUS; SUBCUTANEOUS at 21:20

## 2024-09-11 RX ADMIN — PANTOPRAZOLE SODIUM 40 MG: 40 INJECTION, POWDER, FOR SOLUTION INTRAVENOUS at 06:23

## 2024-09-11 RX ADMIN — GABAPENTIN 800 MG: 400 CAPSULE ORAL at 20:02

## 2024-09-11 RX ADMIN — CLONAZEPAM 1 MG: 1 TABLET ORAL at 03:55

## 2024-09-11 NOTE — PLAN OF CARE
Problem: Adjustment to Illness (Sepsis/Septic Shock)  Goal: Optimal Coping  Outcome: Ongoing, Progressing  Intervention: Optimize Psychosocial Adjustment to Illness  Recent Flowsheet Documentation  Taken 9/11/2024 1400 by Kathleen Ahumada RN  Supportive Measures:   active listening utilized   relaxation techniques promoted  Family/Support System Care:   support provided   presence promoted  Taken 9/11/2024 0900 by Kathleen Ahumada RN  Supportive Measures:   active listening utilized   relaxation techniques promoted  Family/Support System Care:   support provided   presence promoted     Problem: Bleeding (Sepsis/Septic Shock)  Goal: Absence of Bleeding  Outcome: Ongoing, Progressing     Problem: Glycemic Control Impaired (Sepsis/Septic Shock)  Goal: Blood Glucose Level Within Desired Range  Outcome: Ongoing, Progressing     Problem: Infection Progression (Sepsis/Septic Shock)  Goal: Absence of Infection Signs and Symptoms  Outcome: Ongoing, Progressing  Intervention: Initiate Sepsis Management  Recent Flowsheet Documentation  Taken 9/11/2024 1700 by Kathleen Ahumada RN  Infection Prevention:   single patient room provided   rest/sleep promoted   hand hygiene promoted  Taken 9/11/2024 1500 by Kathleen Ahumada RN  Infection Prevention:   single patient room provided   rest/sleep promoted   hand hygiene promoted  Taken 9/11/2024 1400 by Kathleen Ahumada RN  Infection Prevention:   single patient room provided   rest/sleep promoted   hand hygiene promoted  Taken 9/11/2024 1300 by Kathleen Ahumada RN  Infection Prevention:   single patient room provided   rest/sleep promoted   hand hygiene promoted  Taken 9/11/2024 1100 by Kathleen Ahumada RN  Infection Prevention:   single patient room provided   rest/sleep promoted   hand hygiene promoted  Taken 9/11/2024 0900 by Kathleen Ahumada RN  Infection Prevention:   single patient room provided   rest/sleep promoted   hand hygiene promoted  Taken 9/11/2024 0700 by Kathleen Ahumada  RN  Infection Prevention:   single patient room provided   rest/sleep promoted   hand hygiene promoted  Intervention: Promote Recovery  Recent Flowsheet Documentation  Taken 9/11/2024 1700 by Kathleen Ahumada RN  Activity Management: up in chair  Taken 9/11/2024 1500 by Kathleen Ahumada RN  Activity Management: up in chair  Taken 9/11/2024 1400 by Kathleen Ahumada RN  Activity Management: up in chair  Sleep/Rest Enhancement:   consistent schedule promoted   relaxation techniques promoted  Taken 9/11/2024 1300 by Kathleen Ahumada RN  Activity Management: up in chair  Taken 9/11/2024 1100 by Kathleen Ahumada RN  Activity Management: up in chair  Taken 9/11/2024 0900 by Kathleen Ahumada RN  Activity Management: activity encouraged  Sleep/Rest Enhancement:   consistent schedule promoted   family presence promoted   relaxation techniques promoted  Taken 9/11/2024 0700 by Kathleen Ahumada RN  Activity Management: activity encouraged     Problem: Nutrition Impaired (Sepsis/Septic Shock)  Goal: Optimal Nutrition Intake  Outcome: Ongoing, Progressing     Problem: Diabetes Comorbidity  Goal: Blood Glucose Level Within Targeted Range  Outcome: Ongoing, Progressing     Problem: Obstructive Sleep Apnea Risk or Actual Comorbidity Management  Goal: Unobstructed Breathing During Sleep  Outcome: Ongoing, Progressing     Problem: Adult Inpatient Plan of Care  Goal: Plan of Care Review  Outcome: Ongoing, Progressing  Flowsheets (Taken 9/10/2024 1703)  Outcome Evaluation: Pt is resting in bed, call light in reach, and bed alarm set. No new needs noted at this time.  Goal: Patient-Specific Goal (Individualized)  Outcome: Ongoing, Progressing  Goal: Absence of Hospital-Acquired Illness or Injury  Outcome: Ongoing, Progressing  Intervention: Identify and Manage Fall Risk  Recent Flowsheet Documentation  Taken 9/11/2024 1700 by Kathleen Ahumada RN  Safety Promotion/Fall Prevention:   activity supervised   assistive device/personal items within  reach   clutter free environment maintained   fall prevention program maintained   safety round/check completed  Taken 9/11/2024 1500 by Kathleen Ahumada RN  Safety Promotion/Fall Prevention:   activity supervised   assistive device/personal items within reach   clutter free environment maintained   fall prevention program maintained   safety round/check completed  Taken 9/11/2024 1400 by Kathleen Ahumada RN  Safety Promotion/Fall Prevention:   activity supervised   assistive device/personal items within reach   clutter free environment maintained   fall prevention program maintained   safety round/check completed  Taken 9/11/2024 1300 by Kathleen Ahumada RN  Safety Promotion/Fall Prevention:   activity supervised   assistive device/personal items within reach   clutter free environment maintained   fall prevention program maintained   safety round/check completed  Taken 9/11/2024 1100 by Kathleen Ahumada RN  Safety Promotion/Fall Prevention:   activity supervised   assistive device/personal items within reach   clutter free environment maintained   fall prevention program maintained   safety round/check completed  Taken 9/11/2024 0900 by Kathleen Ahumada RN  Safety Promotion/Fall Prevention:   activity supervised   assistive device/personal items within reach   clutter free environment maintained   fall prevention program maintained   safety round/check completed  Taken 9/11/2024 0700 by Kathleen Ahumada RN  Safety Promotion/Fall Prevention:   activity supervised   assistive device/personal items within reach   clutter free environment maintained   fall prevention program maintained   safety round/check completed  Intervention: Prevent Skin Injury  Recent Flowsheet Documentation  Taken 9/11/2024 1400 by Kathleen Ahumada RN  Skin Protection:   adhesive use limited   pouching devices used   protective footwear used   transparent dressing maintained   tubing/devices free from skin contact  Taken 9/11/2024 0900 by Brandyn  SYED Wang  Skin Protection:   adhesive use limited   incontinence pads utilized   protective footwear used   pulse oximeter probe site changed   transparent dressing maintained   tubing/devices free from skin contact  Intervention: Prevent and Manage VTE (Venous Thromboembolism) Risk  Recent Flowsheet Documentation  Taken 9/11/2024 1700 by Kathleen Ahumada RN  Activity Management: up in chair  Taken 9/11/2024 1500 by Kathleen Ahumada RN  Activity Management: up in chair  Taken 9/11/2024 1400 by Kathleen Ahumada RN  Activity Management: up in chair  VTE Prevention/Management: (see MAR) other (see comments)  Range of Motion: active ROM (range of motion) encouraged  Taken 9/11/2024 1300 by Kathleen Ahumada RN  Activity Management: up in chair  Taken 9/11/2024 1100 by Kathleen Ahumada RN  Activity Management: up in chair  Taken 9/11/2024 0900 by Kathleen Ahumada RN  Activity Management: activity encouraged  VTE Prevention/Management: (see MAR) other (see comments)  Range of Motion: active ROM (range of motion) encouraged  Taken 9/11/2024 0700 by Kathleen Ahumada RN  Activity Management: activity encouraged  Intervention: Prevent Infection  Recent Flowsheet Documentation  Taken 9/11/2024 1700 by Kathleen Ahumada RN  Infection Prevention:   single patient room provided   rest/sleep promoted   hand hygiene promoted  Taken 9/11/2024 1500 by Kathleen Ahumada RN  Infection Prevention:   single patient room provided   rest/sleep promoted   hand hygiene promoted  Taken 9/11/2024 1400 by Kathleen Ahumada RN  Infection Prevention:   single patient room provided   rest/sleep promoted   hand hygiene promoted  Taken 9/11/2024 1300 by Kathleen Ahumada RN  Infection Prevention:   single patient room provided   rest/sleep promoted   hand hygiene promoted  Taken 9/11/2024 1100 by Kathleen Ahumada RN  Infection Prevention:   single patient room provided   rest/sleep promoted   hand hygiene promoted  Taken 9/11/2024 0900 by Kathleen Ahumada  RN  Infection Prevention:   single patient room provided   rest/sleep promoted   hand hygiene promoted  Taken 9/11/2024 0700 by Kathleen Ahumada RN  Infection Prevention:   single patient room provided   rest/sleep promoted   hand hygiene promoted  Goal: Optimal Comfort and Wellbeing  Outcome: Ongoing, Progressing  Intervention: Provide Person-Centered Care  Recent Flowsheet Documentation  Taken 9/11/2024 1400 by Kathleen Ahumada RN  Trust Relationship/Rapport:   care explained   choices provided   questions answered   questions encouraged   reassurance provided   thoughts/feelings acknowledged  Taken 9/11/2024 0900 by Kathleen Ahumada RN  Trust Relationship/Rapport:   care explained   choices provided   questions answered   questions encouraged   reassurance provided   thoughts/feelings acknowledged  Goal: Readiness for Transition of Care  Outcome: Ongoing, Progressing     Problem: Noninvasive Ventilation Acute  Goal: Effective Unassisted Ventilation and Oxygenation  Outcome: Ongoing, Progressing     Problem: Fall Injury Risk  Goal: Absence of Fall and Fall-Related Injury  Outcome: Ongoing, Progressing  Intervention: Identify and Manage Contributors  Recent Flowsheet Documentation  Taken 9/11/2024 1700 by Kathleen Ahumada RN  Medication Review/Management: medications reviewed  Taken 9/11/2024 1500 by Kathleen Ahumada RN  Medication Review/Management: medications reviewed  Taken 9/11/2024 1400 by Kathleen Ahumada RN  Medication Review/Management: medications reviewed  Taken 9/11/2024 1300 by Kathleen Ahumada RN  Medication Review/Management: medications reviewed  Taken 9/11/2024 1100 by Kathleen Ahumada RN  Medication Review/Management: medications reviewed  Taken 9/11/2024 0900 by Kathleen Ahumada RN  Medication Review/Management: medications reviewed  Taken 9/11/2024 0700 by Kathleen Ahumada RN  Medication Review/Management: medications reviewed  Intervention: Promote Injury-Free Environment  Recent Flowsheet  Documentation  Taken 9/11/2024 1700 by Kathleen Ahumada RN  Safety Promotion/Fall Prevention:   activity supervised   assistive device/personal items within reach   clutter free environment maintained   fall prevention program maintained   safety round/check completed  Taken 9/11/2024 1500 by Kathleen Ahumada RN  Safety Promotion/Fall Prevention:   activity supervised   assistive device/personal items within reach   clutter free environment maintained   fall prevention program maintained   safety round/check completed  Taken 9/11/2024 1400 by Kathleen Ahumada RN  Safety Promotion/Fall Prevention:   activity supervised   assistive device/personal items within reach   clutter free environment maintained   fall prevention program maintained   safety round/check completed  Taken 9/11/2024 1300 by Kathleen Ahumada RN  Safety Promotion/Fall Prevention:   activity supervised   assistive device/personal items within reach   clutter free environment maintained   fall prevention program maintained   safety round/check completed  Taken 9/11/2024 1100 by Kathleen Ahumada RN  Safety Promotion/Fall Prevention:   activity supervised   assistive device/personal items within reach   clutter free environment maintained   fall prevention program maintained   safety round/check completed  Taken 9/11/2024 0900 by Kathleen Ahumada RN  Safety Promotion/Fall Prevention:   activity supervised   assistive device/personal items within reach   clutter free environment maintained   fall prevention program maintained   safety round/check completed  Taken 9/11/2024 0700 by Kathleen Ahumada RN  Safety Promotion/Fall Prevention:   activity supervised   assistive device/personal items within reach   clutter free environment maintained   fall prevention program maintained   safety round/check completed   Goal Outcome Evaluation:              Outcome Evaluation: Pt is resting in bed, call light in reach, and bed alarm set. No new needs noted at this  time.

## 2024-09-11 NOTE — THERAPY EVALUATION
Acute Care - Physical Therapy Initial Evaluation   [facility]     Patient Name: Ana Ortiz  : 1975  MRN: [id number]  Today's Date: 2024   Onset of Illness/Injury or Date of Surgery: 24  Visit Dx:     ICD-10-CM ICD-9-CM   1. Acute hypoxic respiratory failure  J96.01 518.81   2. Multifocal pneumonia  J18.9 486     Patient Active Problem List   Diagnosis    Morbidly obese    Acute respiratory failure with hypoxia and hypercapnia    Cutaneous abscess of back excluding buttocks    JOANN (obstructive sleep apnea)    Chronic bronchitis    Cigarette nicotine dependence without complication    Chronic respiratory failure with hypoxia    Diabetes mellitus, type II    Type 2 diabetes mellitus with hyperglycemia, with long-term current use of insulin    Severe sepsis    Sepsis due to pneumonia     Past Medical History:   Diagnosis Date    Arthritis     CHF (congestive heart failure)     COPD (chronic obstructive pulmonary disease)     Diabetes mellitus     Elevated cholesterol     GERD (gastroesophageal reflux disease)     Hyperlipemia     Hypertension     JOANN (obstructive sleep apnea)     Pneumonia     Vulvar cancer      Past Surgical History:   Procedure Laterality Date    HYSTERECTOMY      INCISION AND DRAINAGE TRUNK N/A 2022    Procedure: INCISION AND DRAINAGE BACK;  Surgeon: Tamera Saez MD;  Location: [facility];  Service: General;  Laterality: N/A;    MASTECTOMY      VULVA SURGERY      reconstruction due ca     PT Assessment (Last 12 Hours)       PT Evaluation and Treatment       Row Name 24 1410          Physical Therapy Time and Intention    Subjective Information complains of;weakness;fatigue  -CT     Document Type evaluation  -CT     Mode of Treatment individual therapy;physical therapy  -CT     Patient Effort good  -CT     Symptoms Noted During/After Treatment fatigue  -CT     Comment Pt reports she lives at home alone and is independent PLOF including work. Pt is SUP at time of  biancaal.  -CT       Row Name 09/11/24 1410          General Information    Patient Profile Reviewed yes  -CT     Onset of Illness/Injury or Date of Surgery 09/08/24  -CT     Referring Physician Clint  -CT     Patient Observations alert;cooperative;agree to therapy  -CT     Prior Level of Function independent:;all household mobility;community mobility;work  -CT     Existing Precautions/Restrictions fall;oxygen therapy device and L/min  -CT     Equipment Issued to Patient gait belt  -CT     Risks Reviewed patient:;LOB;nausea/vomiting;dizziness;increased discomfort;change in vital signs;increased drainage;lines disloged  -CT     Benefits Reviewed patient:;improve function;increase independence;increase strength;increase balance;decrease pain;decrease risk of DVT;improve skin integrity;increase knowledge  -CT     Barriers to Rehab medically complex  -CT       Row Name 09/11/24 1410          Living Environment    Current Living Arrangements home  -CT     People in Home alone  -CT       Row Name 09/11/24 1410          Cognition    Affect/Mental Status (Cognition) WFL  -CT     Orientation Status (Cognition) oriented x 4  -CT     Follows Commands (Cognition) WFL  -CT       Row Name 09/11/24 1410          Range of Motion Comprehensive    Comment, General Range of Motion BLE grossly WFL  -CT       Row Name 09/11/24 1410          Strength Comprehensive (MMT)    Comment, General Manual Muscle Testing (MMT) Assessment BLE grossly 4/5  -CT       Row Name 09/11/24 1410          Bed Mobility    Comment, (Bed Mobility) not observed, pt up in chair  -CT       Row Name 09/11/24 1410          Transfers    Transfers sit-stand transfer;stand-sit transfer;toilet transfer  -CT       Row Name 09/11/24 1410          Sit-Stand Transfer    Sit-Stand [place] (Transfers) supervision  -CT       Row Name 09/11/24 1410          Stand-Sit Transfer    Stand-Sit [place] (Transfers) supervision  -CT       Row Name 09/11/24 1410          Toilet  Transfer    Type (Toilet Transfer) stand pivot/stand step  -CT     [place] Level (Toilet Transfer) supervision  -CT     Assistive Device (Toilet Transfer) commode, bedside without drop arms  -CT       Row Name 09/11/24 1410          Gait/Stairs (Locomotion)    [place] Level (Gait) supervision  -CT     Patient was able to Ambulate yes  -CT     Distance in Feet (Gait) 5  -CT     Pattern (Gait) swing-to  -CT     Deviations/Abnormal Patterns (Gait) gait speed decreased;weight shifting decreased  -CT       Row Name 09/11/24 1410          Balance    Balance Assessment sitting static balance;sitting dynamic balance;standing static balance;standing dynamic balance  -CT     Static Sitting Balance set-up  -CT     Dynamic Sitting Balance standby assist  -CT     Position, Sitting Balance sitting in chair  -CT     Static Standing Balance supervision  -CT     Dynamic Standing Balance contact guard  -CT       Row Name 09/11/24 1410          Coping    Observed Emotional State calm;cooperative  -CT     Verbalized Emotional State acceptance  -CT       Row Name 09/11/24 1410          Plan of Care Review    Plan of Care Reviewed With patient  -CT       Row Name 09/11/24 1410          Positioning and Restraints    Pre-Treatment Position sitting in chair/recliner  -CT     Post Treatment Position chair  -CT     In Chair sitting;call light within reach;encouraged to call for assist;notified nsg  -CT       Row Name 09/11/24 1410          Therapy Assessment/Plan (PT)    Patient/Family Therapy Goals Statement (PT) Pt goals are to return to PLOF  -CT     Functional Level at Time of Evaluation (PT) SUP  -CT     PT Diagnosis (PT) decreased functional mobility  -CT     Rehab Potential (PT) good, to achieve stated therapy goals  -CT     Criteria for Skilled Interventions Met (PT) yes;skilled treatment is necessary  -CT     Therapy Frequency (PT) 2 times/wk  1-5 times/wk  -CT     Predicted Duration of Therapy Intervention (PT) length of  stay  -CT       Row Name 09/11/24 1410          Therapy Plan Review/Discharge Plan (PT)    Therapy Plan Review (PT) evaluation/treatment results reviewed;care plan/treatment goals reviewed;risks/benefits reviewed;current/potential barriers reviewed;participants voiced agreement with care plan;participants included;patient  -CT       Row Name 09/11/24 1410          Physical Therapy Goals    Bed Mobility Goal Selection (PT) bed mobility, PT goal 1  -CT     Transfer Goal Selection (PT) transfer, PT goal 1  -CT     Gait Training Goal Selection (PT) gait training, PT goal 1  -CT       Row Name 09/11/24 1410          Bed Mobility Goal 1 (PT)    Activity/Assistive Device (Bed Mobility Goal 1, PT) bed mobility activities, all  -CT     [place] Level/Cues Needed (Bed Mobility Goal 1, PT) independent  -CT     Time Frame (Bed Mobility Goal 1, PT) by discharge  -CT       Row Name 09/11/24 1410          Transfer Goal 1 (PT)    Activity/Assistive Device (Transfer Goal 1, PT) sit-to-stand/stand-to-sit;bed-to-chair/chair-to-bed  -CT     [place] Level/Cues Needed (Transfer Goal 1, PT) independent  -CT     Time Frame (Transfer Goal 1, PT) by discharge  -CT       Row Name 09/11/24 1410          Gait Training Goal 1 (PT)    Activity/Assistive Device (Gait Training Goal 1, PT) gait (walking locomotion);assistive device use  -CT     [place] Level (Gait Training Goal 1, PT) independent  -CT     Distance (Gait Training Goal 1, PT) 50  -CT     Time Frame (Gait Training Goal 1, PT) by discharge  -CT               User Key  (r) = Recorded By, (t) = Taken By, (c) = Cosigned By      Initials Name Provider Type    CT Angela Renae, PT Physical Therapist                      PT Recommendation and Plan  Anticipated Discharge Disposition (PT): home, home with assist  Planned Therapy Interventions (PT): balance training, bed mobility training, gait training, home exercise program, manual therapy techniques, motor coordination  training, neuromuscular re-education, patient/family education, postural re-education, strengthening, transfer training  Therapy Frequency (PT): 2 times/wk (1-5 times/wk)  Plan of Care Reviewed With: patient       Time Calculation:    PT Charges       Row Name 09/11/24 1421             Time Calculation    PT Received On 09/11/24  -CT      PT Goal Re-Cert Due Date 09/25/24  -CT                User Key  (r) = Recorded By, (t) = Taken By, (c) = Cosigned By      Initials Name Provider Type    CT Angela Renae, PT Physical Therapist                  Therapy Charges for Today       Code Description Service Date Service Provider Modifiers Qty    63580950868 HC PT EVAL MOD COMPLEXITY 4 9/11/2024 Angela Renae, PT GP 1            PT G-Codes  AM-PAC 6 Clicks Score (PT): 20    Angela Renae PT  9/11/2024

## 2024-09-11 NOTE — CASE MANAGEMENT/SOCIAL WORK
Discharge Planning Assessment   [facility]     Patient Name: Ana Ortiz  MRN: [id number]  Today's Date: 9/11/2024    Admit Date: 9/8/2024    Plan: Patient is an independent FT employee who lives at home alone, where she plans to return at discharge.  Patient denies any insurance or financial issues at this time.  Patient's PCP is Hayden Espinoza and she uses [facility] for her prescription needs.  Patient does not utilize Home Health and denies the need at this time.  Patient has CPAP, Glucometer, Nebulizer & Continuous Oxygen @ 1L N/C with [facility].  Patient is requesting Rollator, BSC & BP Cuff @ discharge and Provider has been notifed.  Patient will need transportation @ discharge and VICTORIANO An has been notified.  No other issues or concerns are noted at this time.  CM will continue to follow and assist with any discharge needs.   Discharge Needs Assessment       Row Name 09/11/24 1449       Living Environment    People in Home alone    Current Living Arrangements apartment    Duration at Residence 6 yrs.    Potentially Unsafe Housing Conditions none    Primary Care Provided by self    Provides Primary Care For no one    Family Caregiver if Needed none    Quality of Family Relationships helpful;involved;supportive    Able to Return to Prior Arrangements yes       Resource/Environmental Concerns    Resource/Environmental Concerns none    Transportation Concerns no car;rides, unreliable from others       Transition Planning    Patient/Family Anticipates Transition to home    Patient/Family Anticipated Services at Transition durable medical equipment    Transportation Anticipated family or friend will provide       Discharge Needs Assessment    Readmission Within the Last 30 Days no previous admission in last 30 days    Equipment Currently Used at Home pulse ox;cpap;glucometer;oxygen;nebulizer    Concerns to be Addressed no discharge needs identified;denies needs/concerns at this time    Anticipated  Changes Related to Illness none    Equipment Needed After Discharge rollator;commode    Provided Post Acute Provider List? Refused    Refused Provider List Comment Patient uses [facility].    Current Discharge Risk lives alone                   Discharge Plan       Row Name 09/11/24 0994       Plan    Plan Patient is an independent FT employee who lives at home alone, where she plans to return at discharge.  Patient denies any insurance or financial issues at this time.  Patient's PCP is Hayden Espnioza and she uses [facility] for her prescription needs.  Patient does not utilize Home Health and denies the need at this time.  Patient has CPAP, Glucometer, Nebulizer & Continuous Oxygen @ 1L N/C with [facility].  Patient is requesting Rollator, BSC & BP Cuff @ discharge and Provider has been notifed.  Patient will need transportation @ discharge and VICTORIANO An has been notified.  No other issues or concerns are noted at this time.  CM will continue to follow and assist with any discharge needs.    Patient/Family in Agreement with Plan yes    Plan Comments 9/11:  Chopped Meat Diet, 50L HHFNC 46%, IV Solu-Medrol, Protonix, , Na+ 131, -500s, WBC 12.63, Pt is compliant with BiPap, Worked with SLP/PT 5'-KLS                  Continued Care and Services - Admitted Since 9/8/2024    No active coordination exists for this encounter.       Expected Discharge Date and Time       Expected Discharge Date Expected Discharge Time    Sep 12, 2024            Demographic Summary       Row Name 09/11/24 1448       General Information    Admission Type inpatient    Arrived From home;emergency department    Referral Source admission list;high risk screening    Reason for Consult discharge planning  CM Trigger    Preferred Language English                   Functional Status       Row Name 09/11/24 1448       Functional Status    Usual Activity Tolerance good    Current Activity Tolerance moderate        Functional Status, IADL    Medications independent    Meal Preparation independent    Housekeeping independent    Laundry independent    Shopping independent       Mental Status    General Appearance WDL WDL       Mental Status Summary    Recent Changes in Mental Status/Cognitive Functioning no changes       Employment/    Employment Status employed full-time    Shift Worked first shift    Employment/ Comments  @ [address]                   Psychosocial    No documentation.                  Abuse/Neglect    No documentation.                  Legal    No documentation.                  Substance Abuse    No documentation.                  Patient Forms    No documentation.                     Yuli Escobar RN

## 2024-09-11 NOTE — PROGRESS NOTES
[facility] HOSPITALIST PROGRESS NOTE     Patient Identification:  Name:  Ana Ortiz  Age:  48 y.o.  Sex:  female  :  1975  MRN:  [id number]  Visit Number:  [id number]  ROOM: [address]     Primary Care Provider:  Hayden Espinoza PA-C    Length of stay in inpatient status:  1    Subjective     Chief Compliant:    Chief Complaint   Patient presents with    Shortness of Breath       History of Presenting Illness: Patient seen and examined this morning, no family present at bedside.  She reported feeling tired, but said that overall she felt better than yesterday and her shortness of breath was improving.  Reports mildly productive cough.    Objective     Current Hospital Meds:atorvastatin, 40 mg, Oral, Daily  azithromycin, 500 mg, Intravenous, Q24H  budesonide-formoterol, 2 puff, Inhalation, BID - RT  cefepime, 2,000 mg, Intravenous, Q8H  cetirizine, 10 mg, Oral, Daily  gabapentin, 800 mg, Oral, 4x Daily  heparin (porcine), 5,000 Units, Subcutaneous, Q12H  [Held by provider] hydroCHLOROthiazide, 25 mg, Oral, QAM  insulin glargine, 15 Units, Subcutaneous, Nightly  insulin lispro, 4-24 Units, Subcutaneous, 4x Daily AC & at Bedtime  ipratropium-albuterol, 3 mL, Nebulization, 4x Daily - RT  Lidocaine, 1 patch, Transdermal, Q24H  lisinopril, 2.5 mg, Oral, Daily  meloxicam, 7.5 mg, Oral, Q12H  methylPREDNISolone sodium succinate, 125 mg, Intravenous, Q12H  nicotine, 1 patch, Transdermal, Q24H  pantoprazole, 40 mg, Intravenous, Q AM  potassium chloride, 40 mEq, Oral, BID  senna-docusate sodium, 2 tablet, Oral, BID  sodium chloride, 10 mL, Intravenous, Q12H         Current Antimicrobial Therapy:  Anti-Infectives (From admission, onward)      Ordered     Dose/Rate Route Frequency Start Stop    24 0843  azithromycin (ZITHROMAX) 500 mg in sodium chloride 0.9 % 250 mL IVPB-VTB        Ordering Provider: Rosa Mishra DO    500 mg  over 60 Minutes Intravenous Every 24 Hours 09/10/24 0200  09/12/24 0159    09/09/24 0843  cefepime 2000 mg IVPB in 100 mL NS (VTB)        Ordering Provider: Rosa Mishra DO    2,000 mg  over 4 Hours Intravenous Every 8 Hours 09/09/24 1700 09/14/24 1659    09/09/24 0920  vancomycin 2500 mg/500 mL 0.9% NS IVPB ([facility])        Ordering Provider: Delia Jaramillo DO    2,500 mg  over 150 Minutes Intravenous Once 09/09/24 1100 09/09/24 1510    09/09/24 0843  cefepime 2000 mg IVPB in 100 mL NS (VTB)        Ordering Provider: Rosa Mishra DO    2,000 mg  over 30 Minutes Intravenous Once 09/09/24 0930 09/09/24 1015    09/09/24 0038  cefTRIAXone (ROCEPHIN) 2,000 mg in sodium chloride 0.9 % 100 mL IVPB-VTB        Ordering Provider: Martir Cordova MD    2,000 mg  200 mL/hr over 30 Minutes Intravenous Once 09/09/24 0054 09/09/24 0153          Current Diuretic Therapy:  Diuretics (From admission, onward)      Ordered     Dose/Rate Route Frequency Start Stop    09/10/24 1422  furosemide (LASIX) injection 60 mg        Ordering Provider: Floyd Turcios MD    60 mg Intravenous Once 09/10/24 1515 09/10/24 1615    09/09/24 0843  furosemide (LASIX) injection 40 mg        Ordering Provider: Rosa Mishra DO    40 mg Intravenous Once 09/09/24 0930 09/09/24 0945    09/09/24 0843  [Held by provider]  hydroCHLOROthiazide tablet 25 mg        (On hold since yesterday at 0843 until manually unheld; held by Delia Jaramillo DOHold Reason: Other (Comment Required)Hold Comments: Low normal blood pressure)   Ordering Provider: Delia Jaramillo DO    25 mg Oral Every Morning 09/09/24 0930            ----------------------------------------------------------------------------------------------------------------------  Vital Signs:  Temp:  [97.6 °F (36.4 °C)-99.4 °F (37.4 °C)] 97.6 °F (36.4 °C)  Heart Rate:  [] 95  Resp:  [10-34] 28  BP: (101-147)/(69-94) 117/69  SpO2:  [85 %-100 %] 97 %  on  Flow (L/min):  [15-55] 55;   Device (Oxygen Therapy): heated;high-flow nasal  cannula  Body mass index is 43.8 kg/m².    Wt Readings from Last 3 Encounters:   09/10/24 119 kg (263 lb 3.7 oz)   08/30/24 118 kg (260 lb)   05/30/24 101 kg (222 lb)     Intake & Output (last 3 days)         09/08 0701  09/09 0700 09/09 0701  09/10 0700 09/10 0701  09/11 0700    P.O.  222 804    I.V. (mL/kg) 100 (0.9) 717 (6)     IV Piggyback 1350      Total Intake(mL/kg) 1450 (12.8) 939 (7.9) 804 (6.8)    Urine (mL/kg/hr)  1650 (0.6) 4000 (2.5)    Total Output  1650 4000    Net +1450 -711 -3196           Urine Unmeasured Occurrence  7 x           Diet: Diabetic, Regular/House; Consistent Carbohydrate; Texture: Soft to Chew (NDD 3); Soft to Chew: Ground Meat; Fluid Consistency: Thin (IDDSI 0)  ----------------------------------------------------------------------------------------------------------------------  Physical exam:   Constitutional:  Well-developed and well-nourished. Appeared tired. No acute distress.      HENT:  Head:  Normocephalic and atraumatic.    Cardiovascular:  Normal rate, regular rhythm  Pulmonary/Chest:  No respiratory distress, diffuse wheezing bilaterally  Musculoskeletal:  No deformity.    Neurological: Awake, alert, no focal deficit on gross examination. No slurred speech or facial droop.   Skin:  Skin is warm and dry.  Peripheral vascular:  No cyanosis  Psychiatric: Appropriate mood and affect    Edited by: Delia Jaramillo DO at 9/10/2024 2033  ----------------------------------------------------------------------------------------------------------------------  Results from last 7 days   Lab Units 09/10/24  0205 09/09/24  0855 09/09/24  0050 09/09/24  0044   LACTATE mmol/L  --   --  1.4  --    WBC 10*3/mm3 19.66* 17.81*  --  20.87*   HEMOGLOBIN g/dL 11.3* 11.5*  --  12.6   HEMATOCRIT % 35.5 35.0  --  37.0   MCV fL 97.3* 92.3  --  90.9   MCHC g/dL 31.8 32.9  --  34.1   PLATELETS 10*3/mm3 287 306  --  323   INR   --   --   --  1.00     Results from last 7 days   Lab Units 09/09/24  4919  "  PH, ARTERIAL pH units 7.446   PO2 ART mm Hg 53.7*   PCO2, ARTERIAL mm Hg 43.3   HCO3 ART mmol/L 29.8*     Results from last 7 days   Lab Units 09/10/24  0205 09/09/24  0855 09/09/24  0102   SODIUM mmol/L 132* 134* 130*   POTASSIUM mmol/L 4.6 3.9 3.5   MAGNESIUM mg/dL 2.0  --  1.5*   CHLORIDE mmol/L 97* 97* 93*   CO2 mmol/L 23.7 27.2 26.4   BUN mg/dL 14 14 13   CREATININE mg/dL 0.70 0.68 0.78   CALCIUM mg/dL 8.9 8.8 8.9   GLUCOSE mg/dL 402* 282* 328*   ALBUMIN g/dL  --  3.6 3.7   BILIRUBIN mg/dL  --  <0.2 <0.2   ALK PHOS U/L  --  96 106   AST (SGOT) U/L  --  20 17   ALT (SGPT) U/L  --  11 14   Estimated Creatinine Clearance: 126.9 mL/min (by C-G formula based on SCr of 0.7 mg/dL).  No results found for: \"AMMONIA\"  Results from last 7 days   Lab Units 09/09/24  0258 09/09/24  0102   HSTROP T ng/L 11 10     Results from last 7 days   Lab Units 09/09/24  0258   PROBNP pg/mL 221.6         Hemoglobin A1C   Date/Time Value Ref Range Status   09/09/2024 0855 8.10 (H) 4.80 - 5.60 % Final     Glucose   Date/Time Value Ref Range Status   09/10/2024 1720 411 (C) 70 - 130 mg/dL Final   09/10/2024 1150 366 (H) 70 - 130 mg/dL Final   09/10/2024 0611 277 (H) 70 - 130 mg/dL Final   09/10/2024 0304 379 (H) 70 - 130 mg/dL Final   09/09/2024 2011 313 (H) 70 - 130 mg/dL Final   09/09/2024 1731 349 (H) 70 - 130 mg/dL Final   09/09/2024 1206 341 (H) 70 - 130 mg/dL Final     Lab Results   Component Value Date    TSH 1.440 03/18/2024    FREET4 0.98 06/07/2021     No results found for: \"PREGTESTUR\", \"PREGSERUM\", \"HCG\", \"HCGQUANT\"  Pain Management Panel  More data exists         Latest Ref Rng & Units 9/9/2024 11/3/2023   Pain Management Panel   Creatinine, Urine Not Estab. mg/dL - 51.9    Amphetamine, Urine Qual Negative Negative  -   Barbiturates Screen, Urine Negative Negative  -   Benzodiazepine Screen, Urine Negative Negative  -   Buprenorphine, Screen, Urine Negative Negative  -   Cocaine Screen, Urine Negative Negative  -   Fentanyl, " "Urine Negative Negative  -   Methadone Screen , Urine Negative Negative  -   Methamphetamine, Ur Negative Negative  -      Details                 Brief Urine Lab Results  (Last result in the past 365 days)        Color   Clarity   Blood   Leuk Est   Nitrite   Protein   CREAT   Urine HCG        09/09/24 0043 Yellow   Clear   Negative   Negative   Negative   Negative                 Blood Culture   Date Value Ref Range Status   09/09/2024 No growth at 24 hours  Preliminary   09/09/2024 No growth at 24 hours  Preliminary     No results found for: \"URINECX\"  No results found for: \"WOUNDCX\"  No results found for: \"STOOLCX\"  No results found for: \"RESPCX\"  No results found for: \"AFBCX\"  Results from last 7 days   Lab Units 09/09/24  0102 09/09/24  0050   PROCALCITONIN ng/mL 0.07  --    LACTATE mmol/L  --  1.4       I have personally looked at the labs and they are summarized above.  ----------------------------------------------------------------------------------------------------------------------  Detailed radiology reports for the last 24 hours:  Imaging Results (Last 24 Hours)       Procedure Component Value Units Date/Time    US Thyroid [[id number]] Collected: 09/10/24 0830     Updated: 09/10/24 0834    Narrative:      PROCEDURE:  US THYROID-     HISTORY: nodule noted on CT; J96.01-Acute respiratory failure with  hypoxia; J18.9-Pneumonia, unspecified organism     PROCEDURE: Multiple sonographic images of the thyroid were obtained.     COMPARISON: None     FINDINGS: The thyroid gland is normal in size and echotexture.     Right lobe: 4.1 x 1.8 x 2.0 cm. There is a subcentimeter colloid cyst in  the right lobe of the thyroid gland. No solid nodule is identified.     Left lobe: 4.4 x 1.9 x 1.9 cm. There is a 1.1 cm well-circumscribed  mildly hypoechoic nodule.     Isthmus: 0.3 cm. There are no discrete nodules             Impression:      1. No suspicious nodules in the right lobe of the thyroid gland.  2. 1.1 cm " TI-RADS 4 nodule in the left lobe. A follow-up thyroid  ultrasound in 1 year is recommended.        This report was finalized on 9/10/2024 8:32 AM by Clint Bustamante M.D..             Assessment & Plan      # Acute on chronic hypoxic respiratory failure  # COPD with acute exacerbation  # Bilateral pneumonia versus interstitial lung disease  # Sepsis secondary to pneumonia versus SIRS criteria met due to COPD exacerbation  -Pulmonology following, appreciate assistance  -CT of the chest and noted diffuse crazy paving opacities in the lungs which could be due to edema, ARDS, infection, or pulmonary alveolar proteinosis per radiologist.  Could also be seen with sarcoidosis or desquamative interstitial pneumonitis secondary to smoking.  -Respiratory culture ordered  -Procalcitonin was normal, so bacterial pneumonia seems less likely.  Respiratory viral panel PCR was negative.  Strep pneumo antigen negative.  MRSA screen negative.  - Autoimmune workup, immunoglobulins, LDH, GM-CSF and autoantibodies to GM-CSF ordered.  - Currently requiring heated high flow nasal cannula at 55L, 66% FiO2.   - Continue high dose steroids per Pulmonology with solu-medrol 125mg IV q12h.   - Lasix given this morning to help improve lung compliance   - Continue nebulizer treatments     # Incidentally noted right thyroid nodule  -Initially thyroid nodule was noted on CT scan, and a follow-up ultrasound was recommended.  Ultrasound was obtained showing a 1.1 cm well-circumscribed mildly hypoechoic nodule left lobe of the thyroid.  Follow-up ultrasound in 1 year is recommended.    # Diabetes mellitus type 2, insulin-dependent and complicated by hyperglycemia  -Blood glucose has been very uncontrolled (worsened secondary to high-dose steroid use).  Lantus increased to 15 units nightly.  Sliding scale increased to high-dose.  Continue Accu-Cheks.  Continue to titrate as needed for improved glucose control.    # Chronic HFpEF  -Echo from March  2024 reviewed that showed normal left ventricular systolic function, left ventricular EF 61 to 65%, grade 1 impaired diastolic relaxation.  -Lasix given today as noted above  -Monitor intake and output    # Mild hypomagnesemia  -Resolved after supplementation    # Morbid obesity, Body mass index is 43.8 kg/m².   -Continue supportive care, encourage healthy lifestyle choices  Edited by: Delia Jaramillo DO at 9/10/2024 2033    Active VTE Prophylaxis:  Pharmacologic:        Start     Dose Route Frequency Stop    09/09/24 0930  heparin (porcine) 5000 UNIT/ML injection 5,000 Units         5,000 Units SC Every 12 Hours Scheduled --                    Dispo: Pending clinical course    Dleia Jaramillo DO  [facility]  09/10/24  20:33 EDT

## 2024-09-11 NOTE — CASE MANAGEMENT/SOCIAL WORK
Discharge Planning Assessment   [place]     Patient Name: Ana Ortiz  MRN: [id number]  Today's Date: 9/11/2024    Admit Date: 9/8/2024        Discharge Needs Assessment       Row Name 09/11/24 1449       Living Environment    People in Home alone    Current Living Arrangements apartment    Duration at Residence 6 yrs.    Potentially Unsafe Housing Conditions none    Primary Care Provided by self    Provides Primary Care For no one    Family Caregiver if Needed none    Quality of Family Relationships helpful;involved;supportive    Able to Return to Prior Arrangements yes       Resource/Environmental Concerns    Resource/Environmental Concerns none    Transportation Concerns no car;rides, unreliable from others       Transition Planning    Patient/Family Anticipates Transition to home    Patient/Family Anticipated Services at Transition durable medical equipment    Transportation Anticipated family or friend will provide       Discharge Needs Assessment    Readmission Within the Last 30 Days no previous admission in last 30 days    Equipment Currently Used at Home pulse ox;cpap;glucometer;oxygen;nebulizer    Concerns to be Addressed no discharge needs identified;denies needs/concerns at this time    Anticipated Changes Related to Illness none    Equipment Needed After Discharge rollator;commode    Provided Post Acute Provider List? Refused    Refused Provider List Comment Patient uses [facility].    Current Discharge Risk lives alone                   Discharge Plan       Row Name 09/11/24 1533       Plan    Plan Comments 9/11:  Chopped Meat Diet, 50L HHFNC 46%, IV Solu-Medrol, Protonix, , Na+ 131, -500s, WBC 12.63, Pt is compliant with BiPap, Worked with SLP/PT 5'-KLS                  Continued Care and Services - Admitted Since 9/8/2024    No active coordination exists for this encounter.       Expected Discharge Date and Time       Expected Discharge Date Expected Discharge Time    Sep 12,  2024            Demographic Summary       Row Name 09/11/24 1448       General Information    Admission Type inpatient    Arrived From home;emergency department    Referral Source admission list;high risk screening    Reason for Consult discharge planning  CM Trigger    Preferred Language English                   Functional Status       Row Name 09/11/24 1448       Functional Status    Usual Activity Tolerance good    Current Activity Tolerance moderate       Functional Status, IADL    Medications independent    Meal Preparation independent    Housekeeping independent    Laundry independent    Shopping independent       Mental Status    General Appearance WDL WDL       Mental Status Summary    Recent Changes in Mental Status/Cognitive Functioning no changes       Employment/    Employment Status employed full-time    Shift Worked first shift    Employment/ Comments  @ [place] [employer] [id number] [employer]                   Psychosocial    No documentation.                  Abuse/Neglect    No documentation.                  Legal    No documentation.                  Substance Abuse    No documentation.                  Patient Forms    No documentation.                     Yuli Escobar RN

## 2024-09-11 NOTE — THERAPY RE-EVALUATION
"Acute Care - Speech Language Pathology   Swallow Re-Assessment  [facility]  CLINICAL DYSPHAGIA RE-ASSESSMENT     Patient Name: Ana Ortiz  : 1975  MRN: [id number]  Today's Date: 2024Onset of Illness/Injury or Date of Surgery: 24 Referring Physician: Clint  Admit Date: 2024    Ana Ortiz  was seen at bedside this am on PCU-211 to assess safety/efficacy of swallowing fnx, determine safest/least restrictive diet tolerance.     She is s/p Clinical Dysphagia Assessment yesterday at which time she presented w/ a \"... wfl oropharyngeal swallow w/o s/s aspiration and no s/s indicative of silent aspiration. No odynophagia reported. Per pt reported premorbid baseline po diet w/ meats already cut into small pieces and moistened, as well as avoidance of hard/crunchy textures, she is felt to most benefit from a modified po diet of mechanical soft textures, ground meats, and thin liquids at this time. She is recommended for additional gravy/sauce to be provided w/ all meats and medications to be presented whole in puree/thins or crushed PRN\".     Per discussion w/ RN, pt has tolerated current modified po diet w/o complications. No concerns noted w/ medication administration.       Social History     Socioeconomic History    Marital status:    Tobacco Use    Smoking status: Every Day     Current packs/day: 1.50     Average packs/day: 1.4 packs/day for 29.3 years (42.2 ttl pk-yrs)     Types: Cigarettes     Start date: 1995     Passive exposure: Current    Smokeless tobacco: Never    Tobacco comments:     8-10 per day    Vaping Use    Vaping status: Former    Substances: Nicotine, Flavoring    Devices: Refillable tank   Substance and Sexual Activity    Alcohol use: Never    Drug use: Never    Sexual activity: Not Currently     Partners: Male     Birth control/protection: Condom      Imaging:  US Thyroid [[id number]] Wilfrido as Reviewed   Order Status: Completed Collected: 09/10/24 0830    " Updated: 09/10/24 0834   Narrative:     PROCEDURE:  US THYROID-     HISTORY: nodule noted on CT; J96.01-Acute respiratory failure with  hypoxia; J18.9-Pneumonia, unspecified organism     PROCEDURE: Multiple sonographic images of the thyroid were obtained.     COMPARISON: None     FINDINGS: The thyroid gland is normal in size and echotexture.     Right lobe: 4.1 x 1.8 x 2.0 cm. There is a subcentimeter colloid cyst in  the right lobe of the thyroid gland. No solid nodule is identified.     Left lobe: 4.4 x 1.9 x 1.9 cm. There is a 1.1 cm well-circumscribed  mildly hypoechoic nodule.     Isthmus: 0.3 cm. There are no discrete nodules      Impression:     1. No suspicious nodules in the right lobe of the thyroid gland.  2. 1.1 cm TI-RADS 4 nodule in the left lobe. A follow-up thyroid  ultrasound in 1 year is recommended.     This report was finalized on 9/10/2024 8:32 AM by Clint Bustamante M.D..       CT Angiogram Chest Pulmonary Embolism [[id number]] Wilfrido as Reviewed   Order Status: Completed Collected: 09/09/24 0334    Updated: 09/09/24 0342   Narrative:     CLINICAL HISTORY: Chest pain.     COMPARISON: None available.     TECHNIQUE: IV contrast was administered and helical images were acquired  through the chest.  Multiplanar reformats were generated.  Axial MIPS  were obtained.  Limited exposure control, adjustment of the mA and/or KV  according to patient size or use of iterative reconstruction technique  was utilized.     FINDINGS:  Contrast bolus timing is suboptimal to evaluate for pulmonary embolism.     Heart and mediastinal structures: No pulmonary arterial filling defect.  Normal mediastinal structures. Coronary artery calcifications are  present.     Lungs and airways: Diffuse crazy paving opacities in the lungs,  relatively sparing the periphery and involving predominately in the mid  and upper lung zones.  The central airways are patent..     Pleura: normal.     Lymph nodes: normal.      Musculoskeletal and chest wall: no acute abnormality.     Lower neck / upper abdomen: Heterogeneous nodule in the posterior aspect  of the right thyroid measuring 1.3 x 1.5 cm.      Impression:     NEGATIVE FOR PULMONARY EMBOLISM.     DIFFUSE CRAZY PAVING OPACITIES IN THE LUNGS, RELATIVELY SPARING THE  PERIPHERY AND BASES, WHICH COULD BE DUE TO PULMONARY EDEMA, ARDS,  INFECTION, PULMONARY ALVEOLAR PROTEINOSIS.     RIGHT THYROID NODULE MEASURING 1.3 X 1.5 CM. FOLLOW UP ULTRASOUND IS  SUGGESTED.     This report was finalized on 9/9/2024 3:39 AM by Dhara Shaikh MD.       XR Chest 1 View [[id number]] Wilfrido as Reviewed   Order Status: Completed Collected: 09/09/24 0200    Updated: 09/09/24 0203   Narrative:     PROCEDURE: Portable chest x-ray examination performed on September 8, 2024. Single view.     HISTORY: Shortness of breath. Chest pain.     COMPARISON: None.     FINDINGS:     Enlarged heart size. Edema.  Multifocal pneumonia, left greater than right.  Central pulmonary vascular congestion.  Slightly elevated left hemidiaphragm.  No pleural effusion.  No pneumothorax      Impression:     1.  Enlarged heart size  2.  Multifocal pneumonia, left greater than right.  3.  Central pulmonary vascular congestion.  4.  Edema.  5.  No pleural effusion.  6.  No pneumothorax.  7.  Hypoinflated lungs.     This report was finalized on 9/9/2024 2:01 AM by Kenny Baca MD.     Labs:   Latest Reference Range & Units 09/09/24 00:44 09/09/24 08:55 09/10/24 02:05 09/11/24 01:59   WBC 3.40 - 10.80 10*3/mm3 20.87 (H) 17.81 (H) 19.66 (H) 12.63 (H)   RBC 3.77 - 5.28 10*6/mm3 4.07 3.79 3.65 (L) 3.43 (L)   Hemoglobin 12.0 - 15.9 g/dL 12.6 11.5 (L) 11.3 (L) 10.5 (L)   Hematocrit 34.0 - 46.6 % 37.0 35.0 35.5 32.8 (L)   Platelets 140 - 450 10*3/mm3 323 306 287 336   RDW 12.3 - 15.4 % 13.6 13.8 13.9 13.7   MCV 79.0 - 97.0 fL 90.9 92.3 97.3 (H) 95.6   MCH 26.6 - 33.0 pg 31.0 30.3 31.0 30.6   MCHC 31.5 - 35.7 g/dL 34.1 32.9 31.8 32.0   MPV  6.0 - 12.0 fL 9.1 9.2 9.7 9.4   RDW-SD 37.0 - 54.0 fl 45.6 47.1 49.3 48.9   (H): Data is abnormally high  (L): Data is abnormally low  Diet Orders (active) (From admission, onward)       Start     Ordered    09/09/24 1200  DIET MESSAGE Please send extra gravy/sauce on the side for all meats.  Daily With Breakfast, Lunch & Dinner      Comments: Please send extra gravy/sauce on the side for all meats.    09/09/24 1126    09/09/24 1124  Diet: Diabetic, Regular/House; Consistent Carbohydrate; Texture: Soft to Chew (NDD 3); Soft to Chew: Ground Meat; Fluid Consistency: Thin (IDDSI 0)  Diet Effective Now        Comments: Meds whole in puree/thins.   Fully upright for all po intake.    09/09/24 1126                  Ms Ortiz is observed on 50L [facility]. She is sitting up on edge of bed upon SLP entry.  SpO2 is noted at 96% upon SLP entry and prior to any po presentations. SpO2 maintains at 96% - 99% across all po presentations and conversational exchanges.     Ms Ortiz was positioned upright and centered on edge of bed to accept multiple po presentations of solid cracker, and thin liquids via spoon, and straw.  She was able to self provide po trials. She accepted all presentations while carrying on conversation w/ SLP and other staff present in room.     Upon po presentations, adequate bolus anticipation and acceptance w/ good labial seal for bolus clearance via spoon bowl, cup rim stability and suction via straw. Bolus formation, manipulation and control were wfl w/ rotary mastication pattern. A-p transit was timely w/o significant oral residue appreciated. No overt s/s aspiration before the swallow.      Pharyngeal swallow was timely w/ adequate hyolaryngeal elevation per palpation. No overt s/s aspiration evidenced across this evaluation. No silent aspiration suspected. Patient denied odynophagia.    She independently demonstrates alternating solid and liquid presentations and slow presentation rate w/ pacing to allow  for adequate breath/swallow coordination. She additionally demonstrates independent use of brief bolus hold to regulate breathing following mastication and prior to pharyngeal swallow.      Visit Dx:     ICD-10-CM ICD-9-CM   1. Acute hypoxic respiratory failure  J96.01 518.81   2. Multifocal pneumonia  J18.9 486     Patient Active Problem List   Diagnosis    Morbidly obese    Acute respiratory failure with hypoxia and hypercapnia    Cutaneous abscess of back excluding buttocks    JOANN (obstructive sleep apnea)    Chronic bronchitis    Cigarette nicotine dependence without complication    Chronic respiratory failure with hypoxia    Diabetes mellitus, type II    Type 2 diabetes mellitus with hyperglycemia, with long-term current use of insulin    Severe sepsis    Sepsis due to pneumonia     Past Medical History:   Diagnosis Date    Arthritis     CHF (congestive heart failure)     COPD (chronic obstructive pulmonary disease)     Diabetes mellitus     Elevated cholesterol     GERD (gastroesophageal reflux disease)     Hyperlipemia     Hypertension     JOANN (obstructive sleep apnea)     Pneumonia     Vulvar cancer      Past Surgical History:   Procedure Laterality Date    HYSTERECTOMY      INCISION AND DRAINAGE TRUNK N/A 1/11/2022    Procedure: INCISION AND DRAINAGE BACK;  Surgeon: Tamera Saez MD;  Location: [facility];  Service: General;  Laterality: N/A;    MASTECTOMY      VULVA SURGERY      reconstruction due ca     Impression:     Ms Ortiz continues w/ a wfl oropharyngeal swallow w/o s/s aspiration and no s/s indicative of silent aspiration. Per her prior report of avoidance of hard/crunchy textures and preference for meats cut into small pieces and moistened as well as her respiratory supports, she is felt to most benefit from diet advancement to a modified po diet of mechanical soft textures, chopped meats, and thin liquids.     SLP Recommendation and Plan     1. Mechanical soft textures, chopped meats, thin  liquids.    2. Medicatoins whole in puree/thins. Crush PRN.   3. Upright and centered for all po intake  4. ZORA precautions.  5. Oral care protocol.  6. Additional sauce/gravy w/ all meats to aid w/ bolus manipulation    SLP to f/u for continued diet safety/tolerance and potential advancement.     D/w patient results and recommendations w/ verbal agreement.    D/w RN results and recommendations w/ verbal agreement.    Thank you for allowing me to participate in the care of your patient-  Corrie Crews M.S., CCC-SLP    SLP Diet Recommendation: soft to chew textures, ground, thin liquids (09/10/24 1120)  Recommended Precautions and Strategies: upright posture during/after eating, general aspiration precautions, fatigue precautions (09/10/24 1120)  SLP Rec. for Method of Medication Administration: meds whole, meds crushed, with thin liquids, with puree (09/10/24 1120)         SWALLOW EVALUATION (Last 72 Hours)       SLP Adult Swallow Evaluation       Row Name 09/11/24 1500 09/10/24 1120                Rehab Evaluation    Document Type re-evaluation  -JR re-evaluation  -JR          General Eating/Swallowing Observations    Respiratory Support Currently in Use -- high-flow nasal cannula  -JR       O2 Liters -- other (see comments)  54  -JR       Positioning During Eating -- upright 90 degree  sitting on edge of bed  -JR       Utensils Used -- spoon;straw  -JR       Consistencies Trialed -- soft to chew textures;thin liquids;pureed  -JR       Pre SpO2 (%) -- --  92%  -JR       Post SpO2 (%) -- --  96%  -JR          Recommendations    SLP Diet Recommendation soft to chew textures;chopped;thin liquids  -JR soft to chew textures;ground;thin liquids  -JR       Recommended Precautions and Strategies upright posture during/after eating;small bites of food and sips of liquid;alternate between small bites of food and sips of liquid;fatigue precautions;general aspiration precautions  -JR upright posture during/after eating;general  aspiration precautions;fatigue precautions  -       SLP Rec. for Method of Medication Administration meds whole;with thin liquids;with puree  MURIEL meds whole;meds crushed;with thin liquids;with puree  -                 User Key  (r) = Recorded By, (t) = Taken By, (c) = Cosigned By      Initials Name Effective Dates    Corrie Yang MS CCC-SLP 10/25/21 -                     EDUCATION  The patient has been educated in the following areas:   Dysphagia (Swallowing Impairment) Modified Diet Instruction.        Time Calculation:         Therapy Charges for Today       Code Description Service Date Service Provider Modifiers Qty    [id number]  ST EVAL ORAL PHARYNG SWALLOW 4 9/10/2024 Corrie Crews MS CCC-SLP GN 1                 Corrie Crews MS CCC-SLP  9/11/2024

## 2024-09-11 NOTE — PLAN OF CARE
Problem: Noninvasive Ventilation Acute  Goal: Effective Unassisted Ventilation and Oxygenation  Outcome: Ongoing, Progressing  Intervention: Monitor and Manage Noninvasive Ventilation  Recent Flowsheet Documentation  Taken 9/11/2024 0025 by Skylar Lan RRT  Airway/Ventilation Management:   airway patency maintained   calming measures promoted  NPPV/CPAP Maintenance:   mask adjusted   mask secure     Problem: Obstructive Sleep Apnea Risk or Actual Comorbidity Management  Goal: Unobstructed Breathing During Sleep  Intervention: Monitor and Manage Obstructive Sleep Apnea  Recent Flowsheet Documentation  Taken 9/11/2024 0025 by Skylar Lan RRT  NPPV/CPAP Maintenance:   mask adjusted   mask secure   Goal Outcome Evaluation:      Patient compliant with bipap use

## 2024-09-11 NOTE — PROGRESS NOTES
Progress note Pulmonary and critical care-shortness of breath    Referring Provider: Dr. Rosa Mishra  Reason for Consultation: Acute hypoxic respiratory failure, pneumonia, COPD exacerbation      Subjective-  All the labs medications ins and outs and vitals reviewed.  Patient resting in bed comfortably not in any distress.    Wearing nasal cannula oxygen.  Wore BiPAP last night.  Blood sugars remain elevated.          Review of Systems-no major changes  Positive for shortness of breath and cough.  Otherwise negative    History  Past Medical History:   Diagnosis Date    Arthritis     CHF (congestive heart failure)     COPD (chronic obstructive pulmonary disease)     Diabetes mellitus     Elevated cholesterol     GERD (gastroesophageal reflux disease)     Hyperlipemia     Hypertension     JOANN (obstructive sleep apnea)     Pneumonia     Vulvar cancer    ,   Past Surgical History:   Procedure Laterality Date    HYSTERECTOMY      INCISION AND DRAINAGE TRUNK N/A 1/11/2022    Procedure: INCISION AND DRAINAGE BACK;  Surgeon: Tamera Saez MD;  Location: [facility];  Service: General;  Laterality: N/A;    MASTECTOMY      VULVA SURGERY      reconstruction due ca   ,   Family History   Problem Relation Age of Onset    Cancer Mother     Hypertension Mother     Parkinsonism Father     Diabetes Father     Hypertension Father     Cancer Maternal Aunt     Diabetes Paternal Aunt     Diabetes Paternal Grandmother     Cancer Maternal Aunt     Diabetes Brother    ,   Social History     Tobacco Use    Smoking status: Every Day     Current packs/day: 1.50     Average packs/day: 1.4 packs/day for 29.3 years (42.2 ttl pk-yrs)     Types: Cigarettes     Start date: 5/18/1995     Passive exposure: Current    Smokeless tobacco: Never    Tobacco comments:     8-10 per day    Vaping Use    Vaping status: Former    Substances: Nicotine, Flavoring    Devices: Refillable tank   Substance Use Topics    Alcohol use: Never    Drug use: Never   ,    Medications Prior to Admission   Medication Sig Dispense Refill Last Dose    albuterol sulfate HFA (Ventolin HFA) 108 (90 Base) MCG/ACT inhaler Inhale 2 puffs Every 4 (Four) Hours As Needed for Wheezing or Shortness of Air. 18 g 12 9/8/2024    budesonide-formoterol (Symbicort) 160-4.5 MCG/ACT inhaler Inhale 2 puffs 2 (Two) Times a Day. 1 each 8 9/8/2024    cetirizine (zyrTEC) 10 MG tablet Take 1 tablet by mouth Daily.   9/8/2024    clonazePAM (KlonoPIN) 1 MG tablet Take 1 tablet by mouth 4 (Four) Times a Day As Needed for Anxiety.   9/8/2024    cyclobenzaprine (FLEXERIL) 5 MG tablet Take 1 tablet by mouth 3 (Three) Times a Day As Needed for Muscle Spasms.   9/8/2024    fluticasone (FLONASE) 50 MCG/ACT nasal spray 2 sprays into the nostril(s) as directed by provider Daily As Needed for Rhinitis or Allergies.   9/8/2024    gabapentin (NEURONTIN) 800 MG tablet Take 1 tablet by mouth 4 (Four) Times a Day.   9/8/2024    glipizide (Glucotrol) 5 MG tablet Take 1 tablet by mouth 2 (Two) Times a Day Before Meals. 60 tablet 5 9/8/2024    hydroCHLOROthiazide (HYDRODIURIL) 25 MG tablet Take 1 tablet by mouth Every Morning.   9/8/2024    ibuprofen (ADVIL,MOTRIN) 800 MG tablet Take 1 tablet by mouth Every 8 (Eight) Hours As Needed for Mild Pain.   9/8/2024    ipratropium-albuterol (DUO-NEB) 0.5-2.5 mg/3 ml nebulizer Take 3 mL by nebulization Every 6 (Six) Hours As Needed for Wheezing or Shortness of Air. 360 mL 5 9/8/2024    lidocaine (LIDODERM) 5 % Place 1 patch on the skin as directed by provider Daily. Remove & Discard patch within 12 hours or as directed by MD   9/8/2024 at 1400    lisinopril (PRINIVIL,ZESTRIL) 5 MG tablet Take 0.5 tablets by mouth Daily.   9/8/2024    loperamide (IMODIUM) 2 MG capsule Take 1 capsule by mouth 4 (Four) Times a Day As Needed for Diarrhea.   Past Week    meloxicam (MOBIC) 7.5 MG tablet Take 1 tablet by mouth Every 12 (Twelve) Hours.   9/8/2024    ondansetron ODT (ZOFRAN-ODT) 4 MG  disintegrating tablet Take 1 tablet by mouth Every 6 (Six) Hours As Needed for Nausea or Vomiting. 12 tablet 0 Past Week    tiotropium (Spiriva HandiHaler) 18 MCG per inhalation capsule Place 1 capsule into inhaler and inhale Daily. 30 capsule 8 9/8/2024    Insulin Glargine (LANTUS SOLOSTAR) 100 UNIT/ML injection pen Inject 70 Units under the skin into the appropriate area as directed Every Night. 30 mL 2 9/7/2024    naloxone (NARCAN) 4 MG/0.1ML nasal spray Call 911. Don't prime. [place] in 1 nostril for overdose. Repeat in 2-3 minutes in other nostril if no or minimal breathing/responsiveness. 2 each 0 Unknown    Semaglutide, 2 MG/DOSE, (Ozempic, 2 MG/DOSE,) 8 MG/3ML solution pen-injector Inject 2 mg under the skin into the appropriate area as directed 1 (One) Time Per Week. 3 mL 2 8/27/2024    simvastatin (ZOCOR) 80 MG tablet Take 1 tablet by mouth every night at bedtime.   9/7/2024   , Scheduled Meds:  atorvastatin, 40 mg, Oral, Daily  budesonide-formoterol, 2 puff, Inhalation, BID - RT  cefepime, 2,000 mg, Intravenous, Q8H  cetirizine, 10 mg, Oral, Daily  gabapentin, 800 mg, Oral, 4x Daily  heparin (porcine), 5,000 Units, Subcutaneous, Q12H  [Held by provider] hydroCHLOROthiazide, 25 mg, Oral, QAM  insulin glargine, 35 Units, Subcutaneous, Q12H  insulin lispro, 4-24 Units, Subcutaneous, 4x Daily AC & at Bedtime  ipratropium-albuterol, 3 mL, Nebulization, 4x Daily - RT  Lidocaine, 1 patch, Transdermal, Q24H  lisinopril, 2.5 mg, Oral, Daily  meloxicam, 7.5 mg, Oral, Q12H  methylPREDNISolone sodium succinate, 125 mg, Intravenous, Q12H  nicotine, 1 patch, Transdermal, Q24H  pantoprazole, 40 mg, Intravenous, Q AM  senna-docusate sodium, 2 tablet, Oral, BID  sodium chloride, 10 mL, Intravenous, Q12H    , Continuous Infusions:      and Allergies:  Codeine and Metformin    Objective     Vital Signs   Temp:  [97.6 °F (36.4 °C)-98.6 °F (37 °C)] 98.1 °F (36.7 °C)  Heart Rate:  [] 97  Resp:  [14-36] 16  BP:  (101-145)/() 120/80    Physical Exam:          General-mild respiratory distress, able to complete sentences but uses accessory muscles on talking.    HEENT- pupils equally reactive to light, normal in size, no scleral icterus    Neck-supple, No JVD, no carotid bruit    Respiratory-bilateral air entry, diffuse wheezing with prolonged expiratory phase, basal rales.  No egophony    Cardiovascular-  Normal S1 and S2. No S3, S4 or murmurs.      GI-nontender nondistended bowel sounds positive    CNS-alert oriented x3, grossly nonfocal      Extremities-no clubbing and edema    Psychiatric-mood good, good eye contact,     Skin- no visible rash             Results Review:    LABS:    Lab Results   Component Value Date    GLUCOSE 514 (C) 09/11/2024    BUN 18 09/11/2024    CREATININE 0.82 09/11/2024    EGFRIFNONA 93 01/13/2022    BCR 22.0 09/11/2024    CO2 24.8 09/11/2024    CALCIUM 9.2 09/11/2024    ALBUMIN 3.6 09/09/2024    AST 20 09/09/2024    ALT 11 09/09/2024    WBC 12.63 (H) 09/11/2024    HGB 10.5 (L) 09/11/2024    HCT 32.8 (L) 09/11/2024    MCV 95.6 09/11/2024     09/11/2024     (L) 09/11/2024    K 4.7 09/11/2024    CL 94 (L) 09/11/2024    ANIONGAP 12.2 09/11/2024       Lab Results   Component Value Date    INR 1.00 09/09/2024    INR 1.00 03/17/2024    INR 1.06 01/10/2022    PROTIME 13.3 09/09/2024    PROTIME 13.7 03/17/2024    PROTIME 14.2 01/10/2022       Results from last 7 days   Lab Units 09/09/24  0050 09/09/24  0044   INR   --  1.00   APTT seconds 30.0  --           I reviewed the patient's new clinical results.  I reviewed the patient's new imaging results and agree with the interpretation.     Latest Reference Range & Units 09/09/24 21:37   pH, Arterial 7.350 - 7.450 pH units 7.446   pCO2, Arterial 35.0 - 45.0 mm Hg 43.3   pO2, Arterial 83.0 - 108.0 mm Hg 53.7 (C)   HCO3, Arterial 20.0 - 26.0 mmol/L 29.8 (H)   Base Excess 0.0 - 2.0 mmol/L 5.1 (H)   O2 Saturation, Arterial 94.0 - 99.0 % 89.0  (L)   CO2 Content 22 - 33 mmol/L 31.1   A-a DO2 0.0 - 300.0 mmHg 40.8   Carboxyhemoglobin 0 - 5 % 1.8   Methemoglobin 0.00 - 3.00 % 0.40   Oxyhemoglobin 94 - 99 % 87.0 (L)   Hematocrit, Blood Gas 38.0 - 51.0 % 35.1 (L)   Hemoglobin, Blood Gas 13.5 - 17.5 g/dL 11.5 (L)   Site  Right Radial   Osiel's Test  Positive   Modality  HFNC   Flow Rate lpm 15.0   Ventilator Mode  NA   Barometric Pressure for Blood Gas mmHg 730   Notified By  [id number]   Notified Time  09/09/2024 21:43   Notified Who  MARK   Collected by  [id number]   (C): Data is critically low  (H): Data is abnormally high  (L): Data is abnormally low    Microbiology Results (last 10 days)       Procedure Component Value - Date/Time    S. Pneumo Ag Urine or CSF - Urine, Urine, Clean Catch [[id number]]  (Normal) Collected: 09/09/24 1504    Lab Status: Final result Specimen: Urine, Clean Catch Updated: 09/09/24 2343     Strep Pneumo Ag Negative    MRSA Screen, PCR (Inpatient) - Swab, Nares [[id number]]  (Normal) Collected: 09/09/24 0928    Lab Status: Final result Specimen: Swab from Nares Updated: 09/09/24 1059     MRSA PCR No MRSA Detected    Narrative:      The negative predictive value of this diagnostic test is high and should only be used to consider de-escalating anti-MRSA therapy. A positive result may indicate colonization with MRSA and must be correlated clinically.    Respiratory Panel PCR w/COVID-19(SARS-CoV-2) [facility]/LUIS MIGUEL/[facility] In-House, NP Swab in UTM/VTM, 2 HR TAT - Swab, Nasopharynx [[id number]]  (Normal) Collected: 09/09/24 0056    Lab Status: Final result Specimen: Swab from Nasopharynx Updated: 09/09/24 0146     ADENOVIRUS, PCR Not Detected     Coronavirus 229E Not Detected     Coronavirus HKU1 Not Detected     Coronavirus NL63 Not Detected     Coronavirus OC43 Not Detected     COVID19 Not Detected     Human Metapneumovirus Not Detected     Human Rhinovirus/Enterovirus Not Detected     Influenza A PCR Not Detected     Influenza B PCR Not  Detected     Parainfluenza Virus 1 Not Detected     Parainfluenza Virus 2 Not Detected     Parainfluenza Virus 3 Not Detected     Parainfluenza Virus 4 Not Detected     RSV, PCR Not Detected     Bordetella pertussis pcr Not Detected     Bordetella parapertussis PCR Not Detected     Chlamydophila pneumoniae PCR Not Detected     Mycoplasma pneumo by PCR Not Detected    Narrative:      In the setting of a positive respiratory panel with a viral infection PLUS a negative procalcitonin without other underlying concern for bacterial infection, consider observing off antibiotics or discontinuation of antibiotics and continue supportive care. If the respiratory panel is positive for atypical bacterial infection (Bordetella pertussis, Chlamydophila pneumoniae, or Mycoplasma pneumoniae), consider antibiotic de-escalation to target atypical bacterial infection.    Blood Culture - Blood, Arm, Left [[id number]]  (Normal) Collected: 09/09/24 0050    Lab Status: Preliminary result Specimen: Blood from Arm, Left Updated: 09/11/24 0100     Blood Culture No growth at 2 days    Blood Culture - Blood, Arm, Left [[id number]]  (Normal) Collected: 09/09/24 0050    Lab Status: Preliminary result Specimen: Blood from Arm, Left Updated: 09/11/24 0100     Blood Culture No growth at 2 days             Assessment & Plan         acute hypoxic respiratory failure-which could be due to pneumonia and interstitial lung disease.  Interstitial lung disease could be desquamative interstitial pneumonitis due to smoking.  This was extensively discussed with the patient and smoking cessation counseling was done extensively.    Patient is currently critically ill due to acute hypoxic respiratory failure-on high amounts of oxygen currently needing 50 l of high flow nasal cannula oxygen and 55% FiO2  I adjusted her FiO2 and decreased her to 45% while I was in the room.  Monitored her for close to 3 to 4 minutes and make sure her pulse ox remains  good.    Patient is currently unstable for bronchoscopy.  If her FiO2 requirement continues to improve we will consider bronchoscopy to get specific cultures.    Continue Solu-Medrol 125 mg IV twice daily.    Blood sugars are running high-discussed with patient as per her she takes 70 of Lantus at nighttime.  This was informed to primary team and asked them to adjust the insulin.    Will give diuretics to improve lung compliance and diffusion capacity.  Will repeat  Lasix.    Results for orders placed during the hospital encounter of 03/17/24    Adult Transthoracic Echo Complete w/ Color, Spectral and Contrast if necessary per protocol    Interpretation Summary    Normal left ventricular cavity size and wall thickness noted. All left ventricular wall segments contract normally    Left ventricular ejection fraction appears to be 61 - 65%.    Left ventricular diastolic function is consistent with (grade I) impaired relaxation.    The aortic valve is structurally normal with no regurgitation or stenosis present.    The mitral valve is structurally normal with no regurgitation or significant stenosis present.    There is no evidence of pericardial effusion.       Bilateral community-acquired pneumonia.    Procalitonin Results:      Lab 09/09/24  0102   PROCALCITONIN 0.07     Discontinued cefepime.  Will continue to monitor for any signs of infection.    Will get a chest x-ray tomorrow morning    COPD exacerbation-continue steroids.  Encouraged to quit smoking.  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  Smoker-continue nicotine patch  Smoking cessation counseling done again.  Encouraged her to quit smoking        Morbid obesity on obstructive sleep apnea-continue NIPPV.  Settings adjusted.       Diabetes-continue to monitor blood sugars.  Adjusted insulin.  GI- PPI  prophylaxis      Patient is currently critically ill due to hypoxic respiratory failure and requiring high amounts of oxygen.  Adjusted patient's medications and NIPPV settings.  Cc-31 mins  Results for orders placed during the hospital encounter of 03/17/24    Adult Transthoracic Echo Complete w/ Color, Spectral and Contrast if necessary per protocol    Interpretation Summary    Normal left ventricular cavity size and wall thickness noted. All left ventricular wall segments contract normally    Left ventricular ejection fraction appears to be 61 - 65%.    Left ventricular diastolic function is consistent with (grade I) impaired relaxation.    The aortic valve is structurally normal with no regurgitation or stenosis present.    The mitral valve is structurally normal with no regurgitation or significant stenosis present.    There is no evidence of pericardial effusion.     Floyd Turcios MD     09/11/24 11:18 EDT

## 2024-09-11 NOTE — PROGRESS NOTES
[facility] HOSPITALIST PROGRESS NOTE     Patient Identification:  Name:  Ana Ortiz  Age:  48 y.o.  Sex:  female  :  1975  MRN:  [id number]  Visit Number:  [id number]  ROOM: [address]     Primary Care Provider:  Hayden Espinoza PA-C    Length of stay in inpatient status:  2    Subjective     Chief Compliant:    Chief Complaint   Patient presents with    Shortness of Breath       History of Presenting Illness:    Patient seen and examined this morning.  She reports feeling a lot better today and says her shortness of breath continues to improve.  Continues to have cough.    Objective     Current Hospital Meds:atorvastatin, 40 mg, Oral, Daily  budesonide-formoterol, 2 puff, Inhalation, BID - RT  cetirizine, 10 mg, Oral, Daily  gabapentin, 800 mg, Oral, 4x Daily  heparin (porcine), 5,000 Units, Subcutaneous, Q12H  [Held by provider] hydroCHLOROthiazide, 25 mg, Oral, QAM  insulin glargine, 35 Units, Subcutaneous, Q12H  insulin lispro, 4-24 Units, Subcutaneous, 4x Daily AC & at Bedtime  ipratropium-albuterol, 3 mL, Nebulization, 4x Daily - RT  Lidocaine, 1 patch, Transdermal, Q24H  lisinopril, 2.5 mg, Oral, Daily  meloxicam, 7.5 mg, Oral, Q12H  methylPREDNISolone sodium succinate, 125 mg, Intravenous, Q12H  nicotine, 1 patch, Transdermal, Q24H  pantoprazole, 40 mg, Intravenous, Q AM  senna-docusate sodium, 2 tablet, Oral, BID  sodium chloride, 10 mL, Intravenous, Q12H         Current Antimicrobial Therapy:  Anti-Infectives (From admission, onward)      Ordered     Dose/Rate Route Frequency Start Stop    24 0843  azithromycin (ZITHROMAX) 500 mg in sodium chloride 0.9 % 250 mL IVPB-VTB        Ordering Provider: Rosa Mishra DO    500 mg  over 60 Minutes Intravenous Every 24 Hours 09/10/24 0200 24 0225    24 0920  vancomycin 2500 mg/500 mL 0.9% NS IVPB ([facility])        Ordering Provider: Delia Jaramillo DO    2,500 mg  over 150 Minutes Intravenous Once 24 1100  09/09/24 1510    09/09/24 0843  cefepime 2000 mg IVPB in 100 mL NS (VTB)        Ordering Provider: Rosa Mishra DO    2,000 mg  over 30 Minutes Intravenous Once 09/09/24 0930 09/09/24 1015    09/09/24 0038  cefTRIAXone (ROCEPHIN) 2,000 mg in sodium chloride 0.9 % 100 mL IVPB-VTB        Ordering Provider: Martir Cordova MD    2,000 mg  200 mL/hr over 30 Minutes Intravenous Once 09/09/24 0054 09/09/24 0153          Current Diuretic Therapy:  Diuretics (From admission, onward)      Ordered     Dose/Rate Route Frequency Start Stop    09/11/24 1331  furosemide (LASIX) injection 40 mg        Ordering Provider: Floyd Turcios MD    40 mg Intravenous Once 09/11/24 1430 09/11/24 1459    09/10/24 1422  furosemide (LASIX) injection 60 mg        Ordering Provider: Floyd Turcios MD    60 mg Intravenous Once 09/10/24 1515 09/10/24 1615    09/09/24 0843  furosemide (LASIX) injection 40 mg        Ordering Provider: Rosa Mishra DO    40 mg Intravenous Once 09/09/24 0930 09/09/24 0945    09/09/24 0843  [Held by provider]  hydroCHLOROthiazide tablet 25 mg        (On hold since Mon 9/9/2024 at 0843 until manually unheld; held by Delia Jaramillo DOHold Reason: Other (Comment Required)Hold Comments: Low normal blood pressure)   Ordering Provider: Delia Jaramillo DO    25 mg Oral Every Morning 09/09/24 0930            ----------------------------------------------------------------------------------------------------------------------  Vital Signs:  Temp:  [97.9 °F (36.6 °C)-98.6 °F (37 °C)] 98.1 °F (36.7 °C)  Heart Rate:  [] 99  Resp:  [16-33] 24  BP: (101-142)/(63-88) 126/80  SpO2:  [90 %-100 %] 97 %  on  Flow (L/min):  [50] 50;   Device (Oxygen Therapy): heated;high-flow nasal cannula  Body mass index is 43.73 kg/m².    Wt Readings from Last 3 Encounters:   09/11/24 119 kg (262 lb 12.6 oz)   08/30/24 118 kg (260 lb)   05/30/24 101 kg (222 lb)     Intake & Output (last 3 days)         09/09 0701  09/10  0700 09/10 0701 09/11 0700 09/11 0701 09/12 0700    P.O.     I.V. (mL/kg) 717 (6)      IV Piggyback       Total Intake(mL/kg) 939 (7.9) 804 (6.8) 1164 (9.8)    Urine (mL/kg/hr) 1650 (0.6) 6561 (2.3) 4200 (2.8)    Total Output 1650 6561 4200    Net -[phone] -[phone] -[phone]           Urine Unmeasured Occurrence 7 x            Diet: Diabetic, Regular/House; Consistent Carbohydrate; Texture: Soft to Chew (NDD 3); Soft to Chew: Chopped Meat; Fluid Consistency: Thin (IDDSI 0)  ----------------------------------------------------------------------------------------------------------------------  Physical exam:   Constitutional:  Well-developed and well-nourished. Appeared less tired today. No acute distress.      HENT:  Head:  Normocephalic and atraumatic.    Cardiovascular:  Normal rate, regular rhythm  Pulmonary/Chest:  No respiratory distress, mild crackles bilaterally but no wheezing noted   Musculoskeletal:  No deformity.    Neurological: Awake, alert, no focal deficit on gross examination. No slurred speech or facial droop.   Skin:  Skin is warm and dry.  Peripheral vascular:  No cyanosis  Psychiatric: Appropriate mood and affect    Edited by: Delia Jaramillo DO at 9/11/2024 1938  ----------------------------------------------------------------------------------------------------------------------  Results from last 7 days   Lab Units 09/11/24  0159 09/10/24  0205 09/09/24  0855 09/09/24  0050 09/09/24  0044   LACTATE mmol/L  --   --   --  1.4  --    WBC 10*3/mm3 12.63* 19.66* 17.81*  --  20.87*   HEMOGLOBIN g/dL 10.5* 11.3* 11.5*  --  12.6   HEMATOCRIT % 32.8* 35.5 35.0  --  37.0   MCV fL 95.6 97.3* 92.3  --  90.9   MCHC g/dL 32.0 31.8 32.9  --  34.1   PLATELETS 10*3/mm3 336 287 306  --  323   INR   --   --   --   --  1.00     Results from last 7 days   Lab Units 09/09/24  2137   PH, ARTERIAL pH units 7.446   PO2 ART mm Hg 53.7*   PCO2, ARTERIAL mm Hg 43.3   HCO3 ART mmol/L 29.8*     Results from last 7 days  "  Lab Units 09/11/24  0159 09/10/24  0205 09/09/24  0855 09/09/24  0102   SODIUM mmol/L 131* 132* 134* 130*   POTASSIUM mmol/L 4.7 4.6 3.9 3.5   MAGNESIUM mg/dL  --  2.0  --  1.5*   CHLORIDE mmol/L 94* 97* 97* 93*   CO2 mmol/L 24.8 23.7 27.2 26.4   BUN mg/dL 18 14 14 13   CREATININE mg/dL 0.82 0.70 0.68 0.78   CALCIUM mg/dL 9.2 8.9 8.8 8.9   GLUCOSE mg/dL 514* 402* 282* 328*   ALBUMIN g/dL  --   --  3.6 3.7   BILIRUBIN mg/dL  --   --  <0.2 <0.2   ALK PHOS U/L  --   --  96 106   AST (SGOT) U/L  --   --  20 17   ALT (SGPT) U/L  --   --  11 14   Estimated Creatinine Clearance: 108.3 mL/min (by C-G formula based on SCr of 0.82 mg/dL).  No results found for: \"AMMONIA\"  Results from last 7 days   Lab Units 09/09/24  0258 09/09/24  0102   HSTROP T ng/L 11 10     Results from last 7 days   Lab Units 09/09/24  0258   PROBNP pg/mL 221.6         Hemoglobin A1C   Date/Time Value Ref Range Status   09/09/2024 0855 8.10 (H) 4.80 - 5.60 % Final     Glucose   Date/Time Value Ref Range Status   09/11/2024 1915 464 (C) 70 - 130 mg/dL Final   09/11/2024 1812 461 (C) 70 - 130 mg/dL Final   09/11/2024 1142 258 (H) 70 - 130 mg/dL Final   09/11/2024 0621 391 (H) 70 - 130 mg/dL Final   09/11/2024 0329 537 (C) 70 - 130 mg/dL Final   09/10/2024 2200 291 (H) 70 - 130 mg/dL Final   09/10/2024 1720 411 (C) 70 - 130 mg/dL Final   09/10/2024 1150 366 (H) 70 - 130 mg/dL Final     Lab Results   Component Value Date    TSH 1.440 03/18/2024    FREET4 0.98 06/07/2021     No results found for: \"PREGTESTUR\", \"PREGSERUM\", \"HCG\", \"HCGQUANT\"  Pain Management Panel  More data exists         Latest Ref Rng & Units 9/9/2024 11/3/2023   Pain Management Panel   Creatinine, Urine Not Estab. mg/dL - 51.9    Amphetamine, Urine Qual Negative Negative  -   Barbiturates Screen, Urine Negative Negative  -   Benzodiazepine Screen, Urine Negative Negative  -   Buprenorphine, Screen, Urine Negative Negative  -   Cocaine Screen, Urine Negative Negative  -   Fentanyl, " "Urine Negative Negative  -   Methadone Screen , Urine Negative Negative  -   Methamphetamine, Ur Negative Negative  -      Details                 Brief Urine Lab Results  (Last result in the past 365 days)        Color   Clarity   Blood   Leuk Est   Nitrite   Protein   CREAT   Urine HCG        09/09/24 0043 Yellow   Clear   Negative   Negative   Negative   Negative                 Blood Culture   Date Value Ref Range Status   09/09/2024 No growth at 2 days  Preliminary   09/09/2024 No growth at 2 days  Preliminary     No results found for: \"URINECX\"  No results found for: \"WOUNDCX\"  No results found for: \"STOOLCX\"  No results found for: \"RESPCX\"  No results found for: \"AFBCX\"  Results from last 7 days   Lab Units 09/09/24  0102 09/09/24  0050   PROCALCITONIN ng/mL 0.07  --    LACTATE mmol/L  --  1.4       I have personally looked at the labs and they are summarized above.  ----------------------------------------------------------------------------------------------------------------------  Detailed radiology reports for the last 24 hours:  Imaging Results (Last 24 Hours)       ** No results found for the last 24 hours. **          Assessment & Plan      # Acute on chronic hypoxic respiratory failure  # COPD with acute exacerbation  # Bilateral pneumonia versus interstitial lung disease  # Sepsis secondary to pneumonia versus SIRS criteria met due to COPD exacerbation  -Pulmonology following, appreciate assistance  -CT of the chest and noted diffuse crazy paving opacities in the lungs which could be due to edema, ARDS, infection, or pulmonary alveolar proteinosis per radiologist.  Could also be seen with sarcoidosis or desquamative interstitial pneumonitis secondary to smoking.  -Respiratory culture ordered  -Procalcitonin was normal, so bacterial pneumonia seems less likely.  Respiratory viral panel PCR was negative.  Strep pneumo antigen negative.  MRSA screen negative.  - Autoimmune workup, immunoglobulins, LDH, " GM-CSF and autoantibodies to GM-CSF ordered.  - Currently requiring heated high flow nasal cannula and has been weaned to 50L, 45% FiO2.   - Continue high dose steroids per Pulmonology with solu-medrol 125mg IV q12h.   - Lasix given again today per Pulmonology to help improve lung compliance   - Continue nebulizer treatments     # Incidentally noted right thyroid nodule  -Initially thyroid nodule was noted on CT scan, and a follow-up ultrasound was recommended.  Ultrasound was obtained showing a 1.1 cm well-circumscribed mildly hypoechoic nodule left lobe of the thyroid.  Follow-up ultrasound in 1 year is recommended.    # Diabetes mellitus type 2, insulin-dependent and complicated by hyperglycemia  -Blood glucose remains uncontrolled (worsened secondary to high-dose steroid use).  Patient reports taking 70 units of lantus nightly at home. Glucose has been persistently elevated and I suspect she will need more than her baseline 70 units total, so I have changed the lantus to split dosing with 35 units BID and will plan to titrate up as needed. Continue Accu-Cheks and high dose sliding scale insulin. Continue to titrate as needed for improved glucose control.    # Chronic HFpEF  -Echo from March 2024 reviewed that showed normal left ventricular systolic function, left ventricular EF 61 to 65%, grade 1 impaired diastolic relaxation.  -Spot dose Lasix as needed  -Monitor intake and output    # Mild hypomagnesemia  -Resolved after supplementation    # Morbid obesity, Body mass index is 43.8 kg/m².   -Continue supportive care, encourage healthy lifestyle choices  Edited by: Delia Jaramillo DO at 9/11/2024 1938    Active VTE Prophylaxis:  Pharmacologic:        Start     Dose Route Frequency Stop    09/09/24 0930  heparin (porcine) 5000 UNIT/ML injection 5,000 Units         5,000 Units SC Every 12 Hours Scheduled --                    Dispo: pending clinical course, hopefully will be able to return home at discharge  pending improvement    Delia Jaramillo DO  [facility]  09/11/24  19:38 EDT

## 2024-09-11 NOTE — PLAN OF CARE
Goal Outcome Evaluation:                 Pt lying in bed bipap in place pt tolerating well, vss, pt A/OX4, critical blood sugar reported to MD see mar for new orders. Safety maintained. No distress noted. Call light within reach.

## 2024-09-12 ENCOUNTER — APPOINTMENT (OUTPATIENT)
Dept: GENERAL RADIOLOGY | Facility: HOSPITAL | Age: 49
DRG: 871 | End: 2024-09-12
Payer: MEDICAID

## 2024-09-12 LAB
ANA SER QL: NEGATIVE
ANION GAP SERPL CALCULATED.3IONS-SCNC: 14.9 MMOL/L (ref 5–15)
BUN SERPL-MCNC: 18 MG/DL (ref 6–20)
BUN/CREAT SERPL: 22.5 (ref 7–25)
CALCIUM SPEC-SCNC: 10 MG/DL (ref 8.6–10.5)
CHLORIDE SERPL-SCNC: 98 MMOL/L (ref 98–107)
CO2 SERPL-SCNC: 26.1 MMOL/L (ref 22–29)
CREAT SERPL-MCNC: 0.8 MG/DL (ref 0.57–1)
DEPRECATED RDW RBC AUTO: 47.4 FL (ref 37–54)
EGFRCR SERPLBLD CKD-EPI 2021: 91 ML/MIN/1.73
ERYTHROCYTE [DISTWIDTH] IN BLOOD BY AUTOMATED COUNT: 13.4 % (ref 12.3–15.4)
GLUCOSE BLDC GLUCOMTR-MCNC: 346 MG/DL (ref 70–130)
GLUCOSE BLDC GLUCOMTR-MCNC: 384 MG/DL (ref 70–130)
GLUCOSE BLDC GLUCOMTR-MCNC: 397 MG/DL (ref 70–130)
GLUCOSE BLDC GLUCOMTR-MCNC: 401 MG/DL (ref 70–130)
GLUCOSE SERPL-MCNC: 249 MG/DL (ref 65–99)
HCT VFR BLD AUTO: 35.3 % (ref 34–46.6)
HGB BLD-MCNC: 11.4 G/DL (ref 12–15.9)
MCH RBC QN AUTO: 30.6 PG (ref 26.6–33)
MCHC RBC AUTO-ENTMCNC: 32.3 G/DL (ref 31.5–35.7)
MCV RBC AUTO: 94.9 FL (ref 79–97)
PLATELET # BLD AUTO: 358 10*3/MM3 (ref 140–450)
PMV BLD AUTO: 9.1 FL (ref 6–12)
POTASSIUM SERPL-SCNC: 4.5 MMOL/L (ref 3.5–5.2)
RBC # BLD AUTO: 3.72 10*6/MM3 (ref 3.77–5.28)
SODIUM SERPL-SCNC: 139 MMOL/L (ref 136–145)
WBC NRBC COR # BLD AUTO: 12.64 10*3/MM3 (ref 3.4–10.8)

## 2024-09-12 PROCEDURE — 25010000002 FUROSEMIDE PER 20 MG: Performed by: INTERNAL MEDICINE

## 2024-09-12 PROCEDURE — 94799 UNLISTED PULMONARY SVC/PX: CPT

## 2024-09-12 PROCEDURE — 25010000002 METHYLPREDNISOLONE PER 125 MG: Performed by: INTERNAL MEDICINE

## 2024-09-12 PROCEDURE — 25010000002 HEPARIN (PORCINE) PER 1000 UNITS: Performed by: INTERNAL MEDICINE

## 2024-09-12 PROCEDURE — 94664 DEMO&/EVAL PT USE INHALER: CPT

## 2024-09-12 PROCEDURE — 92611 MOTION FLUOROSCOPY/SWALLOW: CPT

## 2024-09-12 PROCEDURE — 82948 REAGENT STRIP/BLOOD GLUCOSE: CPT

## 2024-09-12 PROCEDURE — 80048 BASIC METABOLIC PNL TOTAL CA: CPT | Performed by: STUDENT IN AN ORGANIZED HEALTH CARE EDUCATION/TRAINING PROGRAM

## 2024-09-12 PROCEDURE — 71045 X-RAY EXAM CHEST 1 VIEW: CPT

## 2024-09-12 PROCEDURE — 93005 ELECTROCARDIOGRAM TRACING: CPT | Performed by: STUDENT IN AN ORGANIZED HEALTH CARE EDUCATION/TRAINING PROGRAM

## 2024-09-12 PROCEDURE — 71045 X-RAY EXAM CHEST 1 VIEW: CPT | Performed by: RADIOLOGY

## 2024-09-12 PROCEDURE — 25010000002 HEPARIN (PORCINE) PER 1000 UNITS: Performed by: STUDENT IN AN ORGANIZED HEALTH CARE EDUCATION/TRAINING PROGRAM

## 2024-09-12 PROCEDURE — 85027 COMPLETE CBC AUTOMATED: CPT | Performed by: STUDENT IN AN ORGANIZED HEALTH CARE EDUCATION/TRAINING PROGRAM

## 2024-09-12 PROCEDURE — 63710000001 INSULIN LISPRO (HUMAN) PER 5 UNITS: Performed by: STUDENT IN AN ORGANIZED HEALTH CARE EDUCATION/TRAINING PROGRAM

## 2024-09-12 PROCEDURE — 99232 SBSQ HOSP IP/OBS MODERATE 35: CPT | Performed by: STUDENT IN AN ORGANIZED HEALTH CARE EDUCATION/TRAINING PROGRAM

## 2024-09-12 PROCEDURE — 97116 GAIT TRAINING THERAPY: CPT

## 2024-09-12 PROCEDURE — 63710000001 INSULIN GLARGINE PER 5 UNITS: Performed by: STUDENT IN AN ORGANIZED HEALTH CARE EDUCATION/TRAINING PROGRAM

## 2024-09-12 PROCEDURE — 99291 CRITICAL CARE FIRST HOUR: CPT | Performed by: INTERNAL MEDICINE

## 2024-09-12 PROCEDURE — 97530 THERAPEUTIC ACTIVITIES: CPT

## 2024-09-12 PROCEDURE — 25010000002 METHYLPREDNISOLONE PER 125 MG: Performed by: STUDENT IN AN ORGANIZED HEALTH CARE EDUCATION/TRAINING PROGRAM

## 2024-09-12 PROCEDURE — 97110 THERAPEUTIC EXERCISES: CPT

## 2024-09-12 RX ORDER — FUROSEMIDE 10 MG/ML
60 INJECTION INTRAMUSCULAR; INTRAVENOUS ONCE
Status: COMPLETED | OUTPATIENT
Start: 2024-09-12 | End: 2024-09-12

## 2024-09-12 RX ORDER — SODIUM CHLORIDE 9 MG/ML
40 INJECTION, SOLUTION INTRAVENOUS AS NEEDED
Status: DISCONTINUED | OUTPATIENT
Start: 2024-09-12 | End: 2024-09-13 | Stop reason: HOSPADM

## 2024-09-12 RX ORDER — SODIUM CHLORIDE 0.9 % (FLUSH) 0.9 %
10 SYRINGE (ML) INJECTION AS NEEDED
Status: DISCONTINUED | OUTPATIENT
Start: 2024-09-12 | End: 2024-09-13 | Stop reason: HOSPADM

## 2024-09-12 RX ORDER — SODIUM CHLORIDE 0.9 % (FLUSH) 0.9 %
10 SYRINGE (ML) INJECTION EVERY 12 HOURS SCHEDULED
Status: DISCONTINUED | OUTPATIENT
Start: 2024-09-12 | End: 2024-09-13 | Stop reason: HOSPADM

## 2024-09-12 RX ORDER — METHYLPREDNISOLONE SODIUM SUCCINATE 125 MG/2ML
60 INJECTION, POWDER, LYOPHILIZED, FOR SOLUTION INTRAMUSCULAR; INTRAVENOUS EVERY 12 HOURS SCHEDULED
Status: DISCONTINUED | OUTPATIENT
Start: 2024-09-12 | End: 2024-09-13

## 2024-09-12 RX ADMIN — INSULIN LISPRO 16 UNITS: 100 INJECTION, SOLUTION INTRAVENOUS; SUBCUTANEOUS at 06:40

## 2024-09-12 RX ADMIN — ATORVASTATIN CALCIUM 40 MG: 40 TABLET, FILM COATED ORAL at 10:00

## 2024-09-12 RX ADMIN — INSULIN GLARGINE 35 UNITS: 100 INJECTION, SOLUTION SUBCUTANEOUS at 09:58

## 2024-09-12 RX ADMIN — INSULIN LISPRO 20 UNITS: 100 INJECTION, SOLUTION INTRAVENOUS; SUBCUTANEOUS at 12:57

## 2024-09-12 RX ADMIN — MELOXICAM 7.5 MG: 7.5 TABLET ORAL at 20:37

## 2024-09-12 RX ADMIN — IPRATROPIUM BROMIDE AND ALBUTEROL SULFATE 3 ML: 2.5; .5 SOLUTION RESPIRATORY (INHALATION) at 12:48

## 2024-09-12 RX ADMIN — CLONAZEPAM 1 MG: 1 TABLET ORAL at 13:08

## 2024-09-12 RX ADMIN — IPRATROPIUM BROMIDE AND ALBUTEROL SULFATE 3 ML: 2.5; .5 SOLUTION RESPIRATORY (INHALATION) at 18:53

## 2024-09-12 RX ADMIN — GABAPENTIN 800 MG: 400 CAPSULE ORAL at 20:37

## 2024-09-12 RX ADMIN — BUDESONIDE AND FORMOTEROL FUMARATE DIHYDRATE 2 PUFF: 160; 4.5 AEROSOL RESPIRATORY (INHALATION) at 06:58

## 2024-09-12 RX ADMIN — Medication 10 ML: at 20:38

## 2024-09-12 RX ADMIN — GABAPENTIN 800 MG: 400 CAPSULE ORAL at 12:59

## 2024-09-12 RX ADMIN — GABAPENTIN 800 MG: 400 CAPSULE ORAL at 09:58

## 2024-09-12 RX ADMIN — SENNOSIDES AND DOCUSATE SODIUM 2 TABLET: 50; 8.6 TABLET ORAL at 09:58

## 2024-09-12 RX ADMIN — CLONAZEPAM 1 MG: 1 TABLET ORAL at 20:37

## 2024-09-12 RX ADMIN — INSULIN GLARGINE 40 UNITS: 100 INJECTION, SOLUTION SUBCUTANEOUS at 20:37

## 2024-09-12 RX ADMIN — LISINOPRIL 2.5 MG: 2.5 TABLET ORAL at 09:59

## 2024-09-12 RX ADMIN — IPRATROPIUM BROMIDE AND ALBUTEROL SULFATE 3 ML: 2.5; .5 SOLUTION RESPIRATORY (INHALATION) at 00:14

## 2024-09-12 RX ADMIN — PANTOPRAZOLE SODIUM 40 MG: 40 INJECTION, POWDER, FOR SOLUTION INTRAVENOUS at 06:34

## 2024-09-12 RX ADMIN — HEPARIN SODIUM 5000 UNITS: 5000 INJECTION INTRAVENOUS; SUBCUTANEOUS at 10:00

## 2024-09-12 RX ADMIN — MELOXICAM 7.5 MG: 7.5 TABLET ORAL at 09:57

## 2024-09-12 RX ADMIN — LIDOCAINE 1 PATCH: 4 PATCH TOPICAL at 09:57

## 2024-09-12 RX ADMIN — NICOTINE 1 PATCH: 7 PATCH, EXTENDED RELEASE TRANSDERMAL at 10:00

## 2024-09-12 RX ADMIN — BUDESONIDE AND FORMOTEROL FUMARATE DIHYDRATE 2 PUFF: 160; 4.5 AEROSOL RESPIRATORY (INHALATION) at 18:53

## 2024-09-12 RX ADMIN — FUROSEMIDE 60 MG: 10 INJECTION, SOLUTION INTRAMUSCULAR; INTRAVENOUS at 12:58

## 2024-09-12 RX ADMIN — METHYLPREDNISOLONE SODIUM SUCCINATE 60 MG: 125 INJECTION, POWDER, FOR SOLUTION INTRAMUSCULAR; INTRAVENOUS at 20:36

## 2024-09-12 RX ADMIN — METHYLPREDNISOLONE SODIUM SUCCINATE 125 MG: 125 INJECTION, POWDER, FOR SOLUTION INTRAMUSCULAR; INTRAVENOUS at 10:00

## 2024-09-12 RX ADMIN — CETIRIZINE HYDROCHLORIDE 10 MG: 10 TABLET, FILM COATED ORAL at 09:58

## 2024-09-12 RX ADMIN — HEPARIN SODIUM 5000 UNITS: 5000 INJECTION INTRAVENOUS; SUBCUTANEOUS at 20:36

## 2024-09-12 RX ADMIN — INSULIN LISPRO 24 UNITS: 100 INJECTION, SOLUTION INTRAVENOUS; SUBCUTANEOUS at 20:37

## 2024-09-12 RX ADMIN — CLONAZEPAM 1 MG: 1 TABLET ORAL at 00:47

## 2024-09-12 RX ADMIN — INSULIN LISPRO 20 UNITS: 100 INJECTION, SOLUTION INTRAVENOUS; SUBCUTANEOUS at 17:08

## 2024-09-12 RX ADMIN — GABAPENTIN 800 MG: 400 CAPSULE ORAL at 17:08

## 2024-09-12 RX ADMIN — IPRATROPIUM BROMIDE AND ALBUTEROL SULFATE 3 ML: 2.5; .5 SOLUTION RESPIRATORY (INHALATION) at 06:58

## 2024-09-12 RX ADMIN — SENNOSIDES AND DOCUSATE SODIUM 2 TABLET: 50; 8.6 TABLET ORAL at 20:38

## 2024-09-12 NOTE — PROGRESS NOTES
Progress note Pulmonary and critical care-shortness of breath    Referring Provider: Dr. Rosa Mishra  Reason for Consultation: Acute hypoxic respiratory failure, pneumonia, COPD exacerbation      Subjective-  Overnight events reviewed.  All the labs medications ins and outs and vitals reviewed.  Was able to decrease her FiO2 to 30% and The fluid 50 L.  See if she will tolerate normal nasal cannula oxygen or bubble high flow.  All the labs medications ins and outs and vitals reviewed.  Patient resting in bed comfortably not in any distress.  Wore BiPAP last night.  Responded well to diuretics.  Chest x-ray from today morning shows improvement.          Review of Systems-no major changes  Positive for shortness of breath and cough.  Otherwise negative    History  Past Medical History:   Diagnosis Date    Arthritis     CHF (congestive heart failure)     COPD (chronic obstructive pulmonary disease)     Diabetes mellitus     Elevated cholesterol     GERD (gastroesophageal reflux disease)     Hyperlipemia     Hypertension     JOANN (obstructive sleep apnea)     Pneumonia     Vulvar cancer    ,   Past Surgical History:   Procedure Laterality Date    HYSTERECTOMY      INCISION AND DRAINAGE TRUNK N/A 1/11/2022    Procedure: INCISION AND DRAINAGE BACK;  Surgeon: Tamera Saez MD;  Location: [facility];  Service: General;  Laterality: N/A;    MASTECTOMY      VULVA SURGERY      reconstruction due ca   ,   Family History   Problem Relation Age of Onset    Cancer Mother     Hypertension Mother     Parkinsonism Father     Diabetes Father     Hypertension Father     Cancer Maternal Aunt     Diabetes Paternal Aunt     Diabetes Paternal Grandmother     Cancer Maternal Aunt     Diabetes Brother    ,   Social History     Tobacco Use    Smoking status: Every Day     Current packs/day: 1.50     Average packs/day: 1.4 packs/day for 29.3 years (42.2 ttl pk-yrs)     Types: Cigarettes     Start date: 5/18/1995     Passive exposure: Current     Smokeless tobacco: Never    Tobacco comments:     8-10 per day    Vaping Use    Vaping status: Former    Substances: Nicotine, Flavoring    Devices: [employer] tank   Substance Use Topics    Alcohol use: Never    Drug use: Never   ,   Medications Prior to Admission   Medication Sig Dispense Refill Last Dose    albuterol sulfate HFA (Ventolin HFA) 108 (90 Base) MCG/ACT inhaler Inhale 2 puffs Every 4 (Four) Hours As Needed for Wheezing or Shortness of Air. 18 g 12 9/8/2024    budesonide-formoterol (Symbicort) 160-4.5 MCG/ACT inhaler Inhale 2 puffs 2 (Two) Times a Day. 1 each 8 9/8/2024    cetirizine (zyrTEC) 10 MG tablet Take 1 tablet by mouth Daily.   9/8/2024    clonazePAM (KlonoPIN) 1 MG tablet Take 1 tablet by mouth 4 (Four) Times a Day As Needed for Anxiety.   9/8/2024    cyclobenzaprine (FLEXERIL) 5 MG tablet Take 1 tablet by mouth 3 (Three) Times a Day As Needed for Muscle Spasms.   9/8/2024    fluticasone (FLONASE) 50 MCG/ACT nasal spray 2 sprays into the nostril(s) as directed by provider Daily As Needed for Rhinitis or Allergies.   9/8/2024    gabapentin (NEURONTIN) 800 MG tablet Take 1 tablet by mouth 4 (Four) Times a Day.   9/8/2024    glipizide (Glucotrol) 5 MG tablet Take 1 tablet by mouth 2 (Two) Times a Day Before Meals. 60 tablet 5 9/8/2024    hydroCHLOROthiazide (HYDRODIURIL) 25 MG tablet Take 1 tablet by mouth Every Morning.   9/8/2024    ibuprofen (ADVIL,MOTRIN) 800 MG tablet Take 1 tablet by mouth Every 8 (Eight) Hours As Needed for Mild Pain.   9/8/2024    ipratropium-albuterol (DUO-NEB) 0.5-2.5 mg/3 ml nebulizer Take 3 mL by nebulization Every 6 (Six) Hours As Needed for Wheezing or Shortness of Air. 360 mL 5 9/8/2024    lidocaine (LIDODERM) 5 % Place 1 patch on the skin as directed by provider Daily. Remove & Discard patch within 12 hours or as directed by MD   9/8/2024 at 1400    lisinopril (PRINIVIL,ZESTRIL) 5 MG tablet Take 0.5 tablets by mouth Daily.   9/8/2024    loperamide (IMODIUM) 2  MG capsule Take 1 capsule by mouth 4 (Four) Times a Day As Needed for Diarrhea.   Past Week    meloxicam (MOBIC) 7.5 MG tablet Take 1 tablet by mouth Every 12 (Twelve) Hours.   9/8/2024    ondansetron ODT (ZOFRAN-ODT) 4 MG disintegrating tablet Take 1 tablet by mouth Every 6 (Six) Hours As Needed for Nausea or Vomiting. 12 tablet 0 Past Week    tiotropium (Spiriva HandiHaler) 18 MCG per inhalation capsule Place 1 capsule into inhaler and inhale Daily. 30 capsule 8 9/8/2024    Insulin Glargine (LANTUS SOLOSTAR) 100 UNIT/ML injection pen Inject 70 Units under the skin into the appropriate area as directed Every Night. 30 mL 2 9/7/2024    naloxone (NARCAN) 4 MG/0.1ML nasal spray Call 911. Don't prime. [place] in 1 nostril for overdose. Repeat in 2-3 minutes in other nostril if no or minimal breathing/responsiveness. 2 each 0 Unknown    Semaglutide, 2 MG/DOSE, (Ozempic, 2 MG/DOSE,) 8 MG/3ML solution pen-injector Inject 2 mg under the skin into the appropriate area as directed 1 (One) Time Per Week. 3 mL 2 8/27/2024    simvastatin (ZOCOR) 80 MG tablet Take 1 tablet by mouth every night at bedtime.   9/7/2024   , Scheduled Meds:  atorvastatin, 40 mg, Oral, Daily  budesonide-formoterol, 2 puff, Inhalation, BID - RT  cetirizine, 10 mg, Oral, Daily  furosemide, 60 mg, Intravenous, Once  gabapentin, 800 mg, Oral, 4x Daily  heparin (porcine), 5,000 Units, Subcutaneous, Q12H  [Held by provider] hydroCHLOROthiazide, 25 mg, Oral, QAM  insulin glargine, 35 Units, Subcutaneous, Q12H  insulin lispro, 4-24 Units, Subcutaneous, 4x Daily AC & at Bedtime  ipratropium-albuterol, 3 mL, Nebulization, 4x Daily - RT  Lidocaine, 1 patch, Transdermal, Q24H  lisinopril, 2.5 mg, Oral, Daily  meloxicam, 7.5 mg, Oral, Q12H  methylPREDNISolone sodium succinate, 125 mg, Intravenous, Q12H  nicotine, 1 patch, Transdermal, Q24H  pantoprazole, 40 mg, Intravenous, Q AM  senna-docusate sodium, 2 tablet, Oral, BID  sodium chloride, 10 mL, Intravenous,  Q12H  sodium chloride, 10 mL, Intravenous, Q12H    , Continuous Infusions:      and Allergies:  Codeine and Metformin    Objective     Vital Signs   Temp:  [97.7 °F (36.5 °C)-98.3 °F (36.8 °C)] 98.2 °F (36.8 °C)  Heart Rate:  [] 87  Resp:  [16-33] 18  BP: (100-169)/() 114/80    Physical Exam:          General-not in any respiratory distress, resting in bed comfortably  HEENT-PERRLA  Neck-supple, No JVD, no carotid bruit    Respiratory-bilateral air entry, diffuse wheezing with prolonged expiratory phase, basal rales.  No egophony    Cardiovascular-NSR  GI-nontender nondistended bowel sounds positive    CNS-alert oriented x3, grossly nonfocal      Extremities-no clubbing and edema    Psychiatric-mood good, good eye contact,     Skin- no visible rash             Results Review:    LABS:    Lab Results   Component Value Date    GLUCOSE 249 (H) 09/12/2024    BUN 18 09/12/2024    CREATININE 0.80 09/12/2024    EGFRIFNONA 93 01/13/2022    BCR 22.5 09/12/2024    CO2 26.1 09/12/2024    CALCIUM 10.0 09/12/2024    ALBUMIN 3.6 09/09/2024    AST 20 09/09/2024    ALT 11 09/09/2024    WBC 12.64 (H) 09/12/2024    HGB 11.4 (L) 09/12/2024    HCT 35.3 09/12/2024    MCV 94.9 09/12/2024     09/12/2024     09/12/2024    K 4.5 09/12/2024    CL 98 09/12/2024    ANIONGAP 14.9 09/12/2024       Lab Results   Component Value Date    INR 1.00 09/09/2024    INR 1.00 03/17/2024    INR 1.06 01/10/2022    PROTIME 13.3 09/09/2024    PROTIME 13.7 03/17/2024    PROTIME 14.2 01/10/2022       Results from last 7 days   Lab Units 09/09/24  0050 09/09/24  0044   INR   --  1.00   APTT seconds 30.0  --           I reviewed the patient's new clinical results.  I reviewed the patient's new imaging results and agree with the interpretation.     Latest Reference Range & Units 09/09/24 21:37   pH, Arterial 7.350 - 7.450 pH units 7.446   pCO2, Arterial 35.0 - 45.0 mm Hg 43.3   pO2, Arterial 83.0 - 108.0 mm Hg 53.7 (C)   HCO3, Arterial  20.0 - 26.0 mmol/L 29.8 (H)   Base Excess 0.0 - 2.0 mmol/L 5.1 (H)   O2 Saturation, Arterial 94.0 - 99.0 % 89.0 (L)   CO2 Content 22 - 33 mmol/L 31.1   A-a DO2 0.0 - 300.0 mmHg 40.8   Carboxyhemoglobin 0 - 5 % 1.8   Methemoglobin 0.00 - 3.00 % 0.40   Oxyhemoglobin 94 - 99 % 87.0 (L)   Hematocrit, Blood Gas 38.0 - 51.0 % 35.1 (L)   Hemoglobin, Blood Gas 13.5 - 17.5 g/dL 11.5 (L)   Site  Right Radial   Osiel's Test  Positive   Modality  HFNC   Flow Rate lpm 15.0   Ventilator Mode  NA   Barometric Pressure for Blood Gas mmHg 730   Notified By  [id number]   Notified Time  09/09/2024 21:43   Notified Who  MARK   Collected by  [id number]   (C): Data is critically low  (H): Data is abnormally high  (L): Data is abnormally low    Microbiology Results (last 10 days)       Procedure Component Value - Date/Time    S. Pneumo Ag Urine or CSF - Urine, Urine, Clean Catch [[id number]]  (Normal) Collected: 09/09/24 1504    Lab Status: Final result Specimen: Urine, Clean Catch Updated: 09/09/24 2343     Strep Pneumo Ag Negative    MRSA Screen, PCR (Inpatient) - Swab, Nares [[id number]]  (Normal) Collected: 09/09/24 0928    Lab Status: Final result Specimen: Swab from Nares Updated: 09/09/24 1059     MRSA PCR No MRSA Detected    Narrative:      The negative predictive value of this diagnostic test is high and should only be used to consider de-escalating anti-MRSA therapy. A positive result may indicate colonization with MRSA and must be correlated clinically.    Respiratory Panel PCR w/COVID-19(SARS-CoV-2) [facility]IS [facility]UEL/[facility] In-House, NP Swab in UTM/VTM, 2 HR TAT - Swab, Nasopharynx [[id number]]  (Normal) Collected: 09/09/24 0056    Lab Status: Final result Specimen: Swab from Nasopharynx Updated: 09/09/24 0146     ADENOVIRUS, PCR Not Detected     Coronavirus 229E Not Detected     Coronavirus HKU1 Not Detected     Coronavirus NL63 Not Detected     Coronavirus OC43 Not Detected     COVID19 Not Detected     Human Metapneumovirus Not  Detected     Human Rhinovirus/Enterovirus Not Detected     Influenza A PCR Not Detected     Influenza B PCR Not Detected     Parainfluenza Virus 1 Not Detected     Parainfluenza Virus 2 Not Detected     Parainfluenza Virus 3 Not Detected     Parainfluenza Virus 4 Not Detected     RSV, PCR Not Detected     Bordetella pertussis pcr Not Detected     Bordetella parapertussis PCR Not Detected     Chlamydophila pneumoniae PCR Not Detected     Mycoplasma pneumo by PCR Not Detected    Narrative:      In the setting of a positive respiratory panel with a viral infection PLUS a negative procalcitonin without other underlying concern for bacterial infection, consider observing off antibiotics or discontinuation of antibiotics and continue supportive care. If the respiratory panel is positive for atypical bacterial infection (Bordetella pertussis, Chlamydophila pneumoniae, or Mycoplasma pneumoniae), consider antibiotic de-escalation to target atypical bacterial infection.    Blood Culture - Blood, Arm, Left [[id number]]  (Normal) Collected: 09/09/24 0050    Lab Status: Preliminary result Specimen: Blood from Arm, Left Updated: 09/12/24 0100     Blood Culture No growth at 3 days    Blood Culture - Blood, Arm, Left [[id number]]  (Normal) Collected: 09/09/24 0050    Lab Status: Preliminary result Specimen: Blood from Arm, Left Updated: 09/12/24 0100     Blood Culture No growth at 3 days               Assessment & Plan         acute hypoxic respiratory failure-which could be due to pneumonia and interstitial lung disease.  Interstitial lung disease could be desquamative interstitial pneumonitis due to smoking.  This was extensively discussed with the patient and smoking cessation counseling was done extensively.  Will decrease the dose of steroids.  Adjusted the FiO2.    FiO2 requirement improving.      Chest x-ray improving.      Will decrease the steroids to 60 mg IV every 12.    As patient is getting better    Blood sugars are  running high-discussed with patient as per her she takes 70 of Lantus at nighttime.  This was informed to primary team and asked them to adjust the insulin.    Will give diuretics to improve lung compliance and diffusion capacity.  Will repeat  Lasix.  Ins and outs net negative  Results for orders placed during the hospital encounter of 03/17/24    Adult Transthoracic Echo Complete w/ Color, Spectral and Contrast if necessary per protocol    Interpretation Summary    Normal left ventricular cavity size and wall thickness noted. All left ventricular wall segments contract normally    Left ventricular ejection fraction appears to be 61 - 65%.    Left ventricular diastolic function is consistent with (grade I) impaired relaxation.    The aortic valve is structurally normal with no regurgitation or stenosis present.    The mitral valve is structurally normal with no regurgitation or significant stenosis present.    There is no evidence of pericardial effusion.       Bilateral community-acquired pneumonia.    Procalitonin Results:      Lab 09/09/24  0102   PROCALCITONIN 0.07     Discontinued cefepime.  Will continue to monitor for any signs of infection.    Chest x-ray showing improvement    COPD exacerbation-continue steroids.  Encouraged to quit smoking.  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  Smoker-continue nicotine patch  Smoking cessation counseling done again.  Encouraged her to quit smoking        Morbid obesity on obstructive sleep apnea-continue NIPPV.  Settings adjusted.       Diabetes-continue to monitor blood sugars.  Adjusted insulin.  GI- PPI prophylaxis      Patient is currently critically ill due to hypoxic respiratory failure and requiring high amounts of oxygen.  Adjusted patient's medications and NIPPV settings.  Cc-31 mins  Results for orders placed during  the hospital encounter of 03/17/24    Adult Transthoracic Echo Complete w/ Color, Spectral and Contrast if necessary per protocol    Interpretation Summary    Normal left ventricular cavity size and wall thickness noted. All left ventricular wall segments contract normally    Left ventricular ejection fraction appears to be 61 - 65%.    Left ventricular diastolic function is consistent with (grade I) impaired relaxation.    The aortic valve is structurally normal with no regurgitation or stenosis present.    The mitral valve is structurally normal with no regurgitation or significant stenosis present.    There is no evidence of pericardial effusion.     Floyd Turcios MD     09/12/24 11:18 EDT

## 2024-09-12 NOTE — THERAPY RE-EVALUATION
"Acute Care - Speech Language Pathology   Swallow Re-Assessment  [facility]  CLINICAL DYSPHAGIA RE-ASSESSMENT     Patient Name: Ana Ortiz  : 1975  MRN: [id number]  Today's Date: 2024Onset of Illness/Injury or Date of Surgery: 24 Referring Physician: Clint  Admit Date: 2024    Ana Ortiz  was seen at bedside this am on PCU-211 to assess safety/efficacy of swallowing fnx, determine safest/least restrictive diet tolerance.     She is s/p Clinical Dysphagia Re-Assessment yesterday at which time she presented w/ a \"... wfl oropharyngeal swallow w/o s/s aspiration and no s/s indicative of silent aspiration. Per her prior report of avoidance of hard/crunchy textures and preference for meats cut into small pieces and moistened as well as her respiratory supports, she is felt to most benefit from diet advancement to a modified po diet of mechanical soft textures, chopped meats, and thin liquids\".     Per discussion w/ RN, pt has tolerated current modified po diet w/o complications. No concerns noted w/ medication administration.       Social History     Socioeconomic History    Marital status:    Tobacco Use    Smoking status: Every Day     Current packs/day: 1.50     Average packs/day: 1.4 packs/day for 29.3 years (42.2 ttl pk-yrs)     Types: Cigarettes     Start date: 1995     Passive exposure: Current    Smokeless tobacco: Never    Tobacco comments:     8-10 per day    Vaping Use    Vaping status: Former    Substances: Nicotine, Flavoring    Devices: Refillable tank   Substance and Sexual Activity    Alcohol use: Never    Drug use: Never    Sexual activity: Not Currently     Partners: Male     Birth control/protection: Condom      Imaging:  XR Chest 1 View [[id number]] Wilfrido as Reviewed   Order Status: Completed Collected: 24    Updated: 24   Narrative:     PROCEDURE: XR CHEST 1 VW-       HISTORY: sob; J96.01-Acute respiratory failure with " hypoxia;  J18.9-Pneumonia, unspecified organism     COMPARISON: 9/8/2024.     FINDINGS: The heart is normal in size. The mediastinum is unremarkable.  There has been interval improvement in the patient's left lung airspace  disease. There are a few patchy opacities within the right lung. There  is no pneumothorax. There are no acute osseous abnormalities.      Impression:     Improvement in the patient's left lung airspace disease with  patchy opacities remaining within the right lung.     This report was finalized on 9/12/2024 8:56 AM by Clint Bustamante M.D..     US Thyroid [[id number]] Wilfrdio as Reviewed   Order Status: Completed Collected: 09/10/24 0830    Updated: 09/10/24 0834   Narrative:     PROCEDURE:  US THYROID-     HISTORY: nodule noted on CT; J96.01-Acute respiratory failure with  hypoxia; J18.9-Pneumonia, unspecified organism     PROCEDURE: Multiple sonographic images of the thyroid were obtained.     COMPARISON: None     FINDINGS: The thyroid gland is normal in size and echotexture.     Right lobe: 4.1 x 1.8 x 2.0 cm. There is a subcentimeter colloid cyst in  the right lobe of the thyroid gland. No solid nodule is identified.     Left lobe: 4.4 x 1.9 x 1.9 cm. There is a 1.1 cm well-circumscribed  mildly hypoechoic nodule.     Isthmus: 0.3 cm. There are no discrete nodules      Impression:     1. No suspicious nodules in the right lobe of the thyroid gland.  2. 1.1 cm TI-RADS 4 nodule in the left lobe. A follow-up thyroid  ultrasound in 1 year is recommended.     This report was finalized on 9/10/2024 8:32 AM by Clint Bustamante M.D..       CT Angiogram Chest Pulmonary Embolism [[id number]] Wilfrido as Reviewed   Order Status: Completed Collected: 09/09/24 0334    Updated: 09/09/24 0342   Narrative:     CLINICAL HISTORY: Chest pain.     COMPARISON: None available.     TECHNIQUE: IV contrast was administered and helical images were acquired  through the chest.  Multiplanar reformats were generated.   Axial MIPS  were obtained.  Limited exposure control, adjustment of the mA and/or KV  according to patient size or use of iterative reconstruction technique  was utilized.     FINDINGS:  Contrast bolus timing is suboptimal to evaluate for pulmonary embolism.     Heart and mediastinal structures: No pulmonary arterial filling defect.  Normal mediastinal structures. Coronary artery calcifications are  present.     Lungs and airways: Diffuse crazy paving opacities in the lungs,  relatively sparing the periphery and involving predominately in the mid  and upper lung zones.  The central airways are patent..     Pleura: normal.     Lymph nodes: normal.     Musculoskeletal and chest wall: no acute abnormality.     Lower neck / upper abdomen: Heterogeneous nodule in the posterior aspect  of the right thyroid measuring 1.3 x 1.5 cm.      Impression:     NEGATIVE FOR PULMONARY EMBOLISM.     DIFFUSE CRAZY PAVING OPACITIES IN THE LUNGS, RELATIVELY SPARING THE  PERIPHERY AND BASES, WHICH COULD BE DUE TO PULMONARY EDEMA, ARDS,  INFECTION, PULMONARY ALVEOLAR PROTEINOSIS.     RIGHT THYROID NODULE MEASURING 1.3 X 1.5 CM. FOLLOW UP ULTRASOUND IS  SUGGESTED.     This report was finalized on 9/9/2024 3:39 AM by Dhara Shaikh MD.       XR Chest 1 View [[id number]] Wilfrido as Reviewed   Order Status: Completed Collected: 09/09/24 0200    Updated: 09/09/24 0203   Narrative:     PROCEDURE: Portable chest x-ray examination performed on September 8, 2024. Single view.     HISTORY: Shortness of breath. Chest pain.     COMPARISON: None.     FINDINGS:     Enlarged heart size. Edema.  Multifocal pneumonia, left greater than right.  Central pulmonary vascular congestion.  Slightly elevated left hemidiaphragm.  No pleural effusion.  No pneumothorax      Impression:     1.  Enlarged heart size  2.  Multifocal pneumonia, left greater than right.  3.  Central pulmonary vascular congestion.  4.  Edema.  5.  No pleural effusion.  6.  No pneumothorax.  7.   Hypoinflated lungs.     This report was finalized on 9/9/2024 2:01 AM by Kenny Baca MD.     Labs:   Latest Reference Range & Units 09/09/24 00:44 09/09/24 08:55 09/10/24 02:05 09/11/24 01:59   WBC 3.40 - 10.80 10*3/mm3 20.87 (H) 17.81 (H) 19.66 (H) 12.63 (H)   RBC 3.77 - 5.28 10*6/mm3 4.07 3.79 3.65 (L) 3.43 (L)   Hemoglobin 12.0 - 15.9 g/dL 12.6 11.5 (L) 11.3 (L) 10.5 (L)   Hematocrit 34.0 - 46.6 % 37.0 35.0 35.5 32.8 (L)   Platelets 140 - 450 10*3/mm3 323 306 287 336   RDW 12.3 - 15.4 % 13.6 13.8 13.9 13.7   MCV 79.0 - 97.0 fL 90.9 92.3 97.3 (H) 95.6   MCH 26.6 - 33.0 pg 31.0 30.3 31.0 30.6   MCHC 31.5 - 35.7 g/dL 34.1 32.9 31.8 32.0   MPV 6.0 - 12.0 fL 9.1 9.2 9.7 9.4   RDW-SD 37.0 - 54.0 fl 45.6 47.1 49.3 48.9   (H): Data is abnormally high  (L): Data is abnormally low  Diet Orders (active) (From admission, onward)       Start     Ordered    09/09/24 1200  DIET MESSAGE Please send extra gravy/sauce on the side for all meats.  Daily With Breakfast, Lunch & Dinner      Comments: Please send extra gravy/sauce on the side for all meats.    09/09/24 1126    09/09/24 1124  Diet: Diabetic, Regular/House; Consistent Carbohydrate; Texture: Soft to Chew (NDD 3); Soft to Chew: Ground Meat; Fluid Consistency: Thin (IDDSI 0)  Diet Effective Now        Comments: Meds whole in puree/thins.   Fully upright for all po intake.    09/09/24 1126                  Ms Ortiz is observed on 50L [facility]. She is sitting up in bedside chair w/ RN present for medication administration upon SLP entry.  SpO2 is noted at 93% upon SLP entry and prior to any po presentations. SpO2 maintains at 96% across all po presentations and conversational exchanges.     Ms Ortiz was positioned upright and centered to accept multiple po presentations of thin liquids via cup and straw.  She is additionally observed w/ medication administration w/ multiple po meds accepted simultaneously w/ thin wash. She was able to self provide all po trials. She  accepted all presentations while carrying on conversation w/ SLP and other staff present in room.     Upon po presentations, adequate bolus anticipation and acceptance w/ good labial seal for bolus clearance via cup rim stability and suction via straw. Bolus formation, manipulation and control were wfl. A-p transit was timely w/o significant oral residue appreciated. No overt s/s aspiration before the swallow.      Pharyngeal swallow was timely w/ adequate hyolaryngeal elevation per palpation. No overt s/s aspiration evidenced across this evaluation. No silent aspiration suspected. Patient denied odynophagia.    She independently demonstrates pacing to allow for adequate breath/swallow coordination.      Visit Dx:     ICD-10-CM ICD-9-CM   1. Acute hypoxic respiratory failure  J96.01 518.81   2. Multifocal pneumonia  J18.9 486     Patient Active Problem List   Diagnosis    Morbidly obese    Acute respiratory failure with hypoxia and hypercapnia    Cutaneous abscess of back excluding buttocks    JOANN (obstructive sleep apnea)    Chronic bronchitis    Cigarette nicotine dependence without complication    Chronic respiratory failure with hypoxia    Diabetes mellitus, type II    Type 2 diabetes mellitus with hyperglycemia, with long-term current use of insulin    Severe sepsis    Sepsis due to pneumonia     Past Medical History:   Diagnosis Date    Arthritis     CHF (congestive heart failure)     COPD (chronic obstructive pulmonary disease)     Diabetes mellitus     Elevated cholesterol     GERD (gastroesophageal reflux disease)     Hyperlipemia     Hypertension     JOANN (obstructive sleep apnea)     Pneumonia     Vulvar cancer      Past Surgical History:   Procedure Laterality Date    HYSTERECTOMY      INCISION AND DRAINAGE TRUNK N/A 1/11/2022    Procedure: INCISION AND DRAINAGE BACK;  Surgeon: Tamera Saez MD;  Location: [facility];  Service: General;  Laterality: N/A;    MASTECTOMY      VULVA SURGERY       reconstruction due ca     Impression:     Ms Ortiz continues w/ a wfl oropharyngeal swallow w/o overt s/s aspiration and no s/s indicative of silent aspiration. Per her prior report of avoidance of hard/crunchy textures and preference for meats cut into small pieces and moistened as well as her respiratory supports, she is felt to most benefit from diet advancement to a modified po diet of mechanical soft textures, chopped meats, and thin liquids. This is felt to be her baseline po diet at this time per discussion w/ pt.     SLP Recommendation and Plan     1. Mechanical soft textures, chopped meats, thin liquids.    2. Medicatoins whole in puree/thins. Crush PRN.   3. Upright and centered for all po intake  4. ZORA precautions.  5. Oral care protocol.  6. Additional sauce/gravy w/ all meats to aid w/ bolus manipulation    No further SLP f/u recommended.     D/w patient results and recommendations w/ verbal agreement.    D/w RN results and recommendations w/ verbal agreement.    Thank you for allowing me to participate in the care of your patient-  Corrie Crews M.S., CCC-SLP    SLP Diet Recommendation: soft to chew textures, ground, thin liquids (09/10/24 1120)  Recommended Precautions and Strategies: upright posture during/after eating, general aspiration precautions, fatigue precautions (09/10/24 1120)  SLP Rec. for Method of Medication Administration: meds whole, meds crushed, with thin liquids, with puree (09/10/24 1120)         SWALLOW EVALUATION (Last 72 Hours)       SLP Adult Swallow Evaluation       Row Name 09/12/24 1000 09/11/24 1500 09/10/24 1120             Rehab Evaluation    Document Type re-evaluation  -JR re-evaluation  -JR re-evaluation  -JR         General Eating/Swallowing Observations    Respiratory Support Currently in Use high-flow nasal cannula  -JR -- high-flow nasal cannula  -JR      O2 Liters -- -- other (see comments)  54  -JR      Eating/Swallowing Skills self-fed  -JR -- --       Positioning During Eating upright in chair  -JR -- upright 90 degree  sitting on edge of bed  -      Utensils Used -- -- spoon;straw  -      Consistencies Trialed -- -- soft to chew textures;thin liquids;pureed  -      Pre SpO2 (%) -- -- --  92%  -JR      Post SpO2 (%) -- -- --  96%  -         Clinical Swallow Eval    Oral Prep Phase WF  - -- --      Oral Transit [facility]  - -- --      Oral Residue WFL  - -- --      Pharyngeal Phase no overt signs/symptoms of pharyngeal impairment  - -- --         Recommendations    SLP Diet Recommendation soft to chew textures;chopped;thin liquids  - soft to chew textures;chopped;thin liquids  - soft to chew textures;ground;thin liquids  -      Recommended Precautions and Strategies upright posture during/after eating;small bites of food and sips of liquid;general aspiration precautions  - upright posture during/after eating;small bites of food and sips of liquid;alternate between small bites of food and sips of liquid;fatigue precautions;general aspiration precautions  - upright posture during/after eating;general aspiration precautions;fatigue precautions  -      SLP Rec. for Method of Medication Administration meds whole;with thin liquids;with puree  - meds whole;with thin liquids;with puree  -JR meds whole;meds crushed;with thin liquids;with puree  -                User Key  (r) = Recorded By, (t) = Taken By, (c) = Cosigned By      Initials Name Effective Dates    Corrie Yang MS CCC-SLP 10/25/21 -                     EDUCATION  The patient has been educated in the following areas:   Dysphagia (Swallowing Impairment) Modified Diet Instruction.        Time Calculation:         Therapy Charges for Today       Code Description Service Date Service Provider Modifiers Qty    [id number] HC ST EVAL ORAL PHARYNG SWALLOW 4 9/11/2024 Corrie Crews MS CCC-SLP GN 1                 Corrie Crews MS CCC-SLP  9/12/2024

## 2024-09-12 NOTE — PROGRESS NOTES
[facility] HOSPITALIST PROGRESS NOTE     Patient Identification:  Name:  Ana Ortiz  Age:  48 y.o.  Sex:  female  :  1975  MRN:  [id number]  Visit Number:  [id number]  ROOM: [address]     Primary Care Provider:  Hayden Espinoza PA-C    Length of stay in inpatient status:  3    Subjective     Chief Compliant:    Chief Complaint   Patient presents with    Shortness of Breath       History of Presenting Illness: Patient seen and examined while sitting up in the bedside chair this morning.  She reported feeling well says that shortness of breath is continuing to improve.  She reports mild cough.  She is eager to return home when able.     Objective     Current Hospital Meds:atorvastatin, 40 mg, Oral, Daily  budesonide-formoterol, 2 puff, Inhalation, BID - RT  cetirizine, 10 mg, Oral, Daily  gabapentin, 800 mg, Oral, 4x Daily  heparin (porcine), 5,000 Units, Subcutaneous, Q12H  [Held by provider] hydroCHLOROthiazide, 25 mg, Oral, QAM  insulin glargine, 40 Units, Subcutaneous, Q12H  insulin lispro, 4-24 Units, Subcutaneous, 4x Daily AC & at Bedtime  ipratropium-albuterol, 3 mL, Nebulization, 4x Daily - RT  Lidocaine, 1 patch, Transdermal, Q24H  lisinopril, 2.5 mg, Oral, Daily  meloxicam, 7.5 mg, Oral, Q12H  methylPREDNISolone sodium succinate, 60 mg, Intravenous, Q12H  nicotine, 1 patch, Transdermal, Q24H  pantoprazole, 40 mg, Intravenous, Q AM  senna-docusate sodium, 2 tablet, Oral, BID  sodium chloride, 10 mL, Intravenous, Q12H  sodium chloride, 10 mL, Intravenous, Q12H         Current Antimicrobial Therapy:  Anti-Infectives (From admission, onward)      Ordered     Dose/Rate Route Frequency Start Stop    24 0843  azithromycin (ZITHROMAX) 500 mg in sodium chloride 0.9 % 250 mL IVPB-VTB        Ordering Provider: Rosa Mishra DO    500 mg  over 60 Minutes Intravenous Every 24 Hours 09/10/24 0200 24 0225    24 0920  vancomycin 2500 mg/500 mL 0.9% NS IVPB ([facility])         Ordering Provider: Delia Jaramillo DO    2,500 mg  over 150 Minutes Intravenous Once 09/09/24 1100 09/09/24 1510    09/09/24 0843  cefepime 2000 mg IVPB in 100 mL NS (VTB)        Ordering Provider: Rosa Mishra DO    2,000 mg  over 30 Minutes Intravenous Once 09/09/24 0930 09/09/24 1015    09/09/24 0038  cefTRIAXone (ROCEPHIN) 2,000 mg in sodium chloride 0.9 % 100 mL IVPB-VTB        Ordering Provider: Martir Cordova MD    2,000 mg  200 mL/hr over 30 Minutes Intravenous Once 09/09/24 0054 09/09/24 0153          Current Diuretic Therapy:  Diuretics (From admission, onward)      Ordered     Dose/Rate Route Frequency Start Stop    09/12/24 1055  furosemide (LASIX) injection 60 mg        Ordering Provider: Floyd Turcios MD    60 mg Intravenous Once 09/12/24 1145 09/12/24 1258    09/11/24 1331  furosemide (LASIX) injection 40 mg        Ordering Provider: Floyd Turcios MD    40 mg Intravenous Once 09/11/24 1430 09/11/24 1459    09/10/24 1422  furosemide (LASIX) injection 60 mg        Ordering Provider: Floyd Turcios MD    60 mg Intravenous Once 09/10/24 1515 09/10/24 1615    09/09/24 0843  furosemide (LASIX) injection 40 mg        Ordering Provider: Rosa Mishra DO    40 mg Intravenous Once 09/09/24 0930 09/09/24 0945    09/09/24 0843  [Held by provider]  hydroCHLOROthiazide tablet 25 mg        (On hold since Mon 9/9/2024 at 0843 until manually unheld; held by Delia Jaramillo DOHold Reason: Other (Comment Required)Hold Comments: Low normal blood pressure)   Ordering Provider: Delia Jaramillo DO    25 mg Oral Every Morning 09/09/24 0930            ----------------------------------------------------------------------------------------------------------------------  Vital Signs:  Temp:  [97.7 °F (36.5 °C)-98.3 °F (36.8 °C)] 98 °F (36.7 °C)  Heart Rate:  [] 105  Resp:  [18-33] 22  BP: (100-169)/() 128/80  SpO2:  [85 %-100 %] 98 %  on  Flow (L/min):  [4-50] 4;   Device (Oxygen  Therapy): nasal cannula  Body mass index is 43.73 kg/m².    Wt Readings from Last 3 Encounters:   09/12/24 119 kg (262 lb 12.6 oz)   08/30/24 118 kg (260 lb)   05/30/24 101 kg (222 lb)     Intake & Output (last 3 days)         09/09 0701  09/10 0700 09/10 0701 09/11 0700 09/11 0701 09/12 0700 09/12 0701 09/13 0700    P.O.  710    I.V. (mL/kg) 717 (6)       IV Piggyback        Total Intake(mL/kg) 939 (7.9) 804 (6.8) 1164 (9.8) 710 (6)    Urine (mL/kg/hr) 1650 (0.6) 6561 (2.3) 4800 (1.7) 1900 (1.5)    Stool    0    Total Output 1650 6561 4800 1900    Net -711 -5757 -3636 -1190            Urine Unmeasured Occurrence 7 x  1 x     Stool Unmeasured Occurrence    1 x          Diet: Diabetic, Regular/House; Consistent Carbohydrate; Texture: Soft to Chew (NDD 3); Soft to Chew: Chopped Meat; Fluid Consistency: Thin (IDDSI 0)  ----------------------------------------------------------------------------------------------------------------------  Physical exam:   Constitutional:  Well-developed and well-nourished. No acute distress.      HENT:  Head:  Normocephalic and atraumatic.    Cardiovascular:  Normal rate, regular rhythm  Pulmonary/Chest:  No respiratory distress, minimal crackles bilateral lower lobes, no wheezing noted   Musculoskeletal:  No deformity.    Neurological: Awake, alert, no focal deficit on gross examination. No slurred speech or facial droop.   Skin:  Skin is warm and dry.  Peripheral vascular:  No cyanosis  Psychiatric: Appropriate mood and affect    Edited by: Delia Jaramillo DO at 9/12/2024 1725  ----------------------------------------------------------------------------------------------------------------------  Results from last 7 days   Lab Units 09/12/24  0026 09/11/24  0159 09/10/24  0205 09/09/24  0855 09/09/24  0050 09/09/24  0044   LACTATE mmol/L  --   --   --   --  1.4  --    WBC 10*3/mm3 12.64* 12.63* 19.66*   < >  --  20.87*   HEMOGLOBIN g/dL 11.4* 10.5* 11.3*   < >  --  12.6  "  HEMATOCRIT % 35.3 32.8* 35.5   < >  --  37.0   MCV fL 94.9 95.6 97.3*   < >  --  90.9   MCHC g/dL 32.3 32.0 31.8   < >  --  34.1   PLATELETS 10*3/mm3 358 336 287   < >  --  323   INR   --   --   --   --   --  1.00    < > = values in this interval not displayed.     Results from last 7 days   Lab Units 09/09/24  2137   PH, ARTERIAL pH units 7.446   PO2 ART mm Hg 53.7*   PCO2, ARTERIAL mm Hg 43.3   HCO3 ART mmol/L 29.8*     Results from last 7 days   Lab Units 09/12/24  0026 09/11/24  0159 09/10/24  0205 09/09/24  0855 09/09/24  0102   SODIUM mmol/L 139 131* 132* 134* 130*   POTASSIUM mmol/L 4.5 4.7 4.6 3.9 3.5   MAGNESIUM mg/dL  --   --  2.0  --  1.5*   CHLORIDE mmol/L 98 94* 97* 97* 93*   CO2 mmol/L 26.1 24.8 23.7 27.2 26.4   BUN mg/dL 18 18 14 14 13   CREATININE mg/dL 0.80 0.82 0.70 0.68 0.78   CALCIUM mg/dL 10.0 9.2 8.9 8.8 8.9   GLUCOSE mg/dL 249* 514* 402* 282* 328*   ALBUMIN g/dL  --   --   --  3.6 3.7   BILIRUBIN mg/dL  --   --   --  <0.2 <0.2   ALK PHOS U/L  --   --   --  96 106   AST (SGOT) U/L  --   --   --  20 17   ALT (SGPT) U/L  --   --   --  11 14   Estimated Creatinine Clearance: 111.1 mL/min (by C-G formula based on SCr of 0.8 mg/dL).  No results found for: \"AMMONIA\"  Results from last 7 days   Lab Units 09/09/24  0258 09/09/24  0102   HSTROP T ng/L 11 10     Results from last 7 days   Lab Units 09/09/24  0258   PROBNP pg/mL 221.6         Glucose   Date/Time Value Ref Range Status   09/12/2024 1632 384 (H) 70 - 130 mg/dL Final   09/12/2024 1105 397 (H) 70 - 130 mg/dL Final   09/12/2024 0632 346 (H) 70 - 130 mg/dL Final   09/11/2024 2113 341 (H) 70 - 130 mg/dL Final   09/11/2024 1915 464 (C) 70 - 130 mg/dL Final   09/11/2024 1812 461 (C) 70 - 130 mg/dL Final   09/11/2024 1142 258 (H) 70 - 130 mg/dL Final   09/11/2024 0621 391 (H) 70 - 130 mg/dL Final     Lab Results   Component Value Date    TSH 1.440 03/18/2024    FREET4 0.98 06/07/2021     No results found for: \"PREGTESTUR\", \"PREGSERUM\", \"HCG\", " "\"HCGQUANT\"  Pain Management Panel  More data exists         Latest Ref Rng & Units 9/9/2024 11/3/2023   Pain Management Panel   Creatinine, Urine Not Estab. mg/dL - 51.9    Amphetamine, Urine Qual Negative Negative  -   Barbiturates Screen, Urine Negative Negative  -   Benzodiazepine Screen, Urine Negative Negative  -   Buprenorphine, Screen, Urine Negative Negative  -   Cocaine Screen, Urine Negative Negative  -   Fentanyl, Urine Negative Negative  -   Methadone Screen , Urine Negative Negative  -   Methamphetamine, Ur Negative Negative  -      Details                 Brief Urine Lab Results  (Last result in the past 365 days)        Color   Clarity   Blood   Leuk Est   Nitrite   Protein   CREAT   Urine HCG        09/09/24 0043 Yellow   Clear   Negative   Negative   Negative   Negative                 Blood Culture   Date Value Ref Range Status   09/09/2024 No growth at 3 days  Preliminary   09/09/2024 No growth at 3 days  Preliminary     No results found for: \"URINECX\"  No results found for: \"WOUNDCX\"  No results found for: \"STOOLCX\"  No results found for: \"RESPCX\"  No results found for: \"AFBCX\"  Results from last 7 days   Lab Units 09/09/24  0102 09/09/24  0050   PROCALCITONIN ng/mL 0.07  --    LACTATE mmol/L  --  1.4       I have personally looked at the labs and they are summarized above.  ----------------------------------------------------------------------------------------------------------------------  Detailed radiology reports for the last 24 hours:  Imaging Results (Last 24 Hours)       Procedure Component Value Units Date/Time    XR Chest 1 View [[id number]] Collected: 09/12/24 0854     Updated: 09/12/24 0858    Narrative:      PROCEDURE: XR CHEST 1 VW-       HISTORY: sob; J96.01-Acute respiratory failure with hypoxia;  J18.9-Pneumonia, unspecified organism     COMPARISON: 9/8/2024.     FINDINGS: The heart is normal in size. The mediastinum is unremarkable.  There has been interval improvement in the " patient's left lung airspace  disease. There are a few patchy opacities within the right lung. There  is no pneumothorax. There are no acute osseous abnormalities.       Impression:      Improvement in the patient's left lung airspace disease with  patchy opacities remaining within the right lung.        This report was finalized on 9/12/2024 8:56 AM by Clint Bustamante M.D..             Assessment & Plan      # Acute on chronic hypoxic respiratory failure  # COPD with acute exacerbation  # Bilateral pneumonia versus interstitial lung disease  # Sepsis secondary to pneumonia versus SIRS criteria met due to COPD exacerbation  - Patient is improving  -Pulmonology following, appreciate assistance  -CT of the chest and noted diffuse crazy paving opacities in the lungs which could be due to edema, ARDS, infection, or pulmonary alveolar proteinosis per radiologist.  Could also be seen with sarcoidosis or desquamative interstitial pneumonitis secondary to smoking.  -Respiratory culture ordered  -Procalcitonin was normal, so bacterial pneumonia seems less likely.  Respiratory viral panel PCR was negative.  Strep pneumo antigen negative.  MRSA screen negative.  - CRESCENCIO, negative. Immunoglobulins WNL.   - LDH was elevated.   - GM-CSF and autoantibodies to GM-CSF still pending  - Patient was able to be weaned to 4L NC today.  - Continue high dose steroids and begin weaning per Pulmonology. Currently receiving solu-medrol 60mg IV q12h.  - Lasix given again today per Pulmonology to help improve lung compliance   - Continue nebulizer treatments     # Incidentally noted right thyroid nodule  -Initially thyroid nodule was noted on CT scan, and a follow-up ultrasound was recommended.  Ultrasound was obtained showing a 1.1 cm well-circumscribed mildly hypoechoic nodule left lobe of the thyroid.  Follow-up ultrasound in 1 year is recommended.    # Diabetes mellitus type 2, insulin-dependent and complicated by hyperglycemia  -Blood  glucose remains uncontrolled (worsened secondary to high-dose steroid use).  Blood sugar still significantly elevated, but it is a little improved from yesterday. Increase lantus to 40 units q12h. I anticipate that insulin needs will start decreasing as steroids are weaned. Continue Accu-Cheks and high dose sliding scale insulin. Continue to titrate as needed for improved glucose control.    # Chronic HFpEF  -Echo from March 2024 reviewed that showed normal left ventricular systolic function, left ventricular EF 61 to 65%, grade 1 impaired diastolic relaxation.  -Spot dose Lasix as needed  -Monitor intake and output    # Mild hypomagnesemia  -Resolved after supplementation    #Prolonged Qtc  - Qtc was 473 on admission. Possibly due to electrolyte abnormalities. Repeat EKG today to reassess. If QT has improved then we can likely discontinue telemetry monitoring, as she has not had any significant arrhythmia noted.    # Morbid obesity, Body mass index is 43.8 kg/m².   -Continue supportive care, encourage healthy lifestyle choices  Edited by: Delia Jaramillo DO at 9/12/2024 8035    Active VTE Prophylaxis:  Pharmacologic:        Start     Dose Route Frequency Stop    09/09/24 0930  heparin (porcine) 5000 UNIT/ML injection 5,000 Units         5,000 Units SC Every 12 Hours Scheduled --                    Dispo:  likely home at discharge pending clinical improvement     Delia Jaramillo DO  [facility]  09/12/24  17:25 EDT

## 2024-09-12 NOTE — CASE MANAGEMENT/SOCIAL WORK
Discharge Planning Assessment   [place]     Patient Name: Ana Ortiz  MRN: [id number]  Today's Date: 9/12/2024    Admit Date: 9/8/2024     Discharge Plan       Row Name 09/12/24 1545       Plan    Plan SS was notified by RN and CM on 9/11 that pt was requesting resources for food, electric etc. SS spoke with pt at bedside who states she lives alone and works everyday. Pt voiced that she is having a hard time paying for food mostly. Pt voiced that she works five days a week. Pt voiced that she has two daughters and one lives in [place]. Once daughter helps when she can. SS provided pt with resources for food pantry, [facility] ADD, Waiver services and DCBS numbers. SS contacted [employer] per Mathew who requested SS to have pt contact him. SS spoke with pt and pt is agreeable to contact him. Pt states she will need transportation arranged at discharge. SS to follow and assist with discharge planning.          BORA OwenW

## 2024-09-12 NOTE — THERAPY TREATMENT NOTE
Acute Care - Physical Therapy Treatment Note  [facility]     Patient Name: Ana Ortiz  : 1975  MRN: [id number]  Today's Date: 2024   Onset of Illness/Injury or Date of Surgery: 24  Visit Dx:     ICD-10-CM ICD-9-CM   1. Acute hypoxic respiratory failure  J96.01 518.81   2. Multifocal pneumonia  J18.9 486     Patient Active Problem List   Diagnosis    Morbidly obese    Acute respiratory failure with hypoxia and hypercapnia    Cutaneous abscess of back excluding buttocks    JOANN (obstructive sleep apnea)    Chronic bronchitis    Cigarette nicotine dependence without complication    Chronic respiratory failure with hypoxia    Diabetes mellitus, type II    Type 2 diabetes mellitus with hyperglycemia, with long-term current use of insulin    Severe sepsis    Sepsis due to pneumonia     Past Medical History:   Diagnosis Date    Arthritis     CHF (congestive heart failure)     COPD (chronic obstructive pulmonary disease)     Diabetes mellitus     Elevated cholesterol     GERD (gastroesophageal reflux disease)     Hyperlipemia     Hypertension     JOANN (obstructive sleep apnea)     Pneumonia     Vulvar cancer      Past Surgical History:   Procedure Laterality Date    HYSTERECTOMY      INCISION AND DRAINAGE TRUNK N/A 2022    Procedure: INCISION AND DRAINAGE BACK;  Surgeon: Tamera Saez MD;  Location: [facility];  Service: General;  Laterality: N/A;    MASTECTOMY      VULVA SURGERY      reconstruction due ca     PT Assessment (Last 12 Hours)       PT Evaluation and Treatment       Row Name 24 3815          Physical Therapy Time and Intention    Subjective Information no complaints  -AG     Document Type therapy note (daily note)  -AG     Mode of Treatment physical therapy  -AG     Patient Effort good  -AG     Symptoms Noted During/After Treatment significant change in vital signs  -AG       Row Name 24 1313          General Information    Patient Profile Reviewed yes  -AG     Patient  Observations agree to therapy;cooperative;alert  -AG     Existing Precautions/Restrictions fall;oxygen therapy device and L/min  -AG     Risks Reviewed patient:;change in vital signs  -AG     Benefits Reviewed patient:;improve function;increase independence;increase strength;increase balance;increase knowledge;decrease risk of DVT  -AG     Barriers to Rehab medically complex  -AG       Row Name 09/12/24 1319          Pain    Pretreatment Pain Rating 0/10 - no pain  -AG     Posttreatment Pain Rating 0/10 - no pain  -AG       [facility] Name 09/12/24 1319          Cognition    Affect/Mental Status (Cognition) WNL  -AG     Orientation Status (Cognition) oriented x 3  -AG     Follows Commands (Cognition) WFL  -     Personal Safety Interventions fall prevention program maintained;gait belt;nonskid shoes/slippers when out of bed;supervised activity  -AG       Row Name 09/12/24 1319          Sensory    Hearing Status WFL  -[facility] Name 09/12/24 1319          Bed Mobility    Comment, (Bed Mobility) not observed by PT: pt reclining in bedsid chair upon PT entry.  -AG       Row Name 09/12/24 1319          Transfers    Transfers sit-stand transfer;stand-sit transfer  -AG       Row Name 09/12/24 1319          Sit-Stand Transfer    Sit-Stand [place] (Transfers) standby assist  -AG     Assistive Device (Sit-Stand Transfers) walker, front-wheeled  -AG       [facility] Name 09/12/24 1319          Stand-Sit Transfer    Stand-Sit [place] (Transfers) standby assist  -AG     Assistive Device (Stand-Sit Transfers) walker, front-wheeled  -AG       [facility] Name 09/12/24 1319          Gait/Stairs (Locomotion)    [place] Level (Gait) standby assist;contact guard;verbal cues  -AG     Assistive Device (Gait) walker, front-wheeled  -AG     Patient was able to Ambulate yes  -AG     Distance in Feet (Gait) 60  -AG     Pattern (Gait) swing-to  -AG     Deviations/Abnormal Patterns (Gait) gait speed decreased  -AG       Row Name 09/12/24 1319           Safety Issues, Functional Mobility    Impairments Affecting Function (Mobility) balance;endurance/activity tolerance  -AG       Row Name 09/12/24 1319          Balance    Static Sitting Balance independent  -     Static Standing Balance standby assist  -     Dynamic Standing Balance contact guard;verbal cues;standby assist  -AG     Position/Device Used, Standing Balance walker, front-wheeled  -AG       Row Name 09/12/24 1319          Motor Skills    Therapeutic Exercise hip;knee;ankle  -AG       [facility] Name 09/12/24 1319          Hip (Therapeutic Exercise)    Hip (Therapeutic Exercise) strengthening exercise  -     Hip Strengthening (Therapeutic Exercise) bilateral;flexion;extension;sitting  -AG       [facility] Name 09/12/24 1319          Knee (Therapeutic Exercise)    Knee (Therapeutic Exercise) strengthening exercise  -     Knee Strengthening (Therapeutic Exercise) bilateral;flexion;extension;SLR (straight leg raise);LAQ (long arc quad);sitting  and long-sitting  -[facility] Name 09/12/24 1319          Ankle (Therapeutic Exercise)    Ankle (Therapeutic Exercise) strengthening exercise  -     Ankle Strengthening (Therapeutic Exercise) bilateral;dorsiflexion;plantarflexion;sitting  -AG       [facility] Name 09/12/24 1319          Coping    Observed Emotional State calm;cooperative;pleasant  -AG     Verbalized Emotional State acceptance  -     Trust Relationship/Rapport care explained;choices provided;thoughts/feelings acknowledged  -     Family/Support Persons family  -AG     Involvement in Care not present at bedside  -     Family/Support System Care support provided;self-care encouraged  -[facility] Name 09/12/24 1319          Plan of Care Review    Plan of Care Reviewed With patient  -AG     Outcome Evaluation PT treatment complete.  Pt. seated in bedside chair on 6L O2 nc; pt. ambulated w/ SBA, CGA w/ RW with minimal c/o dyspnea but did show O2 desaturation to 85%.  Pt. performed sitting and  long-sitting B LE TE for strengthening and endurance.  Will continue to follow  -AG       Row Name 09/12/24 1319          Vital Signs    Intra SpO2 (%) 85  -AG     O2 Delivery Intra Treatment supplemental O2  -AG     Post SpO2 (%) 99  -AG     O2 Delivery Post Treatment supplemental O2  -AG       Row Name 09/12/24 1319          Positioning and Restraints    Pre-Treatment Position sitting in chair/recliner  -AG     Post Treatment Position chair  -AG     In Chair sitting;call light within reach;encouraged to call for assist  -AG       Row Name 09/12/24 1319          Therapy Plan Review/Discharge Plan (PT)    Therapy Plan Review (PT) patient;participants included;evaluation/treatment results reviewed  -               User Key  (r) = Recorded By, (t) = Taken By, (c) = Cosigned By      Initials Name Provider Type     Charisse Mc, PT Physical Therapist                    Physical Therapy Education       Title: PT OT SLP Therapies (Done)       Topic: Physical Therapy (Done)       Point: Mobility training (Done)       Learning Progress Summary             Patient Acceptance, E,D, VU,NR by  at 9/12/2024 1318                         Point: Home exercise program (Done)       Learning Progress Summary             Patient Acceptance, E,D, VU,NR by  at 9/12/2024 1318                         Point: Body mechanics (Done)       Learning Progress Summary             Patient Acceptance, E,D, VU,NR by  at 9/12/2024 1318                         Point: Precautions (Done)       Learning Progress Summary             Patient Acceptance, E,D, VU,NR by  at 9/12/2024 1318                                         User Key       Initials Effective Dates Name Provider Type [facility] 06/16/21 -  Charisse Mc, PT Physical Therapist PT                  PT Recommendation and Plan  Anticipated Discharge Disposition (PT): home, home with assist  Plan of Care Reviewed With: patient  Outcome Evaluation: PT treatment complete.  Pt.  seated in bedside chair on 6L O2 nc; pt. ambulated w/ SBA, CGA w/ RW with minimal c/o dyspnea but did show O2 desaturation to 85%.  Pt. performed sitting and long-sitting B LE TE for strengthening and endurance.  Will continue to follow       Time Calculation:    PT Charges       Row Name 09/12/24 1317             Time Calculation    PT Received On 09/12/24  -                User Key  (r) = Recorded By, (t) = Taken By, (c) = Cosigned By      Initials Name Provider Type     Charisse Mc, PT Physical Therapist                  Therapy Charges for Today       Code Description Service Date Service Provider Modifiers Qty    95242466708 HC GAIT TRAINING EA 15 MIN 9/12/2024 Charisse Mc, PT GP 1    [id number] HC PT THER PROC EA 15 MIN 9/12/2024 Charisse Mc, PT GP 1            PT G-Janis  AM-PAC 6 Clicks Score (PT): 21    Charisse Mc PT  9/12/2024

## 2024-09-12 NOTE — PLAN OF CARE
Goal Outcome Evaluation:                   PT lying in bed awake, pt ambulated to bsc and tolerated well, pt currently on HHF NC at 50L 44% tolerating well. See Mar for prn pain medication given.

## 2024-09-12 NOTE — PLAN OF CARE
Goal Outcome Evaluation:           Progress: improving  Outcome Evaluation: Pt remains A&OX4 this far in the shift. Pt currently on 4L nasal cannula, she was titrated down from FNC. Pt ambulating to bedside commode this shift with only standby assist. Pt diurseing well this shift. Pt denies any pain this shift. Pt treated with PRN medications.

## 2024-09-13 ENCOUNTER — READMISSION MANAGEMENT (OUTPATIENT)
Dept: CALL CENTER | Facility: HOSPITAL | Age: 49
End: 2024-09-13
Payer: MEDICAID

## 2024-09-13 VITALS
HEART RATE: 97 BPM | OXYGEN SATURATION: 100 % | BODY MASS INDEX: 43.78 KG/M2 | WEIGHT: 262.79 LBS | DIASTOLIC BLOOD PRESSURE: 74 MMHG | HEIGHT: 65 IN | SYSTOLIC BLOOD PRESSURE: 115 MMHG | RESPIRATION RATE: 20 BRPM | TEMPERATURE: 98.4 F

## 2024-09-13 LAB
ANION GAP SERPL CALCULATED.3IONS-SCNC: 12.7 MMOL/L (ref 5–15)
BUN SERPL-MCNC: 21 MG/DL (ref 6–20)
BUN/CREAT SERPL: 25.3 (ref 7–25)
CALCIUM SPEC-SCNC: 9.8 MG/DL (ref 8.6–10.5)
CHLORIDE SERPL-SCNC: 95 MMOL/L (ref 98–107)
CO2 SERPL-SCNC: 28.3 MMOL/L (ref 22–29)
CREAT SERPL-MCNC: 0.83 MG/DL (ref 0.57–1)
DEPRECATED RDW RBC AUTO: 46.4 FL (ref 37–54)
EGFRCR SERPLBLD CKD-EPI 2021: 87.1 ML/MIN/1.73
ERYTHROCYTE [DISTWIDTH] IN BLOOD BY AUTOMATED COUNT: 13.4 % (ref 12.3–15.4)
GLUCOSE BLDC GLUCOMTR-MCNC: 236 MG/DL (ref 70–130)
GLUCOSE BLDC GLUCOMTR-MCNC: 293 MG/DL (ref 70–130)
GLUCOSE BLDC GLUCOMTR-MCNC: 332 MG/DL (ref 70–130)
GLUCOSE SERPL-MCNC: 343 MG/DL (ref 65–99)
HCT VFR BLD AUTO: 36.7 % (ref 34–46.6)
HGB BLD-MCNC: 11.9 G/DL (ref 12–15.9)
MCH RBC QN AUTO: 30.4 PG (ref 26.6–33)
MCHC RBC AUTO-ENTMCNC: 32.4 G/DL (ref 31.5–35.7)
MCV RBC AUTO: 93.9 FL (ref 79–97)
PLATELET # BLD AUTO: 338 10*3/MM3 (ref 140–450)
PMV BLD AUTO: 9.6 FL (ref 6–12)
POTASSIUM SERPL-SCNC: 4.2 MMOL/L (ref 3.5–5.2)
QT INTERVAL: 360 MS
QTC INTERVAL: 433 MS
RBC # BLD AUTO: 3.91 10*6/MM3 (ref 3.77–5.28)
SODIUM SERPL-SCNC: 136 MMOL/L (ref 136–145)
WBC NRBC COR # BLD AUTO: 10.8 10*3/MM3 (ref 3.4–10.8)

## 2024-09-13 PROCEDURE — 94761 N-INVAS EAR/PLS OXIMETRY MLT: CPT

## 2024-09-13 PROCEDURE — 80048 BASIC METABOLIC PNL TOTAL CA: CPT | Performed by: STUDENT IN AN ORGANIZED HEALTH CARE EDUCATION/TRAINING PROGRAM

## 2024-09-13 PROCEDURE — 94799 UNLISTED PULMONARY SVC/PX: CPT

## 2024-09-13 PROCEDURE — 63710000001 INSULIN GLARGINE PER 5 UNITS: Performed by: STUDENT IN AN ORGANIZED HEALTH CARE EDUCATION/TRAINING PROGRAM

## 2024-09-13 PROCEDURE — 94664 DEMO&/EVAL PT USE INHALER: CPT

## 2024-09-13 PROCEDURE — 85027 COMPLETE CBC AUTOMATED: CPT | Performed by: STUDENT IN AN ORGANIZED HEALTH CARE EDUCATION/TRAINING PROGRAM

## 2024-09-13 PROCEDURE — 25010000002 HEPARIN (PORCINE) PER 1000 UNITS: Performed by: STUDENT IN AN ORGANIZED HEALTH CARE EDUCATION/TRAINING PROGRAM

## 2024-09-13 PROCEDURE — 25010000002 FUROSEMIDE PER 20 MG: Performed by: INTERNAL MEDICINE

## 2024-09-13 PROCEDURE — 94660 CPAP INITIATION&MGMT: CPT

## 2024-09-13 PROCEDURE — 99239 HOSP IP/OBS DSCHRG MGMT >30: CPT | Performed by: STUDENT IN AN ORGANIZED HEALTH CARE EDUCATION/TRAINING PROGRAM

## 2024-09-13 PROCEDURE — 99232 SBSQ HOSP IP/OBS MODERATE 35: CPT | Performed by: INTERNAL MEDICINE

## 2024-09-13 PROCEDURE — 25010000002 METHYLPREDNISOLONE PER 125 MG: Performed by: STUDENT IN AN ORGANIZED HEALTH CARE EDUCATION/TRAINING PROGRAM

## 2024-09-13 PROCEDURE — 63710000001 INSULIN LISPRO (HUMAN) PER 5 UNITS: Performed by: STUDENT IN AN ORGANIZED HEALTH CARE EDUCATION/TRAINING PROGRAM

## 2024-09-13 PROCEDURE — 82948 REAGENT STRIP/BLOOD GLUCOSE: CPT

## 2024-09-13 RX ORDER — PREDNISONE 10 MG/1
TABLET ORAL
Qty: 43 TABLET | Refills: 0 | Status: SHIPPED | OUTPATIENT
Start: 2024-09-13 | End: 2024-09-27

## 2024-09-13 RX ORDER — METHYLPREDNISOLONE SODIUM SUCCINATE 40 MG/ML
40 INJECTION, POWDER, LYOPHILIZED, FOR SOLUTION INTRAMUSCULAR; INTRAVENOUS EVERY 12 HOURS SCHEDULED
Status: DISCONTINUED | OUTPATIENT
Start: 2024-09-13 | End: 2024-09-13 | Stop reason: HOSPADM

## 2024-09-13 RX ORDER — POTASSIUM CHLORIDE 1.5 G/1.58G
40 POWDER, FOR SOLUTION ORAL ONCE
Status: COMPLETED | OUTPATIENT
Start: 2024-09-13 | End: 2024-09-13

## 2024-09-13 RX ORDER — FUROSEMIDE 10 MG/ML
60 INJECTION INTRAMUSCULAR; INTRAVENOUS ONCE
Status: COMPLETED | OUTPATIENT
Start: 2024-09-13 | End: 2024-09-13

## 2024-09-13 RX ORDER — PANTOPRAZOLE SODIUM 40 MG/1
40 TABLET, DELAYED RELEASE ORAL DAILY
Qty: 30 TABLET | Refills: 0 | Status: SHIPPED | OUTPATIENT
Start: 2024-09-13 | End: 2024-10-13

## 2024-09-13 RX ADMIN — PANTOPRAZOLE SODIUM 40 MG: 40 INJECTION, POWDER, FOR SOLUTION INTRAVENOUS at 05:30

## 2024-09-13 RX ADMIN — IPRATROPIUM BROMIDE AND ALBUTEROL SULFATE 3 ML: 2.5; .5 SOLUTION RESPIRATORY (INHALATION) at 00:22

## 2024-09-13 RX ADMIN — MELOXICAM 7.5 MG: 7.5 TABLET ORAL at 08:24

## 2024-09-13 RX ADMIN — GABAPENTIN 800 MG: 400 CAPSULE ORAL at 08:24

## 2024-09-13 RX ADMIN — ATORVASTATIN CALCIUM 40 MG: 40 TABLET, FILM COATED ORAL at 08:24

## 2024-09-13 RX ADMIN — SENNOSIDES AND DOCUSATE SODIUM 2 TABLET: 50; 8.6 TABLET ORAL at 08:24

## 2024-09-13 RX ADMIN — CLONAZEPAM 1 MG: 1 TABLET ORAL at 10:49

## 2024-09-13 RX ADMIN — INSULIN LISPRO 12 UNITS: 100 INJECTION, SOLUTION INTRAVENOUS; SUBCUTANEOUS at 10:49

## 2024-09-13 RX ADMIN — CETIRIZINE HYDROCHLORIDE 10 MG: 10 TABLET, FILM COATED ORAL at 08:24

## 2024-09-13 RX ADMIN — IPRATROPIUM BROMIDE AND ALBUTEROL SULFATE 3 ML: 2.5; .5 SOLUTION RESPIRATORY (INHALATION) at 07:30

## 2024-09-13 RX ADMIN — LIDOCAINE 1 PATCH: 4 PATCH TOPICAL at 08:23

## 2024-09-13 RX ADMIN — METHYLPREDNISOLONE SODIUM SUCCINATE 60 MG: 125 INJECTION, POWDER, FOR SOLUTION INTRAMUSCULAR; INTRAVENOUS at 08:25

## 2024-09-13 RX ADMIN — NICOTINE 1 PATCH: 7 PATCH, EXTENDED RELEASE TRANSDERMAL at 08:23

## 2024-09-13 RX ADMIN — INSULIN LISPRO 16 UNITS: 100 INJECTION, SOLUTION INTRAVENOUS; SUBCUTANEOUS at 08:24

## 2024-09-13 RX ADMIN — BUDESONIDE AND FORMOTEROL FUMARATE DIHYDRATE 2 PUFF: 160; 4.5 AEROSOL RESPIRATORY (INHALATION) at 07:30

## 2024-09-13 RX ADMIN — HEPARIN SODIUM 5000 UNITS: 5000 INJECTION INTRAVENOUS; SUBCUTANEOUS at 08:25

## 2024-09-13 RX ADMIN — POTASSIUM CHLORIDE 40 MEQ: 1.5 POWDER, FOR SOLUTION ORAL at 10:49

## 2024-09-13 RX ADMIN — INSULIN GLARGINE 40 UNITS: 100 INJECTION, SOLUTION SUBCUTANEOUS at 08:25

## 2024-09-13 RX ADMIN — FUROSEMIDE 60 MG: 10 INJECTION, SOLUTION INTRAMUSCULAR; INTRAVENOUS at 10:49

## 2024-09-13 NOTE — DISCHARGE PLACEMENT REQUEST
"Charles Fernandez (48 y.o. Female)       Date of Birth   1975    Social Security Number       Address   [address]  [place] [id number]    Home Phone   [phone]    MRN   [id number]       Confucianism   None    Marital Status                               Admission Date   9/8/24    Admission Type   Emergency    Admitting Provider   Rosa Mishra DO    Attending Provider   Bonnie Jaramillo DO    Department, Room/Bed   [address], 3340/2S       Discharge Date       Discharge Disposition   Home or Self Care    Discharge Destination                                 Attending Provider: Bonnie Jaramillo DO    Allergies: Codeine, Metformin    Isolation: None   Infection: None   Code Status: CPR    Ht: 165.1 cm (65\")   Wt: 119 kg (262 lb 12.6 oz)    Admission Cmt: None   Principal Problem: Sepsis due to pneumonia [J18.9,A41.9]                   Active Insurance as of 9/8/2024       Primary Coverage       Payor Plan Insurance Group Employer/Plan Group    [place] [id number]       Payor Plan Address Payor Plan Phone Number Payor Plan Fax Number Effective Dates    [employer] [address] [address] [phone]  1/1/2021 - None Entered    [facility] [id number]         Subscriber Name Subscriber Birth Date Member ID       CHARLES FERNANDEZ 1975 [id number]                     Emergency Contacts        (Rel.) Home Phone Work Phone Mobile Phone    HORACIO WATTERS (Daughter) [phone] -- --              Discharge Order (From admission, onward)       Start     Ordered    09/13/24 1140  Discharge patient  Once        Expected Discharge Date: 09/13/24   Discharge Disposition: Home or Self Care   Physician of Record for Attribution - Please select from Treatment Team: BONNIE JARAMILLO [[id number]]   Review needed by CMO to determine Physician of Record: No      Question Answer Comment   Physician of Record for Attribution - Please select from " Treatment Team BONNIE ELLISON    Review needed by CMO to determine Physician of Record No        09/13/24 1146

## 2024-09-13 NOTE — PLAN OF CARE
Goal Outcome Evaluation:              Outcome Evaluation: Pt resting in bed at this time. VSS on 2L nc. Pt ambulated in room independently this shift. Alert and orieted x4. No complaints or concerns at this time. Will continue plan of care.

## 2024-09-13 NOTE — PLAN OF CARE
Goal Outcome Evaluation:                           Pt being discharged. Iv and tele removed.

## 2024-09-13 NOTE — CASE MANAGEMENT/SOCIAL WORK
Discharge Planning Assessment   Trever     Patient Name: Ana Ortiz  MRN: [id number]  Today's Date: 9/13/2024    Admit Date: 9/8/2024            Discharge Plan       Row Name 09/13/24 1321       Plan    Final Discharge Disposition Code 01 - home or self-care    Final Note Pt is being discharged home on this date. SS notified Pt will need [facility] transport. SS to contact [facility] once discharge is completd, meds delivered and portable O2 delivered to bedside.    14:15pm: SS contacted [facility] per Migdalia [phone] and scheduled transport.. SS faxed dc order fax [phone]. [facility] to contact floor [phone] once  arrives.                         ALYCIA Goldstein

## 2024-09-13 NOTE — PROGRESS NOTES
Progress note Pulmonary and critical care          Subjective-    Overnight events reviewed.  All the labs medications in's and outs and vitals reviewed.  Patient transferred from PCU to regular medical floor.  Was on 2 L nasal cannula oxygen.  Asked to do a ambulatory pulse oximetry and patient desaturated to 85%.  Patient will need at least 2 L of nasal cannula oxygen.  Can follow-up with pulmonary on the outpatient basis.              Review of Systems-no major changes  Positive for shortness of breath and cough.  Otherwise negative    History  Past Medical History:   Diagnosis Date    Arthritis     CHF (congestive heart failure)     COPD (chronic obstructive pulmonary disease)     Diabetes mellitus     Elevated cholesterol     GERD (gastroesophageal reflux disease)     Hyperlipemia     Hypertension     JOANN (obstructive sleep apnea)     Pneumonia     Vulvar cancer    ,   Past Surgical History:   Procedure Laterality Date    HYSTERECTOMY      INCISION AND DRAINAGE TRUNK N/A 1/11/2022    Procedure: INCISION AND DRAINAGE BACK;  Surgeon: Tamera Saez MD;  Location: [facility];  Service: General;  Laterality: N/A;    MASTECTOMY      VULVA SURGERY      reconstruction due ca   ,   Family History   Problem Relation Age of Onset    Cancer Mother     Hypertension Mother     Parkinsonism Father     Diabetes Father     Hypertension Father     Cancer Maternal Aunt     Diabetes Paternal Aunt     Diabetes Paternal Grandmother     Cancer Maternal Aunt     Diabetes Brother    ,   Social History     Tobacco Use    Smoking status: Every Day     Current packs/day: 1.50     Average packs/day: 1.4 packs/day for 29.3 years (42.2 ttl pk-yrs)     Types: Cigarettes     Start date: 5/18/1995     Passive exposure: Current    Smokeless tobacco: Never    Tobacco comments:     8-10 per day    Vaping Use    Vaping status: Former    Substances: Nicotine, Flavoring    Devices: RefNightingaleble tank   Substance Use Topics    Alcohol use: Never     Drug use: Never   ,   Medications Prior to Admission   Medication Sig Dispense Refill Last Dose    albuterol sulfate HFA (Ventolin HFA) 108 (90 Base) MCG/ACT inhaler Inhale 2 puffs Every 4 (Four) Hours As Needed for Wheezing or Shortness of Air. 18 g 12 9/8/2024    budesonide-formoterol (Symbicort) 160-4.5 MCG/ACT inhaler Inhale 2 puffs 2 (Two) Times a Day. 1 each 8 9/8/2024    cetirizine (zyrTEC) 10 MG tablet Take 1 tablet by mouth Daily.   9/8/2024    clonazePAM (KlonoPIN) 1 MG tablet Take 1 tablet by mouth 4 (Four) Times a Day As Needed for Anxiety.   9/8/2024    cyclobenzaprine (FLEXERIL) 5 MG tablet Take 1 tablet by mouth 3 (Three) Times a Day As Needed for Muscle Spasms.   9/8/2024    fluticasone (FLONASE) 50 MCG/ACT nasal spray 2 sprays into the nostril(s) as directed by provider Daily As Needed for Rhinitis or Allergies.   9/8/2024    gabapentin (NEURONTIN) 800 MG tablet Take 1 tablet by mouth 4 (Four) Times a Day.   9/8/2024    glipizide (Glucotrol) 5 MG tablet Take 1 tablet by mouth 2 (Two) Times a Day Before Meals. 60 tablet 5 9/8/2024    hydroCHLOROthiazide (HYDRODIURIL) 25 MG tablet Take 1 tablet by mouth Every Morning.   9/8/2024    ibuprofen (ADVIL,MOTRIN) 800 MG tablet Take 1 tablet by mouth Every 8 (Eight) Hours As Needed for Mild Pain.   9/8/2024    ipratropium-albuterol (DUO-NEB) 0.5-2.5 mg/3 ml nebulizer Take 3 mL by nebulization Every 6 (Six) Hours As Needed for Wheezing or Shortness of Air. 360 mL 5 9/8/2024    lidocaine (LIDODERM) 5 % Place 1 patch on the skin as directed by provider Daily. Remove & Discard patch within 12 hours or as directed by MD   9/8/2024 at 1400    lisinopril (PRINIVIL,ZESTRIL) 5 MG tablet Take 0.5 tablets by mouth Daily.   9/8/2024    loperamide (IMODIUM) 2 MG capsule Take 1 capsule by mouth 4 (Four) Times a Day As Needed for Diarrhea.   Past Week    meloxicam (MOBIC) 7.5 MG tablet Take 1 tablet by mouth Every 12 (Twelve) Hours.   9/8/2024    ondansetron ODT  (ZOFRAN-ODT) 4 MG disintegrating tablet Take 1 tablet by mouth Every 6 (Six) Hours As Needed for Nausea or Vomiting. 12 tablet 0 Past Week    tiotropium (Spiriva HandiHaler) 18 MCG per inhalation capsule Place 1 capsule into inhaler and inhale Daily. 30 capsule 8 9/8/2024    Insulin Glargine (LANTUS SOLOSTAR) 100 UNIT/ML injection pen Inject 70 Units under the skin into the appropriate area as directed Every Night. 30 mL 2 9/7/2024    naloxone (NARCAN) 4 MG/0.1ML nasal spray Call 911. Don't prime. [place] in 1 nostril for overdose. Repeat in 2-3 minutes in other nostril if no or minimal breathing/responsiveness. 2 each 0 Unknown    Semaglutide, 2 MG/DOSE, (Ozempic, 2 MG/DOSE,) 8 MG/3ML solution pen-injector Inject 2 mg under the skin into the appropriate area as directed 1 (One) Time Per Week. 3 mL 2 8/27/2024    simvastatin (ZOCOR) 80 MG tablet Take 1 tablet by mouth every night at bedtime.   9/7/2024   , Scheduled Meds:  atorvastatin, 40 mg, Oral, Daily  budesonide-formoterol, 2 puff, Inhalation, BID - RT  cetirizine, 10 mg, Oral, Daily  furosemide, 60 mg, Intravenous, Once  gabapentin, 800 mg, Oral, 4x Daily  heparin (porcine), 5,000 Units, Subcutaneous, Q12H  [Held by provider] hydroCHLOROthiazide, 25 mg, Oral, QAM  insulin glargine, 40 Units, Subcutaneous, Q12H  insulin lispro, 4-24 Units, Subcutaneous, 4x Daily AC & at Bedtime  ipratropium-albuterol, 3 mL, Nebulization, 4x Daily - RT  Lidocaine, 1 patch, Transdermal, Q24H  lisinopril, 2.5 mg, Oral, Daily  meloxicam, 7.5 mg, Oral, Q12H  methylPREDNISolone sodium succinate, 60 mg, Intravenous, Q12H  nicotine, 1 patch, Transdermal, Q24H  pantoprazole, 40 mg, Intravenous, Q AM  potassium chloride, 40 mEq, Oral, Once  senna-docusate sodium, 2 tablet, Oral, BID  sodium chloride, 10 mL, Intravenous, Q12H  sodium chloride, 10 mL, Intravenous, Q12H    , Continuous Infusions:      and Allergies:  Codeine and Metformin    Objective     Vital Signs   Temp:  [98 °F (36.7  °C)-98.5 °F (36.9 °C)] 98 °F (36.7 °C)  Heart Rate:  [] 84  Resp:  [18-28] 18  BP: (111-146)/(52-80) 111/52    Physical Exam:          General-not in any respiratory distress, resting in bed comfortably  HEENT-PERRLA  Neck-supple, No JVD, no carotid bruit    Respiratory-clear to auscultation.  No wheezing no crackles    Cardiovascular-NSR  GI-nontender nondistended bowel sounds positive    CNS-alert oriented x3, grossly nonfocal      Extremities-no clubbing and edema    Psychiatric-mood good, good eye contact,     Skin- no visible rash             Results Review:    LABS:    Lab Results   Component Value Date    GLUCOSE 343 (H) 09/13/2024    BUN 21 (H) 09/13/2024    CREATININE 0.83 09/13/2024    EGFRIFNONA 93 01/13/2022    BCR 25.3 (H) 09/13/2024    CO2 28.3 09/13/2024    CALCIUM 9.8 09/13/2024    ALBUMIN 3.6 09/09/2024    AST 20 09/09/2024    ALT 11 09/09/2024    WBC 10.80 09/13/2024    HGB 11.9 (L) 09/13/2024    HCT 36.7 09/13/2024    MCV 93.9 09/13/2024     09/13/2024     09/13/2024    K 4.2 09/13/2024    CL 95 (L) 09/13/2024    ANIONGAP 12.7 09/13/2024       Lab Results   Component Value Date    INR 1.00 09/09/2024    INR 1.00 03/17/2024    INR 1.06 01/10/2022    PROTIME 13.3 09/09/2024    PROTIME 13.7 03/17/2024    PROTIME 14.2 01/10/2022       Results from last 7 days   Lab Units 09/09/24  0050 09/09/24  0044   INR   --  1.00   APTT seconds 30.0  --           I reviewed the patient's new clinical results.  I reviewed the patient's new imaging results and agree with the interpretation.     Latest Reference Range & Units 09/09/24 21:37   pH, Arterial 7.350 - 7.450 pH units 7.446   pCO2, Arterial 35.0 - 45.0 mm Hg 43.3   pO2, Arterial 83.0 - 108.0 mm Hg 53.7 (C)   HCO3, Arterial 20.0 - 26.0 mmol/L 29.8 (H)   Base Excess 0.0 - 2.0 mmol/L 5.1 (H)   O2 Saturation, Arterial 94.0 - 99.0 % 89.0 (L)   CO2 Content 22 - 33 mmol/L 31.1   A-a DO2 0.0 - 300.0 mmHg 40.8   Carboxyhemoglobin 0 - 5 % 1.8    Methemoglobin 0.00 - 3.00 % 0.40   Oxyhemoglobin 94 - 99 % 87.0 (L)   Hematocrit, Blood Gas 38.0 - 51.0 % 35.1 (L)   Hemoglobin, Blood Gas 13.5 - 17.5 g/dL 11.5 (L)   Site  Right Radial   Osiel's Test  Positive   Modality  HFNC   Flow Rate lpm 15.0   Ventilator Mode  NA   Barometric Pressure for Blood Gas mmHg 730   Notified By  [id number]   Notified Time  09/09/2024 21:43   Notified Who  MARK   Collected by  [id number]   (C): Data is critically low  (H): Data is abnormally high  (L): Data is abnormally low    Microbiology Results (last 10 days)       Procedure Component Value - Date/Time    S. Pneumo Ag Urine or CSF - Urine, Urine, Clean Catch [[id number]]  (Normal) Collected: 09/09/24 1504    Lab Status: Final result Specimen: Urine, Clean Catch Updated: 09/09/24 2343     Strep Pneumo Ag Negative    MRSA Screen, PCR (Inpatient) - Swab, Nares [[id number]]  (Normal) Collected: 09/09/24 0928    Lab Status: Final result Specimen: Swab from Nares Updated: 09/09/24 1059     MRSA PCR No MRSA Detected    Narrative:      The negative predictive value of this diagnostic test is high and should only be used to consider de-escalating anti-MRSA therapy. A positive result may indicate colonization with MRSA and must be correlated clinically.    Respiratory Panel PCR w/COVID-19(SARS-CoV-2) HARDY/ADAM/LUIS MIGUEL/PAD/COR/RAMA In-House, NP Swab in UTM/VTM, 2 HR TAT - Swab, Nasopharynx [[id number]]  (Normal) Collected: 09/09/24 0056    Lab Status: Final result Specimen: Swab from Nasopharynx Updated: 09/09/24 0146     ADENOVIRUS, PCR Not Detected     Coronavirus 229E Not Detected     Coronavirus HKU1 Not Detected     Coronavirus NL63 Not Detected     Coronavirus OC43 Not Detected     COVID19 Not Detected     Human Metapneumovirus Not Detected     Human Rhinovirus/Enterovirus Not Detected     Influenza A PCR Not Detected     Influenza B PCR Not Detected     Parainfluenza Virus 1 Not Detected     Parainfluenza Virus 2 Not Detected     Parainfluenza  Virus 3 Not Detected     Parainfluenza Virus 4 Not Detected     RSV, PCR Not Detected     Bordetella pertussis pcr Not Detected     Bordetella parapertussis PCR Not Detected     Chlamydophila pneumoniae PCR Not Detected     Mycoplasma pneumo by PCR Not Detected    Narrative:      In the setting of a positive respiratory panel with a viral infection PLUS a negative procalcitonin without other underlying concern for bacterial infection, consider observing off antibiotics or discontinuation of antibiotics and continue supportive care. If the respiratory panel is positive for atypical bacterial infection (Bordetella pertussis, Chlamydophila pneumoniae, or Mycoplasma pneumoniae), consider antibiotic de-escalation to target atypical bacterial infection.    Blood Culture - Blood, Arm, Left [[id number]]  (Normal) Collected: 09/09/24 0050    Lab Status: Preliminary result Specimen: Blood from Arm, Left Updated: 09/13/24 0100     Blood Culture No growth at 4 days    Blood Culture - Blood, Arm, Left [[id number]]  (Normal) Collected: 09/09/24 0050    Lab Status: Preliminary result Specimen: Blood from Arm, Left Updated: 09/13/24 0100     Blood Culture No growth at 4 days               Assessment & Plan         acute hypoxic respiratory failure-which could be due to pneumonia and interstitial lung disease.  Interstitial lung disease could be desquamative interstitial pneumonitis due to smoking.    This was extensively discussed with the patient in last 2 days and smoking cessation counseling was done extensively.  Continue current dose of steroids.  Adjusted the FiO2.  Currently on 2 L nasal cannula oxygen    Used BiPAP overnight    Did ambulatory pulse oximetry and patient desaturated to 85%.  Patient can be discharged home on tapering doses of steroids.  She can go on 40 for 5 days, 30 for 5 days then 20 for 5 days 10 for 5 days then 5 for 5 days and then can stop it.    Latest chest x-ray reviewed and shows improvement  Will  decrease steroids further        Blood sugars-better    Will give diuretics to improve lung compliance and diffusion capacity.  Will repeat  Lasix-60 mg and then will give potassium.  Ins and outs net negative  Results for orders placed during the hospital encounter of 03/17/24    Adult Transthoracic Echo Complete w/ Color, Spectral and Contrast if necessary per protocol    Interpretation Summary    Normal left ventricular cavity size and wall thickness noted. All left ventricular wall segments contract normally    Left ventricular ejection fraction appears to be 61 - 65%.    Left ventricular diastolic function is consistent with (grade I) impaired relaxation.    The aortic valve is structurally normal with no regurgitation or stenosis present.    The mitral valve is structurally normal with no regurgitation or significant stenosis present.    There is no evidence of pericardial effusion.       Bilateral community-acquired pneumonia.    Procalitonin Results:      Lab 09/09/24  0102   PROCALCITONIN 0.07     Remained stable after discontinuing antibiotics    Chest x-ray showing improvement    COPD exacerbation-continue steroids.  Encouraged to quit smoking.  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  Smoker-continue nicotine patch  Smoking cessation counseling done again.  Again encouraged her to quit smoking        Morbid obesity on obstructive sleep apnea-continue NIPPV.  Settings adjusted.       Diabetes-continue to monitor blood sugars.  Adjusted insulin.  GI- PPI prophylaxis        Results for orders placed during the hospital encounter of 03/17/24    Adult Transthoracic Echo Complete w/ Color, Spectral and Contrast if necessary per protocol    Interpretation Summary    Normal left ventricular cavity size and wall thickness noted. All left ventricular wall segments contract  normally    Left ventricular ejection fraction appears to be 61 - 65%.    Left ventricular diastolic function is consistent with (grade I) impaired relaxation.    The aortic valve is structurally normal with no regurgitation or stenosis present.    The mitral valve is structurally normal with no regurgitation or significant stenosis present.    There is no evidence of pericardial effusion.     Floyd Turcios MD     09/13/24 11:18 EDT

## 2024-09-13 NOTE — DISCHARGE PLACEMENT REQUEST
"Charles Fernandez (48 y.o. Female)       Date of Birth   1975    Social Security Number       Address   [address]  [facility] [address]    Home Phone   [phone]    MRN   [id number]       Oriental orthodox   None    Marital Status                               Admission Date   24    Admission Type   Emergency    Admitting Provider   Rosa Mishra DO    Attending Provider   Delia Jaramillo DO    Department, Room/Bed   [address], 3340/2S       Discharge Date       Discharge Disposition   Home or Self Care    Discharge Destination                                 Attending Provider: Delia Jaramillo DO    Allergies: Codeine, Metformin    Isolation: None   Infection: None   Code Status: CPR    Ht: 165.1 cm (65\")   Wt: 119 kg (262 lb 12.6 oz)    Admission Cmt: None   Principal Problem: Sepsis due to pneumonia [J18.9,A41.9]                   Active Insurance as of 2024       Primary Coverage       Payor Plan Insurance Group Employer/Plan Group    [place] [id number]       Payor Plan Address Payor Plan Phone Number Payor Plan Fax Number Effective Dates    HU[employer]ICAL [address] [address] [phone]  2021 - None Entered    [facility] [address]         Subscriber Name Subscriber Birth Date Member ID       CHARLES FERNANDEZ 1975 [id number]                     Emergency Contacts        (Rel.) Home Phone Work Phone Mobile Phone    HORACIO WATTERS (Daughter) [phone] -- --          [address] [address]  Dept. Phone:  [phone]  Dept. Fax:  [phone] Date Ordered: Sep 13, 2024         Patient:  Charles Fernandez MRN:  [id number]   [address]    [facility] [address] :  1975  SSN:    Phone: [phone] Sex:  F     Weight: 119 kg (262 lb 12.6 oz)         Ht Readings from Last 1 Encounters:   24 165.1 cm (65\")         Walker  " "Walker Folding with Wheels              (Order ID: [id number])    Diagnosis:  Chronic respiratory failure with hypoxia (J96.11 [ICD-10-CM] 518.83,799.02 [ICD-9-CM])  Debility (R53.81 [ICD-10-CM] 799.3 [ICD-9-CM])   Quantity:  1     Equipment:  Walker Folding with Wheels  Accessories:  Seat Attachment, Walker  Length of Need: 99 Months = Lifetime        Authorizing Provider's Phone: [phone]  Authorizing Provider:Delia Jaramillo DO  Authorizing Provider's NPI: [id number]  Order Entered By: Delia Jaramillo DO 2024 11:48 AM     Electronically signed by: Delia Jaramillo DO 2024 11:48 AM       [address] [address]  Dept. Phone:  [phone]  Dept. Fax:  [phone] Date Ordered: Sep 13, 2024         Patient:  Ana Ortiz MRN:  [id number]   [address]    [place] [address] :  1975  SSN:    Phone: [phone] Sex:  F     Weight: 119 kg (262 lb 12.6 oz)         Ht Readings from Last 1 Encounters:   24 165.1 cm (65\")         Commode Chair  Bedside Commode with Fixed Arms      (Order ID: [id number])    Diagnosis:  Chronic respiratory failure with hypoxia (J96.11 [ICD-10-CM] 518.83,799.02 [ICD-9-CM])  Debility (R53.81 [ICD-10-CM] 799.3 [ICD-9-CM])   Quantity:  1     Type:  Bedside Commode with Fixed Arms  Length of Need: 99 Months = Lifetime        Authorizing Provider's Phone: [phone]  Authorizing Provider:Delia Jaramillo DO  Authorizing Provider's NPI: [id number]  Order Entered By: Delia Jaramillo DO 2024 11:48 AM     Electronically signed by: Delia Jaramillo DO 2024 11:48 AM            History & Physical        Rosa Mishra DO at 24 0554          [facility]   HISTORY AND PHYSICAL    Patient Name: Ana Ortiz  : 1975  MRN: [id number]  Primary Care Physician:  Hayden Espinoza PA-C  Date of admission: 2024    Subjective  Subjective     Chief " Complaint: Shortness of breath, cough, acute on chronic respiratory failure    History of Present Illness the patient is a 48-year-old female with past medical history significant for chronic hypoxic respiratory failure with nocturnal supplemental oxygen use of 2 L/min nasal cannula, COPD, diabetes mellitus type 2, hypertension, JOANN and morbid obesity who presents to the emergency department complaining of a 2 to 3-day history of progressive dyspnea, cough and hypoxia.    Patient seen and examined in .  The patient reports progressive dyspnea at rest and with exertion for the past 2 to 3 days.  She reports cough with occasional green sputum production.  Patient states that she typically does not have a cough.  The patient states that she has had subjective fevers, chills and night sweats.  She denies hemoptysis.  No unintentional weight loss.  The patient states that she has had some longstanding vomiting and diarrhea that is thought to be related to Ozempic use.  She states that she is a current smoker and currently smokes 5 cigarettes/day.    Patient states that she checked her oxygen level on her pulse oximeter on the evening of presentation to the emergency department and states that she was only saturating in the 70s via her 2 L/min nasal cannula.  The patient states that she contacted EMS and was brought to the ED.  She denies frequent episodes of pneumonia.  She denies any alcohol and/or illicit drug use.    Review of Systems   Constitutional:  Positive for chills, diaphoresis, fatigue and fever.   HENT:  Negative for congestion, hearing loss, tinnitus and trouble swallowing.    Eyes:  Negative for discharge and visual disturbance.   Respiratory:  Positive for cough and shortness of breath. Negative for wheezing.    Cardiovascular:  Negative for chest pain, palpitations and leg swelling.   Gastrointestinal:  Positive for diarrhea, nausea and vomiting. Negative for abdominal pain and constipation.    Endocrine: Negative for polydipsia, polyphagia and polyuria.   Genitourinary:  Negative for dysuria, frequency and hematuria.   Musculoskeletal:  Negative for gait problem, myalgias and neck pain.   Skin:  Negative for rash.   Neurological:  Positive for weakness. Negative for dizziness, tremors, seizures, syncope and light-headedness.   Hematological:  Does not bruise/bleed easily.   Psychiatric/Behavioral:  Negative for agitation and confusion.       Personal History     Past Medical History:   Diagnosis Date    Arthritis     CHF (congestive heart failure)     COPD (chronic obstructive pulmonary disease)     Diabetes mellitus     Elevated cholesterol     GERD (gastroesophageal reflux disease)     Hyperlipemia     Hypertension     JOANN (obstructive sleep apnea)     Pneumonia     Vulvar cancer        Past Surgical History:   Procedure Laterality Date    HYSTERECTOMY      INCISION AND DRAINAGE TRUNK N/A 1/11/2022    Procedure: INCISION AND DRAINAGE BACK;  Surgeon: Tamera Saez MD;  [facility]: [facility];  Service: General;  Laterality: N/A;    MASTECTOMY      VULVA SURGERY      reconstruction due ca       Family History: family history includes Cancer in her maternal aunt, maternal aunt, and mother; Diabetes in her brother, father, paternal aunt, and paternal grandmother; Hypertension in her father and mother; Parkinsonism in her father.     Social History:  reports that she has been smoking cigarettes. She started smoking about 29 years ago. She has a 42.2 pack-year smoking history. She has been exposed to tobacco smoke. She has never used smokeless tobacco. She reports that she does not drink alcohol and does not use drugs.    Home Medications:  Insulin Glargine, Semaglutide (2 MG/DOSE), albuterol sulfate HFA, budesonide-formoterol, cetirizine, clonazePAM, cyclobenzaprine, fluticasone, gabapentin, glipizide, hydroCHLOROthiazide, ibuprofen, ipratropium-albuterol, lidocaine, lisinopril, loperamide, meloxicam,  naloxone, ondansetron ODT, simvastatin, and tiotropium    Allergies:  Allergies   Allergen Reactions    Codeine Nausea Only    Metformin GI Intolerance     Extreme diarrhea       Objective   Objective     Vitals:   Temp:  [98.8 °F (37.1 °C)] 98.8 °F (37.1 °C)  Heart Rate:  [113-116] 116  Resp:  [21-22] 22  BP: (112)/(81) 112/81  Flow (L/min):  [5-6] 6    Physical Exam  Constitutional:       General: She is in acute distress.      Appearance: She is well-developed. She is morbidly obese. She is ill-appearing and toxic-appearing.      Interventions: Nasal cannula in place.      Comments: O2 saturation 91-93% via 6LPM N/C   HENT:      Head: Normocephalic and atraumatic.   Eyes:      Conjunctiva/sclera: Conjunctivae normal.   Neck:      Trachea: No tracheal deviation.   Cardiovascular:      Rate and Rhythm: Regular rhythm. Tachycardia present.      Pulses:           Dorsalis pedis pulses are 2+ on the right side and 2+ on the left side.      Heart sounds: No murmur heard.     No friction rub. No gallop.   Pulmonary:      Effort: Tachypnea and accessory muscle usage present. No respiratory distress.      Breath sounds: Examination of the left-lower field reveals rales. Decreased breath sounds, wheezing, rhonchi and rales present.   Abdominal:      General: Abdomen is protuberant. Bowel sounds are decreased. There is no distension.      Palpations: Abdomen is soft.      Tenderness: There is no abdominal tenderness. There is no guarding.   Skin:     General: Skin is warm and dry.      Findings: No erythema or rash.   Neurological:      Mental Status: She is alert and oriented to person, place, and time.      Cranial Nerves: No cranial nerve deficit.         Result Review   Result Review:  I have personally reviewed the results from the time of this admission to 9/9/2024 05:54 EDT and agree with these findings:  [x]  Laboratory list / accordion  []  Microbiology  [x]  Radiology  [x]  EKG/Telemetry   []  Cardiology/Vascular    []  Pathology  []  Old records  []  Other:  Most notable findings include:     EKG is currently pending official cardiology interpretation, however, per my view the patient has sinus tachycardia without acute ST-T changes; QTc is measured at 473 ms    Recent Labs     09/08/24  2357   PHART 7.490*   IYS5QTS 38.5   PO2ART 59.0*   BQL0LGQ 29.3*   BASEEXCESS 5.6*     Results from last 7 days   Lab Units 09/09/24  0044   WBC 10*3/mm3 20.87*   HEMOGLOBIN g/dL 12.6   HEMATOCRIT % 37.0   PLATELETS 10*3/mm3 323     Results from last 7 days   Lab Units 09/09/24  0102   SODIUM mmol/L 130*   POTASSIUM mmol/L 3.5   CHLORIDE mmol/L 93*   CO2 mmol/L 26.4   BUN mg/dL 13   CREATININE mg/dL 0.78   CALCIUM mg/dL 8.9   BILIRUBIN mg/dL <0.2   ALK PHOS U/L 106   ALT (SGPT) U/L 14   AST (SGOT) U/L 17   GLUCOSE mg/dL 328*         CT chest with PE protocol (images reviewed):    FINDINGS:  Contrast bolus timing is suboptimal to evaluate for pulmonary embolism.     Heart and mediastinal structures: No pulmonary arterial filling defect.  Normal mediastinal structures. Coronary artery calcifications are  present.     Lungs and airways: Diffuse crazy paving opacities in the lungs,  relatively sparing the periphery and involving predominately in the mid  and upper lung zones.  The central airways are patent..     Pleura: normal.     Lymph nodes: normal.     Musculoskeletal and chest wall: no acute abnormality.     Lower neck / upper abdomen: Heterogeneous nodule in the posterior aspect  of the right thyroid measuring 1.3 x 1.5 cm.     IMPRESSION:     NEGATIVE FOR PULMONARY EMBOLISM.     DIFFUSE CRAZY PAVING OPACITIES IN THE LUNGS, RELATIVELY SPARING THE  PERIPHERY AND BASES, WHICH COULD BE DUE TO PULMONARY EDEMA, ARDS,  INFECTION, PULMONARY ALVEOLAR PROTEINOSIS.     RIGHT THYROID NODULE MEASURING 1.3 X 1.5 CM. FOLLOW UP ULTRASOUND IS  SUGGESTED.    Assessment & Plan  Assessment / Plan     #Sepsis due to severe bilateral community-acquired  pneumonia, treating for bacterial and/or atypical species, unspecified  #Acute COPD exacerbation  #Acute on chronic hypoxic respiratory failure due to pneumonia and COPD  #Super morbid obesity with BMI of 41.44 kg/m²  #Mild hypomagnesemia  #Incidentally noted right thyroid nodule measuring 1.3 x 1.5 cm with recommendations for a follow-up ultrasound  #Diabetes mellitus type 2, insulin-dependent and complicated by hyperglycemia  #Chronic HFpEF  -Patient will be admitted to the progressive care unit for close observation and management  -Continue supplemental oxygen and titrate for saturations 90 to 95%  -Admission WBC 20.85K  -I have requested a respiratory culture, MRSA swab and testing for strep pneumo  -Obtain baseline procalcitonin level  -The patient had a respiratory PCR panel performed which was negative  -Patient will be started on broad-spectrum IV antibiotics with pharmacy to dose vancomycin, cefepime and azithromycin  -Tailor antibiotics based on culture data results  -I will obtain a proBNP  -I Have consulted pulmonology for further evaluation and recommendations given appearance of CT imaging  -I have ordered a peripheral blood smear  -Repeat CBC in a.m. and monitor temperature curve  -Patient will receive 40 mg IV Lasix x 1 to assist with lung compliance  -I have started the patient on scheduled Mucomyst with nebulized inhalants  -Patient has been started on Solu-Medrol 40 mg IV twice daily  -Patient will be n.p.o. pending pulmonology evaluation and SLP consult  -Patient will be placed on the magnesium supplementation protocol with a repeat magnesium level in a.m.  -Will repeat the patient's chemistry panel in a.m.  -Obtain thyroid ultrasound for further evaluation  -Patient will be started on the low-moderate dose sliding scale insulin for now while n.p.o.; plan to resume basal insulin once patient's home medication list has been reconciled by pharmacy  -Adjust sliding scale insulin based on ongoing  blood glucose readings    VTE Prophylaxis:  SQH/PPI    CODE STATUS:    Code Status (Patient has no pulse and is not breathing): CPR (Attempt to Resuscitate)  Medical Interventions (Patient has pulse or is breathing): Full Support    Admission Status:  I believe this patient meets inpatient status.    Patient is considered a high risk patient due to: sepsis, acute on chronic respiratory failure, super morbid obesity    Case d/w patient at bedside.     Rosa Mishra DO    Electronically signed by Rosa Mishra DO at 09/09/24 0618

## 2024-09-14 LAB
BACTERIA SPEC AEROBE CULT: NORMAL
BACTERIA SPEC AEROBE CULT: NORMAL

## 2024-09-14 NOTE — OUTREACH NOTE
Prep Survey      Flowsheet Row Responses   [facility] facility patient discharged from? [facility]   Is LACE score < 7 ? No   Eligibility Readm Mgmt   Discharge diagnosis Acute hypoxic respiratory failure   Does the patient have one of the following disease processes/diagnoses(primary or secondary)? Other   Does the patient have Home health ordered? No   Is there a DME ordered? No   Prep survey completed? Yes            KAILEY ZARCO - Registered Nurse

## 2024-09-14 NOTE — PAYOR COMM NOTE
"[facility]  NPI:[id number]    Utilization Review  Contact: Jill Parish RN  Phone: [phone]  Fax:[phone]    DISCHARGE NOTIFICATION     [id number]     Charles Fernandez (48 y.o. Female)       Date of Birth   1975    Social Security Number       Address   [address] [place] [address]    Home Phone   [phone]    MRN   [id number]       Buddhism   None    Marital Status                               Admission Date   9/8/24    Admission Type   Emergency    Admitting Provider   Rosa Mishra DO    Attending Provider       Department, Room/Bed   [address], 3340/2S       Discharge Date   9/13/2024    Discharge Disposition   Home or Self Care    Discharge Destination                                 Attending Provider: (none)   Allergies: Codeine, Metformin    Isolation: None   Infection: None   Code Status: Prior    Ht: 165.1 cm (65\")   Wt: 119 kg (262 lb 12.6 oz)    Admission Cmt: None   Principal Problem: Sepsis due to pneumonia [J18.9,A41.9]                   Active Insurance as of 9/8/2024       Primary Coverage       Payor Plan Insurance Group Employer/Plan Group    [place] [id number]       Payor Plan Address Payor Plan Phone Number Payor Plan Fax Number Effective Dates    [employer] [address] [address] [phone]  1/1/2021 - None Entered    [facility] [address]         Subscriber Name Subscriber Birth Date Member ID       CHARLES FERNANDEZ 1975 [id number]                     Emergency Contacts        (Rel.) Home Phone Work Phone Mobile Phone    WATTERSHORACIO (Daughter) [phone] -- --          Charisse Condon BSW     Case Management     Case Management/Social Work     Addendum     Date of Service: 09/13/24 1322  Creation Time: 09/13/24 1322       Discharge Planning Assessment   [facility]     Patient Name: Charles Fernandez               MRN: [id number]  Today's Date: " 9/13/2024                Admit Date: 9/8/2024                 Discharge Plan         Row Name 09/13/24 1321           Plan     Final Discharge Disposition Code 01 - home or self-care     Final Note Pt is being discharged home on this date. SS notified Pt will need [facility] transport. SS to contact [facility] once discharge is completd, meds delivered and portable O2 delivered to bedside.     14:15pm: SS contacted [facility] per Migdalia [phone] and scheduled transport.. SS faxed dc order fax [phone]. [facility] to contact floor [phone] once  arrives.                                 ALYCIA Goldstein                       Revision History

## 2024-09-17 LAB — Lab: <0.6 PG/ML

## 2024-09-18 ENCOUNTER — READMISSION MANAGEMENT (OUTPATIENT)
Dept: CALL CENTER | Facility: HOSPITAL | Age: 49
End: 2024-09-18
Payer: MEDICAID

## 2024-09-19 LAB
GM1 GANGL IGG TITR SER IA: <20 % (ref 0–30)
GM1 GANGL IGM TITR SER IA: 48 % (ref 0–30)
INTERPRETATION: NORMAL

## 2024-09-23 ENCOUNTER — READMISSION MANAGEMENT (OUTPATIENT)
Dept: CALL CENTER | Facility: HOSPITAL | Age: 49
End: 2024-09-23
Payer: MEDICAID

## 2024-09-25 ENCOUNTER — OFFICE VISIT (OUTPATIENT)
Dept: PULMONOLOGY | Facility: CLINIC | Age: 49
End: 2024-09-25
Payer: MEDICAID

## 2024-09-25 VITALS
HEART RATE: 105 BPM | BODY MASS INDEX: 44.08 KG/M2 | OXYGEN SATURATION: 96 % | HEIGHT: 65 IN | WEIGHT: 264.6 LBS | TEMPERATURE: 96 F | SYSTOLIC BLOOD PRESSURE: 156 MMHG | DIASTOLIC BLOOD PRESSURE: 98 MMHG

## 2024-09-25 DIAGNOSIS — R91.8 PULMONARY INFILTRATES: ICD-10-CM

## 2024-09-25 DIAGNOSIS — G47.33 OSA (OBSTRUCTIVE SLEEP APNEA): ICD-10-CM

## 2024-09-25 DIAGNOSIS — F17.210 CIGARETTE NICOTINE DEPENDENCE WITHOUT COMPLICATION: ICD-10-CM

## 2024-09-25 DIAGNOSIS — J96.11 CHRONIC RESPIRATORY FAILURE WITH HYPOXIA: ICD-10-CM

## 2024-09-25 DIAGNOSIS — J42 CHRONIC BRONCHITIS, UNSPECIFIED CHRONIC BRONCHITIS TYPE: Primary | ICD-10-CM

## 2024-09-25 PROCEDURE — 1111F DSCHRG MED/CURRENT MED MERGE: CPT | Performed by: NURSE PRACTITIONER

## 2024-09-25 PROCEDURE — 1160F RVW MEDS BY RX/DR IN RCRD: CPT | Performed by: NURSE PRACTITIONER

## 2024-09-25 PROCEDURE — 99407 BEHAV CHNG SMOKING > 10 MIN: CPT | Performed by: NURSE PRACTITIONER

## 2024-09-25 PROCEDURE — 1159F MED LIST DOCD IN RCRD: CPT | Performed by: NURSE PRACTITIONER

## 2024-09-25 PROCEDURE — 99214 OFFICE O/P EST MOD 30 MIN: CPT | Performed by: NURSE PRACTITIONER

## 2024-09-25 RX ORDER — IPRATROPIUM BROMIDE AND ALBUTEROL SULFATE 2.5; .5 MG/3ML; MG/3ML
3 SOLUTION RESPIRATORY (INHALATION) EVERY 6 HOURS PRN
Qty: 360 ML | Refills: 5 | Status: SHIPPED | OUTPATIENT
Start: 2024-09-25

## 2024-09-25 RX ORDER — NICOTINE 21 MG/24HR
1 PATCH, TRANSDERMAL 24 HOURS TRANSDERMAL EVERY 24 HOURS
Qty: 28 EACH | Refills: 1 | Status: SHIPPED | OUTPATIENT
Start: 2024-09-25

## 2024-09-25 RX ORDER — DICYCLOMINE HYDROCHLORIDE 10 MG/1
CAPSULE ORAL
COMMUNITY
Start: 2024-09-20

## 2024-09-25 RX ORDER — TIOTROPIUM BROMIDE 18 UG/1
1 CAPSULE ORAL; RESPIRATORY (INHALATION) DAILY
Qty: 30 CAPSULE | Refills: 8 | Status: SHIPPED | OUTPATIENT
Start: 2024-09-25

## 2024-09-25 RX ORDER — BUDESONIDE AND FORMOTEROL FUMARATE DIHYDRATE 160; 4.5 UG/1; UG/1
2 AEROSOL RESPIRATORY (INHALATION)
Qty: 1 EACH | Refills: 8 | Status: SHIPPED | OUTPATIENT
Start: 2024-09-25

## 2024-09-25 RX ORDER — ALBUTEROL SULFATE 90 UG/1
2 INHALANT RESPIRATORY (INHALATION) EVERY 4 HOURS PRN
Qty: 18 G | Refills: 12 | Status: SHIPPED | OUTPATIENT
Start: 2024-09-25

## 2024-09-25 NOTE — PROGRESS NOTES
"Chief Complaint  Chronic respiratory failure with hypoxia, Hospital Follow Up Visit (Double pneumonia ), and Nicotine Dependence (Would like patches, gum burns her throat. )    Subjective          Ana Ortiz presents to [facility] for   History of Present Illness    Ms. Ortiz is a 48 year old female with a medical history significant for arthritis, CHF, COPD, diabetes, GERD, hyperlipidemia, hypertension and JOANN.    She presents today for a hospital follow up.  She reports that she was recently admitted to the hospital with double pneumonia.  Her CT chest also showed characteristics of desquamative interstitial pneumonitis (DIP.)  This is likely due to her continued smoking.  She states that since her discharge she has been using her oxygen continuously but needs a POC for work.  She is taking Symbicort BID and Spiriva once daily.  She is also using albuterol inhaler and neb treatments as needed.  She is compliant with use of cpap at night.  She also taking trazodone at night before bed. She is a current smoker.      Objective   Vital Signs:   /98   Pulse 105   Temp 96 °F (35.6 °C)   Ht 165.1 cm (65\")   Wt 120 kg (264 lb 9.6 oz)   SpO2 96% Comment: 2L tank  BMI 44.03 kg/m²         Physical Exam      GENERAL APPEARANCE: Well developed, well nourished, alert and cooperative, and appears to be in no acute distress.    HEAD: normocephalic. Atraumatic.    EYES: PERRL, EOMI. Vision is grossly intact.    THROAT: Oral cavity and pharynx normal. No inflammation, swelling, exudate, or lesions.     NECK: Neck supple.  No thyromegaly.    CARDIAC: Normal S1 and S2. No S3, S4 or murmurs. Rhythm is regular.     RESPIRATORY:Bilateral air entry positive. Bilateral diminished breath sounds. No wheezing, crackles or rhonchi noted.    GI: Positive bowel sounds. Soft, nondistended, nontender.     MUSCULOSKELETAL: No significant deformity or joint abnormality. No edema. " "Peripheral pulses intact. No varicosities.    NEUROLOGICAL: Strength and sensation symmetric and intact throughout.     PSYCHIATRIC: The mental examination revealed the patient was oriented to person, place, and time.     Estimated body mass index is 44.03 kg/m² as calculated from the following:    Height as of this encounter: 165.1 cm (65\").    Weight as of this encounter: 120 kg (264 lb 9.6 oz).        Result Review :   The following data was reviewed by: CHAZ Erwin on 09/25/2024:  Common labs          9/11/2024    01:59 9/12/2024    00:26 9/13/2024    01:12   Common Labs   Glucose 514  249  343    BUN 18  18  21    Creatinine 0.82  0.80  0.83    Sodium 131  139  136    Potassium 4.7  4.5  4.2    Chloride 94  98  95    Calcium 9.2  10.0  9.8    WBC 12.63  12.64  10.80    Hemoglobin 10.5  11.4  11.9    Hematocrit 32.8  35.3  36.7    Platelets 336  358  338           PFT: not completed    Low dose lung cancer screening:NA    Previous chest imaging:  CT chest angiogram 9/9/24  FINDINGS:  Contrast bolus timing is suboptimal to evaluate for pulmonary embolism.     Heart and mediastinal structures: No pulmonary arterial filling defect.  Normal mediastinal structures. Coronary artery calcifications are  present.     Lungs and airways: Diffuse crazy paving opacities in the lungs,  relatively sparing the periphery and involving predominately in the mid  and upper lung zones.  The central airways are patent..     Pleura: normal.     Lymph nodes: normal.     Musculoskeletal and chest wall: no acute abnormality.     Lower neck / upper abdomen: Heterogeneous nodule in the posterior aspect  of the right thyroid measuring 1.3 x 1.5 cm.     IMPRESSION:     NEGATIVE FOR PULMONARY EMBOLISM.     DIFFUSE CRAZY PAVING OPACITIES IN THE LUNGS, RELATIVELY SPARING THE  PERIPHERY AND BASES, WHICH COULD BE DUE TO PULMONARY EDEMA, ARDS,  INFECTION, PULMONARY ALVEOLAR PROTEINOSIS.     RIGHT THYROID NODULE MEASURING 1.3 X 1.5 " "CM. FOLLOW UP ULTRASOUND IS  SUGGESTED.     This report was finalized on 9/9/2024 3:39 AM by Dhara Shaikh MD.         Alpha-1 antitrypsin screening:not completed    STOP-Bang Score:   NA  Vista Sleepiness Scale:   NA      ABG:    pH No results found for: \"PHART\"   pO2 No results found for: \"PO2ART\"   pCO2 No results found for: \"GIN5XQY\"   HCO3 No results found for: \"HSD6UHS\"                      Assessment and Plan    Problem List Items Addressed This Visit          Pulmonary and Pneumonias    Chronic bronchitis - Primary    Relevant Medications    tiotropium (Spiriva HandiHaler) 18 MCG per inhalation capsule    ipratropium-albuterol (DUO-NEB) 0.5-2.5 mg/3 ml nebulizer    budesonide-formoterol (Symbicort) 160-4.5 MCG/ACT inhaler    albuterol sulfate HFA (Ventolin HFA) 108 (90 Base) MCG/ACT inhaler    Other Relevant Orders    Oxygen Therapy    Chronic respiratory failure with hypoxia       Sleep    JOANN (obstructive sleep apnea)       Tobacco    Cigarette nicotine dependence without complication    Relevant Medications    nicotine (NICODERM CQ) 21 MG/24HR patch     Other Visit Diagnoses       Pulmonary infiltrates        Relevant Medications    tiotropium (Spiriva HandiHaler) 18 MCG per inhalation capsule    ipratropium-albuterol (DUO-NEB) 0.5-2.5 mg/3 ml nebulizer    budesonide-formoterol (Symbicort) 160-4.5 MCG/ACT inhaler    albuterol sulfate HFA (Ventolin HFA) 108 (90 Base) MCG/ACT inhaler    Other Relevant Orders    CT Chest Without Contrast            Ana Ortiz  reports that she has been smoking cigarettes. She started smoking about 29 years ago. She has a 42.2 pack-year smoking history. She has been exposed to tobacco smoke. She has never used smokeless tobacco. I have educated her on the risk of diseases from using tobacco products such as cancer and COPD.     I advised her to quit and she is willing to quit. We have discussed the following method/s for tobacco cessation:  Counseling and OTC Cessation " Products.  Together we have set a quit date for 1 month from today.  She will follow up with me in a few months or sooner to check on her progress.    Sent in nicotine patches to aid in smoking cessation.    I spent >10 minutes counseling the patient.    Continue albuterol inhaler as needed.  Continue duonebs as needed.  Continue Spiriva once daily.  Continue Symbicort BID.      Refills sent to pharmacy.    She is compliant with use of supplemental oxygen and cpap at night.  She is using supplemental oxygen during the day.  Placed an order for POC so that she is able to work.    She continues to take Trazodone nightly.    CT chest was reviewed.  Will plan to repeat in 3 months to assess for resolution of pulmonary infiltrates.        Follow Up   Return in about 3 months (around 12/25/2024).  Patient was given instructions and counseling regarding her condition or for health maintenance advice. Please see specific information pulled into the AVS if appropriate.

## 2024-09-27 ENCOUNTER — READMISSION MANAGEMENT (OUTPATIENT)
Dept: CALL CENTER | Facility: HOSPITAL | Age: 49
End: 2024-09-27
Payer: MEDICAID

## 2024-10-03 NOTE — DISCHARGE SUMMARY
[facility] HOSPITALISTS DISCHARGE SUMMARY    Patient Identification:  Name:  Ana Ortiz  Age:  48 y.o.  Sex:  female  :  1975  MRN:  [id number]  Visit Number:  [id number]    Date of Admission: 2024  Date of Discharge:  2024     PCP: Hayden Espinoza PA-C    DISCHARGE DIAGNOSIS   # Acute on chronic hypoxic respiratory failure  # COPD with acute exacerbation  # Bilateral pneumonia versus interstitial lung disease  # Sepsis secondary to pneumonia versus SIRS criteria met due to COPD exacerbation  #Incidentally noted right thyroid nodule  # Diabetes mellitus type 2, insulin-dependent and complicated by hyperglycemia   #Chronic HFpEF  #Mild hypomagnesemia  #Prolonged Qtc  #Morbid obesity, BMI 43.8 kg/m².     CONSULTS   Pulmonology     HOSPITAL COURSE  Patient is a 48 y.o. female presented to [facility] complaining of shortness of breath, cough, and hypoxia.  Please see the admitting history and physical for further details.      Patient was initially treated for bilateral community acquired pneumonia with broad spectrum antibiotics due to the appearance of her CT of the chest and required heated high flow nasal cannula for oxygen support. Pulmonology was consulted and provided recommendations. Procalcitonin was low, so bacterial pneumonia was lower on the differential diagnosis. 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 She was treated with high dose steroids as well as diuretics to improve lung compliance and had quick improvement in respiratory failure. She was hyperglycemic and insulin regimen was adjusted. On day of discharge patient was only requiring 2L O2 and was discharged home in stable condition with plan for outpatient Pulmonology follow up.    Please note also that imaging incidentally found a 1.1cm well circumscribed, mildly hypoechoic  nodule in the left lobe of the thyroid. Follow-up ultrasound in one year was recommended by the radiologist.      Body mass index is 43.73 kg/m².  Wt Readings from Last 3 Encounters:   09/25/24 120 kg (264 lb 9.6 oz)   09/12/24 119 kg (262 lb 12.6 oz)   08/30/24 118 kg (260 lb)       PHYSICAL EXAM:   Constitutional:  Well-developed and well-nourished.  No acute distress.      HENT:  Head:  Normocephalic and atraumatic.    Cardiovascular:  Normal rate, regular rhythm,  Pulmonary/Chest:  Normal rate and effort. Minimal crackles bilateral lower lobes.  Musculoskeletal:  No deformity.    Neurological: Awake, alert, no focal deficit on gross examination. No slurred speech or facial droop.   Skin:  Skin is warm and dry.   Peripheral vascular:  No cyanosis, no edema.    DISCHARGE DISPOSITION   Stable    DISCHARGE MEDICATIONS:     Your medication list        START taking these medications        Instructions Last Dose Given Next Dose Due   pantoprazole 40 MG EC tablet  Commonly known as: Protonix      Take 1 tablet by mouth Daily for 30 days.       predniSONE 10 MG tablet  Commonly known as: DELTASONE  Start taking on: September 13, 2024      Take 6 tablets by mouth Daily for 2 days, THEN 5 tablets Daily for 2 days, THEN 4 tablets Daily for 2 days, THEN 3 tablets Daily for 2 days, THEN 2 tablets Daily for 2 days, THEN 1 tablet Daily for 2 days, THEN 1/2 tablet Daily for 2 days.              CHANGE how you take these medications        Instructions Last Dose Given Next Dose Due   Insulin Glargine 100 UNIT/ML injection pen  Commonly known as: LANTUS SOLOSTAR  What changed:   how much to take  when to take this      Inject 40 Units under the skin into the appropriate area as directed 2 (Two) Times a Day.              CONTINUE taking these medications        Instructions Last Dose Given Next Dose Due   albuterol sulfate  (90 Base) MCG/ACT inhaler  Commonly known as: Ventolin HFA      Inhale 2 puffs Every 4 (Four) Hours  As Needed for Wheezing or Shortness of Air.       budesonide-formoterol 160-4.5 MCG/ACT inhaler  Commonly known as: Symbicort      Inhale 2 puffs 2 (Two) Times a Day.       cetirizine 10 MG tablet  Commonly known as: zyrTEC      Take 1 tablet by mouth Daily.       clonazePAM 1 MG tablet  Commonly known as: KlonoPIN      Take 1 tablet by mouth 4 (Four) Times a Day As Needed for Anxiety.       cyclobenzaprine 5 MG tablet  Commonly known as: FLEXERIL      Take 1 tablet by mouth 3 (Three) Times a Day As Needed for Muscle Spasms.       fluticasone 50 MCG/ACT nasal spray  Commonly known as: FLONASE      2 sprays into the nostril(s) as directed by provider Daily As Needed for Rhinitis or Allergies.       gabapentin 800 MG tablet  Commonly known as: NEURONTIN      Take 1 tablet by mouth 4 (Four) Times a Day.       glipizide 5 MG tablet  Commonly known as: Glucotrol      Take 1 tablet by mouth 2 (Two) Times a Day Before Meals.       hydroCHLOROthiazide 25 MG tablet      Take 1 tablet by mouth Every Morning.       ibuprofen 800 MG tablet  Commonly known as: ADVIL,MOTRIN      Take 1 tablet by mouth Every 8 (Eight) Hours As Needed for Mild Pain.       ipratropium-albuterol 0.5-2.5 mg/3 ml nebulizer  Commonly known as: DUO-NEB      Take 3 mL by nebulization Every 6 (Six) Hours As Needed for Wheezing or Shortness of Air.       lidocaine 5 %  Commonly known as: LIDODERM      Place 1 patch on the skin as directed by provider Daily. Remove & Discard patch within 12 hours or as directed by MD       lisinopril 5 MG tablet  Commonly known as: PRINIVIL,ZESTRIL      Take 0.5 tablets by mouth Daily.       loperamide 2 MG capsule  Commonly known as: IMODIUM      Take 1 capsule by mouth 4 (Four) Times a Day As Needed for Diarrhea.       meloxicam 7.5 MG tablet  Commonly known as: MOBIC      Take 1 tablet by mouth Every 12 (Twelve) Hours.       naloxone 4 MG/0.1ML nasal spray  Commonly known as: NARCAN      Call 911. Don't prime. [place] in 1  nostril for overdose. Repeat in 2-3 minutes in other nostril if no or minimal breathing/responsiveness.       ondansetron ODT 4 MG disintegrating tablet  Commonly known as: ZOFRAN-ODT      Take 1 tablet by mouth Every 6 (Six) Hours As Needed for Nausea or Vomiting.       Ozempic (2 MG/DOSE) 8 MG/3ML solution pen-injector  Generic drug: Semaglutide (2 MG/DOSE)      Inject 2 mg under the skin into the appropriate area as directed 1 (One) Time Per Week.       simvastatin 80 MG tablet  Commonly known as: ZOCOR      Take 1 tablet by mouth every night at bedtime.       tiotropium 18 MCG per inhalation capsule  Commonly known as: Spiriva HandiHaler      Place 1 capsule into inhaler and inhale Daily.                 Where to Get Your Medications        These medications were sent to [id number]      Hours: Monday to Friday 7 AM to 6 PM Phone: [phone]   Insulin Glargine 100 UNIT/ML injection pen  pantoprazole 40 MG EC tablet  predniSONE 10 MG tablet             Diet Instructions       Diet: Diabetic Diets; Consistent Carbohydrate; Regular (IDDSI 7); Thin (IDDSI 0)      Discharge Diet: Diabetic Diets    Diabetic Diet: Consistent Carbohydrate    Texture: Regular (IDDSI 7)    Fluid Consistency: Thin (IDDSI 0)          Activity Instructions       Activity as Tolerated     Additional Activity Instructions:    As tolerated         Additional Instructions for the Follow-ups that You Need to Schedule       Discharge Follow-up with PCP   As directed       Currently Documented PCP:    Hayden Espinoza PA-C    PCP Phone Number:    [phone]     Follow Up Details: within 1 week        Discharge Follow-up with Specialty: Pulmonology; 2 Weeks   As directed      Specialty: Pulmonology   Follow Up: 2 Weeks               Follow-up Information       Hayden Espinoza PA-C .    Specialty: Physician Assistant  Why: within 1 week  Contact information:  [address]  [place]  [place] [address]  [phone]               Hayden Espinoza PA-C .    Specialty: Physician Assistant  Contact information:  [address]  [facility] [address]  [phone]                              CODE STATUS  Code Status and Medical Interventions: CPR (Attempt to Resuscitate); Full Support   Ordered at: 09/09/24 0511     Code Status (Patient has no pulse and is not breathing):    CPR (Attempt to Resuscitate)     Medical Interventions (Patient has pulse or is breathing):    Full Support       Delia Jaramillo DO  [facility] Hospitalist  10/03/24  00:37 EDT    Please note that this discharge summary required more than 30 minutes to complete.

## 2024-10-03 NOTE — PROGRESS NOTES
"Enter Query Response Below      Query Response: unable to determine             If applicable, please update the problem list.   Patient: Ana Ortiz        : 1975  Account: [id number]           Admit Date: 2024        How to Respond to this query:       a. Click New Note     b. Answer query within the yellow box.                c. Update the Problem List, if applicable.      If you have any questions about this query contact me at: [email or web address]     Dr. Jaramillo,     48yr female with documentation of \"Sepsis secondary to pneumonia versus SIRS criteria met due to COPD exacerbation\" in progress note 24. Pulmonology consulted and reflects \"Sepsis-likely related to interstitial lung disease which is likely desquamative interstitial pneumonitis-DIP due to smoking.\" Patients WBC-20.87, HR-116, R-29 with IV Rocephin given. Chest Xray on 24 reflect Multifocal pneumonia, left greater than right.    Please clarify the following:    Sepsis secondary to ______ ruled in  Sepsis ruled out  Other- specify______  Unable to determine    By submitting this query, we are merely seeking further clarification of documentation to accurately reflect all conditions that you are monitoring, evaluating, treating or that extend the hospitalization or utilize additional resources of care. Please utilize your independent clinical judgment when addressing the question(s) above.     This query and your response, once completed, will be entered into the legal medical record.    Sincerely,  Edda MENDOZA Rn  Clinical Documentation Integrity Program   "

## 2024-11-26 ENCOUNTER — OFFICE VISIT (OUTPATIENT)
Dept: ENDOCRINOLOGY | Facility: CLINIC | Age: 49
End: 2024-11-26
Payer: MEDICAID

## 2024-11-26 VITALS
BODY MASS INDEX: 45.16 KG/M2 | HEART RATE: 104 BPM | DIASTOLIC BLOOD PRESSURE: 83 MMHG | SYSTOLIC BLOOD PRESSURE: 136 MMHG | OXYGEN SATURATION: 94 % | WEIGHT: 271.4 LBS

## 2024-11-26 DIAGNOSIS — E11.65 TYPE 2 DIABETES MELLITUS WITH HYPERGLYCEMIA, WITH LONG-TERM CURRENT USE OF INSULIN: Primary | ICD-10-CM

## 2024-11-26 DIAGNOSIS — Z79.4 TYPE 2 DIABETES MELLITUS WITH HYPERGLYCEMIA, WITH LONG-TERM CURRENT USE OF INSULIN: Primary | ICD-10-CM

## 2024-11-26 LAB
EXPIRATION DATE: ABNORMAL
GLUCOSE BLDC GLUCOMTR-MCNC: 450 MG/DL (ref 70–130)
Lab: ABNORMAL

## 2024-11-26 RX ORDER — GLIPIZIDE 10 MG/1
10 TABLET ORAL 2 TIMES DAILY
Qty: 60 TABLET | Refills: 2 | Status: SHIPPED | OUTPATIENT
Start: 2024-11-26

## 2024-11-26 RX ORDER — INSULIN ASPART 100 [IU]/ML
15 INJECTION, SOLUTION INTRAVENOUS; SUBCUTANEOUS
Qty: 15 ML | Refills: 2 | Status: SHIPPED | OUTPATIENT
Start: 2024-11-26

## 2024-11-26 RX ORDER — TIRZEPATIDE 2.5 MG/.5ML
2.5 INJECTION, SOLUTION SUBCUTANEOUS WEEKLY
Qty: 2 ML | Refills: 0 | Status: SHIPPED | OUTPATIENT
Start: 2024-11-26

## 2024-11-26 RX ORDER — ACYCLOVIR 400 MG/1
1 TABLET ORAL
Qty: 3 EACH | Refills: 5 | Status: SHIPPED | OUTPATIENT
Start: 2024-11-26

## 2024-11-26 NOTE — PROGRESS NOTES
Chief Complaint   Patient presents with    Diabetes     F/u w/ dm2, last A1c 24 value 8.10        Referring Provider  No ref. provider found     HPI   Ana Ortiz is a 48 y.o. female had concerns including Diabetes (F/u w/ dm2, last A1c 24 value 8.10).    T2DM.    Patient reports that since her last visit, she has been helping significantly elevated blood sugars.  She reports that she is having increased symptoms related to this.  She reports that she was unable to tolerate GLP-1 due to increased GI symptoms.  She reports that it made her deathly ill and she was unable to function due to this.  She is asking about additional medication therapy as her blood sugars have been higher than normal.  She also has not been wearing her CGM as she has not had refills of this.    Diabetes:  Diabetes was diagnosed [id number], she had abnormal labs, but was starting to feel ill as well.  Complications include neuropathy, no stroke/cardiovascular issues.  Last ophtho exam was [facility] in [place], [place].  Current medications for diabetes include Levemir 70 units QHS, Ozempic 2 mg weekly-has not been taking d/t increase GI symptoms, Glipizide 5 mg BID.  Past meds: Metformin-diarrhea, glipizide-changed therapy d/t not working anymore, Trulicity-constipation/nausea, Ozempic-insurance coverage  She checks her blood sugar 3 times per day.   Hypos: none  FSB+  PPD: 250+    ACE/ARB:yes, Statin: yes  Labs:  A1C:10.6 (2023), 10.4 (2023), 8.7 (2023)  Lipid Panel:utd  MELLO: utd    The following portions of the patient's history were reviewed and updated as appropriate: allergies, current medications, past family history, past medical history, past social history, past surgical history, and problem list.    Diet: she doesn't limit her diet much    Past Medical History:   Diagnosis Date    Arthritis     CHF (congestive heart failure)     COPD (chronic obstructive pulmonary disease)     Diabetes  mellitus     Elevated cholesterol     GERD (gastroesophageal reflux disease)     Hyperlipemia     Hypertension     JOANN (obstructive sleep apnea)     Pneumonia     Vulvar cancer      Past Surgical History:   Procedure Laterality Date    HYSTERECTOMY      INCISION AND DRAINAGE TRUNK N/A 1/11/2022    Procedure: INCISION AND DRAINAGE BACK;  Surgeon: Tamera Saez MD;  Location: [facility];  Service: General;  Laterality: N/A;    MASTECTOMY      VULVA SURGERY      reconstruction due ca      Family History   Problem Relation Age of Onset    Cancer Mother     Hypertension Mother     Parkinsonism Father     Diabetes Father     Hypertension Father     Cancer Maternal Aunt     Diabetes Paternal Aunt     Diabetes Paternal Grandmother     Cancer Maternal Aunt     Diabetes Brother       Social History     Socioeconomic History    Marital status:    Tobacco Use    Smoking status: Every Day     Current packs/day: 1.50     Average packs/day: 1.4 packs/day for 29.5 years (42.5 ttl pk-yrs)     Types: Cigarettes     Start date: 5/18/1995     Passive exposure: Current    Smokeless tobacco: Never    Tobacco comments:     Down to 4 per day    Vaping Use    Vaping status: Former    Substances: Nicotine, Flavoring    Devices: RefBloomfireble tank   Substance and Sexual Activity    Alcohol use: Never    Drug use: Never    Sexual activity: Not Currently     Partners: Male     Birth control/protection: Condom      Allergies   Allergen Reactions    Codeine Nausea Only    Metformin GI Intolerance     Extreme diarrhea      Current Outpatient Medications on File Prior to Visit   Medication Sig Dispense Refill    albuterol sulfate HFA (Ventolin HFA) 108 (90 Base) MCG/ACT inhaler Inhale 2 puffs Every 4 (Four) Hours As Needed for Wheezing or Shortness of Air. 18 g 12    budesonide-formoterol (Symbicort) 160-4.5 MCG/ACT inhaler Inhale 2 puffs 2 (Two) Times a Day. 1 each 8    cetirizine (zyrTEC) 10 MG tablet Take 1 tablet by mouth Daily.       clonazePAM (KlonoPIN) 1 MG tablet Take 1 tablet by mouth 4 (Four) Times a Day As Needed for Anxiety.      cyclobenzaprine (FLEXERIL) 5 MG tablet Take 1 tablet by mouth 3 (Three) Times a Day As Needed for Muscle Spasms.      dicyclomine (BENTYL) 10 MG capsule       fluticasone (FLONASE) 50 MCG/ACT nasal spray Administer 2 sprays into the nostril(s) as directed by provider Daily As Needed for Rhinitis or Allergies.      gabapentin (NEURONTIN) 800 MG tablet Take 1 tablet by mouth 4 (Four) Times a Day.      hydroCHLOROthiazide (HYDRODIURIL) 25 MG tablet Take 1 tablet by mouth Every Morning.      ibuprofen (ADVIL,MOTRIN) 800 MG tablet Take 1 tablet by mouth Every 8 (Eight) Hours As Needed for Mild Pain.      ipratropium-albuterol (DUO-NEB) 0.5-2.5 mg/3 ml nebulizer Take 3 mL by nebulization Every 6 (Six) Hours As Needed for Wheezing or Shortness of Air. 360 mL 5    lidocaine (LIDODERM) 5 % Place 1 patch on the skin as directed by provider Daily. Remove & Discard patch within 12 hours or as directed by MD      lisinopril (PRINIVIL,ZESTRIL) 5 MG tablet Take 0.5 tablets by mouth Daily.      meloxicam (MOBIC) 7.5 MG tablet Take 1 tablet by mouth Every 12 (Twelve) Hours.      nicotine (NICODERM CQ) 21 MG/24HR patch Place 1 patch on the skin as directed by provider Daily. 28 each 1    ondansetron ODT (ZOFRAN-ODT) 4 MG disintegrating tablet Take 1 tablet by mouth Every 6 (Six) Hours As Needed for Nausea or Vomiting. 12 tablet 0    simvastatin (ZOCOR) 80 MG tablet Take 1 tablet by mouth every night at bedtime.      tiotropium (Spiriva HandiHaler) 18 MCG per inhalation capsule Place 1 capsule into inhaler and inhale Daily. 30 capsule 8    [DISCONTINUED] glipizide (Glucotrol) 5 MG tablet Take 1 tablet by mouth 2 (Two) Times a Day Before Meals. 60 tablet 5    [DISCONTINUED] Insulin Glargine (LANTUS SOLOSTAR) 100 UNIT/ML injection pen Inject 40 Units under the skin into the appropriate area as directed 2 (Two) Times a Day. 30 mL 0     loperamide (IMODIUM) 2 MG capsule Take 1 capsule by mouth 4 (Four) Times a Day As Needed for Diarrhea. (Patient not taking: Reported on 11/26/2024)      [DISCONTINUED] Semaglutide, 2 MG/DOSE, (Ozempic, 2 MG/DOSE,) 8 MG/3ML solution pen-injector Inject 2 mg under the skin into the appropriate area as directed 1 (One) Time Per Week. (Patient not taking: Reported on 11/26/2024) 3 mL 2     No current facility-administered medications on file prior to visit.        Review of Systems   Constitutional:  Positive for fatigue and unexpected weight gain.   Eyes: Negative.    Gastrointestinal:  Positive for constipation.   Endocrine: Positive for polydipsia, polyphagia and polyuria.   Genitourinary:         Itching and burning.   Psychiatric/Behavioral:  Positive for sleep disturbance.    All other systems reviewed and are negative.    /83 (BP Location: Right arm, Patient Position: Sitting, Cuff Size: Adult)   Pulse 104   Wt 123 kg (271 lb 6.4 oz)   SpO2 94%   BMI 45.16 kg/m²      Physical Exam  Vitals reviewed.   Constitutional:       Appearance: Normal appearance.   Eyes:      Extraocular Movements: Extraocular movements intact.   Cardiovascular:      Rate and Rhythm: Normal rate.   Pulmonary:      Effort: Pulmonary effort is normal.   Skin:     General: Skin is warm.   Neurological:      General: No focal deficit present.      Mental Status: She is alert and oriented to person, place, and time.   Psychiatric:         Mood and Affect: Mood normal.         Behavior: Behavior normal.         Thought Content: Thought content normal.         Judgment: Judgment normal.       CMP:  Lab Results   Component Value Date    Glucose 450 (A) 11/26/2024    Glucose, UA >=1000 mg/dL (3+) (A) 09/09/2024    BUN 21 (H) 09/13/2024    BUN/Creatinine Ratio 25.3 (H) 09/13/2024    Creatinine 0.83 09/13/2024    Creatinine, Urine 51.9 11/03/2023    Ketones, UA Negative 09/09/2024    CO2 28.3 09/13/2024    CO2 Content 31.1 09/09/2024     "Calcium 9.8 09/13/2024    Albumin 3.6 09/09/2024    AST (SGOT) 20 09/09/2024    Astrovirus Not Detected 03/18/2024    ALT (SGPT) 11 09/09/2024     Lipid Panel:  No results found for: \"CHOL\", \"TRIG\", \"HDL\", \"VLDL\", \"LDL\"  HbA1c:  Lab Results   Component Value Date    HGBA1C 8.10 (H) 09/09/2024     Glucose:  Lab Results   Component Value Date    POCGLU 450 (A) 11/26/2024     Microalbumin:  Lab Results   Component Value Date    MALBCRERATIO 23 11/03/2023     TSH:  Lab Results   Component Value Date    TSH 1.440 03/18/2024       Assessment and Plan    Diagnoses and all orders for this visit:    1. Type 2 diabetes mellitus with hyperglycemia, with long-term current use of insulin (Primary)  Assessment & Plan:  -Diabetes is unchanged with A1c 8.1.  -Discussed dietary and exercise guidelines with patient.    -Discussed the importance of yearly eye exams.  -Discussed the importance of checking BG's regularly.  Start re-wearing CGM. RX sent to pharmacy.  -Discussed use of Mounjaro.  Patient would like to try this therapy.  Start on Mounjaro 2.5 mg weekly.  Patient has no personal history of pancreatitis, no family history of MEN syndrome or medullary thyroid cancer. Possible side effects including nausea, bloating, other GI upset and rarely pancreatitis were discussed. She was advised to call the office with any symptoms or concerns.   Discussed importance of increasing water intake and fiber in diet and taking stool softeners as needed to relieve constipation.  -Discontinue Jardiance.   -Increase Lantus 80 units QHS.   -Increase Glipizide 10 mg BID.  -Start Novolog 15 units TID with meals for hyperglycemia.  -S/S hypoglycemia reviewed with Rule of 15's advised.  -Follow-up in 1 month.    Orders:  -     POC Glucose, Blood    Other orders  -     Continuous Glucose Sensor (Dexcom G7 Sensor) misc; Use 1 each Every 10 (Ten) Days.  Dispense: 3 each; Refill: 5  -     Tirzepatide (Mounjaro) 2.5 MG/0.5ML solution auto-injector; " Inject 2.5 mg under the skin into the appropriate area as directed 1 (One) Time Per Week.  Dispense: 2 mL; Refill: 0  -     Insulin Pen Needle 32G X 6 MM misc; Use 1 each 4 (Four) Times a Day.  Dispense: 150 each; Refill: 2  -     insulin aspart (NovoLOG FlexPen) 100 UNIT/ML solution pen-injector sc pen; Inject 15 Units under the skin into the appropriate area as directed 3 (Three) Times a Day With Meals.  Dispense: 15 mL; Refill: 2  -     glipizide (GLUCOTROL) 10 MG tablet; Take 1 tablet by mouth 2 (Two) Times a Day.  Dispense: 60 tablet; Refill: 2  -     Insulin Glargine (LANTUS SOLOSTAR) 100 UNIT/ML injection pen; Inject 80 Units under the skin into the appropriate area as directed Every Night.  Dispense: 30 mL; Refill: 2             Return in about 4 weeks (around 12/24/2024) for Follow-up appointment. The patient was instructed to contact the clinic with any interval questions or concerns.        This document has been electronically signed by CHAZ Mcintosh  November 26, 2024 16:13 EST   Endocrinology    Please note that portions of this document were completed with a voice recognition program. Efforts were made to edit the dictations, but occasionally words are mis-transcribed.

## 2024-11-26 NOTE — PATIENT INSTRUCTIONS
Lantus 80 units nightly  Novolog 15 units 3 times a day, IF your sugar is above 300  Glipizide 10 mg twice a day  Mounjaro 2.5 mg weekly

## 2024-11-26 NOTE — ASSESSMENT & PLAN NOTE
-Diabetes is unchanged with A1c 8.1.  -Discussed dietary and exercise guidelines with patient.    -Discussed the importance of yearly eye exams.  -Discussed the importance of checking BG's regularly.  Start re-wearing CGM. RX sent to pharmacy.  -Discussed use of Mounjaro.  Patient would like to try this therapy.  Start on Mounjaro 2.5 mg weekly.  Patient has no personal history of pancreatitis, no family history of MEN syndrome or medullary thyroid cancer. Possible side effects including nausea, bloating, other GI upset and rarely pancreatitis were discussed. She was advised to call the office with any symptoms or concerns.   Discussed importance of increasing water intake and fiber in diet and taking stool softeners as needed to relieve constipation.  -Discontinue Jardiance.   -Increase Lantus 80 units QHS.   -Increase Glipizide 10 mg BID.  -Start Novolog 15 units TID with meals for hyperglycemia.  -S/S hypoglycemia reviewed with Rule of 15's advised.  -Follow-up in 1 month.

## 2024-12-02 ENCOUNTER — PRIOR AUTHORIZATION (OUTPATIENT)
Dept: ENDOCRINOLOGY | Facility: CLINIC | Age: 49
End: 2024-12-02
Payer: MEDICAID

## 2025-01-02 ENCOUNTER — OFFICE VISIT (OUTPATIENT)
Dept: ENDOCRINOLOGY | Facility: CLINIC | Age: 50
End: 2025-01-02
Payer: MEDICAID

## 2025-01-02 VITALS
BODY MASS INDEX: 44.76 KG/M2 | SYSTOLIC BLOOD PRESSURE: 135 MMHG | OXYGEN SATURATION: 93 % | DIASTOLIC BLOOD PRESSURE: 79 MMHG | HEART RATE: 90 BPM | WEIGHT: 269 LBS

## 2025-01-02 DIAGNOSIS — Z79.4 TYPE 2 DIABETES MELLITUS WITH HYPERGLYCEMIA, WITH LONG-TERM CURRENT USE OF INSULIN: Primary | ICD-10-CM

## 2025-01-02 DIAGNOSIS — E11.65 TYPE 2 DIABETES MELLITUS WITH HYPERGLYCEMIA, WITH LONG-TERM CURRENT USE OF INSULIN: Primary | ICD-10-CM

## 2025-01-02 LAB
EXPIRATION DATE: ABNORMAL
HBA1C MFR BLD: 12.4 % (ref 4.5–5.7)
Lab: ABNORMAL

## 2025-01-02 PROCEDURE — 1159F MED LIST DOCD IN RCRD: CPT | Performed by: NURSE PRACTITIONER

## 2025-01-02 PROCEDURE — 95251 CONT GLUC MNTR ANALYSIS I&R: CPT | Performed by: NURSE PRACTITIONER

## 2025-01-02 PROCEDURE — 83036 HEMOGLOBIN GLYCOSYLATED A1C: CPT | Performed by: NURSE PRACTITIONER

## 2025-01-02 PROCEDURE — 1160F RVW MEDS BY RX/DR IN RCRD: CPT | Performed by: NURSE PRACTITIONER

## 2025-01-02 PROCEDURE — 3046F HEMOGLOBIN A1C LEVEL >9.0%: CPT | Performed by: NURSE PRACTITIONER

## 2025-01-02 PROCEDURE — 99214 OFFICE O/P EST MOD 30 MIN: CPT | Performed by: NURSE PRACTITIONER

## 2025-01-02 RX ORDER — INSULIN DEGLUDEC 200 U/ML
90 INJECTION, SOLUTION SUBCUTANEOUS
Qty: 15 ML | Refills: 2 | Status: SHIPPED | OUTPATIENT
Start: 2025-01-02

## 2025-01-02 RX ORDER — ARIPIPRAZOLE 2 MG/1
2 TABLET ORAL DAILY
COMMUNITY

## 2025-01-02 RX ORDER — TRAZODONE HYDROCHLORIDE 50 MG/1
50 TABLET, FILM COATED ORAL NIGHTLY
COMMUNITY

## 2025-01-02 RX ORDER — SERTRALINE HYDROCHLORIDE 25 MG/1
25 TABLET, FILM COATED ORAL DAILY
COMMUNITY

## 2025-01-02 NOTE — PROGRESS NOTES
Chief Complaint   Patient presents with    Diabetes     T2DM,        Referring Provider  No ref. provider found     HPI   Ana Ortiz is a 49 y.o. female had concerns including Diabetes (T2DM,).    T2DM.    Patient reports that since her last visit, she has been noting improvements in her BG's, but that they are still higher than normal range.  She is overall doing well.      Diabetes:  Diabetes was diagnosed [id number], she had abnormal labs, but was starting to feel ill as well.  Complications include neuropathy, no stroke/cardiovascular issues.  Last [facility]htho exam was [facility] in [place], [place].  Current medications for diabetes include Lantus 80 units QHS, Novolog 15 units TID with meals, Mounjaro 2.5 mg weekly-did not get this from the pharmacy, Glipizide 10 mg BID.  Past meds: Metformin-diarrhea, glipizide-changed therapy d/t not working anymore, Trulicity-constipation/nausea, Ozempic-GI intolerance  She checks her blood sugar with Dexcom CGM.   Hypos: none  FSB+  PPD: 250+    ACE/ARB:yes, Statin: yes  Labs:  A1C:10.6 (2023), 10.4 (2023), 8.7 (2023)  Lipid Panel:utd  MELLO: utd    The following portions of the patient's history were reviewed and updated as appropriate: allergies, current medications, past family history, past medical history, past social history, past surgical history, and problem list.    Diet: she doesn't limit her diet much    Past Medical History:   Diagnosis Date    Arthritis     CHF (congestive heart failure)     COPD (chronic obstructive pulmonary disease)     Diabetes insipidus     Diabetes mellitus     Elevated cholesterol     GERD (gastroesophageal reflux disease)     Hyperlipemia     Hypertension     JOANN (obstructive sleep apnea)     Pneumonia     Type 2 diabetes mellitus 2013    Vulvar cancer      Past Surgical History:   Procedure Laterality Date    HYSTERECTOMY      INCISION AND DRAINAGE TRUNK N/A 2022    Procedure: INCISION AND DRAINAGE BACK;   Surgeon: Tamera Saez MD;  Location: [facility] OR;  Service: General;  Laterality: N/A;    MASTECTOMY      VULVA SURGERY      reconstruction due ca      Family History   Problem Relation Age of Onset    Cancer Mother     Hypertension Mother     Parkinsonism Father     Diabetes Father     Hypertension Father     Obesity Father     Cancer Maternal Aunt     Diabetes Paternal Aunt     Diabetes Paternal Grandmother     Cancer Maternal Aunt     Diabetes Brother     Obesity Brother       Social History     Socioeconomic History    Marital status:    Tobacco Use    Smoking status: Some Days     Current packs/day: 1.50     Average packs/day: 1.4 packs/day for 30.8 years (44.4 ttl pk-yrs)     Types: Cigarettes     Start date: 5/18/1995     Passive exposure: Current    Smokeless tobacco: Never    Tobacco comments:     Down to 4 per day    Vaping Use    Vaping status: Former    Substances: Nicotine, Flavoring    Devices: Refillable tank   Substance and Sexual Activity    Alcohol use: Never    Drug use: Never    Sexual activity: Not Currently     Partners: Male     Birth control/protection: Condom      Allergies   Allergen Reactions    Codeine Nausea Only    Metformin GI Intolerance     Extreme diarrhea      Current Outpatient Medications on File Prior to Visit   Medication Sig Dispense Refill    albuterol sulfate HFA (Ventolin HFA) 108 (90 Base) MCG/ACT inhaler Inhale 2 puffs Every 4 (Four) Hours As Needed for Wheezing or Shortness of Air. 18 g 12    ARIPiprazole (ABILIFY) 2 MG tablet Take 1 tablet by mouth Daily.      budesonide-formoterol (Symbicort) 160-4.5 MCG/ACT inhaler Inhale 2 puffs 2 (Two) Times a Day. 1 each 8    cetirizine (zyrTEC) 10 MG tablet Take 1 tablet by mouth Daily.      clonazePAM (KlonoPIN) 1 MG tablet Take 1 tablet by mouth 4 (Four) Times a Day As Needed for Anxiety.      Continuous Glucose Sensor (Dexcom G7 Sensor) misc Use 1 each Every 10 (Ten) Days. 3 each 5    cyclobenzaprine (FLEXERIL) 5 MG  tablet Take 1 tablet by mouth 3 (Three) Times a Day As Needed for Muscle Spasms.      dicyclomine (BENTYL) 10 MG capsule       fluticasone (FLONASE) 50 MCG/ACT nasal spray Administer 2 sprays into the nostril(s) as directed by provider Daily As Needed for Rhinitis or Allergies.      gabapentin (NEURONTIN) 800 MG tablet Take 1 tablet by mouth 4 (Four) Times a Day.      glipizide (GLUCOTROL) 10 MG tablet Take 1 tablet by mouth 2 (Two) Times a Day. 60 tablet 2    hydroCHLOROthiazide (HYDRODIURIL) 25 MG tablet Take 1 tablet by mouth Every Morning.      ibuprofen (ADVIL,MOTRIN) 800 MG tablet Take 1 tablet by mouth Every 8 (Eight) Hours As Needed for Mild Pain.      insulin aspart (NovoLOG FlexPen) 100 UNIT/ML solution pen-injector sc pen Inject 15 Units under the skin into the appropriate area as directed 3 (Three) Times a Day With Meals. 15 mL 2    Insulin Pen Needle 32G X 6 MM misc Use 1 each 4 (Four) Times a Day. 150 each 2    ipratropium-albuterol (DUO-NEB) 0.5-2.5 mg/3 ml nebulizer Take 3 mL by nebulization Every 6 (Six) Hours As Needed for Wheezing or Shortness of Air. 360 mL 5    lidocaine (LIDODERM) 5 % Place 1 patch on the skin as directed by provider Daily. Remove & Discard patch within 12 hours or as directed by MD      lisinopril (PRINIVIL,ZESTRIL) 5 MG tablet Take 0.5 tablets by mouth Daily.      loperamide (IMODIUM) 2 MG capsule Take 1 capsule by mouth 4 (Four) Times a Day As Needed for Diarrhea.      meloxicam (MOBIC) 7.5 MG tablet Take 1 tablet by mouth Every 12 (Twelve) Hours.      nicotine (NICODERM CQ) 21 MG/24HR patch Place 1 patch on the skin as directed by provider Daily. 28 each 1    ondansetron ODT (ZOFRAN-ODT) 4 MG disintegrating tablet Take 1 tablet by mouth Every 6 (Six) Hours As Needed for Nausea or Vomiting. 12 tablet 0    sertraline (ZOLOFT) 25 MG tablet Take 1 tablet by mouth Daily.      simvastatin (ZOCOR) 80 MG tablet Take 1 tablet by mouth every night at bedtime.      tiotropium (Spiriva  HandiHaler) 18 MCG per inhalation capsule Place 1 capsule into inhaler and inhale Daily. 30 capsule 8    Tirzepatide (Mounjaro) 2.5 MG/0.5ML solution auto-injector Inject 2.5 mg under the skin into the appropriate area as directed 1 (One) Time Per Week. 2 mL 0    traZODone (DESYREL) 50 MG tablet Take 1 tablet by mouth Every Night.      [DISCONTINUED] Insulin Glargine (LANTUS SOLOSTAR) 100 UNIT/ML injection pen Inject 80 Units under the skin into the appropriate area as directed Every Night. 30 mL 2     No current facility-administered medications on file prior to visit.        Review of Systems   Constitutional:  Positive for fatigue and unexpected weight gain.   Eyes: Negative.    Gastrointestinal:  Positive for constipation.   Endocrine: Positive for polydipsia, polyphagia and polyuria.   Genitourinary:         Itching and burning.   Psychiatric/Behavioral:  Positive for sleep disturbance.    All other systems reviewed and are negative.    /79 (BP Location: Right arm, Patient Position: Sitting, Cuff Size: Adult)   Pulse 90   Wt 122 kg (269 lb)   SpO2 93%   Breastfeeding No   BMI 44.76 kg/m²      Physical Exam  Vitals reviewed.   Constitutional:       Appearance: Normal appearance.   Eyes:      Extraocular Movements: Extraocular movements intact.   Cardiovascular:      Rate and Rhythm: Normal rate.   Pulmonary:      Effort: Pulmonary effort is normal.   Skin:     General: Skin is warm.   Neurological:      General: No focal deficit present.      Mental Status: She is alert and oriented to person, place, and time.   Psychiatric:         Mood and Affect: Mood normal.         Behavior: Behavior normal.         Thought Content: Thought content normal.         Judgment: Judgment normal.       CMP:  Lab Results   Component Value Date    Glucose 450 (A) 11/26/2024    Glucose, UA >=1000 mg/dL (3+) (A) 09/09/2024    BUN 21 (H) 09/13/2024    BUN/Creatinine Ratio 25.3 (H) 09/13/2024    Creatinine 0.83 09/13/2024     "Creatinine, Urine 51.9 11/03/2023    Ketones, UA Negative 09/09/2024    CO2 28.3 09/13/2024    CO2 Content 31.1 09/09/2024    Calcium 9.8 09/13/2024    Albumin 3.6 09/09/2024    AST (SGOT) 20 09/09/2024    Astrovirus Not Detected 03/18/2024    ALT (SGPT) 11 09/09/2024     Lipid Panel:  No results found for: \"CHOL\", \"TRIG\", \"HDL\", \"VLDL\", \"LDL\"  HbA1c:  Lab Results   Component Value Date    HGBA1C 12.4 (A) 01/02/2025     Glucose:  Lab Results   Component Value Date    POCGLU 450 (A) 11/26/2024     Microalbumin:  Lab Results   Component Value Date    MALBCRERATIO 23 11/03/2023     TSH:  Lab Results   Component Value Date    TSH 1.440 03/18/2024       Assessment and Plan    Diagnoses and all orders for this visit:    1. Type 2 diabetes mellitus with hyperglycemia, with long-term current use of insulin (Primary)  Assessment & Plan:  -Diabetes is above goal with A1c 12.4; however, BG's are improving with average of 245 on CGM.  -Discussed dietary and exercise guidelines with patient.    -Discussed the importance of yearly eye exams.  -Discussed the importance of checking BG's regularly.  Continue using CGM.  2 week data download is as follows:  Very High: 43%  High:41%  In Range:16%  Low: 0%  Very Low:<1%  Trend shows overall hypers with some mild post meal hypers as well.  -Discussed use of Mounjaro.  Patient would like to try this therapy.  Sample provided for patient to try Mounjaro 2.5 mg weekly. Once the last injection is taken, will increase to 5 mg weekly.  Patient has no personal history of pancreatitis, no family history of MEN syndrome or medullary thyroid cancer. Possible side effects including nausea, bloating, other GI upset and rarely pancreatitis were discussed. She was advised to call the office with any symptoms or concerns.   Discussed importance of increasing water intake and fiber in diet and taking stool softeners as needed to relieve constipation.  -Change from Lantus to Tresiba to increase to 90 " units QHS.   -Continue Glipizide 10 mg BID.  -Continue Novolog 15 units TID with meals for hyperglycemia.  -S/S hypoglycemia reviewed with Rule of 15's advised.  -Follow-up in 3 months.    Orders:  -     POC Glycosylated Hemoglobin (Hb A1C)    Other orders  -     Insulin Degludec (Tresiba FlexTouch) 200 UNIT/ML solution pen-injector pen injection; Inject 90 Units under the skin into the appropriate area as directed every night at bedtime.  Dispense: 15 mL; Refill: 2               Return in about 3 months (around 4/2/2025) for Follow-up appointment, A1C. The patient was instructed to contact the clinic with any interval questions or concerns.        This document has been electronically signed by CHAZ Mcintosh  January 2, 2025 14:35 EST   Endocrinology    Please note that portions of this document were completed with a voice recognition program. Efforts were made to edit the dictations, but occasionally words are mis-transcribed.

## 2025-01-02 NOTE — ASSESSMENT & PLAN NOTE
-Diabetes is above goal with A1c 12.4; however, BG's are improving with average of 245 on CGM.  -Discussed dietary and exercise guidelines with patient.    -Discussed the importance of yearly eye exams.  -Discussed the importance of checking BG's regularly.  Continue using CGM.  2 week data download is as follows:  Very High: 43%  High:41%  In Range:16%  Low: 0%  Very Low:<1%  Trend shows overall hypers with some mild post meal hypers as well.  -Discussed use of Mounjaro.  Patient would like to try this therapy.  Sample provided for patient to try Mounjaro 2.5 mg weekly. Once the last injection is taken, will increase to 5 mg weekly.  Patient has no personal history of pancreatitis, no family history of MEN syndrome or medullary thyroid cancer. Possible side effects including nausea, bloating, other GI upset and rarely pancreatitis were discussed. She was advised to call the office with any symptoms or concerns.   Discussed importance of increasing water intake and fiber in diet and taking stool softeners as needed to relieve constipation.  -Change from Lantus to Tresiba to increase to 90 units QHS.   -Continue Glipizide 10 mg BID.  -Continue Novolog 15 units TID with meals for hyperglycemia.  -S/S hypoglycemia reviewed with Rule of 15's advised.  -Follow-up in 3 months.

## 2025-01-08 ENCOUNTER — HOSPITAL ENCOUNTER (OUTPATIENT)
Dept: CT IMAGING | Facility: HOSPITAL | Age: 50
Discharge: HOME | End: 2025-01-08
Admitting: NURSE PRACTITIONER
Payer: MEDICAID

## 2025-01-08 DIAGNOSIS — R91.8 PULMONARY INFILTRATES: ICD-10-CM

## 2025-01-08 PROCEDURE — 71250 CT THORAX DX C-: CPT

## 2025-01-16 ENCOUNTER — PRIOR AUTHORIZATION (OUTPATIENT)
Dept: ENDOCRINOLOGY | Facility: CLINIC | Age: 50
End: 2025-01-16
Payer: MEDICAID

## 2025-01-16 ENCOUNTER — OFFICE VISIT (OUTPATIENT)
Dept: PULMONOLOGY | Facility: CLINIC | Age: 50
End: 2025-01-16
Payer: MEDICAID

## 2025-01-16 VITALS
SYSTOLIC BLOOD PRESSURE: 126 MMHG | HEART RATE: 88 BPM | WEIGHT: 270 LBS | BODY MASS INDEX: 44.98 KG/M2 | OXYGEN SATURATION: 95 % | TEMPERATURE: 96.9 F | HEIGHT: 65 IN | DIASTOLIC BLOOD PRESSURE: 78 MMHG

## 2025-01-16 DIAGNOSIS — J96.11 CHRONIC RESPIRATORY FAILURE WITH HYPOXIA: ICD-10-CM

## 2025-01-16 DIAGNOSIS — F17.211 CIGARETTE NICOTINE DEPENDENCE IN REMISSION: ICD-10-CM

## 2025-01-16 DIAGNOSIS — G47.33 OSA (OBSTRUCTIVE SLEEP APNEA): ICD-10-CM

## 2025-01-16 DIAGNOSIS — J42 CHRONIC BRONCHITIS, UNSPECIFIED CHRONIC BRONCHITIS TYPE: Primary | ICD-10-CM

## 2025-01-16 PROCEDURE — 1160F RVW MEDS BY RX/DR IN RCRD: CPT | Performed by: NURSE PRACTITIONER

## 2025-01-16 PROCEDURE — 99214 OFFICE O/P EST MOD 30 MIN: CPT | Performed by: NURSE PRACTITIONER

## 2025-01-16 PROCEDURE — 1159F MED LIST DOCD IN RCRD: CPT | Performed by: NURSE PRACTITIONER

## 2025-01-16 RX ORDER — ALBUTEROL SULFATE 90 UG/1
2 INHALANT RESPIRATORY (INHALATION) EVERY 4 HOURS PRN
Qty: 18 G | Refills: 12 | Status: SHIPPED | OUTPATIENT
Start: 2025-01-16

## 2025-01-16 RX ORDER — IPRATROPIUM BROMIDE AND ALBUTEROL SULFATE 2.5; .5 MG/3ML; MG/3ML
3 SOLUTION RESPIRATORY (INHALATION) EVERY 6 HOURS PRN
Qty: 360 ML | Refills: 5 | Status: SHIPPED | OUTPATIENT
Start: 2025-01-16

## 2025-01-16 RX ORDER — NICOTINE 21 MG/24HR
1 PATCH, TRANSDERMAL 24 HOURS TRANSDERMAL EVERY 24 HOURS
Qty: 28 EACH | Refills: 3 | Status: SHIPPED | OUTPATIENT
Start: 2025-01-16

## 2025-01-16 RX ORDER — TIOTROPIUM BROMIDE 18 UG/1
1 CAPSULE ORAL; RESPIRATORY (INHALATION) DAILY
Qty: 30 CAPSULE | Refills: 8 | Status: SHIPPED | OUTPATIENT
Start: 2025-01-16

## 2025-01-16 RX ORDER — BUDESONIDE AND FORMOTEROL FUMARATE DIHYDRATE 160; 4.5 UG/1; UG/1
2 AEROSOL RESPIRATORY (INHALATION)
Qty: 1 EACH | Refills: 8 | Status: SHIPPED | OUTPATIENT
Start: 2025-01-16

## 2025-01-16 NOTE — PROGRESS NOTES
"Chief Complaint  Chronic bronchitis, unspecified chronic bronchitis type    Subjective          Ana Ortiz presents to [facility] for   History of Present Illness    Ms. Ortiz is a 49 year old female with a medical history significant for arthritis, COPD, CHF, diabetes, GERD, hyperlipidemia, hypertension, and sleep apnea.    She presents today for follow up on COPD.  She reports that she has been doing well since her last visit.  She reports that her breathing has been some better.  She is currently taking Spiriva once daily and Symbicort BID.  She is also using albuterol inhaler and neb treatments as needed.  She reports that she is using supplemental oxygen and cpap at night. She reports that she is not having to use the oxygen as much during the day.  She also tells me that she has not smoked in 4 days.  She continues to take Trazodone nightly for insomnia.       Objective   Vital Signs:   /78   Pulse 88   Temp 96.9 °F (36.1 °C)   Ht 165.1 cm (65\")   Wt 122 kg (270 lb)   SpO2 95%   BMI 44.93 kg/m²         Physical Exam    GENERAL APPEARANCE: Well developed, well nourished, alert and cooperative, and appears to be in no acute distress.    HEAD: normocephalic. Atraumatic.    EYES: PERRL, EOMI. Vision is grossly intact.    THROAT: Oral cavity and pharynx normal. No inflammation, swelling, exudate, or lesions.     NECK: Neck supple.  No thyromegaly.    CARDIAC: Normal S1 and S2. No S3, S4 or murmurs. Rhythm is regular.     RESPIRATORY:Bilateral air entry positive. No wheezing, crackles or rhonchi noted.    GI: Positive bowel sounds. Soft, nondistended, nontender.     MUSCULOSKELETAL: No significant deformity or joint abnormality. No edema. Peripheral pulses intact. No varicosities.    NEUROLOGICAL: Strength and sensation symmetric and intact throughout.     PSYCHIATRIC: The mental examination revealed the patient was oriented to person, place, and " "time.       Estimated body mass index is 44.93 kg/m² as calculated from the following:    Height as of this encounter: 165.1 cm (65\").    Weight as of this encounter: 122 kg (270 lb).        Result Review :   The following data was reviewed by: CHAZ Erwin on 01/16/2025:  Common labs          9/12/2024    00:26 9/13/2024    01:12 1/2/2025    14:10   Common Labs   Glucose 249  343     BUN 18  21     Creatinine 0.80  0.83     Sodium 139  136     Potassium 4.5  4.2     Chloride 98  95     Calcium 10.0  9.8     WBC 12.64  10.80     Hemoglobin 11.4  11.9     Hematocrit 35.3  36.7     Platelets 358  338     Hemoglobin A1C   12.4           PFT:6/24/21    Flow volume loop was assessed.  Spirometry showed no obstruction.  There is no significant bronchodilator response.  Diffusing capacity, uncorrected for hemoglobin, is mildly reduced.  Lung volumes are normal.  Significant air trapping noted       Low dose lung cancer screening:NA    Previous chest imaging:    CT Chest 1/8/25    FINDINGS:    Lungs and pleural spaces:  Marked interval improvement in bilateral  groundglass opacities compared to the previous exam. There are now  scattered groundglass opacities with upper lung predominance  predominantly along a centrilobular distribution likely sequela from  prior pneumonia, pneumonitis, and/or ARDS.  No significant effusion.  No  new airspace disease or consolidations identified.    Heart:  Mild cardiomegaly and mild coronary artery calcifications,  stable.  No significant pericardial effusion.    Mediastinum:  Reactive appearing mediastinal and hilar lymph nodes but  no thoracic adenopathy based on CT size criteria. Compression.    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    Bones/joints:  Unremarkable.  No acute fracture.  No dislocation.    Soft tissues:  Unremarkable.    Vasculature:  Unremarkable.  No thoracic aortic aneurysm.    Lymph nodes:  See above.    Liver:  Marked diffuse " "fatty infiltration of liver with hepatomegaly.     IMPRESSION:  1.  No new airspace disease or consolidations identified.  2.  Marked interval improvement in bilateral groundglass opacities  compared to the previous exam. There are now only scattered groundglass  opacities with upper lung predominance predominantly along a  centrilobular distribution likely sequela from prior pneumonia,  pneumonitis, and/or ARDS.  3.  Marked diffuse fatty infiltration of liver with hepatomegaly.  4.  Mild cardiomegaly and mild coronary artery calcifications, stable.  5. Other incidental/nonacute findings above stable from previous.     This report was finalized on 1/8/2025 9:24 AM by Dr. David Mckeon MD.      Alpha-1 antitrypsin screening:NA    STOP-Bang Score:   NA  [place] Sleepiness Scale:   NA      ABG:    pH No results found for: \"PHART\"   pO2 No results found for: \"PO2ART\"   pCO2 No results found for: \"PPY2NHL\"   HCO3 No results found for: \"[id number]\"                      Assessment and Plan    Problem List Items Addressed This Visit          Pulmonary and Pneumonias    Chronic bronchitis - Primary    Relevant Medications    tiotropium (Spiriva HandiHaler) 18 MCG per inhalation capsule    ipratropium-albuterol (DUO-NEB) 0.5-2.5 mg/3 ml nebulizer    budesonide-formoterol (Symbicort) 160-4.5 MCG/ACT inhaler    albuterol sulfate HFA (Ventolin HFA) 108 (90 Base) MCG/ACT inhaler    Chronic respiratory failure with hypoxia       Sleep    JOANN (obstructive sleep apnea)       Tobacco    Cigarette nicotine dependence without complication    Relevant Medications    nicotine (NICODERM CQ) 14 MG/24HR patch    nicotine polacrilex (NICORETTE) 4 MG gum       Ana Ortiz  reports that she has quit smoking. Her smoking use included cigarettes. She started smoking about 29 years ago. She has a 44.5 pack-year smoking history. She has been exposed to tobacco smoke. She has never used smokeless tobacco.     She reports that she quit smoking 4 " days ago.  Will send her in some nicotine patches and lozenges.      CT Chest was reviewed.  Marked improvement in ground glass opacities.  We will plan to continue with yearly lung cancer screenings in one year.    Continue albuterol inhaler as needed.  Continue duonebs as needed.  Continue Spiriva once daily.  Continue Symbicort BID.  Sent in refills.      She is compliant with use of supplemental oxygen and cpap at night.  She is also using the oxygen during the day as needed but reports that she has not been needing to use it as often.    She continues to take Trazodone nightly.      Follow Up   Return in about 6 months (around 7/16/2025).  Patient was given instructions and counseling regarding her condition or for health maintenance advice. Please see specific information pulled into the AVS if appropriate.

## 2025-03-08 ENCOUNTER — APPOINTMENT (OUTPATIENT)
Dept: GENERAL RADIOLOGY | Facility: HOSPITAL | Age: 50
DRG: 177 | End: 2025-03-08
Payer: MEDICAID

## 2025-03-08 ENCOUNTER — HOSPITAL ENCOUNTER (INPATIENT)
Facility: HOSPITAL | Age: 50
LOS: 5 days | Discharge: HOME | DRG: 177 | End: 2025-03-13
Admitting: INTERNAL MEDICINE
Payer: MEDICAID

## 2025-03-08 ENCOUNTER — APPOINTMENT (OUTPATIENT)
Dept: CT IMAGING | Facility: HOSPITAL | Age: 50
DRG: 177 | End: 2025-03-08
Payer: MEDICAID

## 2025-03-08 DIAGNOSIS — J44.1 COPD EXACERBATION: ICD-10-CM

## 2025-03-08 DIAGNOSIS — E11.65 TYPE 2 DIABETES MELLITUS WITH HYPERGLYCEMIA, WITH LONG-TERM CURRENT USE OF INSULIN: ICD-10-CM

## 2025-03-08 DIAGNOSIS — A41.9 SEPSIS, DUE TO UNSPECIFIED ORGANISM, UNSPECIFIED WHETHER ACUTE ORGAN DYSFUNCTION PRESENT: ICD-10-CM

## 2025-03-08 DIAGNOSIS — R06.02 SHORTNESS OF BREATH: Primary | ICD-10-CM

## 2025-03-08 DIAGNOSIS — J96.00 ACUTE RESPIRATORY FAILURE, UNSPECIFIED WHETHER WITH HYPOXIA OR HYPERCAPNIA: ICD-10-CM

## 2025-03-08 DIAGNOSIS — Z79.4 TYPE 2 DIABETES MELLITUS WITH HYPERGLYCEMIA, WITH LONG-TERM CURRENT USE OF INSULIN: ICD-10-CM

## 2025-03-08 PROBLEM — J18.9 MULTIFOCAL PNEUMONIA: Status: ACTIVE | Noted: 2025-03-08

## 2025-03-08 LAB
A-A DO2: 170.4 MMHG (ref 0–300)
A-A DO2: 530.5 MMHG (ref 0–300)
ALBUMIN SERPL-MCNC: 4.1 G/DL (ref 3.5–5.2)
ALBUMIN/GLOB SERPL: 1.1 G/DL
ALP SERPL-CCNC: 122 U/L (ref 39–117)
ALT SERPL W P-5'-P-CCNC: 14 U/L (ref 1–33)
ANION GAP SERPL CALCULATED.3IONS-SCNC: 15.1 MMOL/L (ref 5–15)
APTT PPP: 30 SECONDS (ref 24.5–35.9)
ARTERIAL PATENCY WRIST A: ABNORMAL
ARTERIAL PATENCY WRIST A: ABNORMAL
AST SERPL-CCNC: 17 U/L (ref 1–32)
ATMOSPHERIC PRESS: 727 MMHG
ATMOSPHERIC PRESS: 727 MMHG
B PARAPERT DNA SPEC QL NAA+PROBE: NOT DETECTED
B PERT DNA SPEC QL NAA+PROBE: NOT DETECTED
BACTERIA UR QL AUTO: ABNORMAL /HPF
BASE EXCESS BLDA CALC-SCNC: 2.5 MMOL/L (ref 0–2)
BASE EXCESS BLDA CALC-SCNC: 5.9 MMOL/L (ref 0–2)
BASOPHILS # BLD AUTO: 0.05 10*3/MM3 (ref 0–0.2)
BASOPHILS NFR BLD AUTO: 0.3 % (ref 0–1.5)
BDY SITE: ABNORMAL
BDY SITE: ABNORMAL
BILIRUB SERPL-MCNC: 0.5 MG/DL (ref 0–1.2)
BILIRUB UR QL STRIP: NEGATIVE
BUN SERPL-MCNC: 10 MG/DL (ref 6–20)
BUN/CREAT SERPL: 11.4 (ref 7–25)
C PNEUM DNA NPH QL NAA+NON-PROBE: NOT DETECTED
CALCIUM SPEC-SCNC: 8.8 MG/DL (ref 8.6–10.5)
CHLORIDE SERPL-SCNC: 87 MMOL/L (ref 98–107)
CLARITY UR: CLEAR
CO2 BLDA-SCNC: 30.9 MMOL/L (ref 22–33)
CO2 BLDA-SCNC: 33.4 MMOL/L (ref 22–33)
CO2 SERPL-SCNC: 27.9 MMOL/L (ref 22–29)
COHGB MFR BLD: 5.6 % (ref 0–5)
COHGB MFR BLD: 7.8 % (ref 0–5)
COLOR UR: YELLOW
CREAT SERPL-MCNC: 0.88 MG/DL (ref 0.57–1)
D-LACTATE SERPL-SCNC: 1.5 MMOL/L (ref 0.5–2)
DEPRECATED RDW RBC AUTO: 44 FL (ref 37–54)
EGFRCR SERPLBLD CKD-EPI 2021: 80.7 ML/MIN/1.73
EOSINOPHIL # BLD AUTO: 0.01 10*3/MM3 (ref 0–0.4)
EOSINOPHIL NFR BLD AUTO: 0.1 % (ref 0.3–6.2)
EPAP: 6
ERYTHROCYTE [DISTWIDTH] IN BLOOD BY AUTOMATED COUNT: 13.6 % (ref 12.3–15.4)
FLUAV SUBTYP SPEC NAA+PROBE: NOT DETECTED
FLUBV RNA ISLT QL NAA+PROBE: NOT DETECTED
GEN 5 1HR TROPONIN T REFLEX: 9 NG/L
GLOBULIN UR ELPH-MCNC: 3.6 GM/DL
GLUCOSE SERPL-MCNC: 439 MG/DL (ref 65–99)
GLUCOSE UR STRIP-MCNC: ABNORMAL MG/DL
HADV DNA SPEC NAA+PROBE: NOT DETECTED
HCG SERPL QL: NEGATIVE
HCO3 BLDA-SCNC: 29.7 MMOL/L (ref 20–26)
HCO3 BLDA-SCNC: 31.4 MMOL/L (ref 20–26)
HCOV 229E RNA SPEC QL NAA+PROBE: NOT DETECTED
HCOV HKU1 RNA SPEC QL NAA+PROBE: NOT DETECTED
HCOV NL63 RNA SPEC QL NAA+PROBE: NOT DETECTED
HCOV OC43 RNA SPEC QL NAA+PROBE: NOT DETECTED
HCT VFR BLD AUTO: 41.7 % (ref 34–46.6)
HCT VFR BLD CALC: 43.4 % (ref 38–51)
HCT VFR BLD CALC: 44.3 % (ref 38–51)
HGB BLD-MCNC: 13.9 G/DL (ref 12–15.9)
HGB BLDA-MCNC: 14.2 G/DL (ref 13.5–17.5)
HGB BLDA-MCNC: 14.5 G/DL (ref 13.5–17.5)
HGB UR QL STRIP.AUTO: ABNORMAL
HMPV RNA NPH QL NAA+NON-PROBE: NOT DETECTED
HOLD SPECIMEN: NORMAL
HPIV1 RNA ISLT QL NAA+PROBE: NOT DETECTED
HPIV2 RNA SPEC QL NAA+PROBE: NOT DETECTED
HPIV3 RNA NPH QL NAA+PROBE: NOT DETECTED
HPIV4 P GENE NPH QL NAA+PROBE: NOT DETECTED
HYALINE CASTS UR QL AUTO: ABNORMAL /LPF
IMM GRANULOCYTES # BLD AUTO: 0.31 10*3/MM3 (ref 0–0.05)
IMM GRANULOCYTES NFR BLD AUTO: 1.6 % (ref 0–0.5)
INHALED O2 CONCENTRATION: 100 %
INHALED O2 CONCENTRATION: 40 %
INR PPP: 1.09 (ref 0.9–1.1)
IPAP: 12
KETONES UR QL STRIP: ABNORMAL
LEUKOCYTE ESTERASE UR QL STRIP.AUTO: NEGATIVE
LYMPHOCYTES # BLD AUTO: 0.79 10*3/MM3 (ref 0.7–3.1)
LYMPHOCYTES NFR BLD AUTO: 4 % (ref 19.6–45.3)
Lab: ABNORMAL
M PNEUMO IGG SER IA-ACNC: NOT DETECTED
MAGNESIUM SERPL-MCNC: 1.4 MG/DL (ref 1.6–2.6)
MCH RBC QN AUTO: 29.9 PG (ref 26.6–33)
MCHC RBC AUTO-ENTMCNC: 33.3 G/DL (ref 31.5–35.7)
MCV RBC AUTO: 89.7 FL (ref 79–97)
METHGB BLD QL: 0.5 % (ref 0–3)
METHGB BLD QL: 0.6 % (ref 0–3)
MODALITY: ABNORMAL
MODALITY: ABNORMAL
MONOCYTES # BLD AUTO: 0.64 10*3/MM3 (ref 0.1–0.9)
MONOCYTES NFR BLD AUTO: 3.2 % (ref 5–12)
NEUTROPHILS NFR BLD AUTO: 18.03 10*3/MM3 (ref 1.7–7)
NEUTROPHILS NFR BLD AUTO: 90.8 % (ref 42.7–76)
NITRITE UR QL STRIP: NEGATIVE
NOTIFIED BY: ABNORMAL
NOTIFIED WHO: ABNORMAL
NRBC BLD AUTO-RTO: 0 /100 WBC (ref 0–0.2)
OXYHGB MFR BLDV: 85 % (ref 94–99)
OXYHGB MFR BLDV: 88.8 % (ref 94–99)
PCO2 BLDA: 39.4 MM HG (ref 35–45)
PCO2 BLDA: 66.8 MM HG (ref 35–45)
PCO2 TEMP ADJ BLD: ABNORMAL MM[HG]
PCO2 TEMP ADJ BLD: ABNORMAL MM[HG]
PEEP RESPIRATORY: 8 CM[H2O]
PH BLDA: 7.28 PH UNITS (ref 7.35–7.45)
PH BLDA: 7.49 PH UNITS (ref 7.35–7.45)
PH UR STRIP.AUTO: 6.5 [PH] (ref 5–8)
PH, TEMP CORRECTED: ABNORMAL
PH, TEMP CORRECTED: ABNORMAL
PLATELET # BLD AUTO: 213 10*3/MM3 (ref 140–450)
PMV BLD AUTO: 10.1 FL (ref 6–12)
PO2 BLDA: 58.3 MM HG (ref 83–108)
PO2 BLDA: 82.8 MM HG (ref 83–108)
PO2 TEMP ADJ BLD: ABNORMAL MM[HG]
PO2 TEMP ADJ BLD: ABNORMAL MM[HG]
POTASSIUM SERPL-SCNC: 3.4 MMOL/L (ref 3.5–5.2)
PROCALCITONIN SERPL-MCNC: 0.18 NG/ML (ref 0–0.25)
PROT SERPL-MCNC: 7.7 G/DL (ref 6–8.5)
PROT UR QL STRIP: ABNORMAL
PROTHROMBIN TIME: 14.2 SECONDS (ref 11.6–15.1)
RBC # BLD AUTO: 4.65 10*6/MM3 (ref 3.77–5.28)
RBC # UR STRIP: ABNORMAL /HPF
REF LAB TEST METHOD: ABNORMAL
RHINOVIRUS RNA SPEC NAA+PROBE: NOT DETECTED
RSV RNA NPH QL NAA+NON-PROBE: NOT DETECTED
SAO2 % BLDCOA: 92.7 % (ref 94–99)
SAO2 % BLDCOA: 94.7 % (ref 94–99)
SARS-COV-2 RNA RESP QL NAA+PROBE: NOT DETECTED
SET MECH RESP RATE: 20
SET MECH RESP RATE: 20
SODIUM SERPL-SCNC: 130 MMOL/L (ref 136–145)
SP GR UR STRIP: 1.03 (ref 1–1.03)
SQUAMOUS #/AREA URNS HPF: ABNORMAL /HPF
TROPONIN T NUMERIC DELTA: -1 NG/L
TROPONIN T SERPL HS-MCNC: 10 NG/L
TSH SERPL DL<=0.05 MIU/L-ACNC: 0.98 UIU/ML (ref 0.27–4.2)
UROBILINOGEN UR QL STRIP: ABNORMAL
VENTILATOR MODE: ABNORMAL
VENTILATOR MODE: ABNORMAL
VT ON VENT VENT: 450 ML
WBC # UR STRIP: ABNORMAL /HPF
WBC NRBC COR # BLD AUTO: 19.83 10*3/MM3 (ref 3.4–10.8)
WHOLE BLOOD HOLD COAG: NORMAL
WHOLE BLOOD HOLD SPECIMEN: NORMAL

## 2025-03-08 PROCEDURE — 83735 ASSAY OF MAGNESIUM: CPT

## 2025-03-08 PROCEDURE — 93010 ELECTROCARDIOGRAM REPORT: CPT | Performed by: INTERNAL MEDICINE

## 2025-03-08 PROCEDURE — 94799 UNLISTED PULMONARY SVC/PX: CPT

## 2025-03-08 PROCEDURE — 25010000002 VANCOMYCIN 5 G RECONSTITUTED SOLUTION

## 2025-03-08 PROCEDURE — 25510000001 IOPAMIDOL PER 1 ML: Performed by: STUDENT IN AN ORGANIZED HEALTH CARE EDUCATION/TRAINING PROGRAM

## 2025-03-08 PROCEDURE — 94664 DEMO&/EVAL PT USE INHALER: CPT

## 2025-03-08 PROCEDURE — 25010000002 MIDAZOLAM 1 MG/ML 100ML NS 100 MG/100ML SOLUTION: Performed by: STUDENT IN AN ORGANIZED HEALTH CARE EDUCATION/TRAINING PROGRAM

## 2025-03-08 PROCEDURE — 85610 PROTHROMBIN TIME: CPT

## 2025-03-08 PROCEDURE — 25010000002 CEFEPIME PER 500 MG

## 2025-03-08 PROCEDURE — 71045 X-RAY EXAM CHEST 1 VIEW: CPT

## 2025-03-08 PROCEDURE — 25010000002 AZITHROMYCIN PER 500 MG

## 2025-03-08 PROCEDURE — 80307 DRUG TEST PRSMV CHEM ANLYZR: CPT | Performed by: INTERNAL MEDICINE

## 2025-03-08 PROCEDURE — 36600 WITHDRAWAL OF ARTERIAL BLOOD: CPT

## 2025-03-08 PROCEDURE — 25010000002 MAGNESIUM SULFATE 2 GM/50ML SOLUTION

## 2025-03-08 PROCEDURE — 87449 NOS EACH ORGANISM AG IA: CPT | Performed by: INTERNAL MEDICINE

## 2025-03-08 PROCEDURE — 85730 THROMBOPLASTIN TIME PARTIAL: CPT

## 2025-03-08 PROCEDURE — 93005 ELECTROCARDIOGRAM TRACING: CPT

## 2025-03-08 PROCEDURE — 25810000003 SODIUM CHLORIDE 0.9 % SOLUTION 250 ML FLEX CONT

## 2025-03-08 PROCEDURE — 0202U NFCT DS 22 TRGT SARS-COV-2: CPT

## 2025-03-08 PROCEDURE — 36415 COLL VENOUS BLD VENIPUNCTURE: CPT

## 2025-03-08 PROCEDURE — 94761 N-INVAS EAR/PLS OXIMETRY MLT: CPT

## 2025-03-08 PROCEDURE — 84703 CHORIONIC GONADOTROPIN ASSAY: CPT

## 2025-03-08 PROCEDURE — 83050 HGB METHEMOGLOBIN QUAN: CPT

## 2025-03-08 PROCEDURE — 83605 ASSAY OF LACTIC ACID: CPT

## 2025-03-08 PROCEDURE — 82375 ASSAY CARBOXYHB QUANT: CPT

## 2025-03-08 PROCEDURE — 94660 CPAP INITIATION&MGMT: CPT

## 2025-03-08 PROCEDURE — 25010000002 PROPOFOL 1000 MG/100ML EMULSION

## 2025-03-08 PROCEDURE — 94002 VENT MGMT INPAT INIT DAY: CPT

## 2025-03-08 PROCEDURE — 31500 INSERT EMERGENCY AIRWAY: CPT

## 2025-03-08 PROCEDURE — 71275 CT ANGIOGRAPHY CHEST: CPT | Performed by: RADIOLOGY

## 2025-03-08 PROCEDURE — 81001 URINALYSIS AUTO W/SCOPE: CPT

## 2025-03-08 PROCEDURE — 82805 BLOOD GASES W/O2 SATURATION: CPT

## 2025-03-08 PROCEDURE — 87040 BLOOD CULTURE FOR BACTERIA: CPT

## 2025-03-08 PROCEDURE — 80053 COMPREHEN METABOLIC PANEL: CPT

## 2025-03-08 PROCEDURE — 84145 PROCALCITONIN (PCT): CPT

## 2025-03-08 PROCEDURE — 25810000003 SODIUM CHLORIDE 0.9 % SOLUTION

## 2025-03-08 PROCEDURE — 25010000002 METHYLPREDNISOLONE PER 125 MG

## 2025-03-08 PROCEDURE — 85025 COMPLETE CBC W/AUTO DIFF WBC: CPT

## 2025-03-08 PROCEDURE — C1751 CATH, INF, PER/CENT/MIDLINE: HCPCS

## 2025-03-08 PROCEDURE — 94640 AIRWAY INHALATION TREATMENT: CPT

## 2025-03-08 PROCEDURE — 51702 INSERT TEMP BLADDER CATH: CPT

## 2025-03-08 PROCEDURE — 84484 ASSAY OF TROPONIN QUANT: CPT

## 2025-03-08 PROCEDURE — 99291 CRITICAL CARE FIRST HOUR: CPT

## 2025-03-08 PROCEDURE — 25010000002 FENTANYL CITRATE (PF) 2500 MCG/50ML SOLUTION

## 2025-03-08 PROCEDURE — 71275 CT ANGIOGRAPHY CHEST: CPT

## 2025-03-08 PROCEDURE — 84443 ASSAY THYROID STIM HORMONE: CPT

## 2025-03-08 RX ORDER — KETAMINE HYDROCHLORIDE 100 MG/ML
120 INJECTION, SOLUTION INTRAMUSCULAR; INTRAVENOUS ONCE
Status: COMPLETED | OUTPATIENT
Start: 2025-03-08 | End: 2025-03-09

## 2025-03-08 RX ORDER — MIDAZOLAM IN NACL,ISO-OSMOT/PF 50 MG/50ML
1-10 INFUSION BOTTLE (ML) INTRAVENOUS
Status: DISCONTINUED | OUTPATIENT
Start: 2025-03-08 | End: 2025-03-09

## 2025-03-08 RX ORDER — ACETAMINOPHEN 650 MG/1
650 SUPPOSITORY RECTAL ONCE
Status: COMPLETED | OUTPATIENT
Start: 2025-03-08 | End: 2025-03-08

## 2025-03-08 RX ORDER — ROCURONIUM BROMIDE 10 MG/ML
150 INJECTION, SOLUTION INTRAVENOUS ONCE
Status: COMPLETED | OUTPATIENT
Start: 2025-03-08 | End: 2025-03-08

## 2025-03-08 RX ORDER — PROPOFOL 10 MG/ML
INJECTION, EMULSION INTRAVENOUS
Status: COMPLETED
Start: 2025-03-08 | End: 2025-03-08

## 2025-03-08 RX ORDER — MAGNESIUM SULFATE HEPTAHYDRATE 40 MG/ML
2 INJECTION, SOLUTION INTRAVENOUS ONCE
Status: COMPLETED | OUTPATIENT
Start: 2025-03-08 | End: 2025-03-08

## 2025-03-08 RX ORDER — FENTANYL CITRATE-0.9 % NACL/PF 10 MCG/ML
50-300 PLASTIC BAG, INJECTION (ML) INTRAVENOUS
Refills: 0 | Status: DISCONTINUED | OUTPATIENT
Start: 2025-03-08 | End: 2025-03-11

## 2025-03-08 RX ORDER — IPRATROPIUM BROMIDE AND ALBUTEROL SULFATE 2.5; .5 MG/3ML; MG/3ML
3 SOLUTION RESPIRATORY (INHALATION) ONCE
Status: COMPLETED | OUTPATIENT
Start: 2025-03-08 | End: 2025-03-08

## 2025-03-08 RX ORDER — SODIUM CHLORIDE 0.9 % (FLUSH) 0.9 %
10 SYRINGE (ML) INJECTION AS NEEDED
Status: DISCONTINUED | OUTPATIENT
Start: 2025-03-08 | End: 2025-03-13 | Stop reason: HOSPADM

## 2025-03-08 RX ORDER — KETAMINE HYDROCHLORIDE 100 MG/ML
INJECTION, SOLUTION INTRAMUSCULAR; INTRAVENOUS
Status: COMPLETED
Start: 2025-03-08 | End: 2025-03-09

## 2025-03-08 RX ORDER — IOPAMIDOL 755 MG/ML
100 INJECTION, SOLUTION INTRAVASCULAR
Status: COMPLETED | OUTPATIENT
Start: 2025-03-08 | End: 2025-03-08

## 2025-03-08 RX ADMIN — IOPAMIDOL 85 ML: 755 INJECTION, SOLUTION INTRAVENOUS at 20:35

## 2025-03-08 RX ADMIN — SODIUM BICARBONATE 50 MEQ: 84 INJECTION INTRAVENOUS at 18:19

## 2025-03-08 RX ADMIN — AZITHROMYCIN MONOHYDRATE 500 MG: 500 INJECTION, POWDER, LYOPHILIZED, FOR SOLUTION INTRAVENOUS at 19:41

## 2025-03-08 RX ADMIN — KETAMINE HYDROCHLORIDE 120 MG: 100 INJECTION INTRAMUSCULAR; INTRAVENOUS at 18:22

## 2025-03-08 RX ADMIN — CEFEPIME 2000 MG: 2 INJECTION, POWDER, FOR SOLUTION INTRAVENOUS at 19:00

## 2025-03-08 RX ADMIN — VANCOMYCIN HYDROCHLORIDE 2500 MG: 5 INJECTION, POWDER, LYOPHILIZED, FOR SOLUTION INTRAVENOUS at 19:48

## 2025-03-08 RX ADMIN — IPRATROPIUM BROMIDE AND ALBUTEROL SULFATE 3 ML: .5; 2.5 SOLUTION RESPIRATORY (INHALATION) at 18:00

## 2025-03-08 RX ADMIN — FENTANYL CITRATE 50 MCG/HR: 50 INJECTION, SOLUTION INTRAMUSCULAR; INTRAVENOUS at 18:49

## 2025-03-08 RX ADMIN — MIDAZOLAM HYDROCHLORIDE 3 MG/HR: 1 INJECTION, SOLUTION INTRAVENOUS at 20:44

## 2025-03-08 RX ADMIN — ACETAMINOPHEN 650 MG: 650 SUPPOSITORY RECTAL at 18:49

## 2025-03-08 RX ADMIN — PROPOFOL 5 MCG/KG/MIN: 10 INJECTION, EMULSION INTRAVENOUS at 18:27

## 2025-03-08 RX ADMIN — MAGNESIUM SULFATE IN WATER 2 G: 40 INJECTION, SOLUTION INTRAVENOUS at 18:05

## 2025-03-08 RX ADMIN — ROCURONIUM BROMIDE 150 MG: 10 SOLUTION INTRAVENOUS at 18:23

## 2025-03-08 NOTE — Clinical Note
Level of Care: Critical Care [6]   Diagnosis: Multifocal pneumonia [[id number]]   Admitting Physician: AMARI BURNHAM [[id number]]   Attending Physician: AMARI BURNHAM [[id number]]   Certification: I Certify That Inpatient Hospital Services Are Medically Necessary For Greater Than 2 Midnights

## 2025-03-08 NOTE — ED PROVIDER NOTES
Subjective   History of Present Illness  Pt is a 49F PMHx CHF, COPD, DM, HLD, HTN, JOANN presenting to ED with complaint of shortness of breath x 4-5 days. Subjective fevers and chills. Cough without sputum. Says has become very tired. Presented febrile, tachypnic, tachycardia, hypoxic. Required BIPAP and eventually emergent intubation for respiratory failure and work of breathing.    History provided by:  Patient   used: No        Review of Systems   Unable to perform ROS: Acuity of condition   Constitutional:  Positive for chills, fatigue and fever.   HENT:  Negative for congestion and rhinorrhea.    Respiratory:  Positive for cough and shortness of breath.    Cardiovascular:  Negative for chest pain and palpitations.   Gastrointestinal:  Negative for abdominal pain, diarrhea, nausea and vomiting.       Past Medical History:   Diagnosis Date    Arthritis     CHF (congestive heart failure)     COPD (chronic obstructive pulmonary disease)     Diabetes insipidus     Diabetes mellitus     Elevated cholesterol     GERD (gastroesophageal reflux disease)     Hyperlipemia     Hypertension     JOANN (obstructive sleep apnea)     Pneumonia     Type 2 diabetes mellitus 2013    Vulvar cancer        Allergies   Allergen Reactions    Codeine Nausea Only    Metformin GI Intolerance     Extreme diarrhea       Past Surgical History:   Procedure Laterality Date    HYSTERECTOMY      INCISION AND DRAINAGE TRUNK N/A 1/11/2022    Procedure: INCISION AND DRAINAGE BACK;  Surgeon: Tamera Saez MD;  Location: [facility];  Service: General;  Laterality: N/A;    MASTECTOMY      VULVA SURGERY      reconstruction due ca       Family History   Problem Relation Age of Onset    Cancer Mother     Hypertension Mother     Parkinsonism Father     Diabetes Father     Hypertension Father     Obesity Father     Cancer Maternal Aunt     Diabetes Paternal Aunt     Diabetes Paternal Grandmother     Cancer Maternal Aunt     Diabetes  Brother     Obesity Brother        Social History     Socioeconomic History    Marital status:    Tobacco Use    Smoking status: Former     Current packs/day: 1.50     Average packs/day: 1.4 packs/day for 31.0 years (44.7 ttl pk-yrs)     Types: Cigarettes     Start date: 5/18/1995     Passive exposure: Current    Smokeless tobacco: Never    Tobacco comments:     Hasn't smoked in 4 days   Vaping Use    Vaping status: Former    Substances: Nicotine, Flavoring    Devices: Refillable tank   Substance and Sexual Activity    Alcohol use: Never    Drug use: Never    Sexual activity: Not Currently     Partners: Male     Birth control/protection: Condom           Objective   Physical Exam  Vitals and nursing note reviewed.   Constitutional:       General: She is in acute distress.      Appearance: She is well-developed. She is obese. She is ill-appearing, toxic-appearing and diaphoretic.   HENT:      Head: Normocephalic and atraumatic.      Mouth/Throat:      Mouth: Mucous membranes are moist.      Pharynx: Oropharynx is clear. No pharyngeal swelling or oropharyngeal exudate.   Eyes:      Extraocular Movements: Extraocular movements intact.      Pupils: Pupils are equal, round, and reactive to light.   Cardiovascular:      Rate and Rhythm: Tachycardia present.   Pulmonary:      Effort: Tachypnea, accessory muscle usage and respiratory distress present.      Breath sounds: Examination of the right-upper field reveals wheezing and rhonchi. Examination of the left-upper field reveals wheezing and rhonchi. Examination of the right-middle field reveals wheezing and rhonchi. Examination of the left-middle field reveals wheezing and rhonchi. Examination of the right-lower field reveals wheezing and rhonchi. Examination of the left-lower field reveals wheezing and rhonchi. Wheezing and rhonchi present. No decreased breath sounds or rales.   Chest:      Chest wall: No mass, deformity, tenderness, crepitus or edema. There is no  dullness to percussion.   Abdominal:      General: Bowel sounds are normal.      Palpations: Abdomen is soft.   Musculoskeletal:         General: Normal range of motion.      Cervical back: Normal range of motion and neck supple.      Right lower leg: No tenderness. No edema.      Left lower leg: No tenderness. No edema.   Lymphadenopathy:      Cervical: No cervical adenopathy.   Skin:     General: Skin is warm.      Capillary Refill: Capillary refill takes less than 2 seconds.      Coloration: Skin is not cyanotic or pale.      Findings: No ecchymosis, erythema or rash.      Nails: There is no clubbing.   Neurological:      General: No focal deficit present.      Mental Status: She is alert and oriented to person, place, and time.      Cranial Nerves: No cranial nerve deficit.      Motor: No weakness.      Comments: Somewhat sleepy and tired appearing         Critical Care    Performed by: Harry Frey MD  Authorized by: Erick Fraga DO    Critical care provider statement:     Critical care time (minutes):  55    Critical care time was exclusive of:  Separately billable procedures and treating other patients and teaching time    Critical care was necessary to treat or prevent imminent or life-threatening deterioration of the following conditions:  Respiratory failure and sepsis    Critical care was time spent personally by me on the following activities:  Blood draw for specimens, development of treatment plan with patient or surrogate, evaluation of patient's response to treatment, examination of patient, ordering and performing treatments and interventions, ordering and review of laboratory studies, ordering and review of radiographic studies, pulse oximetry, re-evaluation of patient's condition and review of old charts    I assumed direction of critical care for this patient from another provider in my specialty: no      Care discussed with: admitting provider    Intubation    Date/Time: 3/8/2025 7:29  PM    Performed by: Harry Frey MD  Authorized by: Erick Fraga DO    Consent:     Consent obtained:  Emergent situation and verbal    Consent given by:  Patient    Risks discussed:  Aspiration, brain injury, dental trauma, laryngeal injury, pneumothorax, hypoxia, bleeding and death    Alternatives discussed:  No treatment, delayed treatment, alternative treatment and observation  Universal protocol:     Patient identity confirmed:  Arm band, hospital-assigned identification number and verbally with patient  Pre-procedure details:     Indications: respiratory distress and respiratory failure      Patient status:  Awake    Look externally: large tongue      Mouth opening - incisor distance:  2 finger widths    Hyoid-mental distance: 2 finger widths      Hyoid-thyroid distance: 1 finger width      Mallampati score:  III    Obstruction: none      Neck mobility: normal      Pharmacologic strategy: RSI      Induction agents:  Ketamine    Paralytics:  Rocuronium  Procedure details:     Preoxygenation:  BiLevel    CPR in progress: no      Number of attempts:  1  Successful intubation attempt details:     Intubation method:  Oral    Intubation technique: video assisted      Laryngoscope blade:  Hypercurved    Bougie used: no      Grade view: III      Tube size (mm):  7.5    Tube type:  Cuffed    Tube visualized through cords: yes    Placement assessment:     ETT at teeth/gumline (cm):  24    Tube secured with:  ETT berger    Breath sounds:  Equal and absent over the epigastrium    Placement verification: chest rise, colorimetric ETCO2, CXR verification, direct visualization, endoscopic, equal breath sounds, esophageal detector and tube exhalation      CXR findings:  High    Tube repositioned: yes    Post-procedure details:     Procedure completion:  Tolerated well, no immediate complications             ED Course  ED Course as of 03/08/25 2120   Sat Mar 08, 2025   1843 BP: 127/74 [CAROLINA]   1844 Temp(!): 102.8 °F  (39.3 °C) [CAROLINA]   1844 Heart Rate(!): 122 [CAROLINA]   1844 Resp: 20 [CAROLINA]   1844 SpO2: 94 % [CAROLINA]   1844 Device (Oxygen Therapy): ventilator [CAROLINA]   1844 Blood Gas, Arterial With Co-Ox(!) [CAROLINA]   1844 pH, Arterial(!): 7.486 [CAROLINA]   1844 pCO2, Arterial: 39.4 [CAROLINA]   1844 pO2, Arterial(!): 58.3 [CAROLINA]   1844 HCO3, Arterial(!): 29.7 [CAROLINA]   1844 Base Excess(!): 5.9 [CAROLINA]   1844 WBC(!): 19.83 [CAROLINA]   1845 Neutrophils Absolute(!): 18.03 [CAROLINA]   1845 Magnesium(!): 1.4 [CAROLINA]      ED Course User Index  [CAROLINA] Harry Frey MD                                                       Medical Decision Making  MDM:  Pt is a 49F PMHx as above presenting to the Ed with complaint of shortness of breath. Suspect sepsis based on febrile vitals and additional vital signs. Attempted to bridge or spare patient intubation with BIPAP but was unable to titrate appropriately to patient's respiratory distress. Used for preoxygenation. RSI with ketamine and rocuronium  with success. Tylenol suppository for antipyetics. Will give small bolus of fluids to start seeing as patient has CHF. Will start ABX broadly and for respiratory source. Will obtain additional gas, CTPE, CXR, place OG tube and collins. Will rule out ACS, PE, PNA, PTX, volume overload. Low concern for aortic dissection without widened mediastinum on CXR. Anticipate admission to ICU once workup complete.    ---------------------------------------------------------------------              03/08/25 03/08/25 03/08/25 03/08/25                 8760        1837            1900            1904     ---------------------------------------------------------------------   BP:                                     154/95                     BP Location:                                                           Patient Position:                                                           Pulse:                 (!) 122         (!) 138                     Resp:   "                   20                                       Temp:                           (!) 101.8 °F (38.8 °C)             TempSrc:                               Axillary                    SpO2:                    94%           (!) 89%           91%       Weight:                                                            Height: 165.1 cm (65\")                                            ---------------------------------------------------------------------      Harry Frey MD  Emergency Medicine      Problems Addressed:  Acute respiratory failure, unspecified whether with hypoxia or hypercapnia: complicated acute illness or injury  COPD exacerbation: complicated acute illness or injury  Sepsis, due to unspecified organism, unspecified whether acute organ dysfunction present: complicated acute illness or injury  Shortness of breath: complicated acute illness or injury    Amount and/or Complexity of Data Reviewed  Labs: ordered. Decision-making details documented in ED Course.  Radiology: ordered.  ECG/medicine tests: ordered.    Risk  OTC drugs.  Prescription drug management.        Final diagnoses:   Shortness of breath   COPD exacerbation   Sepsis, due to unspecified organism, unspecified whether acute organ dysfunction present   Acute respiratory failure, unspecified whether with hypoxia or hypercapnia       ED Disposition  ED Disposition       ED Disposition   Intended Admit    Condition   --    Comment   --               No follow-up provider specified.       Medication List      No changes were made to your prescriptions during this visit.            Harry Frey MD  03/08/25 2120       Harry Frey MD  03/08/25 2120    "

## 2025-03-09 PROBLEM — J96.90 RESPIRATORY FAILURE: Status: ACTIVE | Noted: 2025-03-09

## 2025-03-09 LAB
A-A DO2: 312 MMHG (ref 0–300)
ALBUMIN SERPL-MCNC: 3.4 G/DL (ref 3.5–5.2)
ALBUMIN/GLOB SERPL: 1 G/DL
ALP SERPL-CCNC: 91 U/L (ref 39–117)
ALT SERPL W P-5'-P-CCNC: 12 U/L (ref 1–33)
AMPHET+METHAMPHET UR QL: NEGATIVE
AMPHETAMINES UR QL: NEGATIVE
ANION GAP SERPL CALCULATED.3IONS-SCNC: 9.3 MMOL/L (ref 5–15)
ARTERIAL PATENCY WRIST A: ABNORMAL
AST SERPL-CCNC: 14 U/L (ref 1–32)
ATMOSPHERIC PRESS: 726 MMHG
BARBITURATES UR QL SCN: NEGATIVE
BASE EXCESS BLDA CALC-SCNC: 2.2 MMOL/L (ref 0–2)
BASOPHILS # BLD AUTO: 0.01 10*3/MM3 (ref 0–0.2)
BASOPHILS # BLD AUTO: 0.02 10*3/MM3 (ref 0–0.2)
BASOPHILS NFR BLD AUTO: 0.1 % (ref 0–1.5)
BASOPHILS NFR BLD AUTO: 0.1 % (ref 0–1.5)
BDY SITE: ABNORMAL
BENZODIAZ UR QL SCN: NEGATIVE
BILIRUB SERPL-MCNC: 0.2 MG/DL (ref 0–1.2)
BUN SERPL-MCNC: 16 MG/DL (ref 6–20)
BUN/CREAT SERPL: 21.9 (ref 7–25)
BUPRENORPHINE SERPL-MCNC: NEGATIVE NG/ML
CALCIUM SPEC-SCNC: 8.4 MG/DL (ref 8.6–10.5)
CANNABINOIDS SERPL QL: NEGATIVE
CHLORIDE SERPL-SCNC: 100 MMOL/L (ref 98–107)
CO2 BLDA-SCNC: 29.9 MMOL/L (ref 22–33)
CO2 SERPL-SCNC: 27.7 MMOL/L (ref 22–29)
COCAINE UR QL: NEGATIVE
COHGB MFR BLD: 1.4 % (ref 0–5)
CREAT SERPL-MCNC: 0.73 MG/DL (ref 0.57–1)
DEPRECATED RDW RBC AUTO: 46.9 FL (ref 37–54)
DEPRECATED RDW RBC AUTO: 47.6 FL (ref 37–54)
EGFRCR SERPLBLD CKD-EPI 2021: 101 ML/MIN/1.73
EOSINOPHIL # BLD AUTO: 0 10*3/MM3 (ref 0–0.4)
EOSINOPHIL # BLD AUTO: 0.01 10*3/MM3 (ref 0–0.4)
EOSINOPHIL NFR BLD AUTO: 0 % (ref 0.3–6.2)
EOSINOPHIL NFR BLD AUTO: 0.1 % (ref 0.3–6.2)
ERYTHROCYTE [DISTWIDTH] IN BLOOD BY AUTOMATED COUNT: 13.7 % (ref 12.3–15.4)
ERYTHROCYTE [DISTWIDTH] IN BLOOD BY AUTOMATED COUNT: 13.9 % (ref 12.3–15.4)
FENTANYL UR-MCNC: NEGATIVE NG/ML
GLOBULIN UR ELPH-MCNC: 3.5 GM/DL
GLUCOSE BLDC GLUCOMTR-MCNC: 121 MG/DL (ref 70–130)
GLUCOSE BLDC GLUCOMTR-MCNC: 126 MG/DL (ref 70–130)
GLUCOSE BLDC GLUCOMTR-MCNC: 133 MG/DL (ref 70–130)
GLUCOSE BLDC GLUCOMTR-MCNC: 135 MG/DL (ref 70–130)
GLUCOSE BLDC GLUCOMTR-MCNC: 140 MG/DL (ref 70–130)
GLUCOSE BLDC GLUCOMTR-MCNC: 144 MG/DL (ref 70–130)
GLUCOSE BLDC GLUCOMTR-MCNC: 177 MG/DL (ref 70–130)
GLUCOSE BLDC GLUCOMTR-MCNC: 179 MG/DL (ref 70–130)
GLUCOSE BLDC GLUCOMTR-MCNC: 191 MG/DL (ref 70–130)
GLUCOSE BLDC GLUCOMTR-MCNC: 211 MG/DL (ref 70–130)
GLUCOSE BLDC GLUCOMTR-MCNC: 243 MG/DL (ref 70–130)
GLUCOSE BLDC GLUCOMTR-MCNC: 293 MG/DL (ref 70–130)
GLUCOSE BLDC GLUCOMTR-MCNC: 328 MG/DL (ref 70–130)
GLUCOSE BLDC GLUCOMTR-MCNC: 343 MG/DL (ref 70–130)
GLUCOSE BLDC GLUCOMTR-MCNC: 372 MG/DL (ref 70–130)
GLUCOSE BLDC GLUCOMTR-MCNC: 427 MG/DL (ref 70–130)
GLUCOSE BLDC GLUCOMTR-MCNC: 442 MG/DL (ref 70–130)
GLUCOSE BLDC GLUCOMTR-MCNC: 484 MG/DL (ref 70–130)
GLUCOSE BLDC GLUCOMTR-MCNC: 550 MG/DL (ref 70–130)
GLUCOSE SERPL-MCNC: 264 MG/DL (ref 65–99)
HCO3 BLDA-SCNC: 28.4 MMOL/L (ref 20–26)
HCT VFR BLD AUTO: 34.7 % (ref 34–46.6)
HCT VFR BLD AUTO: 36.7 % (ref 34–46.6)
HCT VFR BLD CALC: 36.8 % (ref 38–51)
HGB BLD-MCNC: 11.3 G/DL (ref 12–15.9)
HGB BLD-MCNC: 12.1 G/DL (ref 12–15.9)
HGB BLDA-MCNC: 12 G/DL (ref 13.5–17.5)
IMM GRANULOCYTES # BLD AUTO: 0.11 10*3/MM3 (ref 0–0.05)
IMM GRANULOCYTES # BLD AUTO: 0.14 10*3/MM3 (ref 0–0.05)
IMM GRANULOCYTES NFR BLD AUTO: 0.8 % (ref 0–0.5)
IMM GRANULOCYTES NFR BLD AUTO: 0.9 % (ref 0–0.5)
INHALED O2 CONCENTRATION: 80 %
L PNEUMO1 AG UR QL IA: NEGATIVE
LYMPHOCYTES # BLD AUTO: 0.57 10*3/MM3 (ref 0.7–3.1)
LYMPHOCYTES # BLD AUTO: 0.84 10*3/MM3 (ref 0.7–3.1)
LYMPHOCYTES NFR BLD AUTO: 4.2 % (ref 19.6–45.3)
LYMPHOCYTES NFR BLD AUTO: 5.6 % (ref 19.6–45.3)
Lab: ABNORMAL
MCH RBC QN AUTO: 30.5 PG (ref 26.6–33)
MCH RBC QN AUTO: 30.5 PG (ref 26.6–33)
MCHC RBC AUTO-ENTMCNC: 32.6 G/DL (ref 31.5–35.7)
MCHC RBC AUTO-ENTMCNC: 33 G/DL (ref 31.5–35.7)
MCV RBC AUTO: 92.4 FL (ref 79–97)
MCV RBC AUTO: 93.5 FL (ref 79–97)
METHADONE UR QL SCN: NEGATIVE
METHGB BLD QL: 0.2 % (ref 0–3)
MODALITY: ABNORMAL
MONOCYTES # BLD AUTO: 0.29 10*3/MM3 (ref 0.1–0.9)
MONOCYTES # BLD AUTO: 0.45 10*3/MM3 (ref 0.1–0.9)
MONOCYTES NFR BLD AUTO: 2.1 % (ref 5–12)
MONOCYTES NFR BLD AUTO: 3 % (ref 5–12)
MRSA DNA SPEC QL NAA+PROBE: NORMAL
NEUTROPHILS NFR BLD AUTO: 12.58 10*3/MM3 (ref 1.7–7)
NEUTROPHILS NFR BLD AUTO: 13.58 10*3/MM3 (ref 1.7–7)
NEUTROPHILS NFR BLD AUTO: 90.3 % (ref 42.7–76)
NEUTROPHILS NFR BLD AUTO: 92.8 % (ref 42.7–76)
NRBC BLD AUTO-RTO: 0 /100 WBC (ref 0–0.2)
NRBC BLD AUTO-RTO: 0 /100 WBC (ref 0–0.2)
OPIATES UR QL: NEGATIVE
OXYCODONE UR QL SCN: NEGATIVE
OXYHGB MFR BLDV: 97.2 % (ref 94–99)
PCO2 BLDA: 50 MM HG (ref 35–45)
PCO2 TEMP ADJ BLD: ABNORMAL MM[HG]
PCP UR QL SCN: NEGATIVE
PEEP RESPIRATORY: 12 CM[H2O]
PH BLDA: 7.36 PH UNITS (ref 7.35–7.45)
PH, TEMP CORRECTED: ABNORMAL
PLATELET # BLD AUTO: 182 10*3/MM3 (ref 140–450)
PLATELET # BLD AUTO: 199 10*3/MM3 (ref 140–450)
PMV BLD AUTO: 10.1 FL (ref 6–12)
PMV BLD AUTO: 10.5 FL (ref 6–12)
PO2 BLDA: 180 MM HG (ref 83–108)
PO2 TEMP ADJ BLD: ABNORMAL MM[HG]
POTASSIUM SERPL-SCNC: 3.6 MMOL/L (ref 3.5–5.2)
PROT SERPL-MCNC: 6.9 G/DL (ref 6–8.5)
QT INTERVAL: 322 MS
QTC INTERVAL: 457 MS
RBC # BLD AUTO: 3.71 10*6/MM3 (ref 3.77–5.28)
RBC # BLD AUTO: 3.97 10*6/MM3 (ref 3.77–5.28)
S PNEUM AG SPEC QL LA: NEGATIVE
SAO2 % BLDCOA: 98.8 % (ref 94–99)
SET MECH RESP RATE: 22
SODIUM SERPL-SCNC: 137 MMOL/L (ref 136–145)
TRICYCLICS UR QL SCN: NEGATIVE
VENTILATOR MODE: ABNORMAL
VT ON VENT VENT: 450 ML
WBC NRBC COR # BLD AUTO: 13.56 10*3/MM3 (ref 3.4–10.8)
WBC NRBC COR # BLD AUTO: 15.04 10*3/MM3 (ref 3.4–10.8)

## 2025-03-09 PROCEDURE — 82805 BLOOD GASES W/O2 SATURATION: CPT

## 2025-03-09 PROCEDURE — 71045 X-RAY EXAM CHEST 1 VIEW: CPT | Performed by: RADIOLOGY

## 2025-03-09 PROCEDURE — 94799 UNLISTED PULMONARY SVC/PX: CPT

## 2025-03-09 PROCEDURE — 36600 WITHDRAWAL OF ARTERIAL BLOOD: CPT

## 2025-03-09 PROCEDURE — 82948 REAGENT STRIP/BLOOD GLUCOSE: CPT

## 2025-03-09 PROCEDURE — 25810000003 LACTATED RINGERS SOLUTION: Performed by: INTERNAL MEDICINE

## 2025-03-09 PROCEDURE — 25810000003 LACTATED RINGERS PER 1000 ML: Performed by: INTERNAL MEDICINE

## 2025-03-09 PROCEDURE — 25810000003 SODIUM CHLORIDE 0.9 % SOLUTION: Performed by: INTERNAL MEDICINE

## 2025-03-09 PROCEDURE — 25010000002 PROPOFOL 10 MG/ML EMULSION: Performed by: INTERNAL MEDICINE

## 2025-03-09 PROCEDURE — 82375 ASSAY CARBOXYHB QUANT: CPT

## 2025-03-09 PROCEDURE — 82010 KETONE BODYS QUAN: CPT | Performed by: NURSE PRACTITIONER

## 2025-03-09 PROCEDURE — 25810000003 SODIUM CHLORIDE 0.9 % SOLUTION 250 ML FLEX CONT: Performed by: INTERNAL MEDICINE

## 2025-03-09 PROCEDURE — 94664 DEMO&/EVAL PT USE INHALER: CPT

## 2025-03-09 PROCEDURE — 84132 ASSAY OF SERUM POTASSIUM: CPT | Performed by: STUDENT IN AN ORGANIZED HEALTH CARE EDUCATION/TRAINING PROGRAM

## 2025-03-09 PROCEDURE — 25010000002 FENTANYL CITRATE (PF) 2500 MCG/50ML SOLUTION: Performed by: INTERNAL MEDICINE

## 2025-03-09 PROCEDURE — 25010000002 CEFEPIME PER 500 MG: Performed by: INTERNAL MEDICINE

## 2025-03-09 PROCEDURE — 83735 ASSAY OF MAGNESIUM: CPT | Performed by: INTERNAL MEDICINE

## 2025-03-09 PROCEDURE — 94761 N-INVAS EAR/PLS OXIMETRY MLT: CPT

## 2025-03-09 PROCEDURE — 25010000002 ENOXAPARIN PER 10 MG: Performed by: INTERNAL MEDICINE

## 2025-03-09 PROCEDURE — 25810000003 SODIUM CHLORIDE 0.9 % SOLUTION: Performed by: STUDENT IN AN ORGANIZED HEALTH CARE EDUCATION/TRAINING PROGRAM

## 2025-03-09 PROCEDURE — 85025 COMPLETE CBC W/AUTO DIFF WBC: CPT | Performed by: STUDENT IN AN ORGANIZED HEALTH CARE EDUCATION/TRAINING PROGRAM

## 2025-03-09 PROCEDURE — 87899 AGENT NOS ASSAY W/OPTIC: CPT | Performed by: INTERNAL MEDICINE

## 2025-03-09 PROCEDURE — 80053 COMPREHEN METABOLIC PANEL: CPT | Performed by: STUDENT IN AN ORGANIZED HEALTH CARE EDUCATION/TRAINING PROGRAM

## 2025-03-09 PROCEDURE — 99223 1ST HOSP IP/OBS HIGH 75: CPT | Performed by: INTERNAL MEDICINE

## 2025-03-09 PROCEDURE — 25010000002 MAGNESIUM SULFATE 2 GM/50ML SOLUTION: Performed by: INTERNAL MEDICINE

## 2025-03-09 PROCEDURE — 87641 MR-STAPH DNA AMP PROBE: CPT | Performed by: INTERNAL MEDICINE

## 2025-03-09 PROCEDURE — 25010000002 METHYLPREDNISOLONE PER 40 MG: Performed by: INTERNAL MEDICINE

## 2025-03-09 PROCEDURE — 94003 VENT MGMT INPAT SUBQ DAY: CPT

## 2025-03-09 PROCEDURE — 25010000002 AZITHROMYCIN PER 500 MG: Performed by: INTERNAL MEDICINE

## 2025-03-09 PROCEDURE — 83050 HGB METHEMOGLOBIN QUAN: CPT

## 2025-03-09 RX ORDER — SODIUM CHLORIDE 0.9 % (FLUSH) 0.9 %
10 SYRINGE (ML) INJECTION AS NEEDED
Status: DISCONTINUED | OUTPATIENT
Start: 2025-03-09 | End: 2025-03-13 | Stop reason: HOSPADM

## 2025-03-09 RX ORDER — NICOTINE 21 MG/24HR
1 PATCH, TRANSDERMAL 24 HOURS TRANSDERMAL EVERY 24 HOURS
Status: DISCONTINUED | OUTPATIENT
Start: 2025-03-09 | End: 2025-03-13 | Stop reason: HOSPADM

## 2025-03-09 RX ORDER — ATORVASTATIN CALCIUM 40 MG/1
40 TABLET, FILM COATED ORAL DAILY
Status: DISCONTINUED | OUTPATIENT
Start: 2025-03-10 | End: 2025-03-13 | Stop reason: HOSPADM

## 2025-03-09 RX ORDER — LIDOCAINE 4 G/G
1 PATCH TOPICAL EVERY 24 HOURS
Status: DISCONTINUED | OUTPATIENT
Start: 2025-03-09 | End: 2025-03-13 | Stop reason: HOSPADM

## 2025-03-09 RX ORDER — BUDESONIDE AND FORMOTEROL FUMARATE DIHYDRATE 160; 4.5 UG/1; UG/1
2 AEROSOL RESPIRATORY (INHALATION)
Status: CANCELLED | OUTPATIENT
Start: 2025-03-09

## 2025-03-09 RX ORDER — FLUTICASONE PROPIONATE 50 MCG
2 SPRAY, SUSPENSION (ML) NASAL DAILY PRN
Status: DISCONTINUED | OUTPATIENT
Start: 2025-03-09 | End: 2025-03-13 | Stop reason: HOSPADM

## 2025-03-09 RX ORDER — SODIUM CHLORIDE 9 MG/ML
40 INJECTION, SOLUTION INTRAVENOUS AS NEEDED
Status: DISCONTINUED | OUTPATIENT
Start: 2025-03-09 | End: 2025-03-13 | Stop reason: HOSPADM

## 2025-03-09 RX ORDER — SODIUM CHLORIDE 0.9 % (FLUSH) 0.9 %
10 SYRINGE (ML) INJECTION EVERY 12 HOURS SCHEDULED
Status: DISCONTINUED | OUTPATIENT
Start: 2025-03-09 | End: 2025-03-12

## 2025-03-09 RX ORDER — ENOXAPARIN SODIUM 100 MG/ML
40 INJECTION SUBCUTANEOUS EVERY 12 HOURS
Status: DISCONTINUED | OUTPATIENT
Start: 2025-03-09 | End: 2025-03-13 | Stop reason: HOSPADM

## 2025-03-09 RX ORDER — DICYCLOMINE HYDROCHLORIDE 10 MG/1
10 CAPSULE ORAL 4 TIMES DAILY PRN
Status: DISCONTINUED | OUTPATIENT
Start: 2025-03-09 | End: 2025-03-13 | Stop reason: HOSPADM

## 2025-03-09 RX ORDER — SODIUM CHLORIDE 0.9 % (FLUSH) 0.9 %
20 SYRINGE (ML) INJECTION AS NEEDED
Status: DISCONTINUED | OUTPATIENT
Start: 2025-03-09 | End: 2025-03-12

## 2025-03-09 RX ORDER — OMEGA-3S/DHA/EPA/FISH OIL/D3 300MG-1000
400 CAPSULE ORAL DAILY
Status: DISCONTINUED | OUTPATIENT
Start: 2025-03-10 | End: 2025-03-13 | Stop reason: HOSPADM

## 2025-03-09 RX ORDER — MIDAZOLAM HYDROCHLORIDE 1 MG/ML
2 INJECTION, SOLUTION INTRAMUSCULAR; INTRAVENOUS EVERY 4 HOURS PRN
Status: DISCONTINUED | OUTPATIENT
Start: 2025-03-09 | End: 2025-03-11

## 2025-03-09 RX ORDER — SODIUM CHLORIDE 9 MG/ML
40 INJECTION, SOLUTION INTRAVENOUS AS NEEDED
Status: DISCONTINUED | OUTPATIENT
Start: 2025-03-09 | End: 2025-03-12

## 2025-03-09 RX ORDER — NICOTINE POLACRILEX 4 MG
15 LOZENGE BUCCAL
Status: DISCONTINUED | OUTPATIENT
Start: 2025-03-09 | End: 2025-03-11 | Stop reason: SDUPTHER

## 2025-03-09 RX ORDER — MAGNESIUM SULFATE HEPTAHYDRATE 40 MG/ML
2 INJECTION, SOLUTION INTRAVENOUS
Status: COMPLETED | OUTPATIENT
Start: 2025-03-09 | End: 2025-03-09

## 2025-03-09 RX ORDER — ONDANSETRON 8 MG/1
8 TABLET, ORALLY DISINTEGRATING ORAL DAILY PRN
COMMUNITY

## 2025-03-09 RX ORDER — FAMOTIDINE 10 MG/ML
20 INJECTION, SOLUTION INTRAVENOUS 2 TIMES DAILY
Status: DISCONTINUED | OUTPATIENT
Start: 2025-03-09 | End: 2025-03-11

## 2025-03-09 RX ORDER — POLYETHYLENE GLYCOL 3350 17 G/17G
17 POWDER, FOR SOLUTION ORAL DAILY PRN
Status: DISCONTINUED | OUTPATIENT
Start: 2025-03-09 | End: 2025-03-13 | Stop reason: HOSPADM

## 2025-03-09 RX ORDER — PANTOPRAZOLE SODIUM 40 MG/1
40 TABLET, DELAYED RELEASE ORAL
Status: CANCELLED | OUTPATIENT
Start: 2025-03-10

## 2025-03-09 RX ORDER — LISINOPRIL 2.5 MG/1
2.5 TABLET ORAL DAILY
Status: CANCELLED | OUTPATIENT
Start: 2025-03-10

## 2025-03-09 RX ORDER — SODIUM CHLORIDE, SODIUM LACTATE, POTASSIUM CHLORIDE, CALCIUM CHLORIDE 600; 310; 30; 20 MG/100ML; MG/100ML; MG/100ML; MG/100ML
100 INJECTION, SOLUTION INTRAVENOUS CONTINUOUS
Status: ACTIVE | OUTPATIENT
Start: 2025-03-09 | End: 2025-03-09

## 2025-03-09 RX ORDER — ONDANSETRON 4 MG/1
8 TABLET, ORALLY DISINTEGRATING ORAL DAILY PRN
Status: CANCELLED | OUTPATIENT
Start: 2025-03-09

## 2025-03-09 RX ORDER — IMIQUIMOD 12.5 MG/.25G
1 CREAM TOPICAL 3 TIMES WEEKLY
COMMUNITY

## 2025-03-09 RX ORDER — POTASSIUM CHLORIDE 1.5 G/1.58G
40 POWDER, FOR SOLUTION ORAL EVERY 4 HOURS
Status: COMPLETED | OUTPATIENT
Start: 2025-03-09 | End: 2025-03-09

## 2025-03-09 RX ORDER — CLONAZEPAM 1 MG/1
1 TABLET ORAL 4 TIMES DAILY PRN
Status: DISCONTINUED | OUTPATIENT
Start: 2025-03-09 | End: 2025-03-13 | Stop reason: HOSPADM

## 2025-03-09 RX ORDER — CYCLOBENZAPRINE HCL 10 MG
5 TABLET ORAL 3 TIMES DAILY PRN
Status: DISCONTINUED | OUTPATIENT
Start: 2025-03-09 | End: 2025-03-13 | Stop reason: HOSPADM

## 2025-03-09 RX ORDER — SODIUM CHLORIDE 0.9 % (FLUSH) 0.9 %
10 SYRINGE (ML) INJECTION EVERY 12 HOURS SCHEDULED
Status: DISCONTINUED | OUTPATIENT
Start: 2025-03-09 | End: 2025-03-13 | Stop reason: HOSPADM

## 2025-03-09 RX ORDER — HYDROCHLOROTHIAZIDE 25 MG/1
25 TABLET ORAL EVERY MORNING
Status: CANCELLED | OUTPATIENT
Start: 2025-03-10

## 2025-03-09 RX ORDER — INSULIN LISPRO 100 [IU]/ML
15 INJECTION, SOLUTION INTRAVENOUS; SUBCUTANEOUS
Status: CANCELLED | OUTPATIENT
Start: 2025-03-10

## 2025-03-09 RX ORDER — BISACODYL 10 MG
10 SUPPOSITORY, RECTAL RECTAL DAILY PRN
Status: DISCONTINUED | OUTPATIENT
Start: 2025-03-09 | End: 2025-03-13 | Stop reason: HOSPADM

## 2025-03-09 RX ORDER — OMEPRAZOLE 20 MG/1
20 CAPSULE, DELAYED RELEASE ORAL DAILY
COMMUNITY

## 2025-03-09 RX ORDER — VECURONIUM BROMIDE 1 MG/ML
5 INJECTION, POWDER, LYOPHILIZED, FOR SOLUTION INTRAVENOUS ONCE
Status: COMPLETED | OUTPATIENT
Start: 2025-03-09 | End: 2025-03-09

## 2025-03-09 RX ORDER — SODIUM CHLORIDE 0.9 % (FLUSH) 0.9 %
10 SYRINGE (ML) INJECTION AS NEEDED
Status: DISCONTINUED | OUTPATIENT
Start: 2025-03-09 | End: 2025-03-12

## 2025-03-09 RX ORDER — CETIRIZINE HYDROCHLORIDE 10 MG/1
10 TABLET ORAL DAILY
Status: DISCONTINUED | OUTPATIENT
Start: 2025-03-10 | End: 2025-03-13 | Stop reason: HOSPADM

## 2025-03-09 RX ORDER — OMEGA-3S/DHA/EPA/FISH OIL/D3 300MG-1000
400 CAPSULE ORAL DAILY
COMMUNITY

## 2025-03-09 RX ORDER — NITROGLYCERIN 0.4 MG/1
0.4 TABLET SUBLINGUAL
Status: DISCONTINUED | OUTPATIENT
Start: 2025-03-09 | End: 2025-03-13 | Stop reason: HOSPADM

## 2025-03-09 RX ORDER — AMOXICILLIN 250 MG
2 CAPSULE ORAL 2 TIMES DAILY
Status: DISCONTINUED | OUTPATIENT
Start: 2025-03-09 | End: 2025-03-13 | Stop reason: HOSPADM

## 2025-03-09 RX ORDER — DEXTROSE MONOHYDRATE 25 G/50ML
10-50 INJECTION, SOLUTION INTRAVENOUS
Status: DISCONTINUED | OUTPATIENT
Start: 2025-03-09 | End: 2025-03-11 | Stop reason: SDUPTHER

## 2025-03-09 RX ORDER — ALBUTEROL SULFATE 0.83 MG/ML
2.5 SOLUTION RESPIRATORY (INHALATION) EVERY 4 HOURS PRN
Status: DISCONTINUED | OUTPATIENT
Start: 2025-03-09 | End: 2025-03-13 | Stop reason: HOSPADM

## 2025-03-09 RX ORDER — GABAPENTIN 400 MG/1
800 CAPSULE ORAL EVERY 8 HOURS SCHEDULED
Status: DISCONTINUED | OUTPATIENT
Start: 2025-03-09 | End: 2025-03-13 | Stop reason: HOSPADM

## 2025-03-09 RX ORDER — IBUPROFEN 800 MG/1
800 TABLET, FILM COATED ORAL EVERY 8 HOURS PRN
Status: CANCELLED | OUTPATIENT
Start: 2025-03-09

## 2025-03-09 RX ORDER — IPRATROPIUM BROMIDE AND ALBUTEROL SULFATE 2.5; .5 MG/3ML; MG/3ML
3 SOLUTION RESPIRATORY (INHALATION)
Status: DISCONTINUED | OUTPATIENT
Start: 2025-03-09 | End: 2025-03-10

## 2025-03-09 RX ORDER — BISACODYL 5 MG/1
5 TABLET, DELAYED RELEASE ORAL DAILY PRN
Status: DISCONTINUED | OUTPATIENT
Start: 2025-03-09 | End: 2025-03-13 | Stop reason: HOSPADM

## 2025-03-09 RX ORDER — GLUCAGON 1 MG/ML
1 KIT INJECTION
Status: DISCONTINUED | OUTPATIENT
Start: 2025-03-09 | End: 2025-03-11

## 2025-03-09 RX ORDER — VANCOMYCIN/0.9 % SOD CHLORIDE 1 G/100 ML
1000 PLASTIC BAG, INJECTION (ML) INTRAVENOUS EVERY 12 HOURS
Status: DISCONTINUED | OUTPATIENT
Start: 2025-03-09 | End: 2025-03-09

## 2025-03-09 RX ADMIN — MUPIROCIN 1 APPLICATION: 20 OINTMENT TOPICAL at 20:18

## 2025-03-09 RX ADMIN — FAMOTIDINE 20 MG: 10 INJECTION, SOLUTION INTRAVENOUS at 08:28

## 2025-03-09 RX ADMIN — CEFEPIME 2000 MG: 2 INJECTION, POWDER, FOR SOLUTION INTRAVENOUS at 11:34

## 2025-03-09 RX ADMIN — FENTANYL CITRATE 300 MCG/HR: 50 INJECTION, SOLUTION INTRAMUSCULAR; INTRAVENOUS at 04:56

## 2025-03-09 RX ADMIN — Medication 10 ML: at 20:18

## 2025-03-09 RX ADMIN — IPRATROPIUM BROMIDE AND ALBUTEROL SULFATE 3 ML: .5; 2.5 SOLUTION RESPIRATORY (INHALATION) at 18:37

## 2025-03-09 RX ADMIN — AZITHROMYCIN MONOHYDRATE 500 MG: 500 INJECTION, POWDER, LYOPHILIZED, FOR SOLUTION INTRAVENOUS at 18:54

## 2025-03-09 RX ADMIN — ENOXAPARIN SODIUM 40 MG: 40 INJECTION SUBCUTANEOUS at 14:48

## 2025-03-09 RX ADMIN — SODIUM CHLORIDE, POTASSIUM CHLORIDE, SODIUM LACTATE AND CALCIUM CHLORIDE 100 ML/HR: 600; 310; 30; 20 INJECTION, SOLUTION INTRAVENOUS at 03:01

## 2025-03-09 RX ADMIN — SODIUM CHLORIDE, POTASSIUM CHLORIDE, SODIUM LACTATE AND CALCIUM CHLORIDE 1000 ML: 600; 310; 30; 20 INJECTION, SOLUTION INTRAVENOUS at 03:01

## 2025-03-09 RX ADMIN — FENTANYL CITRATE 200 MCG/HR: 50 INJECTION, SOLUTION INTRAMUSCULAR; INTRAVENOUS at 16:23

## 2025-03-09 RX ADMIN — IPRATROPIUM BROMIDE AND ALBUTEROL SULFATE 3 ML: .5; 2.5 SOLUTION RESPIRATORY (INHALATION) at 10:13

## 2025-03-09 RX ADMIN — SENNOSIDES AND DOCUSATE SODIUM 2 TABLET: 50; 8.6 TABLET ORAL at 20:17

## 2025-03-09 RX ADMIN — CEFEPIME 2000 MG: 2 INJECTION, POWDER, FOR SOLUTION INTRAVENOUS at 03:29

## 2025-03-09 RX ADMIN — IPRATROPIUM BROMIDE AND ALBUTEROL SULFATE 3 ML: .5; 2.5 SOLUTION RESPIRATORY (INHALATION) at 03:10

## 2025-03-09 RX ADMIN — Medication 10 ML: at 08:31

## 2025-03-09 RX ADMIN — Medication 1000 MG: at 08:30

## 2025-03-09 RX ADMIN — KETAMINE HYDROCHLORIDE 120 MG: 100 INJECTION INTRAMUSCULAR; INTRAVENOUS at 07:25

## 2025-03-09 RX ADMIN — SENNOSIDES AND DOCUSATE SODIUM 2 TABLET: 50; 8.6 TABLET ORAL at 08:28

## 2025-03-09 RX ADMIN — POTASSIUM CHLORIDE 40 MEQ: 1.5 POWDER, FOR SOLUTION ORAL at 06:19

## 2025-03-09 RX ADMIN — MUPIROCIN 1 APPLICATION: 20 OINTMENT TOPICAL at 03:30

## 2025-03-09 RX ADMIN — SODIUM BICARBONATE 50 MEQ: 84 INJECTION INTRAVENOUS at 07:27

## 2025-03-09 RX ADMIN — Medication 10 ML: at 08:29

## 2025-03-09 RX ADMIN — MUPIROCIN 1 APPLICATION: 20 OINTMENT TOPICAL at 08:28

## 2025-03-09 RX ADMIN — DEXMEDETOMIDINE 0.2 MCG/KG/HR: 200 INJECTION, SOLUTION INTRAVENOUS at 18:53

## 2025-03-09 RX ADMIN — METHYLPREDNISOLONE SODIUM SUCCINATE 40 MG: 40 INJECTION INTRAMUSCULAR; INTRAVENOUS at 05:16

## 2025-03-09 RX ADMIN — CEFEPIME 2000 MG: 2 INJECTION, POWDER, FOR SOLUTION INTRAVENOUS at 18:55

## 2025-03-09 RX ADMIN — PROPOFOL 15 MCG/KG/MIN: 10 INJECTION, EMULSION INTRAVENOUS at 13:47

## 2025-03-09 RX ADMIN — INSULIN HUMAN 9.8 UNITS/HR: 1 INJECTION, SOLUTION INTRAVENOUS at 03:10

## 2025-03-09 RX ADMIN — Medication 10 ML: at 08:30

## 2025-03-09 RX ADMIN — PROPOFOL 15 MCG/KG/MIN: 10 INJECTION, EMULSION INTRAVENOUS at 05:21

## 2025-03-09 RX ADMIN — METHYLPREDNISOLONE SODIUM SUCCINATE 40 MG: 40 INJECTION INTRAMUSCULAR; INTRAVENOUS at 18:54

## 2025-03-09 RX ADMIN — IPRATROPIUM BROMIDE AND ALBUTEROL SULFATE 3 ML: .5; 2.5 SOLUTION RESPIRATORY (INHALATION) at 14:45

## 2025-03-09 RX ADMIN — IPRATROPIUM BROMIDE AND ALBUTEROL SULFATE 3 ML: .5; 2.5 SOLUTION RESPIRATORY (INHALATION) at 06:35

## 2025-03-09 RX ADMIN — PROPOFOL 40 MCG/KG/MIN: 10 INJECTION, EMULSION INTRAVENOUS at 19:46

## 2025-03-09 RX ADMIN — INSULIN HUMAN 11.2 UNITS/HR: 1 INJECTION, SOLUTION INTRAVENOUS at 10:25

## 2025-03-09 RX ADMIN — MAGNESIUM SULFATE HEPTAHYDRATE 2 G: 40 INJECTION, SOLUTION INTRAVENOUS at 03:00

## 2025-03-09 RX ADMIN — MAGNESIUM SULFATE HEPTAHYDRATE 2 G: 40 INJECTION, SOLUTION INTRAVENOUS at 05:17

## 2025-03-09 RX ADMIN — POTASSIUM CHLORIDE 40 MEQ: 1.5 POWDER, FOR SOLUTION ORAL at 14:49

## 2025-03-09 RX ADMIN — ENOXAPARIN SODIUM 40 MG: 40 INJECTION SUBCUTANEOUS at 03:02

## 2025-03-09 RX ADMIN — Medication 10 ML: at 03:02

## 2025-03-09 RX ADMIN — FAMOTIDINE 20 MG: 10 INJECTION, SOLUTION INTRAVENOUS at 20:18

## 2025-03-09 RX ADMIN — VECURONIUM BROMIDE 5 MG: 1 INJECTION, POWDER, LYOPHILIZED, FOR SOLUTION INTRAVENOUS at 03:29

## 2025-03-09 RX ADMIN — POTASSIUM CHLORIDE 40 MEQ: 1.5 POWDER, FOR SOLUTION ORAL at 18:54

## 2025-03-09 RX ADMIN — POTASSIUM CHLORIDE 40 MEQ: 1.5 POWDER, FOR SOLUTION ORAL at 03:01

## 2025-03-09 RX ADMIN — MAGNESIUM SULFATE HEPTAHYDRATE 2 G: 40 INJECTION, SOLUTION INTRAVENOUS at 07:09

## 2025-03-09 NOTE — PLAN OF CARE
Problem: Noninvasive Ventilation Acute  Goal: Effective Unassisted Ventilation and Oxygenation  Outcome: Progressing     Problem: Adult Inpatient Plan of Care  Goal: Plan of Care Review  Outcome: Progressing  Goal: Patient-Specific Goal (Individualized)  Outcome: Progressing  Goal: Absence of Hospital-Acquired Illness or Injury  Outcome: Progressing  Intervention: Identify and Manage Fall Risk  Recent Flowsheet Documentation  Taken 3/9/2025 0500 by Joao Houston RN  Safety Promotion/Fall Prevention: safety round/check completed  Taken 3/9/2025 0400 by Joao Houston RN  Safety Promotion/Fall Prevention: safety round/check completed  Taken 3/9/2025 0300 by Joao Houston RN  Safety Promotion/Fall Prevention: safety round/check completed  Taken 3/9/2025 0159 by Joao Houston RN  Safety Promotion/Fall Prevention: safety round/check completed  Taken 3/9/2025 0100 by Joao Houston RN  Safety Promotion/Fall Prevention: safety round/check completed  Intervention: Prevent Skin Injury  Recent Flowsheet Documentation  Taken 3/9/2025 0400 by Joao Houston RN  Body Position:   turned   side-lying   right  Taken 3/9/2025 0159 by Joao Houston RN  Body Position:   turned   side-lying   left  Taken 3/9/2025 0100 by Joao Houston RN  Body Position:   turned   side-lying   right  Intervention: Prevent and Manage VTE (Venous Thromboembolism) Risk  Recent Flowsheet Documentation  Taken 3/9/2025 0100 by Joao Houston RN  VTE Prevention/Management: (See MAR) other (see comments)  Intervention: Prevent Infection  Recent Flowsheet Documentation  Taken 3/9/2025 0100 by Joao Hosuton RN  Infection Prevention:   environmental surveillance performed   rest/sleep promoted   visitors restricted/screened   single patient room provided  Goal: Optimal Comfort and Wellbeing  Outcome: Progressing  Intervention: Monitor Pain and Promote Comfort  Recent Flowsheet Documentation  Taken 3/9/2025 0100 by Celso  SYED Shepherd  Pain Management Interventions:   position adjusted   quiet environment facilitated   relaxation techniques promoted  Goal: Readiness for Transition of Care  Outcome: Progressing     Problem: Mechanical Ventilation Invasive  Goal: Effective Communication  Outcome: Progressing  Goal: Optimal Device Function  Outcome: Progressing  Intervention: Optimize Device Care and Function  Recent Flowsheet Documentation  Taken 3/9/2025 0400 by Joao Houston RN  Oral Care:   swabbed with antiseptic solution   lip/mouth moisturizer applied  Taken 3/9/2025 0100 by Joao Houston RN  Airway Safety Measures: mask valve resuscitator at bedside  Taken 3/9/2025 0058 by Joao Houston RN  Oral Care:   swabbed with antiseptic solution   lip/mouth moisturizer applied  Goal: Mechanical Ventilation Liberation  Outcome: Progressing  Intervention: Promote Extubation and Mechanical Ventilation Liberation  Recent Flowsheet Documentation  Taken 3/9/2025 0500 by Joao Houston RN  Medication Review/Management: medications reviewed  Taken 3/9/2025 0400 by Joao Houston RN  Medication Review/Management: medications reviewed  Taken 3/9/2025 0300 by Joao Houston RN  Medication Review/Management: medications reviewed  Taken 3/9/2025 0159 by Joao Houston RN  Medication Review/Management: medications reviewed  Taken 3/9/2025 0100 by Joao Houston RN  Medication Review/Management: medications reviewed  Goal: Optimal Nutrition Delivery  Outcome: Progressing  Goal: Absence of Device-Related Skin and Tissue Injury  Outcome: Progressing  Intervention: Maintain Skin and Tissue Health  Recent Flowsheet Documentation  Taken 3/9/2025 0100 by Joao Houston RN  Device Skin Pressure Protection:   absorbent pad utilized/changed   tubing/devices free from skin contact  Goal: Absence of Ventilator-Induced Lung Injury  Outcome: Progressing  Intervention: Prevent Ventilator-Associated Pneumonia  Recent Flowsheet  Documentation  Taken 3/9/2025 0400 by Joao Houston RN  Head of Bed ([facility]) Positioning: HOB at 30-45 degrees  Oral Care:   swabbed with antiseptic solution   lip/mouth moisturizer applied  Taken 3/9/2025 0159 by Joao Houston RN  Head of Bed ([facility]) Positioning: HOB at 30-45 degrees  Taken 3/9/2025 0100 by Joao Houston RN  Head of Bed ([facility]) Positioning: HOB at 30-45 degrees  Taken 3/9/2025 0058 by Joao Houston RN  Oral Care:   swabbed with antiseptic solution   lip/mouth moisturizer applied     Problem: Comorbidity Management  Goal: Maintenance of COPD Symptom Control  Outcome: Progressing  Intervention: Maintain COPD (Chronic Obstructive Pulmonary Disease) Symptom Control  Recent Flowsheet Documentation  Taken 3/9/2025 0500 by Joao Houston RN  Medication Review/Management: medications reviewed  Taken 3/9/2025 0400 by Joao Houston RN  Medication Review/Management: medications reviewed  Taken 3/9/2025 0300 by Joao Houston RN  Medication Review/Management: medications reviewed  Taken 3/9/2025 0159 by Joao Houston RN  Medication Review/Management: medications reviewed  Taken 3/9/2025 0100 by Joao Houston RN  Medication Review/Management: medications reviewed  Goal: Blood Glucose Level Within Target Range  Outcome: Progressing  Intervention: Monitor and Manage Glycemia  Recent Flowsheet Documentation  Taken 3/9/2025 0500 by Joao oHuston RN  Medication Review/Management: medications reviewed  Taken 3/9/2025 0400 by Joao Houston RN  Medication Review/Management: medications reviewed  Taken 3/9/2025 0300 by Joao Houston RN  Medication Review/Management: medications reviewed  Taken 3/9/2025 0159 by Joao Houston RN  Medication Review/Management: medications reviewed  Taken 3/9/2025 0100 by Joao Houston RN  Medication Review/Management: medications reviewed  Goal: Maintenance of Heart Failure Symptom Control  Outcome: Progressing  Intervention: Maintain Heart  Failure Management  Recent Flowsheet Documentation  Taken 3/9/2025 0500 by Joao Houston RN  Medication Review/Management: medications reviewed  Taken 3/9/2025 0400 by Joao Houston RN  Medication Review/Management: medications reviewed  Taken 3/9/2025 0300 by Joao Houston RN  Medication Review/Management: medications reviewed  Taken 3/9/2025 0159 by Joao Houston RN  Medication Review/Management: medications reviewed  Taken 3/9/2025 0100 by Joao Houston RN  Medication Review/Management: medications reviewed  Goal: Blood Pressure in Desired Range  Outcome: Progressing  Intervention: Maintain Blood Pressure Management  Recent Flowsheet Documentation  Taken 3/9/2025 0500 by Joao Houston RN  Medication Review/Management: medications reviewed  Taken 3/9/2025 0400 by Joao Houston RN  Medication Review/Management: medications reviewed  Taken 3/9/2025 0300 by Joao Houston RN  Medication Review/Management: medications reviewed  Taken 3/9/2025 0159 by Joao Houston RN  Medication Review/Management: medications reviewed  Taken 3/9/2025 0100 by Joao Houston RN  Medication Review/Management: medications reviewed     Problem: Violence Risk or Actual  Goal: Anger and Impulse Control  Outcome: Progressing  Intervention: Minimize Safety Risk  Recent Flowsheet Documentation  Taken 3/9/2025 0500 by Joao Houston RN  Enhanced Safety Measures: bed alarm set  Taken 3/9/2025 0400 by Joao Houston RN  Enhanced Safety Measures: bed alarm set  Taken 3/9/2025 0300 by Joao Houston RN  Enhanced Safety Measures: bed alarm set  Taken 3/9/2025 0159 by Joao Houston RN  Enhanced Safety Measures: bed alarm set  Taken 3/9/2025 0100 by Joao Houston RN  Enhanced Safety Measures: bed alarm set     Problem: Skin Injury Risk Increased  Goal: Skin Health and Integrity  Outcome: Progressing  Intervention: Optimize Skin Protection  Recent Flowsheet Documentation  Taken 3/9/2025 0400 by  Joao Houston, RN  Head of Bed (HOB) Positioning: HOB at 30-45 degrees  Taken 3/9/2025 0159 by Joao Houston, RN  Head of Bed (HOB) Positioning: HOB at 30-45 degrees  Taken 3/9/2025 0100 by Joao Houston, RN  Activity Management: bedrest  Pressure Reduction Techniques:   weight shift assistance provided   positioned off wounds   heels elevated off bed  Head of Bed (HOB) Positioning: HOB at 30-45 degrees  Pressure Reduction Devices:   specialty bed utilized   pressure-redistributing mattress utilized   positioning supports utilized   Goal Outcome Evaluation:

## 2025-03-09 NOTE — CASE MANAGEMENT/SOCIAL WORK
Discharge Planning Assessment   [place]     Patient Name: Ana Ortiz  MRN: [id number]  Today's Date: 3/8/2025    Admit Date: 3/8/2025    Plan: Unable to speak to pt at this time she is currently intubated no family available at this time. Per chart pt's pcp is Jose Antonio Espinoza, she uses [facility] and has [employer] Medicare Replacement. Per previous admission pt uses o2 2 liters, cpap, glucometer and nebs from [facility].   Discharge Needs Assessment       Row Name 03/08/25 2106       Living Environment    People in Home alone    Current Living Arrangements home                   Discharge Plan       Row Name 03/08/25 2107       Plan    Plan Unable to speak to pt at this time she is currently intubated no family available at this time. Per chart pt's pcp is Jose Antonio Espinoza, she uses [facility] and has [employer] Medicare Replacement. Per previous admission pt uses o2 2 liters, cpap, glucometer and nebs from [facility].    Patient/Family in Agreement with Plan unable to assess                    Expected Discharge Date and Time       Expected Discharge Date Expected Discharge Time    Mar 12, 2025            Demographic Summary       Row Name 03/08/25 2106       General Information    Admission Type inpatient    Arrived From home    Referral Source emergency department    Reason for Consult discharge planning                  Kylie Reyes RN

## 2025-03-09 NOTE — PROGRESS NOTES
Nurse Practitioner - Brief Progress Note  PERMANENT  03/09/2025 04:19    [facility], [facility] [place] [facility] [place] ([facility])    CHARLES FERNANDEZ    Date of Service 03/09/2025 04:19    HPI/Events of Note [facility] Provider Assessment Note    48 y/o female PMH of COPD, former tobacco use, JOANN, morbid obesity, chronic HFpEF, HTN, HLD, DM type 2, valvar cancer s/p reconstruction surgery 2013. Pt admitted to ICU from ER with c/o SOB, fever, chills. On arrival temp 102.8 F, , RR 40, O2 sat   79% on 2L NC. Pt was intubated after failing BiPAP.     Pertinent labs: Respiratory PCR panel negative, AG 15, Na 130 (corrected Na 136), K 3.4, Mg 1.4, glucose 439, bicarb 27, BUN 10, Cr 0.88, Ca 8, lactate 1.5, WBC 19, HCG negative, UDS negative    ABG on BiPAP: 7.48 / 39 / 58 / 29  Repeat ABG on the vent: 7.36 / 50 / 180 / 28    Per radiology  CXR:   Enlarged heart size  Endotracheal tube in place with tip 6 cm above the judah.  Right neck central vascular catheter with tip to the SVC.  Enteric drain in place with tip to the stomach  Hazy opacities in the lungs suggestive of atelectasis and/or multifocal  pneumonia.  Coarsened bronchovascular pattern to the lungs.  No pleural effusion or pneumothorax.  Mild edema.    CTA chest:   No thoracic aortic aneurysm or dissection.  No pulmonary embolus  Enlarged heart size  Trace volume of pericardial fluid.  Multifocal pneumonia in the upper and lower lobes with multiple air  bronchograms.  No significant mediastinal or hilar adenopathy.  No free fluid or free air in the upper abdomen.    EKG: Sinus tachycardia rate 121. QTc 457. Possible Anterolateral infarct , age undetermined    Assessment and Plan:    Acute on chronic hypoxic hypercapnic respiratory failure, acute COPD exacerbation, sepsis/bilateral pneumonia, JOANN, hypomagnesemia, hypokalemia, uncontrolled DM  - VC/AC 22/450/+12/65%. DuoNeb. Monitor ABG, CXR  - Fentany, Propofol and Versed infusions  started in the ER. Versed infusion (now off)  - Cefepime, Azithromycin, IV Vancomycin pharmacy to dose. Sputum/blood cultures, UA, urine antigens, MRSA  - Solumedrol 125 mg IV x1, then 40 mg IV q12h  - LR at 100 ml/hr  - Insulin infusion per Glucommander protocol. A1c in 1/25 was 12.4%. Trace urine ketones. Add BHB  - Electrolyte replacement protocol  - Hx of HFpEF. EF 61-65% on ECHO on 3/18/2024      __X___   Video Assessment performed.  __X___   Most recent labs reviewed  __X___   Vital Signs reviewed. HR 76, /56 (73), RR 22, O2 sat 9%, temp 98.2 F  __X___   Best Practices addressed:                 VTE prophylaxis: Lovenox                 SUP (when indicated): Famotidine                 Current Glucose: 442                      Please notify bedside physician when present or [facility] if glucose > 180 X 2                 Sepsis guidelines:                 Lung protective strategy: Tidal volume 7.9 ml/kg per IBW                  Targeted Temperature Management: N/A  __X___     Spoke with bedside RN  __X___     Orders written    Contact [facility] for any needs if bedside physician is not present.  Note drafted by Sammi Ghosh, MSN, ACNP-BC       Critical care; 30 minutes total evaluation time     Care during the described time interval was provided by me.  I have reviewed this patient's available data, including medical history, events of note, physical examination and test results as part of my evaluation.    Interventions Major-Hyperglycemia - active titration of insulin therapy, Infection - evaluation and management, Respiratory failure - evaluation and management, Other: Acute on chronic hypoxic hypercapnic respiratory failure, acute COPD exacerbation,   sepsis/bilateral pneumonia, JOANN, hypomagnesemia, hypokalemia, uncontrolled DM  Intermediate-Best-practice therapies (e.g. VTE, beta blocker, etc.), Communication with other healthcare providers and/or family, Electrolyte abnormality -  evaluation and management        Electronically Signed by: Sammi Ghosh (ANP,CCRN) on 03/09/2025 04:54    Annotated By: Henrry Monson)    Date: 03/09/25 05:13  I have reviewed and I agree with the assessment and plan by CHIDI.

## 2025-03-09 NOTE — PROGRESS NOTES
Pharmacy to dose vancomycin for pneumonia -- Dosed as follows: Vancomycin 2500mg iv x 1 then 1000mg iv q12h.  Pharmacy will monitor and adjust dosing to maintain AUC of 400-600.     Xander Suazo RPH  03:55 EDT  03/09/25

## 2025-03-09 NOTE — PROGRESS NOTES
Patient seen and examined at bedside this morning after admission earlier this morning by overnight hospitalist.  Patient presenting to the hospital with respiratory failure and evidence of multifocal pneumonia ultimately requiring intubation and mechanical ventilation.  Patient at time of evaluation sedated and breathing in synchrony with ventilator with improving oxygen requirements.  Patient with evidence of multifocal pneumonia in the upper and lower lobes with multiple air bronchograms.  Decision made to not proceed with spontaneous awakening trial or breathing trial today as patient hospitalized for less than 12 hours and will initiate on antibiotic therapy and mechanical support and try for beginning SBT's in the coming 24 to 48 hours.  Admission orders reviewed and patient continued on propofol and fentanyl with Versed pushes as needed.  Will continue vancomycin, cefepime and azithromycin.  Lung exam largely clear with mechanical breath sounds on auscultation at time of evaluation other than diminished breath sounds largely likely secondary to body habitus.  Nutrition consulted for initiation of tube feeds and patient continued on insulin drip for glycemic control.  Blood cultures and sputum cultures currently pending at this time.  Formal hospitalist progress note to follow tomorrow.

## 2025-03-09 NOTE — H&P
[facility]   HISTORY AND PHYSICAL    Patient Name: Ana Ortiz  : 1975  MRN: [id number]  Primary Care Physician:  Hayden Espinoza PA-C  Date of admission: 3/8/2025    Subjective   Subjective     Chief Complaint: Shortness of air    History of Present Illness The patient is a 50 y/o female with PMHx significant for COPD, JOANN, Morbid Obesity, former tobacco use, HFrEF, DM type II and HTN who presents to the ED complaining of a 5 day history of worsening shortness of air. Patient is currently sedated and on the ventilator and no visitors are present during my visit.  According to the ED provider, patient complained of fever and chills in addition to progressive shortness of air.  Patient was initially trialed on BiPAP but then intubated for respiratory failure and increased work of breathing.  Patient was febrile upon arrival with initial temperature of 102.8 °F, heart rate 126, respiratory rate 40 and an O2 saturation of 79% via 2 L/min nasal cannula.    Patient seen and examined in .     Currently, patient noted to have ventilator dyssynchrony with Tv alternating between well less than 100 mL and greater than 1000 mL. Current sedation drips include Versed at 6 mL/hr, Propofol at 15 mcg/kg/min and Versed at 300 mcg/hr.    Patient has had 1100 mL urine output.    Review of Systems   Unable to perform ROS: Intubated      Personal History     Past Medical History:   Diagnosis Date    Arthritis     CHF (congestive heart failure)     COPD (chronic obstructive pulmonary disease)     Diabetes insipidus     Diabetes mellitus     Elevated cholesterol     GERD (gastroesophageal reflux disease)     Hyperlipemia     Hypertension     JOANN (obstructive sleep apnea)     Pneumonia     Type 2 diabetes mellitus 2013    Vulvar cancer        Past Surgical History:   Procedure Laterality Date    HYSTERECTOMY      INCISION AND DRAINAGE TRUNK N/A 2022    Procedure: INCISION AND DRAINAGE BACK;  Surgeon: Se  Tamera MCWILLIAMS MD;  [facility]: [facility];  Service: General;  Laterality: N/A;    MASTECTOMY      VULVA SURGERY      reconstruction due ca       Family History: family history includes Cancer in her maternal aunt, maternal aunt, and mother; Diabetes in her brother, father, paternal aunt, and paternal grandmother; Hypertension in her father and mother; Obesity in her brother and father; Parkinsonism in her father.     Social History:  reports that she has quit smoking. Her smoking use included cigarettes. She started smoking about 29 years ago. She has a 44.7 pack-year smoking history. She has been exposed to tobacco smoke. She has never used smokeless tobacco. She reports that she does not drink alcohol and does not use drugs.    Home Medications:  ARIPiprazole, Dexcom G7 Sensor, Insulin Degludec, Insulin Pen Needle, Tirzepatide, albuterol sulfate HFA, budesonide-formoterol, cetirizine, clonazePAM, cyclobenzaprine, dicyclomine, fluticasone, gabapentin, glipizide, hydroCHLOROthiazide, ibuprofen, insulin aspart, ipratropium-albuterol, lidocaine, lisinopril, loperamide, meloxicam, nicotine, nicotine polacrilex, ondansetron ODT, sertraline, simvastatin, tiotropium, and traZODone    Allergies:  Allergies   Allergen Reactions    Codeine Nausea Only    Metformin GI Intolerance     Extreme diarrhea       Objective    Objective     Vitals:   Temp:  [98.9 °F (37.2 °C)-102.8 °F (39.3 °C)] 98.9 °F (37.2 °C)  Heart Rate:  [] 80  Resp:  [20-40] 24  BP: ()/(43-95) 93/60  Flow (L/min) (Oxygen Therapy):  [2] 2  FiO2 (%):  [80 %-100 %] 80 %    Physical Exam  Constitutional:       General: She is not in acute distress.     Appearance: She is well-developed. She is morbidly obese.      Interventions: She is sedated and intubated.   HENT:      Head: Normocephalic and atraumatic.      Mouth/Throat:      Comments: ET and OG tubes in place; patient with excess saliva around ET tube  Eyes:      Conjunctiva/sclera: Conjunctivae  normal.   Neck:      Trachea: No tracheal deviation.      Comments: Large neck, R IJ CVL  Cardiovascular:      Rate and Rhythm: Normal rate and regular rhythm.      Pulses:           Dorsalis pedis pulses are 2+ on the right side and 2+ on the left side.      Heart sounds: No murmur heard.     No friction rub. No gallop.      Comments: No significant pitting edema  Pulmonary:      Effort: No tachypnea, accessory muscle usage or respiratory distress. She is intubated.      Breath sounds: Normal breath sounds. Decreased air movement present. Examination of the right-lower field reveals decreased breath sounds. Examination of the left-lower field reveals decreased breath sounds. Decreased breath sounds present. No wheezing or rales.   Abdominal:      General: Abdomen is protuberant. Bowel sounds are normal. Bowel sounds are decreased. There is no distension.      Palpations: Abdomen is soft.      Tenderness: There is no abdominal tenderness. There is no guarding.   Genitourinary:     Comments: Arciniega catheter in place  Skin:     General: Skin is warm and dry.      Findings: No erythema or rash.   Neurological:      Comments: Unable to assess         Result Review    Result Review:  I have personally reviewed the results from the time of this admission to 3/9/2025 00:28 EST and agree with these findings:  [x]  Laboratory list / accordion  []  Microbiology  [x]  Radiology  [x]  EKG/Telemetry   []  Cardiology/Vascular   []  Pathology  []  Old records  []  Other:  Most notable findings include:     Recent Labs     03/08/25 1756 03/08/25 1938   PHART 7.486* 7.280*   XCN4LNE 39.4 66.8*   PO2ART 58.3* 82.8*   VIM0AUR 29.7* 31.4*   BASEEXCESS 5.9* 2.5*     Results from last 7 days   Lab Units 03/08/25 1756   WBC 10*3/mm3 19.83*   HEMOGLOBIN g/dL 13.9   HEMATOCRIT % 41.7   PLATELETS 10*3/mm3 213     Results from last 7 days   Lab Units 03/08/25  1756   SODIUM mmol/L 130*   POTASSIUM mmol/L 3.4*   CHLORIDE mmol/L 87*   CO2  mmol/L 27.9   BUN mg/dL 10   CREATININE mg/dL 0.88   CALCIUM mg/dL 8.8   BILIRUBIN mg/dL 0.5   ALK PHOS U/L 122*   ALT (SGPT) U/L 14   AST (SGOT) U/L 17   GLUCOSE mg/dL 439*     Lactate: 1.5    Pro-calcitonin: 0.18    CT chest with PE protocol:  FINDINGS:     Enlarged heart size.  Airspace disease in the upper and lower lobes consistent with multifocal  pneumonia.  Endotracheal tube in place with tip noted to terminate above the level  of the judah.  Enteric drain in place with tip to the stomach.  No pleural effusion.  No pneumothorax.  Normal thyroid gland.  Small paratracheal and hilar nodes  No free fluid or free air in the upper abdomen.  No thoracic aortic aneurysm or dissection.  No pulmonary embolus.     IMPRESSION:     No thoracic aortic aneurysm or dissection.  No pulmonary embolus  Enlarged heart size  Trace volume of pericardial fluid.  Multifocal pneumonia in the upper and lower lobes with multiple air  bronchograms.  No significant mediastinal or hilar adenopathy.  No free fluid or free air in the upper abdomen.    Assessment & Plan   Assessment / Plan     #Bilateral pneumonia, treating for gram negative and gram positive organisms, unspecified  #Acute COPD exacerbation  #Acute hypercapnic on chronic hypoxic respiratory failure due to above  #JOANN  #Morbid obesity with BMI 42.92 kg/m²  -Patient has been admitted to the critical care unit for further management  -The patient has been started on fentanyl, propofol and Versed infusions in the ED  -Will wean off the Versed infusion and can add a Precedex infusion if needed  -Will use as needed Versed pushes instead  -Continue with ventilatory support; I have requested a repeat ABG in a.m.  -Patient has been started on empiric intravenous antibiotic therapy with pharmacy to dose vancomycin, cefepime and azithromycin  -I have requested a respiratory culture via ET suction, testing for strep pneumo, Legionella and MRSA  -The patient's respiratory PCR panel  was negative  -Blood cultures were obtained in the ED; follow-up final culture results  -Tailor antibiotic therapy based on culture data results  -Repeat the patient's CBC in a.m. and continue to monitor her temperature curve  -For patient's acute COPD exacerbation, I have started her on Solu-Medrol 40 mg IV every 12 hours and scheduled nebulized inhalants  -Consider a pulmonary consult pending clinical course  -Wean FiO2 for O2 saturation 90-95%    #Acute hypomagnesemia  #Mild acute hypokalemia  #Mildly elevated anion gap without acidosis  #Diabetes mellitus type 2, insulin dependent complicated by hyperglycemia  -Patient has been started on the electrolyte replacement protocol for Magnesium and Potassium  -Will repeat Mg level and chemistry panel in a.m.   -LR @ 100 ml/hr  -Patient has been started on the glucommander insulin infusion with goal glucose 140-180  -A1c in 1/25 was 12.4%    Chronic medical conditions:  -Former tobacco use  -Chronic HFpEF  -Hyperlipidemia  -Hypertension  -Previous vulvar cancer status post reconstructive surgery 2013    VTE/GI Prophylaxis:  Lovenox/Famotidine    CODE STATUS:    Code Status (Patient has no pulse and is not breathing): CPR (Attempt to Resuscitate)  Medical Interventions (Patient has pulse or is breathing): Full Support    Admission Status:  I believe this patient meets inpatient status.    Patient is considered high risk due to: respiratory failure requiring intubation, multifocal pneumonia, COPD, obesity, DM type II    Rosa Mishra DO

## 2025-03-09 NOTE — ED NOTES
In room with provider and RRT at this time setting room up for intubation due to pts condition.  
Patient transported to CCU room #3 at this time on zoll monitor via ER stretcher by RN x2, and RT. 7.5mm ETT remains at 23 cm at the lip. 16F OG in place at 70 cm at the lip. 16F collins in intact and draining. All IV sites intact and infusing without difficulty. VSS. NADN. All belongings placed in belongings bag and sent with patient.   
14-Dec-2018

## 2025-03-09 NOTE — PLAN OF CARE
Goal Outcome Evaluation:         Patient remains intubated and on vent. No sbt done this shift

## 2025-03-09 NOTE — ED PROVIDER NOTES
20:01 EST    I have discussed Ana Ortiz's ED presentation and ED course with Dr. Frey.  I will assume care of the patient. Please see the prior note for further details.     CHIEF COMPLAINT      Chief Complaint   Patient presents with    Shortness of Breath       HPI    Ana Ortiz is a 49 y.o. female who presents with complaints of shortness of breath for 5 days.  The patient has had fevers and chills.  She has a history of CHF, COPD, diabetes, dyslipidemia hypertension and JOANN.  The patient was intubated prior to my assumption of care, initially on BiPAP and then eventually intubated for respiratory failure and increased work of breathing.  Sepsis was initiated by the prior provider.  Tylenol given for antipyretic management.  Only small amounts of fluids were given secondary to CHF history.    PAST MEDICAL HISTORY      Past Medical History:   Diagnosis Date    Arthritis     CHF (congestive heart failure)     COPD (chronic obstructive pulmonary disease)     Diabetes insipidus     Diabetes mellitus     Elevated cholesterol     GERD (gastroesophageal reflux disease)     Hyperlipemia     Hypertension     JOANN (obstructive sleep apnea)     Pneumonia     Type 2 diabetes mellitus 2013    Vulvar cancer        SURGICAL HISTORY      Past Surgical History:   Procedure Laterality Date    HYSTERECTOMY      INCISION AND DRAINAGE TRUNK N/A 1/11/2022    Procedure: INCISION AND DRAINAGE BACK;  Surgeon: Tamera Saez MD;  [facility];  Service: General;  Laterality: N/A;    MASTECTOMY      VULVA SURGERY      reconstruction due ca       CURRENT ED MEDICATIONS  (Meds given Today)    Medications   sodium chloride 0.9 % flush 10 mL (has no administration in time range)   propofol (DIPRIVAN) infusion 10 mg/mL 100 mL (50 mcg/kg/min × 122 kg Intravenous Rate/Dose Change 3/8/25 1858)   fentaNYL 2500 mcg in 250 mL NS infusion (250 mcg/hr Intravenous Rate/Dose Change 3/8/25 1952)   vancomycin 2500 mg/500 mL 0.9% NS IVPB  ([facility]) (2,500 mg Intravenous New Bag 3/8/25 1948)   azithromycin (ZITHROMAX) 500 mg in sodium chloride 0.9 % 250 mL IVPB-VTB (500 mg Intravenous New Bag 3/8/25 1941)   ipratropium-albuterol (DUO-NEB) nebulizer solution 3 mL (3 mL Nebulization Given 3/8/25 1800)   magnesium sulfate 2g/50 mL (PREMIX) infusion (0 g Intravenous Stopped 3/8/25 1825)   methylPREDNISolone sodium succinate (SOLU-Medrol) 125 mg in sterile water (preservative free) 2 mL (125 mg Intravenous Given 3/8/25 1806)   sodium bicarbonate injection 8.4% 50 mEq (50 mEq Intravenous Given 3/8/25 1819)   ketamine (KETALAR) injection 120 mg (120 mg Intravenous Given 3/8/25 1822)   rocuronium (ZEMURON) injection 150 mg (150 mg Intravenous Given 3/8/25 1823)   cefepime 2000 mg IVPB in 100 mL NS (VTB) (0 mg Intravenous Stopped 3/8/25 1930)   acetaminophen (TYLENOL) suppository 650 mg (650 mg Rectal Given 3/8/25 1849)       ALLERGIES      Allergies   Allergen Reactions    Codeine Nausea Only    Metformin GI Intolerance     Extreme diarrhea       FAMILY HISTORY      Family History   Problem Relation Age of Onset    Cancer Mother     Hypertension Mother     Parkinsonism Father     Diabetes Father     Hypertension Father     Obesity Father     Cancer Maternal Aunt     Diabetes Paternal Aunt     Diabetes Paternal Grandmother     Cancer Maternal Aunt     Diabetes Brother     Obesity Brother        SOCIAL HISTORY      Social History     Socioeconomic History    Marital status:    Tobacco Use    Smoking status: Former     Current packs/day: 1.50     Average packs/day: 1.4 packs/day for 31.0 years (44.7 ttl pk-yrs)     Types: Cigarettes     Start date: 5/18/1995     Passive exposure: Current    Smokeless tobacco: Never    Tobacco comments:     Hasn't smoked in 4 days   Vaping Use    Vaping status: Former    Substances: Nicotine, Flavoring    Devices: Refillable tank   Substance and Sexual Activity    Alcohol use: Never    Drug use: Never    Sexual activity: Not  Currently     Partners: Male     Birth control/protection: Condom       RESULTS:   - See lab and imaging In patient chart for this visit.    PROCEDURE:    Central Line Procedure Note    Time: 23:05 (estimate), please see nursing note for exact time.     Confirmed correct: Patient, procedure, side, site, time out    Consent: Emergent    Indication: Hemodynamically unstable and Venous Access    Monitoring: Cardiac, blood pressure, pulse oximetry    Location: Right Internal Jugular vein    Preparation: Sterile field, chlorhexidine, maximum barrier sterile technique  Local anesthesia: 3ml 1% lidocaine without epinephrine  Ultrasound guidance: Yes    Procedure: Central venous line, triple lumen 7 Cuban, Seldinger technique, attempts: 1, secured at 18 cm    Post procedure: Adequate blood return, adequate fluid flow, equal bilateral breath sounds, circulation/motor/sensory intact, biopatch and tegaderm applied  Patient tolerated: Well  Complications: None  Performed by: Erick Fraga DO        MDM:    I assumed care of the patient at 1900, agree with the history physical performed.  The patient is pending admission and further evaluation by PE protocol CT scan.  Patient does not have a PE, she does have multifocal pneumonia, remains intubated and sedated, Versed was added over the course of her time here in the ED with me secondary to some agitation.    I spoke with the hospitalist, kindly agreed to admit for further care and management.  Patient otherwise comfortable and stable for admission.  She is mildly hypotensive at 93/59 on my evaluation with a MAP of 70, I believe this is likely secondary to the patient's propofol, we will back off of the propofol just slightly.  She will receive a central line here in the ED.  She will be admitted to the hospitalist.      Clinical Impression:    1. Shortness of breath    2. COPD exacerbation    3. Sepsis, due to unspecified organism, unspecified whether acute organ dysfunction  present    4. Acute respiratory failure, unspecified whether with hypoxia or hypercapnia           Erick Fraga DO  03/08/25 [id number]

## 2025-03-09 NOTE — PLAN OF CARE
Goal Outcome Evaluation:  Plan of Care Reviewed With: patient        Progress: improving  Outcome Evaluation: pt continues on ventilator with light sedation; pt wakes up and follows commands; VSS; adequate urinary output; blood glucose levels improved with insulin gtt; continue to monitor.

## 2025-03-10 ENCOUNTER — APPOINTMENT (OUTPATIENT)
Dept: GENERAL RADIOLOGY | Facility: HOSPITAL | Age: 50
DRG: 177 | End: 2025-03-10
Payer: MEDICAID

## 2025-03-10 LAB
A-A DO2: 110.5 MMHG (ref 0–300)
ALBUMIN SERPL-MCNC: 3.4 G/DL (ref 3.5–5.2)
ALBUMIN/GLOB SERPL: 1.1 G/DL
ALP SERPL-CCNC: 78 U/L (ref 39–117)
ALT SERPL W P-5'-P-CCNC: 12 U/L (ref 1–33)
ANION GAP SERPL CALCULATED.3IONS-SCNC: 6.5 MMOL/L (ref 5–15)
ARTERIAL PATENCY WRIST A: ABNORMAL
AST SERPL-CCNC: 14 U/L (ref 1–32)
ATMOSPHERIC PRESS: 723 MMHG
BASE EXCESS BLDA CALC-SCNC: 5 MMOL/L (ref 0–2)
BDY SITE: ABNORMAL
BILIRUB SERPL-MCNC: <0.2 MG/DL (ref 0–1.2)
BUN SERPL-MCNC: 22 MG/DL (ref 6–20)
BUN/CREAT SERPL: 28.6 (ref 7–25)
CALCIUM SPEC-SCNC: 8.3 MG/DL (ref 8.6–10.5)
CHLORIDE SERPL-SCNC: 107 MMOL/L (ref 98–107)
CO2 BLDA-SCNC: 31.4 MMOL/L (ref 22–33)
CO2 SERPL-SCNC: 29.5 MMOL/L (ref 22–29)
COHGB MFR BLD: 0.7 % (ref 0–5)
CREAT SERPL-MCNC: 0.77 MG/DL (ref 0.57–1)
EGFRCR SERPLBLD CKD-EPI 2021: 94.7 ML/MIN/1.73
GLOBULIN UR ELPH-MCNC: 3.2 GM/DL
GLUCOSE BLDC GLUCOMTR-MCNC: 120 MG/DL (ref 70–130)
GLUCOSE BLDC GLUCOMTR-MCNC: 121 MG/DL (ref 70–130)
GLUCOSE BLDC GLUCOMTR-MCNC: 129 MG/DL (ref 70–130)
GLUCOSE BLDC GLUCOMTR-MCNC: 132 MG/DL (ref 70–130)
GLUCOSE BLDC GLUCOMTR-MCNC: 134 MG/DL (ref 70–130)
GLUCOSE BLDC GLUCOMTR-MCNC: 135 MG/DL (ref 70–130)
GLUCOSE BLDC GLUCOMTR-MCNC: 135 MG/DL (ref 70–130)
GLUCOSE BLDC GLUCOMTR-MCNC: 138 MG/DL (ref 70–130)
GLUCOSE BLDC GLUCOMTR-MCNC: 141 MG/DL (ref 70–130)
GLUCOSE BLDC GLUCOMTR-MCNC: 143 MG/DL (ref 70–130)
GLUCOSE BLDC GLUCOMTR-MCNC: 143 MG/DL (ref 70–130)
GLUCOSE BLDC GLUCOMTR-MCNC: 144 MG/DL (ref 70–130)
GLUCOSE BLDC GLUCOMTR-MCNC: 145 MG/DL (ref 70–130)
GLUCOSE BLDC GLUCOMTR-MCNC: 151 MG/DL (ref 70–130)
GLUCOSE BLDC GLUCOMTR-MCNC: 153 MG/DL (ref 70–130)
GLUCOSE BLDC GLUCOMTR-MCNC: 163 MG/DL (ref 70–130)
GLUCOSE BLDC GLUCOMTR-MCNC: 164 MG/DL (ref 70–130)
GLUCOSE BLDC GLUCOMTR-MCNC: 168 MG/DL (ref 70–130)
GLUCOSE BLDC GLUCOMTR-MCNC: 174 MG/DL (ref 70–130)
GLUCOSE BLDC GLUCOMTR-MCNC: 175 MG/DL (ref 70–130)
GLUCOSE BLDC GLUCOMTR-MCNC: 177 MG/DL (ref 70–130)
GLUCOSE BLDC GLUCOMTR-MCNC: 181 MG/DL (ref 70–130)
GLUCOSE BLDC GLUCOMTR-MCNC: 183 MG/DL (ref 70–130)
GLUCOSE BLDC GLUCOMTR-MCNC: 191 MG/DL (ref 70–130)
GLUCOSE BLDC GLUCOMTR-MCNC: 194 MG/DL (ref 70–130)
GLUCOSE BLDC GLUCOMTR-MCNC: 194 MG/DL (ref 70–130)
GLUCOSE BLDC GLUCOMTR-MCNC: 198 MG/DL (ref 70–130)
GLUCOSE SERPL-MCNC: 204 MG/DL (ref 65–99)
HCO3 BLDA-SCNC: 30 MMOL/L (ref 20–26)
HCT VFR BLD CALC: 34.6 % (ref 38–51)
HGB BLDA-MCNC: 11.3 G/DL (ref 13.5–17.5)
INHALED O2 CONCENTRATION: 45 %
Lab: ABNORMAL
MAGNESIUM SERPL-MCNC: 2.5 MG/DL (ref 1.6–2.6)
METHGB BLD QL: 0.3 % (ref 0–3)
MODALITY: ABNORMAL
OXYHGB MFR BLDV: 97.2 % (ref 94–99)
PCO2 BLDA: 45.2 MM HG (ref 35–45)
PCO2 TEMP ADJ BLD: ABNORMAL MM[HG]
PEEP RESPIRATORY: 12 CM[H2O]
PH BLDA: 7.43 PH UNITS (ref 7.35–7.45)
PH, TEMP CORRECTED: ABNORMAL
PO2 BLDA: 144 MM HG (ref 83–108)
PO2 TEMP ADJ BLD: ABNORMAL MM[HG]
POTASSIUM SERPL-SCNC: 4.9 MMOL/L (ref 3.5–5.2)
POTASSIUM SERPL-SCNC: 4.9 MMOL/L (ref 3.5–5.2)
PROT SERPL-MCNC: 6.6 G/DL (ref 6–8.5)
SAO2 % BLDCOA: 98.2 % (ref 94–99)
SET MECH RESP RATE: 22
SODIUM SERPL-SCNC: 143 MMOL/L (ref 136–145)
VENTILATOR MODE: ABNORMAL
VT ON VENT VENT: 450 ML

## 2025-03-10 PROCEDURE — 83050 HGB METHEMOGLOBIN QUAN: CPT

## 2025-03-10 PROCEDURE — 82375 ASSAY CARBOXYHB QUANT: CPT

## 2025-03-10 PROCEDURE — 94660 CPAP INITIATION&MGMT: CPT

## 2025-03-10 PROCEDURE — 25010000002 PROPOFOL 10 MG/ML EMULSION: Performed by: INTERNAL MEDICINE

## 2025-03-10 PROCEDURE — 99291 CRITICAL CARE FIRST HOUR: CPT | Performed by: INTERNAL MEDICINE

## 2025-03-10 PROCEDURE — 36600 WITHDRAWAL OF ARTERIAL BLOOD: CPT

## 2025-03-10 PROCEDURE — 71045 X-RAY EXAM CHEST 1 VIEW: CPT | Performed by: RADIOLOGY

## 2025-03-10 PROCEDURE — 25010000002 CEFEPIME PER 500 MG: Performed by: INTERNAL MEDICINE

## 2025-03-10 PROCEDURE — 94664 DEMO&/EVAL PT USE INHALER: CPT

## 2025-03-10 PROCEDURE — 94799 UNLISTED PULMONARY SVC/PX: CPT

## 2025-03-10 PROCEDURE — 25010000002 FUROSEMIDE PER 20 MG: Performed by: INTERNAL MEDICINE

## 2025-03-10 PROCEDURE — 82948 REAGENT STRIP/BLOOD GLUCOSE: CPT

## 2025-03-10 PROCEDURE — 25010000002 ONDANSETRON PER 1 MG: Performed by: INTERNAL MEDICINE

## 2025-03-10 PROCEDURE — 99233 SBSQ HOSP IP/OBS HIGH 50: CPT | Performed by: INTERNAL MEDICINE

## 2025-03-10 PROCEDURE — 94003 VENT MGMT INPAT SUBQ DAY: CPT

## 2025-03-10 PROCEDURE — 25010000002 FENTANYL CITRATE (PF) 2500 MCG/50ML SOLUTION: Performed by: INTERNAL MEDICINE

## 2025-03-10 PROCEDURE — 82805 BLOOD GASES W/O2 SATURATION: CPT

## 2025-03-10 PROCEDURE — 25010000002 AZITHROMYCIN PER 500 MG

## 2025-03-10 PROCEDURE — 25010000002 METHYLPREDNISOLONE PER 40 MG: Performed by: INTERNAL MEDICINE

## 2025-03-10 PROCEDURE — 71045 X-RAY EXAM CHEST 1 VIEW: CPT

## 2025-03-10 PROCEDURE — 25810000003 SODIUM CHLORIDE 0.9 % SOLUTION: Performed by: INTERNAL MEDICINE

## 2025-03-10 PROCEDURE — 25810000003 SODIUM CHLORIDE 0.9 % SOLUTION 250 ML FLEX CONT

## 2025-03-10 PROCEDURE — 25010000002 CEFTRIAXONE PER 250 MG: Performed by: INTERNAL MEDICINE

## 2025-03-10 PROCEDURE — 25810000003 SODIUM CHLORIDE 0.9 % SOLUTION: Performed by: STUDENT IN AN ORGANIZED HEALTH CARE EDUCATION/TRAINING PROGRAM

## 2025-03-10 PROCEDURE — 25010000002 ENOXAPARIN PER 10 MG: Performed by: INTERNAL MEDICINE

## 2025-03-10 RX ORDER — ONDANSETRON 2 MG/ML
4 INJECTION INTRAMUSCULAR; INTRAVENOUS EVERY 6 HOURS PRN
Status: DISCONTINUED | OUTPATIENT
Start: 2025-03-10 | End: 2025-03-13 | Stop reason: HOSPADM

## 2025-03-10 RX ORDER — IPRATROPIUM BROMIDE AND ALBUTEROL SULFATE 2.5; .5 MG/3ML; MG/3ML
3 SOLUTION RESPIRATORY (INHALATION)
Status: DISCONTINUED | OUTPATIENT
Start: 2025-03-10 | End: 2025-03-13 | Stop reason: HOSPADM

## 2025-03-10 RX ORDER — FUROSEMIDE 10 MG/ML
20 INJECTION INTRAMUSCULAR; INTRAVENOUS ONCE
Status: COMPLETED | OUTPATIENT
Start: 2025-03-10 | End: 2025-03-10

## 2025-03-10 RX ORDER — CLONAZEPAM 0.5 MG/1
0.5 TABLET ORAL ONCE
Status: DISCONTINUED | OUTPATIENT
Start: 2025-03-11 | End: 2025-03-12

## 2025-03-10 RX ADMIN — Medication 10 ML: at 08:12

## 2025-03-10 RX ADMIN — CETIRIZINE HYDROCHLORIDE 10 MG: 10 TABLET, FILM COATED ORAL at 08:11

## 2025-03-10 RX ADMIN — PROPOFOL 40 MCG/KG/MIN: 10 INJECTION, EMULSION INTRAVENOUS at 00:17

## 2025-03-10 RX ADMIN — CEFEPIME 2000 MG: 2 INJECTION, POWDER, FOR SOLUTION INTRAVENOUS at 03:13

## 2025-03-10 RX ADMIN — FENTANYL CITRATE 200 MCG/HR: 50 INJECTION, SOLUTION INTRAMUSCULAR; INTRAVENOUS at 07:09

## 2025-03-10 RX ADMIN — Medication 10 ML: at 21:00

## 2025-03-10 RX ADMIN — IPRATROPIUM BROMIDE AND ALBUTEROL SULFATE 3 ML: .5; 2.5 SOLUTION RESPIRATORY (INHALATION) at 12:10

## 2025-03-10 RX ADMIN — RACEPINEPHRINE HYDROCHLORIDE 0.5 ML: 11.25 SOLUTION RESPIRATORY (INHALATION) at 13:40

## 2025-03-10 RX ADMIN — SENNOSIDES AND DOCUSATE SODIUM 2 TABLET: 50; 8.6 TABLET ORAL at 08:11

## 2025-03-10 RX ADMIN — IPRATROPIUM BROMIDE AND ALBUTEROL SULFATE 3 ML: .5; 2.5 SOLUTION RESPIRATORY (INHALATION) at 03:52

## 2025-03-10 RX ADMIN — ENOXAPARIN SODIUM 40 MG: 40 INJECTION SUBCUTANEOUS at 01:29

## 2025-03-10 RX ADMIN — NICOTINE 1 PATCH: 14 PATCH, EXTENDED RELEASE TRANSDERMAL at 00:16

## 2025-03-10 RX ADMIN — ENOXAPARIN SODIUM 40 MG: 40 INJECTION SUBCUTANEOUS at 13:28

## 2025-03-10 RX ADMIN — DEXMEDETOMIDINE 1 MCG/KG/HR: 200 INJECTION, SOLUTION INTRAVENOUS at 09:16

## 2025-03-10 RX ADMIN — GABAPENTIN 800 MG: 400 CAPSULE ORAL at 00:17

## 2025-03-10 RX ADMIN — METHYLPREDNISOLONE SODIUM SUCCINATE 40 MG: 40 INJECTION INTRAMUSCULAR; INTRAVENOUS at 05:18

## 2025-03-10 RX ADMIN — PROPOFOL 20 MCG/KG/MIN: 10 INJECTION, EMULSION INTRAVENOUS at 03:46

## 2025-03-10 RX ADMIN — NICOTINE 1 PATCH: 14 PATCH, EXTENDED RELEASE TRANSDERMAL at 23:10

## 2025-03-10 RX ADMIN — INSULIN HUMAN 4.4 UNITS/HR: 1 INJECTION, SOLUTION INTRAVENOUS at 14:39

## 2025-03-10 RX ADMIN — GABAPENTIN 800 MG: 400 CAPSULE ORAL at 05:18

## 2025-03-10 RX ADMIN — ONDANSETRON 4 MG: 2 INJECTION INTRAMUSCULAR; INTRAVENOUS at 13:27

## 2025-03-10 RX ADMIN — IPRATROPIUM BROMIDE AND ALBUTEROL SULFATE 3 ML: .5; 2.5 SOLUTION RESPIRATORY (INHALATION) at 06:24

## 2025-03-10 RX ADMIN — FAMOTIDINE 20 MG: 10 INJECTION, SOLUTION INTRAVENOUS at 08:11

## 2025-03-10 RX ADMIN — SODIUM CHLORIDE 2000 MG: 9 INJECTION, SOLUTION INTRAVENOUS at 13:27

## 2025-03-10 RX ADMIN — FUROSEMIDE 20 MG: 10 INJECTION, SOLUTION INTRAMUSCULAR; INTRAVENOUS at 15:40

## 2025-03-10 RX ADMIN — IPRATROPIUM BROMIDE AND ALBUTEROL SULFATE 3 ML: .5; 2.5 SOLUTION RESPIRATORY (INHALATION) at 00:07

## 2025-03-10 RX ADMIN — CHOLECALCIFEROL (VITAMIN D3) 10 MCG (400 UNIT) TABLET 400 UNITS: at 08:11

## 2025-03-10 RX ADMIN — GABAPENTIN 800 MG: 400 CAPSULE ORAL at 21:00

## 2025-03-10 RX ADMIN — LIDOCAINE 1 PATCH: 4 PATCH TOPICAL at 23:10

## 2025-03-10 RX ADMIN — CLONAZEPAM 1 MG: 1 TABLET ORAL at 23:47

## 2025-03-10 RX ADMIN — IPRATROPIUM BROMIDE AND ALBUTEROL SULFATE 3 ML: .5; 2.5 SOLUTION RESPIRATORY (INHALATION) at 18:49

## 2025-03-10 RX ADMIN — MUPIROCIN 1 APPLICATION: 20 OINTMENT TOPICAL at 08:12

## 2025-03-10 RX ADMIN — MUPIROCIN 1 APPLICATION: 20 OINTMENT TOPICAL at 21:00

## 2025-03-10 RX ADMIN — AZITHROMYCIN MONOHYDRATE 500 MG: 500 INJECTION, POWDER, LYOPHILIZED, FOR SOLUTION INTRAVENOUS at 18:23

## 2025-03-10 RX ADMIN — INSULIN HUMAN 8.7 UNITS/HR: 1 INJECTION, SOLUTION INTRAVENOUS at 01:28

## 2025-03-10 RX ADMIN — ATORVASTATIN CALCIUM 40 MG: 40 TABLET, FILM COATED ORAL at 08:11

## 2025-03-10 RX ADMIN — ONDANSETRON 4 MG: 2 INJECTION INTRAMUSCULAR; INTRAVENOUS at 20:40

## 2025-03-10 RX ADMIN — METHYLPREDNISOLONE SODIUM SUCCINATE 40 MG: 40 INJECTION INTRAMUSCULAR; INTRAVENOUS at 18:23

## 2025-03-10 RX ADMIN — FAMOTIDINE 20 MG: 10 INJECTION, SOLUTION INTRAVENOUS at 20:59

## 2025-03-10 NOTE — PROGRESS NOTES
Antimicrobial Length of Therapy:    Day 3 of 3 azithromycin  Day 2 of 5 cefepime    Thank you.  Avril England, Pharm.D.  3/10/2025  08:25 EDT

## 2025-03-10 NOTE — NURSING NOTE
WOCN consulted for left posterior heel DTPI, bilateral 4th/5th toes and right medial abdomen. Assessed patient with SYED Fritz - bilateral heels assessed, no pressure injuries visualized; heels are dry, callused, pink and blanching at this time. Order to assess daily and offload heels at all times with heel protector boots; may use silicone bordered dressing for protection if unable to tolerate boots.    The right medial abdomen has a dry scab noted that is intact, no drainage. No pressure injury visualized. Recommend assessing daily and leaving open to air.    The skin between the 4th and 5th toes of each foot is noted to have a dry, flaky appearance with small intact scabs. No drainage noted. These do not appear to be pressure related; they do not appear to be ulcers. Recommend assessing daily and leaving open to air.    Bin score 14. PI prevention orders initiated.     03/10/25 0855   Wound 03/09/25 0105 Left posterior heel   Placement Date/Time: 03/09/25 0105   Present on Original Admission: Yes  Side: Left  Orientation: posterior  Location: heel   Dressing Appearance intact   Closure None   Base dry;blanchable;pink   Periwound intact;dry;pink   Periwound Temperature warm   Periwound Skin Turgor firm   Edges callused   Drainage Amount none   Wound 03/09/25 0359 Bilateral anterior fifth toe Other (Comments)   Placement Date/Time: 03/09/25 0359   Side: Bilateral  Orientation: anterior  Location: (c) fifth toe  Primary Wound Type: Other (Comments)  Secondary Wound Type - Other: (c)    Dressing Appearance open to air   Closure None   Base dry;scab   Periwound intact;dry   Periwound Temperature warm   Periwound Skin Turgor soft   Edges rolled/closed   Drainage Amount none   Wound 03/09/25 0403 Right medial abdomen Atypical Other (Comments)   Placement Date/Time: 03/09/25 0403   Side: Right  Orientation: medial  Location: abdomen  Primary Wound Type: Atypical  Secondary Wound Type - Atypical: Other (Comments)   Additional Comments: Folliculitis   Dressing Appearance open to air   Closure None   Base dry;scab   Periwound intact;dry   Periwound Temperature warm   Periwound Skin Turgor soft   Edges rolled/closed   Drainage Amount none

## 2025-03-10 NOTE — PLAN OF CARE
Problem: Noninvasive Ventilation Acute  Goal: Effective Unassisted Ventilation and Oxygenation  Outcome: Progressing     Problem: Adult Inpatient Plan of Care  Goal: Plan of Care Review  Outcome: Progressing  Goal: Patient-Specific Goal (Individualized)  Outcome: Progressing  Goal: Absence of Hospital-Acquired Illness or Injury  Outcome: Progressing  Intervention: Identify and Manage Fall Risk  Recent Flowsheet Documentation  Taken 3/10/2025 0400 by Joao Houston RN  Safety Promotion/Fall Prevention: safety round/check completed  Taken 3/10/2025 0300 by Joao Houston RN  Safety Promotion/Fall Prevention: safety round/check completed  Taken 3/10/2025 0200 by Joao Houston RN  Safety Promotion/Fall Prevention: safety round/check completed  Taken 3/10/2025 0100 by Joao Houston RN  Safety Promotion/Fall Prevention: safety round/check completed  Taken 3/10/2025 0000 by Joao Houston RN  Safety Promotion/Fall Prevention: safety round/check completed  Taken 3/9/2025 2300 by Joao oHuston RN  Safety Promotion/Fall Prevention: safety round/check completed  Taken 3/9/2025 2200 by Joao Houston RN  Safety Promotion/Fall Prevention: safety round/check completed  Taken 3/9/2025 2100 by Joao Houston RN  Safety Promotion/Fall Prevention: safety round/check completed  Taken 3/9/2025 2000 by Joao Houston RN  Safety Promotion/Fall Prevention: safety round/check completed  Taken 3/9/2025 1900 by Joao Houston RN  Safety Promotion/Fall Prevention: safety round/check completed  Intervention: Prevent Skin Injury  Recent Flowsheet Documentation  Taken 3/10/2025 0400 by Joao Houston RN  Body Position:   turned   side-lying   right  Taken 3/10/2025 0200 by Joao Houston RN  Body Position:   turned   side-lying   left  Taken 3/10/2025 0000 by Joao Houston RN  Body Position:   turned   side-lying   right  Taken 3/9/2025 2200 by Joao Houston RN  Body Position:   turned    side-lying   left  Taken 3/9/2025 2000 by Joao Houston RN  Body Position:   turned   side-lying   right  Intervention: Prevent and Manage VTE (Venous Thromboembolism) Risk  Recent Flowsheet Documentation  Taken 3/9/2025 2000 by Joao Houston RN  VTE Prevention/Management: (See MAR) other (see comments)  Intervention: Prevent Infection  Recent Flowsheet Documentation  Taken 3/10/2025 0200 by Joao Houston RN  Infection Prevention:   equipment surfaces disinfected   hand hygiene promoted   visitors restricted/screened   single patient room provided  Taken 3/9/2025 2000 by Joao Houston RN  Infection Prevention:   equipment surfaces disinfected   hand hygiene promoted  Goal: Optimal Comfort and Wellbeing  Outcome: Progressing  Goal: Readiness for Transition of Care  Outcome: Progressing     Problem: Mechanical Ventilation Invasive  Goal: Effective Communication  Outcome: Progressing  Goal: Optimal Device Function  Outcome: Progressing  Intervention: Optimize Device Care and Function  Recent Flowsheet Documentation  Taken 3/10/2025 0400 by Joao Houston RN  Oral Care:   swabbed with antiseptic solution   lip/mouth moisturizer applied  Taken 3/10/2025 0200 by Joao Houston RN  Airway Safety Measures: mask valve resuscitator at bedside  Taken 3/10/2025 0000 by Joao Houston RN  Oral Care:   swabbed with antiseptic solution   lip/mouth moisturizer applied  Taken 3/9/2025 2000 by Joao Houston RN  Airway Safety Measures: mask valve resuscitator at bedside  Taken 3/9/2025 1938 by Joao Houston RN  Oral Care:   swabbed with antiseptic solution   lip/mouth moisturizer applied  Goal: Mechanical Ventilation Liberation  Outcome: Progressing  Intervention: Promote Extubation and Mechanical Ventilation Liberation  Recent Flowsheet Documentation  Taken 3/10/2025 0400 by Joao Houston RN  Medication Review/Management: medications reviewed  Taken 3/10/2025 0300 by Joao Houston  RN  Medication Review/Management: medications reviewed  Taken 3/10/2025 0200 by Joao Houston RN  Medication Review/Management: medications reviewed  Taken 3/10/2025 0100 by Joao Houston RN  Medication Review/Management: medications reviewed  Taken 3/10/2025 0000 by Joao Houston RN  Medication Review/Management: medications reviewed  Taken 3/9/2025 2300 by Joao Houston RN  Medication Review/Management: medications reviewed  Taken 3/9/2025 2200 by Joao Houston RN  Medication Review/Management: medications reviewed  Taken 3/9/2025 2100 by Joao Houston RN  Medication Review/Management: medications reviewed  Taken 3/9/2025 2000 by Joao Houston RN  Medication Review/Management: medications reviewed  Taken 3/9/2025 1900 by Joao Houston RN  Medication Review/Management: medications reviewed  Goal: Optimal Nutrition Delivery  Outcome: Progressing  Goal: Absence of Device-Related Skin and Tissue Injury  Outcome: Progressing  Intervention: Maintain Skin and Tissue Health  Recent Flowsheet Documentation  Taken 3/10/2025 0200 by Joao Houston RN  Device Skin Pressure Protection:   absorbent pad utilized/changed   pressure points protected   tubing/devices free from skin contact  Taken 3/9/2025 2000 by Joao Houston RN  Device Skin Pressure Protection:   absorbent pad utilized/changed   tubing/devices free from skin contact  Goal: Absence of Ventilator-Induced Lung Injury  Outcome: Progressing  Intervention: Prevent Ventilator-Associated Pneumonia  Recent Flowsheet Documentation  Taken 3/10/2025 0400 by Joao Houston RN  Head of Bed ([facility]) Positioning: HOB at 30-45 degrees  Oral Care:   swabbed with antiseptic solution   lip/mouth moisturizer applied  Taken 3/10/2025 0200 by Joao Houston RN  Head of Bed ([facility]) Positioning: HOB at 30-45 degrees  Taken 3/10/2025 0000 by Joao Houston RN  Head of Bed ([facility]) Positioning: HOB at 30-45 degrees  Oral Care:   swabbed with  antiseptic solution   lip/mouth moisturizer applied  Taken 3/9/2025 2200 by Joao Houston RN  Head of Bed ([facility]) Positioning: HOB at 30-45 degrees  Taken 3/9/2025 2000 by Joao Houston RN  Head of Bed ([facility]) Positioning: HOB at 30-45 degrees  Taken 3/9/2025 1938 by Joao Houston RN  Oral Care:   swabbed with antiseptic solution   lip/mouth moisturizer applied     Problem: Comorbidity Management  Goal: Maintenance of COPD Symptom Control  Outcome: Progressing  Intervention: Maintain COPD (Chronic Obstructive Pulmonary Disease) Symptom Control  Recent Flowsheet Documentation  Taken 3/10/2025 0400 by Joao Houston RN  Medication Review/Management: medications reviewed  Taken 3/10/2025 0300 by Joao Houston RN  Medication Review/Management: medications reviewed  Taken 3/10/2025 0200 by Joao Houston RN  Medication Review/Management: medications reviewed  Taken 3/10/2025 0100 by Joao Houston RN  Medication Review/Management: medications reviewed  Taken 3/10/2025 0000 by Joao Houston RN  Medication Review/Management: medications reviewed  Taken 3/9/2025 2300 by Joao Houston RN  Medication Review/Management: medications reviewed  Taken 3/9/2025 2200 by Joao Houston RN  Medication Review/Management: medications reviewed  Taken 3/9/2025 2100 by Joao Houston RN  Medication Review/Management: medications reviewed  Taken 3/9/2025 2000 by Joao Houston RN  Medication Review/Management: medications reviewed  Taken 3/9/2025 1900 by Joao Houston RN  Medication Review/Management: medications reviewed  Goal: Blood Glucose Level Within Target Range  Outcome: Progressing  Intervention: Monitor and Manage Glycemia  Recent Flowsheet Documentation  Taken 3/10/2025 0400 by Joao Houston RN  Medication Review/Management: medications reviewed  Taken 3/10/2025 0300 by Joao Houston RN  Medication Review/Management: medications reviewed  Taken 3/10/2025 0200 by Joao Houston  RN  Medication Review/Management: medications reviewed  Taken 3/10/2025 0100 by Joao Houston RN  Medication Review/Management: medications reviewed  Taken 3/10/2025 0000 by Joao Houston RN  Medication Review/Management: medications reviewed  Taken 3/9/2025 2300 by Joao Houston RN  Medication Review/Management: medications reviewed  Taken 3/9/2025 2200 by Joao Houston RN  Medication Review/Management: medications reviewed  Taken 3/9/2025 2100 by Joao Houston RN  Medication Review/Management: medications reviewed  Taken 3/9/2025 2000 by Joao Houston RN  Medication Review/Management: medications reviewed  Taken 3/9/2025 1900 by Joao Houston RN  Medication Review/Management: medications reviewed  Goal: Maintenance of Heart Failure Symptom Control  Outcome: Progressing  Intervention: Maintain Heart Failure Management  Recent Flowsheet Documentation  Taken 3/10/2025 0400 by Joao Houston RN  Medication Review/Management: medications reviewed  Taken 3/10/2025 0300 by Joao Houston RN  Medication Review/Management: medications reviewed  Taken 3/10/2025 0200 by Joao Houston RN  Medication Review/Management: medications reviewed  Taken 3/10/2025 0100 by Joao Houston RN  Medication Review/Management: medications reviewed  Taken 3/10/2025 0000 by Joao Houston RN  Medication Review/Management: medications reviewed  Taken 3/9/2025 2300 by Joao Houston RN  Medication Review/Management: medications reviewed  Taken 3/9/2025 2200 by Joao Houston RN  Medication Review/Management: medications reviewed  Taken 3/9/2025 2100 by Joao Houston RN  Medication Review/Management: medications reviewed  Taken 3/9/2025 2000 by Joao Houston RN  Medication Review/Management: medications reviewed  Taken 3/9/2025 1900 by Joao Houston RN  Medication Review/Management: medications reviewed  Goal: Blood Pressure in Desired Range  Outcome: Progressing  Intervention:  Maintain Blood Pressure Management  Recent Flowsheet Documentation  Taken 3/10/2025 0400 by Joao Houston RN  Medication Review/Management: medications reviewed  Taken 3/10/2025 0300 by Joao Houston RN  Medication Review/Management: medications reviewed  Taken 3/10/2025 0200 by Joao Houston RN  Medication Review/Management: medications reviewed  Taken 3/10/2025 0100 by Joao Houston RN  Medication Review/Management: medications reviewed  Taken 3/10/2025 0000 by Joao Houston RN  Medication Review/Management: medications reviewed  Taken 3/9/2025 2300 by Joao Houston RN  Medication Review/Management: medications reviewed  Taken 3/9/2025 2200 by Joao Houston RN  Medication Review/Management: medications reviewed  Taken 3/9/2025 2100 by Joao Houston RN  Medication Review/Management: medications reviewed  Taken 3/9/2025 2000 by Joao Houston RN  Medication Review/Management: medications reviewed  Taken 3/9/2025 1900 by Joao Houston RN  Medication Review/Management: medications reviewed     Problem: Violence Risk or Actual  Goal: Anger and Impulse Control  Outcome: Progressing  Intervention: Minimize Safety Risk  Recent Flowsheet Documentation  Taken 3/10/2025 0400 by Joao Houston RN  Enhanced Safety Measures: bed alarm set  Taken 3/10/2025 0300 by Joao Houston RN  Enhanced Safety Measures: bed alarm set  Taken 3/10/2025 0200 by Joao Houston RN  Enhanced Safety Measures: bed alarm set  Taken 3/10/2025 0100 by Joao Houston RN  Enhanced Safety Measures: bed alarm set  Taken 3/10/2025 0000 by Joao Houston RN  Enhanced Safety Measures: bed alarm set  Taken 3/9/2025 2300 by Joao Houston RN  Enhanced Safety Measures: bed alarm set  Taken 3/9/2025 2200 by Joao Houston RN  Enhanced Safety Measures: bed alarm set  Taken 3/9/2025 2100 by Joao Houston RN  Enhanced Safety Measures: bed alarm set  Taken 3/9/2025 2000 by Joao Houston RN  Enhanced  Safety Measures: bed alarm set  Taken 3/9/2025 1900 by Joao Houston RN  Enhanced Safety Measures: bed alarm set     Problem: Skin Injury Risk Increased  Goal: Skin Health and Integrity  Outcome: Progressing  Intervention: Optimize Skin Protection  Recent Flowsheet Documentation  Taken 3/10/2025 0400 by Joao Houston RN  Head of Bed (HOB) Positioning: HOB at 30-45 degrees  Taken 3/10/2025 0200 by Joao Houston RN  Pressure Reduction Techniques:   frequent weight shift encouraged   heels elevated off bed   weight shift assistance provided  Head of Bed ([facility]) Positioning: HOB at 30-45 degrees  Pressure Reduction Devices:   specialty bed utilized   pressure-redistributing mattress utilized  Taken 3/10/2025 0000 by Joao Houston RN  Head of Bed ([facility]) Positioning: HOB at 30-45 degrees  Taken 3/9/2025 2200 by Joao Houston RN  Head of Bed ([facility]) Positioning: HOB at 30-45 degrees  Taken 3/9/2025 2000 by Joao Houston RN  Activity Management: bedrest  Pressure Reduction Techniques:   frequent weight shift encouraged   weight shift assistance provided  Head of Bed ([facility]) Positioning: HOB at 30-45 degrees  Pressure Reduction Devices:   specialty bed utilized   positioning supports utilized   pressure-redistributing mattress utilized     Problem: Fall Injury Risk  Goal: Absence of Fall and Fall-Related Injury  Outcome: Progressing  Intervention: Identify and Manage Contributors  Recent Flowsheet Documentation  Taken 3/10/2025 0400 by Joao Houston RN  Medication Review/Management: medications reviewed  Taken 3/10/2025 0300 by Joao Houston RN  Medication Review/Management: medications reviewed  Taken 3/10/2025 0200 by Joao Houston RN  Medication Review/Management: medications reviewed  Taken 3/10/2025 0100 by Joao Houston RN  Medication Review/Management: medications reviewed  Taken 3/10/2025 0000 by Joao Houston RN  Medication Review/Management: medications reviewed  Taken 3/9/2025  2300 by Joao Houston RN  Medication Review/Management: medications reviewed  Taken 3/9/2025 2200 by Joao Houston RN  Medication Review/Management: medications reviewed  Taken 3/9/2025 2100 by Joao Houston RN  Medication Review/Management: medications reviewed  Taken 3/9/2025 2000 by Joao Houston RN  Medication Review/Management: medications reviewed  Taken 3/9/2025 1900 by Joao Houston RN  Medication Review/Management: medications reviewed  Intervention: Promote Injury-Free Environment  Recent Flowsheet Documentation  Taken 3/10/2025 0400 by Joao Houston RN  Safety Promotion/Fall Prevention: safety round/check completed  Taken 3/10/2025 0300 by Joao Houston RN  Safety Promotion/Fall Prevention: safety round/check completed  Taken 3/10/2025 0200 by Joao Houston RN  Safety Promotion/Fall Prevention: safety round/check completed  Taken 3/10/2025 0100 by Joao Houston RN  Safety Promotion/Fall Prevention: safety round/check completed  Taken 3/10/2025 0000 by Joao Houston RN  Safety Promotion/Fall Prevention: safety round/check completed  Taken 3/9/2025 2300 by Joao Houston RN  Safety Promotion/Fall Prevention: safety round/check completed  Taken 3/9/2025 2200 by Joao Houston RN  Safety Promotion/Fall Prevention: safety round/check completed  Taken 3/9/2025 2100 by Joao Houston RN  Safety Promotion/Fall Prevention: safety round/check completed  Taken 3/9/2025 2000 by Joao Houstno RN  Safety Promotion/Fall Prevention: safety round/check completed  Taken 3/9/2025 1900 by Joao Houston RN  Safety Promotion/Fall Prevention: safety round/check completed     Problem: Sepsis/Septic Shock  Goal: Optimal Coping  Outcome: Progressing  Goal: Absence of Bleeding  Outcome: Progressing  Goal: Blood Glucose Level Within Target Range  Outcome: Progressing  Goal: Absence of Infection Signs and Symptoms  Outcome: Progressing  Intervention: Initiate Sepsis  Management  Recent Flowsheet Documentation  Taken 3/10/2025 0200 by Joao Houston, RN  Infection Prevention:   equipment surfaces disinfected   hand hygiene promoted   visitors restricted/screened   single patient room provided  Taken 3/9/2025 2000 by Joao Houston, RN  Infection Prevention:   equipment surfaces disinfected   hand hygiene promoted  Intervention: Promote Recovery  Recent Flowsheet Documentation  Taken 3/9/2025 2000 by Joao Houston, RN  Activity Management: bedrest  Goal: Optimal Nutrition Delivery  Outcome: Progressing   Goal Outcome Evaluation:

## 2025-03-10 NOTE — PLAN OF CARE
Problem: Noninvasive Ventilation Acute  Goal: Effective Unassisted Ventilation and Oxygenation  Outcome: Progressing     Problem: Adult Inpatient Plan of Care  Goal: Plan of Care Review  Outcome: Progressing  Flowsheets (Taken 3/10/2025 1814)  Progress: improving  Outcome Evaluation: pt self extubated this shift, no gtt except for insulin. remains on 4L NC and CPAP at night. pt ambulated in room.  Plan of Care Reviewed With: patient  Goal: Patient-Specific Goal (Individualized)  Outcome: Progressing  Goal: Absence of Hospital-Acquired Illness or Injury  Outcome: Progressing  Intervention: Identify and Manage Fall Risk  Description: Perform standard risk assessment on admission using a validated tool or comprehensive approach appropriate to the patient; reassess fall risk frequently, with change in status or transfer to another level of care.Communicate risk to interprofessional healthcare team; ensure fall risk visible cue.Determine need for increased observation, equipment and environmental modification, as well as use of supportive, nonskid footwear.Adjust safety measures to individual needs and identified risk factors.Reinforce the importance of active participation with fall risk prevention, safety, and physical activity with the patient and family.Perform regular intentional rounding to assess need for position change, pain assessment and personal needs, including assistance with toileting.  Recent Flowsheet Documentation  Taken 3/10/2025 1800 by Patrice Crawley, RN  Safety Promotion/Fall Prevention:   safety round/check completed   fall prevention program maintained  Taken 3/10/2025 1700 by Patrice Crawley, RN  Safety Promotion/Fall Prevention:   safety round/check completed   fall prevention program maintained  Taken 3/10/2025 1600 by Patrice Crawley, RN  Safety Promotion/Fall Prevention:   safety round/check completed   fall prevention program maintained  Taken 3/10/2025 1500 by Patrice Crawley,  RN  Safety Promotion/Fall Prevention:   safety round/check completed   fall prevention program maintained  Taken 3/10/2025 1400 by Patrice Crawley RN  Safety Promotion/Fall Prevention:   safety round/check completed   fall prevention program maintained  Taken 3/10/2025 1300 by Patrice Crawley RN  Safety Promotion/Fall Prevention:   safety round/check completed   fall prevention program maintained  Taken 3/10/2025 1200 by Patrice Crawley RN  Safety Promotion/Fall Prevention:   safety round/check completed   fall prevention program maintained  Taken 3/10/2025 1100 by Patrice Crawley RN  Safety Promotion/Fall Prevention:   safety round/check completed   fall prevention program maintained  Taken 3/10/2025 1000 by Patrice Crawley RN  Safety Promotion/Fall Prevention:   safety round/check completed   fall prevention program maintained  Taken 3/10/2025 0900 by Patrice Crawley RN  Safety Promotion/Fall Prevention:   safety round/check completed   fall prevention program maintained  Taken 3/10/2025 0800 by Patrice Crawley RN  Safety Promotion/Fall Prevention:   safety round/check completed   fall prevention program maintained  Taken 3/10/2025 0700 by Patrice Crawley RN  Safety Promotion/Fall Prevention:   safety round/check completed   fall prevention program maintained  Intervention: Prevent Skin Injury  Description: Perform a screening for skin injury risk, such as pressure or moisture-associated skin damage on admission and at regular intervals throughout hospital stay.Keep all areas of skin (especially folds) clean and dry.Maintain adequate skin hydration.Relieve and redistribute pressure and protect bony prominences and skin at risk for injury; implement measures based on patient-specific risk factors.Match turning and repositioning schedule to clinical condition.Encourage weight shift frequently; assist with reposition if unable to complete independently.Float heels off bed; avoid pressure on  the Achilles tendon.Keep skin free from extended contact with medical devices.Optimize nutrition and hydration.Encourage functional activity and mobility, as early as tolerated.Use aids (e.g., slide boards, mechanical lift) during transfer.  Recent Flowsheet Documentation  Taken 3/10/2025 1800 by Patrice Crawley RN  Body Position: position changed independently  Taken 3/10/2025 1600 by Patrice Crawley RN  Body Position:   right   side-lying  Taken 3/10/2025 1400 by Patrice Crawley RN  Body Position:   left   side-lying  Taken 3/10/2025 1200 by Patrice Crawley RN  Body Position:   right   side-lying  Taken 3/10/2025 1000 by Patrice Crawley RN  Body Position:   left   side-lying  Taken 3/10/2025 0800 by Patrice Crawley RN  Body Position:   right   side-lying  Goal: Optimal Comfort and Wellbeing  Outcome: Progressing  Intervention: Provide Person-Centered Care  Description: Use a family-focused approach to care; encourage support system presence and participation.Develop trust and rapport by proactively providing information, encouraging questions, addressing concerns and offering reassurance.Acknowledge emotional response to hospitalization.Recognize and utilize personal coping strategies and strengths; develop goals via shared decision-making.Honor spiritual and cultural preferences.  Recent Flowsheet Documentation  Taken 3/10/2025 1400 by Patrice Crawley RN  Trust Relationship/Rapport:   care explained   reassurance provided  Goal: Readiness for Transition of Care  Outcome: Progressing   Goal Outcome Evaluation:  Plan of Care Reviewed With: patient        Progress: improving  Outcome Evaluation: pt self extubated this shift, no gtt except for insulin. remains on 4L NC and CPAP at night. pt ambulated in room.

## 2025-03-10 NOTE — CONSULTS
Referring Provider: Dr. Clayton  Reason for Consultation: Hypoxic and hypercarbic respiratory failure.  Ventilator management      Chief complaint -could not be obtained as patient on my assessment was intubated and sedated.      History of present illness:    Patient is a 49-year-old female with extensive past medical history positive for COPD, JOANN, former tobacco user, heart failure with reduced ejection fraction, diabetes mellitus type 2, morbid obesity and hypertension presented to ER with complaints of shortness of breath.  She was initially tried on BiPAP but failed and was intubated.  All the labs medications ins and outs vitals reviewed.  On my assessment patient was intubated in the morning.  On doing SAT and SBT patient self extubated.  Evaluated her couple of times with her daughters at bedside.    Later in the afternoon started wheezing.  Gave an order to give her racemic epinephrine and will start her on BiPAP.  If patient's breathing does not improve will reintubate.      Plan discussed with patient's nurse   insulin drip   Self extubated   Will put her on cpap at night time   Precdex - weaned down   4 liters nasal cannula after extubation.  Patient uses 2 L at home on and off.    Case discussed with patient's daughters at bedside.    MDR done at bed side with RN, pharmacist, nutrition,  and RT  All the drips,other meds,  labs,ins and outs , abg, fio2 requirement reviewed and dicussed in rounds.     Review of Systems  Initially was intubated and review of systems could not be obtained.  Later on after extubation complained of shortness of breath and slight wheezing.  Otherwise negative        History  Past Medical History:   Diagnosis Date    Arthritis     CHF (congestive heart failure)     COPD (chronic obstructive pulmonary disease)     Diabetes insipidus     Diabetes mellitus     Elevated cholesterol     GERD (gastroesophageal reflux disease)     Hyperlipemia     Hypertension     JOANN  (obstructive sleep apnea)     Pneumonia     Type 2 diabetes mellitus 2013    Vulvar cancer    ,   Past Surgical History:   Procedure Laterality Date    HYSTERECTOMY      INCISION AND DRAINAGE TRUNK N/A 1/11/2022    Procedure: INCISION AND DRAINAGE BACK;  Surgeon: Tamera Saez MD;  Location: [facility];  Service: General;  Laterality: N/A;    MASTECTOMY      VULVA SURGERY      reconstruction due ca   ,   Family History   Problem Relation Age of Onset    Cancer Mother     Hypertension Mother     Parkinsonism Father     Diabetes Father     Hypertension Father     Obesity Father     Cancer Maternal Aunt     Diabetes Paternal Aunt     Diabetes Paternal Grandmother     Cancer Maternal Aunt     Diabetes Brother     Obesity Brother    ,   Social History     Tobacco Use    Smoking status: Every Day     Current packs/day: 1.00     Average packs/day: 1.4 packs/day for 31.0 years (44.6 ttl pk-yrs)     Types: Cigarettes     Start date: 5/18/1995     Passive exposure: Current    Smokeless tobacco: Never    Tobacco comments:     Hasn't smoked in 4 days   Vaping Use    Vaping status: Former    Substances: Nicotine, Flavoring    Devices: ThetaRay   Substance Use Topics    Alcohol use: Never    Drug use: Never   ,   Medications Prior to Admission   Medication Sig Dispense Refill Last Dose/Taking    albuterol sulfate HFA (Ventolin HFA) 108 (90 Base) MCG/ACT inhaler Inhale 2 puffs Every 4 (Four) Hours As Needed for Wheezing or Shortness of Air. 18 g 12 Unknown    budesonide-formoterol (Symbicort) 160-4.5 MCG/ACT inhaler Inhale 2 puffs 2 (Two) Times a Day. 1 each 8 Unknown    cetirizine (zyrTEC) 10 MG tablet Take 1 tablet by mouth Daily.   Unknown    Cholecalciferol 10 MCG (400 UNIT) tablet Take 1 tablet by mouth Daily.   Unknown    clonazePAM (KlonoPIN) 1 MG tablet Take 1 tablet by mouth 4 (Four) Times a Day As Needed for Anxiety.   Unknown    cyclobenzaprine (FLEXERIL) 5 MG tablet Take 1 tablet by mouth 3 (Three) Times a  Day As Needed for Muscle Spasms.   Unknown    dicyclomine (BENTYL) 10 MG capsule Take 1 capsule by mouth 4 (Four) Times a Day As Needed for Abdominal Cramping.   Unknown    fluticasone (FLONASE) 50 MCG/ACT nasal spray Administer 2 sprays into the nostril(s) as directed by provider Daily As Needed for Rhinitis or Allergies.   Unknown    gabapentin (NEURONTIN) 800 MG tablet Take 1 tablet by mouth 4 (Four) Times a Day.   Unknown    hydroCHLOROthiazide (HYDRODIURIL) 25 MG tablet Take 1 tablet by mouth Every Morning.   Unknown    ibuprofen (ADVIL,MOTRIN) 800 MG tablet Take 1 tablet by mouth Every 8 (Eight) Hours As Needed for Mild Pain.   Unknown    imiquimod (ALDARA) 5 % cream Apply 1 Application topically to the appropriate area as directed 3 (Three) Times a Week.   Unknown    insulin aspart (NovoLOG FlexPen) 100 UNIT/ML solution pen-injector sc pen Inject 15 Units under the skin into the appropriate area as directed 3 (Three) Times a Day With Meals. 15 mL 2 Unknown    Insulin Degludec (Tresiba FlexTouch) 200 UNIT/ML solution pen-injector pen injection Inject 90 Units under the skin into the appropriate area as directed every night at bedtime. 15 mL 2 Unknown    ipratropium-albuterol (DUO-NEB) 0.5-2.5 mg/3 ml nebulizer Take 3 mL by nebulization Every 6 (Six) Hours As Needed for Wheezing or Shortness of Air. 360 mL 5 Unknown    lidocaine (LIDODERM) 5 % Place 1 patch on the skin as directed by provider Daily. Remove & Discard patch within 12 hours or as directed by MD   Unknown    lisinopril (PRINIVIL,ZESTRIL) 5 MG tablet Take 0.5 tablets by mouth Daily.   Unknown    nicotine (NICODERM CQ) 14 MG/24HR patch Place 1 patch on the skin as directed by provider Daily. 28 each 3 Unknown    nicotine polacrilex (NICORETTE) 4 MG gum Chew 1 each As Needed for Smoking Cessation. 220 each 5 Unknown    omeprazole (priLOSEC) 20 MG capsule Take 1 capsule by mouth Daily.   Unknown    ondansetron ODT (ZOFRAN-ODT) 8 MG disintegrating  tablet Place 1 tablet on the tongue Daily As Needed for Nausea or Vomiting.   Unknown    simvastatin (ZOCOR) 80 MG tablet Take 1 tablet by mouth every night at bedtime.   Unknown    tiotropium (Spiriva HandiHaler) 18 MCG per inhalation capsule Place 1 capsule into inhaler and inhale Daily. 30 capsule 8 Unknown    Tirzepatide 5 MG/0.5ML solution auto-injector Inject 5 mg under the skin into the appropriate area as directed 1 (One) Time Per Week. 2 mL 1 Unknown   , Scheduled Meds:  atorvastatin, 40 mg, Oral, Daily  azithromycin, 500 mg, Intravenous, Q24H  cefepime, 2,000 mg, Intravenous, Q8H  cetirizine, 10 mg, Oral, Daily  cholecalciferol, 400 Units, Oral, Daily  enoxaparin sodium, 40 mg, Subcutaneous, Q12H  famotidine, 20 mg, Intravenous, BID  gabapentin, 800 mg, Oral, Q8H  ipratropium-albuterol, 3 mL, Nebulization, Q4H - RT  Lidocaine, 1 patch, Transdermal, Q24H  methylPREDNISolone sodium succinate, 40 mg, Intravenous, Q12H  mupirocin, 1 Application, Each Nare, BID  nicotine, 1 patch, Transdermal, Q24H  senna-docusate sodium, 2 tablet, Oral, BID  sodium chloride, 10 mL, Intravenous, Q12H  sodium chloride, 10 mL, Intravenous, Q12H  sodium chloride, 10 mL, Intravenous, Q12H  sodium chloride, 10 mL, Intravenous, Q12H  sodium chloride, 10 mL, Intravenous, Q12H    , Continuous Infusions:  dexmedetomidine, 0.2-1.5 mcg/kg/hr (Dosing Weight), Last Rate: 1 mcg/kg/hr (03/10/25 0916)  fentanyl 10 mcg/mL,  mcg/hr, Last Rate: Stopped (03/10/25 0850)  insulin, 0-100 Units/hr, Last Rate: 10.5 Units/hr (03/10/25 0921)  propofol, 5-50 mcg/kg/min, Last Rate: Stopped (03/10/25 0850)     and Allergies:  Codeine and Metformin    Objective     Vital Signs   Temp:  [98.2 °F (36.8 °C)-99.9 °F (37.7 °C)] 99.9 °F (37.7 °C)  Heart Rate:  [71-96] 74  Resp:  [22-28] 24  BP: ()/(46-84) 112/70  FiO2 (%):  [45 %-50 %] 45 %    Physical Exam:             General-acutely ill appearance, not in any acute distress    HEENT- pupils equally  reactive to light, normal in size, no scleral icterus    Neck-supple    Respiratory-respirations normal-on auscultation no wheezing no crackles,     Cardiovascular-NSR  GI-nontender nondistended bowel sounds positive    CNS-nonfocal    Musculoskeletal -no edema  Extremities- no obvious deformity noticed     Psychiatric-alert awake oriented  Skin- no visible rash                                                                   Results Review:    LABS:    Lab Results   Component Value Date    GLUCOSE 204 (H) 03/09/2025    BUN 22 (H) 03/09/2025    CREATININE 0.77 03/09/2025    EGFRIFNONA 93 01/13/2022    BCR 28.6 (H) 03/09/2025    CO2 29.5 (H) 03/09/2025    CALCIUM 8.3 (L) 03/09/2025    ALBUMIN 3.4 (L) 03/09/2025    AST 14 03/09/2025    ALT 12 03/09/2025    WBC 15.04 (H) 03/09/2025    HGB 11.3 (L) 03/09/2025    HCT 34.7 03/09/2025    MCV 93.5 03/09/2025     03/09/2025     03/09/2025    K 4.9 03/09/2025    K 4.9 03/09/2025     03/09/2025    ANIONGAP 6.5 03/09/2025       Lab Results   Component Value Date    INR 1.09 03/08/2025    INR 1.00 09/09/2024    INR 1.00 03/17/2024    PROTIME 14.2 03/08/2025    PROTIME 13.3 09/09/2024    PROTIME 13.7 03/17/2024       Results from last 7 days   Lab Units 03/08/25  1756   INR  1.09   APTT seconds 30.0          I reviewed the patient's new clinical results.  I reviewed the patient's new imaging results and agree with the interpretation.  Microbiology Results (last 10 days)       Procedure Component Value - Date/Time    MRSA Screen, PCR (Inpatient) - Swab, Nares [[id number]]  (Normal) Collected: 03/09/25 0350    Lab Status: Final result Specimen: Swab from Nares Updated: 03/09/25 0508     MRSA PCR No MRSA Detected    Narrative:      The negative predictive value of this diagnostic test is high and should only be used to consider de-escalating anti-MRSA therapy. A positive result may indicate colonization with MRSA and must be correlated clinically.    S. Pneumo Ag  Urine or CSF - Urine, Urine, Clean Catch [[id number]]  (Normal) Collected: 03/09/25 0349    Lab Status: Final result Specimen: Urine, Clean Catch Updated: 03/09/25 1349     Strep Pneumo Ag Negative    Legionella Antigen, Urine - Urine, Indwelling Urethral Catheter [[id number]]  (Normal) Collected: 03/08/25 1859    Lab Status: Final result Specimen: Urine from Indwelling Urethral Catheter Updated: 03/09/25 0121     LEGIONELLA ANTIGEN, URINE Negative    Narrative:      Presumptive negative for L. pneumophilia serogroup 1 antigen, suggesting no recent or current infection.    Respiratory Panel PCR w/COVID-19(SARS-CoV-2) HARDY/ADAM/LUIS MIGUEL/PAD/COR/RAMA In-House, NP Swab in UTM/VTM, 2 HR TAT - Swab, Nasopharynx [[id number]]  (Normal) Collected: 03/08/25 1840    Lab Status: Final result Specimen: Swab from Nasopharynx Updated: 03/08/25 1929     ADENOVIRUS, PCR Not Detected     Coronavirus 229E Not Detected     Coronavirus HKU1 Not Detected     Coronavirus NL63 Not Detected     Coronavirus OC43 Not Detected     COVID19 Not Detected     Human Metapneumovirus Not Detected     Human Rhinovirus/Enterovirus Not Detected     Influenza A PCR Not Detected     Influenza B PCR Not Detected     Parainfluenza Virus 1 Not Detected     Parainfluenza Virus 2 Not Detected     Parainfluenza Virus 3 Not Detected     Parainfluenza Virus 4 Not Detected     RSV, PCR Not Detected     Bordetella pertussis pcr Not Detected     Bordetella parapertussis PCR Not Detected     Chlamydophila pneumoniae PCR Not Detected     Mycoplasma pneumo by PCR Not Detected    Narrative:      In the setting of a positive respiratory panel with a viral infection PLUS a negative procalcitonin without other underlying concern for bacterial infection, consider observing off antibiotics or discontinuation of antibiotics and continue supportive care. If the respiratory panel is positive for atypical bacterial infection (Bordetella pertussis, Chlamydophila pneumoniae, or Mycoplasma  pneumoniae), consider antibiotic de-escalation to target atypical bacterial infection.    Blood Culture - Blood, Arm, Right [[id number]]  (Normal) Collected: 03/08/25 1756    Lab Status: Preliminary result Specimen: Blood from Arm, Right Updated: 03/09/25 1915     Blood Culture No growth at 24 hours    Blood Culture - Blood, Arm, Left [[id number]]  (Normal) Collected: 03/08/25 1756    Lab Status: Preliminary result Specimen: Blood from Arm, Left Updated: 03/09/25 1915     Blood Culture No growth at 24 hours                 Assessment & Plan     Neurology-sedated on drips.  On my assessment was on Precedex.  Drips reviewed and adjusted for a RASS goal of 0 to -1.  Medication list reviewed.    Respiratory-acute on chronic hypoxic and hypercarbic respiratory failure    Chest x-ray shows bilateral pulmonary infiltrates.  CT chest reviewed.  Not sure if patient vapes.  Patient self extubated today morning.  Will try BiPAP and racemic epinephrine.  If fails will reintubate her.  Will repeat chest x-ray.  If the chest x-ray shows similar infiltrates will give give her high doses of steroids.  If gets reintubated will consider bronchoscopy to get specific cultures.    It is microbiology reviewed and is negative so far.    Continue current antibiotics.  Continue steroids.  Continue nebs.    Continue oxygen to maintain saturation 88 to 92%.  Antibiotics were discussed with pharmacist and de-escalated.  Will get a procalcitonin level tomorrow morning    Procalitonin Results:      Lab 03/08/25 1756   PROCALCITONIN 0.18        Vent settings   Vent Settings          Resp Rate (Set): 22     FiO2 (%): 45 %  PEEP/CPAP (cm H2O): 10 cm H20    Minute Ventilation (L/min) (Obs): 11.2 L/min  Resp Rate (Observed) Vent: 24     I:E Ratio (Obs): 1:2.3    PIP Observed (cm H2O): 33 cm H2O           VAP- precautions  Head end - 30 %  Mouth care   DVT prophylaxis    Chest x-ray latest reviewed    ABG -latest reviewed  Will get venous blood gas  tomorrow morning    Cardiology- hemodynamically -stable.  Vitals reviewed.  Continue to monitor HR- rate and rhythm, BP     Nephrology- Cr and BUN stable  I/O-reviewed avoid nephrotoxic agents    GI- PPI prophylaxis  If remains extubated will get speech and swallow evaluation and accordingly start her on a diet.  Currently n.p.o.    Hematology- CBC  Latest reviewed continue to monitor.  Transfuse for hemoglobin less than 7    ID  Culture-reviewed  And Antibiotics-reviewed discussed with pharmacist and adjusted.  Procalcitonin level negative.    Endrocrinology- Maintain Blood sugar 140 -180  Continue insulin drip.  Blood sugars reviewed      Electrolytes-   Mag and phos       DVT prophylaxis-    Bedside rounds were done with RT and patient's nurse. All the lab and clinical findings were discussed with them and plan was also discussed in great detail.    Family member present-daughters.  Case discussed with them and answered their questions to their satisfaction.  Patient is currently critically ill due to hypoxic respiratory failure.  Patient is very high risk for respiratory failure and requiring reintubation.  Will continue to monitor her closely.    Critical Care time spent in direct patient care: 30 minutes (excluding procedure time, if applicable) including high complexity decision making to assess, manipulate, and support vital organ system failure in this individual who has impairment of one or more vital organ systems such that there is a high probability of imminent or life threatening deterioration in the patient’s condition.  Patient is critically ill and is at higher risk for further mortality/morbidity. Continue ICU care .         Multifocal pneumonia    Respiratory failure          Floyd Turcios MD  03/10/25  09:47 EDT

## 2025-03-10 NOTE — PROGRESS NOTES
[facility] HOSPITALIST PROGRESS NOTE    Subjective     History:   Ana Ortiz is a 49 y.o. female admitted on 3/8/2025 secondary to Multifocal pneumonia     Procedures:   3/8/25: Intubation in the ED  3/8/25: Right IJ central line placement in the ED  3/10/25: Extubation     CC: Follow up resp failure     Patient seen and examined this AM. Sedated on vent support. Appears to be resting comfortably. BP borderline low but stable. Currently on insulin gtt. No acute events overnight per RN.     History taken from: chart, and RN.      Objective     Vital Signs  Temp:  [98.2 °F (36.8 °C)-99.9 °F (37.7 °C)] 99.9 °F (37.7 °C)  Heart Rate:  [71-96] 73  Resp:  [22-28] 27  BP: ()/(46-84) 112/70  FiO2 (%):  [45 %-50 %] 45 %    Intake/Output Summary (Last 24 hours) at 3/10/2025 1230  Last data filed at 3/10/2025 0529  Gross per 24 hour   Intake 1511.91 ml   Output 900 ml   Net 611.91 ml         Physical Exam:  General:    Sedated on vent support, in no acute distress, obese   Heart:      Normal S1 and S2. Regular rate and rhythm. No significant murmur, rubs or gallops appreciated.   Lungs:     Respirations regular, even and unlabored. Diminished breath sounds. No wheezes appreciated.    Abdomen:   Soft and nontender. No guarding, rebound tenderness or  organomegaly noted. Bowel sounds present x 4.   Extremities:  No clubbing, cyanosis or edema noted.      Results Review:    Results from last 7 days   Lab Units 03/09/25 2327 03/09/25  1005 03/08/25  1756   WBC 10*3/mm3 15.04* 13.56* 19.83*   HEMOGLOBIN g/dL 11.3* 12.1 13.9   PLATELETS 10*3/mm3 199 182 213     Results from last 7 days   Lab Units 03/09/25  2327 03/09/25  1006 03/08/25  1756   SODIUM mmol/L 143 137 130*   POTASSIUM mmol/L 4.9  4.9 3.6 3.4*   CHLORIDE mmol/L 107 100 87*   CO2 mmol/L 29.5* 27.7 27.9   BUN mg/dL 22* 16 10   CREATININE mg/dL 0.77 0.73 0.88   CALCIUM mg/dL 8.3* 8.4* 8.8   GLUCOSE mg/dL 204* 264* 439*     Results from last 7  days   Lab Units 03/09/25  2327 03/09/25  1006 03/08/25  1756   BILIRUBIN mg/dL <0.2 0.2 0.5   ALK PHOS U/L 78 91 122*   AST (SGOT) U/L 14 14 17   ALT (SGPT) U/L 12 12 14     Results from last 7 days   Lab Units 03/09/25  2327 03/08/25  1756   MAGNESIUM mg/dL 2.5 1.4*     Results from last 7 days   Lab Units 03/08/25  1756   INR  1.09     Results from last 7 days   Lab Units 03/08/25  1851 03/08/25  1756   HSTROP T ng/L 9 10       Imaging Results (Last 24 Hours)       ** No results found for the last 24 hours. **              Medications:  atorvastatin, 40 mg, Oral, Daily  azithromycin, 500 mg, Intravenous, Q24H  cefTRIAXone, 2,000 mg, Intravenous, Q24H  cetirizine, 10 mg, Oral, Daily  cholecalciferol, 400 Units, Oral, Daily  enoxaparin sodium, 40 mg, Subcutaneous, Q12H  famotidine, 20 mg, Intravenous, BID  gabapentin, 800 mg, Oral, Q8H  ipratropium-albuterol, 3 mL, Nebulization, Q6H - RT  Lidocaine, 1 patch, Transdermal, Q24H  methylPREDNISolone sodium succinate, 40 mg, Intravenous, Q12H  mupirocin, 1 Application, Each Nare, BID  nicotine, 1 patch, Transdermal, Q24H  senna-docusate sodium, 2 tablet, Oral, BID  sodium chloride, 10 mL, Intravenous, Q12H  sodium chloride, 10 mL, Intravenous, Q12H  sodium chloride, 10 mL, Intravenous, Q12H  sodium chloride, 10 mL, Intravenous, Q12H  sodium chloride, 10 mL, Intravenous, Q12H      dexmedetomidine, 0.2-1.5 mcg/kg/hr (Dosing Weight), Last Rate: Stopped (03/10/25 1200)  fentanyl 10 mcg/mL,  mcg/hr, Last Rate: Stopped (03/10/25 0850)  insulin, 0-100 Units/hr, Last Rate: 8 Units/hr (03/10/25 1130)  propofol, 5-50 mcg/kg/min, Last Rate: Stopped (03/10/25 0850)            Assessment & Plan   Multifocal bacterial pneumonia: Suspect gram negative. MRSA PCR is negative. Strep pneumo and Legionella urine antigens negative. Completing short course of azithromycin. Cont Rocephin. Follow cultures.     Acute exacerbation of COPD: Respiratory PCR is negative. Cont treatment as  above, steroids and nebs.     Acute hypercapnic on chronic hypoxic respiratory failure: Likely 2/2 above. No evidence of PE on CT chest. Intubated in the ED. Clinically improving with treatment as outlined above. SAT initiated this AM and pt self extubated. Currently stable on 4L's NC. Cont treatment as outlined above. Cont to monitor closely. Pulm input appreciated.     DM II, insulin dependent with hyperglycemia: HgbA1c 12.4 in 1/25. Improved with insulin gtt. Cont to monitor closely.     Hypokalemia: K+ improved with supplementation. Mg previously<2 but improved with supplementation. Cont to monitor.     Chronic HFpEF: Echo in 3/24 revealed an EF of 61-65% and grade I diastolic dysfunction. Appears compensated at present. Monitor volume status.      Essential HTN: BP has been borderline low intermittently. Cont to monitor.     HLD: Cont statin.     Morbid obesity by BMI: Complicates all aspects of care.     PPX  GI: Pepcid  DVT: Lovenox     Discussed with Dr. Turcios.     Disposition Pending clinical course, likely home when medically stable.     Yury Herzog DO  03/10/25  12:30 EDT

## 2025-03-10 NOTE — PAYOR COMM NOTE
"CONTACT: GABRIELE GOMES RN  UTILIZATION MANAGEMENT DEPT.  [facility]  [address] [place] [address]  PHONE: [phone]  FAX: [phone]      INPATIENT AUTH REQUEST    CLINICALS FOR REVIEW, PER REQUEST.    REF#[id number]       Charles Fernandez (49 y.o. Female)       Date of Birth   1975    Social Security Number       Address   [address] [address]    Home Phone   [phone]    MRN   [id number]       Alevism   Alevism    Marital Status                               Admission Date   3/8/2025    Admission Type   Emergency    Admitting Provider   Rosa Mishra DO    Attending Provider   Yury Herzog DO    Department, Room/Bed   [facility] CRITICAL CARE, 03/1C       Discharge Date       Discharge Disposition       Discharge Destination                                 Attending Provider: Yury Herzog DO    Allergies: Codeine, Metformin    Isolation: None   Infection: None   Code Status: CPR    Ht: 165.1 cm (65\")   Wt: 119 kg (262 lb 2 oz)    Admission Cmt: None   Principal Problem: Multifocal pneumonia [J18.9]                   Active Insurance as of 3/8/2025       Primary Coverage       Payor Plan Insurance Group Employer/Plan Group    [place] [id number]       Payor Plan Address Payor Plan Phone Number Payor Plan Fax Number Effective Dates    [employer] [address] [address] [phone]  2021 - None Entered    [facility] [id number]         Subscriber Name Subscriber Birth Date Member ID       CHARLES FERNANDEZ 1975 [id number]                     Emergency Contacts        (Rel.) Home Phone Work Phone Mobile Phone    HORACIO WATTERS (Daughter) [phone] -- --                 History & Physical        Rosa Mishra DO at 25 0028          [facility]   HISTORY AND PHYSICAL    Patient Name: Charles Fernandez  : 1975  MRN: [id number]  Primary Care " Physician:  Hayden Espinoza PA-C  Date of admission: 3/8/2025    Subjective  Subjective     Chief Complaint: Shortness of air    History of Present Illness The patient is a 48 y/o female with PMHx significant for COPD, JOANN, Morbid Obesity, former tobacco use, HFrEF, DM type II and HTN who presents to the ED complaining of a 5 day history of worsening shortness of air. Patient is currently sedated and on the ventilator and no visitors are present during my visit.  According to the ED provider, patient complained of fever and chills in addition to progressive shortness of air.  Patient was initially trialed on BiPAP but then intubated for respiratory failure and increased work of breathing.  Patient was febrile upon arrival with initial temperature of 102.8 °F, heart rate 126, respiratory rate 40 and an O2 saturation of 79% via 2 L/min nasal cannula.    Patient seen and examined in .     Currently, patient noted to have ventilator dyssynchrony with Tv alternating between well less than 100 mL and greater than 1000 mL. Current sedation drips include Versed at 6 mL/hr, Propofol at 15 mcg/kg/min and Versed at 300 mcg/hr.    Patient has had 1100 mL urine output.    Review of Systems   Unable to perform ROS: Intubated      Personal History     Past Medical History:   Diagnosis Date    Arthritis     CHF (congestive heart failure)     COPD (chronic obstructive pulmonary disease)     Diabetes insipidus     Diabetes mellitus     Elevated cholesterol     GERD (gastroesophageal reflux disease)     Hyperlipemia     Hypertension     JOANN (obstructive sleep apnea)     Pneumonia     Type 2 diabetes mellitus 2013    Vulvar cancer        Past Surgical History:   Procedure Laterality Date    HYSTERECTOMY      INCISION AND DRAINAGE TRUNK N/A 1/11/2022    Procedure: INCISION AND DRAINAGE BACK;  Surgeon: Tamera Saez MD;  Location: [facility];  Service: General;  Laterality: N/A;    MASTECTOMY      VULVA SURGERY       reconstruction due ca       Family History: family history includes Cancer in her maternal aunt, maternal aunt, and mother; Diabetes in her brother, father, paternal aunt, and paternal grandmother; Hypertension in her father and mother; Obesity in her brother and father; Parkinsonism in her father.     Social History:  reports that she has quit smoking. Her smoking use included cigarettes. She started smoking about 29 years ago. She has a 44.7 pack-year smoking history. She has been exposed to tobacco smoke. She has never used smokeless tobacco. She reports that she does not drink alcohol and does not use drugs.    Home Medications:  ARIPiprazole, Dexcom G7 Sensor, Insulin Degludec, Insulin Pen Needle, Tirzepatide, albuterol sulfate HFA, budesonide-formoterol, cetirizine, clonazePAM, cyclobenzaprine, dicyclomine, fluticasone, gabapentin, glipizide, hydroCHLOROthiazide, ibuprofen, insulin aspart, ipratropium-albuterol, lidocaine, lisinopril, loperamide, meloxicam, nicotine, nicotine polacrilex, ondansetron ODT, sertraline, simvastatin, tiotropium, and traZODone    Allergies:  Allergies   Allergen Reactions    Codeine Nausea Only    Metformin GI Intolerance     Extreme diarrhea       Objective   Objective     Vitals:   Temp:  [98.9 °F (37.2 °C)-102.8 °F (39.3 °C)] 98.9 °F (37.2 °C)  Heart Rate:  [] 80  Resp:  [20-40] 24  BP: ()/(43-95) 93/60  Flow (L/min) (Oxygen Therapy):  [2] 2  FiO2 (%):  [80 %-100 %] 80 %    Physical Exam  Constitutional:       General: She is not in acute distress.     Appearance: She is well-developed. She is morbidly obese.      Interventions: She is sedated and intubated.   HENT:      Head: Normocephalic and atraumatic.      Mouth/Throat:      Comments: ET and OG tubes in place; patient with excess saliva around ET tube  Eyes:      Conjunctiva/sclera: Conjunctivae normal.   Neck:      Trachea: No tracheal deviation.      Comments: Large neck, R IJ CVL  Cardiovascular:      Rate and  Rhythm: Normal rate and regular rhythm.      Pulses:           Dorsalis pedis pulses are 2+ on the right side and 2+ on the left side.      Heart sounds: No murmur heard.     No friction rub. No gallop.      Comments: No significant pitting edema  Pulmonary:      Effort: No tachypnea, accessory muscle usage or respiratory distress. She is intubated.      Breath sounds: Normal breath sounds. Decreased air movement present. Examination of the right-lower field reveals decreased breath sounds. Examination of the left-lower field reveals decreased breath sounds. Decreased breath sounds present. No wheezing or rales.   Abdominal:      General: Abdomen is protuberant. Bowel sounds are normal. Bowel sounds are decreased. There is no distension.      Palpations: Abdomen is soft.      Tenderness: There is no abdominal tenderness. There is no guarding.   Genitourinary:     Comments: Arciniega catheter in place  Skin:     General: Skin is warm and dry.      Findings: No erythema or rash.   Neurological:      Comments: Unable to assess         Result Review   Result Review:  I have personally reviewed the results from the time of this admission to 3/9/2025 00:28 EST and agree with these findings:  [x]  Laboratory list / accordion  []  Microbiology  [x]  Radiology  [x]  EKG/Telemetry   []  Cardiology/Vascular   []  Pathology  []  Old records  []  Other:  Most notable findings include:     Recent Labs     03/08/25 1756 03/08/25  1938   PHART 7.486* 7.280*   AHC3ECS 39.4 66.8*   PO2ART 58.3* 82.8*   GQD4XIY 29.7* 31.4*   BASEEXCESS 5.9* 2.5*     Results from last 7 days   Lab Units 03/08/25  1756   WBC 10*3/mm3 19.83*   HEMOGLOBIN g/dL 13.9   HEMATOCRIT % 41.7   PLATELETS 10*3/mm3 213     Results from last 7 days   Lab Units 03/08/25  1756   SODIUM mmol/L 130*   POTASSIUM mmol/L 3.4*   CHLORIDE mmol/L 87*   CO2 mmol/L 27.9   BUN mg/dL 10   CREATININE mg/dL 0.88   CALCIUM mg/dL 8.8   BILIRUBIN mg/dL 0.5   ALK PHOS U/L 122*   ALT  (SGPT) U/L 14   AST (SGOT) U/L 17   GLUCOSE mg/dL 439*     Lactate: 1.5    Pro-calcitonin: 0.18    CT chest with PE protocol:  FINDINGS:     Enlarged heart size.  Airspace disease in the upper and lower lobes consistent with multifocal  pneumonia.  Endotracheal tube in place with tip noted to terminate above the level  of the judah.  Enteric drain in place with tip to the stomach.  No pleural effusion.  No pneumothorax.  Normal thyroid gland.  Small paratracheal and hilar nodes  No free fluid or free air in the upper abdomen.  No thoracic aortic aneurysm or dissection.  No pulmonary embolus.     IMPRESSION:     No thoracic aortic aneurysm or dissection.  No pulmonary embolus  Enlarged heart size  Trace volume of pericardial fluid.  Multifocal pneumonia in the upper and lower lobes with multiple air  bronchograms.  No significant mediastinal or hilar adenopathy.  No free fluid or free air in the upper abdomen.    Assessment & Plan  Assessment / Plan     #Bilateral pneumonia, treating for gram negative and gram positive organisms, unspecified  #Acute COPD exacerbation  #Acute hypercapnic on chronic hypoxic respiratory failure due to above  #JOANN  #Morbid obesity with BMI 42.92 kg/m²  -Patient has been admitted to the critical care unit for further management  -The patient has been started on fentanyl, propofol and Versed infusions in the ED  -Will wean off the Versed infusion and can add a Precedex infusion if needed  -Will use as needed Versed pushes instead  -Continue with ventilatory support; I have requested a repeat ABG in a.m.  -Patient has been started on empiric intravenous antibiotic therapy with pharmacy to dose vancomycin, cefepime and azithromycin  -I have requested a respiratory culture via ET suction, testing for strep pneumo, Legionella and MRSA  -The patient's respiratory PCR panel was negative  -Blood cultures were obtained in the ED; follow-up final culture results  -Tailor antibiotic therapy based  on culture data results  -Repeat the patient's CBC in a.m. and continue to monitor her temperature curve  -For patient's acute COPD exacerbation, I have started her on Solu-Medrol 40 mg IV every 12 hours and scheduled nebulized inhalants  -Consider a pulmonary consult pending clinical course  -Wean FiO2 for O2 saturation 90-95%    #Acute hypomagnesemia  #Mild acute hypokalemia  #Mildly elevated anion gap without acidosis  #Diabetes mellitus type 2, insulin dependent complicated by hyperglycemia  -Patient has been started on the electrolyte replacement protocol for Magnesium and Potassium  -Will repeat Mg level and chemistry panel in a.m.   -LR @ 100 ml/hr  -Patient has been started on the glucommander insulin infusion with goal glucose 140-180  -A1c in 1/25 was 12.4%    Chronic medical conditions:  -Former tobacco use  -Chronic HFpEF  -Hyperlipidemia  -Hypertension  -Previous vulvar cancer status post reconstructive surgery 2013    VTE/GI Prophylaxis:  Lovenox/Famotidine    CODE STATUS:    Code Status (Patient has no pulse and is not breathing): CPR (Attempt to Resuscitate)  Medical Interventions (Patient has pulse or is breathing): Full Support    Admission Status:  I believe this patient meets inpatient status.    Patient is considered high risk due to: respiratory failure requiring intubation, multifocal pneumonia, COPD, obesity, DM type II    Rosa Mishra DO    Electronically signed by Rosa Mishra DO at 03/09/25 0317          Emergency Department Notes        Alvina Plaza RN at 03/09/25 0045          Patient transported to CCU room #3 at this time on zoll monitor via ER stretcher by RN x2, and RT. 7.5mm ETT remains at 23 cm at the lip. 16F OG in place at 70 cm at the lip. 16F collins in intact and draining. All IV sites intact and infusing without difficulty. VSS. NADN. All belongings placed in belongings bag and sent with patient.     Electronically signed by Alvina Plaza RN at  03/09/25 0108       Erick Fraga, DO at 03/08/25 2001          20:01 EST    I have discussed Ana Ortiz's ED presentation and ED course with Dr. Frey.  I will assume care of the patient. Please see the prior note for further details.     CHIEF COMPLAINT      Chief Complaint   Patient presents with    Shortness of Breath       HPI    Ana Ortiz is a 49 y.o. female who presents with complaints of shortness of breath for 5 days.  The patient has had fevers and chills.  She has a history of CHF, COPD, diabetes, dyslipidemia hypertension and JOANN.  The patient was intubated prior to my assumption of care, initially on BiPAP and then eventually intubated for respiratory failure and increased work of breathing.  Sepsis was initiated by the prior provider.  Tylenol given for antipyretic management.  Only small amounts of fluids were given secondary to CHF history.    PAST MEDICAL HISTORY      Past Medical History:   Diagnosis Date    Arthritis     CHF (congestive heart failure)     COPD (chronic obstructive pulmonary disease)     Diabetes insipidus     Diabetes mellitus     Elevated cholesterol     GERD (gastroesophageal reflux disease)     Hyperlipemia     Hypertension     JOANN (obstructive sleep apnea)     Pneumonia     Type 2 diabetes mellitus 2013    Vulvar cancer        SURGICAL HISTORY      Past Surgical History:   Procedure Laterality Date    HYSTERECTOMY      INCISION AND DRAINAGE TRUNK N/A 1/11/2022    Procedure: INCISION AND DRAINAGE BACK;  Surgeon: Tamera Saez MD;  Location: [facility];  Service: General;  Laterality: N/A;    MASTECTOMY      VULVA SURGERY      reconstruction due ca       CURRENT ED MEDICATIONS  (Meds given Today)    Medications   sodium chloride 0.9 % flush 10 mL (has no administration in time range)   propofol (DIPRIVAN) infusion 10 mg/mL 100 mL (50 mcg/kg/min × 122 kg Intravenous Rate/Dose Change 3/8/25 4545)   fentaNYL 2500 mcg in 250 mL NS infusion (250 mcg/hr Intravenous  Rate/Dose Change 3/8/25 1952)   vancomycin 2500 mg/500 mL 0.9% NS IVPB ([facility]) (2,500 mg Intravenous New Bag 3/8/25 1948)   azithromycin (ZITHROMAX) 500 mg in sodium chloride 0.9 % 250 mL IVPB-VTB (500 mg Intravenous New Bag 3/8/25 1941)   ipratropium-albuterol (DUO-NEB) nebulizer solution 3 mL (3 mL Nebulization Given 3/8/25 1800)   magnesium sulfate 2g/50 mL (PREMIX) infusion (0 g Intravenous Stopped 3/8/25 1825)   methylPREDNISolone sodium succinate (SOLU-Medrol) 125 mg in sterile water (preservative free) 2 mL (125 mg Intravenous Given 3/8/25 1806)   sodium bicarbonate injection 8.4% 50 mEq (50 mEq Intravenous Given 3/8/25 1819)   ketamine (KETALAR) injection 120 mg (120 mg Intravenous Given 3/8/25 1822)   rocuronium (ZEMURON) injection 150 mg (150 mg Intravenous Given 3/8/25 1823)   cefepime 2000 mg IVPB in 100 mL NS (VTB) (0 mg Intravenous Stopped 3/8/25 1930)   acetaminophen (TYLENOL) suppository 650 mg (650 mg Rectal Given 3/8/25 1849)       ALLERGIES      Allergies   Allergen Reactions    Codeine Nausea Only    Metformin GI Intolerance     Extreme diarrhea       FAMILY HISTORY      Family History   Problem Relation Age of Onset    Cancer Mother     Hypertension Mother     Parkinsonism Father     Diabetes Father     Hypertension Father     Obesity Father     Cancer Maternal Aunt     Diabetes Paternal Aunt     Diabetes Paternal Grandmother     Cancer Maternal Aunt     Diabetes Brother     Obesity Brother        SOCIAL HISTORY      Social History     Socioeconomic History    Marital status:    Tobacco Use    Smoking status: Former     Current packs/day: 1.50     Average packs/day: 1.4 packs/day for 31.0 years (44.7 ttl pk-yrs)     Types: Cigarettes     Start date: 5/18/1995     Passive exposure: Current    Smokeless tobacco: Never    Tobacco comments:     Hasn't smoked in 4 days   Vaping Use    Vaping status: Former    Substances: Nicotine, Flavoring    Devices: Be At Oneble tank   Substance and Sexual  Activity    Alcohol use: Never    Drug use: Never    Sexual activity: Not Currently     Partners: Male     Birth control/protection: Condom       RESULTS:   - See lab and imaging In patient chart for this visit.    PROCEDURE:    Central Line Procedure Note    Time: 23:05 (estimate), please see nursing note for exact time.     Confirmed correct: Patient, procedure, side, site, time out    Consent: Emergent    Indication: Hemodynamically unstable and Venous Access    Monitoring: Cardiac, blood pressure, pulse oximetry    Location: Right Internal Jugular vein    Preparation: Sterile field, chlorhexidine, maximum barrier sterile technique  Local anesthesia: 3ml 1% lidocaine without epinephrine  Ultrasound guidance: Yes    Procedure: Central venous line, triple lumen 7 Japanese, Seldinger technique, attempts: 1, secured at 18 cm    Post procedure: Adequate blood return, adequate fluid flow, equal bilateral breath sounds, circulation/motor/sensory intact, biopatch and tegaderm applied  Patient tolerated: Well  Complications: None  Performed by: Erick Fraga DO        MDM:    I assumed care of the patient at 1900, agree with the history physical performed.  The patient is pending admission and further evaluation by PE protocol CT scan.  Patient does not have a PE, she does have multifocal pneumonia, remains intubated and sedated, Versed was added over the course of her time here in the ED with me secondary to some agitation.    I spoke with the hospitalist, kindly agreed to admit for further care and management.  Patient otherwise comfortable and stable for admission.  She is mildly hypotensive at 93/59 on my evaluation with a MAP of 70, I believe this is likely secondary to the patient's propofol, we will back off of the propofol just slightly.  She will receive a central line here in the ED.  She will be admitted to the hospitalist.      Clinical Impression:    1. Shortness of breath    2. COPD exacerbation    3. Sepsis,  due to unspecified organism, unspecified whether acute organ dysfunction present    4. Acute respiratory failure, unspecified whether with hypoxia or hypercapnia           Erick Fraga DO  03/08/25 2326      Electronically signed by Erick Fraga DO at 03/08/25 2326       Duran Ware RN at 03/08/25 1815          In room with provider and RRT at this time setting room up for intubation due to pts condition.    Electronically signed by Duran Ware RN at 03/08/25 1822       Harry Frey MD at 03/08/25 1743        Procedure Orders    1. Critical Care [[id number]] ordered by Harry Frey MD    2. Intubation [[id number]] ordered by Harry Frey MD                 Subjective   History of Present Illness  Pt is a 49F PMHx CHF, COPD, DM, HLD, HTN, JOANN presenting to ED with complaint of shortness of breath x 4-5 days. Subjective fevers and chills. Cough without sputum. Says has become very tired. Presented febrile, tachypnic, tachycardia, hypoxic. Required BIPAP and eventually emergent intubation for respiratory failure and work of breathing.    History provided by:  Patient   used: No        Review of Systems   Unable to perform ROS: Acuity of condition   Constitutional:  Positive for chills, fatigue and fever.   HENT:  Negative for congestion and rhinorrhea.    Respiratory:  Positive for cough and shortness of breath.    Cardiovascular:  Negative for chest pain and palpitations.   Gastrointestinal:  Negative for abdominal pain, diarrhea, nausea and vomiting.       Past Medical History:   Diagnosis Date    Arthritis     CHF (congestive heart failure)     COPD (chronic obstructive pulmonary disease)     Diabetes insipidus     Diabetes mellitus     Elevated cholesterol     GERD (gastroesophageal reflux disease)     Hyperlipemia     Hypertension     JOANN (obstructive sleep apnea)     Pneumonia     Type 2 diabetes mellitus 2013    Vulvar cancer        Allergies   Allergen Reactions     Codeine Nausea Only    Metformin GI Intolerance     Extreme diarrhea       Past Surgical History:   Procedure Laterality Date    HYSTERECTOMY      INCISION AND DRAINAGE TRUNK N/A 1/11/2022    Procedure: INCISION AND DRAINAGE BACK;  Surgeon: Tamera Saez MD;  Location: [facility];  Service: General;  Laterality: N/A;    MASTECTOMY      VULVA SURGERY      reconstruction due ca       Family History   Problem Relation Age of Onset    Cancer Mother     Hypertension Mother     Parkinsonism Father     Diabetes Father     Hypertension Father     Obesity Father     Cancer Maternal Aunt     Diabetes Paternal Aunt     Diabetes Paternal Grandmother     Cancer Maternal Aunt     Diabetes Brother     Obesity Brother        Social History     Socioeconomic History    Marital status:    Tobacco Use    Smoking status: Former     Current packs/day: 1.50     Average packs/day: 1.4 packs/day for 31.0 years (44.7 ttl pk-yrs)     Types: Cigarettes     Start date: 5/18/1995     Passive exposure: Current    Smokeless tobacco: Never    Tobacco comments:     Hasn't smoked in 4 days   Vaping Use    Vaping status: Former    Substances: Nicotine, Flavoring    Devices: Refillable tank   Substance and Sexual Activity    Alcohol use: Never    Drug use: Never    Sexual activity: Not Currently     Partners: Male     Birth control/protection: Condom           Objective   Physical Exam  Vitals and nursing note reviewed.   Constitutional:       General: She is in acute distress.      Appearance: She is well-developed. She is obese. She is ill-appearing, toxic-appearing and diaphoretic.   HENT:      Head: Normocephalic and atraumatic.      Mouth/Throat:      Mouth: Mucous membranes are moist.      Pharynx: Oropharynx is clear. No pharyngeal swelling or oropharyngeal exudate.   Eyes:      Extraocular Movements: Extraocular movements intact.      Pupils: Pupils are equal, round, and reactive to light.   Cardiovascular:      Rate and Rhythm:  Tachycardia present.   Pulmonary:      Effort: Tachypnea, accessory muscle usage and respiratory distress present.      Breath sounds: Examination of the right-upper field reveals wheezing and rhonchi. Examination of the left-upper field reveals wheezing and rhonchi. Examination of the right-middle field reveals wheezing and rhonchi. Examination of the left-middle field reveals wheezing and rhonchi. Examination of the right-lower field reveals wheezing and rhonchi. Examination of the left-lower field reveals wheezing and rhonchi. Wheezing and rhonchi present. No decreased breath sounds or rales.   Chest:      Chest wall: No mass, deformity, tenderness, crepitus or edema. There is no dullness to percussion.   Abdominal:      General: Bowel sounds are normal.      Palpations: Abdomen is soft.   Musculoskeletal:         General: Normal range of motion.      Cervical back: Normal range of motion and neck supple.      Right lower leg: No tenderness. No edema.      Left lower leg: No tenderness. No edema.   Lymphadenopathy:      Cervical: No cervical adenopathy.   Skin:     General: Skin is warm.      Capillary Refill: Capillary refill takes less than 2 seconds.      Coloration: Skin is not cyanotic or pale.      Findings: No ecchymosis, erythema or rash.      Nails: There is no clubbing.   Neurological:      General: No focal deficit present.      Mental Status: She is alert and oriented to person, place, and time.      Cranial Nerves: No cranial nerve deficit.      Motor: No weakness.      Comments: Somewhat sleepy and tired appearing         Critical Care    Performed by: Harry Frey MD  Authorized by: Erick Fraga DO    Critical care provider statement:     Critical care time (minutes):  55    Critical care time was exclusive of:  Separately billable procedures and treating other patients and teaching time    Critical care was necessary to treat or prevent imminent or life-threatening deterioration of the  following conditions:  Respiratory failure and sepsis    Critical care was time spent personally by me on the following activities:  Blood draw for specimens, development of treatment plan with patient or surrogate, evaluation of patient's response to treatment, examination of patient, ordering and performing treatments and interventions, ordering and review of laboratory studies, ordering and review of radiographic studies, pulse oximetry, re-evaluation of patient's condition and review of old charts    I assumed direction of critical care for this patient from another provider in my specialty: no      Care discussed with: admitting provider    Intubation    Date/Time: 3/8/2025 7:29 PM    Performed by: Harry Frey MD  Authorized by: Erick Fraga DO    Consent:     Consent obtained:  Emergent situation and verbal    Consent given by:  Patient    Risks discussed:  Aspiration, brain injury, dental trauma, laryngeal injury, pneumothorax, hypoxia, bleeding and death    Alternatives discussed:  No treatment, delayed treatment, alternative treatment and observation  Universal protocol:     Patient identity confirmed:  Arm band, hospital-assigned identification number and verbally with patient  Pre-procedure details:     Indications: respiratory distress and respiratory failure      Patient status:  Awake    Look externally: large tongue      Mouth opening - incisor distance:  2 finger widths    Hyoid-mental distance: 2 finger widths      Hyoid-thyroid distance: 1 finger width      Mallampati score:  III    Obstruction: none      Neck mobility: normal      Pharmacologic strategy: RSI      Induction agents:  Ketamine    Paralytics:  Rocuronium  Procedure details:     Preoxygenation:  BiLevel    CPR in progress: no      Number of attempts:  1  Successful intubation attempt details:     Intubation method:  Oral    Intubation technique: video assisted      Laryngoscope blade:  Hypercurved    Bougie used: no       Grade view: III      Tube size (mm):  7.5    Tube type:  Cuffed    Tube visualized through cords: yes    Placement assessment:     ETT at teeth/gumline (cm):  24    Tube secured with:  ETT berger    Breath sounds:  Equal and absent over the epigastrium    Placement verification: chest rise, colorimetric ETCO2, CXR verification, direct visualization, endoscopic, equal breath sounds, esophageal detector and tube exhalation      CXR findings:  High    Tube repositioned: yes    Post-procedure details:     Procedure completion:  Tolerated well, no immediate complications            ED Course  ED Course as of 03/08/25 2120   Sat Mar 08, 2025   1843 BP: 127/74 [CAROLINA]   1844 Temp(!): 102.8 °F (39.3 °C) [CAROLINA]   1844 Heart Rate(!): 122 [CAROLINA]   1844 Resp: 20 [CAROLINA]   1844 SpO2: 94 % [CAROLINA]   1844 Device (Oxygen Therapy): ventilator [CAROLINA]   1844 Blood Gas, Arterial With Co-Ox(!) [CAROLINA]   1844 pH, Arterial(!): 7.486 [CAROLINA]   1844 pCO2, Arterial: 39.4 [CAROLINA]   1844 pO2, Arterial(!): 58.3 [CAROLINA]   1844 HCO3, Arterial(!): 29.7 [CAROLINA]   1844 Base Excess(!): 5.9 [CAROLINA]   1844 WBC(!): 19.83 [CAROLINA]   1845 Neutrophils Absolute(!): 18.03 [CAROLINA]   1845 Magnesium(!): 1.4 [CAROLINA]      ED Course User Index  [CAROLINA] Harry Frey MD                                                       Medical Decision Making  MDM:  Pt is a 49F PMHx as above presenting to the Ed with complaint of shortness of breath. Suspect sepsis based on febrile vitals and additional vital signs. Attempted to bridge or spare patient intubation with BIPAP but was unable to titrate appropriately to patient's respiratory distress. Used for preoxygenation. RSI with ketamine and rocuronium  with success. Tylenol suppository for antipyetics. Will give small bolus of fluids to start seeing as patient has CHF. Will start ABX broadly and for respiratory source. Will obtain additional gas, CTPE, CXR, place OG tube and collins. Will rule out ACS, PE, PNA, PTX, volume overload. Low concern for aortic dissection  "without widened mediastinum on CXR. Anticipate admission to ICU once workup complete.    ---------------------------------------------------------------------              03/08/25 03/08/25 03/08/25 03/08/25                 1825        1832            1900            1904     ---------------------------------------------------------------------   BP:                                     154/95                     BP Location:                                                           Patient Position:                                                           Pulse:                 (!) 122         (!) 138                     Resp:                     20                                       Temp:                           (!) 101.8 °F (38.8 °C)             TempSrc:                               Axillary                    SpO2:                    94%           (!) 89%           91%       Weight:                                                            Height: 165.1 cm (65\")                                            ---------------------------------------------------------------------      Harry Frey MD  Emergency Medicine      Problems Addressed:  Acute respiratory failure, unspecified whether with hypoxia or hypercapnia: complicated acute illness or injury  COPD exacerbation: complicated acute illness or injury  Sepsis, due to unspecified organism, unspecified whether acute organ dysfunction present: complicated acute illness or injury  Shortness of breath: complicated acute illness or injury    Amount and/or Complexity of Data Reviewed  Labs: ordered. Decision-making details documented in ED Course.  Radiology: ordered.  ECG/medicine tests: ordered.    Risk  OTC drugs.  Prescription drug management.        Final diagnoses:   Shortness of breath   COPD exacerbation   Sepsis, due to unspecified organism, unspecified " whether acute organ dysfunction present   Acute respiratory failure, unspecified whether with hypoxia or hypercapnia       ED Disposition  ED Disposition       ED Disposition   Intended Admit    Condition   --    Comment   --               No follow-up provider specified.       Medication List      No changes were made to your prescriptions during this visit.            Harry Frey MD  03/08/25 2120       Harry Frey MD  03/08/25 2120      Electronically signed by Harry Frey MD at 03/08/25 2120       Facility-Administered Medications as of 3/10/2025   Medication Dose Route Frequency Provider Last Rate Last Admin    [COMPLETED] acetaminophen (TYLENOL) suppository 650 mg  650 mg Rectal Once Harry Frey MD   650 mg at 03/08/25 1849    albuterol (PROVENTIL) nebulizer solution 0.083% 2.5 mg/3mL  2.5 mg Nebulization Q4H PRN Rosa Mishra DO        atorvastatin (LIPITOR) tablet 40 mg  40 mg Oral Daily Rosa Mishra DO        [COMPLETED] azithromycin (ZITHROMAX) 500 mg in sodium chloride 0.9 % 250 mL IVPB-VTB  500 mg Intravenous Once Harry Frey MD   Stopped at 03/08/25 2041    azithromycin (ZITHROMAX) 500 mg in sodium chloride 0.9 % 250 mL IVPB-VTB  500 mg Intravenous Q24H Rosa Mishra DO   500 mg at 03/09/25 1854    sennosides-docusate (PERICOLACE) 8.6-50 MG per tablet 2 tablet  2 tablet Oral BID Rosa Mishra DO   2 tablet at 03/09/25 2017    And    polyethylene glycol (MIRALAX) packet 17 g  17 g Oral Daily PRN Rosa Mishra DO        And    bisacodyl (DULCOLAX) EC tablet 5 mg  5 mg Oral Daily PRN Rosa Mishra DO        And    bisacodyl (DULCOLAX) suppository 10 mg  10 mg Rectal Daily PRN Rosa Mishra DO        Calcium Replacement - Follow Nurse / BPA Driven Protocol   Not Applicable PRN Rosa Mishra DO        [COMPLETED] cefepime 2000 mg IVPB in 100 mL NS (VTB)  2,000 mg Intravenous Once Harry Frey MD   Stopped at  03/08/25 1930    cefepime 2000 mg IVPB in 100 mL NS (VTB)  2,000 mg Intravenous Q8H Rosa Mishra DO   2,000 mg at 03/10/25 0313    cetirizine (zyrTEC) tablet 10 mg  10 mg Oral Daily Rosa Mishra DO        cholecalciferol (VITAMIN D3) tablet 400 Units  400 Units Oral Daily Rosa Mishra DO        clonazePAM (KlonoPIN) tablet 1 mg  1 mg Oral 4x Daily PRN Rosa Mishra DO        cyclobenzaprine (FLEXERIL) tablet 5 mg  5 mg Oral TID PRN Rosa Mishra DO        DEXMEDETOMIDINE 1000 MCG/250ML INFUSION infusion  0.2-1.5 mcg/kg/hr (Dosing Weight) Intravenous Titrated Louis Wynn DO 17.6 mL/hr at 03/10/25 0527 0.6 mcg/kg/hr at 03/10/25 0527    dextrose (D50W) (25 g/50 mL) IV injection 10-50 mL  10-50 mL Intravenous Q15 Min PRN Rosa Mishra DO        dextrose (GLUTOSE) oral gel 15 g  15 g Oral Q15 Min PRN Rosa Mishra DO        dicyclomine (BENTYL) capsule 10 mg  10 mg Oral 4x Daily PRN Rosa Mishra DO        enoxaparin sodium (LOVENOX) syringe 40 mg  40 mg Subcutaneous Q12H Rosa Mishra DO   40 mg at 03/10/25 0129    famotidine (PEPCID) injection 20 mg  20 mg Intravenous BID Rosa Mishra DO   20 mg at 03/09/25 2018    fentaNYL 2500 mcg in 250 mL NS infusion   mcg/hr Intravenous Titrated Rosa Mishra DO 20 mL/hr at 03/09/25 1623 200 mcg/hr at 03/09/25 1623    fluticasone (FLONASE) 50 MCG/ACT nasal spray 2 spray  2 spray Nasal Daily PRN Rosa Mishra DO        gabapentin (NEURONTIN) capsule 800 mg  800 mg Oral Q8H Rosa Mishra DO   800 mg at 03/10/25 0518    glucagon HCl (Diagnostic) injection 1 mg  1 mg Intramuscular Q15 Min PRN Rosa Mishra DO        insulin regular 1 unit/mL in 0.9% sodium chloride (Glucommander)  0-100 Units/hr Intravenous Titrated Rosa Mishra DO 3.8 mL/hr at 03/10/25 0619 3.8 Units/hr at 03/10/25 0619    [COMPLETED] iopamidol (ISOVUE-370) 76 % injection 100 mL  100  mL Intravenous Once in imaging Erick Fraga, DO   85 mL at 25 2035    [COMPLETED] ipratropium-albuterol (DUO-NEB) nebulizer solution 3 mL  3 mL Nebulization Once Harry Frey MD   3 mL at 25 1800    ipratropium-albuterol (DUO-NEB) nebulizer solution 3 mL  3 mL Nebulization Q4H - RT Rosa Mishra DO   3 mL at 03/10/25 0624    [COMPLETED] ketamine (KETALAR) injection 120 mg  120 mg Intravenous Once Harry Frey MD   120 mg at 25 0725    [COMPLETED] lactated ringers bolus 1,000 mL  1,000 mL Intravenous Once Rosa Mishra DO 1,000 mL/hr at 25 0301 1,000 mL at 25 0301    [] lactated ringers infusion  100 mL/hr Intravenous Continuous Rosa Mishra DO   Stopped at 25 1423    Lidocaine 4 % 1 patch  1 patch Transdermal Q24H Rosa Mishra DO        Magnesium Standard Dose Replacement - Follow Nurse / BPA Driven Protocol   Not Applicable PRN Rosa Mishra DO        [COMPLETED] magnesium sulfate 2g/50 mL (PREMIX) infusion  2 g Intravenous Once Harry Frey MD   Stopped at 25 1825    [COMPLETED] magnesium sulfate 2g/50 mL (PREMIX) infusion  2 g Intravenous Q2H Rosa Mishra DO   2 g at 25 0709    [COMPLETED] methylPREDNISolone sodium succinate (SOLU-Medrol) 125 mg in sterile water (preservative free) 2 mL  125 mg Intravenous Once Harry Frey MD   125 mg at 25 1806    methylPREDNISolone sodium succinate (SOLU-Medrol) 40 mg in sterile water (preservative free) 1 mL  40 mg Intravenous Q12H Rosa Mishra DO   40 mg at 03/10/25 0518    midazolam (VERSED) injection 2 mg  2 mg Intravenous Q4H PRN Rosa Mishra DO        mupirocin (BACTROBAN) 2 % nasal ointment 1 Application  1 Application Each Nare BID Rosa Mishra DO   1 Application at 25 2018    nicotine (NICODERM CQ) 14 MG/24HR patch 1 patch  1 patch Transdermal Q24H Rosa Mishra, DO   1 patch at 03/10/25 0016     nicotine polacrilex (NICORETTE) gum 4 mg  4 mg Mouth/Throat PRN Rosa Mishra DO        nitroglycerin (NITROSTAT) SL tablet 0.4 mg  0.4 mg Sublingual Q5 Min PRN Rosa Mishra DO        Pharmacy to dose vancomycin   Not Applicable Continuous PRN Rosa Mishra DO        Phosphorus Replacement - Follow Nurse / BPA Driven Protocol   Not Applicable PRN Rosa Mishra DO        [COMPLETED] potassium chloride (KLOR-CON) packet 40 mEq  40 mEq Oral Q4H Rosa Mishra DO   40 mEq at 03/09/25 0619    [COMPLETED] potassium chloride (KLOR-CON) packet 40 mEq  40 mEq Oral Q4H Louis Wynn DO   40 mEq at 03/09/25 1854    Potassium Replacement - Follow Nurse / BPA Driven Protocol   Not Applicable PRN Rosa Mishra DO        propofol (DIPRIVAN) infusion 10 mg/mL 100 mL  5-50 mcg/kg/min Intravenous Titrated Rosa Mishra DO 14.64 mL/hr at 03/10/25 0529 20 mcg/kg/min at 03/10/25 0529    [COMPLETED] rocuronium (ZEMURON) injection 150 mg  150 mg Intravenous Once Harry Frey MD   150 mg at 03/08/25 1823    [COMPLETED] sodium bicarbonate injection 8.4% 50 mEq  50 mEq Intravenous Once Harry Frey MD   50 mEq at 03/09/25 0727    sodium chloride 0.9 % flush 10 mL  10 mL Intravenous PRN Rosa Mishra DO        sodium chloride 0.9 % flush 10 mL  10 mL Intravenous Q12H Rosa Mishra DO   10 mL at 03/09/25 2018    sodium chloride 0.9 % flush 10 mL  10 mL Intravenous PRN Rosa Mishra DO        sodium chloride 0.9 % flush 10 mL  10 mL Intravenous Q12H Rosa Mishra DO   10 mL at 03/09/25 2018    sodium chloride 0.9 % flush 10 mL  10 mL Intravenous Q12H Rosa Mishra DO   10 mL at 03/09/25 2018    sodium chloride 0.9 % flush 10 mL  10 mL Intravenous Q12H Rosa Mishra DO   10 mL at 03/09/25 2018    sodium chloride 0.9 % flush 10 mL  10 mL Intravenous PRN Rosa Mihsra DO        sodium chloride 0.9 % flush 10 mL  10 mL  Intravenous Q12H Rosa Mishra DO   10 mL at 03/09/25 2018    sodium chloride 0.9 % flush 10 mL  10 mL Intravenous PRN Rosa Mishra DO        sodium chloride 0.9 % flush 20 mL  20 mL Intravenous PRN Rosa Mishra,         sodium chloride 0.9 % infusion 40 mL  40 mL Intravenous PRN Rosa Mishra,         sodium chloride 0.9 % infusion 40 mL  40 mL Intravenous PRN Rosa Mishra, DO        sodium chloride 0.9 % infusion 40 mL  40 mL Intravenous PRN Rosa Mishra DO        [COMPLETED] vancomycin 2500 mg/500 mL 0.9% NS IVPB ([facility])  20 mg/kg Intravenous Once Harry Frey MD   Stopped at 03/08/25 2218    [COMPLETED] vecuronium (NORCURON) injection 5 mg  5 mg Intravenous Once Rosa Mishra DO   5 mg at 03/09/25 0329     Orders (all)        Start     Ordered    03/10/25 0900  cetirizine (zyrTEC) tablet 10 mg  Daily         03/09/25 2220    03/10/25 0900  cholecalciferol (VITAMIN D3) tablet 400 Units  Daily         03/09/25 2220    03/10/25 0900  atorvastatin (LIPITOR) tablet 40 mg  Daily         03/09/25 2220    03/10/25 0702  Inpatient Pulmonology Consult  [place]         Specialty:  Pulmonary Disease  Provider:  (Not yet assigned)    03/09/25 1409    03/10/25 0626  POC Glucose Once  PROCEDURE ONCE        Comments: Complete no more than 45 minutes prior to patient eating      03/10/25 0618    03/10/25 0600  Magnesium  Morning Draw         03/09/25 0121    03/10/25 0600  Daily CHG Bath While Central Line in Place  Daily       03/09/25 0610    03/10/25 0600  CBC & Differential  Morning Draw         03/09/25 1409    03/10/25 0600  Comprehensive Metabolic Panel  Morning Draw         03/09/25 1409    03/10/25 0600  CBC Auto Differential  PROCEDURE ONCE         03/09/25 2202    03/10/25 0523  POC Glucose Once  PROCEDURE ONCE        Comments: Complete no more than 45 minutes prior to patient eating      03/10/25 0516    03/10/25 0442  Respiratory communication  Once         Comments: Please decrease PEEP to 10 for now and titrate for O2 >90%; may decrease by 2-3 cm for goal peep 5-8 cm.    03/10/25 0441    03/10/25 0430  POC Glucose Once  PROCEDURE ONCE        Comments: Complete no more than 45 minutes prior to patient eating      03/10/25 0413    03/10/25 0400  Blood Gas, Arterial With Co-Ox  PROCEDURE ONCE         03/10/25 0357    03/10/25 0315  POC Glucose Once  PROCEDURE ONCE        Comments: Complete no more than 45 minutes prior to patient eating      03/10/25 0308    03/10/25 0222  POC Glucose Once  PROCEDURE ONCE        Comments: Complete no more than 45 minutes prior to patient eating      03/10/25 0205    03/10/25 0119  POC Glucose Once  PROCEDURE ONCE        Comments: Complete no more than 45 minutes prior to patient eating      03/10/25 0102    03/10/25 0055  POC Glucose Once  PROCEDURE ONCE        Comments: Complete no more than 45 minutes prior to patient eating      03/09/25 2156    03/10/25 0055  POC Glucose Once  PROCEDURE ONCE        Comments: Complete no more than 45 minutes prior to patient eating      03/09/25 2259    03/10/25 0055  POC Glucose Once  PROCEDURE ONCE        Comments: Complete no more than 45 minutes prior to patient eating      03/10/25 0000    03/09/25 2315  nicotine (NICODERM CQ) 14 MG/24HR patch 1 patch  Every 24 Hours         03/09/25 2220 03/09/25 2315  Lidocaine 4 % 1 patch  Every 24 Hours         03/09/25 2220 03/09/25 2315  gabapentin (NEURONTIN) capsule 800 mg  Every 8 Hours Scheduled         03/09/25 2220 03/09/25 2304  Potassium  Timed         03/09/25 1404    03/09/25 2219  dicyclomine (BENTYL) capsule 10 mg  4 Times Daily PRN         03/09/25 2220 03/09/25 2219  nicotine polacrilex (NICORETTE) gum 4 mg  As Needed         03/09/25 2220 03/09/25 2219  albuterol (PROVENTIL) nebulizer solution 0.083% 2.5 mg/3mL  Every 4 Hours PRN         03/09/25 2220 03/09/25 2219  fluticasone (FLONASE) 50 MCG/ACT nasal spray 2 spray   Daily PRN         03/09/25 2220    03/09/25 2219  clonazePAM (KlonoPIN) tablet 1 mg  4 Times Daily PRN         03/09/25 2220    03/09/25 2219  cyclobenzaprine (FLEXERIL) tablet 5 mg  3 Times Daily PRN         03/09/25 2220    03/09/25 2109  POC Glucose Once  PROCEDURE ONCE        Comments: Complete no more than 45 minutes prior to patient eating      03/09/25 2053    03/09/25 2007  POC Glucose Once  PROCEDURE ONCE        Comments: Complete no more than 45 minutes prior to patient eating      03/09/25 1950    03/09/25 1930  azithromycin (ZITHROMAX) 500 mg in sodium chloride 0.9 % 250 mL IVPB-VTB  Every 24 Hours         03/09/25 0057    03/09/25 1915  DEXMEDETOMIDINE 1000 MCG/250ML INFUSION infusion  Titrated         03/09/25 1817    03/09/25 1903  POC Glucose Once  PROCEDURE ONCE        Comments: Complete no more than 45 minutes prior to patient eating      03/09/25 1846    03/09/25 1802  POC Glucose Once  PROCEDURE ONCE        Comments: Complete no more than 45 minutes prior to patient eating      03/09/25 1744    03/09/25 1704  POC Glucose Once  PROCEDURE ONCE        Comments: Complete no more than 45 minutes prior to patient eating      03/09/25 1641    03/09/25 1654  Tube Feeding: Formula: Peptamen Intense VHP (Vital HP); Feeding Type: Continuous; Start at: 10 mL/hr; Then Advance By: 10 mL/hr; Every: 6 hours; To Goal Rate of: 30 mL/hr; Water Flush: 10 mL; Every: 1 hour; Water Bolus: None  Diet Effective Now         03/09/25 1654    03/09/25 1554  POC Glucose Once  PROCEDURE ONCE        Comments: Complete no more than 45 minutes prior to patient eating      03/09/25 1537    03/09/25 1500  potassium chloride (KLOR-CON) packet 40 mEq  Every 4 Hours         03/09/25 1404    03/09/25 1451  POC Glucose Once  PROCEDURE ONCE        Comments: Complete no more than 45 minutes prior to patient eating      03/09/25 1434    03/09/25 1337  POC Glucose Once  PROCEDURE ONCE        Comments: Complete no more than 45 minutes prior  "to patient eating      03/09/25 1330    03/09/25 1243  POC Glucose Once  PROCEDURE ONCE        Comments: Complete no more than 45 minutes prior to patient eating      03/09/25 1226    03/09/25 1226  Respiratory Culture - Sputum, ET Suction  Once         03/09/25 0057    03/09/25 1145  POC Glucose Once  PROCEDURE ONCE        Comments: Complete no more than 45 minutes prior to patient eating      03/09/25 1129    03/09/25 1041  POC Glucose Once  PROCEDURE ONCE        Comments: Complete no more than 45 minutes prior to patient eating      03/09/25 1024    03/09/25 1021  Potassium  Timed,   Status:  Canceled         03/09/25 0121    03/09/25 0940  POC Glucose Once  PROCEDURE ONCE        Comments: Complete no more than 45 minutes prior to patient eating      03/09/25 0923    03/09/25 0925  Inpatient Nutrition Consult  Once        Provider:  (Not yet assigned)    03/09/25 0924    03/09/25 0900  sennosides-docusate (PERICOLACE) 8.6-50 MG per tablet 2 tablet  2 Times Daily        Placed in \"And\" Linked Group    03/09/25 0057    03/09/25 0900  famotidine (PEPCID) injection 20 mg  2 Times Daily         03/09/25 0057    03/09/25 0900  sodium chloride 0.9 % flush 10 mL  Every 12 Hours Scheduled         03/09/25 0610    03/09/25 0900  sodium chloride 0.9 % flush 10 mL  Every 12 Hours Scheduled         03/09/25 0610    03/09/25 0900  sodium chloride 0.9 % flush 10 mL  Every 12 Hours Scheduled         03/09/25 0610    03/09/25 0900  sodium chloride 0.9 % flush 10 mL  Every 12 Hours Scheduled         03/09/25 0610    03/09/25 0839  POC Glucose Once  PROCEDURE ONCE        Comments: Complete no more than 45 minutes prior to patient eating      03/09/25 0823    03/09/25 0800  vancomycin 1000 mg/250 mL 0.9% NaCl IVPB  Every 12 Hours,   Status:  Discontinued         03/09/25 0355    03/09/25 0740  CBC & Differential  Morning Draw         03/09/25 0740    03/09/25 0740  Comprehensive Metabolic Panel  Morning Draw         03/09/25 0740    " "03/09/25 0740  CBC Auto Differential  PROCEDURE ONCE         03/09/25 0740    03/09/25 0732  POC Glucose Once  PROCEDURE ONCE        Comments: Complete no more than 45 minutes prior to patient eating      03/09/25 0723    03/09/25 0625  POC Glucose Once  PROCEDURE ONCE        Comments: Complete no more than 45 minutes prior to patient eating      03/09/25 0617    03/09/25 0613  Continue Indwelling Urinary Catheter  Once        Placed in \"And\" Linked Group    03/09/25 0612    03/09/25 0613  Assess Need for Indwelling Urinary Catheter - Follow Removal Protocol  Continuous        Comments: Follow Protocol As Outlined in Process Instructions (Open Order Report to View Full Instructions)   Placed in \"And\" Linked Group    03/09/25 0612    03/09/25 0613  Urinary Catheter Care  Every Shift      Placed in \"And\" Linked Group    03/09/25 0612    03/09/25 0611  Deep Tissue Injury Care Q12H  Every 12 Hours        Comments: - Gently Cleanse With Normal Saline  - Cover With Silicone Border Dressing (If Indicated)  - For Frequent Incontinence - Apply Skin Protective Barrier Cream Rather Than Silicone Border Dressing    03/09/25 0610    03/09/25 0611  Connectors / Hubs Must Be Scrubbed 15 Seconds Using 70% Alcohol Before Access - Allow to Dry Before Accessing Line  Continuous         03/09/25 0610    03/09/25 0611  Change Transparent Dressing & Needleless Connectors Every 7 Days  Weekly        Comments: Per [facility] Policy    03/09/25 0610    03/09/25 0610  sodium chloride 0.9 % flush 10 mL  As Needed         03/09/25 0610    03/09/25 0610  sodium chloride 0.9 % infusion 40 mL  As Needed         03/09/25 0610    03/09/25 0610  sodium chloride 0.9 % flush 10 mL  As Needed         03/09/25 0610    03/09/25 0610  sodium chloride 0.9 % flush 20 mL  As Needed         03/09/25 0610    03/09/25 0610  sodium chloride 0.9 % infusion 40 mL  As Needed         03/09/25 0610    03/09/25 0609  Follow Pressure Injury Prevention Measures Policy  " Continuous        Comments: Implement Appropriate Pressure Injury Prevention Measures  - Open Order Report to View Full Instructions  Enter Wound LDA & Document Assessment  Add Wound Care Plan  Add Patient Education Per Policy    03/09/25 0610    03/09/25 0609  Turn Patient  Now Then Every 2 Hours         03/09/25 0610    03/09/25 0609  Elevate Heels Off of Bed  Until Discontinued         03/09/25 0610    03/09/25 0609  Silicone Border Dressing to Bony Prominences  Every Shift       03/09/25 0610    03/09/25 0609  Do NOT Rub or Massage Any Bony Prominence  Continuous         03/09/25 0610    03/09/25 0609  Follow Pressure Ulcer Prevention Measures Policy  Continuous        Comments: Implement Appropriate Pressure Injury Prevention Measures  - Open Order Report to View Full Instructions  Enter Wound LDA & Document Assessment  Add Wound Care Plan  Add Patient Education Per Policy    03/09/25 0610    03/09/25 0609  Notify Provider of Deep Tissue Injury  Per Order Details        Comments: Open Order Report to View Parameters Requiring Provider Notification    03/09/25 0610    03/09/25 0609  Wound Ostomy Eval & Treat  Once         03/09/25 0610    03/09/25 0600  Incentive Spirometry  Every 4 Hours While Awake       03/09/25 0057    03/09/25 0600  Blood Gas, Arterial -With Co-Ox Panel: Yes  Daily      Comments: While On Ventilator      03/09/25 0057    03/09/25 0600  methylPREDNISolone sodium succinate (SOLU-Medrol) 40 mg in sterile water (preservative free) 1 mL  Every 12 Hours         03/09/25 0057    03/09/25 0527  POC Glucose Once  PROCEDURE ONCE        Comments: Complete no more than 45 minutes prior to patient eating      03/09/25 0515    03/09/25 0440  Beta-Hydroxybutyrate  Once         03/09/25 0439    03/09/25 0424  POC Glucose Once  PROCEDURE ONCE        Comments: Complete no more than 45 minutes prior to patient eating      03/09/25 0412    03/09/25 0408  Blood Gas, Arterial With Co-Ox  PROCEDURE ONCE          03/09/25 0405    03/09/25 0400  Oral Care & Teeth Brushing - Intubated Patient  Every 4 Hours      Comments: [place] Teeth at Least 2x/day    03/09/25 0057    03/09/25 0345  lactated ringers bolus 1,000 mL  Once         03/09/25 0148    03/09/25 0330  cefepime 2000 mg IVPB in 100 mL NS (VTB)  Every 8 Hours         03/09/25 0057    03/09/25 0321  POC Glucose Once  PROCEDURE ONCE        Comments: Complete no more than 45 minutes prior to patient eating      03/09/25 0207    03/09/25 0315  midazolam (VERSED) injection 2 mg  Every 4 Hours PRN         03/09/25 0315    03/09/25 0315  magnesium sulfate 2g/50 mL (PREMIX) infusion  Every 2 Hours         03/09/25 0121    03/09/25 0315  potassium chloride (KLOR-CON) packet 40 mEq  Every 4 Hours         03/09/25 0121    03/09/25 0300  ipratropium-albuterol (DUO-NEB) nebulizer solution 3 mL  Every 4 Hours - RT         03/09/25 0057    03/09/25 0159  POC Glucose Once  PROCEDURE ONCE        Comments: Complete no more than 45 minutes prior to patient eating      03/09/25 0152    03/09/25 0145  insulin regular 1 unit/mL in 0.9% sodium chloride (Glucommander)  Titrated        Note to Pharmacy: Upon initiation of Glucommander insulin drip, make sure all other insulin orders and anti-diabetic medications have been discontinued.    03/09/25 0057 03/09/25 0145  sodium chloride 0.9 % flush 10 mL  Every 12 Hours Scheduled         03/09/25 0057    03/09/25 0145  mupirocin (BACTROBAN) 2 % nasal ointment 1 Application  2 Times Daily         03/09/25 0057    03/09/25 0145  cefepime 2000 mg IVPB in 100 mL NS (VTB)  Once,   Status:  Discontinued         03/09/25 0057    03/09/25 0145  lactated ringers infusion  Continuous         03/09/25 0057    03/09/25 0145  enoxaparin sodium (LOVENOX) syringe 40 mg  Every 12 Hours         03/09/25 0057    03/09/25 0101  Fentanyl, Urine - Indwelling Urethral Catheter  Once         03/09/25 0100    03/09/25 0100  Vital Signs Every Hour and Per Hospital Policy  Based on Patient Condition  Every Hour       03/09/25 0057    03/09/25 0100  Intake & Output  Every Hour       03/09/25 0057    03/09/25 0058  NPO Diet NPO Type: Strict NPO  Diet Effective Now         03/09/25 0057    03/09/25 0058  Utilize the Start Meal Feature / Meal Bolus Feature in Glucommander if Patient Starts a Diet or Bolus Tube Feedings  Until Discontinued         03/09/25 0057    03/09/25 0058  Notify Provider - Insulin Infusion  Continuous        Comments: Open Order Report to View Parameters Requiring Provider Notification    03/09/25 0057    03/09/25 0058  RN to Release PRN POC Glucose Orders Per Glucommander  Continuous        Comments: Glucommander Recommended POC Glucose Testing Will Vary Between Every 15 Minutes & Every 2 Hours   Release PRN POC Glucose Orders as Needed    03/09/25 0057    03/09/25 0058  RN to Order STAT Glucose For Any POC Glucose <10 or >600  Continuous        Comments: Do Not Delay Treatment of Unstable Patient to Obtain Glucose Sample  Inform Provider of Results    03/09/25 0057    03/09/25 0058  Nursing Dysphagia Screening (Complete Prior to Giving Anything By Mouth)  Once        Comments: Use Dysphagia Screen for Pneumonia    03/09/25 0057    03/09/25 0058  RN to Place Order SLP Consult - Eval & Treat Choosing Reason of RN Dysphagia Screen Failed  Per Order Details        Comments: RN to Place Order SLP Consult - Eval & Treat Choosing Reason of RN Dysphagia Screen Failed    03/09/25 0057    03/09/25 0058  Nurse to Call MD or Nutrition Services for Diet if Patient Passes Dysphagia Screen  Once         03/09/25 0057    03/09/25 0058  Continuous Cardiac Monitoring  Continuous        Comments: Follow Standing Orders As Outlined in Process Instructions (Open Order Report to View Full Instructions)    03/09/25 0057    03/09/25 0058  Maintain IV Access  Continuous,   Status:  Canceled         03/09/25 0057    03/09/25 0058  Telemetry - Place Orders & Notify Provider of Results When  Patient Experiences Acute Chest Pain, Dysrhythmia or Respiratory Distress  Continuous        Comments: Open Order Report to View Parameters Requiring Provider Notification    03/09/25 0057    03/09/25 0058  Continuous Pulse Oximetry  Continuous         03/09/25 0057    03/09/25 0058  Height & Weight  Once         03/09/25 0057    03/09/25 0058  Daily Weights  Daily       03/09/25 0057    03/09/25 0058  Target Arousal Level RASS 0 to -2  Continuous,   Status:  Canceled         03/09/25 0057    03/09/25 0058  Use Mobility Guidelines for Advancement of Activity  Continuous         03/09/25 0057    03/09/25 0058  Insert Peripheral IV  Once         03/09/25 0057    03/09/25 0058  Saline Lock & Maintain IV Access  Continuous         03/09/25 0057    03/09/25 0058  Daily CHG Bath While in Critical Care  Daily      Comments: Use for admission bath and length of critical care stay.    03/09/25 0057    03/09/25 0058  Elevate HOB  Continuous         03/09/25 0057    03/09/25 0058  Subglottic Suctioning Must Be Done Every 6 Hours  Per Order Details        Comments: At Least Every 6 Hours    03/09/25 0057    03/09/25 0058  Target Arousal Level RASS 0 to -2  Continuous         03/09/25 0057    03/09/25 0058  RN May Place Order For ABG As Needed for Respiratory Distress  Continuous         03/09/25 0057    03/09/25 0058  S. Pneumo Ag Urine or CSF - Urine, Urine, Clean Catch  Once         03/09/25 0057    03/09/25 0058  MRSA Screen, PCR (Inpatient) - Swab, Nares  Once         03/09/25 0057    03/09/25 0058  Legionella Antigen, Urine - Urine, Indwelling Urethral Catheter  Once         03/09/25 0057    03/09/25 0058  Urine Drug Screen - Indwelling Urethral Catheter  Once         03/09/25 0057    03/09/25 0058  Inpatient COPD Navigator Consult  Once        Provider:  (Not yet assigned)    03/09/25 0057    03/09/25 0057  dextrose (GLUTOSE) oral gel 15 g  Every 15 Minutes PRN         03/09/25 0057    03/09/25 0057  dextrose (D50W) (25  "g/50 mL) IV injection 10-50 mL  Every 15 Minutes PRN         03/09/25 0057    03/09/25 0057  glucagon HCl (Diagnostic) injection 1 mg  Every 15 Minutes PRN         03/09/25 0057    03/09/25 0057  nitroglycerin (NITROSTAT) SL tablet 0.4 mg  Every 5 Minutes PRN         03/09/25 0057    03/09/25 0057  Oral Care - Patient Not on NPPV & Not Intubated  Every Shift       03/09/25 0057    03/09/25 0057  sodium chloride 0.9 % flush 10 mL  As Needed         03/09/25 0057    03/09/25 0057  sodium chloride 0.9 % infusion 40 mL  As Needed         03/09/25 0057    03/09/25 0057  polyethylene glycol (MIRALAX) packet 17 g  Daily PRN        Placed in \"And\" Linked Group    03/09/25 0057    03/09/25 0057  bisacodyl (DULCOLAX) EC tablet 5 mg  Daily PRN        Placed in \"And\" Linked Group    03/09/25 0057    03/09/25 0057  bisacodyl (DULCOLAX) suppository 10 mg  Daily PRN        Placed in \"And\" Linked Group    03/09/25 0057    03/09/25 0057  Pharmacy to dose vancomycin  Continuous PRN         03/09/25 0057    03/09/25 0057  Potassium Replacement - Follow Nurse / BPA Driven Protocol  As Needed         03/09/25 0057    03/09/25 0057  Magnesium Standard Dose Replacement - Follow Nurse / BPA Driven Protocol  As Needed         03/09/25 0057    03/09/25 0057  Phosphorus Replacement - Follow Nurse / BPA Driven Protocol  As Needed         03/09/25 0057    03/09/25 0057  Calcium Replacement - Follow Nurse / BPA Driven Protocol  As Needed         03/09/25 0057    03/09/25 0044  vecuronium (NORCURON) injection 5 mg  Once         03/09/25 0028    03/08/25 2359  Inpatient Admission  Once         03/09/25 0003    03/08/25 2359  Code Status and Medical Interventions: CPR (Attempt to Resuscitate); Full Support  Continuous         03/09/25 0003    03/08/25 2324  XR Chest 1 View  1 Time Imaging         03/08/25 2323    03/08/25 2258  Inpatient Admission  Once,   Status:  Canceled         03/08/25 2258 03/08/25 2121  Sepsis Fluid Exclusion: Concern For " Fluid Overload; Bolus Volume Given / Ordered (mL): 1,000  Once        Comments: The patient was ordered 1000 of fluids.    03/08/25 2120 03/08/25 2051  iopamidol (ISOVUE-370) 76 % injection 100 mL  Once in Imaging         03/08/25 2035 03/08/25 2019  midazolam (VERSED) 100 mg in 100mL NS infusion  Titrated,   Status:  Discontinued         03/08/25 2003 03/08/25 2004  Target Arousal Level RASS -1 to -2  Continuous,   Status:  Canceled         03/08/25 2003 03/08/25 1939  Blood Gas, Arterial With Co-Ox  PROCEDURE ONCE         03/08/25 1938 03/08/25 1930  Intubation  Once        Comments: This order was created via procedure documentation    03/08/25 1929 03/08/25 1929  Critical Care  Once        Comments: This order was created via procedure documentation    03/08/25 1928 03/08/25 1929  Blood Gas, Arterial -With Co-Ox Panel: Yes  Once         03/08/25 1929 03/08/25 1908  Urinalysis, Microscopic Only - Indwelling Urethral Catheter  Once         03/08/25 1907    03/08/25 1856  High Sensitivity Troponin T 1Hr  PROCEDURE ONCE         03/08/25 1841    03/08/25 1852  acetaminophen (TYLENOL) suppository 650 mg  Once         03/08/25 1836    03/08/25 1824  cefepime 2000 mg IVPB in 100 mL NS (VTB)  Once         03/08/25 1808 03/08/25 1824  vancomycin 2500 mg/500 mL 0.9% NS IVPB ([facility])  Once         03/08/25 1808 03/08/25 1824  azithromycin (ZITHROMAX) 500 mg in sodium chloride 0.9 % 250 mL IVPB-VTB  Once         03/08/25 1808    03/08/25 1823  sodium bicarbonate injection 8.4% 50 mEq  Once         03/08/25 1807 03/08/25 1823  ketamine (KETALAR) injection 120 mg  Once         03/08/25 1807 03/08/25 1823  rocuronium (ZEMURON) injection 150 mg  Once         03/08/25 1807 03/08/25 1823  propofol (DIPRIVAN) infusion 10 mg/mL 100 mL  Titrated         03/08/25 1807 03/08/25 1823  fentaNYL 2500 mcg in 250 mL NS infusion  Titrated         03/08/25 1807    03/08/25 1808  Target Arousal Level RASS  -1 to -2  Continuous,   Status:  Canceled         03/08/25 1807 03/08/25 1805  ipratropium-albuterol (DUO-NEB) nebulizer solution 3 mL  Once         03/08/25 1749    03/08/25 1805  magnesium sulfate 2g/50 mL (PREMIX) infusion  Once         03/08/25 1749    03/08/25 1805  methylPREDNISolone sodium succinate (SOLU-Medrol) 125 mg in sterile water (preservative free) 2 mL  Once         03/08/25 1749    03/08/25 1801  NIPPV - Provider Settings  Until Discontinued         03/08/25 1802    03/08/25 1801  Continuous Pulse Oximetry  Continuous,   Status:  Canceled         03/08/25 1802 03/08/25 1757  Blood Gas, Arterial With Co-Ox  PROCEDURE ONCE         03/08/25 1756    03/08/25 1751  hCG, Serum, Qualitative  STAT         03/08/25 1750    03/08/25 1750  Magnesium  Once         03/08/25 1749    03/08/25 1750  Blood Gas, Arterial -With Co-Ox Panel: Yes  Once         03/08/25 1749    03/08/25 1750  Respiratory Panel PCR w/COVID-19(SARS-CoV-2) HARDY/ADAM/LUIS MIGUEL/PAD/COR/RAMA In-House, NP Swab in UTM/VTM, 2 HR TAT - Swab, Nasopharynx  STAT         03/08/25 1749    03/08/25 1750  TSH Rfx On Abnormal To Free T4  Once         03/08/25 1749    03/08/25 1750  CT Angiogram Chest Pulmonary Embolism  1 Time Imaging         03/08/25 1750    03/08/25 1749  Undress & Gown  Once         03/08/25 1748    03/08/25 1749  Cardiac Monitoring  Continuous,   Status:  Canceled        Comments: Follow Standing Orders As Outlined in Process Instructions (Open Order Report to View Full Instructions)    03/08/25 1748 03/08/25 1749  Continuous Pulse Oximetry  Continuous,   Status:  Canceled         03/08/25 1748    03/08/25 1749  Measure Blood Pressure  Every 30 Minutes        Comments: Increase Frequency as Patient Condition Dictates and/or With Use of Vasoactive Treatments    03/08/25 1748    03/08/25 1749  Vital Signs  Every 30 Minutes        Comments: Increase Frequency as Patient Condition Dictates    03/08/25 1748    03/08/25 1749  ECG 12 Lead  Other; Sepsis  Once         03/08/25 1748    03/08/25 1749  XR Chest 1 View  1 Time Imaging         03/08/25 1748    03/08/25 1749  Insert Peripheral IV  Once         03/08/25 1748    03/08/25 1749  Insert 2nd Large Bore Peripheral IV  Once         03/08/25 1748    03/08/25 1749  [place] Draw  Once         03/08/25 1748    03/08/25 1749  CBC & Differential  Once         03/08/25 1748    03/08/25 1749  Comprehensive Metabolic Panel  Once         03/08/25 1748    03/08/25 1749  Protime-INR  Once         03/08/25 1748    03/08/25 1749  aPTT  Once         03/08/25 1748    03/08/25 1749  Lactic Acid, Plasma  Once         03/08/25 1748    03/08/25 1749  Procalcitonin  Once         03/08/25 1748    03/08/25 1749  High Sensitivity Troponin T  Once         03/08/25 1748    03/08/25 1749  Blood Culture - Blood, Arm, Right  Once         03/08/25 1748    03/08/25 1749  Blood Culture - Blood, Arm, Left  Once         03/08/25 1748    03/08/25 1749  Urinalysis With Microscopic If Indicated (No Culture) - Indwelling Urethral Catheter  Once         03/08/25 1748    03/08/25 1749  Green Top (Gel)  PROCEDURE ONCE         03/08/25 1748    03/08/25 1749  Lavender Top  PROCEDURE ONCE         03/08/25 1748    03/08/25 1749  Gold Top - SST  PROCEDURE ONCE,   Status:  Canceled         03/08/25 1748    03/08/25 1749  Light Blue Top  PROCEDURE ONCE         03/08/25 1748    03/08/25 1749  CBC Auto Differential  PROCEDURE ONCE         03/08/25 1748    03/08/25 1748  sodium chloride 0.9 % flush 10 mL  As Needed         03/08/25 1748    Unscheduled  Oxygen Therapy- Nasal Cannula; Titrate 1-6 LPM Per SpO2; 90 - 95%  Continuous PRN       03/08/25 1748    Unscheduled  Treat Hypoglycemia As Recommended By Glucommander™ & Notify Provider of Treatment  As Needed      Comments: Follow Hypoglycemia Orders As Outlined in Process Instructions (Open Order Report to View Full Instructions)  Notify Provider Any Time Hypoglycemia Treatment is Administered     03/09/25 0057    Unscheduled  If Insulin Infusion is Paused - Follow Glucommander Instructions  As Needed       03/09/25 0057    Unscheduled  POC Glucose PRN  As Needed      Comments: Glucommander Recommended POC Glucose Testing Will Vary Between Every 15 Minutes & Every 2 Hours Release PRN POC Glucose Orders as Needed      03/09/25 0057    Unscheduled  Blood Gas, Arterial -With Co-Ox Panel: Yes  As Needed      Comments: Respiratory Distress      03/09/25 0057    Unscheduled  Spontaneous Awakening Trial  Daily - SAT       03/09/25 0057    Unscheduled  Spontaneous Breathing Trial  Daily - SBT       03/09/25 0057    Unscheduled  Wound Care  As Needed       03/09/25 0610    Unscheduled  Deep Tissue Injury Care PRN  As Needed      Comments: - Gently Cleanse With Normal Saline  - Cover With Silicone Border Dressing (If Indicated)  - For Frequent Incontinence - Apply Skin Protective Barrier Cream Rather Than Silicone Border Dressing    03/09/25 0610    Unscheduled  Change Dressing to IV Site As Needed When Damp, Loose or Soiled  As Needed       03/09/25 0610    Unscheduled  Change Needleless Connectors  As Needed      Comments: Change Needleless Connectors When:  - Administration Set Changed  - Dressing Changed  - Removed For Any Reason  - Residual Blood or Debris Within Connector  - Prior to Drawing Blood Cultures  - Contamination of Connector  - After Administration of Blood or Blood Components    03/09/25 0610    Unscheduled  Insert New Peripheral IV  As Needed      Comments: Frequency Per Facility Policy    03/09/25 0610    --  Cholecalciferol 10 MCG (400 UNIT) tablet  Daily         03/09/25 1836    --  imiquimod (ALDARA) 5 % cream  3 Times Weekly         03/09/25 1838    --  omeprazole (priLOSEC) 20 MG capsule  Daily         03/09/25 1838    --  ondansetron ODT (ZOFRAN-ODT) 8 MG disintegrating tablet  Daily PRN         03/09/25 1838                     Physician Progress Notes (all)        Louis Wynn DO at  03/09/25 1405            Patient seen and examined at bedside this morning after admission earlier this morning by overnight hospitalist.  Patient presenting to the hospital with respiratory failure and evidence of multifocal pneumonia ultimately requiring intubation and mechanical ventilation.  Patient at time of evaluation sedated and breathing in synchrony with ventilator with improving oxygen requirements.  Patient with evidence of multifocal pneumonia in the upper and lower lobes with multiple air bronchograms.  Decision made to not proceed with spontaneous awakening trial or breathing trial today as patient hospitalized for less than 12 hours and will initiate on antibiotic therapy and mechanical support and try for beginning SBT's in the coming 24 to 48 hours.  Admission orders reviewed and patient continued on propofol and fentanyl with Versed pushes as needed.  Will continue vancomycin, cefepime and azithromycin.  Lung exam largely clear with mechanical breath sounds on auscultation at time of evaluation other than diminished breath sounds largely likely secondary to body habitus.  Nutrition consulted for initiation of tube feeds and patient continued on insulin drip for glycemic control.  Blood cultures and sputum cultures currently pending at this time.  Formal hospitalist progress note to follow tomorrow.    Electronically signed by Louis Wynn DO at 03/09/25 1408       Progress Notes signed by Henrry Monson MD at 03/09/25 0513           Nurse Practitioner - Brief Progress Note  PERMANENT  03/09/2025 04:19    [facility] - [place] - [place] - CCU - 10 - [place], [place] ([facility])    CHARLES FERNANDEZ    Date of Service 03/09/2025 04:19    HPI/Events of Note [facility] Provider Assessment Note    48 y/o female PMH of COPD, former tobacco use, JOANN, morbid obesity, chronic HFpEF, HTN, HLD, DM type 2, valvar cancer s/p reconstruction surgery 2013. Pt admitted to ICU from ER with c/o SOB, fever,  chills. On arrival temp 102.8 F, , RR 40, O2 sat   79% on 2L NC. Pt was intubated after failing BiPAP.     Pertinent labs: Respiratory PCR panel negative, AG 15, Na 130 (corrected Na 136), K 3.4, Mg 1.4, glucose 439, bicarb 27, BUN 10, Cr 0.88, Ca 8, lactate 1.5, WBC 19, HCG negative, UDS negative    ABG on BiPAP: 7.48 / 39 / 58 / 29  Repeat ABG on the vent: 7.36 / 50 / 180 / 28    Per radiology  CXR:   Enlarged heart size  Endotracheal tube in place with tip 6 cm above the judah.  Right neck central vascular catheter with tip to the SVC.  Enteric drain in place with tip to the stomach  Hazy opacities in the lungs suggestive of atelectasis and/or multifocal  pneumonia.  Coarsened bronchovascular pattern to the lungs.  No pleural effusion or pneumothorax.  Mild edema.    CTA chest:   No thoracic aortic aneurysm or dissection.  No pulmonary embolus  Enlarged heart size  Trace volume of pericardial fluid.  Multifocal pneumonia in the upper and lower lobes with multiple air  bronchograms.  No significant mediastinal or hilar adenopathy.  No free fluid or free air in the upper abdomen.    EKG: Sinus tachycardia rate 121. QTc 457. Possible Anterolateral infarct , age undetermined    Assessment and Plan:    Acute on chronic hypoxic hypercapnic respiratory failure, acute COPD exacerbation, sepsis/bilateral pneumonia, JOANN, hypomagnesemia, hypokalemia, uncontrolled DM  - VC/AC 22/450/+12/65%. DuoNeb. Monitor ABG, CXR  - Fentany, Propofol and Versed infusions started in the ER. Versed infusion (now off)  - Cefepime, Azithromycin, IV Vancomycin pharmacy to dose. Sputum/blood cultures, UA, urine antigens, MRSA  - Solumedrol 125 mg IV x1, then 40 mg IV q12h  - LR at 100 ml/hr  - Insulin infusion per Glucommander protocol. A1c in 1/25 was 12.4%. Trace urine ketones. Add BHB  - Electrolyte replacement protocol  - Hx of HFpEF. EF 61-65% on ECHO on 3/18/2024      __X___   Video Assessment performed.  __X___   Most recent labs  reviewed  __X___   Vital Signs reviewed. HR 76, /56 (73), RR 22, O2 sat 9%, temp 98.2 F  __X___   Best Practices addressed:                 VTE prophylaxis: Lovenox                 SUP (when indicated): Famotidine                 Current Glucose: 442                      Please notify bedside physician when present or [facility] if glucose > 180 X 2                 Sepsis guidelines:                 Lung protective strategy: Tidal volume 7.9 ml/kg per IBW                  Targeted Temperature Management: N/A  __X___     Spoke with bedside RN  __X___     Orders written    Contact [facility] for any needs if bedside physician is not present.  Note drafted by Sammi Ghosh, MSN, ACNP-BC       Critical care; 30 minutes total evaluation time     Care during the described time interval was provided by me.  I have reviewed this patient's available data, including medical history, events of note, physical examination and test results as part of my evaluation.    Interventions Major-Hyperglycemia - active titration of insulin therapy, Infection - evaluation and management, Respiratory failure - evaluation and management, Other: Acute on chronic hypoxic hypercapnic respiratory failure, acute COPD exacerbation,   sepsis/bilateral pneumonia, JOANN, hypomagnesemia, hypokalemia, uncontrolled DM  Intermediate-Best-practice therapies (e.g. VTE, beta blocker, etc.), Communication with other healthcare providers and/or family, Electrolyte abnormality - evaluation and management        Electronically Signed by: Sammi Ghosh (ANP,CCRN) on 03/09/2025 04:54    Annotated By: Henrry Monson)    Date: 03/09/25 05:13  I have reviewed and I agree with the assessment and plan by CHIDI.     Electronically signed by Henrry Monson MD at 03/09/25 0513       Consult Notes (all)    No notes of this type exist for this encounter.

## 2025-03-11 ENCOUNTER — APPOINTMENT (OUTPATIENT)
Dept: GENERAL RADIOLOGY | Facility: HOSPITAL | Age: 50
DRG: 177 | End: 2025-03-11
Payer: MEDICAID

## 2025-03-11 LAB
A-A DO2: 106.3 MMHG (ref 0–300)
ALBUMIN SERPL-MCNC: 3.1 G/DL (ref 3.5–5.2)
ALBUMIN/GLOB SERPL: 0.8 G/DL
ALP SERPL-CCNC: 88 U/L (ref 39–117)
ALT SERPL W P-5'-P-CCNC: 18 U/L (ref 1–33)
ANION GAP SERPL CALCULATED.3IONS-SCNC: 12.5 MMOL/L (ref 5–15)
ARTERIAL PATENCY WRIST A: POSITIVE
AST SERPL-CCNC: 22 U/L (ref 1–32)
ATMOSPHERIC PRESS: 722 MMHG
ATMOSPHERIC PRESS: 722 MMHG
BASE EXCESS BLDA CALC-SCNC: 8.3 MMOL/L (ref 0–2)
BASE EXCESS BLDV CALC-SCNC: 5.6 MMOL/L (ref 0–2)
BASOPHILS # BLD AUTO: 0.03 10*3/MM3 (ref 0–0.2)
BASOPHILS NFR BLD AUTO: 0.2 % (ref 0–1.5)
BDY SITE: ABNORMAL
BDY SITE: ABNORMAL
BILIRUB SERPL-MCNC: 0.2 MG/DL (ref 0–1.2)
BUN SERPL-MCNC: 21 MG/DL (ref 6–20)
BUN/CREAT SERPL: 25.3 (ref 7–25)
CALCIUM SPEC-SCNC: 9.1 MG/DL (ref 8.6–10.5)
CHLORIDE SERPL-SCNC: 100 MMOL/L (ref 98–107)
CO2 BLDA-SCNC: 32.8 MMOL/L (ref 22–33)
CO2 BLDA-SCNC: 36.4 MMOL/L (ref 22–33)
CO2 SERPL-SCNC: 25.5 MMOL/L (ref 22–29)
COHGB MFR BLD: 1.9 % (ref 0–5)
COHGB MFR BLD: 4.5 % (ref 0–5)
CREAT SERPL-MCNC: 0.83 MG/DL (ref 0.57–1)
DEPRECATED RDW RBC AUTO: 49.4 FL (ref 37–54)
EGFRCR SERPLBLD CKD-EPI 2021: 86.5 ML/MIN/1.73
EOSINOPHIL # BLD AUTO: 0.03 10*3/MM3 (ref 0–0.4)
EOSINOPHIL NFR BLD AUTO: 0.2 % (ref 0.3–6.2)
EPAP: 18
ERYTHROCYTE [DISTWIDTH] IN BLOOD BY AUTOMATED COUNT: 14.2 % (ref 12.3–15.4)
GAS FLOW AIRWAY: 4 LPM
GLOBULIN UR ELPH-MCNC: 4 GM/DL
GLUCOSE BLDC GLUCOMTR-MCNC: 133 MG/DL (ref 70–130)
GLUCOSE BLDC GLUCOMTR-MCNC: 135 MG/DL (ref 70–130)
GLUCOSE BLDC GLUCOMTR-MCNC: 135 MG/DL (ref 70–130)
GLUCOSE BLDC GLUCOMTR-MCNC: 137 MG/DL (ref 70–130)
GLUCOSE BLDC GLUCOMTR-MCNC: 142 MG/DL (ref 70–130)
GLUCOSE BLDC GLUCOMTR-MCNC: 146 MG/DL (ref 70–130)
GLUCOSE BLDC GLUCOMTR-MCNC: 150 MG/DL (ref 70–130)
GLUCOSE BLDC GLUCOMTR-MCNC: 154 MG/DL (ref 70–130)
GLUCOSE BLDC GLUCOMTR-MCNC: 154 MG/DL (ref 70–130)
GLUCOSE BLDC GLUCOMTR-MCNC: 161 MG/DL (ref 70–130)
GLUCOSE BLDC GLUCOMTR-MCNC: 166 MG/DL (ref 70–130)
GLUCOSE BLDC GLUCOMTR-MCNC: 176 MG/DL (ref 70–130)
GLUCOSE BLDC GLUCOMTR-MCNC: 177 MG/DL (ref 70–130)
GLUCOSE BLDC GLUCOMTR-MCNC: 180 MG/DL (ref 70–130)
GLUCOSE BLDC GLUCOMTR-MCNC: 182 MG/DL (ref 70–130)
GLUCOSE BLDC GLUCOMTR-MCNC: 191 MG/DL (ref 70–130)
GLUCOSE BLDC GLUCOMTR-MCNC: 199 MG/DL (ref 70–130)
GLUCOSE BLDC GLUCOMTR-MCNC: 204 MG/DL (ref 70–130)
GLUCOSE BLDC GLUCOMTR-MCNC: 215 MG/DL (ref 70–130)
GLUCOSE BLDC GLUCOMTR-MCNC: 220 MG/DL (ref 70–130)
GLUCOSE BLDC GLUCOMTR-MCNC: 233 MG/DL (ref 70–130)
GLUCOSE BLDC GLUCOMTR-MCNC: 244 MG/DL (ref 70–130)
GLUCOSE BLDC GLUCOMTR-MCNC: 267 MG/DL (ref 70–130)
GLUCOSE BLDC GLUCOMTR-MCNC: 275 MG/DL (ref 70–130)
GLUCOSE BLDC GLUCOMTR-MCNC: 284 MG/DL (ref 70–130)
GLUCOSE BLDC GLUCOMTR-MCNC: 288 MG/DL (ref 70–130)
GLUCOSE SERPL-MCNC: 153 MG/DL (ref 65–99)
HCO3 BLDA-SCNC: 34.7 MMOL/L (ref 20–26)
HCO3 BLDV-SCNC: 31.3 MMOL/L (ref 22–28)
HCT VFR BLD AUTO: 36 % (ref 34–46.6)
HCT VFR BLD CALC: 38.4 % (ref 38–51)
HGB BLD-MCNC: 11.5 G/DL (ref 12–15.9)
HGB BLDA-MCNC: 12 G/DL (ref 13.5–17.5)
HGB BLDA-MCNC: 12.5 G/DL (ref 13.5–17.5)
IMM GRANULOCYTES # BLD AUTO: 0.11 10*3/MM3 (ref 0–0.05)
IMM GRANULOCYTES NFR BLD AUTO: 0.7 % (ref 0–0.5)
INHALED O2 CONCENTRATION: 36 %
INHALED O2 CONCENTRATION: 50 %
IPAP: 8
LYMPHOCYTES # BLD AUTO: 1.57 10*3/MM3 (ref 0.7–3.1)
LYMPHOCYTES NFR BLD AUTO: 10.2 % (ref 19.6–45.3)
Lab: ABNORMAL
Lab: ABNORMAL
MAGNESIUM SERPL-MCNC: 1.5 MG/DL (ref 1.6–2.6)
MCH RBC QN AUTO: 30.4 PG (ref 26.6–33)
MCHC RBC AUTO-ENTMCNC: 31.9 G/DL (ref 31.5–35.7)
MCV RBC AUTO: 95.2 FL (ref 79–97)
METHGB BLD QL: 0.6 % (ref 0–3)
METHGB BLD QL: <-0.1 % (ref 0–3)
MODALITY: ABNORMAL
MODALITY: ABNORMAL
MONOCYTES # BLD AUTO: 0.66 10*3/MM3 (ref 0.1–0.9)
MONOCYTES NFR BLD AUTO: 4.3 % (ref 5–12)
NEUTROPHILS NFR BLD AUTO: 12.97 10*3/MM3 (ref 1.7–7)
NEUTROPHILS NFR BLD AUTO: 84.4 % (ref 42.7–76)
NRBC BLD AUTO-RTO: 0 /100 WBC (ref 0–0.2)
OXYHGB MFR BLDV: 73.3 % (ref 45–75)
OXYHGB MFR BLDV: 93.7 % (ref 94–99)
PCO2 BLDA: 55.4 MM HG (ref 35–45)
PCO2 BLDV: 49.4 MM HG (ref 41–51)
PCO2 TEMP ADJ BLD: ABNORMAL MM[HG]
PH BLDA: 7.41 PH UNITS (ref 7.35–7.45)
PH BLDV: 7.41 PH UNITS (ref 7.32–7.42)
PH, TEMP CORRECTED: ABNORMAL
PHOSPHATE SERPL-MCNC: 4 MG/DL (ref 2.5–4.5)
PLATELET # BLD AUTO: 202 10*3/MM3 (ref 140–450)
PMV BLD AUTO: 10.2 FL (ref 6–12)
PO2 BLDA: 75.7 MM HG (ref 83–108)
PO2 BLDV: 40.6 MM HG (ref 27–53)
PO2 TEMP ADJ BLD: ABNORMAL MM[HG]
POTASSIUM SERPL-SCNC: 4.3 MMOL/L (ref 3.5–5.2)
PROCALCITONIN SERPL-MCNC: 0.13 NG/ML (ref 0–0.25)
PROT SERPL-MCNC: 7.1 G/DL (ref 6–8.5)
RBC # BLD AUTO: 3.78 10*6/MM3 (ref 3.77–5.28)
SAO2 % BLDCOA: 93.6 % (ref 94–99)
SAO2 % BLDCOV: 75.2 % (ref 45–75)
SET MECH RESP RATE: 16
SODIUM SERPL-SCNC: 138 MMOL/L (ref 136–145)
VENTILATOR MODE: ABNORMAL
VENTILATOR MODE: ABNORMAL
WBC NRBC COR # BLD AUTO: 15.37 10*3/MM3 (ref 3.4–10.8)

## 2025-03-11 PROCEDURE — 71045 X-RAY EXAM CHEST 1 VIEW: CPT | Performed by: RADIOLOGY

## 2025-03-11 PROCEDURE — 25010000002 ENOXAPARIN PER 10 MG: Performed by: INTERNAL MEDICINE

## 2025-03-11 PROCEDURE — 83735 ASSAY OF MAGNESIUM: CPT | Performed by: INTERNAL MEDICINE

## 2025-03-11 PROCEDURE — 80053 COMPREHEN METABOLIC PANEL: CPT | Performed by: INTERNAL MEDICINE

## 2025-03-11 PROCEDURE — 82375 ASSAY CARBOXYHB QUANT: CPT

## 2025-03-11 PROCEDURE — 85025 COMPLETE CBC W/AUTO DIFF WBC: CPT | Performed by: INTERNAL MEDICINE

## 2025-03-11 PROCEDURE — 25010000002 MAGNESIUM SULFATE 2 GM/50ML SOLUTION: Performed by: INTERNAL MEDICINE

## 2025-03-11 PROCEDURE — 94660 CPAP INITIATION&MGMT: CPT

## 2025-03-11 PROCEDURE — 94799 UNLISTED PULMONARY SVC/PX: CPT

## 2025-03-11 PROCEDURE — 25010000002 ONDANSETRON PER 1 MG: Performed by: INTERNAL MEDICINE

## 2025-03-11 PROCEDURE — 82948 REAGENT STRIP/BLOOD GLUCOSE: CPT

## 2025-03-11 PROCEDURE — 25010000002 METHYLPREDNISOLONE PER 40 MG: Performed by: INTERNAL MEDICINE

## 2025-03-11 PROCEDURE — 71045 X-RAY EXAM CHEST 1 VIEW: CPT

## 2025-03-11 PROCEDURE — 94664 DEMO&/EVAL PT USE INHALER: CPT

## 2025-03-11 PROCEDURE — 99233 SBSQ HOSP IP/OBS HIGH 50: CPT | Performed by: INTERNAL MEDICINE

## 2025-03-11 PROCEDURE — 82820 HEMOGLOBIN-OXYGEN AFFINITY: CPT

## 2025-03-11 PROCEDURE — 25010000002 CEFTRIAXONE PER 250 MG: Performed by: INTERNAL MEDICINE

## 2025-03-11 PROCEDURE — 25010000002 PROCHLORPERAZINE 10 MG/2ML SOLUTION: Performed by: INTERNAL MEDICINE

## 2025-03-11 PROCEDURE — 92610 EVALUATE SWALLOWING FUNCTION: CPT

## 2025-03-11 PROCEDURE — 83050 HGB METHEMOGLOBIN QUAN: CPT

## 2025-03-11 PROCEDURE — 84145 PROCALCITONIN (PCT): CPT | Performed by: INTERNAL MEDICINE

## 2025-03-11 PROCEDURE — 99232 SBSQ HOSP IP/OBS MODERATE 35: CPT | Performed by: INTERNAL MEDICINE

## 2025-03-11 PROCEDURE — 36600 WITHDRAWAL OF ARTERIAL BLOOD: CPT

## 2025-03-11 PROCEDURE — 84100 ASSAY OF PHOSPHORUS: CPT | Performed by: INTERNAL MEDICINE

## 2025-03-11 PROCEDURE — 25010000002 FUROSEMIDE PER 20 MG: Performed by: INTERNAL MEDICINE

## 2025-03-11 PROCEDURE — 82805 BLOOD GASES W/O2 SATURATION: CPT

## 2025-03-11 RX ORDER — SODIUM CHLORIDE 0.9 % (FLUSH) 0.9 %
10 SYRINGE (ML) INJECTION AS NEEDED
Status: DISCONTINUED | OUTPATIENT
Start: 2025-03-11 | End: 2025-03-12

## 2025-03-11 RX ORDER — NICOTINE POLACRILEX 4 MG
15 LOZENGE BUCCAL
Status: DISCONTINUED | OUTPATIENT
Start: 2025-03-11 | End: 2025-03-12

## 2025-03-11 RX ORDER — INSULIN LISPRO 100 [IU]/ML
1-200 INJECTION, SOLUTION INTRAVENOUS; SUBCUTANEOUS
Status: DISCONTINUED | OUTPATIENT
Start: 2025-03-11 | End: 2025-03-11

## 2025-03-11 RX ORDER — SODIUM CHLORIDE 9 MG/ML
40 INJECTION, SOLUTION INTRAVENOUS AS NEEDED
Status: DISCONTINUED | OUTPATIENT
Start: 2025-03-11 | End: 2025-03-12

## 2025-03-11 RX ORDER — DEXTROSE MONOHYDRATE 25 G/50ML
10-50 INJECTION, SOLUTION INTRAVENOUS
Status: DISCONTINUED | OUTPATIENT
Start: 2025-03-11 | End: 2025-03-13 | Stop reason: HOSPADM

## 2025-03-11 RX ORDER — PROCHLORPERAZINE EDISYLATE 5 MG/ML
10 INJECTION INTRAMUSCULAR; INTRAVENOUS EVERY 6 HOURS PRN
Status: DISCONTINUED | OUTPATIENT
Start: 2025-03-11 | End: 2025-03-12

## 2025-03-11 RX ORDER — INSULIN LISPRO 100 [IU]/ML
1-200 INJECTION, SOLUTION INTRAVENOUS; SUBCUTANEOUS AS NEEDED
Status: DISCONTINUED | OUTPATIENT
Start: 2025-03-11 | End: 2025-03-12

## 2025-03-11 RX ORDER — FUROSEMIDE 10 MG/ML
40 INJECTION INTRAMUSCULAR; INTRAVENOUS ONCE
Status: COMPLETED | OUTPATIENT
Start: 2025-03-11 | End: 2025-03-11

## 2025-03-11 RX ORDER — MAGNESIUM SULFATE HEPTAHYDRATE 40 MG/ML
2 INJECTION, SOLUTION INTRAVENOUS
Status: COMPLETED | OUTPATIENT
Start: 2025-03-11 | End: 2025-03-11

## 2025-03-11 RX ORDER — GLUCAGON 1 MG/ML
1 KIT INJECTION
Status: DISCONTINUED | OUTPATIENT
Start: 2025-03-11 | End: 2025-03-13 | Stop reason: HOSPADM

## 2025-03-11 RX ORDER — SODIUM CHLORIDE 0.9 % (FLUSH) 0.9 %
10 SYRINGE (ML) INJECTION EVERY 12 HOURS SCHEDULED
Status: DISCONTINUED | OUTPATIENT
Start: 2025-03-11 | End: 2025-03-12

## 2025-03-11 RX ADMIN — ONDANSETRON 4 MG: 2 INJECTION INTRAMUSCULAR; INTRAVENOUS at 09:59

## 2025-03-11 RX ADMIN — Medication 10 ML: at 08:52

## 2025-03-11 RX ADMIN — MAGNESIUM SULFATE HEPTAHYDRATE 2 G: 40 INJECTION, SOLUTION INTRAVENOUS at 03:52

## 2025-03-11 RX ADMIN — ATORVASTATIN CALCIUM 40 MG: 40 TABLET, FILM COATED ORAL at 08:52

## 2025-03-11 RX ADMIN — Medication 10 ML: at 21:53

## 2025-03-11 RX ADMIN — SENNOSIDES AND DOCUSATE SODIUM 2 TABLET: 50; 8.6 TABLET ORAL at 08:52

## 2025-03-11 RX ADMIN — ENOXAPARIN SODIUM 40 MG: 40 INJECTION SUBCUTANEOUS at 15:22

## 2025-03-11 RX ADMIN — SENNOSIDES AND DOCUSATE SODIUM 2 TABLET: 50; 8.6 TABLET ORAL at 21:39

## 2025-03-11 RX ADMIN — PHENOL 1 SPRAY: 1.4 SPRAY ORAL at 06:02

## 2025-03-11 RX ADMIN — GABAPENTIN 800 MG: 400 CAPSULE ORAL at 15:22

## 2025-03-11 RX ADMIN — PROCHLORPERAZINE EDISYLATE 10 MG: 5 INJECTION INTRAMUSCULAR; INTRAVENOUS at 12:19

## 2025-03-11 RX ADMIN — IPRATROPIUM BROMIDE AND ALBUTEROL SULFATE 3 ML: .5; 2.5 SOLUTION RESPIRATORY (INHALATION) at 06:40

## 2025-03-11 RX ADMIN — GABAPENTIN 800 MG: 400 CAPSULE ORAL at 05:56

## 2025-03-11 RX ADMIN — METHYLPREDNISOLONE SODIUM SUCCINATE 40 MG: 40 INJECTION INTRAMUSCULAR; INTRAVENOUS at 18:18

## 2025-03-11 RX ADMIN — CETIRIZINE HYDROCHLORIDE 10 MG: 10 TABLET, FILM COATED ORAL at 08:52

## 2025-03-11 RX ADMIN — INSULIN HUMAN 8.4 UNITS/HR: 1 INJECTION, SOLUTION INTRAVENOUS at 15:20

## 2025-03-11 RX ADMIN — ENOXAPARIN SODIUM 40 MG: 40 INJECTION SUBCUTANEOUS at 01:49

## 2025-03-11 RX ADMIN — METHYLPREDNISOLONE SODIUM SUCCINATE 40 MG: 40 INJECTION INTRAMUSCULAR; INTRAVENOUS at 05:56

## 2025-03-11 RX ADMIN — IPRATROPIUM BROMIDE AND ALBUTEROL SULFATE 3 ML: .5; 2.5 SOLUTION RESPIRATORY (INHALATION) at 18:24

## 2025-03-11 RX ADMIN — MAGNESIUM SULFATE HEPTAHYDRATE 2 G: 40 INJECTION, SOLUTION INTRAVENOUS at 05:57

## 2025-03-11 RX ADMIN — MUPIROCIN 1 APPLICATION: 20 OINTMENT TOPICAL at 21:51

## 2025-03-11 RX ADMIN — FAMOTIDINE 20 MG: 10 INJECTION, SOLUTION INTRAVENOUS at 08:52

## 2025-03-11 RX ADMIN — MAGNESIUM SULFATE HEPTAHYDRATE 2 G: 40 INJECTION, SOLUTION INTRAVENOUS at 01:49

## 2025-03-11 RX ADMIN — FUROSEMIDE 40 MG: 10 INJECTION, SOLUTION INTRAMUSCULAR; INTRAVENOUS at 15:22

## 2025-03-11 RX ADMIN — IPRATROPIUM BROMIDE AND ALBUTEROL SULFATE 3 ML: .5; 2.5 SOLUTION RESPIRATORY (INHALATION) at 12:10

## 2025-03-11 RX ADMIN — NICOTINE POLACRILEX 4 MG: 2 GUM, CHEWING BUCCAL at 04:53

## 2025-03-11 RX ADMIN — CHOLECALCIFEROL (VITAMIN D3) 10 MCG (400 UNIT) TABLET 400 UNITS: at 08:52

## 2025-03-11 RX ADMIN — NICOTINE 1 PATCH: 14 PATCH, EXTENDED RELEASE TRANSDERMAL at 22:54

## 2025-03-11 RX ADMIN — SODIUM CHLORIDE 2000 MG: 9 INJECTION, SOLUTION INTRAVENOUS at 12:19

## 2025-03-11 RX ADMIN — GABAPENTIN 800 MG: 400 CAPSULE ORAL at 21:39

## 2025-03-11 RX ADMIN — IPRATROPIUM BROMIDE AND ALBUTEROL SULFATE 3 ML: .5; 2.5 SOLUTION RESPIRATORY (INHALATION) at 00:13

## 2025-03-11 RX ADMIN — LIDOCAINE 1 PATCH: 4 PATCH TOPICAL at 22:53

## 2025-03-11 RX ADMIN — INSULIN HUMAN 17.2 UNITS/HR: 1 INJECTION, SOLUTION INTRAVENOUS at 22:38

## 2025-03-11 RX ADMIN — MUPIROCIN 1 APPLICATION: 20 OINTMENT TOPICAL at 08:52

## 2025-03-11 NOTE — PROGRESS NOTES
[facility] HOSPITALIST PROGRESS NOTE    Subjective     History:   Ana Ortiz is a 49 y.o. female admitted on 3/8/2025 secondary to Multifocal pneumonia     Procedures:   3/8/25: Intubation in the ED  3/8/25: Right IJ central line placement in the ED  3/10/25: Extubation     CC: Follow up resp failure     Patient seen and examined this AM. Awake and alert. Reports dyspnea and wheezing but feels overall improved. No reported CP. Nausea with vomiting reported. Tolerated BiPAP overnight. No acute events overnight per RN.     History taken from: chart, and RN.      Objective     Vital Signs  Temp:  [98.3 °F (36.8 °C)-99.9 °F (37.7 °C)] 99.5 °F (37.5 °C)  Heart Rate:  [] 101  Resp:  [14-29] 29  BP: (100-156)/() 141/98    Intake/Output Summary (Last 24 hours) at 3/11/2025 1221  Last data filed at 3/11/2025 1219  Gross per 24 hour   Intake 1054.14 ml   Output 1800 ml   Net -745.86 ml         Physical Exam:  General:    Awake and alert, in no acute distress, obese   Heart:      Normal S1 and S2. Regular rate and rhythm. No significant murmur, rubs or gallops appreciated.   Lungs:     Respirations regular, even and unlabored. Expiratory wheezes throughout.     Abdomen:   Soft and nontender. No guarding, rebound tenderness or  organomegaly noted. Bowel sounds present x 4.   Extremities:  No clubbing, cyanosis or edema noted.      Results Review:    Results from last 7 days   Lab Units 03/11/25  0034 03/09/25 2327 03/09/25  1005 03/08/25  1756   WBC 10*3/mm3 15.37* 15.04* 13.56* 19.83*   HEMOGLOBIN g/dL 11.5* 11.3* 12.1 13.9   PLATELETS 10*3/mm3 202 199 182 213     Results from last 7 days   Lab Units 03/11/25  0034 03/09/25 2327 03/09/25  1006 03/08/25  1756   SODIUM mmol/L 138 143 137 130*   POTASSIUM mmol/L 4.3 4.9  4.9 3.6 3.4*   CHLORIDE mmol/L 100 107 100 87*   CO2 mmol/L 25.5 29.5* 27.7 27.9   BUN mg/dL 21* 22* 16 10   CREATININE mg/dL 0.83 0.77 0.73 0.88   CALCIUM mg/dL 9.1 8.3* 8.4*  8.8   GLUCOSE mg/dL 153* 204* 264* 439*     Results from last 7 days   Lab Units 03/11/25  0034 03/09/25  2327 03/09/25  1006 03/08/25  1756   BILIRUBIN mg/dL 0.2 <0.2 0.2 0.5   ALK PHOS U/L 88 78 91 122*   AST (SGOT) U/L 22 14 14 17   ALT (SGPT) U/L 18 12 12 14     Results from last 7 days   Lab Units 03/11/25  0034 03/09/25  2327 03/08/25  1756   MAGNESIUM mg/dL 1.5* 2.5 1.4*     Results from last 7 days   Lab Units 03/08/25  1756   INR  1.09     Results from last 7 days   Lab Units 03/08/25  1851 03/08/25  1756   HSTROP T ng/L 9 10       Imaging Results (Last 24 Hours)       Procedure Component Value Units Date/Time    XR Chest AP [[id number]] Collected: 03/11/25 0835     Updated: 03/11/25 0838    Narrative:       VERIFICATION OBSERVER NAME: Huy Mayo MD.     TECHNIQUE, ADDITIONAL HISTORY, FINDINGS: Single frontal view of the  chest was obtained.   Comparison study date : 9/12/2024. Time : 5:24 AM.       RIGHT central line tip in the SVC.  Minimal cardiac enlargement.  Extensive bilateral reticulonodular changes, significantly worsened from  prior.  No pleural effusion.  No pneumothorax.       Impression:      Extensive bilateral nodules and reticulonodular changes likely  infectious in origin.                       [id number], Zip code [id number].           This report was finalized on 3/11/2025 8:36 AM by Dr. Huy Mayo MD.       XR Chest 1 View [[id number]] Collected: 03/10/25 1412     Updated: 03/10/25 1414    Narrative:      EXAM:    XR Chest, 1 View     EXAM DATE:    3/10/2025 1:43 PM     CLINICAL HISTORY:    increase WOB/hypoxia; R06.02-Shortness of breath; J44.1-Chronic  obstructive pulmonary disease with (acute) exacerbation; A41.9-Sepsis,  unspecified organism; J96.00-Acute respiratory failure, unspecified  whether with hypoxia or hypercapnia     TECHNIQUE:    Frontal view of the chest.     COMPARISON:    325     FINDINGS:    Lungs and pleural spaces:  Improvement in diffuse bilateral airspace  disease.   No consolidation.  No pneumothorax.    Heart:  Cardiomegaly is stable.    Mediastinum:  Unremarkable.  Normal mediastinal contour.    Bones/joints:  Unremarkable.  No acute fracture.    Vasculature:  Right IJ deep line with tip in the proximal SVC.       Impression:      1.  Improvement in diffuse bilateral airspace disease.  2.  Cardiomegaly is stable.  3.  Right IJ deep line with tip in the proximal SVC.     This report was finalized on 3/10/2025 2:12 PM by Dr. David Mckeon MD.                 Medications:  atorvastatin, 40 mg, Oral, Daily  cefTRIAXone, 2,000 mg, Intravenous, Q24H  cetirizine, 10 mg, Oral, Daily  cholecalciferol, 400 Units, Oral, Daily  clonazePAM, 0.5 mg, Oral, Once  enoxaparin sodium, 40 mg, Subcutaneous, Q12H  gabapentin, 800 mg, Oral, Q8H  Edit Initial Basal Insulin Transition Dose, 1 each, Not Applicable, Once  insulin glargine, 1-200 Units, Subcutaneous, Nightly - Glucommander  insulin lispro, 1-200 Units, Subcutaneous, 4x Daily With Meals & Nightly  ipratropium-albuterol, 3 mL, Nebulization, Q6H - RT  Lidocaine, 1 patch, Transdermal, Q24H  methylPREDNISolone sodium succinate, 40 mg, Intravenous, Q12H  mupirocin, 1 Application, Each Nare, BID  nicotine, 1 patch, Transdermal, Q24H  senna-docusate sodium, 2 tablet, Oral, BID  sodium chloride, 10 mL, Intravenous, Q12H  sodium chloride, 10 mL, Intravenous, Q12H  sodium chloride, 10 mL, Intravenous, Q12H  sodium chloride, 10 mL, Intravenous, Q12H  sodium chloride, 10 mL, Intravenous, Q12H                 Assessment & Plan   Multifocal bacterial pneumonia: Suspect gram negative. MRSA PCR is negative. Strep pneumo and Legionella urine antigens negative. Completed short course of azithromycin. Cont Rocephin. Follow cultures.     Acute exacerbation of COPD: Respiratory PCR is negative. Cont treatment as above, steroids and nebs.     Acute hypercapnic on chronic hypoxic respiratory failure: Likely 2/2 above. No evidence of PE on CT chest. Intubated  in the ED. Clinically improved with treatment as outlined above. SAT initiated on 3/10 and pt self extubated. Currently stable on 4L's NC. Cont treatment as outlined above. Intermittent diuresis to assist with lung compliance. Cont to monitor closely. Pulm input appreciated.     DM II, insulin dependent with hyperglycemia: HgbA1c 12.4 in 1/25. Improved with insulin gtt. Transition to SQ Glucommander. Cont to monitor closely.     Hypokalemia: K+ improved with supplementation and stable today. Mg <2 and replaced. Cont to monitor.     Chronic HFpEF: Echo in 3/24 revealed an EF of 61-65% and grade I diastolic dysfunction. Appears compensated at present. Monitor volume status.      Essential HTN: BP has been borderline low intermittently but trending upward once sedation. Cont to monitor.     HLD: Cont statin.     Tobacco use disorder: Cont NRT and encourage cessation.     Morbid obesity by BMI: Complicates all aspects of care.     PPX  GI: Pepcid  DVT: Lovenox      Disposition Pending clinical course, likely home when medically stable.     Yury Herzog DO  03/11/25  12:21 EDT

## 2025-03-11 NOTE — PLAN OF CARE
Problem: Noninvasive Ventilation Acute  Goal: Effective Unassisted Ventilation and Oxygenation  Outcome: Progressing     Problem: Adult Inpatient Plan of Care  Goal: Plan of Care Review  Outcome: Progressing  Flowsheets (Taken 3/11/2025 0305)  Progress: improving  Plan of Care Reviewed With: patient  Goal: Patient-Specific Goal (Individualized)  Outcome: Progressing  Goal: Absence of Hospital-Acquired Illness or Injury  Outcome: Progressing  Intervention: Identify and Manage Fall Risk  Recent Flowsheet Documentation  Taken 3/11/2025 0300 by Deja Chino RN  Safety Promotion/Fall Prevention: safety round/check completed  Taken 3/11/2025 0200 by Deja Chino RN  Safety Promotion/Fall Prevention: safety round/check completed  Taken 3/11/2025 0100 by Deja Chino RN  Safety Promotion/Fall Prevention: safety round/check completed  Taken 3/11/2025 0000 by Deja Chino RN  Safety Promotion/Fall Prevention: safety round/check completed  Taken 3/10/2025 2300 by Deja Chino RN  Safety Promotion/Fall Prevention: safety round/check completed  Taken 3/10/2025 2200 by Deja Chino RN  Safety Promotion/Fall Prevention: safety round/check completed  Taken 3/10/2025 2100 by Deja Chino RN  Safety Promotion/Fall Prevention: safety round/check completed  Taken 3/10/2025 2000 by Deja Chino RN  Safety Promotion/Fall Prevention: safety round/check completed  Taken 3/10/2025 1900 by Deja Chino RN  Safety Promotion/Fall Prevention: safety round/check completed  Intervention: Prevent Skin Injury  Recent Flowsheet Documentation  Taken 3/11/2025 0200 by Deja Chino RN  Body Position:   position changed independently   sitting up in bed  Skin Protection:   transparent dressing maintained   skin sealant/moisture barrier applied   silicone foam dressing in place   pulse oximeter probe site changed   incontinence pads utilized   drying agents applied  Taken 3/11/2025 0000 by Deja Chino RN  Body  Position:   position changed independently   tilted   left  Taken 3/10/2025 2200 by Deja Chino RN  Body Position:   position changed independently   tilted   right  Taken 3/10/2025 2000 by Deja Chino RN  Body Position:   position changed independently   sitting up in bed  Skin Protection:   transparent dressing maintained   skin sealant/moisture barrier applied   silicone foam dressing in place   pulse oximeter probe site changed   incontinence pads utilized   drying agents applied  Intervention: Prevent and Manage VTE (Venous Thromboembolism) Risk  Recent Flowsheet Documentation  Taken 3/11/2025 0200 by Deja Chino RN  VTE Prevention/Management: (see MAR) other (see comments)  Taken 3/10/2025 2000 by Deja Chino RN  VTE Prevention/Management: (see MAR) other (see comments)  Intervention: Prevent Infection  Recent Flowsheet Documentation  Taken 3/11/2025 0200 by Deja Chino RN  Infection Prevention:   hand hygiene promoted   rest/sleep promoted  Taken 3/10/2025 2000 by Deja Chino RN  Infection Prevention:   hand hygiene promoted   rest/sleep promoted  Goal: Optimal Comfort and Wellbeing  Outcome: Progressing  Intervention: Provide Person-Centered Care  Recent Flowsheet Documentation  Taken 3/11/2025 0200 by Deja Chino RN  Trust Relationship/Rapport:   choices provided   care explained  Taken 3/10/2025 2000 by Deja Chino RN  Trust Relationship/Rapport:   care explained   choices provided  Goal: Readiness for Transition of Care  Outcome: Progressing     Problem: Mechanical Ventilation Invasive  Goal: Effective Communication  Outcome: Progressing  Intervention: Ensure Effective Communication  Recent Flowsheet Documentation  Taken 3/11/2025 0200 by Deja Chino RN  Trust Relationship/Rapport:   choices provided   care explained  Diversional Activities:   smartphone   television  Family/Support System Care: caregiver stress acknowledged  Taken 3/10/2025 2000 by Deja Chino  RN  Trust Relationship/Rapport:   care explained   choices provided  Diversional Activities:   television   smartphone  Family/Support System Care: caregiver stress acknowledged  Goal: Optimal Device Function  Outcome: Progressing  Intervention: Optimize Device Care and Function  Recent Flowsheet Documentation  Taken 3/11/2025 0200 by Deja Chino RN  Airway Safety Measures: suction at bedside  Taken 3/11/2025 0000 by Deja Chino RN  Oral Care:   oral rinse provided   lip/mouth moisturizer applied  Taken 3/10/2025 2000 by Deja Chino RN  Oral Care:   teeth brushed - regular toothbrush   tongue brushed  Airway Safety Measures: suction at bedside  Goal: Mechanical Ventilation Liberation  Outcome: Progressing  Intervention: Promote Extubation and Mechanical Ventilation Liberation  Recent Flowsheet Documentation  Taken 3/11/2025 0300 by Deja Chino RN  Medication Review/Management: medications reviewed  Taken 3/11/2025 0200 by Deja Chino RN  Medication Review/Management: medications reviewed  Taken 3/11/2025 0100 by Deja Chino RN  Medication Review/Management: medications reviewed  Taken 3/11/2025 0000 by Deja Chino RN  Medication Review/Management: medications reviewed  Taken 3/10/2025 2300 by Deja Chino RN  Medication Review/Management: medications reviewed  Taken 3/10/2025 2200 by Deja Chino RN  Medication Review/Management: medications reviewed  Taken 3/10/2025 2100 by Deja Chino RN  Medication Review/Management: medications reviewed  Taken 3/10/2025 2000 by Deja Chino RN  Medication Review/Management: medications reviewed  Taken 3/10/2025 1900 by Deja Chino RN  Medication Review/Management: medications reviewed  Goal: Optimal Nutrition Delivery  Outcome: Progressing  Goal: Absence of Device-Related Skin and Tissue Injury  Outcome: Progressing  Intervention: Maintain Skin and Tissue Health  Recent Flowsheet Documentation  Taken 3/11/2025 0200 by Deja Chino  RN  Device Skin Pressure Protection:   tubing/devices free from skin contact   skin-to-skin areas padded   skin-to-device areas padded   pressure points protected   positioning supports utilized   adhesive use limited   absorbent pad utilized/changed  Taken 3/10/2025 2000 by Deja Chino RN  Device Skin Pressure Protection:   tubing/devices free from skin contact   skin-to-skin areas padded   skin-to-device areas padded   pressure points protected   adhesive use limited  Goal: Absence of Ventilator-Induced Lung Injury  Outcome: Progressing  Intervention: Prevent Ventilator-Associated Pneumonia  Recent Flowsheet Documentation  Taken 3/11/2025 0200 by Deja Chino RN  Head of Bed ([facility]) Positioning: HOB at 30-45 degrees  Taken 3/11/2025 0000 by Deja Chino RN  Head of Bed ([facility]) Positioning: HOB at 30-45 degrees  Oral Care:   oral rinse provided   lip/mouth moisturizer applied  Taken 3/10/2025 2200 by Deja Chino RN  Head of Bed ([facility]) Positioning: HOB at 30-45 degrees  Taken 3/10/2025 2000 by Deja Chino RN  Head of Bed ([facility]) Positioning: HOB at 30-45 degrees  Oral Care:   teeth brushed - regular toothbrush   tongue brushed     Problem: Comorbidity Management  Goal: Maintenance of COPD Symptom Control  Outcome: Progressing  Intervention: Maintain COPD (Chronic Obstructive Pulmonary Disease) Symptom Control  Recent Flowsheet Documentation  Taken 3/11/2025 0300 by Deja Chino RN  Medication Review/Management: medications reviewed  Taken 3/11/2025 0200 by Deja Chino RN  Medication Review/Management: medications reviewed  Taken 3/11/2025 0100 by Deja Chino RN  Medication Review/Management: medications reviewed  Taken 3/11/2025 0000 by Deja Chino RN  Medication Review/Management: medications reviewed  Taken 3/10/2025 2300 by Deja Chino RN  Medication Review/Management: medications reviewed  Taken 3/10/2025 2200 by Deja Chino RN  Medication Review/Management: medications  reviewed  Taken 3/10/2025 2100 by Deja Chino RN  Medication Review/Management: medications reviewed  Taken 3/10/2025 2000 by Deja Chino RN  Medication Review/Management: medications reviewed  Taken 3/10/2025 1900 by Deja Chino RN  Medication Review/Management: medications reviewed  Goal: Blood Glucose Level Within Target Range  Outcome: Progressing  Intervention: Monitor and Manage Glycemia  Recent Flowsheet Documentation  Taken 3/11/2025 0300 by Deja Chino RN  Medication Review/Management: medications reviewed  Taken 3/11/2025 0200 by Deja Chino RN  Medication Review/Management: medications reviewed  Taken 3/11/2025 0100 by Deja Chino RN  Medication Review/Management: medications reviewed  Taken 3/11/2025 0000 by Deja Chino RN  Medication Review/Management: medications reviewed  Taken 3/10/2025 2300 by Deja Chino RN  Medication Review/Management: medications reviewed  Taken 3/10/2025 2200 by Deja Chino RN  Medication Review/Management: medications reviewed  Taken 3/10/2025 2100 by Deja Chino RN  Medication Review/Management: medications reviewed  Taken 3/10/2025 2000 by Deja Chino RN  Medication Review/Management: medications reviewed  Taken 3/10/2025 1900 by Deja Chino RN  Medication Review/Management: medications reviewed  Goal: Maintenance of Heart Failure Symptom Control  Outcome: Progressing  Intervention: Maintain Heart Failure Management  Recent Flowsheet Documentation  Taken 3/11/2025 0300 by Deja Chino RN  Medication Review/Management: medications reviewed  Taken 3/11/2025 0200 by Deja Chino RN  Medication Review/Management: medications reviewed  Taken 3/11/2025 0100 by Deja Chino RN  Medication Review/Management: medications reviewed  Taken 3/11/2025 0000 by Deja Chino RN  Medication Review/Management: medications reviewed  Taken 3/10/2025 2300 by Deja Chino RN  Medication Review/Management: medications reviewed  Taken  3/10/2025 2200 by Deja Chino RN  Medication Review/Management: medications reviewed  Taken 3/10/2025 2100 by Deja Chino RN  Medication Review/Management: medications reviewed  Taken 3/10/2025 2000 by Deja Chino RN  Medication Review/Management: medications reviewed  Taken 3/10/2025 1900 by Deja Chino RN  Medication Review/Management: medications reviewed  Goal: Blood Pressure in Desired Range  Outcome: Progressing  Intervention: Maintain Blood Pressure Management  Recent Flowsheet Documentation  Taken 3/11/2025 0300 by Deja Chino RN  Medication Review/Management: medications reviewed  Taken 3/11/2025 0200 by Deja Chino RN  Medication Review/Management: medications reviewed  Taken 3/11/2025 0100 by Deja Chino RN  Medication Review/Management: medications reviewed  Taken 3/11/2025 0000 by Deja Chino RN  Medication Review/Management: medications reviewed  Taken 3/10/2025 2300 by Deja Chino RN  Medication Review/Management: medications reviewed  Taken 3/10/2025 2200 by Deja Chino RN  Medication Review/Management: medications reviewed  Taken 3/10/2025 2100 by Deja Chino RN  Medication Review/Management: medications reviewed  Taken 3/10/2025 2000 by Deja Chino RN  Medication Review/Management: medications reviewed  Taken 3/10/2025 1900 by Deja Chino RN  Medication Review/Management: medications reviewed     Problem: Violence Risk or Actual  Goal: Anger and Impulse Control  Outcome: Progressing  Intervention: Minimize Safety Risk  Recent Flowsheet Documentation  Taken 3/11/2025 0300 by Deja Chino RN  Enhanced Safety Measures: bed alarm set  Taken 3/11/2025 0200 by Deja Chino RN  Enhanced Safety Measures: bed alarm set  Taken 3/11/2025 0100 by Deja Chino RN  Enhanced Safety Measures: bed alarm set  Taken 3/11/2025 0000 by Deja Chino RN  Enhanced Safety Measures: bed alarm set  Taken 3/10/2025 2300 by Deja Chino RN  Enhanced Safety  Measures: bed alarm set  Taken 3/10/2025 2200 by Deja Chino RN  Enhanced Safety Measures: bed alarm set  Taken 3/10/2025 2100 by Deja Chino RN  Enhanced Safety Measures: bed alarm set  Taken 3/10/2025 2000 by Deja Chino RN  Enhanced Safety Measures: bed alarm set  Taken 3/10/2025 1900 by Deja Chino RN  Enhanced Safety Measures: (pt states she will call out if she needs to get up.) chair alarm refused     Problem: Skin Injury Risk Increased  Goal: Skin Health and Integrity  Outcome: Progressing  Intervention: Optimize Skin Protection  Recent Flowsheet Documentation  Taken 3/11/2025 0200 by Deja Chino RN  Activity Management: activity encouraged  Pressure Reduction Techniques:   frequent weight shift encouraged   heels elevated off bed   pressure points protected   weight shift assistance provided  Head of Bed (HOB) Positioning: HOB at 30-45 degrees  Pressure Reduction Devices:   specialty bed utilized   pressure-redistributing mattress utilized   positioning supports utilized   heel offloading device utilized  Skin Protection:   transparent dressing maintained   skin sealant/moisture barrier applied   silicone foam dressing in place   pulse oximeter probe site changed   incontinence pads utilized   drying agents applied  Taken 3/11/2025 0000 by Deja Chino RN  Head of Bed (HOB) Positioning: HOB at 30-45 degrees  Taken 3/10/2025 2200 by Deja Chino RN  Head of Bed (HOB) Positioning: HOB at 30-45 degrees  Taken 3/10/2025 2000 by Deja Chino RN  Activity Management:   activity encouraged   ambulated in room  Pressure Reduction Techniques:   frequent weight shift encouraged   heels elevated off bed   pressure points protected   weight shift assistance provided  Head of Bed (HOB) Positioning: HOB at 30-45 degrees  Pressure Reduction Devices:   specialty bed utilized   pressure-redistributing mattress utilized   positioning supports utilized   heel offloading device utilized  Skin  Protection:   transparent dressing maintained   skin sealant/moisture barrier applied   silicone foam dressing in place   pulse oximeter probe site changed   incontinence pads utilized   drying agents applied     Problem: Fall Injury Risk  Goal: Absence of Fall and Fall-Related Injury  Outcome: Progressing  Intervention: Identify and Manage Contributors  Recent Flowsheet Documentation  Taken 3/11/2025 0300 by Deja Chino RN  Medication Review/Management: medications reviewed  Taken 3/11/2025 0200 by Deja Chino RN  Medication Review/Management: medications reviewed  Taken 3/11/2025 0100 by Deja Chino RN  Medication Review/Management: medications reviewed  Taken 3/11/2025 0000 by Deja Chino RN  Medication Review/Management: medications reviewed  Taken 3/10/2025 2300 by Deja Chino RN  Medication Review/Management: medications reviewed  Taken 3/10/2025 2200 by Deja Chino RN  Medication Review/Management: medications reviewed  Taken 3/10/2025 2100 by Deja Chino RN  Medication Review/Management: medications reviewed  Taken 3/10/2025 2000 by Deja Chino RN  Medication Review/Management: medications reviewed  Taken 3/10/2025 1900 by Deja Chino RN  Medication Review/Management: medications reviewed  Intervention: Promote Injury-Free Environment  Recent Flowsheet Documentation  Taken 3/11/2025 0300 by Deja Chino RN  Safety Promotion/Fall Prevention: safety round/check completed  Taken 3/11/2025 0200 by Deja Chino RN  Safety Promotion/Fall Prevention: safety round/check completed  Taken 3/11/2025 0100 by Deja Chino RN  Safety Promotion/Fall Prevention: safety round/check completed  Taken 3/11/2025 0000 by Deja Chino RN  Safety Promotion/Fall Prevention: safety round/check completed  Taken 3/10/2025 2300 by Deja Chino RN  Safety Promotion/Fall Prevention: safety round/check completed  Taken 3/10/2025 2200 by Deja Chino RN  Safety Promotion/Fall Prevention:  safety round/check completed  Taken 3/10/2025 2100 by Deja Chino RN  Safety Promotion/Fall Prevention: safety round/check completed  Taken 3/10/2025 2000 by Deja Chino RN  Safety Promotion/Fall Prevention: safety round/check completed  Taken 3/10/2025 1900 by Deja Chino RN  Safety Promotion/Fall Prevention: safety round/check completed     Problem: Sepsis/Septic Shock  Goal: Optimal Coping  Outcome: Progressing  Intervention: Support Patient and Family Response  Recent Flowsheet Documentation  Taken 3/11/2025 0200 by Deja Chino RN  Family/Support System Care: caregiver stress acknowledged  Taken 3/10/2025 2000 by Deja Chino RN  Family/Support System Care: caregiver stress acknowledged  Goal: Absence of Bleeding  Outcome: Progressing  Goal: Blood Glucose Level Within Target Range  Outcome: Progressing  Goal: Absence of Infection Signs and Symptoms  Outcome: Progressing  Intervention: Initiate Sepsis Management  Recent Flowsheet Documentation  Taken 3/11/2025 0200 by Deja Chino RN  Infection Prevention:   hand hygiene promoted   rest/sleep promoted  Taken 3/10/2025 2000 by Deja Chino RN  Infection Prevention:   hand hygiene promoted   rest/sleep promoted  Intervention: Promote Recovery  Recent Flowsheet Documentation  Taken 3/11/2025 0200 by Deja Chino RN  Activity Management: activity encouraged  Taken 3/10/2025 2000 by Deja Chino RN  Activity Management:   activity encouraged   ambulated in room  Goal: Optimal Nutrition Delivery  Outcome: Progressing   Goal Outcome Evaluation:  Plan of Care Reviewed With: patient        Progress: improving

## 2025-03-11 NOTE — PLAN OF CARE
Goal Outcome Evaluation:  Plan of Care Reviewed With: patient        Progress: no change     Problem: Adult Inpatient Plan of Care  Goal: Plan of Care Review  Outcome: Progressing  Flowsheets (Taken 3/11/2025 1729)  Progress: no change  Plan of Care Reviewed With: patient  Goal: Patient-Specific Goal (Individualized)  Outcome: Progressing  Goal: Absence of Hospital-Acquired Illness or Injury  Outcome: Progressing  Intervention: Identify and Manage Fall Risk  Recent Flowsheet Documentation  Taken 3/11/2025 1700 by Tone Bustos RN  Safety Promotion/Fall Prevention:   activity supervised   fall prevention program maintained   safety round/check completed  Goal: Optimal Comfort and Wellbeing  Outcome: Progressing  Goal: Readiness for Transition of Care  Outcome: Progressing     Problem: Comorbidity Management  Goal: Maintenance of COPD Symptom Control  Outcome: Progressing  Intervention: Maintain COPD (Chronic Obstructive Pulmonary Disease) Symptom Control  Recent Flowsheet Documentation  Taken 3/11/2025 1700 by Tone Bustos RN  Medication Review/Management: medications reviewed  Goal: Blood Glucose Level Within Target Range  Outcome: Progressing  Intervention: Monitor and Manage Glycemia  Recent Flowsheet Documentation  Taken 3/11/2025 1700 by Tone Bustos RN  Medication Review/Management: medications reviewed  Goal: Maintenance of Heart Failure Symptom Control  Outcome: Progressing  Intervention: Maintain Heart Failure Management  Recent Flowsheet Documentation  Taken 3/11/2025 1700 by Tone Bustos RN  Medication Review/Management: medications reviewed  Goal: Blood Pressure in Desired Range  Outcome: Progressing  Intervention: Maintain Blood Pressure Management  Recent Flowsheet Documentation  Taken 3/11/2025 1700 by Tone Bustos RN  Medication Review/Management: medications reviewed     Problem: Skin Injury Risk Increased  Goal: Skin Health and Integrity  Outcome: Progressing  Intervention: Optimize  Spoke with patient she stated she needed appointment before the 1st of December I checked the schedule and no other providers are open. I saw that you had two same day appoinments open for 11/29/2021. I rescheduled her for 11/29/2021 @3:40AM   Skin Protection  Recent Flowsheet Documentation  Taken 3/11/2025 1713 by Tone Bustos, RN  Activity Management: up in chair     Problem: Fall Injury Risk  Goal: Absence of Fall and Fall-Related Injury  Outcome: Progressing  Intervention: Identify and Manage Contributors  Recent Flowsheet Documentation  Taken 3/11/2025 1700 by Tone Bustos, RN  Medication Review/Management: medications reviewed  Intervention: Promote Injury-Free Environment  Recent Flowsheet Documentation  Taken 3/11/2025 1700 by Tone Bustos, RN  Safety Promotion/Fall Prevention:   activity supervised   fall prevention program maintained   safety round/check completed

## 2025-03-11 NOTE — THERAPY EVALUATION
Acute Care - Speech Language Pathology   Swallow Initial Evaluation [facility]  CLINICAL DYSPHAGIA ASSESSMENT     Patient Name: Ana Ortiz  : 1975  MRN: [id number]  Today's Date: 3/11/2025             Admit Date: 3/8/2025    Ana Ortiz  was seen at bedside this AM on CCU to assess safety/efficacy of swallowing fnx, determine safest/least restrictive diet tolerance.     Ms. Ortiz presented to [facility] ED for evaluation of five day history of worsening shortness of air, accompanied by fever and chills. In ED, she initially trialed on BiPAP, subsequently intubated for respiratory failure with increased work of breathing. Evaluation in ED revealed bilateral pneumonia (gram- and gram +organisms), acute COPD exacerbation, ARF, JOANN, hypomagnesemia, hypokalemia, mildy elevated anion gap w/o acidosis. She was admitted for further evaluation and management.     PMH significant for COPD, JOANN, Morbid Obesity, former tobacco use, HFrEF, DM type II and HTN.    She was referred to rule out aspiration/silent aspiration s/p self extubation 3/10/25. She did pass an initial nursing dysphagia screener, initiated on a regular texture, thin liquids po diet. However, nursing staff reported increased difficulties with dinner tray last evening. She did accept po breakfast tray this morning w/o reported difficulties per RN.     Social History     Socioeconomic History    Marital status:    Tobacco Use    Smoking status: Every Day     Current packs/day: 1.00     Average packs/day: 1.4 packs/day for 31.0 years (44.6 ttl pk-yrs)     Types: Cigarettes     Start date: 1995     Passive exposure: Current    Smokeless tobacco: Never    Tobacco comments:     Hasn't smoked in 4 days   Vaping Use    Vaping status: Former    Substances: Nicotine, Flavoring    Devices: Refillable tank   Substance and Sexual Activity    Alcohol use: Never    Drug use: Never    Sexual activity: Not Currently     Partners: Male     Birth  "control/protection: Condom     Diet Orders (active) (From admission, onward)       Start     Ordered    03/10/25 1209  Diet: Regular/House, Diabetic; Consistent Carbohydrate; Fluid Consistency: Thin (IDDSI 0)  Diet Effective Now         03/10/25 1208                  Ms. Angel was positioned upright and centered in bed to accept multiple po presentations of ice chips, solid cracker, puree, and thin liquids via spoon, cup, and straw.  She was able to self provide po trials.     Facial/oral structures were symmetrical upon observation. Lingual protrusion revealed no deviation. Oral mucosa were moist, pink, and clean. Secretions were clear, thin, and well controlled. OROM/YUSUF was wfl to imitate oral postures. Gag is not assessed. Volitional cough was intact w/ adequate  intensity, clear in quality, non-productive. Voice was adequate in intensity, clear in quality w/ intelligible speech. She did endorse mild \"sore throat\" with chloraseptic spray at bedside. She did state this is improving daily.     Upon po presentations, adequate bolus anticipation and acceptance w/ good labial seal for bolus clearance via spoon bowl, cup rim stability and suction via straw. Bolus formation, manipulation and control were wfl w/ rotary mastication pattern. A-p transit was timely w/o significant oral residue appreciated. No overt s/s aspiration before the swallow.      Pharyngeal swallow was timely w/ adequate hyolaryngeal elevation per palpation. No overt s/s aspiration evidenced across this evaluation. No silent aspiration suspected. She denied odynophagia.    Visit Dx:     ICD-10-CM ICD-9-CM   1. Shortness of breath  R06.02 786.05   2. COPD exacerbation  J44.1 491.21   3. Sepsis, due to unspecified organism, unspecified whether acute organ dysfunction present  A41.9 038.9     995.91   4. Acute respiratory failure, unspecified whether with hypoxia or hypercapnia  J96.00 518.81     Patient Active Problem List   Diagnosis    Morbidly " obese    Acute respiratory failure with hypoxia and hypercapnia    Cutaneous abscess of back excluding buttocks    JOANN (obstructive sleep apnea)    Chronic bronchitis    Cigarette nicotine dependence without complication    Chronic respiratory failure with hypoxia    Diabetes mellitus, type II    Type 2 diabetes mellitus with hyperglycemia, with long-term current use of insulin    Severe sepsis    Sepsis due to pneumonia    Multifocal pneumonia    Respiratory failure     Past Medical History:   Diagnosis Date    Arthritis     CHF (congestive heart failure)     COPD (chronic obstructive pulmonary disease)     Diabetes insipidus     Diabetes mellitus     Elevated cholesterol     GERD (gastroesophageal reflux disease)     Hyperlipemia     Hypertension     JOANN (obstructive sleep apnea)     Pneumonia     Type 2 diabetes mellitus 2013    Vulvar cancer      Past Surgical History:   Procedure Laterality Date    HYSTERECTOMY      INCISION AND DRAINAGE TRUNK N/A 1/11/2022    Procedure: INCISION AND DRAINAGE BACK;  Surgeon: Tamera Saez MD;  Location: [facility];  Service: General;  Laterality: N/A;    MASTECTOMY      VULVA SURGERY      reconstruction due ca     EDUCATION  The patient has been educated in the following areas:   Dysphagia (Swallowing Impairment) Oral Care/Hydration.     Impression: Ms. Ortiz presented w/ wfl oropharyngeal swallow w/o s/s aspiration.No s/s indicative of silent aspiration.  No odynophagia reported. She did endorse mild sore throat in general, likely residual from self extubation yesterday.     She is recommended to continue least restrictive regular consistency po diet, thin liquids. Medications whole in puree, single pill presentations only, please. Upright and centered for all po intake.      SLP Recommendation and Plan   1. Continue regular texture po diet, thin liquids.    2. Medications whole in puree/thins.   3. Upright and centered for all po intake  4. ZORA precautions.  5. Oral care  protocol.  No further formal SLP f/u warranted/recommended at this time.    D/w patient results and recommendations w/ verbal agreement.    D/w RN results and recommendations w/ verbal agreement.    Thank you for allowing me to participate in the care of your patient-   Radha Rodríguez M.S,. CCC/SLP                                                                                                  Time Calculation:       Therapy Charges for Today       Code Description Service Date Service Provider Modifiers Qty    [id number] HC ST EVAL ORAL PHARYNG SWALLOW 4 3/11/2025 Radha Rodríguez, MS CCC-SLP GN 1                 Radha Rodríguez, MS CCC-SLP  3/11/2025

## 2025-03-11 NOTE — PLAN OF CARE
Problem: Noninvasive Ventilation Acute  Goal: Effective Unassisted Ventilation and Oxygenation  Outcome: Progressing     Problem: Mechanical Ventilation Invasive  Goal: Absence of Device-Related Skin and Tissue Injury  Intervention: Maintain Skin and Tissue Health  Recent Flowsheet Documentation  Taken 3/11/2025 0013 by Wendy Yanes CRT  Device Skin Pressure Protection: skin-to-device areas padded  Taken 3/10/2025 2206 by Wendy Yanes CRT  Device Skin Pressure Protection: skin-to-device areas padded  Taken 3/10/2025 2127 by Wendy Yanes CRT  Device Skin Pressure Protection: skin-to-device areas padded   Goal Outcome Evaluation:

## 2025-03-12 LAB
ANION GAP SERPL CALCULATED.3IONS-SCNC: 9.6 MMOL/L (ref 5–15)
BASOPHILS # BLD AUTO: 0.02 10*3/MM3 (ref 0–0.2)
BASOPHILS NFR BLD AUTO: 0.2 % (ref 0–1.5)
BUN SERPL-MCNC: 17 MG/DL (ref 6–20)
BUN/CREAT SERPL: 24.3 (ref 7–25)
CALCIUM SPEC-SCNC: 8.9 MG/DL (ref 8.6–10.5)
CHLORIDE SERPL-SCNC: 97 MMOL/L (ref 98–107)
CO2 SERPL-SCNC: 29.4 MMOL/L (ref 22–29)
CREAT SERPL-MCNC: 0.7 MG/DL (ref 0.57–1)
DEPRECATED RDW RBC AUTO: 48.2 FL (ref 37–54)
EGFRCR SERPLBLD CKD-EPI 2021: 106.2 ML/MIN/1.73
EOSINOPHIL # BLD AUTO: 0.03 10*3/MM3 (ref 0–0.4)
EOSINOPHIL NFR BLD AUTO: 0.4 % (ref 0.3–6.2)
ERYTHROCYTE [DISTWIDTH] IN BLOOD BY AUTOMATED COUNT: 13.8 % (ref 12.3–15.4)
GLUCOSE BLDC GLUCOMTR-MCNC: 103 MG/DL (ref 70–130)
GLUCOSE BLDC GLUCOMTR-MCNC: 104 MG/DL (ref 70–130)
GLUCOSE BLDC GLUCOMTR-MCNC: 135 MG/DL (ref 70–130)
GLUCOSE BLDC GLUCOMTR-MCNC: 157 MG/DL (ref 70–130)
GLUCOSE BLDC GLUCOMTR-MCNC: 161 MG/DL (ref 70–130)
GLUCOSE BLDC GLUCOMTR-MCNC: 162 MG/DL (ref 70–130)
GLUCOSE BLDC GLUCOMTR-MCNC: 162 MG/DL (ref 70–130)
GLUCOSE BLDC GLUCOMTR-MCNC: 166 MG/DL (ref 70–130)
GLUCOSE BLDC GLUCOMTR-MCNC: 168 MG/DL (ref 70–130)
GLUCOSE BLDC GLUCOMTR-MCNC: 171 MG/DL (ref 70–130)
GLUCOSE BLDC GLUCOMTR-MCNC: 179 MG/DL (ref 70–130)
GLUCOSE BLDC GLUCOMTR-MCNC: 179 MG/DL (ref 70–130)
GLUCOSE BLDC GLUCOMTR-MCNC: 180 MG/DL (ref 70–130)
GLUCOSE BLDC GLUCOMTR-MCNC: 188 MG/DL (ref 70–130)
GLUCOSE BLDC GLUCOMTR-MCNC: 196 MG/DL (ref 70–130)
GLUCOSE BLDC GLUCOMTR-MCNC: 209 MG/DL (ref 70–130)
GLUCOSE BLDC GLUCOMTR-MCNC: 218 MG/DL (ref 70–130)
GLUCOSE BLDC GLUCOMTR-MCNC: 226 MG/DL (ref 70–130)
GLUCOSE BLDC GLUCOMTR-MCNC: 329 MG/DL (ref 70–130)
GLUCOSE SERPL-MCNC: 167 MG/DL (ref 65–99)
HCT VFR BLD AUTO: 39.7 % (ref 34–46.6)
HGB BLD-MCNC: 12.8 G/DL (ref 12–15.9)
IMM GRANULOCYTES # BLD AUTO: 0.04 10*3/MM3 (ref 0–0.05)
IMM GRANULOCYTES NFR BLD AUTO: 0.5 % (ref 0–0.5)
LYMPHOCYTES # BLD AUTO: 1.02 10*3/MM3 (ref 0.7–3.1)
LYMPHOCYTES NFR BLD AUTO: 12.3 % (ref 19.6–45.3)
MAGNESIUM SERPL-MCNC: 1.6 MG/DL (ref 1.6–2.6)
MCH RBC QN AUTO: 30.5 PG (ref 26.6–33)
MCHC RBC AUTO-ENTMCNC: 32.2 G/DL (ref 31.5–35.7)
MCV RBC AUTO: 94.7 FL (ref 79–97)
MONOCYTES # BLD AUTO: 0.37 10*3/MM3 (ref 0.1–0.9)
MONOCYTES NFR BLD AUTO: 4.5 % (ref 5–12)
NEUTROPHILS NFR BLD AUTO: 6.81 10*3/MM3 (ref 1.7–7)
NEUTROPHILS NFR BLD AUTO: 82.1 % (ref 42.7–76)
NRBC BLD AUTO-RTO: 0 /100 WBC (ref 0–0.2)
PLATELET # BLD AUTO: 204 10*3/MM3 (ref 140–450)
PMV BLD AUTO: 9.6 FL (ref 6–12)
POTASSIUM SERPL-SCNC: 4.3 MMOL/L (ref 3.5–5.2)
QT INTERVAL: 354 MS
QTC INTERVAL: 430 MS
RBC # BLD AUTO: 4.19 10*6/MM3 (ref 3.77–5.28)
SODIUM SERPL-SCNC: 136 MMOL/L (ref 136–145)
WBC NRBC COR # BLD AUTO: 8.29 10*3/MM3 (ref 3.4–10.8)

## 2025-03-12 PROCEDURE — 94799 UNLISTED PULMONARY SVC/PX: CPT

## 2025-03-12 PROCEDURE — 94660 CPAP INITIATION&MGMT: CPT

## 2025-03-12 PROCEDURE — 80048 BASIC METABOLIC PNL TOTAL CA: CPT | Performed by: INTERNAL MEDICINE

## 2025-03-12 PROCEDURE — 94761 N-INVAS EAR/PLS OXIMETRY MLT: CPT

## 2025-03-12 PROCEDURE — 85025 COMPLETE CBC W/AUTO DIFF WBC: CPT | Performed by: INTERNAL MEDICINE

## 2025-03-12 PROCEDURE — 93005 ELECTROCARDIOGRAM TRACING: CPT | Performed by: INTERNAL MEDICINE

## 2025-03-12 PROCEDURE — 25010000002 ENOXAPARIN PER 10 MG: Performed by: INTERNAL MEDICINE

## 2025-03-12 PROCEDURE — 99232 SBSQ HOSP IP/OBS MODERATE 35: CPT | Performed by: INTERNAL MEDICINE

## 2025-03-12 PROCEDURE — 25010000002 CEFTRIAXONE PER 250 MG: Performed by: INTERNAL MEDICINE

## 2025-03-12 PROCEDURE — 63710000001 INSULIN LISPRO (HUMAN) PER 5 UNITS: Performed by: HOSPITALIST

## 2025-03-12 PROCEDURE — 25010000002 METHYLPREDNISOLONE PER 40 MG: Performed by: INTERNAL MEDICINE

## 2025-03-12 PROCEDURE — 25010000002 ONDANSETRON PER 1 MG: Performed by: INTERNAL MEDICINE

## 2025-03-12 PROCEDURE — 25010000002 MAGNESIUM SULFATE 2 GM/50ML SOLUTION: Performed by: INTERNAL MEDICINE

## 2025-03-12 PROCEDURE — 83735 ASSAY OF MAGNESIUM: CPT | Performed by: INTERNAL MEDICINE

## 2025-03-12 PROCEDURE — 25010000002 FUROSEMIDE PER 20 MG: Performed by: INTERNAL MEDICINE

## 2025-03-12 PROCEDURE — 82948 REAGENT STRIP/BLOOD GLUCOSE: CPT

## 2025-03-12 PROCEDURE — 63710000001 INSULIN GLARGINE PER 5 UNITS: Performed by: INTERNAL MEDICINE

## 2025-03-12 PROCEDURE — 63710000001 INSULIN LISPRO (HUMAN) PER 5 UNITS: Performed by: INTERNAL MEDICINE

## 2025-03-12 PROCEDURE — 99233 SBSQ HOSP IP/OBS HIGH 50: CPT | Performed by: INTERNAL MEDICINE

## 2025-03-12 PROCEDURE — 94664 DEMO&/EVAL PT USE INHALER: CPT

## 2025-03-12 RX ORDER — METOCLOPRAMIDE 10 MG/1
5 TABLET ORAL
Status: DISCONTINUED | OUTPATIENT
Start: 2025-03-12 | End: 2025-03-13 | Stop reason: HOSPADM

## 2025-03-12 RX ORDER — NICOTINE POLACRILEX 4 MG
15 LOZENGE BUCCAL
Status: DISCONTINUED | OUTPATIENT
Start: 2025-03-12 | End: 2025-03-13 | Stop reason: HOSPADM

## 2025-03-12 RX ORDER — INSULIN LISPRO 100 [IU]/ML
1-200 INJECTION, SOLUTION INTRAVENOUS; SUBCUTANEOUS
Status: DISCONTINUED | OUTPATIENT
Start: 2025-03-12 | End: 2025-03-12

## 2025-03-12 RX ORDER — MAGNESIUM SULFATE HEPTAHYDRATE 40 MG/ML
2 INJECTION, SOLUTION INTRAVENOUS ONCE
Status: COMPLETED | OUTPATIENT
Start: 2025-03-12 | End: 2025-03-12

## 2025-03-12 RX ORDER — FUROSEMIDE 10 MG/ML
20 INJECTION INTRAMUSCULAR; INTRAVENOUS ONCE
Status: COMPLETED | OUTPATIENT
Start: 2025-03-12 | End: 2025-03-12

## 2025-03-12 RX ORDER — DEXTROSE MONOHYDRATE 25 G/50ML
10-50 INJECTION, SOLUTION INTRAVENOUS
Status: DISCONTINUED | OUTPATIENT
Start: 2025-03-12 | End: 2025-03-13 | Stop reason: HOSPADM

## 2025-03-12 RX ORDER — INSULIN LISPRO 100 [IU]/ML
1-200 INJECTION, SOLUTION INTRAVENOUS; SUBCUTANEOUS AS NEEDED
Status: DISCONTINUED | OUTPATIENT
Start: 2025-03-12 | End: 2025-03-12

## 2025-03-12 RX ORDER — DEXTROSE MONOHYDRATE 25 G/50ML
25 INJECTION, SOLUTION INTRAVENOUS
Status: DISCONTINUED | OUTPATIENT
Start: 2025-03-12 | End: 2025-03-13 | Stop reason: HOSPADM

## 2025-03-12 RX ORDER — CALCIUM CARBONATE 500 MG/1
1 TABLET, CHEWABLE ORAL 3 TIMES DAILY PRN
Status: DISCONTINUED | OUTPATIENT
Start: 2025-03-12 | End: 2025-03-13 | Stop reason: HOSPADM

## 2025-03-12 RX ORDER — INSULIN LISPRO 100 [IU]/ML
15 INJECTION, SOLUTION INTRAVENOUS; SUBCUTANEOUS
Status: DISCONTINUED | OUTPATIENT
Start: 2025-03-12 | End: 2025-03-13

## 2025-03-12 RX ORDER — INSULIN LISPRO 100 [IU]/ML
3-14 INJECTION, SOLUTION INTRAVENOUS; SUBCUTANEOUS
Status: DISCONTINUED | OUTPATIENT
Start: 2025-03-12 | End: 2025-03-13 | Stop reason: HOSPADM

## 2025-03-12 RX ORDER — PANTOPRAZOLE SODIUM 40 MG/1
40 TABLET, DELAYED RELEASE ORAL
Status: DISCONTINUED | OUTPATIENT
Start: 2025-03-12 | End: 2025-03-13 | Stop reason: HOSPADM

## 2025-03-12 RX ADMIN — IPRATROPIUM BROMIDE AND ALBUTEROL SULFATE 3 ML: .5; 2.5 SOLUTION RESPIRATORY (INHALATION) at 00:17

## 2025-03-12 RX ADMIN — NICOTINE 1 PATCH: 14 PATCH, EXTENDED RELEASE TRANSDERMAL at 22:19

## 2025-03-12 RX ADMIN — LIDOCAINE 1 PATCH: 4 PATCH TOPICAL at 22:16

## 2025-03-12 RX ADMIN — MAGNESIUM SULFATE HEPTAHYDRATE 2 G: 40 INJECTION, SOLUTION INTRAVENOUS at 08:19

## 2025-03-12 RX ADMIN — NICOTINE POLACRILEX 4 MG: 2 GUM, CHEWING BUCCAL at 19:52

## 2025-03-12 RX ADMIN — CLONAZEPAM 1 MG: 1 TABLET ORAL at 14:46

## 2025-03-12 RX ADMIN — IPRATROPIUM BROMIDE AND ALBUTEROL SULFATE 3 ML: .5; 2.5 SOLUTION RESPIRATORY (INHALATION) at 18:29

## 2025-03-12 RX ADMIN — CLONAZEPAM 1 MG: 1 TABLET ORAL at 09:00

## 2025-03-12 RX ADMIN — BISACODYL 5 MG: 5 TABLET, COATED ORAL at 22:17

## 2025-03-12 RX ADMIN — METHYLPREDNISOLONE SODIUM SUCCINATE 40 MG: 40 INJECTION INTRAMUSCULAR; INTRAVENOUS at 05:21

## 2025-03-12 RX ADMIN — MUPIROCIN 1 APPLICATION: 20 OINTMENT TOPICAL at 08:19

## 2025-03-12 RX ADMIN — MUPIROCIN 1 APPLICATION: 20 OINTMENT TOPICAL at 21:28

## 2025-03-12 RX ADMIN — ENOXAPARIN SODIUM 40 MG: 40 INJECTION SUBCUTANEOUS at 13:22

## 2025-03-12 RX ADMIN — INSULIN LISPRO 10 UNITS: 100 INJECTION, SOLUTION INTRAVENOUS; SUBCUTANEOUS at 21:28

## 2025-03-12 RX ADMIN — Medication 10 ML: at 08:19

## 2025-03-12 RX ADMIN — SODIUM CHLORIDE 2000 MG: 9 INJECTION, SOLUTION INTRAVENOUS at 12:24

## 2025-03-12 RX ADMIN — GABAPENTIN 800 MG: 400 CAPSULE ORAL at 13:22

## 2025-03-12 RX ADMIN — GABAPENTIN 800 MG: 400 CAPSULE ORAL at 05:21

## 2025-03-12 RX ADMIN — CHOLECALCIFEROL (VITAMIN D3) 10 MCG (400 UNIT) TABLET 400 UNITS: at 08:19

## 2025-03-12 RX ADMIN — INSULIN GLARGINE 90 UNITS: 100 INJECTION, SOLUTION SUBCUTANEOUS at 13:22

## 2025-03-12 RX ADMIN — INSULIN HUMAN 4.1 UNITS/HR: 1 INJECTION, SOLUTION INTRAVENOUS at 05:20

## 2025-03-12 RX ADMIN — NICOTINE POLACRILEX 4 MG: 2 GUM, CHEWING BUCCAL at 09:00

## 2025-03-12 RX ADMIN — SENNOSIDES AND DOCUSATE SODIUM 2 TABLET: 50; 8.6 TABLET ORAL at 08:19

## 2025-03-12 RX ADMIN — IPRATROPIUM BROMIDE AND ALBUTEROL SULFATE 3 ML: .5; 2.5 SOLUTION RESPIRATORY (INHALATION) at 13:27

## 2025-03-12 RX ADMIN — METOCLOPRAMIDE 5 MG: 10 TABLET ORAL at 17:03

## 2025-03-12 RX ADMIN — CLONAZEPAM 1 MG: 1 TABLET ORAL at 21:28

## 2025-03-12 RX ADMIN — CYCLOBENZAPRINE 5 MG: 10 TABLET, FILM COATED ORAL at 18:13

## 2025-03-12 RX ADMIN — METHYLPREDNISOLONE SODIUM SUCCINATE 40 MG: 40 INJECTION INTRAMUSCULAR; INTRAVENOUS at 17:03

## 2025-03-12 RX ADMIN — INSULIN HUMAN 48.7 UNITS/HR: 1 INJECTION, SOLUTION INTRAVENOUS at 12:43

## 2025-03-12 RX ADMIN — ENOXAPARIN SODIUM 40 MG: 40 INJECTION SUBCUTANEOUS at 02:43

## 2025-03-12 RX ADMIN — ATORVASTATIN CALCIUM 40 MG: 40 TABLET, FILM COATED ORAL at 08:19

## 2025-03-12 RX ADMIN — Medication 10 ML: at 21:28

## 2025-03-12 RX ADMIN — CLONAZEPAM 1 MG: 1 TABLET ORAL at 02:47

## 2025-03-12 RX ADMIN — IPRATROPIUM BROMIDE AND ALBUTEROL SULFATE 3 ML: .5; 2.5 SOLUTION RESPIRATORY (INHALATION) at 07:02

## 2025-03-12 RX ADMIN — INSULIN LISPRO 15 UNITS: 100 INJECTION, SOLUTION INTRAVENOUS; SUBCUTANEOUS at 17:03

## 2025-03-12 RX ADMIN — SENNOSIDES AND DOCUSATE SODIUM 2 TABLET: 50; 8.6 TABLET ORAL at 21:28

## 2025-03-12 RX ADMIN — ONDANSETRON 4 MG: 2 INJECTION INTRAMUSCULAR; INTRAVENOUS at 21:18

## 2025-03-12 RX ADMIN — CETIRIZINE HYDROCHLORIDE 10 MG: 10 TABLET, FILM COATED ORAL at 08:19

## 2025-03-12 RX ADMIN — GABAPENTIN 800 MG: 400 CAPSULE ORAL at 21:28

## 2025-03-12 RX ADMIN — CALCIUM CARBONATE (ANTACID) CHEW TAB 500 MG 1 TABLET: 500 CHEW TAB at 21:28

## 2025-03-12 RX ADMIN — FUROSEMIDE 20 MG: 10 INJECTION, SOLUTION INTRAMUSCULAR; INTRAVENOUS at 10:31

## 2025-03-12 RX ADMIN — PANTOPRAZOLE SODIUM 40 MG: 40 TABLET, DELAYED RELEASE ORAL at 21:28

## 2025-03-12 NOTE — PLAN OF CARE
Goal Outcome Evaluation:  Plan of Care Reviewed With: patient        Progress: improving  Outcome Evaluation: Care ongoing.

## 2025-03-12 NOTE — PROGRESS NOTES
Referring Provider: Dr. Clayton  Reason for Consultation: Hypoxic and hypercarbic respiratory failure.      Chief complaint -could not be obtained as patient on my assessment was intubated and sedated.      Sub-  Overnight events reviewed.  All the labs medications ins and outs and vitals reviewed.  Remains on insulin drip.  Eating.  Will switch to patient's home insulin.  Discussed in rounds with pharmacist.  Lasix 20         Review of Systems  Generalized fatigue and tiredness otherwise negative.        History  Past Medical History:   Diagnosis Date    Arthritis     CHF (congestive heart failure)     COPD (chronic obstructive pulmonary disease)     Diabetes insipidus     Diabetes mellitus     Elevated cholesterol     GERD (gastroesophageal reflux disease)     Hyperlipemia     Hypertension     JOANN (obstructive sleep apnea)     Pneumonia     Type 2 diabetes mellitus 2013    Vulvar cancer    ,   Past Surgical History:   Procedure Laterality Date    HYSTERECTOMY      INCISION AND DRAINAGE TRUNK N/A 1/11/2022    Procedure: INCISION AND DRAINAGE BACK;  Surgeon: Tamera Saez MD;  Location: [facility];  Service: General;  Laterality: N/A;    MASTECTOMY      VULVA SURGERY      reconstruction due ca   ,   Family History   Problem Relation Age of Onset    Cancer Mother     Hypertension Mother     Parkinsonism Father     Diabetes Father     Hypertension Father     Obesity Father     Cancer Maternal Aunt     Diabetes Paternal Aunt     Diabetes Paternal Grandmother     Cancer Maternal Aunt     Diabetes Brother     Obesity Brother    ,   Social History     Tobacco Use    Smoking status: Every Day     Current packs/day: 1.00     Average packs/day: 1.4 packs/day for 31.0 years (44.6 ttl pk-yrs)     Types: Cigarettes     Start date: 5/18/1995     Passive exposure: Current    Smokeless tobacco: Never    Tobacco comments:     Hasn't smoked in 4 days   Vaping Use    Vaping status: Former    Substances: Nicotine, Flavoring     Devices: ASSIA tank   Substance Use Topics    Alcohol use: Never    Drug use: Never   ,   Medications Prior to Admission   Medication Sig Dispense Refill Last Dose/Taking    albuterol sulfate HFA (Ventolin HFA) 108 (90 Base) MCG/ACT inhaler Inhale 2 puffs Every 4 (Four) Hours As Needed for Wheezing or Shortness of Air. 18 g 12 Unknown    budesonide-formoterol (Symbicort) 160-4.5 MCG/ACT inhaler Inhale 2 puffs 2 (Two) Times a Day. 1 each 8 Unknown    cetirizine (zyrTEC) 10 MG tablet Take 1 tablet by mouth Daily.   Unknown    Cholecalciferol 10 MCG (400 UNIT) tablet Take 1 tablet by mouth Daily.   Unknown    clonazePAM (KlonoPIN) 1 MG tablet Take 1 tablet by mouth 4 (Four) Times a Day As Needed for Anxiety.   Unknown    cyclobenzaprine (FLEXERIL) 5 MG tablet Take 1 tablet by mouth 3 (Three) Times a Day As Needed for Muscle Spasms.   Unknown    dicyclomine (BENTYL) 10 MG capsule Take 1 capsule by mouth 4 (Four) Times a Day As Needed for Abdominal Cramping.   Unknown    fluticasone (FLONASE) 50 MCG/ACT nasal spray Administer 2 sprays into the nostril(s) as directed by provider Daily As Needed for Rhinitis or Allergies.   Unknown    gabapentin (NEURONTIN) 800 MG tablet Take 1 tablet by mouth 4 (Four) Times a Day.   Unknown    hydroCHLOROthiazide (HYDRODIURIL) 25 MG tablet Take 1 tablet by mouth Every Morning.   Unknown    ibuprofen (ADVIL,MOTRIN) 800 MG tablet Take 1 tablet by mouth Every 8 (Eight) Hours As Needed for Mild Pain.   Unknown    imiquimod (ALDARA) 5 % cream Apply 1 Application topically to the appropriate area as directed 3 (Three) Times a Week.   Unknown    insulin aspart (NovoLOG FlexPen) 100 UNIT/ML solution pen-injector sc pen Inject 15 Units under the skin into the appropriate area as directed 3 (Three) Times a Day With Meals. 15 mL 2 Unknown    Insulin Degludec (Tresiba FlexTouch) 200 UNIT/ML solution pen-injector pen injection Inject 90 Units under the skin into the appropriate area as  directed every night at bedtime. 15 mL 2 Unknown    ipratropium-albuterol (DUO-NEB) 0.5-2.5 mg/3 ml nebulizer Take 3 mL by nebulization Every 6 (Six) Hours As Needed for Wheezing or Shortness of Air. 360 mL 5 Unknown    lidocaine (LIDODERM) 5 % Place 1 patch on the skin as directed by provider Daily. Remove & Discard patch within 12 hours or as directed by MD   Unknown    lisinopril (PRINIVIL,ZESTRIL) 5 MG tablet Take 0.5 tablets by mouth Daily.   Unknown    nicotine (NICODERM CQ) 14 MG/24HR patch Place 1 patch on the skin as directed by provider Daily. 28 each 3 Unknown    nicotine polacrilex (NICORETTE) 4 MG gum Chew 1 each As Needed for Smoking Cessation. 220 each 5 Unknown    omeprazole (priLOSEC) 20 MG capsule Take 1 capsule by mouth Daily.   Unknown    ondansetron ODT (ZOFRAN-ODT) 8 MG disintegrating tablet Place 1 tablet on the tongue Daily As Needed for Nausea or Vomiting.   Unknown    simvastatin (ZOCOR) 80 MG tablet Take 1 tablet by mouth every night at bedtime.   Unknown    tiotropium (Spiriva HandiHaler) 18 MCG per inhalation capsule Place 1 capsule into inhaler and inhale Daily. 30 capsule 8 Unknown    Tirzepatide 5 MG/0.5ML solution auto-injector Inject 5 mg under the skin into the appropriate area as directed 1 (One) Time Per Week. 2 mL 1 Unknown   , Scheduled Meds:  atorvastatin, 40 mg, Oral, Daily  cefTRIAXone, 2,000 mg, Intravenous, Q24H  cetirizine, 10 mg, Oral, Daily  cholecalciferol, 400 Units, Oral, Daily  clonazePAM, 0.5 mg, Oral, Once  enoxaparin sodium, 40 mg, Subcutaneous, Q12H  gabapentin, 800 mg, Oral, Q8H  Edit Initial Basal Insulin Transition Dose, 1 each, Not Applicable, Once  ipratropium-albuterol, 3 mL, Nebulization, Q6H - RT  Lidocaine, 1 patch, Transdermal, Q24H  magnesium sulfate, 2 g, Intravenous, Once  methylPREDNISolone sodium succinate, 40 mg, Intravenous, Q12H  mupirocin, 1 Application, Each Nare, BID  nicotine, 1 patch, Transdermal, Q24H  senna-docusate sodium, 2 tablet,  Oral, BID  sodium chloride, 10 mL, Intravenous, Q12H  sodium chloride, 10 mL, Intravenous, Q12H  sodium chloride, 10 mL, Intravenous, Q12H  sodium chloride, 10 mL, Intravenous, Q12H  sodium chloride, 10 mL, Intravenous, Q12H  sodium chloride, 10 mL, Intravenous, Q12H    , Continuous Infusions:  insulin, 0-100 Units/hr, Last Rate: 27.3 Units/hr (03/12/25 0855)     and Allergies:  Codeine and Metformin    Objective     Vital Signs   Temp:  [98.3 °F (36.8 °C)-98.7 °F (37.1 °C)] 98.7 °F (37.1 °C)  Heart Rate:  [] 100  Resp:  [18-29] 22  BP: (108-173)/() 108/80    Physical Exam:             General-acutely ill appearance, not in any acute distress    HEENT-PERRLA    Neck-supple    Respiratory-respirations normal-on auscultation no wheezing no crackles, decreased breath sounds due to patient's body habitus    Cardiovascular-NSR  GI-nontender nondistended bowel sounds positive    CNS-nonfocal    Musculoskeletal -no edema  Extremities- no obvious deformity noticed     Psychiatric-alert awake oriented  Skin- no visible rash                                                                   Results Review:    LABS:    Lab Results   Component Value Date    GLUCOSE 167 (H) 03/12/2025    BUN 17 03/12/2025    CREATININE 0.70 03/12/2025    EGFRIFNONA 93 01/13/2022    BCR 24.3 03/12/2025    CO2 29.4 (H) 03/12/2025    CALCIUM 8.9 03/12/2025    ALBUMIN 3.1 (L) 03/11/2025    AST 22 03/11/2025    ALT 18 03/11/2025    WBC 8.29 03/12/2025    HGB 12.8 03/12/2025    HCT 39.7 03/12/2025    MCV 94.7 03/12/2025     03/12/2025     03/12/2025    K 4.3 03/12/2025    CL 97 (L) 03/12/2025    ANIONGAP 9.6 03/12/2025       Lab Results   Component Value Date    INR 1.09 03/08/2025    INR 1.00 09/09/2024    INR 1.00 03/17/2024    PROTIME 14.2 03/08/2025    PROTIME 13.3 09/09/2024    PROTIME 13.7 03/17/2024       Results from last 7 days   Lab Units 03/08/25  1756   INR  1.09   APTT seconds 30.0          I reviewed the patient's  new clinical results.  I reviewed the patient's new imaging results and agree with the interpretation.  Microbiology Results (last 10 days)       Procedure Component Value - Date/Time    MRSA Screen, PCR (Inpatient) - Swab, Nares [[id number]]  (Normal) Collected: 03/09/25 0350    Lab Status: Final result Specimen: Swab from Nares Updated: 03/09/25 0509     MRSA PCR No MRSA Detected    Narrative:      The negative predictive value of this diagnostic test is high and should only be used to consider de-escalating anti-MRSA therapy. A positive result may indicate colonization with MRSA and must be correlated clinically.    S. Pneumo Ag Urine or CSF - Urine, Urine, Clean Catch [[id number]]  (Normal) Collected: 03/09/25 0349    Lab Status: Final result Specimen: Urine, Clean Catch Updated: 03/09/25 1349     Strep Pneumo Ag Negative    Legionella Antigen, Urine - Urine, Indwelling Urethral Catheter [[id number]]  (Normal) Collected: 03/08/25 1859    Lab Status: Final result Specimen: Urine from Indwelling Urethral Catheter Updated: 03/09/25 0121     LEGIONELLA ANTIGEN, URINE Negative    Narrative:      Presumptive negative for L. pneumophilia serogroup 1 antigen, suggesting no recent or current infection.    Respiratory Panel PCR w/COVID-19(SARS-CoV-2) HARDY/ADAM/LUIS MIGUEL/PAD/COR/RAMA In-House, NP Swab in UTM/VTM, 2 HR TAT - Swab, Nasopharynx [[id number]]  (Normal) Collected: 03/08/25 1840    Lab Status: Final result Specimen: Swab from Nasopharynx Updated: 03/08/25 1929     ADENOVIRUS, PCR Not Detected     Coronavirus 229E Not Detected     Coronavirus HKU1 Not Detected     Coronavirus NL63 Not Detected     Coronavirus OC43 Not Detected     COVID19 Not Detected     Human Metapneumovirus Not Detected     Human Rhinovirus/Enterovirus Not Detected     Influenza A PCR Not Detected     Influenza B PCR Not Detected     Parainfluenza Virus 1 Not Detected     Parainfluenza Virus 2 Not Detected     Parainfluenza Virus 3 Not Detected      Parainfluenza Virus 4 Not Detected     RSV, PCR Not Detected     Bordetella pertussis pcr Not Detected     Bordetella parapertussis PCR Not Detected     Chlamydophila pneumoniae PCR Not Detected     Mycoplasma pneumo by PCR Not Detected    Narrative:      In the setting of a positive respiratory panel with a viral infection PLUS a negative procalcitonin without other underlying concern for bacterial infection, consider observing off antibiotics or discontinuation of antibiotics and continue supportive care. If the respiratory panel is positive for atypical bacterial infection (Bordetella pertussis, Chlamydophila pneumoniae, or Mycoplasma pneumoniae), consider antibiotic de-escalation to target atypical bacterial infection.    Blood Culture - Blood, Arm, Right [[id number]]  (Normal) Collected: 03/08/25 1756    Lab Status: Preliminary result Specimen: Blood from Arm, Right Updated: 03/11/25 1915     Blood Culture No growth at 3 days    Blood Culture - Blood, Arm, Left [[id number]]  (Normal) Collected: 03/08/25 1756    Lab Status: Preliminary result Specimen: Blood from Arm, Left Updated: 03/11/25 1915     Blood Culture No growth at 3 days                 Assessment & Plan    Latest Reference Range & Units 03/11/25 00:38   CO2 Content 22 - 33 mmol/L 32.8   Carboxyhemoglobin Venous 0.0 - 5.0 % 1.9   Methemoglobin Venous 0.0 - 3.0 % 0.6   Oxyhemoglobin Venous 45.0 - 75.0 % 73.3   Hemoglobin, Blood Gas 13.5 - 17.5 g/dL 12.0 (L)   Site  Lab   Modality  Room Air   FIO2 % 50   Ventilator Mode  BiPAP   Set [facility] Resp Rate  16.0   IPAP  8   EPAP  18   Barometric Pressure for Blood Gas mmHg 722   pH, Venous 7.320 - 7.420 pH Units 7.409   pCO2, Venous 41.0 - 51.0 mm Hg 49.4   pO2, Venous 27.0 - 53.0 mm Hg 40.6   HCO3, Venous 22.0 - 28.0 mmol/L 31.3 (H)   Base Excess 0.0 - 2.0 mmol/L 5.6 (H)   O2 Saturation, Venous 45.0 - 75.0 % 75.2 (H)   Collected by  [id number]   (L): Data is abnormally low  (H): Data is abnormally  high  Hypomagnesemia-replaced.      Neurology-s alert awake oriented able to protect her airway.      Respiratory-acute on chronic hypoxic and hypercarbic respiratory failure    Chest x-ray shows bilateral pulmonary infiltrates.  CT chest reviewed.     Use BiPAP for some time during nighttime    It is microbiology reviewed and is negative so far.    Continue current antibiotics.  Continue steroids.  Continue nebs.  Will decrease steroids  Continue oxygen to maintain saturation 88 to 92%.        Procalitonin Results:      Lab 03/11/25  0034 03/08/25  1756   PROCALCITONIN 0.13 0.18      Latest chest x-ray reviewed.  Will give diuretics to improve lung compliance and diffusion capacity.    ABG -latest reviewed  Latest VBG reviewed.      Ins and outs reviewed.    Cardiology- hemodynamically -stable.  Vitals reviewed.  Continue to monitor HR- rate and rhythm, BP     Nephrology- Cr and BUN stable  I/O-reviewed avoid nephrotoxic agents    GI- PPI prophylaxis  Continue current diet.  Nauseous-likely has diabetic gastroparesis.  Will get EKG and start Reglan if QTc interval is good.    QTc interval within normal limits.  Will start Reglan and get another EKG tomorrow morning to make sure QTc remained stable.    Hematology- CBC  Latest reviewed continue to monitor.  Transfuse for hemoglobin less than 7    ID  Culture-reviewed  And Antibiotics-reviewed discussed with pharmacist and adjusted.  Procalcitonin level negative.    Endrocrinology- Maintain Blood sugar 140 -180  Switch insulin drip to home dose of patient's insulin.      Electrolytes-   Mag and phos       DVT prophylaxis-continue            Multifocal pneumonia    Respiratory failure          Floyd Turcios MD  03/12/25  09:12 EDT

## 2025-03-12 NOTE — CASE MANAGEMENT/SOCIAL WORK
Discharge Planning Assessment  [facility]     Patient Name: Ana Ortiz  MRN: [id number]  Today's Date: 3/12/2025    Admit Date: 3/8/2025    Plan: CM spoke with pt at bedside. Pt plans to return home at d/c. Pts PCP is Jose Antonio Espinoza and fills prescriptions at [facility].Pt has a cpap via [employer], oxygen 2lnc qhs, BSC, walker and glucometer from [employer]. Pt has [employer] [place] denies any trouble with coverage. Family will transport at d/c. CM will follow.   Discharge Needs Assessment    No documentation.                  Discharge Plan       Row Name 03/12/25 1318       Plan    Plan CM spoke with pt at bedside. Pt plans to return home at d/c. Pts PCP is Jose Antonio Espinoza and fills prescriptions at [facility].Pt has a cpap via [employer], oxygen 2lnc qhs, BSC, walker and glucometer from [employer]. Pt has [employer] [place] denies any trouble with coverage. Family will transport at d/c. CM will follow.                      Gabriella Diaz RN

## 2025-03-12 NOTE — PLAN OF CARE
Patient on NC 4L all day.  Problem: Noninvasive Ventilation Acute  Goal: Effective Unassisted Ventilation and Oxygenation  Outcome: Progressing   Goal Outcome Evaluation:

## 2025-03-12 NOTE — PLAN OF CARE
Problem: Noninvasive Ventilation Acute  Goal: Effective Unassisted Ventilation and Oxygenation  Outcome: Progressing     Problem: Adult Inpatient Plan of Care  Goal: Plan of Care Review  Outcome: Progressing  Flowsheets (Taken 3/12/2025 0553)  Progress: improving  Outcome Evaluation: Insulin gtt infusing. Glucose improving. VSS  Plan of Care Reviewed With: patient  Goal: Patient-Specific Goal (Individualized)  Outcome: Progressing  Goal: Absence of Hospital-Acquired Illness or Injury  Outcome: Progressing  Intervention: Identify and Manage Fall Risk  Recent Flowsheet Documentation  Taken 3/12/2025 0500 by Yuli Owusu RN  Safety Promotion/Fall Prevention:   fall prevention program maintained   safety round/check completed  Taken 3/12/2025 0400 by Yuli Owusu RN  Safety Promotion/Fall Prevention:   fall prevention program maintained   safety round/check completed  Taken 3/12/2025 0300 by Yuli Owusu RN  Safety Promotion/Fall Prevention:   fall prevention program maintained   safety round/check completed  Taken 3/12/2025 0200 by Yuli Owusu RN  Safety Promotion/Fall Prevention:   fall prevention program maintained   safety round/check completed  Taken 3/12/2025 0100 by Yuli Owusu RN  Safety Promotion/Fall Prevention:   fall prevention program maintained   safety round/check completed  Taken 3/12/2025 0000 by Yuli Owusu RN  Safety Promotion/Fall Prevention:   fall prevention program maintained   safety round/check completed  Intervention: Prevent Skin Injury  Recent Flowsheet Documentation  Taken 3/12/2025 0400 by Yuli Owusu RN  Body Position:   right   position changed independently   side-lying   lower extremity elevated   upper extremity elevated  Taken 3/12/2025 0200 by Yuli Owusu RN  Body Position:   left   heels elevated   lower extremity elevated   side-lying   upper extremity elevated   position changed independently  Taken 3/12/2025 0100 by Yuli Owusu RN  Skin  Protection: transparent dressing maintained  Taken 3/12/2025 0000 by Yuli Owusu RN  Body Position:   right   side-lying   position changed independently  Intervention: Prevent Infection  Recent Flowsheet Documentation  Taken 3/12/2025 0100 by Yuli Owusu RN  Infection Prevention: rest/sleep promoted  Goal: Optimal Comfort and Wellbeing  Outcome: Progressing  Intervention: Provide Person-Centered Care  Recent Flowsheet Documentation  Taken 3/12/2025 0100 by Yuli Owusu RN  Trust Relationship/Rapport:   care explained   choices provided   reassurance provided   thoughts/feelings acknowledged  Goal: Readiness for Transition of Care  Outcome: Progressing     Problem: Mechanical Ventilation Invasive  Goal: Effective Communication  Outcome: Progressing  Intervention: Ensure Effective Communication  Recent Flowsheet Documentation  Taken 3/12/2025 0100 by Yuli Owusu RN  Trust Relationship/Rapport:   care explained   choices provided   reassurance provided   thoughts/feelings acknowledged  Diversional Activities:   television   smartphone  Goal: Optimal Device Function  Outcome: Progressing  Intervention: Optimize Device Care and Function  Recent Flowsheet Documentation  Taken 3/12/2025 0400 by Yuli Owusu RN  Oral Care: oral rinse provided  Taken 3/12/2025 0100 by Yuli Owusu RN  Airway Safety Measures: suction at bedside  Taken 3/12/2025 0000 by Yuli Owusu RN  Oral Care: oral rinse provided  Goal: Mechanical Ventilation Liberation  Outcome: Progressing  Intervention: Promote Extubation and Mechanical Ventilation Liberation  Recent Flowsheet Documentation  Taken 3/12/2025 0100 by Yuli Owusu RN  Environmental Support: calm environment promoted  Sleep/Rest Enhancement:   awakenings minimized   consistent schedule promoted   regular sleep/rest pattern promoted  Goal: Optimal Nutrition Delivery  Outcome: Progressing  Goal: Absence of Device-Related Skin and Tissue Injury  Outcome:  Progressing  Intervention: Maintain Skin and Tissue Health  Recent Flowsheet Documentation  Taken 3/12/2025 0100 by Yuli Owusu RN  Device Skin Pressure Protection:   adhesive use limited   skin-to-device areas padded  Goal: Absence of Ventilator-Induced Lung Injury  Outcome: Progressing  Intervention: Prevent Ventilator-Associated Pneumonia  Recent Flowsheet Documentation  Taken 3/12/2025 0400 by Yuli Owusu RN  Head of Bed ([facility]) Positioning: HOB at 30-45 degrees  Oral Care: oral rinse provided  Taken 3/12/2025 0200 by Yuli Owusu RN  Head of Bed ([facility]) Positioning: HOB at 30-45 degrees  Taken 3/12/2025 0000 by Yuli Owusu RN  Head of Bed ([facility]) Positioning: HOB at 30-45 degrees  Oral Care: oral rinse provided     Problem: Comorbidity Management  Goal: Maintenance of COPD Symptom Control  Outcome: Progressing  Goal: Blood Glucose Level Within Target Range  Outcome: Progressing  Goal: Maintenance of Heart Failure Symptom Control  Outcome: Progressing  Goal: Blood Pressure in Desired Range  Outcome: Progressing     Problem: Violence Risk or Actual  Goal: Anger and Impulse Control  Outcome: Progressing  Intervention: Promote Self-Control  Recent Flowsheet Documentation  Taken 3/12/2025 0100 by Yuli Owusu RN  Environmental Support: calm environment promoted     Problem: Skin Injury Risk Increased  Goal: Skin Health and Integrity  Outcome: Progressing  Intervention: Optimize Skin Protection  Recent Flowsheet Documentation  Taken 3/12/2025 0400 by Yuli Owusu RN  Head of Bed ([facility]) Positioning: HOB at 30-45 degrees  Taken 3/12/2025 0200 by Yuli Owusu RN  Head of Bed ([facility]) Positioning: HOB at 30-45 degrees  Taken 3/12/2025 0100 by Yuli Owusu RN  Pressure Reduction Techniques: frequent weight shift encouraged  Pressure Reduction Devices: specialty bed utilized  Skin Protection: transparent dressing maintained  Taken 3/12/2025 0000 by Yuli Owusu RN  Head of Bed ([facility])  Positioning: HOB at 30-45 degrees     Problem: Fall Injury Risk  Goal: Absence of Fall and Fall-Related Injury  Outcome: Progressing  Intervention: Promote Injury-Free Environment  Recent Flowsheet Documentation  Taken 3/12/2025 0500 by Yuli Owusu RN  Safety Promotion/Fall Prevention:   fall prevention program maintained   safety round/check completed  Taken 3/12/2025 0400 by Yuli Owusu RN  Safety Promotion/Fall Prevention:   fall prevention program maintained   safety round/check completed  Taken 3/12/2025 0300 by Yuli Owusu RN  Safety Promotion/Fall Prevention:   fall prevention program maintained   safety round/check completed  Taken 3/12/2025 0200 by Yuli Owusu RN  Safety Promotion/Fall Prevention:   fall prevention program maintained   safety round/check completed  Taken 3/12/2025 0100 by Yuli Owusu RN  Safety Promotion/Fall Prevention:   fall prevention program maintained   safety round/check completed  Taken 3/12/2025 0000 by Yuli Owusu RN  Safety Promotion/Fall Prevention:   fall prevention program maintained   safety round/check completed     Problem: Sepsis/Septic Shock  Goal: Optimal Coping  Outcome: Progressing  Goal: Absence of Bleeding  Outcome: Progressing  Goal: Blood Glucose Level Within Target Range  Outcome: Progressing  Intervention: Optimize Glycemic Control  Recent Flowsheet Documentation  Taken 3/12/2025 0100 by Yuli Owusu RN  Hyperglycemia Management: blood glucose monitored  Hypoglycemia Management: blood glucose monitored  Goal: Absence of Infection Signs and Symptoms  Outcome: Progressing  Intervention: Initiate Sepsis Management  Recent Flowsheet Documentation  Taken 3/12/2025 0100 by Yuli Owusu RN  Infection Prevention: rest/sleep promoted  Intervention: Promote Stabilization  Recent Flowsheet Documentation  Taken 3/12/2025 0100 by Yuli Owusu RN  Fever Reduction/Comfort Measures:   lightweight bedding   lightweight  clothing  Intervention: Promote Recovery  Recent Flowsheet Documentation  Taken 3/12/2025 0100 by Yuli Owusu, RN  Sleep/Rest Enhancement:   awakenings minimized   consistent schedule promoted   regular sleep/rest pattern promoted  Goal: Optimal Nutrition Delivery  Outcome: Progressing   Goal Outcome Evaluation:  Plan of Care Reviewed With: patient        Progress: improving  Outcome Evaluation: Insulin gtt infusing. Glucose improving. VSS

## 2025-03-12 NOTE — PROGRESS NOTES
[facility] HOSPITALIST PROGRESS NOTE    Subjective     History:   Ana Ortiz is a 49 y.o. female admitted on 3/8/2025 secondary to Multifocal pneumonia     Procedures:   3/8/25: Intubation in the ED  3/8/25: Right IJ central line placement in the ED  3/10/25: Extubation     CC: Follow up resp failure     Patient seen and examined this AM. Awake and alert. States she is feeling better today with improving dyspnea. No reported CP. No reported vomiting today. Increased lethargy reported last PM. No acute events overnight per RN.     History taken from: patient, chart, and RN.      Objective     Vital Signs  Temp:  [97.6 °F (36.4 °C)-98.7 °F (37.1 °C)] 97.6 °F (36.4 °C)  Heart Rate:  [] 93  Resp:  [18-26] 22  BP: (108-173)/() 141/103    Intake/Output Summary (Last 24 hours) at 3/12/2025 1342  Last data filed at 3/12/2025 1229  Gross per 24 hour   Intake 1513.47 ml   Output 700 ml   Net 813.47 ml         Physical Exam:  General:    Awake and alert, in no acute distress, obese   Heart:      Normal S1 and S2. Regular rate and rhythm. No significant murmur, rubs or gallops appreciated.   Lungs:     Respirations regular, even and unlabored. Scattered expiratory wheezes but aeration overall improved.      Abdomen:   Soft and nontender. No guarding, rebound tenderness or  organomegaly noted. Bowel sounds present x 4.   Extremities:  No clubbing, cyanosis or edema noted.      Results Review:    Results from last 7 days   Lab Units 03/12/25  0040 03/11/25  0034 03/09/25 2327 03/09/25  1005 03/08/25  1756   WBC 10*3/mm3 8.29 15.37* 15.04* 13.56* 19.83*   HEMOGLOBIN g/dL 12.8 11.5* 11.3* 12.1 13.9   PLATELETS 10*3/mm3 204 202 199 182 213     Results from last 7 days   Lab Units 03/12/25  0040 03/11/25  0034 03/09/25 2327 03/09/25  1006 03/08/25  1756   SODIUM mmol/L 136 138 143 137 130*   POTASSIUM mmol/L 4.3 4.3 4.9  4.9 3.6 3.4*   CHLORIDE mmol/L 97* 100 107 100 87*   CO2 mmol/L 29.4* 25.5  29.5* 27.7 27.9   BUN mg/dL 17 21* 22* 16 10   CREATININE mg/dL 0.70 0.83 0.77 0.73 0.88   CALCIUM mg/dL 8.9 9.1 8.3* 8.4* 8.8   GLUCOSE mg/dL 167* 153* 204* 264* 439*     Results from last 7 days   Lab Units 03/11/25  0034 03/09/25  2327 03/09/25  1006 03/08/25  1756   BILIRUBIN mg/dL 0.2 <0.2 0.2 0.5   ALK PHOS U/L 88 78 91 122*   AST (SGOT) U/L 22 14 14 17   ALT (SGPT) U/L 18 12 12 14     Results from last 7 days   Lab Units 03/12/25  0040 03/11/25  0034 03/09/25  2327 03/08/25  1756   MAGNESIUM mg/dL 1.6 1.5* 2.5 1.4*     Results from last 7 days   Lab Units 03/08/25  1756   INR  1.09     Results from last 7 days   Lab Units 03/08/25  1851 03/08/25  1756   HSTROP T ng/L 9 10       Imaging Results (Last 24 Hours)       ** No results found for the last 24 hours. **              Medications:  atorvastatin, 40 mg, Oral, Daily  cefTRIAXone, 2,000 mg, Intravenous, Q24H  cetirizine, 10 mg, Oral, Daily  cholecalciferol, 400 Units, Oral, Daily  enoxaparin sodium, 40 mg, Subcutaneous, Q12H  gabapentin, 800 mg, Oral, Q8H  insulin glargine, 90 Units, Subcutaneous, Daily  insulin lispro, 15 Units, Subcutaneous, TID With Meals  ipratropium-albuterol, 3 mL, Nebulization, Q6H - RT  Lidocaine, 1 patch, Transdermal, Q24H  methylPREDNISolone sodium succinate, 40 mg, Intravenous, Q12H  mupirocin, 1 Application, Each Nare, BID  nicotine, 1 patch, Transdermal, Q24H  senna-docusate sodium, 2 tablet, Oral, BID  sodium chloride, 10 mL, Intravenous, Q12H  sodium chloride, 10 mL, Intravenous, Q12H  sodium chloride, 10 mL, Intravenous, Q12H  sodium chloride, 10 mL, Intravenous, Q12H  sodium chloride, 10 mL, Intravenous, Q12H  sodium chloride, 10 mL, Intravenous, Q12H      insulin, 0-100 Units/hr, Last Rate: 12 Units/hr (03/12/25 1326)              Assessment & Plan   Multifocal bacterial pneumonia: Suspect gram negative. MRSA PCR is negative. Strep pneumo and Legionella urine antigens negative. Completed short course of azithromycin. Cont  Rocephin. Follow cultures.     Acute exacerbation of COPD: Respiratory PCR is negative. Cont treatment as above, steroids and nebs.     Acute hypercapnic on chronic hypoxic respiratory failure: Likely 2/2 above. No evidence of PE on CT chest. Intubated in the ED. Clinically improved with treatment as outlined above. SAT initiated on 3/10 and pt self extubated. Currently stable on 4L's NC. Cont treatment as outlined above. Intermittent diuresis to assist with lung compliance. Consult PT/OT. Cont to monitor closely. Pulm input appreciated.     DM II, insulin dependent with hyperglycemia: HgbA1c 12.4 in 1/25. Improved with insulin gtt. Transition to SQ regimen. Cont to monitor closely.     Hypokalemia: K+ improved with supplementation and stable today. Mg <2 and replaced. Cont to monitor.     Chronic HFpEF: Echo in 3/24 revealed an EF of 61-65% and grade I diastolic dysfunction. Appears compensated at present. Monitor volume status.      Essential HTN: BP previously borderline low intermittently but trended upward once off sedation. Cont to monitor.     HLD: Cont statin.     Tobacco use disorder: Cont NRT and encourage cessation.     Morbid obesity by BMI: Complicates all aspects of care.     PPX  GI: Pepcid  DVT: Lovenox      Transfer to PCU.     Disposition Pending clinical course, likely home when medically stable.     Yury Herzog DO  03/12/25  13:42 EDT

## 2025-03-13 ENCOUNTER — READMISSION MANAGEMENT (OUTPATIENT)
Dept: CALL CENTER | Facility: HOSPITAL | Age: 50
End: 2025-03-13
Payer: MEDICAID

## 2025-03-13 VITALS
DIASTOLIC BLOOD PRESSURE: 88 MMHG | BODY MASS INDEX: 43.75 KG/M2 | TEMPERATURE: 98.6 F | RESPIRATION RATE: 20 BRPM | HEART RATE: 91 BPM | SYSTOLIC BLOOD PRESSURE: 123 MMHG | WEIGHT: 262.57 LBS | HEIGHT: 65 IN | OXYGEN SATURATION: 93 %

## 2025-03-13 PROBLEM — J15.69 PNEUMONIA DUE TO OTHER GRAM-NEGATIVE BACTERIA: Status: ACTIVE | Noted: 2025-03-13

## 2025-03-13 LAB
ANION GAP SERPL CALCULATED.3IONS-SCNC: 13.7 MMOL/L (ref 5–15)
B-OH-BUTYR SERPL-MCNC: 1.5 MG/DL
BACTERIA SPEC AEROBE CULT: NORMAL
BACTERIA SPEC AEROBE CULT: NORMAL
BASOPHILS # BLD AUTO: 0.03 10*3/MM3 (ref 0–0.2)
BASOPHILS NFR BLD AUTO: 0.3 % (ref 0–1.5)
BUN SERPL-MCNC: 17 MG/DL (ref 6–20)
BUN/CREAT SERPL: 22.1 (ref 7–25)
CALCIUM SPEC-SCNC: 9.6 MG/DL (ref 8.6–10.5)
CHLORIDE SERPL-SCNC: 96 MMOL/L (ref 98–107)
CO2 SERPL-SCNC: 28.3 MMOL/L (ref 22–29)
CREAT SERPL-MCNC: 0.77 MG/DL (ref 0.57–1)
DEPRECATED RDW RBC AUTO: 45.9 FL (ref 37–54)
EGFRCR SERPLBLD CKD-EPI 2021: 94.7 ML/MIN/1.73
EOSINOPHIL # BLD AUTO: 0.04 10*3/MM3 (ref 0–0.4)
EOSINOPHIL NFR BLD AUTO: 0.3 % (ref 0.3–6.2)
ERYTHROCYTE [DISTWIDTH] IN BLOOD BY AUTOMATED COUNT: 13.4 % (ref 12.3–15.4)
GLUCOSE BLDC GLUCOMTR-MCNC: 199 MG/DL (ref 70–130)
GLUCOSE BLDC GLUCOMTR-MCNC: 224 MG/DL (ref 70–130)
GLUCOSE BLDC GLUCOMTR-MCNC: 312 MG/DL (ref 70–130)
GLUCOSE SERPL-MCNC: 327 MG/DL (ref 65–99)
HCT VFR BLD AUTO: 41.7 % (ref 34–46.6)
HGB BLD-MCNC: 13.4 G/DL (ref 12–15.9)
IMM GRANULOCYTES # BLD AUTO: 0.08 10*3/MM3 (ref 0–0.05)
IMM GRANULOCYTES NFR BLD AUTO: 0.7 % (ref 0–0.5)
LYMPHOCYTES # BLD AUTO: 1.5 10*3/MM3 (ref 0.7–3.1)
LYMPHOCYTES NFR BLD AUTO: 13 % (ref 19.6–45.3)
MAGNESIUM SERPL-MCNC: 1.7 MG/DL (ref 1.6–2.6)
MCH RBC QN AUTO: 29.8 PG (ref 26.6–33)
MCHC RBC AUTO-ENTMCNC: 32.1 G/DL (ref 31.5–35.7)
MCV RBC AUTO: 92.9 FL (ref 79–97)
MONOCYTES # BLD AUTO: 0.57 10*3/MM3 (ref 0.1–0.9)
MONOCYTES NFR BLD AUTO: 5 % (ref 5–12)
NEUTROPHILS NFR BLD AUTO: 80.7 % (ref 42.7–76)
NEUTROPHILS NFR BLD AUTO: 9.28 10*3/MM3 (ref 1.7–7)
NRBC BLD AUTO-RTO: 0 /100 WBC (ref 0–0.2)
PLATELET # BLD AUTO: 249 10*3/MM3 (ref 140–450)
PMV BLD AUTO: 9.8 FL (ref 6–12)
POTASSIUM SERPL-SCNC: 4.7 MMOL/L (ref 3.5–5.2)
QT INTERVAL: 372 MS
QTC INTERVAL: 457 MS
RBC # BLD AUTO: 4.49 10*6/MM3 (ref 3.77–5.28)
SODIUM SERPL-SCNC: 138 MMOL/L (ref 136–145)
WBC NRBC COR # BLD AUTO: 11.5 10*3/MM3 (ref 3.4–10.8)

## 2025-03-13 PROCEDURE — 25010000002 ENOXAPARIN PER 10 MG: Performed by: INTERNAL MEDICINE

## 2025-03-13 PROCEDURE — 97161 PT EVAL LOW COMPLEX 20 MIN: CPT

## 2025-03-13 PROCEDURE — 63710000001 INSULIN GLARGINE PER 5 UNITS: Performed by: INTERNAL MEDICINE

## 2025-03-13 PROCEDURE — 80048 BASIC METABOLIC PNL TOTAL CA: CPT | Performed by: INTERNAL MEDICINE

## 2025-03-13 PROCEDURE — 94799 UNLISTED PULMONARY SVC/PX: CPT

## 2025-03-13 PROCEDURE — 63710000001 INSULIN LISPRO (HUMAN) PER 5 UNITS: Performed by: HOSPITALIST

## 2025-03-13 PROCEDURE — 82948 REAGENT STRIP/BLOOD GLUCOSE: CPT

## 2025-03-13 PROCEDURE — 94761 N-INVAS EAR/PLS OXIMETRY MLT: CPT

## 2025-03-13 PROCEDURE — 99231 SBSQ HOSP IP/OBS SF/LOW 25: CPT | Performed by: INTERNAL MEDICINE

## 2025-03-13 PROCEDURE — 93005 ELECTROCARDIOGRAM TRACING: CPT | Performed by: INTERNAL MEDICINE

## 2025-03-13 PROCEDURE — 83735 ASSAY OF MAGNESIUM: CPT | Performed by: INTERNAL MEDICINE

## 2025-03-13 PROCEDURE — 94660 CPAP INITIATION&MGMT: CPT

## 2025-03-13 PROCEDURE — 99239 HOSP IP/OBS DSCHRG MGMT >30: CPT | Performed by: INTERNAL MEDICINE

## 2025-03-13 PROCEDURE — 94664 DEMO&/EVAL PT USE INHALER: CPT

## 2025-03-13 PROCEDURE — 97165 OT EVAL LOW COMPLEX 30 MIN: CPT

## 2025-03-13 PROCEDURE — 85025 COMPLETE CBC W/AUTO DIFF WBC: CPT | Performed by: INTERNAL MEDICINE

## 2025-03-13 PROCEDURE — 63710000001 INSULIN LISPRO (HUMAN) PER 5 UNITS: Performed by: INTERNAL MEDICINE

## 2025-03-13 PROCEDURE — 25010000002 MAGNESIUM SULFATE 2 GM/50ML SOLUTION: Performed by: INTERNAL MEDICINE

## 2025-03-13 PROCEDURE — 25010000002 METHYLPREDNISOLONE PER 40 MG: Performed by: INTERNAL MEDICINE

## 2025-03-13 RX ORDER — DOXYCYCLINE 100 MG/1
CAPSULE ORAL
Qty: 10 CAPSULE | Refills: 0 | Status: SHIPPED | OUTPATIENT
Start: 2025-03-13

## 2025-03-13 RX ORDER — MAGNESIUM SULFATE HEPTAHYDRATE 40 MG/ML
2 INJECTION, SOLUTION INTRAVENOUS ONCE
Status: COMPLETED | OUTPATIENT
Start: 2025-03-13 | End: 2025-03-13

## 2025-03-13 RX ORDER — PREDNISONE 20 MG/1
TABLET ORAL
Qty: 10 TABLET | Refills: 0 | Status: SHIPPED | OUTPATIENT
Start: 2025-03-13

## 2025-03-13 RX ORDER — INSULIN LISPRO 100 [IU]/ML
10 INJECTION, SOLUTION INTRAVENOUS; SUBCUTANEOUS ONCE
Status: COMPLETED | OUTPATIENT
Start: 2025-03-13 | End: 2025-03-13

## 2025-03-13 RX ORDER — IPRATROPIUM BROMIDE AND ALBUTEROL SULFATE 2.5; .5 MG/3ML; MG/3ML
SOLUTION RESPIRATORY (INHALATION)
Qty: 18 ML | Refills: 0 | Status: SHIPPED | OUTPATIENT
Start: 2025-03-13

## 2025-03-13 RX ORDER — METOCLOPRAMIDE 5 MG/1
5 TABLET ORAL
Qty: 90 TABLET | Refills: 0 | Status: SHIPPED | OUTPATIENT
Start: 2025-03-13

## 2025-03-13 RX ORDER — GABAPENTIN 800 MG/1
800 TABLET ORAL 3 TIMES DAILY
Start: 2025-03-13

## 2025-03-13 RX ORDER — INSULIN LISPRO 100 [IU]/ML
20 INJECTION, SOLUTION INTRAVENOUS; SUBCUTANEOUS
Status: DISCONTINUED | OUTPATIENT
Start: 2025-03-13 | End: 2025-03-13 | Stop reason: HOSPADM

## 2025-03-13 RX ORDER — PREDNISONE 20 MG/1
40 TABLET ORAL DAILY
Qty: 10 TABLET | Refills: 0 | Status: SHIPPED | OUTPATIENT
Start: 2025-03-13 | End: 2025-03-18

## 2025-03-13 RX ADMIN — ATORVASTATIN CALCIUM 40 MG: 40 TABLET, FILM COATED ORAL at 08:03

## 2025-03-13 RX ADMIN — INSULIN LISPRO 10 UNITS: 100 INJECTION, SOLUTION INTRAVENOUS; SUBCUTANEOUS at 02:10

## 2025-03-13 RX ADMIN — CETIRIZINE HYDROCHLORIDE 10 MG: 10 TABLET, FILM COATED ORAL at 08:03

## 2025-03-13 RX ADMIN — IPRATROPIUM BROMIDE AND ALBUTEROL SULFATE 3 ML: .5; 2.5 SOLUTION RESPIRATORY (INHALATION) at 06:31

## 2025-03-13 RX ADMIN — METOCLOPRAMIDE 5 MG: 10 TABLET ORAL at 11:40

## 2025-03-13 RX ADMIN — IPRATROPIUM BROMIDE AND ALBUTEROL SULFATE 3 ML: .5; 2.5 SOLUTION RESPIRATORY (INHALATION) at 00:12

## 2025-03-13 RX ADMIN — Medication 10 ML: at 08:06

## 2025-03-13 RX ADMIN — INSULIN LISPRO 20 UNITS: 100 INJECTION, SOLUTION INTRAVENOUS; SUBCUTANEOUS at 08:04

## 2025-03-13 RX ADMIN — INSULIN LISPRO 5 UNITS: 100 INJECTION, SOLUTION INTRAVENOUS; SUBCUTANEOUS at 08:04

## 2025-03-13 RX ADMIN — METHYLPREDNISOLONE SODIUM SUCCINATE 40 MG: 40 INJECTION INTRAMUSCULAR; INTRAVENOUS at 05:28

## 2025-03-13 RX ADMIN — MAGNESIUM SULFATE HEPTAHYDRATE 2 G: 40 INJECTION, SOLUTION INTRAVENOUS at 08:06

## 2025-03-13 RX ADMIN — SENNOSIDES AND DOCUSATE SODIUM 2 TABLET: 50; 8.6 TABLET ORAL at 08:03

## 2025-03-13 RX ADMIN — INSULIN LISPRO 20 UNITS: 100 INJECTION, SOLUTION INTRAVENOUS; SUBCUTANEOUS at 11:40

## 2025-03-13 RX ADMIN — ENOXAPARIN SODIUM 40 MG: 40 INJECTION SUBCUTANEOUS at 02:10

## 2025-03-13 RX ADMIN — INSULIN GLARGINE 100 UNITS: 100 INJECTION, SOLUTION SUBCUTANEOUS at 08:04

## 2025-03-13 RX ADMIN — MUPIROCIN 1 APPLICATION: 20 OINTMENT TOPICAL at 08:06

## 2025-03-13 RX ADMIN — PANTOPRAZOLE SODIUM 40 MG: 40 TABLET, DELAYED RELEASE ORAL at 05:28

## 2025-03-13 RX ADMIN — GABAPENTIN 800 MG: 400 CAPSULE ORAL at 05:28

## 2025-03-13 RX ADMIN — METOCLOPRAMIDE 5 MG: 10 TABLET ORAL at 08:03

## 2025-03-13 RX ADMIN — CHOLECALCIFEROL (VITAMIN D3) 10 MCG (400 UNIT) TABLET 400 UNITS: at 08:03

## 2025-03-13 NOTE — THERAPY DISCHARGE NOTE
Acute Care - Physical Therapy Initial Evaluation   [facility]     Patient Name: Ana Ortiz  : 1975  MRN: [id number]  Today's Date: 3/13/2025   Onset of Illness/Injury or Date of Surgery: 25 (admission date)  Visit Dx:     ICD-10-CM ICD-9-CM   1. Shortness of breath  R06.02 786.05   2. COPD exacerbation  J44.1 491.21   3. Sepsis, due to unspecified organism, unspecified whether acute organ dysfunction present  A41.9 038.9     995.91   4. Acute respiratory failure, unspecified whether with hypoxia or hypercapnia  J96.00 518.81   5. Type 2 diabetes mellitus with hyperglycemia, with long-term current use of insulin  E11.65 250.00    Z79.4 790.29     V58.67     Patient Active Problem List   Diagnosis    Morbidly obese    Acute respiratory failure with hypoxia and hypercapnia    Cutaneous abscess of back excluding buttocks    JOANN (obstructive sleep apnea)    Chronic bronchitis    Cigarette nicotine dependence without complication    Chronic respiratory failure with hypoxia    Diabetes mellitus, type II    Type 2 diabetes mellitus with hyperglycemia, with long-term current use of insulin    Severe sepsis    Sepsis due to pneumonia    Multifocal pneumonia    Respiratory failure    Pneumonia due to other gram-negative bacteria     Past Medical History:   Diagnosis Date    Arthritis     CHF (congestive heart failure)     COPD (chronic obstructive pulmonary disease)     Diabetes insipidus     Diabetes mellitus     Elevated cholesterol     GERD (gastroesophageal reflux disease)     Hyperlipemia     Hypertension     JOANN (obstructive sleep apnea)     Pneumonia     Type 2 diabetes mellitus 2013    Vulvar cancer      Past Surgical History:   Procedure Laterality Date    HYSTERECTOMY      INCISION AND DRAINAGE TRUNK N/A 2022    Procedure: INCISION AND DRAINAGE BACK;  Surgeon: Tamera Saez MD;  Location: [facility];  Service: General;  Laterality: N/A;    MASTECTOMY      VULVA SURGERY      reconstruction  due ca     PT Assessment (Last 12 Hours)       PT Evaluation and Treatment       Row Name 03/13/25 0915          Physical Therapy Time and Intention    Document Type evaluation;discharge evaluation/summary  -     Mode of Treatment physical therapy  -     Patient Effort good  -     Comment Pt seen for evaluation this date, pleasant and cooperative with therapy. Pt seen reclined in chair in CCU3. Pt grossly (I) PLOF, denies falls, lives alone.  -       Row Name 03/13/25 0915          General Information    Patient Profile Reviewed yes  -     Onset of Illness/Injury or Date of Surgery 03/08/25  admission date  -     Patient Observations alert;cooperative;agree to therapy  -     Prior Level of Function gait;independent:  -     Equipment Currently Used at Home none  has RW and BSC  -     Existing Precautions/Restrictions fall  -     Equipment Issued to Patient gait belt  -       Row Name 03/13/25 0915          Living Environment    Current Living Arrangements home  -     People in Home alone  -[facility] Name 03/13/25 0915          Cognition    Personal Safety Interventions gait belt;nonskid shoes/slippers when out of bed;supervised activity  -[facility] Name 03/13/25 0915          Strength Comprehensive (MMT)    Comment, General Manual Muscle Testing (MMT) Assessment Gross MMT: 4+ knee flex; hip flex and abd 3/5; others grossly 5/5  -[facility] Name 03/13/25 0915          Bed Mobility    Comment, (Bed Mobility) not observed, pt seen sitting in recliner  -[facility] Name 03/13/25 0915          Transfers    Transfers sit-stand transfer;stand-sit transfer  -[facility] Name 03/13/25 0915          Sit-Stand Transfer    Sit-Stand [place] (Transfers) standby assist;supervision  -       Row Name 03/13/25 0915          Stand-Sit Transfer    Stand-Sit [place] (Transfers) standby assist;supervision  -[facility] Name 03/13/25 0915          Gait/Stairs (Locomotion)    Gait/Stairs  Locomotion gait/ambulation independence  -     [place] (Gait) contact guard;standby assist;supervision  -     Patient was able to Ambulate yes  -     Distance in Feet (Gait) 20  grossly in room  -       Row Name 03/13/25 0915          Balance    Comment, Balance Static standing balance grossly WFL with eyes open and mild perturbations; grossly WFL just with eyes closed for grossly 10'  -       Row Name 03/13/25 0915          Plan of Care Review    Outcome Evaluation Pt seen for evaluation prior to D/C this date. Pt demonstrates good functional mobility, recommend home health therapy interventions to address strength, stability, safety s/p hospitalization.  -       Row Name 03/13/25 0915          Positioning and Restraints    Pre-Treatment Position sitting in chair/recliner  -     Post Treatment Position chair  -     In Chair reclined  Pt within staff view  -       Row Name 03/13/25 0915          Therapy Assessment/Plan (PT)    Therapy Frequency (PT) evaluation only  -               User Key  (r) = Recorded By, (t) = Taken By, (c) = Cosigned By      Initials Name Provider Type    Goldie Flores PT Physical Therapist                      PT Recommendation and Plan  Anticipated Discharge Disposition (PT): home with home health, home with assist, home with supervision  Therapy Frequency (PT): evaluation only  Outcome Evaluation: Pt seen for evaluation prior to D/C this date. Pt demonstrates good functional mobility, recommend home health therapy interventions to address strength, stability, safety s/p hospitalization.       Time Calculation:    PT Charges       Row Name 03/13/25 1221             Time Calculation    Start Time 0920  -KH      PT Received On 03/13/25  -                User Key  (r) = Recorded By, (t) = Taken By, (c) = Cosigned By      Initials Name Provider Type    Goldie Flores PT Physical Therapist                  Therapy Charges for Today       Code  Description Service Date Service Provider Modifiers Qty    [id number] HC PT EVAL LOW COMPLEXITY 4 3/13/2025 Goldie Cobos, PT GP 1            PT G-Codes  AM-PAC 6 Clicks Score (PT): 24    Goldie Cobos, PT  3/13/2025

## 2025-03-13 NOTE — PLAN OF CARE
Problem: Noninvasive Ventilation Acute  Goal: Effective Unassisted Ventilation and Oxygenation  Outcome: Progressing     Problem: Adult Inpatient Plan of Care  Goal: Plan of Care Review  Outcome: Progressing  Flowsheets (Taken 3/13/2025 0346)  Progress: improving  Plan of Care Reviewed With: patient  Goal: Patient-Specific Goal (Individualized)  Outcome: Progressing  Goal: Absence of Hospital-Acquired Illness or Injury  Outcome: Progressing  Intervention: Identify and Manage Fall Risk  Recent Flowsheet Documentation  Taken 3/13/2025 0300 by Deja Chino RN  Safety Promotion/Fall Prevention: safety round/check completed  Taken 3/13/2025 0200 by Deja Chino RN  Safety Promotion/Fall Prevention: safety round/check completed  Taken 3/13/2025 0100 by Deja Chino RN  Safety Promotion/Fall Prevention: safety round/check completed  Taken 3/13/2025 0000 by Deja Chino RN  Safety Promotion/Fall Prevention: safety round/check completed  Taken 3/12/2025 2300 by Deja Chino RN  Safety Promotion/Fall Prevention: safety round/check completed  Taken 3/12/2025 2200 by Deja Chino RN  Safety Promotion/Fall Prevention: safety round/check completed  Taken 3/12/2025 2100 by Deja Chino RN  Safety Promotion/Fall Prevention: safety round/check completed  Taken 3/12/2025 2000 by Deja Chino RN  Safety Promotion/Fall Prevention: safety round/check completed  Taken 3/12/2025 1900 by Deja Chino RN  Safety Promotion/Fall Prevention: safety round/check completed  Intervention: Prevent Skin Injury  Recent Flowsheet Documentation  Taken 3/13/2025 0200 by Deja Chino RN  Body Position:   position changed independently   supine  Skin Protection:   transparent dressing maintained   skin sealant/moisture barrier applied   silicone foam dressing in place   pulse oximeter probe site changed   incontinence pads utilized   drying agents applied  Taken 3/13/2025 0000 by Deja Chino RN  Body Position:    position changed independently   side-lying   right  Taken 3/12/2025 2200 by Deja Chion RN  Body Position:   position changed independently   side-lying   left  Taken 3/12/2025 2000 by Deja Chino RN  Body Position:   position changed independently   side-lying   right  Intervention: Prevent and Manage VTE (Venous Thromboembolism) Risk  Recent Flowsheet Documentation  Taken 3/12/2025 2000 by Deja Chino RN  VTE Prevention/Management: (see MAR) other (see comments)  Intervention: Prevent Infection  Recent Flowsheet Documentation  Taken 3/13/2025 0200 by Deja Chino RN  Infection Prevention:   hand hygiene promoted   rest/sleep promoted  Taken 3/12/2025 2000 by Deja Chino RN  Infection Prevention: rest/sleep promoted  Goal: Optimal Comfort and Wellbeing  Outcome: Progressing  Intervention: Provide Person-Centered Care  Recent Flowsheet Documentation  Taken 3/13/2025 0200 by Deja Chino RN  Trust Relationship/Rapport:   care explained   choices provided  Taken 3/12/2025 2000 by Deja Chino RN  Trust Relationship/Rapport:   care explained   choices provided  Goal: Readiness for Transition of Care  Outcome: Progressing     Problem: Mechanical Ventilation Invasive  Goal: Effective Communication  Outcome: Progressing  Intervention: Ensure Effective Communication  Recent Flowsheet Documentation  Taken 3/13/2025 0200 by Deja Chino RN  Trust Relationship/Rapport:   care explained   choices provided  Diversional Activities:   television   smartphone  Family/Support System Care: support provided  Taken 3/12/2025 2000 by Deja Chino RN  Trust Relationship/Rapport:   care explained   choices provided  Diversional Activities:   television   smartphone  Family/Support System Care: support provided  Goal: Optimal Device Function  Outcome: Progressing  Intervention: Optimize Device Care and Function  Recent Flowsheet Documentation  Taken 3/13/2025 0200 by Deja Chino RN  Airway Safety  Measures: suction at bedside  Taken 3/13/2025 0000 by Deja Chino RN  Oral Care:   oral rinse provided   lip/mouth moisturizer applied  Taken 3/12/2025 2000 by Deja Chino RN  Oral Care:   oral rinse provided   lip/mouth moisturizer applied  Airway Safety Measures: suction at bedside  Goal: Mechanical Ventilation Liberation  Outcome: Progressing  Intervention: Promote Extubation and Mechanical Ventilation Liberation  Recent Flowsheet Documentation  Taken 3/13/2025 0300 by Deja Chino RN  Medication Review/Management: medications reviewed  Taken 3/13/2025 0200 by Deja Chino RN  Medication Review/Management: medications reviewed  Taken 3/13/2025 0100 by Deja Chino RN  Medication Review/Management: medications reviewed  Taken 3/13/2025 0000 by Deja Chino RN  Medication Review/Management: medications reviewed  Taken 3/12/2025 2300 by Deja Chino RN  Medication Review/Management: medications reviewed  Taken 3/12/2025 2200 by Deja Chino RN  Medication Review/Management: medications reviewed  Taken 3/12/2025 2100 by Deja Chino RN  Medication Review/Management: medications reviewed  Taken 3/12/2025 2000 by Deja Chino RN  Medication Review/Management: medications reviewed  Taken 3/12/2025 1900 by Deja Chino RN  Medication Review/Management: medications reviewed  Goal: Optimal Nutrition Delivery  Outcome: Progressing  Goal: Absence of Device-Related Skin and Tissue Injury  Outcome: Progressing  Goal: Absence of Ventilator-Induced Lung Injury  Outcome: Progressing  Intervention: Prevent Ventilator-Associated Pneumonia  Recent Flowsheet Documentation  Taken 3/13/2025 0200 by Deja Chino RN  Head of Bed ([facility]) Positioning: HOB at 20-30 degrees  Taken 3/13/2025 0000 by Deja Chino RN  Head of Bed ([facility]) Positioning: HOB at 20-30 degrees  Oral Care:   oral rinse provided   lip/mouth moisturizer applied  Taken 3/12/2025 2200 by Deja Chino RN  Head of Bed ([facility])  Positioning: HOB at 30-45 degrees  Taken 3/12/2025 2000 by Deja Chino RN  Head of Bed (HOB) Positioning: HOB at 20-30 degrees  Oral Care:   oral rinse provided   lip/mouth moisturizer applied     Problem: Comorbidity Management  Goal: Maintenance of COPD Symptom Control  Outcome: Progressing  Intervention: Maintain COPD (Chronic Obstructive Pulmonary Disease) Symptom Control  Recent Flowsheet Documentation  Taken 3/13/2025 0300 by Deja Chino RN  Medication Review/Management: medications reviewed  Taken 3/13/2025 0200 by Deja Chino RN  Medication Review/Management: medications reviewed  Taken 3/13/2025 0100 by Deja Chino RN  Medication Review/Management: medications reviewed  Taken 3/13/2025 0000 by Deja Chino RN  Medication Review/Management: medications reviewed  Taken 3/12/2025 2300 by Deja Chino RN  Medication Review/Management: medications reviewed  Taken 3/12/2025 2200 by Deja Chino RN  Medication Review/Management: medications reviewed  Taken 3/12/2025 2100 by Deja Chino RN  Medication Review/Management: medications reviewed  Taken 3/12/2025 2000 by Deja Chino RN  Medication Review/Management: medications reviewed  Taken 3/12/2025 1900 by Deja Chino RN  Medication Review/Management: medications reviewed  Goal: Blood Glucose Level Within Target Range  Outcome: Progressing  Intervention: Monitor and Manage Glycemia  Recent Flowsheet Documentation  Taken 3/13/2025 0300 by Deja Chino RN  Medication Review/Management: medications reviewed  Taken 3/13/2025 0200 by Deja Chino RN  Medication Review/Management: medications reviewed  Taken 3/13/2025 0100 by Deja Chino RN  Medication Review/Management: medications reviewed  Taken 3/13/2025 0000 by Djea Chino RN  Medication Review/Management: medications reviewed  Taken 3/12/2025 2300 by Deja Chino RN  Medication Review/Management: medications reviewed  Taken 3/12/2025 2200 by Deja Chino  RN  Medication Review/Management: medications reviewed  Taken 3/12/2025 2100 by Deja Chino RN  Medication Review/Management: medications reviewed  Taken 3/12/2025 2000 by Deja Chino RN  Medication Review/Management: medications reviewed  Taken 3/12/2025 1900 by Deja Chino RN  Medication Review/Management: medications reviewed  Goal: Maintenance of Heart Failure Symptom Control  Outcome: Progressing  Intervention: Maintain Heart Failure Management  Recent Flowsheet Documentation  Taken 3/13/2025 0300 by Deja Chino RN  Medication Review/Management: medications reviewed  Taken 3/13/2025 0200 by Deja Chino RN  Medication Review/Management: medications reviewed  Taken 3/13/2025 0100 by Deja Chino RN  Medication Review/Management: medications reviewed  Taken 3/13/2025 0000 by Deja Chino RN  Medication Review/Management: medications reviewed  Taken 3/12/2025 2300 by Deja Chino RN  Medication Review/Management: medications reviewed  Taken 3/12/2025 2200 by Deja Chino RN  Medication Review/Management: medications reviewed  Taken 3/12/2025 2100 by Deja Chino RN  Medication Review/Management: medications reviewed  Taken 3/12/2025 2000 by Deja Chino RN  Medication Review/Management: medications reviewed  Taken 3/12/2025 1900 by Deja Chino RN  Medication Review/Management: medications reviewed  Goal: Blood Pressure in Desired Range  Outcome: Progressing  Intervention: Maintain Blood Pressure Management  Recent Flowsheet Documentation  Taken 3/13/2025 0300 by Deja Chino RN  Medication Review/Management: medications reviewed  Taken 3/13/2025 0200 by Deja Chino RN  Medication Review/Management: medications reviewed  Taken 3/13/2025 0100 by Deja Chino RN  Medication Review/Management: medications reviewed  Taken 3/13/2025 0000 by Deja Chino RN  Medication Review/Management: medications reviewed  Taken 3/12/2025 2300 by Deja Chino RN  Medication  Review/Management: medications reviewed  Taken 3/12/2025 2200 by Deja Chino RN  Medication Review/Management: medications reviewed  Taken 3/12/2025 2100 by Deja Chino RN  Medication Review/Management: medications reviewed  Taken 3/12/2025 2000 by Deja Chino RN  Medication Review/Management: medications reviewed  Taken 3/12/2025 1900 by Deja Chino RN  Medication Review/Management: medications reviewed     Problem: Violence Risk or Actual  Goal: Anger and Impulse Control  Outcome: Progressing  Intervention: Minimize Safety Risk  Recent Flowsheet Documentation  Taken 3/13/2025 0300 by Deja Chino RN  Enhanced Safety Measures: bed alarm set  Taken 3/13/2025 0200 by Deja Chino RN  Enhanced Safety Measures: bed alarm set  Taken 3/13/2025 0100 by Deja Chino RN  Enhanced Safety Measures: bed alarm set  Taken 3/13/2025 0000 by Deja Chino RN  Enhanced Safety Measures: bed alarm set  Taken 3/12/2025 2300 by Deja Chino RN  Enhanced Safety Measures: bed alarm set  Taken 3/12/2025 2200 by Deja Chino RN  Enhanced Safety Measures: bed alarm set  Taken 3/12/2025 2100 by Deja Chino RN  Enhanced Safety Measures: bed alarm set  Taken 3/12/2025 2000 by Deja Chino RN  Enhanced Safety Measures: bed alarm set  Taken 3/12/2025 1900 by Deja Chino RN  Enhanced Safety Measures: bed alarm set     Problem: Skin Injury Risk Increased  Goal: Skin Health and Integrity  Outcome: Progressing  Intervention: Optimize Skin Protection  Recent Flowsheet Documentation  Taken 3/13/2025 0200 by Deja Chino RN  Activity Management:   up ad valentina   activity encouraged  Pressure Reduction Techniques:   frequent weight shift encouraged   heels elevated off bed   pressure points protected   weight shift assistance provided  Head of Bed (HOB) Positioning: HOB at 20-30 degrees  Pressure Reduction Devices:   specialty bed utilized   positioning supports utilized   pressure-redistributing mattress  utilized   heel offloading device utilized  Skin Protection:   transparent dressing maintained   skin sealant/moisture barrier applied   silicone foam dressing in place   pulse oximeter probe site changed   incontinence pads utilized   drying agents applied  Taken 3/13/2025 0000 by Deja Chino RN  Head of Bed (HOB) Positioning: HOB at 20-30 degrees  Taken 3/12/2025 2200 by Deja Chino RN  Head of Bed (HOB) Positioning: HOB at 30-45 degrees  Taken 3/12/2025 2000 by Deja Chino RN  Activity Management:   up ad valentina   activity encouraged  Head of Bed (HOB) Positioning: HOB at 20-30 degrees     Problem: Fall Injury Risk  Goal: Absence of Fall and Fall-Related Injury  Outcome: Progressing  Intervention: Identify and Manage Contributors  Recent Flowsheet Documentation  Taken 3/13/2025 0300 by Deja Chino RN  Medication Review/Management: medications reviewed  Taken 3/13/2025 0200 by Deja Chino RN  Medication Review/Management: medications reviewed  Taken 3/13/2025 0100 by Deja Chino RN  Medication Review/Management: medications reviewed  Taken 3/13/2025 0000 by Deja Chino RN  Medication Review/Management: medications reviewed  Taken 3/12/2025 2300 by Deja Chino RN  Medication Review/Management: medications reviewed  Taken 3/12/2025 2200 by Deja Chino RN  Medication Review/Management: medications reviewed  Taken 3/12/2025 2100 by Deja Chnio RN  Medication Review/Management: medications reviewed  Taken 3/12/2025 2000 by Deja Chino RN  Medication Review/Management: medications reviewed  Taken 3/12/2025 1900 by Deja Chino RN  Medication Review/Management: medications reviewed  Intervention: Promote Injury-Free Environment  Recent Flowsheet Documentation  Taken 3/13/2025 0300 by Deja Chino RN  Safety Promotion/Fall Prevention: safety round/check completed  Taken 3/13/2025 0200 by Deja Chino RN  Safety Promotion/Fall Prevention: safety round/check completed  Taken  3/13/2025 0100 by Deja Chino RN  Safety Promotion/Fall Prevention: safety round/check completed  Taken 3/13/2025 0000 by Deja Chino RN  Safety Promotion/Fall Prevention: safety round/check completed  Taken 3/12/2025 2300 by Deja Chino RN  Safety Promotion/Fall Prevention: safety round/check completed  Taken 3/12/2025 2200 by Deja Chino RN  Safety Promotion/Fall Prevention: safety round/check completed  Taken 3/12/2025 2100 by Deja Chino RN  Safety Promotion/Fall Prevention: safety round/check completed  Taken 3/12/2025 2000 by Deja Chino RN  Safety Promotion/Fall Prevention: safety round/check completed  Taken 3/12/2025 1900 by Deja Chino RN  Safety Promotion/Fall Prevention: safety round/check completed     Problem: Sepsis/Septic Shock  Goal: Optimal Coping  Outcome: Progressing  Intervention: Support Patient and Family Response  Recent Flowsheet Documentation  Taken 3/13/2025 0200 by Deja Chino RN  Family/Support System Care: support provided  Taken 3/12/2025 2000 by Deja Chino RN  Family/Support System Care: support provided  Goal: Absence of Bleeding  Outcome: Progressing  Goal: Blood Glucose Level Within Target Range  Outcome: Progressing  Goal: Absence of Infection Signs and Symptoms  Outcome: Progressing  Intervention: Initiate Sepsis Management  Recent Flowsheet Documentation  Taken 3/13/2025 0200 by Deja Chino RN  Infection Prevention:   hand hygiene promoted   rest/sleep promoted  Taken 3/12/2025 2000 by Deja Chino RN  Infection Prevention: rest/sleep promoted  Intervention: Promote Recovery  Recent Flowsheet Documentation  Taken 3/13/2025 0200 by Deja Chino RN  Activity Management:   up ad valentina   activity encouraged  Taken 3/12/2025 2000 by Deja Chino RN  Activity Management:   up ad valentina   activity encouraged  Goal: Optimal Nutrition Delivery  Outcome: Progressing   Goal Outcome Evaluation:  Plan of Care Reviewed With: patient         Progress: improving

## 2025-03-13 NOTE — PLAN OF CARE
Goal Outcome Evaluation:  Plan of Care Reviewed With: patient        Progress: improving  Outcome Evaluation: Pending discharge.

## 2025-03-13 NOTE — PAYOR COMM NOTE
"CONTACT: GABRIELE GOMES RN  UTILIZATION MANAGEMENT DEPT.  [facility]  [address] [facility] [place] [address]  PHONE: [phone]  FAX: [phone]        CLINICAL UPDATE  [id number]   REF#      Charles Fernandez (49 y.o. Female)       Date of Birth   1975    Social Security Number       Address   [address] [place] [address]    Home Phone   [phone]    MRN   [id number]       Confucianist   Mu-ism    Marital Status                               Admission Date   3/8/2025    Admission Type   Emergency    Admitting Provider   Rosa Mishra DO    Attending Provider   Yury Herzog DO    Department, Room/Bed   [facility] CRITICAL CARE, 03/       Discharge Date       Discharge Disposition   Home or Self Care    Discharge Destination                                 Attending Provider: Yury Herzog DO    Allergies: Codeine, Metformin    Isolation: None   Infection: None   Code Status: CPR    Ht: 165.1 cm (65\")   Wt: 119 kg (262 lb 9.1 oz)    Admission Cmt: None   Principal Problem: Multifocal pneumonia [J18.9]                   Active Insurance as of 3/8/2025       Primary Coverage       Payor Plan Insurance Group Employer/Plan Group    [place] [id number]       Payor Plan Address Payor Plan Phone Number Payor Plan Fax Number Effective Dates    [employer] [address] [address] [phone]  1/1/2021 - None Entered    [facility] [id number]         Subscriber Name Subscriber Birth Date Member ID       CHARLES FERNANDEZ 1975 [id number]                     Emergency Contacts        (Rel.) Home Phone Work Phone Mobile Phone    HORACIO WATTERS (Daughter) [phone] -- --              Current Facility-Administered Medications   Medication Dose Route Frequency Provider Last Rate Last Admin    albuterol (PROVENTIL) nebulizer solution 0.083% 2.5 mg/3mL  2.5 mg Nebulization Q4H PRN Yury Herzog, " DO        atorvastatin (LIPITOR) tablet 40 mg  40 mg Oral Daily Yury Herzog DO   40 mg at 03/13/25 0803    sennosides-docusate (PERICOLACE) 8.6-50 MG per tablet 2 tablet  2 tablet Oral BID Yury Herzog DO   2 tablet at 03/13/25 0803    And    polyethylene glycol (MIRALAX) packet 17 g  17 g Oral Daily PRN Yury Herzog DO        And    bisacodyl (DULCOLAX) EC tablet 5 mg  5 mg Oral Daily PRN Yury Herzog DO   5 mg at 03/12/25 2217    And    bisacodyl (DULCOLAX) suppository 10 mg  10 mg Rectal Daily PRN Yury Herzog DO        calcium carbonate (TUMS) chewable tablet 500 mg (200 mg elemental)  1 tablet Oral TID PRN Joe Agarwal MD   1 tablet at 03/12/25 2128    Calcium Replacement - Follow Nurse / BPA Driven Protocol   Not Applicable PRN Yury Herzog DO        cefTRIAXone (ROCEPHIN) 2,000 mg in sodium chloride 0.9 % 100 mL IVPB-VTB  2,000 mg Intravenous Q24H Yury Herzog  mL/hr at 03/12/25 1224 2,000 mg at 03/12/25 1224    cetirizine (zyrTEC) tablet 10 mg  10 mg Oral Daily Yury Herzog DO   10 mg at 03/13/25 0803    cholecalciferol (VITAMIN D3) tablet 400 Units  400 Units Oral Daily Yury Herzog DO   400 Units at 03/13/25 0803    clonazePAM (KlonoPIN) tablet 1 mg  1 mg Oral 4x Daily PRN Yury Herzog DO   1 mg at 03/12/25 2128    cyclobenzaprine (FLEXERIL) tablet 5 mg  5 mg Oral TID PRN Yury Herzog DO   5 mg at 03/12/25 1813    dextrose (D50W) (25 g/50 mL) IV injection 10-50 mL  10-50 mL Intravenous Q15 Min PRN Yury Herzog DO        dextrose (D50W) (25 g/50 mL) IV injection 10-50 mL  10-50 mL Intravenous Q15 Min PRN Yury Herzog DO        dextrose (D50W) (25 g/50 mL) IV injection 25 g  25 g Intravenous Q15 Min PRN Joe Agarwal MD        dextrose (GLUTOSE) oral gel 15 g  15 g Oral Q15 Min PRN Yury Herzog DO        dextrose (GLUTOSE) oral gel  15 g  15 g Oral Q15 Min PRN Joe Agarwal MD        dicyclomine (BENTYL) capsule 10 mg  10 mg Oral 4x Daily PRN Yury Herzog DO        enoxaparin sodium (LOVENOX) syringe 40 mg  40 mg Subcutaneous Q12H Yury Herzog DO   40 mg at 03/13/25 0210    fluticasone (FLONASE) 50 MCG/ACT nasal spray 2 spray  2 spray Nasal Daily PRN Yury Herzog DO        gabapentin (NEURONTIN) capsule 800 mg  800 mg Oral Q8H RosendaYury dominguez DO   800 mg at 03/13/25 0528    glucagon HCl (Diagnostic) injection 1 mg  1 mg Intramuscular Q15 Min PRN Yury Herzog DO        insulin glargine (LANTUS, SEMGLEE) injection 100 Units  100 Units Subcutaneous Daily Yury Herzog DO   100 Units at 03/13/25 0804    Insulin Lispro (humaLOG) injection 20 Units  20 Units Subcutaneous TID With Meals Yury Herzog DO   20 Units at 03/13/25 0804    Insulin Lispro (humaLOG) injection 3-14 Units  3-14 Units Subcutaneous 4x Daily PC & at Bedtime Joe Agarwal MD   5 Units at 03/13/25 0804    ipratropium-albuterol (DUO-NEB) nebulizer solution 3 mL  3 mL Nebulization Q6H - RT Yury Herzog DO   3 mL at 03/13/25 0631    Lidocaine 4 % 1 patch  1 patch Transdermal Q24H Yury Herzog DO   1 patch at 03/12/25 2216    Magnesium Standard Dose Replacement - Follow Nurse / BPA Driven Protocol   Not Applicable PRN Yury Herzog DO        methylPREDNISolone sodium succinate (SOLU-Medrol) 40 mg in sterile water (preservative free) 1 mL  40 mg Intravenous Q12H Yury Herzog DO   40 mg at 03/13/25 0528    metoclopramide (REGLAN) tablet 5 mg  5 mg Oral TID AC Floyd Turcios MD   5 mg at 03/13/25 0803    nicotine (NICODERM CQ) 14 MG/24HR patch 1 patch  1 patch Transdermal Q24H Rosenda, Christopher A, DO   1 patch at 03/12/25 2219    nicotine polacrilex (NICORETTE) gum 4 mg  4 mg Mouth/Throat PRN Yury Herzog DO   4 mg at 03/12/25 1952     nitroglycerin (NITROSTAT) SL tablet 0.4 mg  0.4 mg Sublingual Q5 Min PRN Yury Herzog A, DO        ondansetron (ZOFRAN) injection 4 mg  4 mg Intravenous Q6H PRN Yury Herzog, DO   4 mg at 03/12/25 2118    pantoprazole (PROTONIX) EC tablet 40 mg  40 mg Oral Q AM Joe Agarwal MD   40 mg at 03/13/25 0528    phenol (CHLORASEPTIC) 1.4 % liquid 1 spray  1 spray Mouth/Throat Q2H PRN Yury Herzog DO   1 spray at 03/11/25 0602    Phosphorus Replacement - Follow Nurse / BPA Driven Protocol   Not Applicable PRN Yury Herzog, DO        Potassium Replacement - Follow Nurse / BPA Driven Protocol   Not Applicable PRN RosendaDuran dominguezer A, DO        sodium chloride 0.9 % flush 10 mL  10 mL Intravenous PRN RosendaYury dominguez A, DO        sodium chloride 0.9 % flush 10 mL  10 mL Intravenous Q12H Yury Herzog A, DO   10 mL at 03/13/25 0806    sodium chloride 0.9 % flush 10 mL  10 mL Intravenous PRN Rosenda, Danielopher A, DO        sodium chloride 0.9 % flush 10 mL  10 mL Intravenous PRN Rosenda, Duraner A, DO        sodium chloride 0.9 % infusion 40 mL  40 mL Intravenous PRN Rosenda, Duraner A, DO        sodium chloride 0.9 % infusion 40 mL  40 mL Intravenous PRN Duran Herzoger A, DO         Orders (last 72 hrs)        Start     Ordered    03/14/25 0600  Comprehensive Metabolic Panel  Morning Draw         03/13/25 0654    03/14/25 0600  CBC & Differential  Morning Draw         03/13/25 0654    03/14/25 0600  Magnesium  Morning Draw         03/13/25 0654    03/13/25 0941  Nursing to Verify Pneumonia & Flu Immunizations Up to Date  Once         03/13/25 0941    03/13/25 0940  Discontinue IV  Once         03/13/25 0941    03/13/25 0940  Discontinue Telemetry  Once         03/13/25 0941    03/13/25 0937  Discharge patient  Once         03/13/25 0941    03/13/25 0937  Pneumonia Details  Continuous         03/13/25 0941    03/13/25 0900  insulin glargine (LANTUS,  SEMGLEE) injection 100 Units  Daily         03/13/25 0654    03/13/25 0800  Insulin Lispro (humaLOG) injection 20 Units  3 Times Daily With Meals         03/13/25 0654    03/13/25 0745  magnesium sulfate 2g/50 mL (PREMIX) infusion  Once         03/13/25 0654    03/13/25 0718  POC Glucose Once  PROCEDURE ONCE        Comments: Complete no more than 45 minutes prior to patient eating      03/13/25 0711    03/13/25 0600  CBC & Differential  Morning Draw         03/12/25 0727    03/13/25 0600  Basic Metabolic Panel  Morning Draw         03/12/25 0727    03/13/25 0600  Magnesium  Morning Draw         03/12/25 0727    03/13/25 0600  CBC Auto Differential  PROCEDURE ONCE         03/12/25 2202    03/13/25 0500  ECG 12 Lead QT Measurement  Tomorrow AM         03/12/25 1354    03/13/25 0200  Insulin Lispro (humaLOG) injection 10 Units  Once         03/13/25 0107    03/13/25 0100  POC Glucose Finger Once  Once        Comments: Complete no more than 45 minutes prior to patient eating      03/12/25 2113    03/13/25 0037  POC Glucose Once  PROCEDURE ONCE        Comments: Complete no more than 45 minutes prior to patient eating      03/13/25 0030    03/13/25 0000  gabapentin (NEURONTIN) 800 MG tablet  3 Times Daily         03/13/25 0941    03/13/25 0000  predniSONE (DELTASONE) 20 MG tablet  Daily         03/13/25 0941    03/13/25 0000  Discharge Follow-up with PCP         03/13/25 0941    03/13/25 0000  Discharge Follow-up with Specified Provider: Follow up with pulmonology in 2 weeks.         03/13/25 0941    03/13/25 0000  doxycycline (MONODOX) 100 MG capsule         03/13/25 0941    03/13/25 0000  predniSONE (DELTASONE) 20 MG tablet         03/13/25 0941    03/13/25 0000  ipratropium-albuterol (DUO-NEB) 0.5-2.5 mg/3 ml nebulizer         03/13/25 0941    03/13/25 0000  Resume Oxygen Therapy After Discharge         03/13/25 0941    03/12/25 2215  pantoprazole (PROTONIX) EC tablet 40 mg  Every Early Morning         03/12/25 7598     03/12/25 2200  POC Glucose 4x Daily Before Meals & at Bedtime  4 Times Daily Before Meals & at Bedtime,   Status:  Canceled      Comments: Complete no more than 45 minutes prior to patient eating      03/12/25 2113 03/12/25 2200  Insulin Lispro (humaLOG) injection 3-14 Units  4 Times Daily After Meals & at Bedtime         03/12/25 2113 03/12/25 2117  calcium carbonate (TUMS) chewable tablet 500 mg (200 mg elemental)  3 Times Daily PRN         03/12/25 2117 03/12/25 2113  POC Glucose Once  PROCEDURE ONCE        Comments: Complete no more than 45 minutes prior to patient eating      03/12/25 2106 03/12/25 2112  dextrose (GLUTOSE) oral gel 15 g  Every 15 Minutes PRN         03/12/25 2113 03/12/25 2112  dextrose (D50W) (25 g/50 mL) IV injection 25 g  Every 15 Minutes PRN         03/12/25 2113 03/12/25 2100  insulin glargine (LANTUS, SEMGLEE) injection 1-200 Units  Nightly - Glucommander,   Status:  Discontinued         03/12/25 0952    03/12/25 1800  Insulin Lispro (humaLOG) injection 15 Units  3 Times Daily With Meals,   Status:  Discontinued         03/12/25 1254    03/12/25 1730  metoclopramide (REGLAN) tablet 5 mg  3 Times Daily Before Meals         03/12/25 1353    03/12/25 1702  POC Glucose Once  PROCEDURE ONCE        Comments: Complete no more than 45 minutes prior to patient eating      03/12/25 1655    03/12/25 1516  POC Glucose Once  PROCEDURE ONCE        Comments: Complete no more than 45 minutes prior to patient eating      03/12/25 1510    03/12/25 1415  POC Glucose Once  PROCEDURE ONCE        Comments: Complete no more than 45 minutes prior to patient eating      03/12/25 1358    03/12/25 1345  insulin lispro (humaLOG) injection 1-200 Units  4 Times Daily With Meals & Nightly,   Status:  Discontinued         03/12/25 0952    03/12/25 1334  POC Glucose Once  PROCEDURE ONCE        Comments: Complete no more than 45 minutes prior to patient eating      03/12/25 1326    03/12/25 1330  insulin  glargine (LANTUS, SEMGLEE) injection 90 Units  Daily,   Status:  Discontinued         03/12/25 1254    03/12/25 1234  POC Glucose Once  PROCEDURE ONCE        Comments: Complete no more than 45 minutes prior to patient eating      03/12/25 1217    03/12/25 1152  Discontinue Insulin Infusion When Prompted by Glucommander™  Once         03/12/25 0952    03/12/25 1152  Discontinue IV Glucommander™ Order Set Once Transition Complete  Once        Comments: When Discontinuing Insulin Infusion Order RN Will Be Prompted to Discontinue Supporting Orders Within the Order Set    03/12/25 0952    03/12/25 1143  POC Glucose Once  PROCEDURE ONCE        Comments: Complete no more than 45 minutes prior to patient eating      03/12/25 1135    03/12/25 1100  POC Glucose 4x Daily Before Meals & at Bedtime  4 Times Daily Before Meals & at Bedtime      Comments: Complete no more than 45 minutes prior to patient eating      03/12/25 0952    03/12/25 1047  POC Glucose Once  PROCEDURE ONCE        Comments: Complete no more than 45 minutes prior to patient eating      03/12/25 1030    03/12/25 1045  Initial Transition Basal Dose Per Glucommander™ Recommendation  Once,   Status:  Discontinued         03/12/25 0952    03/12/25 1016  POC Glucose Once  PROCEDURE ONCE        Comments: Complete no more than 45 minutes prior to patient eating      03/12/25 1000    03/12/25 1001  ECG 12 Lead QT Measurement  Once         03/12/25 1000    03/12/25 1000  furosemide (LASIX) injection 20 mg  Once         03/12/25 0913    03/12/25 0954  Transfer Patient  Once         03/12/25 0955    03/12/25 0953  PT Consult: Eval & Treat Functional Mobility Below Baseline  Once         03/12/25 0952    03/12/25 0953  OT Consult: Eval & Treat ADL Performance Below Baseline  Once         03/12/25 0952    03/12/25 0952  Initiate Glucommander™ SQ  Once,   Status:  Canceled         03/12/25 0952    03/12/25 0952  Patient is on Glucommander  Continuous,   Status:  Canceled          03/12/25 0952    03/12/25 0952  RN to Order STAT Glucose (Lab Performed) for POC Glucose <10 or >600  Continuous        Comments: Do NOT Delay Treatment of Unstable Patient to Obtain Glucose Sample From Lab  Notify Provider of Results    03/12/25 0952 03/12/25 0952  Transition Patient from Glucommander™ IV to Glucommander™ SQ  Once        Comments: When Transitioning to SQ Insulin, Patient Should Have Basal Insulin Administered at Least 2 Hours Prior to Discontinuation of IV Insulin Infusion    03/12/25 0952    03/12/25 0952  Glucommander™ Transition Timing Instructions  Once,   Status:  Canceled        Comments: - Initiate Transition Orders Immediately Unless Orders Placed 3446-4879  --- Orders Placed 7712-9290 Should Be Initiated AFTER 1700  --- If Orders Placed Prior to 1601 Are Not Initiated Prior to 1700, Contact Provider to DC & Reorder Transition Orders  - BID or AM Basal Orders ONLY - If Transition Orders Placed 6057-4818, Initiate Transition After 1300    03/12/25 0952    03/12/25 0951  dextrose (GLUTOSE) oral gel 15 g  Every 15 Minutes PRN         03/12/25 0952    03/12/25 0951  dextrose (D50W) (25 g/50 mL) IV injection 10-50 mL  Every 15 Minutes PRN         03/12/25 0952    03/12/25 0951  insulin lispro (humaLOG) injection 1-200 Units  As Needed,   Status:  Discontinued         03/12/25 0952    03/12/25 0902  POC Glucose Once  PROCEDURE ONCE        Comments: Complete no more than 45 minutes prior to patient eating      03/12/25 0855    03/12/25 0834  POC Glucose Once  PROCEDURE ONCE        Comments: Complete no more than 45 minutes prior to patient eating      03/12/25 0823    03/12/25 0800  magnesium sulfate 2g/50 mL (PREMIX) infusion  Once         03/12/25 0727    03/12/25 0733  POC Glucose Once  PROCEDURE ONCE        Comments: Complete no more than 45 minutes prior to patient eating      03/12/25 0726    03/12/25 0659  POC Glucose Once  PROCEDURE ONCE        Comments: Complete no more than 45  minutes prior to patient eating      03/12/25 0519    03/12/25 0631  POC Glucose Once  PROCEDURE ONCE        Comments: Complete no more than 45 minutes prior to patient eating      03/12/25 0623    03/12/25 0600  Magnesium  Morning Draw         03/11/25 0135    03/12/25 0600  Basic Metabolic Panel  Morning Draw         03/11/25 0658    03/12/25 0600  CBC & Differential  Morning Draw         03/11/25 0658    03/12/25 0600  CBC Auto Differential  PROCEDURE ONCE         03/11/25 2201    03/12/25 0504  POC Glucose Once  PROCEDURE ONCE        Comments: Complete no more than 45 minutes prior to patient eating      03/12/25 0457    03/12/25 0318  POC Glucose Once  PROCEDURE ONCE        Comments: Complete no more than 45 minutes prior to patient eating      03/12/25 0301    03/12/25 0214  POC Glucose Once  PROCEDURE ONCE        Comments: Complete no more than 45 minutes prior to patient eating      03/12/25 0157    03/12/25 0110  POC Glucose Once  PROCEDURE ONCE        Comments: Complete no more than 45 minutes prior to patient eating      03/12/25 0053    03/12/25 0001  POC Glucose Once  PROCEDURE ONCE        Comments: Complete no more than 45 minutes prior to patient eating      03/11/25 2354    03/11/25 2308  POC Glucose Once  PROCEDURE ONCE        Comments: Complete no more than 45 minutes prior to patient eating      03/11/25 2251    03/11/25 2201  Blood Gas, Arterial With Co-Ox  PROCEDURE ONCE         03/11/25 2158    03/11/25 2157  POC Glucose Once  PROCEDURE ONCE        Comments: Complete no more than 45 minutes prior to patient eating      03/11/25 2150    03/11/25 2153  Blood Gas, Arterial -With Co-Ox Panel: Yes  STAT         03/11/25 2152    03/11/25 2107  POC Glucose Once  PROCEDURE ONCE        Comments: Complete no more than 45 minutes prior to patient eating      03/11/25 2051    03/11/25 2100  sodium chloride 0.9 % flush 10 mL  Every 12 Hours Scheduled,   Status:  Discontinued         03/11/25 1402     03/11/25 1958  POC Glucose Once  PROCEDURE ONCE        Comments: Complete no more than 45 minutes prior to patient eating      03/11/25 1951    03/11/25 1956  POC Glucose Once  PROCEDURE ONCE        Comments: Complete no more than 45 minutes prior to patient eating      03/11/25 1853    03/11/25 1903  POC Glucose Once  PROCEDURE ONCE        Comments: Complete no more than 45 minutes prior to patient eating      03/11/25 1819    03/11/25 1726  POC Glucose Once  PROCEDURE ONCE        Comments: Complete no more than 45 minutes prior to patient eating      03/11/25 1719    03/11/25 1700  POC Glucose 4x Daily Before Meals & at Bedtime  4 Times Daily Before Meals & at Bedtime,   Status:  Canceled      Comments: Complete no more than 45 minutes prior to patient eating      03/11/25 1218    03/11/25 1654  POC Glucose Once  PROCEDURE ONCE        Comments: Complete no more than 45 minutes prior to patient eating      03/11/25 1647    03/11/25 1615  insulin lispro (humaLOG) injection 1-200 Units  4 Times Daily With Meals & Nightly,   Status:  Discontinued         03/11/25 1218    03/11/25 1602  POC Glucose Once  PROCEDURE ONCE        Comments: Complete no more than 45 minutes prior to patient eating      03/11/25 1555    03/11/25 1500  insulin regular 1 unit/mL in 0.9% sodium chloride (Glucommander)  Titrated        Note to Pharmacy: Upon initiation of Glucommander insulin drip, make sure all other insulin orders and anti-diabetic medications have been discontinued.    03/11/25 1402    03/11/25 1500  POC Glucose Once  PROCEDURE ONCE        Comments: Complete no more than 45 minutes prior to patient eating      03/11/25 1453    03/11/25 1418  Discontinue Insulin Infusion When Prompted by Glucommander™  Once         03/11/25 1218    03/11/25 1418  Discontinue IV Glucommander™ Order Set Once Transition Complete  Once        Comments: When Discontinuing Insulin Infusion Order RN Will Be Prompted to Discontinue Supporting Orders  Within the Order Set    03/11/25 1218    03/11/25 1402  Saline Lock & Maintain IV Access  Continuous,   Status:  Canceled         03/11/25 1402    03/11/25 1402  Prior to Initiating Glucommander™, Ensure All Prior Insulin Orders Are Discontinued  Once,   Status:  Canceled         03/11/25 1402    03/11/25 1402  Do Not Start Insulin Infusion if Potassium < 3.3  Per Order Details,   Status:  Canceled         03/11/25 1402    03/11/25 1402  Use a Dedicated Line for Insulin Infusion (If Possible).  May Use a Carrier Fluid of NS at KVO Rate if Insulin Rate is Insufficient to Maintain IV Patency.  Prime IV Line With Insulin Infusion  Continuous,   Status:  Canceled         03/11/25 1402 03/11/25 1402  Glucommander Must Be Discontinued if Insulin Infusion is Discontinued.  If Insulin Infusion is Restarted, Previous Glucommander Settings Must Be Discontinued and Re-Entered From New Order  Per Order Details,   Status:  Canceled         03/11/25 1402    03/11/25 1402  Patient is on Glucommander  Continuous,   Status:  Canceled         03/11/25 1402 03/11/25 1402  Utilize the Start Meal Feature / Meal Bolus Feature in Glucommander if Patient Starts a Diet or Bolus Tube Feedings  Until Discontinued,   Status:  Canceled         03/11/25 1402 03/11/25 1402  Notify Provider - Insulin Infusion  Continuous,   Status:  Canceled        Comments: Open Order Report to View Parameters Requiring Provider Notification    03/11/25 1402 03/11/25 1402  RN to Release PRN POC Glucose Orders Per Glucommander  Continuous,   Status:  Canceled        Comments: Glucommander Recommended POC Glucose Testing Will Vary Between Every 15 Minutes & Every 2 Hours   Release PRN POC Glucose Orders as Needed    03/11/25 1402    03/11/25 1402  RN to Order STAT Glucose For Any POC Glucose <10 or >600  Continuous,   Status:  Canceled        Comments: Do Not Delay Treatment of Unstable Patient to Obtain Glucose Sample  Inform Provider of Results     03/11/25 1402    03/11/25 1401  sodium chloride 0.9 % flush 10 mL  As Needed,   Status:  Discontinued         03/11/25 1402    03/11/25 1401  sodium chloride 0.9 % infusion 40 mL  As Needed,   Status:  Discontinued         03/11/25 1402    03/11/25 1401  POC Glucose PRN  As Needed,   Status:  Canceled      Comments: Glucommander Recommended POC Glucose Testing Will Vary Between Every 15 Minutes & Every 2 Hours Release PRN POC Glucose Orders as Needed      03/11/25 1402    03/11/25 1400  furosemide (LASIX) injection 40 mg  Once         03/11/25 1312    03/11/25 1358  POC Glucose Once  PROCEDURE ONCE        Comments: Complete no more than 45 minutes prior to patient eating      03/11/25 1351    03/11/25 1322  POC Glucose Once  PROCEDURE ONCE        Comments: Complete no more than 45 minutes prior to patient eating      03/11/25 1315    03/11/25 1315  Initial Transition Basal Dose Per Glucommander™ Recommendation  Once,   Status:  Discontinued         03/11/25 1218    03/11/25 1315  insulin glargine (LANTUS, SEMGLEE) injection 1-200 Units  Nightly - Glucommander,   Status:  Discontinued         03/11/25 1218    03/11/25 1225  D / C Central Line  Once         03/11/25 1224    03/11/25 1222  POC Glucose Once  PROCEDURE ONCE        Comments: Complete no more than 45 minutes prior to patient eating      03/11/25 1214    03/11/25 1218  Initiate Glucommander™ SQ  Once         03/11/25 1218    03/11/25 1218  Patient is on Glucommander  Continuous,   Status:  Canceled         03/11/25 1218    03/11/25 1218  RN to Order STAT Glucose (Lab Performed) for POC Glucose <10 or >600  Continuous        Comments: Do NOT Delay Treatment of Unstable Patient to Obtain Glucose Sample From Lab  Notify Provider of Results    03/11/25 1218    03/11/25 1218  Transition Patient from Glucommander™ IV to Glucommander™ SQ  Once        Comments: When Transitioning to SQ Insulin, Patient Should Have Basal Insulin Administered at Least 2 Hours Prior  to Discontinuation of IV Insulin Infusion    03/11/25 1218    03/11/25 1218  Glucommander™ Transition Timing Instructions  Once        Comments: - Initiate Transition Orders Immediately Unless Orders Placed 8911-4296  --- Orders Placed [id number] Should Be Initiated AFTER 1700  --- If Orders Placed Prior to 1601 Are Not Initiated Prior to 1700, Contact Provider to DC & Reorder Transition Orders  - BID or AM Basal Orders ONLY - If Transition Orders Placed 0669-2039, Initiate Transition After 1300    03/11/25 1218    03/11/25 1217  insulin lispro (humaLOG) injection 1-200 Units  As Needed,   Status:  Discontinued         03/11/25 1218    03/11/25 1217  dextrose (GLUTOSE) oral gel 15 g  Every 15 Minutes PRN,   Status:  Discontinued         03/11/25 1218    03/11/25 1217  dextrose (D50W) (25 g/50 mL) IV injection 10-50 mL  Every 15 Minutes PRN         03/11/25 1218    03/11/25 1217  glucagon HCl (Diagnostic) injection 1 mg  Every 15 Minutes PRN         03/11/25 1218    03/11/25 1217  POC Glucose PRN  As Needed,   Status:  Canceled      Comments: Complete no more than 45 minutes prior to patient eating      03/11/25 1218    03/11/25 1216  prochlorperazine (COMPAZINE) injection 10 mg  Every 6 Hours PRN,   Status:  Discontinued         03/11/25 1216    03/11/25 1147  POC Glucose Once  PROCEDURE ONCE        Comments: Complete no more than 45 minutes prior to patient eating      03/11/25 1141    03/11/25 1044  POC Glucose Once  PROCEDURE ONCE        Comments: Complete no more than 45 minutes prior to patient eating      03/11/25 1037    03/11/25 1008  POC Glucose Once  PROCEDURE ONCE        Comments: Complete no more than 45 minutes prior to patient eating      03/11/25 1002    03/11/25 0947  SLP Consult: Eval & Treat Other; Per Dr. Herzog  Once         03/11/25 0946    03/11/25 0924  POC Glucose Once  PROCEDURE ONCE        Comments: Complete no more than 45 minutes prior to patient eating      03/11/25 0917    03/11/25 0849   POC Glucose Once  PROCEDURE ONCE        Comments: Complete no more than 45 minutes prior to patient eating      03/11/25 0842    03/11/25 0815  POC Glucose Once  PROCEDURE ONCE        Comments: Complete no more than 45 minutes prior to patient eating      03/11/25 0806    03/11/25 0703  POC Glucose Once  PROCEDURE ONCE        Comments: Complete no more than 45 minutes prior to patient eating      03/11/25 0656    03/11/25 0602  POC Glucose Once  PROCEDURE ONCE        Comments: Complete no more than 45 minutes prior to patient eating      03/11/25 0555    03/11/25 0600  CBC & Differential  Morning Draw         03/10/25 0724    03/11/25 0600  Comprehensive Metabolic Panel  Morning Draw         03/10/25 0724    03/11/25 0600  Magnesium  Morning Draw         03/10/25 0724    03/11/25 0600  Phosphorus  Morning Draw         03/10/25 0724    03/11/25 0600  XR Chest AP  1 Time Imaging         03/10/25 0724    03/11/25 0600  Blood Gas, Venous -With Co-Ox Panel: Yes  Daily,   Status:  Canceled      Comments: With morning labs      03/10/25 1341    03/11/25 0600  Procalcitonin  Morning Draw         03/10/25 1349    03/11/25 0600  CBC Auto Differential  PROCEDURE ONCE         03/10/25 2201    03/11/25 0459  POC Glucose Once  PROCEDURE ONCE        Comments: Complete no more than 45 minutes prior to patient eating      03/11/25 0452    03/11/25 0400  phenol (CHLORASEPTIC) 1.4 % liquid 1 spray  Every 2 Hours PRN         03/11/25 0401    03/11/25 0355  POC Glucose Once  PROCEDURE ONCE        Comments: Complete no more than 45 minutes prior to patient eating      03/11/25 0348    03/11/25 0325  POC Glucose Once  PROCEDURE ONCE        Comments: Complete no more than 45 minutes prior to patient eating      03/11/25 0318    03/11/25 0230  magnesium sulfate 2g/50 mL (PREMIX) infusion  Every 2 Hours         03/11/25 0135    03/11/25 0223  POC Glucose Once  PROCEDURE ONCE        Comments: Complete no more than 45 minutes prior to patient  eating      03/11/25 0216    03/11/25 0154  POC Glucose Once  PROCEDURE ONCE        Comments: Complete no more than 45 minutes prior to patient eating      03/11/25 0147    03/11/25 0139  Blood Gas, Arterial -With Co-Ox Panel: Yes  Once         03/11/25 0139    03/11/25 0044  Blood Gas, Venous With Co-Ox  PROCEDURE ONCE         03/11/25 0038    03/11/25 0015  clonazePAM (KlonoPIN) tablet 0.5 mg  Once,   Status:  Discontinued         03/10/25 2319    03/10/25 2350  POC Glucose Once  PROCEDURE ONCE        Comments: Complete no more than 45 minutes prior to patient eating      03/10/25 2343    03/10/25 2151  POC Glucose Once  PROCEDURE ONCE        Comments: Complete no more than 45 minutes prior to patient eating      03/10/25 2144    03/10/25 2052  POC Glucose Once  PROCEDURE ONCE        Comments: Complete no more than 45 minutes prior to patient eating      03/10/25 2044    03/10/25 1943  POC Glucose Once  PROCEDURE ONCE        Comments: Complete no more than 45 minutes prior to patient eating      03/10/25 1936    03/10/25 1846  POC Glucose Once  PROCEDURE ONCE        Comments: Complete no more than 45 minutes prior to patient eating      03/10/25 1840    03/10/25 1824  POC Glucose Once  PROCEDURE ONCE        Comments: Complete no more than 45 minutes prior to patient eating      03/10/25 1807    03/10/25 1708  POC Glucose Once  PROCEDURE ONCE        Comments: Complete no more than 45 minutes prior to patient eating      03/10/25 1701    03/10/25 1641  POC Glucose Once  PROCEDURE ONCE        Comments: Complete no more than 45 minutes prior to patient eating      03/10/25 1633    03/10/25 1545  POC Glucose Once  PROCEDURE ONCE        Comments: Complete no more than 45 minutes prior to patient eating      03/10/25 1537    03/10/25 1445  POC Glucose Once  PROCEDURE ONCE        Comments: Complete no more than 45 minutes prior to patient eating      03/10/25 1437    03/10/25 1430  racemic epinephrine (RACEPINEPHRINE)  nebulizer solution 0.5 mL  Once         03/10/25 1335    03/10/25 1430  furosemide (LASIX) injection 20 mg  Once         03/10/25 1344    03/10/25 1416  POC Glucose Once  PROCEDURE ONCE        Comments: Complete no more than 45 minutes prior to patient eating      03/10/25 1408    03/10/25 1336  NIPPV-IPPV / BIPAP / CPAP  At Bedtime & As Needed-RT         03/10/25 1336    03/10/25 1331  XR Chest 1 View  1 Time Imaging         03/10/25 1331    03/10/25 1310  POC Glucose Once  PROCEDURE ONCE        Comments: Complete no more than 45 minutes prior to patient eating      03/10/25 1300    03/10/25 1309  POC Glucose Once  PROCEDURE ONCE        Comments: Complete no more than 45 minutes prior to patient eating      03/10/25 1231    03/10/25 1300  ipratropium-albuterol (DUO-NEB) nebulizer solution 3 mL  Every 6 Hours - RT         03/10/25 1008    03/10/25 1209  Diet: Regular/House, Diabetic; Consistent Carbohydrate; Fluid Consistency: Thin (IDDSI 0)  Diet Effective Now         03/10/25 1208    03/10/25 1200  cefTRIAXone (ROCEPHIN) 2,000 mg in sodium chloride 0.9 % 100 mL IVPB-VTB  Every 24 Hours         03/10/25 1020    03/10/25 1137  POC Glucose Once  PROCEDURE ONCE        Comments: Complete no more than 45 minutes prior to patient eating      03/10/25 1129    03/10/25 1052  Apply bilateral Heel Protector Boots Until Discontinued  Until Discontinued        Comments: May use silicone bordered dressing for protection if unable to tolerate boots    03/10/25 1051    03/10/25 1050  Apply bilateral Heel Protector Boots Until Discontinued  Until Discontinued,   Status:  Canceled         03/10/25 1050    03/10/25 1050  Turn Patient  Now Then Every 2 Hours         03/10/25 1050    03/10/25 1042  POC Glucose Once  PROCEDURE ONCE        Comments: Complete no more than 45 minutes prior to patient eating      03/10/25 1026    03/10/25 0920  ondansetron (ZOFRAN) injection 4 mg  Every 6 Hours PRN         03/10/25 0920    03/10/25 0900   "cetirizine (zyrTEC) tablet 10 mg  Daily         03/09/25 2220    03/10/25 0900  cholecalciferol (VITAMIN D3) tablet 400 Units  Daily         03/09/25 2220    03/10/25 0900  atorvastatin (LIPITOR) tablet 40 mg  Daily         03/09/25 2220    03/10/25 0600  Daily CHG Bath While Central Line in Place  Daily,   Status:  Canceled       03/09/25 0610 03/09/25 2315  nicotine (NICODERM CQ) 14 MG/24HR patch 1 patch  Every 24 Hours         03/09/25 2220 03/09/25 2315  Lidocaine 4 % 1 patch  Every 24 Hours         03/09/25 2220 03/09/25 2315  gabapentin (NEURONTIN) capsule 800 mg  Every 8 Hours Scheduled         03/09/25 2220 03/09/25 2219  dicyclomine (BENTYL) capsule 10 mg  4 Times Daily PRN         03/09/25 2220 03/09/25 2219  nicotine polacrilex (NICORETTE) gum 4 mg  As Needed         03/09/25 2220 03/09/25 2219  albuterol (PROVENTIL) nebulizer solution 0.083% 2.5 mg/3mL  Every 4 Hours PRN         03/09/25 2220 03/09/25 2219  fluticasone (FLONASE) 50 MCG/ACT nasal spray 2 spray  Daily PRN         03/09/25 2220 03/09/25 2219  clonazePAM (KlonoPIN) tablet 1 mg  4 Times Daily PRN         03/09/25 2220 03/09/25 2219  cyclobenzaprine (FLEXERIL) tablet 5 mg  3 Times Daily PRN         03/09/25 2220 03/09/25 1930  azithromycin (ZITHROMAX) 500 mg in sodium chloride 0.9 % 250 mL IVPB-VTB  Every 24 Hours         03/09/25 0057 03/09/25 1915  DEXMEDETOMIDINE 1000 MCG/250ML INFUSION infusion  Titrated,   Status:  Discontinued         03/09/25 1817 03/09/25 0900  sennosides-docusate (PERICOLACE) 8.6-50 MG per tablet 2 tablet  2 Times Daily        Placed in \"And\" Linked Group    03/09/25 0057 03/09/25 0900  famotidine (PEPCID) injection 20 mg  2 Times Daily,   Status:  Discontinued         03/09/25 0057    03/09/25 0900  sodium chloride 0.9 % flush 10 mL  Every 12 Hours Scheduled,   Status:  Discontinued         03/09/25 0610    03/09/25 0900  sodium chloride 0.9 % flush 10 mL  Every 12 Hours " "Scheduled,   Status:  Discontinued         03/09/25 0610    03/09/25 0900  sodium chloride 0.9 % flush 10 mL  Every 12 Hours Scheduled,   Status:  Discontinued         03/09/25 0610    03/09/25 0900  sodium chloride 0.9 % flush 10 mL  Every 12 Hours Scheduled,   Status:  Discontinued         03/09/25 0610    03/09/25 0613  Urinary Catheter Care  Every Shift,   Status:  Canceled      Placed in \"And\" Linked Group    03/09/25 0612    03/09/25 0610  sodium chloride 0.9 % flush 10 mL  As Needed         03/09/25 0610    03/09/25 0610  sodium chloride 0.9 % infusion 40 mL  As Needed         03/09/25 0610    03/09/25 0610  Insert New Peripheral IV  As Needed,   Status:  Canceled      Comments: Frequency Per Facility Policy    03/09/25 0610    03/09/25 0610  sodium chloride 0.9 % flush 10 mL  As Needed,   Status:  Discontinued         03/09/25 0610    03/09/25 0610  sodium chloride 0.9 % flush 20 mL  As Needed,   Status:  Discontinued         03/09/25 0610    03/09/25 0610  sodium chloride 0.9 % infusion 40 mL  As Needed,   Status:  Discontinued         03/09/25 0610    03/09/25 0609  Silicone Border Dressing to Bony Prominences  Every Shift       03/09/25 0610    03/09/25 0608  Deep Tissue Injury Care PRN  As Needed,   Status:  Canceled      Comments: - Gently Cleanse With Normal Saline  - Cover With Silicone Border Dressing (If Indicated)  - For Frequent Incontinence - Apply Skin Protective Barrier Cream Rather Than Silicone Border Dressing    03/09/25 0610    03/09/25 0600  Incentive Spirometry  Every 4 Hours While Awake       03/09/25 0057 03/09/25 0600  Blood Gas, Arterial -With Co-Ox Panel: Yes  Daily,   Status:  Canceled      Comments: While On Ventilator      03/09/25 0057 03/09/25 0600  methylPREDNISolone sodium succinate (SOLU-Medrol) 40 mg in sterile water (preservative free) 1 mL  Every 12 Hours         03/09/25 0057    03/09/25 0400  Oral Care & Teeth Brushing - Intubated Patient  Every 4 Hours,   Status:  " Canceled      Comments: [employer] Teeth at Least 2x/day    03/09/25 0057 03/09/25 0330  cefepime 2000 mg IVPB in 100 mL NS (VTB)  Every 8 Hours,   Status:  Discontinued         03/09/25 0057 03/09/25 0315  midazolam (VERSED) injection 2 mg  Every 4 Hours PRN,   Status:  Discontinued         03/09/25 0315 03/09/25 0300  Spontaneous Awakening Trial  Daily - SAT,   Status:  Canceled       03/09/25 0057 03/09/25 0300  Spontaneous Breathing Trial  Daily - SBT,   Status:  Canceled       03/09/25 0057 03/09/25 0300  ipratropium-albuterol (DUO-NEB) nebulizer solution 3 mL  Every 4 Hours - RT,   Status:  Discontinued         03/09/25 0057 03/09/25 0145  insulin regular 1 unit/mL in 0.9% sodium chloride (Glucommander)  Titrated,   Status:  Discontinued        Note to Pharmacy: Upon initiation of Glucommander insulin drip, make sure all other insulin orders and anti-diabetic medications have been discontinued.    03/09/25 0057 03/09/25 0145  sodium chloride 0.9 % flush 10 mL  Every 12 Hours Scheduled         03/09/25 0057 03/09/25 0145  mupirocin (BACTROBAN) 2 % nasal ointment 1 Application  2 Times Daily         03/09/25 0057 03/09/25 0145  enoxaparin sodium (LOVENOX) syringe 40 mg  Every 12 Hours         03/09/25 0057    03/09/25 0100  Vital Signs Every Hour and Per Hospital Policy Based on Patient Condition  Every Hour       03/09/25 0057    03/09/25 0100  Intake & Output  Every Hour       03/09/25 0057    03/09/25 0058  Daily Weights  Daily       03/09/25 0057 03/09/25 0058  Daily CHG Bath While in Critical Care  Daily      Comments: Use for admission bath and length of critical care stay.    03/09/25 0057 03/09/25 0057  Treat Hypoglycemia As Recommended By Glucommander™ & Notify Provider of Treatment  As Needed,   Status:  Canceled      Comments: Follow Hypoglycemia Orders As Outlined in Process Instructions (Open Order Report to View Full Instructions)  Notify Provider Any Time Hypoglycemia  "Treatment is Administered    03/09/25 0057 03/09/25 0057  If Insulin Infusion is Paused - Follow Glucommander Instructions  As Needed,   Status:  Canceled       03/09/25 0057 03/09/25 0057  dextrose (GLUTOSE) oral gel 15 g  Every 15 Minutes PRN,   Status:  Discontinued         03/09/25 0057 03/09/25 0057  dextrose (D50W) (25 g/50 mL) IV injection 10-50 mL  Every 15 Minutes PRN,   Status:  Discontinued         03/09/25 0057 03/09/25 0057  glucagon HCl (Diagnostic) injection 1 mg  Every 15 Minutes PRN,   Status:  Discontinued         03/09/25 0057 03/09/25 0057  POC Glucose PRN  As Needed,   Status:  Canceled      Comments: Glucommander Recommended POC Glucose Testing Will Vary Between Every 15 Minutes & Every 2 Hours Release PRN POC Glucose Orders as Needed      03/09/25 0057 03/09/25 0057  nitroglycerin (NITROSTAT) SL tablet 0.4 mg  Every 5 Minutes PRN         03/09/25 0057 03/09/25 0057  Oral Care - Patient Not on NPPV & Not Intubated  Every Shift       03/09/25 0057 03/09/25 0057  sodium chloride 0.9 % flush 10 mL  As Needed         03/09/25 0057 03/09/25 0057  sodium chloride 0.9 % infusion 40 mL  As Needed         03/09/25 0057 03/09/25 0057  polyethylene glycol (MIRALAX) packet 17 g  Daily PRN        Placed in \"And\" Linked Group    03/09/25 0057    03/09/25 0057  bisacodyl (DULCOLAX) EC tablet 5 mg  Daily PRN        Placed in \"And\" Linked Group    03/09/25 0057    03/09/25 0057  bisacodyl (DULCOLAX) suppository 10 mg  Daily PRN        Placed in \"And\" Linked Group    03/09/25 0057    03/09/25 0057  Potassium Replacement - Follow Nurse / BPA Driven Protocol  As Needed         03/09/25 0057 03/09/25 0057  Magnesium Standard Dose Replacement - Follow Nurse / BPA Driven Protocol  As Needed         03/09/25 0057 03/09/25 0057  Phosphorus Replacement - Follow Nurse / BPA Driven Protocol  As Needed         03/09/25 0057    03/09/25 0057  Calcium Replacement - Follow Nurse / BPA " Driven Protocol  As Needed         03/09/25 0057 03/08/25 1823  propofol (DIPRIVAN) infusion 10 mg/mL 100 mL  Titrated,   Status:  Discontinued         03/08/25 1807 03/08/25 1823  fentaNYL 2500 mcg in 250 mL NS infusion  Titrated,   Status:  Discontinued         03/08/25 1807 03/08/25 1748  sodium chloride 0.9 % flush 10 mL  As Needed         03/08/25 1748    Unscheduled  Oxygen Therapy- Nasal Cannula; Titrate 1-6 LPM Per SpO2; 90 - 95%  Continuous PRN       03/08/25 1748    Unscheduled  Blood Gas, Arterial -With Co-Ox Panel: Yes  As Needed      Comments: Respiratory Distress      03/09/25 0057    Unscheduled  Wound Care  As Needed       03/09/25 0610    Unscheduled  Change Dressing to IV Site As Needed When Damp, Loose or Soiled  As Needed       03/09/25 0610    Unscheduled  Change Needleless Connectors  As Needed      Comments: Change Needleless Connectors When:  - Administration Set Changed  - Dressing Changed  - Removed For Any Reason  - Residual Blood or Debris Within Connector  - Prior to Drawing Blood Cultures  - Contamination of Connector  - After Administration of Blood or Blood Components    03/09/25 0610    Unscheduled  Wound Care  As Needed       03/10/25 1050    Unscheduled  Treat Hypoglycemia As Recommended By Glucommander™ & Notify Provider of Treatment  As Needed      Comments: Follow Hypoglycemia Orders As Outlined in Process Instructions (Open Order Report to View Full Instructions)  Notify Provider Any Time Hypoglycemia Treatment is Administered    03/11/25 1218    Unscheduled  POC Glucose PRN  As Needed      Comments: Complete no more than 45 minutes prior to patient eating      03/12/25 0952    Unscheduled  Treat Hypoglycemia As Recommended By Glucommander™ & Notify Provider of Treatment  As Needed      Comments: Follow Hypoglycemia Orders As Outlined in Process Instructions (Open Order Report to View Full Instructions)  Notify Provider Any Time Hypoglycemia Treatment is Administered     03/12/25 0952    Unscheduled  Follow Hypoglycemia Standing Orders For Blood Glucose <70 & Notify Provider of Treatment  As Needed      Comments: Follow Hypoglycemia Orders As Outlined in Process Instructions (Open Order Report to View Full Instructions)  Notify Provider Any Time Hypoglycemia Treatment is Administered    03/12/25 2113    --  Cholecalciferol 10 MCG (400 UNIT) tablet  Daily         03/09/25 1836    --  imiquimod (ALDARA) 5 % cream  3 Times Weekly         03/09/25 1838    --  omeprazole (priLOSEC) 20 MG capsule  Daily         03/09/25 1838    --  ondansetron ODT (ZOFRAN-ODT) 8 MG disintegrating tablet  Daily PRN         03/09/25 1838                     Physician Progress Notes (last 72 hours)        Yury Herzog DO at 03/12/25 1342                [facility]    Subjective     History:   Ana Ortiz is a 49 y.o. female admitted on 3/8/2025 secondary to Multifocal pneumonia     Procedures:   3/8/25: Intubation in the ED  3/8/25: Right IJ central line placement in the ED  3/10/25: Extubation     CC: Follow up resp failure     Patient seen and examined this AM. Awake and alert. States she is feeling better today with improving dyspnea. No reported CP. No reported vomiting today. Increased lethargy reported last PM. No acute events overnight per RN.     History taken from: patient, chart, and RN.      Objective     Vital Signs  Temp:  [97.6 °F (36.4 °C)-98.7 °F (37.1 °C)] 97.6 °F (36.4 °C)  Heart Rate:  [] 93  Resp:  [18-26] 22  BP: (108-173)/() 141/103    Intake/Output Summary (Last 24 hours) at 3/12/2025 1342  Last data filed at 3/12/2025 1229  Gross per 24 hour   Intake 1513.47 ml   Output 700 ml   Net 813.47 ml         Physical Exam:  General:    Awake and alert, in no acute distress, obese   Heart:      Normal S1 and S2. Regular rate and rhythm. No significant murmur, rubs or gallops appreciated.   Lungs:     Respirations regular, even  and unlabored. Scattered expiratory wheezes but aeration overall improved.      Abdomen:   Soft and nontender. No guarding, rebound tenderness or  organomegaly noted. Bowel sounds present x 4.   Extremities:  No clubbing, cyanosis or edema noted.      Results Review:    Results from last 7 days   Lab Units 03/12/25  0040 03/11/25 0034 03/09/25 2327 03/09/25  1005 03/08/25  1756   WBC 10*3/mm3 8.29 15.37* 15.04* 13.56* 19.83*   HEMOGLOBIN g/dL 12.8 11.5* 11.3* 12.1 13.9   PLATELETS 10*3/mm3 204 202 199 182 213     Results from last 7 days   Lab Units 03/12/25 0040 03/11/25 0034 03/09/25 2327 03/09/25 1006 03/08/25  1756   SODIUM mmol/L 136 138 143 137 130*   POTASSIUM mmol/L 4.3 4.3 4.9  4.9 3.6 3.4*   CHLORIDE mmol/L 97* 100 107 100 87*   CO2 mmol/L 29.4* 25.5 29.5* 27.7 27.9   BUN mg/dL 17 21* 22* 16 10   CREATININE mg/dL 0.70 0.83 0.77 0.73 0.88   CALCIUM mg/dL 8.9 9.1 8.3* 8.4* 8.8   GLUCOSE mg/dL 167* 153* 204* 264* 439*     Results from last 7 days   Lab Units 03/11/25 0034 03/09/25 2327 03/09/25  1006 03/08/25  1756   BILIRUBIN mg/dL 0.2 <0.2 0.2 0.5   ALK PHOS U/L 88 78 91 122*   AST (SGOT) U/L 22 14 14 17   ALT (SGPT) U/L 18 12 12 14     Results from last 7 days   Lab Units 03/12/25 0040 03/11/25 0034 03/09/25 2327 03/08/25  1756   MAGNESIUM mg/dL 1.6 1.5* 2.5 1.4*     Results from last 7 days   Lab Units 03/08/25  1756   INR  1.09     Results from last 7 days   Lab Units 03/08/25  1851 03/08/25  1756   HSTROP T ng/L 9 10       Imaging Results (Last 24 Hours)       ** No results found for the last 24 hours. **              Medications:  atorvastatin, 40 mg, Oral, Daily  cefTRIAXone, 2,000 mg, Intravenous, Q24H  cetirizine, 10 mg, Oral, Daily  cholecalciferol, 400 Units, Oral, Daily  enoxaparin sodium, 40 mg, Subcutaneous, Q12H  gabapentin, 800 mg, Oral, Q8H  insulin glargine, 90 Units, Subcutaneous, Daily  insulin lispro, 15 Units, Subcutaneous, TID With Meals  ipratropium-albuterol, 3 mL,  Nebulization, Q6H - RT  Lidocaine, 1 patch, Transdermal, Q24H  methylPREDNISolone sodium succinate, 40 mg, Intravenous, Q12H  mupirocin, 1 Application, Each Nare, BID  nicotine, 1 patch, Transdermal, Q24H  senna-docusate sodium, 2 tablet, Oral, BID  sodium chloride, 10 mL, Intravenous, Q12H  sodium chloride, 10 mL, Intravenous, Q12H  sodium chloride, 10 mL, Intravenous, Q12H  sodium chloride, 10 mL, Intravenous, Q12H  sodium chloride, 10 mL, Intravenous, Q12H  sodium chloride, 10 mL, Intravenous, Q12H      insulin, 0-100 Units/hr, Last Rate: 12 Units/hr (03/12/25 1326)              Assessment & Plan   Multifocal bacterial pneumonia: Suspect gram negative. MRSA PCR is negative. Strep pneumo and Legionella urine antigens negative. Completed short course of azithromycin. Cont Rocephin. Follow cultures.     Acute exacerbation of COPD: Respiratory PCR is negative. Cont treatment as above, steroids and nebs.     Acute hypercapnic on chronic hypoxic respiratory failure: Likely 2/2 above. No evidence of PE on CT chest. Intubated in the ED. Clinically improved with treatment as outlined above. SAT initiated on 3/10 and pt self extubated. Currently stable on 4L's NC. Cont treatment as outlined above. Intermittent diuresis to assist with lung compliance. Consult PT/OT. Cont to monitor closely. Pulm input appreciated.     DM II, insulin dependent with hyperglycemia: HgbA1c 12.4 in 1/25. Improved with insulin gtt. Transition to SQ regimen. Cont to monitor closely.     Hypokalemia: K+ improved with supplementation and stable today. Mg <2 and replaced. Cont to monitor.     Chronic HFpEF: Echo in 3/24 revealed an EF of 61-65% and grade I diastolic dysfunction. Appears compensated at present. Monitor volume status.      Essential HTN: BP previously borderline low intermittently but trended upward once off sedation. Cont to monitor.     HLD: Cont statin.     Tobacco use disorder: Cont NRT and encourage cessation.     Morbid obesity  by BMI: Complicates all aspects of care.     PPX  GI: Pepcid  DVT: Lovenox      Transfer to PCU.     Disposition Pending clinical course, likely home when medically stable.     Yury Herzog DO  03/12/25  13:42 EDT     Electronically signed by Yury Herzog DO at 03/12/25 1345       Floyd Turcios MD at 03/12/25 0912                Referring Provider: Dr. Clayton  Reason for Consultation: Hypoxic and hypercarbic respiratory failure.      Chief complaint -could not be obtained as patient on my assessment was intubated and sedated.      Sub-  Overnight events reviewed.  All the labs medications ins and outs and vitals reviewed.  Remains on insulin drip.  Eating.  Will switch to patient's home insulin.  Discussed in rounds with pharmacist.  Lasix 20         Review of Systems  Generalized fatigue and tiredness otherwise negative.        History  Past Medical History:   Diagnosis Date    Arthritis     CHF (congestive heart failure)     COPD (chronic obstructive pulmonary disease)     Diabetes insipidus     Diabetes mellitus     Elevated cholesterol     GERD (gastroesophageal reflux disease)     Hyperlipemia     Hypertension     JOANN (obstructive sleep apnea)     Pneumonia     Type 2 diabetes mellitus 2013    Vulvar cancer    ,   Past Surgical History:   Procedure Laterality Date    HYSTERECTOMY      INCISION AND DRAINAGE TRUNK N/A 1/11/2022    Procedure: INCISION AND DRAINAGE BACK;  Surgeon: Tamera Saez MD;  Location: [facility];  Service: General;  Laterality: N/A;    MASTECTOMY      VULVA SURGERY      reconstruction due ca   ,   Family History   Problem Relation Age of Onset    Cancer Mother     Hypertension Mother     Parkinsonism Father     Diabetes Father     Hypertension Father     Obesity Father     Cancer Maternal Aunt     Diabetes Paternal Aunt     Diabetes Paternal Grandmother     Cancer Maternal Aunt     Diabetes Brother     Obesity Brother    ,   Social History     Tobacco Use     Smoking status: Every Day     Current packs/day: 1.00     Average packs/day: 1.4 packs/day for 31.0 years (44.6 ttl pk-yrs)     Types: Cigarettes     Start date: 5/18/1995     Passive exposure: Current    Smokeless tobacco: Never    Tobacco comments:     Hasn't smoked in 4 days   Vaping Use    Vaping status: Former    Substances: Nicotine, Flavoring    Devices: [employer] tank   Substance Use Topics    Alcohol use: Never    Drug use: Never   ,   Medications Prior to Admission   Medication Sig Dispense Refill Last Dose/Taking    albuterol sulfate HFA (Ventolin HFA) 108 (90 Base) MCG/ACT inhaler Inhale 2 puffs Every 4 (Four) Hours As Needed for Wheezing or Shortness of Air. 18 g 12 Unknown    budesonide-formoterol (Symbicort) 160-4.5 MCG/ACT inhaler Inhale 2 puffs 2 (Two) Times a Day. 1 each 8 Unknown    cetirizine (zyrTEC) 10 MG tablet Take 1 tablet by mouth Daily.   Unknown    Cholecalciferol 10 MCG (400 UNIT) tablet Take 1 tablet by mouth Daily.   Unknown    clonazePAM (KlonoPIN) 1 MG tablet Take 1 tablet by mouth 4 (Four) Times a Day As Needed for Anxiety.   Unknown    cyclobenzaprine (FLEXERIL) 5 MG tablet Take 1 tablet by mouth 3 (Three) Times a Day As Needed for Muscle Spasms.   Unknown    dicyclomine (BENTYL) 10 MG capsule Take 1 capsule by mouth 4 (Four) Times a Day As Needed for Abdominal Cramping.   Unknown    fluticasone (FLONASE) 50 MCG/ACT nasal spray Administer 2 sprays into the nostril(s) as directed by provider Daily As Needed for Rhinitis or Allergies.   Unknown    gabapentin (NEURONTIN) 800 MG tablet Take 1 tablet by mouth 4 (Four) Times a Day.   Unknown    hydroCHLOROthiazide (HYDRODIURIL) 25 MG tablet Take 1 tablet by mouth Every Morning.   Unknown    ibuprofen (ADVIL,MOTRIN) 800 MG tablet Take 1 tablet by mouth Every 8 (Eight) Hours As Needed for Mild Pain.   Unknown    imiquimod (ALDARA) 5 % cream Apply 1 Application topically to the appropriate area as directed 3 (Three) Times a Week.   Unknown     insulin aspart (NovoLOG FlexPen) 100 UNIT/ML solution pen-injector sc pen Inject 15 Units under the skin into the appropriate area as directed 3 (Three) Times a Day With Meals. 15 mL 2 Unknown    Insulin Degludec (Tresiba FlexTouch) 200 UNIT/ML solution pen-injector pen injection Inject 90 Units under the skin into the appropriate area as directed every night at bedtime. 15 mL 2 Unknown    ipratropium-albuterol (DUO-NEB) 0.5-2.5 mg/3 ml nebulizer Take 3 mL by nebulization Every 6 (Six) Hours As Needed for Wheezing or Shortness of Air. 360 mL 5 Unknown    lidocaine (LIDODERM) 5 % Place 1 patch on the skin as directed by provider Daily. Remove & Discard patch within 12 hours or as directed by MD   Unknown    lisinopril (PRINIVIL,ZESTRIL) 5 MG tablet Take 0.5 tablets by mouth Daily.   Unknown    nicotine (NICODERM CQ) 14 MG/24HR patch Place 1 patch on the skin as directed by provider Daily. 28 each 3 Unknown    nicotine polacrilex (NICORETTE) 4 MG gum Chew 1 each As Needed for Smoking Cessation. 220 each 5 Unknown    omeprazole (priLOSEC) 20 MG capsule Take 1 capsule by mouth Daily.   Unknown    ondansetron ODT (ZOFRAN-ODT) 8 MG disintegrating tablet Place 1 tablet on the tongue Daily As Needed for Nausea or Vomiting.   Unknown    simvastatin (ZOCOR) 80 MG tablet Take 1 tablet by mouth every night at bedtime.   Unknown    tiotropium (Spiriva HandiHaler) 18 MCG per inhalation capsule Place 1 capsule into inhaler and inhale Daily. 30 capsule 8 Unknown    Tirzepatide 5 MG/0.5ML solution auto-injector Inject 5 mg under the skin into the appropriate area as directed 1 (One) Time Per Week. 2 mL 1 Unknown   , Scheduled Meds:  atorvastatin, 40 mg, Oral, Daily  cefTRIAXone, 2,000 mg, Intravenous, Q24H  cetirizine, 10 mg, Oral, Daily  cholecalciferol, 400 Units, Oral, Daily  clonazePAM, 0.5 mg, Oral, Once  enoxaparin sodium, 40 mg, Subcutaneous, Q12H  gabapentin, 800 mg, Oral, Q8H  Edit Initial Basal Insulin Transition Dose,  1 each, Not Applicable, Once  ipratropium-albuterol, 3 mL, Nebulization, Q6H - RT  Lidocaine, 1 patch, Transdermal, Q24H  magnesium sulfate, 2 g, Intravenous, Once  methylPREDNISolone sodium succinate, 40 mg, Intravenous, Q12H  mupirocin, 1 Application, Each Nare, BID  nicotine, 1 patch, Transdermal, Q24H  senna-docusate sodium, 2 tablet, Oral, BID  sodium chloride, 10 mL, Intravenous, Q12H  sodium chloride, 10 mL, Intravenous, Q12H  sodium chloride, 10 mL, Intravenous, Q12H  sodium chloride, 10 mL, Intravenous, Q12H  sodium chloride, 10 mL, Intravenous, Q12H  sodium chloride, 10 mL, Intravenous, Q12H    , Continuous Infusions:  insulin, 0-100 Units/hr, Last Rate: 27.3 Units/hr (03/12/25 0855)     and Allergies:  Codeine and Metformin    Objective     Vital Signs   Temp:  [98.3 °F (36.8 °C)-98.7 °F (37.1 °C)] 98.7 °F (37.1 °C)  Heart Rate:  [] 100  Resp:  [18-29] 22  BP: (108-173)/() 108/80    Physical Exam:             General-acutely ill appearance, not in any acute distress    HEENT-PERRLA    Neck-supple    Respiratory-respirations normal-on auscultation no wheezing no crackles, decreased breath sounds due to patient's body habitus    Cardiovascular-NSR  GI-nontender nondistended bowel sounds positive    CNS-nonfocal    Musculoskeletal -no edema  Extremities- no obvious deformity noticed     Psychiatric-alert awake oriented  Skin- no visible rash                                                                   Results Review:    LABS:    Lab Results   Component Value Date    GLUCOSE 167 (H) 03/12/2025    BUN 17 03/12/2025    CREATININE 0.70 03/12/2025    EGFRIFNONA 93 01/13/2022    BCR 24.3 03/12/2025    CO2 29.4 (H) 03/12/2025    CALCIUM 8.9 03/12/2025    ALBUMIN 3.1 (L) 03/11/2025    AST 22 03/11/2025    ALT 18 03/11/2025    WBC 8.29 03/12/2025    HGB 12.8 03/12/2025    HCT 39.7 03/12/2025    MCV 94.7 03/12/2025     03/12/2025     03/12/2025    K 4.3 03/12/2025    CL 97 (L) 03/12/2025     ANIONGAP 9.6 03/12/2025       Lab Results   Component Value Date    INR 1.09 03/08/2025    INR 1.00 09/09/2024    INR 1.00 03/17/2024    PROTIME 14.2 03/08/2025    PROTIME 13.3 09/09/2024    PROTIME 13.7 03/17/2024       Results from last 7 days   Lab Units 03/08/25  1756   INR  1.09   APTT seconds 30.0          I reviewed the patient's new clinical results.  I reviewed the patient's new imaging results and agree with the interpretation.  Microbiology Results (last 10 days)       Procedure Component Value - Date/Time    MRSA Screen, PCR (Inpatient) - Swab, Nares [[id number]]  (Normal) Collected: 03/09/25 0350    Lab Status: Final result Specimen: Swab from Nares Updated: 03/09/25 0509     MRSA PCR No MRSA Detected    Narrative:      The negative predictive value of this diagnostic test is high and should only be used to consider de-escalating anti-MRSA therapy. A positive result may indicate colonization with MRSA and must be correlated clinically.    S. Pneumo Ag Urine or CSF - Urine, Urine, Clean Catch [[id number]]  (Normal) Collected: 03/09/25 0349    Lab Status: Final result Specimen: Urine, Clean Catch Updated: 03/09/25 1349     Strep Pneumo Ag Negative    Legionella Antigen, Urine - Urine, Indwelling Urethral Catheter [[id number]]  (Normal) Collected: 03/08/25 1859    Lab Status: Final result Specimen: Urine from Indwelling Urethral Catheter Updated: 03/09/25 0121     LEGIONELLA ANTIGEN, URINE Negative    Narrative:      Presumptive negative for L. pneumophilia serogroup 1 antigen, suggesting no recent or current infection.    Respiratory Panel PCR w/COVID-19(SARS-CoV-2) [facility]Y/ADAM/LUIS MIGUEL/PAD/COR/[facility]A In-House, NP Swab in UTM/VTM, 2 HR TAT - Swab, Nasopharynx [[id number]]  (Normal) Collected: 03/08/25 1840    Lab Status: Final result Specimen: Swab from Nasopharynx Updated: 03/08/25 1929     ADENOVIRUS, PCR Not Detected     Coronavirus 229E Not Detected     Coronavirus HKU1 Not Detected     Coronavirus NL63 Not  Detected     Coronavirus OC43 Not Detected     COVID19 Not Detected     Human Metapneumovirus Not Detected     Human Rhinovirus/Enterovirus Not Detected     Influenza A PCR Not Detected     Influenza B PCR Not Detected     Parainfluenza Virus 1 Not Detected     Parainfluenza Virus 2 Not Detected     Parainfluenza Virus 3 Not Detected     Parainfluenza Virus 4 Not Detected     RSV, PCR Not Detected     Bordetella pertussis pcr Not Detected     Bordetella parapertussis PCR Not Detected     Chlamydophila pneumoniae PCR Not Detected     Mycoplasma pneumo by PCR Not Detected    Narrative:      In the setting of a positive respiratory panel with a viral infection PLUS a negative procalcitonin without other underlying concern for bacterial infection, consider observing off antibiotics or discontinuation of antibiotics and continue supportive care. If the respiratory panel is positive for atypical bacterial infection (Bordetella pertussis, Chlamydophila pneumoniae, or Mycoplasma pneumoniae), consider antibiotic de-escalation to target atypical bacterial infection.    Blood Culture - Blood, Arm, Right [[id number]]  (Normal) Collected: 03/08/25 1756    Lab Status: Preliminary result Specimen: Blood from Arm, Right Updated: 03/11/25 1915     Blood Culture No growth at 3 days    Blood Culture - Blood, Arm, Left [[id number]]  (Normal) Collected: 03/08/25 1756    Lab Status: Preliminary result Specimen: Blood from Arm, Left Updated: 03/11/25 1915     Blood Culture No growth at 3 days                 Assessment & Plan    Latest Reference Range & Units 03/11/25 00:38   CO2 Content 22 - 33 mmol/L 32.8   Carboxyhemoglobin Venous 0.0 - 5.0 % 1.9   Methemoglobin Venous 0.0 - 3.0 % 0.6   Oxyhemoglobin Venous 45.0 - 75.0 % 73.3   Hemoglobin, Blood Gas 13.5 - 17.5 g/dL 12.0 (L)   Site  Lab   Modality  Room Air   FIO2 % 50   Ventilator Mode  BiPAP   Set Mech Resp Rate  16.0   IPAP  8   EPAP  18   Barometric Pressure for Blood Gas mmHg 722    pH, Venous 7.320 - 7.420 pH Units 7.409   pCO2, Venous 41.0 - 51.0 mm Hg 49.4   pO2, Venous 27.0 - 53.0 mm Hg 40.6   HCO3, Venous 22.0 - 28.0 mmol/L 31.3 (H)   Base Excess 0.0 - 2.0 mmol/L 5.6 (H)   O2 Saturation, Venous 45.0 - 75.0 % 75.2 (H)   Collected by  [id number]   (L): Data is abnormally low  (H): Data is abnormally high  Hypomagnesemia-replaced.      Neurology-s alert awake oriented able to protect her airway.      Respiratory-acute on chronic hypoxic and hypercarbic respiratory failure    Chest x-ray shows bilateral pulmonary infiltrates.  CT chest reviewed.     Use BiPAP for some time during nighttime    It is microbiology reviewed and is negative so far.    Continue current antibiotics.  Continue steroids.  Continue nebs.  Will decrease steroids  Continue oxygen to maintain saturation 88 to 92%.        Procalitonin Results:      Lab 03/11/25  0034 03/08/25  1756   PROCALCITONIN 0.13 0.18      Latest chest x-ray reviewed.  Will give diuretics to improve lung compliance and diffusion capacity.    ABG -latest reviewed  Latest VBG reviewed.      Ins and outs reviewed.    Cardiology- hemodynamically -stable.  Vitals reviewed.  Continue to monitor HR- rate and rhythm, BP     Nephrology- Cr and BUN stable  I/O-reviewed avoid nephrotoxic agents    GI- PPI prophylaxis  Continue current diet.  Nauseous-likely has diabetic gastroparesis.  Will get EKG and start Reglan if QTc interval is good.    QTc interval within normal limits.  Will start Reglan and get another EKG tomorrow morning to make sure QTc remained stable.    Hematology- CBC  Latest reviewed continue to monitor.  Transfuse for hemoglobin less than 7    ID  Culture-reviewed  And Antibiotics-reviewed discussed with pharmacist and adjusted.  Procalcitonin level negative.    Endrocrinology- Maintain Blood sugar 140 -180  Switch insulin drip to home dose of patient's insulin.      Electrolytes-   Mag and phos       DVT prophylaxis-continue             Multifocal pneumonia    Respiratory failure          Floyd Turcios MD  03/12/25  09:12 EDT         Electronically signed by Floyd Turcios MD at 03/12/25 1354       Yury Herzog DO at 03/11/25 1221                [facility] HOSPITALIST PROGRESS NOTE    Subjective     History:   Ana Ortiz is a 49 y.o. female admitted on 3/8/2025 secondary to Multifocal pneumonia     Procedures:   3/8/25: Intubation in the ED  3/8/25: Right IJ central line placement in the ED  3/10/25: Extubation     CC: Follow up resp failure     Patient seen and examined this AM. Awake and alert. Reports dyspnea and wheezing but feels overall improved. No reported CP. Nausea with vomiting reported. Tolerated BiPAP overnight. No acute events overnight per RN.     History taken from: chart, and RN.      Objective     Vital Signs  Temp:  [98.3 °F (36.8 °C)-99.9 °F (37.7 °C)] 99.5 °F (37.5 °C)  Heart Rate:  [] 101  Resp:  [14-29] 29  BP: (100-156)/() 141/98    Intake/Output Summary (Last 24 hours) at 3/11/2025 1221  Last data filed at 3/11/2025 1219  Gross per 24 hour   Intake 1054.14 ml   Output 1800 ml   Net -745.86 ml         Physical Exam:  General:    Awake and alert, in no acute distress, obese   Heart:      Normal S1 and S2. Regular rate and rhythm. No significant murmur, rubs or gallops appreciated.   Lungs:     Respirations regular, even and unlabored. Expiratory wheezes throughout.     Abdomen:   Soft and nontender. No guarding, rebound tenderness or  organomegaly noted. Bowel sounds present x 4.   Extremities:  No clubbing, cyanosis or edema noted.      Results Review:    Results from last 7 days   Lab Units 03/11/25  0034 03/09/25  2327 03/09/25  1005 03/08/25  1756   WBC 10*3/mm3 15.37* 15.04* 13.56* 19.83*   HEMOGLOBIN g/dL 11.5* 11.3* 12.1 13.9   PLATELETS 10*3/mm3 202 199 182 213     Results from last 7 days   Lab Units 03/11/25  0034 03/09/25  2327 03/09/25  1006 03/08/25  1756   SODIUM mmol/L  138 143 137 130*   POTASSIUM mmol/L 4.3 4.9  4.9 3.6 3.4*   CHLORIDE mmol/L 100 107 100 87*   CO2 mmol/L 25.5 29.5* 27.7 27.9   BUN mg/dL 21* 22* 16 10   CREATININE mg/dL 0.83 0.77 0.73 0.88   CALCIUM mg/dL 9.1 8.3* 8.4* 8.8   GLUCOSE mg/dL 153* 204* 264* 439*     Results from last 7 days   Lab Units 03/11/25  0034 03/09/25  2327 03/09/25  1006 03/08/25  1756   BILIRUBIN mg/dL 0.2 <0.2 0.2 0.5   ALK PHOS U/L 88 78 91 122*   AST (SGOT) U/L 22 14 14 17   ALT (SGPT) U/L 18 12 12 14     Results from last 7 days   Lab Units 03/11/25  0034 03/09/25  2327 03/08/25  1756   MAGNESIUM mg/dL 1.5* 2.5 1.4*     Results from last 7 days   Lab Units 03/08/25  1756   INR  1.09     Results from last 7 days   Lab Units 03/08/25  1851 03/08/25  1756   HSTROP T ng/L 9 10       Imaging Results (Last 24 Hours)       Procedure Component Value Units Date/Time    XR Chest AP [[id number]] Collected: 03/11/25 0835     Updated: 03/11/25 0838    Narrative:       VERIFICATION OBSERVER NAME: Huy Mayo MD.     TECHNIQUE, ADDITIONAL HISTORY, FINDINGS: Single frontal view of the  chest was obtained.   Comparison study date : 9/12/2024. Time : 5:24 AM.       RIGHT central line tip in the SVC.  Minimal cardiac enlargement.  Extensive bilateral reticulonodular changes, significantly worsened from  prior.  No pleural effusion.  No pneumothorax.       Impression:      Extensive bilateral nodules and reticulonodular changes likely  infectious in origin.                       [id number], Zip code [id number].           This report was finalized on 3/11/2025 8:36 AM by Dr. Huy Mayo MD.       XR Chest 1 View [[id number]] Collected: 03/10/25 1412     Updated: 03/10/25 1414    Narrative:      EXAM:    XR Chest, 1 View     EXAM DATE:    3/10/2025 1:43 PM     CLINICAL HISTORY:    increase WOB/hypoxia; R06.02-Shortness of breath; J44.1-Chronic  obstructive pulmonary disease with (acute) exacerbation; A41.9-Sepsis,  unspecified organism; J96.00-Acute respiratory  failure, unspecified  whether with hypoxia or hypercapnia     TECHNIQUE:    Frontal view of the chest.     COMPARISON:    325     FINDINGS:    Lungs and pleural spaces:  Improvement in diffuse bilateral airspace  disease.  No consolidation.  No pneumothorax.    Heart:  Cardiomegaly is stable.    Mediastinum:  Unremarkable.  Normal mediastinal contour.    Bones/joints:  Unremarkable.  No acute fracture.    Vasculature:  Right IJ deep line with tip in the proximal SVC.       Impression:      1.  Improvement in diffuse bilateral airspace disease.  2.  Cardiomegaly is stable.  3.  Right IJ deep line with tip in the proximal SVC.     This report was finalized on 3/10/2025 2:12 PM by Dr. David Mckeon MD.                 Medications:  atorvastatin, 40 mg, Oral, Daily  cefTRIAXone, 2,000 mg, Intravenous, Q24H  cetirizine, 10 mg, Oral, Daily  cholecalciferol, 400 Units, Oral, Daily  clonazePAM, 0.5 mg, Oral, Once  enoxaparin sodium, 40 mg, Subcutaneous, Q12H  gabapentin, 800 mg, Oral, Q8H  Edit Initial Basal Insulin Transition Dose, 1 each, Not Applicable, Once  insulin glargine, 1-200 Units, Subcutaneous, Nightly - Glucommander  insulin lispro, 1-200 Units, Subcutaneous, 4x Daily With Meals & Nightly  ipratropium-albuterol, 3 mL, Nebulization, Q6H - RT  Lidocaine, 1 patch, Transdermal, Q24H  methylPREDNISolone sodium succinate, 40 mg, Intravenous, Q12H  mupirocin, 1 Application, Each Nare, BID  nicotine, 1 patch, Transdermal, Q24H  senna-docusate sodium, 2 tablet, Oral, BID  sodium chloride, 10 mL, Intravenous, Q12H  sodium chloride, 10 mL, Intravenous, Q12H  sodium chloride, 10 mL, Intravenous, Q12H  sodium chloride, 10 mL, Intravenous, Q12H  sodium chloride, 10 mL, Intravenous, Q12H                 Assessment & Plan   Multifocal bacterial pneumonia: Suspect gram negative. MRSA PCR is negative. Strep pneumo and Legionella urine antigens negative. Completed short course of azithromycin. Cont Rocephin. Follow cultures.      Acute exacerbation of COPD: Respiratory PCR is negative. Cont treatment as above, steroids and nebs.     Acute hypercapnic on chronic hypoxic respiratory failure: Likely 2/2 above. No evidence of PE on CT chest. Intubated in the ED. Clinically improved with treatment as outlined above. SAT initiated on 3/10 and pt self extubated. Currently stable on 4L's NC. Cont treatment as outlined above. Intermittent diuresis to assist with lung compliance. Cont to monitor closely. Pulm input appreciated.     DM II, insulin dependent with hyperglycemia: HgbA1c 12.4 in 1/25. Improved with insulin gtt. Transition to SQ Glucommander. Cont to monitor closely.     Hypokalemia: K+ improved with supplementation and stable today. Mg <2 and replaced. Cont to monitor.     Chronic HFpEF: Echo in 3/24 revealed an EF of 61-65% and grade I diastolic dysfunction. Appears compensated at present. Monitor volume status.      Essential HTN: BP has been borderline low intermittently but trending upward once sedation. Cont to monitor.     HLD: Cont statin.     Tobacco use disorder: Cont NRT and encourage cessation.     Morbid obesity by BMI: Complicates all aspects of care.     PPX  GI: Pepcid  DVT: Lovenox      Disposition Pending clinical course, likely home when medically stable.     Yury Herzog DO  03/11/25  12:21 EDT     Electronically signed by Yury Herzog DO at 03/11/25 9965       Floyd Turcios MD at 03/11/25 0832                Referring Provider: Dr. Clayton  Reason for Consultation: Hypoxic and hypercarbic respiratory failure.      Chief complaint -could not be obtained as patient on my assessment was intubated and sedated.      Sub-overnight events reviewed.  Patient self extubated yesterday.  Had to be on continuous BiPAP and gave her 1 dose of racemic epinephrine.  Blood gas from today morning reviewed.    4 hours -bipap   Speech and swallow evaluation today.  Aspirated on dinner   Did okay with  breakfast.  None of the family members at bedside today  Review of Systems  Generalized fatigue and tiredness otherwise negative.        History  Past Medical History:   Diagnosis Date    Arthritis     CHF (congestive heart failure)     COPD (chronic obstructive pulmonary disease)     Diabetes insipidus     Diabetes mellitus     Elevated cholesterol     GERD (gastroesophageal reflux disease)     Hyperlipemia     Hypertension     JOANN (obstructive sleep apnea)     Pneumonia     Type 2 diabetes mellitus 2013    Vulvar cancer    ,   Past Surgical History:   Procedure Laterality Date    HYSTERECTOMY      INCISION AND DRAINAGE TRUNK N/A 1/11/2022    Procedure: INCISION AND DRAINAGE BACK;  Surgeon: Tamera Saez MD;  Location: [facility];  Service: General;  Laterality: N/A;    MASTECTOMY      VULVA SURGERY      reconstruction due ca   ,   Family History   Problem Relation Age of Onset    Cancer Mother     Hypertension Mother     Parkinsonism Father     Diabetes Father     Hypertension Father     Obesity Father     Cancer Maternal Aunt     Diabetes Paternal Aunt     Diabetes Paternal Grandmother     Cancer Maternal Aunt     Diabetes Brother     Obesity Brother    ,   Social History     Tobacco Use    Smoking status: Every Day     Current packs/day: 1.00     Average packs/day: 1.4 packs/day for 31.0 years (44.6 ttl pk-yrs)     Types: Cigarettes     Start date: 5/18/1995     Passive exposure: Current    Smokeless tobacco: Never    Tobacco comments:     Hasn't smoked in 4 days   Vaping Use    Vaping status: Former    Substances: Nicotine, Flavoring    Devices: [employer]   Substance Use Topics    Alcohol use: Never    Drug use: Never   ,   Medications Prior to Admission   Medication Sig Dispense Refill Last Dose/Taking    albuterol sulfate HFA (Ventolin HFA) 108 (90 Base) MCG/ACT inhaler Inhale 2 puffs Every 4 (Four) Hours As Needed for Wheezing or Shortness of Air. 18 g 12 Unknown    budesonide-formoterol  (Symbicort) 160-4.5 MCG/ACT inhaler Inhale 2 puffs 2 (Two) Times a Day. 1 each 8 Unknown    cetirizine (zyrTEC) 10 MG tablet Take 1 tablet by mouth Daily.   Unknown    Cholecalciferol 10 MCG (400 UNIT) tablet Take 1 tablet by mouth Daily.   Unknown    clonazePAM (KlonoPIN) 1 MG tablet Take 1 tablet by mouth 4 (Four) Times a Day As Needed for Anxiety.   Unknown    cyclobenzaprine (FLEXERIL) 5 MG tablet Take 1 tablet by mouth 3 (Three) Times a Day As Needed for Muscle Spasms.   Unknown    dicyclomine (BENTYL) 10 MG capsule Take 1 capsule by mouth 4 (Four) Times a Day As Needed for Abdominal Cramping.   Unknown    fluticasone (FLONASE) 50 MCG/ACT nasal spray Administer 2 sprays into the nostril(s) as directed by provider Daily As Needed for Rhinitis or Allergies.   Unknown    gabapentin (NEURONTIN) 800 MG tablet Take 1 tablet by mouth 4 (Four) Times a Day.   Unknown    hydroCHLOROthiazide (HYDRODIURIL) 25 MG tablet Take 1 tablet by mouth Every Morning.   Unknown    ibuprofen (ADVIL,MOTRIN) 800 MG tablet Take 1 tablet by mouth Every 8 (Eight) Hours As Needed for Mild Pain.   Unknown    imiquimod (ALDARA) 5 % cream Apply 1 Application topically to the appropriate area as directed 3 (Three) Times a Week.   Unknown    insulin aspart (NovoLOG FlexPen) 100 UNIT/ML solution pen-injector sc pen Inject 15 Units under the skin into the appropriate area as directed 3 (Three) Times a Day With Meals. 15 mL 2 Unknown    Insulin Degludec (Tresiba FlexTouch) 200 UNIT/ML solution pen-injector pen injection Inject 90 Units under the skin into the appropriate area as directed every night at bedtime. 15 mL 2 Unknown    ipratropium-albuterol (DUO-NEB) 0.5-2.5 mg/3 ml nebulizer Take 3 mL by nebulization Every 6 (Six) Hours As Needed for Wheezing or Shortness of Air. 360 mL 5 Unknown    lidocaine (LIDODERM) 5 % Place 1 patch on the skin as directed by provider Daily. Remove & Discard patch within 12 hours or as directed by MD   Unknown     lisinopril (PRINIVIL,ZESTRIL) 5 MG tablet Take 0.5 tablets by mouth Daily.   Unknown    nicotine (NICODERM CQ) 14 MG/24HR patch Place 1 patch on the skin as directed by provider Daily. 28 each 3 Unknown    nicotine polacrilex (NICORETTE) 4 MG gum Chew 1 each As Needed for Smoking Cessation. 220 each 5 Unknown    omeprazole (priLOSEC) 20 MG capsule Take 1 capsule by mouth Daily.   Unknown    ondansetron ODT (ZOFRAN-ODT) 8 MG disintegrating tablet Place 1 tablet on the tongue Daily As Needed for Nausea or Vomiting.   Unknown    simvastatin (ZOCOR) 80 MG tablet Take 1 tablet by mouth every night at bedtime.   Unknown    tiotropium (Spiriva HandiHaler) 18 MCG per inhalation capsule Place 1 capsule into inhaler and inhale Daily. 30 capsule 8 Unknown    Tirzepatide 5 MG/0.5ML solution auto-injector Inject 5 mg under the skin into the appropriate area as directed 1 (One) Time Per Week. 2 mL 1 Unknown   , Scheduled Meds:  atorvastatin, 40 mg, Oral, Daily  cefTRIAXone, 2,000 mg, Intravenous, Q24H  cetirizine, 10 mg, Oral, Daily  cholecalciferol, 400 Units, Oral, Daily  clonazePAM, 0.5 mg, Oral, Once  enoxaparin sodium, 40 mg, Subcutaneous, Q12H  famotidine, 20 mg, Intravenous, BID  gabapentin, 800 mg, Oral, Q8H  ipratropium-albuterol, 3 mL, Nebulization, Q6H - RT  Lidocaine, 1 patch, Transdermal, Q24H  methylPREDNISolone sodium succinate, 40 mg, Intravenous, Q12H  mupirocin, 1 Application, Each Nare, BID  nicotine, 1 patch, Transdermal, Q24H  senna-docusate sodium, 2 tablet, Oral, BID  sodium chloride, 10 mL, Intravenous, Q12H  sodium chloride, 10 mL, Intravenous, Q12H  sodium chloride, 10 mL, Intravenous, Q12H  sodium chloride, 10 mL, Intravenous, Q12H  sodium chloride, 10 mL, Intravenous, Q12H    , Continuous Infusions:  insulin, 0-100 Units/hr, Last Rate: 1.9 Units/hr (03/11/25 0806)     and Allergies:  Codeine and Metformin    Objective     Vital Signs   Temp:  [98.3 °F (36.8 °C)-99.9 °F (37.7 °C)] 98.3 °F (36.8  °C)  Heart Rate:  [] 85  Resp:  [14-28] 25  BP: (100-147)/(62-99) 129/92    Physical Exam:             General-acutely ill appearance, not in any acute distress    HEENT-PERRLA    Neck-supple    Respiratory-respirations normal-on auscultation no wheezing no crackles,     Cardiovascular-NSR  GI-nontender nondistended bowel sounds positive    CNS-nonfocal    Musculoskeletal -no edema  Extremities- no obvious deformity noticed     Psychiatric-alert awake oriented  Skin- no visible rash                                                                   Results Review:    LABS:    Lab Results   Component Value Date    GLUCOSE 153 (H) 03/11/2025    BUN 21 (H) 03/11/2025    CREATININE 0.83 03/11/2025    EGFRIFNONA 93 01/13/2022    BCR 25.3 (H) 03/11/2025    CO2 25.5 03/11/2025    CALCIUM 9.1 03/11/2025    ALBUMIN 3.1 (L) 03/11/2025    AST 22 03/11/2025    ALT 18 03/11/2025    WBC 15.37 (H) 03/11/2025    HGB 11.5 (L) 03/11/2025    HCT 36.0 03/11/2025    MCV 95.2 03/11/2025     03/11/2025     03/11/2025    K 4.3 03/11/2025     03/11/2025    ANIONGAP 12.5 03/11/2025       Lab Results   Component Value Date    INR 1.09 03/08/2025    INR 1.00 09/09/2024    INR 1.00 03/17/2024    PROTIME 14.2 03/08/2025    PROTIME 13.3 09/09/2024    PROTIME 13.7 03/17/2024       Results from last 7 days   Lab Units 03/08/25  1756   INR  1.09   APTT seconds 30.0          I reviewed the patient's new clinical results.  I reviewed the patient's new imaging results and agree with the interpretation.  Microbiology Results (last 10 days)       Procedure Component Value - Date/Time    MRSA Screen, PCR (Inpatient) - Swab, Nares [[id number]]  (Normal) Collected: 03/09/25 0350    Lab Status: Final result Specimen: Swab from Nares Updated: 03/09/25 0509     MRSA PCR No MRSA Detected    Narrative:      The negative predictive value of this diagnostic test is high and should only be used to consider de-escalating anti-MRSA therapy. A  positive result may indicate colonization with MRSA and must be correlated clinically.    S. Pneumo Ag Urine or CSF - Urine, Urine, Clean Catch [[id number]]  (Normal) Collected: 03/09/25 0349    Lab Status: Final result Specimen: Urine, Clean Catch Updated: 03/09/25 1349     Strep Pneumo Ag Negative    Legionella Antigen, Urine - Urine, Indwelling Urethral Catheter [[id number]]  (Normal) Collected: 03/08/25 1859    Lab Status: Final result Specimen: Urine from Indwelling Urethral Catheter Updated: 03/09/25 0121     LEGIONELLA ANTIGEN, URINE Negative    Narrative:      Presumptive negative for L. pneumophilia serogroup 1 antigen, suggesting no recent or current infection.    Respiratory Panel PCR w/COVID-19(SARS-CoV-2) HARDY/ADAM/LUISM IGUEL/PAD/COR/RAMA In-House, NP Swab in UTM/VTM, 2 HR TAT - Swab, Nasopharynx [[id number]]  (Normal) Collected: 03/08/25 1840    Lab Status: Final result Specimen: Swab from Nasopharynx Updated: 03/08/25 1929     ADENOVIRUS, PCR Not Detected     Coronavirus 229E Not Detected     Coronavirus HKU1 Not Detected     Coronavirus NL63 Not Detected     Coronavirus OC43 Not Detected     COVID19 Not Detected     Human Metapneumovirus Not Detected     Human Rhinovirus/Enterovirus Not Detected     Influenza A PCR Not Detected     Influenza B PCR Not Detected     Parainfluenza Virus 1 Not Detected     Parainfluenza Virus 2 Not Detected     Parainfluenza Virus 3 Not Detected     Parainfluenza Virus 4 Not Detected     RSV, PCR Not Detected     Bordetella pertussis pcr Not Detected     Bordetella parapertussis PCR Not Detected     Chlamydophila pneumoniae PCR Not Detected     Mycoplasma pneumo by PCR Not Detected    Narrative:      In the setting of a positive respiratory panel with a viral infection PLUS a negative procalcitonin without other underlying concern for bacterial infection, consider observing off antibiotics or discontinuation of antibiotics and continue supportive care. If the respiratory panel is  positive for atypical bacterial infection (Bordetella pertussis, Chlamydophila pneumoniae, or Mycoplasma pneumoniae), consider antibiotic de-escalation to target atypical bacterial infection.    Blood Culture - Blood, Arm, Right [[id number]]  (Normal) Collected: 03/08/25 1756    Lab Status: Preliminary result Specimen: Blood from Arm, Right Updated: 03/10/25 1915     Blood Culture No growth at 2 days    Blood Culture - Blood, Arm, Left [[id number]]  (Normal) Collected: 03/08/25 1756    Lab Status: Preliminary result Specimen: Blood from Arm, Left Updated: 03/10/25 1915     Blood Culture No growth at 2 days                 Assessment & Plan    Latest Reference Range & Units 03/11/25 00:38   CO2 Content 22 - 33 mmol/L 32.8   Carboxyhemoglobin Venous 0.0 - 5.0 % 1.9   Methemoglobin Venous 0.0 - 3.0 % 0.6   Oxyhemoglobin Venous 45.0 - 75.0 % 73.3   Hemoglobin, Blood Gas 13.5 - 17.5 g/dL 12.0 (L)   Site  Lab   Modality  Room Air   FIO2 % 50   Ventilator Mode  BiPAP   Set [facility] Resp Rate  16.0   IPAP  8   EPAP  18   Barometric Pressure for Blood Gas mmHg 722   pH, Venous 7.320 - 7.420 pH Units 7.409   pCO2, Venous 41.0 - 51.0 mm Hg 49.4   pO2, Venous 27.0 - 53.0 mm Hg 40.6   HCO3, Venous 22.0 - 28.0 mmol/L 31.3 (H)   Base Excess 0.0 - 2.0 mmol/L 5.6 (H)   O2 Saturation, Venous 45.0 - 75.0 % 75.2 (H)   Collected by  [id number]   (L): Data is abnormally low  (H): Data is abnormally high  Hypomagnesemia-replaced.      Neurology-s alert awake oriented able to protect her airway.          Respiratory-acute on chronic hypoxic and hypercarbic respiratory failure    Chest x-ray shows bilateral pulmonary infiltrates.  CT chest reviewed.     Self extubated yesterday on SAT.  Had to be given racemic epinephrine and was on continuous BiPAP.  Blood gas from today morning reviewed and does not show any hypercarbia.    It is microbiology reviewed and is negative so far.    Continue current antibiotics.  Continue steroids.  Continue  nebs.    Continue oxygen to maintain saturation 88 to 92%.  Antibiotics were discussed with pharmacist and de-escalated.        Procalitonin Results:      Lab 03/11/25  0034 03/08/25  1756   PROCALCITONIN 0.13 0.18        Chest x-ray latest reviewed    ABG -latest reviewed  Latest VBG reviewed.  Will give Lasix to improve lung compliance and diffusion capacity.    Tolerated well yesterday.    Cardiology- hemodynamically -stable.  Vitals reviewed.  Continue to monitor HR- rate and rhythm, BP     Nephrology- Cr and BUN stable  I/O-reviewed avoid nephrotoxic agents    GI- PPI prophylaxis  Speech and swallow evaluation today.    Hematology- CBC  Latest reviewed continue to monitor.  Transfuse for hemoglobin less than 7    ID  Culture-reviewed  And Antibiotics-reviewed discussed with pharmacist and adjusted.  Procalcitonin level negative.    Endrocrinology- Maintain Blood sugar 140 -180  Continue insulin drip.  Blood sugars reviewed      Electrolytes-   Mag and phos       DVT prophylaxis-continue            Multifocal pneumonia    Respiratory failure          Floyd Turcios MD  03/11/25  08:35 EDT         Electronically signed by Floyd Turcios MD at 03/11/25 1314       Yury Herzog DO at 03/10/25 1230                [facility] HOSPITALIST PROGRESS NOTE    Subjective     History:   Ana Ortiz is a 49 y.o. female admitted on 3/8/2025 secondary to Multifocal pneumonia     Procedures:   3/8/25: Intubation in the ED  3/8/25: Right IJ central line placement in the ED  3/10/25: Extubation     CC: Follow up resp failure     Patient seen and examined this AM. Sedated on vent support. Appears to be resting comfortably. BP borderline low but stable. Currently on insulin gtt. No acute events overnight per RN.     History taken from: chart, and RN.      Objective     Vital Signs  Temp:  [98.2 °F (36.8 °C)-99.9 °F (37.7 °C)] 99.9 °F (37.7 °C)  Heart Rate:  [71-96] 73  Resp:  [22-28] 27  BP:  ()/(46-84) 112/70  FiO2 (%):  [45 %-50 %] 45 %    Intake/Output Summary (Last 24 hours) at 3/10/2025 1230  Last data filed at 3/10/2025 0529  Gross per 24 hour   Intake 1511.91 ml   Output 900 ml   Net 611.91 ml         Physical Exam:  General:    Sedated on vent support, in no acute distress, obese   Heart:      Normal S1 and S2. Regular rate and rhythm. No significant murmur, rubs or gallops appreciated.   Lungs:     Respirations regular, even and unlabored. Diminished breath sounds. No wheezes appreciated.    Abdomen:   Soft and nontender. No guarding, rebound tenderness or  organomegaly noted. Bowel sounds present x 4.   Extremities:  No clubbing, cyanosis or edema noted.      Results Review:    Results from last 7 days   Lab Units 03/09/25 2327 03/09/25  1005 03/08/25  1756   WBC 10*3/mm3 15.04* 13.56* 19.83*   HEMOGLOBIN g/dL 11.3* 12.1 13.9   PLATELETS 10*3/mm3 199 182 213     Results from last 7 days   Lab Units 03/09/25 2327 03/09/25  1006 03/08/25  1756   SODIUM mmol/L 143 137 130*   POTASSIUM mmol/L 4.9  4.9 3.6 3.4*   CHLORIDE mmol/L 107 100 87*   CO2 mmol/L 29.5* 27.7 27.9   BUN mg/dL 22* 16 10   CREATININE mg/dL 0.77 0.73 0.88   CALCIUM mg/dL 8.3* 8.4* 8.8   GLUCOSE mg/dL 204* 264* 439*     Results from last 7 days   Lab Units 03/09/25 2327 03/09/25  1006 03/08/25  1756   BILIRUBIN mg/dL <0.2 0.2 0.5   ALK PHOS U/L 78 91 122*   AST (SGOT) U/L 14 14 17   ALT (SGPT) U/L 12 12 14     Results from last 7 days   Lab Units 03/09/25 2327 03/08/25  1756   MAGNESIUM mg/dL 2.5 1.4*     Results from last 7 days   Lab Units 03/08/25  1756   INR  1.09     Results from last 7 days   Lab Units 03/08/25  1851 03/08/25  1756   HSTROP T ng/L 9 10       Imaging Results (Last 24 Hours)       ** No results found for the last 24 hours. **              Medications:  atorvastatin, 40 mg, Oral, Daily  azithromycin, 500 mg, Intravenous, Q24H  cefTRIAXone, 2,000 mg, Intravenous, Q24H  cetirizine, 10 mg, Oral,  Daily  cholecalciferol, 400 Units, Oral, Daily  enoxaparin sodium, 40 mg, Subcutaneous, Q12H  famotidine, 20 mg, Intravenous, BID  gabapentin, 800 mg, Oral, Q8H  ipratropium-albuterol, 3 mL, Nebulization, Q6H - RT  Lidocaine, 1 patch, Transdermal, Q24H  methylPREDNISolone sodium succinate, 40 mg, Intravenous, Q12H  mupirocin, 1 Application, Each Nare, BID  nicotine, 1 patch, Transdermal, Q24H  senna-docusate sodium, 2 tablet, Oral, BID  sodium chloride, 10 mL, Intravenous, Q12H  sodium chloride, 10 mL, Intravenous, Q12H  sodium chloride, 10 mL, Intravenous, Q12H  sodium chloride, 10 mL, Intravenous, Q12H  sodium chloride, 10 mL, Intravenous, Q12H      dexmedetomidine, 0.2-1.5 mcg/kg/hr (Dosing Weight), Last Rate: Stopped (03/10/25 1200)  fentanyl 10 mcg/mL,  mcg/hr, Last Rate: Stopped (03/10/25 0850)  insulin, 0-100 Units/hr, Last Rate: 8 Units/hr (03/10/25 1130)  propofol, 5-50 mcg/kg/min, Last Rate: Stopped (03/10/25 0850)            Assessment & Plan   Multifocal bacterial pneumonia: Suspect gram negative. MRSA PCR is negative. Strep pneumo and Legionella urine antigens negative. Completing short course of azithromycin. Cont Rocephin. Follow cultures.     Acute exacerbation of COPD: Respiratory PCR is negative. Cont treatment as above, steroids and nebs.     Acute hypercapnic on chronic hypoxic respiratory failure: Likely 2/2 above. No evidence of PE on CT chest. Intubated in the ED. Clinically improving with treatment as outlined above. SAT initiated this AM and pt self extubated. Currently stable on 4L's NC. Cont treatment as outlined above. Cont to monitor closely. Pulm input appreciated.     DM II, insulin dependent with hyperglycemia: HgbA1c 12.4 in 1/25. Improved with insulin gtt. Cont to monitor closely.     Hypokalemia: K+ improved with supplementation. Mg previously<2 but improved with supplementation. Cont to monitor.     Chronic HFpEF: Echo in 3/24 revealed an EF of 61-65% and grade I diastolic  dysfunction. Appears compensated at present. Monitor volume status.      Essential HTN: BP has been borderline low intermittently. Cont to monitor.     HLD: Cont statin.     Morbid obesity by BMI: Complicates all aspects of care.     PPX  GI: Pepcid  DVT: Lovenox     Discussed with Dr. Turcios.     Disposition Pending clinical course, likely home when medically stable.     Yury Herzog DO  03/10/25  12:30 EDT     Electronically signed by Yury Herzog DO at 03/10/25 1250       Consult Notes (last 72 hours)  Notes from 03/10/25 0953 through 03/13/25 0953   No notes of this type exist for this encounter.

## 2025-03-13 NOTE — DISCHARGE SUMMARY
[facility] DISCHARGE SUMMARY      Date of Admission: 3/8/2025    Date of Discharge: 3/13/2025     PCP: Hayden Espinoza PA-C    Admission Diagnosis:   Please see admission H&P    Discharge Diagnosis:   Multifocal bacterial pneumonia, suspect gram negative   Acute exacerbation of COPD  Acute hypercapnic on chronic hypoxic respiratory failure  DM II, insulin dependent with hyperglycemia  Hypokalemia  Hypomagnesemia  Chronic HFpEF  Essential HTN  HLD  Tobacco use disorder  Morbid obesity by BMI     Procedures Performed:  3/8/25: Intubation in the ED  3/8/25: Right IJ central line placement in the ED  3/10/25: Extubation      Consults:   Consults       Date and Time Order Name Status Description    3/9/2025  2:09 PM Inpatient Pulmonology Consult Completed               History of Present Illness:  Ana Ortiz is a 49 y.o. female who presented to [facility] ED with CC of worsening shortness of breath. Please see admission H&P for complete details.     In the ED, workup revealed multifocal pneumonia, an acute COPD exacerbation, acute hypercapnic on chronic hypoxic resp failure, electrolyte abnormalities including hypokalemia and hypomagnesemia, and hyperglycemia. Cultures were obtained and IV antibiotics initiated. She was given steroids and nebs. She was initially placed on BiPAP but subsequently intubated for resp failure with increased work of breathing. She was placed on IV Glucommander in the setting of hyperglycemia.      Hospital Course  Ana Ortiz was admitted to the CCU for further evaluation and treatment. She was continued on IV antibiotics including Vanc, Cefepime and azithromycin, steroids, nebs maintenance IVF's, IV Glucommander and vent support. The hospital's electrolyte replacement protocols were initiated. Pulmonology was consulted for additional support.     Respiratory PCR was negative with negative MRSA PCR as well. Strep pneumo and Legionella urine antigens were negative. Vanc was  stopped and she completed a short course of azithromycin. Cefepime was deescalated to Rocephin with course later completed while hospitalized. IVF's were stopped and she was later intermittently diuresed to assist with lung compliance. Her respiratory status clinically improved over the the course of a few days with the outlined treatment plan. SAT was initiated on 3/10 and she self extubated during SAT. She was placed on NC with intermittent BiPAP and remained clinically stable. Once her respiratory status improved, PT/OT were consulted. She was placed on a diet and once her hyperglycemia improved she was transitioned off IV Glucommander back to SQ regimen. She experienced some N/V and she was placed on Reglan per pulm with concern for diabetic gastroparesis. She was reevaluated on 3/13 and was eager to go home. She was deemed medically stable for discharge after discussion with pulmonology. She was prescribed a course of steroids in addition to COPD rescue kit with instructions for outpatient follow up with her PCP and pulmonology.     Condition on Discharge:  Stable    Vital Signs  Vitals:    03/13/25 1000   BP: 123/88   Pulse: 91   Resp: 20   Temp:    SpO2: 93%       Physical Exam:  General:    Awake, alert, in no acute distress, obese   Heart:      Normal S1 and S2. Regular rate and rhythm. No significant murmur, rubs or gallops appreciated.   Lungs:     Respirations regular, even and unlabored. Diminished breath sounds at bases but aeration overall improved.    Abdomen:   Soft and nontender. No guarding, rebound tenderness or  organomegaly noted. Bowel sounds present x 4.   Extremities:  No clubbing, cyanosis or edema noted. Moves UE and LE equally B/L.     Discharge Disposition:   home      Discharge Medications:     Discharge Medications        New Medications        Instructions Start Date   doxycycline 100 MG capsule  Commonly known as: MONODOX   Take 1 capsule by mouth every 12 hours for 5 days as part of  COPD Rescue Kit. (Only Start if in YELLOW ZONE.)      metoclopramide 5 MG tablet  Commonly known as: REGLAN   5 mg, Oral, 3 Times Daily Before Meals      predniSONE 20 MG tablet  Commonly known as: DELTASONE   40 mg, Oral, Daily      predniSONE 20 MG tablet  Commonly known as: DELTASONE   Take 2 tablets (40 mg) daily for 5 days as part of COPD Rescue Kit. (Only Start if in YELLOW ZONE.)             Changes to Medications        Instructions Start Date   gabapentin 800 MG tablet  Commonly known as: NEURONTIN  What changed: when to take this   800 mg, Oral, 3 Times Daily      ipratropium-albuterol 0.5-2.5 mg/3 ml nebulizer  Commonly known as: DUO-NEB  What changed: Another medication with the same name was added. Make sure you understand how and when to take each.   3 mL, Nebulization, Every 6 Hours PRN      ipratropium-albuterol 0.5-2.5 mg/3 ml nebulizer  Commonly known as: DUO-NEB  What changed: You were already taking a medication with the same name, and this prescription was added. Make sure you understand how and when to take each.   Take 3 mL by neb every 30 minutes as needed for shortness of air for up to 6 doses. Part of COPD Rescue Kit. (Only Start if in YELLOW ZONE.)             Continue These Medications        Instructions Start Date   albuterol sulfate  (90 Base) MCG/ACT inhaler  Commonly known as: Ventolin HFA   2 puffs, Inhalation, Every 4 Hours PRN      budesonide-formoterol 160-4.5 MCG/ACT inhaler  Commonly known as: Symbicort   2 puffs, Inhalation, 2 Times Daily - RT      cetirizine 10 MG tablet  Commonly known as: zyrTEC   10 mg, Oral, Daily      cholecalciferol 10 MCG (400 UNIT) tablet  Commonly known as: VITAMIN D3   400 Units, Oral, Daily      clonazePAM 1 MG tablet  Commonly known as: KlonoPIN   1 mg, Oral, 4 Times Daily PRN      cyclobenzaprine 5 MG tablet  Commonly known as: FLEXERIL   Take 1 tablet by mouth 3 (Three) Times a Day As Needed for Muscle Spasms.      dicyclomine 10 MG  capsule  Commonly known as: BENTYL   Take 1 capsule by mouth 4 (Four) Times a Day As Needed for Abdominal Cramping.      fluticasone 50 MCG/ACT nasal spray  Commonly known as: FLONASE   Administer 2 sprays into the nostril(s) as directed by provider Daily As Needed for Rhinitis or Allergies.      hydroCHLOROthiazide 25 MG tablet   25 mg, Oral, Every Morning      ibuprofen 800 MG tablet  Commonly known as: ADVIL,MOTRIN   800 mg, Oral, Every 8 Hours PRN      imiquimod 5 % cream  Commonly known as: ALDARA   1 Application, Topical, 3 Times Weekly      lidocaine 5 %  Commonly known as: LIDODERM   1 patch, Transdermal, Every 24 Hours, Remove & Discard patch within 12 hours or as directed by MD      lisinopril 5 MG tablet  Commonly known as: PRINIVIL,ZESTRIL   2.5 mg, Oral, Daily      nicotine 14 MG/24HR patch  Commonly known as: NICODERM CQ   1 patch, Transdermal, Every 24 Hours      nicotine polacrilex 4 MG gum  Commonly known as: NICORETTE   4 mg, Mouth/Throat, As Needed      NovoLOG FlexPen 100 UNIT/ML solution pen-injector sc pen  Generic drug: insulin aspart   15 Units, Subcutaneous, 3 Times Daily With Meals      omeprazole 20 MG capsule  Commonly known as: priLOSEC   20 mg, Oral, Daily      ondansetron ODT 8 MG disintegrating tablet  Commonly known as: ZOFRAN-ODT   8 mg, Translingual, Daily PRN      simvastatin 80 MG tablet  Commonly known as: ZOCOR   80 mg, Oral, Every Night at Bedtime      tiotropium 18 MCG per inhalation capsule  Commonly known as: Spiriva HandiHaler   1 capsule, Inhalation, Daily      Tirzepatide 5 MG/0.5ML solution auto-injector   5 mg, Subcutaneous, Weekly      Tresiba FlexTouch 200 UNIT/ML solution pen-injector pen injection  Generic drug: Insulin Degludec   90 Units, Subcutaneous, Every Night at Bedtime                 Discharge Diet:   Dietary Orders (From admission, onward)       Start     Ordered    03/10/25 7715  Diet: Regular/House, Diabetic; Consistent Carbohydrate; Fluid Consistency:  Thin (IDDSI 0)  Diet Effective Now        References:    Diet Order Definitions   Question Answer Comment   Diets: Regular/House    Diets: Diabetic    Diabetic Diet: Consistent Carbohydrate    Fluid Consistency: Thin (IDDSI 0)        03/10/25 1208                    Activity at Discharge:  activity as tolerated    Follow-up Appointments:  Additional Instructions for the Follow-ups that You Need to Schedule       Discharge Follow-up with PCP   As directed       Currently Documented PCP:    Hayden Espinoza PA-C    PCP Phone Number:    [phone]     Follow Up Details: Follow up with PCP within 1 week.        Discharge Follow-up with Specified Provider: Follow up with pulmonology in 2 weeks.   As directed      To: Follow up with pulmonology in 2 weeks.               Follow-up Information       Hayden Espinoza PA-C .    Specialty: Physician Assistant  Why: Follow up with PCP within 1 week.  Contact information:  [address]  [facility] [address]  [phone]                           Your Scheduled Appointments      Mar 26, 2025 8:30 AM  Hospital Follow Up with Cassi Wiggins APRRED  [facility] PULMONARY & CRITICAL CARE MEDICINE ([place]) [address]  [facility] [address]  [phone]   Established: Please bring outside images or reports.         May 07, 2025 2:15 PM  FOLLOW UP with CHAZ Mcintosh  [facility] ENDOCRINOLOGY ([place]) [address]  [facility] [address]  [phone]        Jul 16, 2025 8:30 AM  Follow Up with Cassi Wiggins APRRED  [facility] PULMONARY & CRITICAL CARE MEDICINE ([place]) [address]  [facility] [address]  [phone]   Established: Please bring outside images or reports.       Additional instructions:                F/U  shena. With Joseline (PCP) Edenilson March 20th @ 1:30 PM              Test Results Pending at Discharge:  Pending Labs       Order Current Status     Beta-Hydroxybutyrate In process    Blood Culture - Blood, Arm, Left Preliminary result    Blood Culture - Blood, Arm, Right Preliminary result             The ASCVD Risk score (Thony MELENDREZ, et al., 2019) failed to calculate for the following reasons:    Cannot find a previous HDL lab    Cannot find a previous total cholesterol lab      Yury Herzog DO  03/13/25  09:42 EDT      Time: Greater than 30 minutes spent on this discharge.

## 2025-03-13 NOTE — THERAPY EVALUATION
Acute Care - Occupational Therapy Initial Evaluation   [place]     Patient Name: Ana Ortiz  : 1975  MRN: [id number]  Today's Date: 3/13/2025  Onset of Illness/Injury or Date of Surgery: 25 (admission date)          Admit Date: 3/8/2025       ICD-10-CM ICD-9-CM   1. Shortness of breath  R06.02 786.05   2. COPD exacerbation  J44.1 491.21   3. Sepsis, due to unspecified organism, unspecified whether acute organ dysfunction present  A41.9 038.9     995.91   4. Acute respiratory failure, unspecified whether with hypoxia or hypercapnia  J96.00 518.81   5. Type 2 diabetes mellitus with hyperglycemia, with long-term current use of insulin  E11.65 250.00    Z79.4 790.29     V58.67     Patient Active Problem List   Diagnosis    Morbidly obese    Acute respiratory failure with hypoxia and hypercapnia    Cutaneous abscess of back excluding buttocks    JOANN (obstructive sleep apnea)    Chronic bronchitis    Cigarette nicotine dependence without complication    Chronic respiratory failure with hypoxia    Diabetes mellitus, type II    Type 2 diabetes mellitus with hyperglycemia, with long-term current use of insulin    Severe sepsis    Sepsis due to pneumonia    Multifocal pneumonia    Respiratory failure    Pneumonia due to other gram-negative bacteria     Past Medical History:   Diagnosis Date    Arthritis     CHF (congestive heart failure)     COPD (chronic obstructive pulmonary disease)     Diabetes insipidus     Diabetes mellitus     Elevated cholesterol     GERD (gastroesophageal reflux disease)     Hyperlipemia     Hypertension     JOANN (obstructive sleep apnea)     Pneumonia     Type 2 diabetes mellitus     Vulvar cancer      Past Surgical History:   Procedure Laterality Date    HYSTERECTOMY      INCISION AND DRAINAGE TRUNK N/A 2022    Procedure: INCISION AND DRAINAGE BACK;  Surgeon: Tamera Saez MD;  Location: [facility];  Service: General;  Laterality: N/A;    MASTECTOMY      VULVA  SURGERY      reconstruction due ca         OT ASSESSMENT FLOWSHEET (Last 12 Hours)       OT Evaluation and Treatment       Row Name 03/13/25 0837                   OT Time and Intention    Subjective Information no complaints  -LM        Document Type evaluation  -LM        Mode of Treatment occupational therapy  -LM        Patient Effort good  -LM           General Information    Patient Profile Reviewed yes  -LM        Prior Level of Function independent:;ADL's;transfer;all household mobility  -LM        Equipment Currently Used at Home none  -LM        Existing Precautions/Restrictions no known precautions/restrictions  -LM           Living Environment    Current Living Arrangements home  -LM        People in Home alone  -LM           Cognition    Affect/Mental Status (Cognition) WNL  -LM        Orientation Status (Cognition) oriented x 4  -LM           Range of Motion (ROM)    Range of Motion ROM is WNL  -LM           Strength (Manual Muscle Testing)    Strength (Manual Muscle Testing) strength is WNL  -LM           Activities of Daily Living    BADL Assessment/Intervention bathing;upper body dressing;lower body dressing;grooming;feeding;toileting  -LM           Bathing Assessment/Intervention    [place] Level (Bathing) independent;modified independence  -LM           Upper Body Dressing Assessment/Training    [place] Level (Upper Body Dressing) independent;modified independence  -LM           Lower Body Dressing Assessment/Training    [place] Level (Lower Body Dressing) independent;modified independence  -LM           Grooming Assessment/Training    [place] Level (Grooming) independent  -LM           Self-Feeding Assessment/Training    [place] Level (Feeding) independent  -LM           Toileting Assessment/Training    [place] Level (Toileting) independent;modified independence  -LM           Transfer Assessment/Treatment    Transfers toilet transfer  -LM           Toilet Transfer     Type (Toilet Transfer) stand pivot/stand step  -LM        [place] Level (Toilet Transfer) supervision;modified independence  -LM           Motor Skills    Motor Skills functional endurance  -LM        Functional Endurance F  -LM           Plan of Care Review    Plan of Care Reviewed With patient  -LM           Positioning and Restraints    In Chair reclined;call light within reach;encouraged to call for assist  -LM           Therapy Assessment/Plan (OT)    Criteria for Skilled Therapeutic Interventions Met (OT) no;no problems identified which require skilled intervention;does not meet criteria for skilled intervention  nearing baseline, decreased activity tolerance, educated patient on compensatory BADL while seated  -LM        Therapy Frequency (OT) evaluation only  -LM           Therapy Plan Review/Discharge Plan (OT)    Anticipated Discharge Disposition (OT) home  -LM                  User Key  (r) = Recorded By, (t) = Taken By, (c) = Cosigned By      Initials Name Effective Dates     Delmi Winslow OT 06/16/21 -                            OT Recommendation and Plan  Therapy Frequency (OT): evaluation only  Plan of Care Review  Plan of Care Reviewed With: patient  Plan of Care Reviewed With: patient        Time Calculation:     Therapy Charges for Today       Code Description Service Date Service Provider Modifiers Qty    [id number]  OT EVAL LOW COMPLEXITY 4 3/13/2025 Delmi Winslow OT GO 1                 Delmi Winslow OT  3/13/2025

## 2025-03-13 NOTE — PLAN OF CARE
Goal Outcome Evaluation:              Outcome Evaluation: Pt seen for evaluation prior to D/C this date. Pt demonstrates good functional mobility, recommend home health therapy interventions to address strength, stability, safety s/p hospitalization. Recommend quad cane for pt PRN.     Anticipated Discharge Disposition (PT): home with home health, home with assist, home with supervision

## 2025-03-14 NOTE — OUTREACH NOTE
Prep Survey      Flowsheet Row Responses   [facility] facility patient discharged from? [facility]   Is LACE score < 7 ? No   Eligibility Readm Mgmt   Discharge diagnosis Multifocal pneumonia   Does the patient have one of the following disease processes/diagnoses(primary or secondary)? Pneumonia   Does the patient have Home health ordered? No   Is there a DME ordered? No   Prep survey completed? Yes            KAILEY ZARCO - Registered Nurse

## 2025-03-14 NOTE — PROGRESS NOTES
Referring Provider: Dr. Clayton  Reason for Consultation: Hypoxic and hypercarbic respiratory failure.      Chief complaint -could not be obtained as patient on my assessment was intubated and sedated.      Sub-  Overnight events reviewed.  All the labs medications ins and outs and vitals reviewed.  Case discussed with Dr. Clayton briefly.  Patient will be discharged home.    Can follow-up with pulmonary on the outpatient basis.  For shortness of breath and sleep apnea.  Will get a sleep study.    Review of Systems  Generalized fatigue and tiredness otherwise negative.        History  Past Medical History:   Diagnosis Date    Arthritis     CHF (congestive heart failure)     COPD (chronic obstructive pulmonary disease)     Diabetes insipidus     Diabetes mellitus     Elevated cholesterol     GERD (gastroesophageal reflux disease)     Hyperlipemia     Hypertension     JOANN (obstructive sleep apnea)     Pneumonia     Type 2 diabetes mellitus 2013    Vulvar cancer    ,   Past Surgical History:   Procedure Laterality Date    HYSTERECTOMY      INCISION AND DRAINAGE TRUNK N/A 1/11/2022    Procedure: INCISION AND DRAINAGE BACK;  Surgeon: Tamera Saez MD;  Location: [facility];  Service: General;  Laterality: N/A;    MASTECTOMY      VULVA SURGERY      reconstruction due ca   ,   Family History   Problem Relation Age of Onset    Cancer Mother     Hypertension Mother     Parkinsonism Father     Diabetes Father     Hypertension Father     Obesity Father     Cancer Maternal Aunt     Diabetes Paternal Aunt     Diabetes Paternal Grandmother     Cancer Maternal Aunt     Diabetes Brother     Obesity Brother    ,   Social History     Tobacco Use    Smoking status: Every Day     Current packs/day: 1.00     Average packs/day: 1.4 packs/day for 31.0 years (44.6 ttl pk-yrs)     Types: Cigarettes     Start date: 5/18/1995     Passive exposure: Current    Smokeless tobacco: Never    Tobacco comments:     Hasn't smoked in 4 days    Vaping Use    Vaping status: Former    Substances: Nicotine, Flavoring    Devices: [employer] tank   Substance Use Topics    Alcohol use: Never    Drug use: Never   ,   No medications prior to admission.   , Scheduled Meds:    , Continuous Infusions:  No current facility-administered medications for this encounter.   and Allergies:  Codeine and Metformin    Objective     Vital Signs   Temp:  [98.6 °F (37 °C)] 98.6 °F (37 °C)  Heart Rate:  [86-95] 91  Resp:  [18-23] 20  BP: (123-136)/(88-93) 123/88    Physical Exam:             General-acutely ill appearance, not in any acute distress    HEENT-PERRLA    Neck-supple    Respiratory-respirations normal-on auscultation no wheezing no crackles, decreased breath sounds due to patient's body habitus    Cardiovascular-NSR  GI-nontender nondistended bowel sounds positive    CNS-nonfocal    Musculoskeletal -no edema  Extremities- no obvious deformity noticed     Psychiatric-alert awake oriented  Skin- no visible rash                                                                   Results Review:    LABS:    Lab Results   Component Value Date    GLUCOSE 327 (H) 03/13/2025    BUN 17 03/13/2025    CREATININE 0.77 03/13/2025    EGFRIFNONA 93 01/13/2022    BCR 22.1 03/13/2025    CO2 28.3 03/13/2025    CALCIUM 9.6 03/13/2025    ALBUMIN 3.1 (L) 03/11/2025    AST 22 03/11/2025    ALT 18 03/11/2025    WBC 11.50 (H) 03/13/2025    HGB 13.4 03/13/2025    HCT 41.7 03/13/2025    MCV 92.9 03/13/2025     03/13/2025     03/13/2025    K 4.7 03/13/2025    CL 96 (L) 03/13/2025    ANIONGAP 13.7 03/13/2025       Lab Results   Component Value Date    INR 1.09 03/08/2025    INR 1.00 09/09/2024    INR 1.00 03/17/2024    PROTIME 14.2 03/08/2025    PROTIME 13.3 09/09/2024    PROTIME 13.7 03/17/2024       Results from last 7 days   Lab Units 03/08/25  1756   INR  1.09   APTT seconds 30.0          I reviewed the patient's new clinical results.  I reviewed the patient's new imaging results  and agree with the interpretation.  Microbiology Results (last 10 days)       Procedure Component Value - Date/Time    MRSA Screen, PCR (Inpatient) - Swab, Nares [[id number]]  (Normal) Collected: 03/09/25 0350    Lab Status: Final result Specimen: Swab from Nares Updated: 03/09/25 0509     MRSA PCR No MRSA Detected    Narrative:      The negative predictive value of this diagnostic test is high and should only be used to consider de-escalating anti-MRSA therapy. A positive result may indicate colonization with MRSA and must be correlated clinically.    S. Pneumo Ag Urine or CSF - Urine, Urine, Clean Catch [[id number]]  (Normal) Collected: 03/09/25 0349    Lab Status: Final result Specimen: Urine, Clean Catch Updated: 03/09/25 1349     Strep Pneumo Ag Negative    Legionella Antigen, Urine - Urine, Indwelling Urethral Catheter [[id number]]  (Normal) Collected: 03/08/25 1859    Lab Status: Final result Specimen: Urine from Indwelling Urethral Catheter Updated: 03/09/25 0121     LEGIONELLA ANTIGEN, URINE Negative    Narrative:      Presumptive negative for L. pneumophilia serogroup 1 antigen, suggesting no recent or current infection.    Respiratory Panel PCR w/COVID-19(SARS-CoV-2) H[facility]Y/ADAM/LUIS MIGUEL/PAD/COR/[facility]A In-House, NP Swab in UTM/VTM, 2 HR TAT - Swab, Nasopharynx [[id number]]  (Normal) Collected: 03/08/25 1840    Lab Status: Final result Specimen: Swab from Nasopharynx Updated: 03/08/25 1929     ADENOVIRUS, PCR Not Detected     Coronavirus 229E Not Detected     Coronavirus HKU1 Not Detected     Coronavirus NL63 Not Detected     Coronavirus OC43 Not Detected     COVID19 Not Detected     Human Metapneumovirus Not Detected     Human Rhinovirus/Enterovirus Not Detected     Influenza A PCR Not Detected     Influenza B PCR Not Detected     Parainfluenza Virus 1 Not Detected     Parainfluenza Virus 2 Not Detected     Parainfluenza Virus 3 Not Detected     Parainfluenza Virus 4 Not Detected     RSV, PCR Not Detected     Bordetella  pertussis pcr Not Detected     Bordetella parapertussis PCR Not Detected     Chlamydophila pneumoniae PCR Not Detected     Mycoplasma pneumo by PCR Not Detected    Narrative:      In the setting of a positive respiratory panel with a viral infection PLUS a negative procalcitonin without other underlying concern for bacterial infection, consider observing off antibiotics or discontinuation of antibiotics and continue supportive care. If the respiratory panel is positive for atypical bacterial infection (Bordetella pertussis, Chlamydophila pneumoniae, or Mycoplasma pneumoniae), consider antibiotic de-escalation to target atypical bacterial infection.    Blood Culture - Blood, Arm, Right [[id number]]  (Normal) Collected: 03/08/25 1756    Lab Status: Final result Specimen: Blood from Arm, Right Updated: 03/13/25 1915     Blood Culture No growth at 5 days    Blood Culture - Blood, Arm, Left [[id number]]  (Normal) Collected: 03/08/25 1756    Lab Status: Final result Specimen: Blood from Arm, Left Updated: 03/13/25 1915     Blood Culture No growth at 5 days                 Assessment & Plan    Latest Reference Range & Units 03/11/25 00:38   CO2 Content 22 - 33 mmol/L 32.8   Carboxyhemoglobin Venous 0.0 - 5.0 % 1.9   Methemoglobin Venous 0.0 - 3.0 % 0.6   Oxyhemoglobin Venous 45.0 - 75.0 % 73.3   Hemoglobin, Blood Gas 13.5 - 17.5 g/dL 12.0 (L)   Site  Lab   Modality  Room Air   FIO2 % 50   Ventilator Mode  BiPAP   Set Mech Resp Rate  16.0   IPAP  8   EPAP  18   Barometric Pressure for Blood Gas mmHg 722   pH, Venous 7.320 - 7.420 pH Units 7.409   pCO2, Venous 41.0 - 51.0 mm Hg 49.4   pO2, Venous 27.0 - 53.0 mm Hg 40.6   HCO3, Venous 22.0 - 28.0 mmol/L 31.3 (H)   Base Excess 0.0 - 2.0 mmol/L 5.6 (H)   O2 Saturation, Venous 45.0 - 75.0 % 75.2 (H)   Collected by  [id number]   (L): Data is abnormally low  (H): Data is abnormally high  Hypomagnesemia-replaced.      Neurology-s alert awake oriented able to protect her  airway.      Respiratory-acute on chronic hypoxic and hypercarbic respiratory failure    Chest x-ray shows bilateral pulmonary infiltrates.  CT chest reviewed.     Use BiPAP for some time during nighttime  Follow-up with pulmonary on the outpatient basis.  Will get PFTs and sleep study.  It is microbiology reviewed and is negative so far.    Continue current antibiotics.  Continue steroids.  Continue nebs.  Will decrease steroids  Continue oxygen to maintain saturation 88 to 92%.        Procalitonin Results:      Lab 03/11/25  0034 03/08/25  1756   PROCALCITONIN 0.13 0.18      Latest chest x-ray reviewed.  Will give diuretics to improve lung compliance and diffusion capacity.    ABG -latest reviewed  Latest VBG reviewed.      Ins and outs reviewed.    Cardiology- hemodynamically -stable.  Vitals reviewed.  Continue to monitor HR- rate and rhythm, BP     Nephrology- Cr and BUN stable  I/O-reviewed avoid nephrotoxic agents    GI- PPI prophylaxis  Continue current diet.  Nauseous-likely has diabetic gastroparesis.    Better after starting Reglan.    Hematology- CBC  Latest reviewed continue to monitor.  Transfuse for hemoglobin less than 7    ID  Culture-reviewed  And Antibiotics-reviewed discussed with pharmacist and adjusted.  Procalcitonin level negative.    Endrocrinology- Maintain Blood sugar 140 -180  Switch insulin drip to home dose of patient's insulin.      Electrolytes-   Mag and phos       DVT prophylaxis-continue            Multifocal pneumonia    Respiratory failure    Pneumonia due to other gram-negative bacteria          Floyd Turcios MD  03/14/25  06:19 EDT

## 2025-04-02 ENCOUNTER — OFFICE VISIT (OUTPATIENT)
Dept: PULMONOLOGY | Facility: CLINIC | Age: 50
End: 2025-04-02
Payer: MEDICAID

## 2025-04-02 VITALS
BODY MASS INDEX: 46.19 KG/M2 | HEART RATE: 110 BPM | SYSTOLIC BLOOD PRESSURE: 126 MMHG | WEIGHT: 277.2 LBS | TEMPERATURE: 99.2 F | OXYGEN SATURATION: 91 % | DIASTOLIC BLOOD PRESSURE: 84 MMHG | HEIGHT: 65 IN

## 2025-04-02 DIAGNOSIS — F17.210 CIGARETTE NICOTINE DEPENDENCE WITHOUT COMPLICATION: ICD-10-CM

## 2025-04-02 DIAGNOSIS — G47.33 OSA (OBSTRUCTIVE SLEEP APNEA): ICD-10-CM

## 2025-04-02 DIAGNOSIS — R91.8 PULMONARY INFILTRATES: ICD-10-CM

## 2025-04-02 DIAGNOSIS — J96.11 CHRONIC RESPIRATORY FAILURE WITH HYPOXIA: ICD-10-CM

## 2025-04-02 DIAGNOSIS — J42 CHRONIC BRONCHITIS, UNSPECIFIED CHRONIC BRONCHITIS TYPE: Primary | ICD-10-CM

## 2025-04-02 RX ORDER — INSULIN DEGLUDEC 200 U/ML
90 INJECTION, SOLUTION SUBCUTANEOUS DAILY
Qty: 15 ML | Refills: 0 | Status: SHIPPED | OUTPATIENT
Start: 2025-04-02

## 2025-04-02 RX ORDER — TIOTROPIUM BROMIDE 18 UG/1
1 CAPSULE ORAL; RESPIRATORY (INHALATION) DAILY
Qty: 30 CAPSULE | Refills: 8 | Status: SHIPPED | OUTPATIENT
Start: 2025-04-02

## 2025-04-02 RX ORDER — ALBUTEROL SULFATE 90 UG/1
2 INHALANT RESPIRATORY (INHALATION) EVERY 4 HOURS PRN
Qty: 18 G | Refills: 12 | Status: SHIPPED | OUTPATIENT
Start: 2025-04-02

## 2025-04-02 RX ORDER — NICOTINE 21 MG/24HR
1 PATCH, TRANSDERMAL 24 HOURS TRANSDERMAL EVERY 24 HOURS
Qty: 28 EACH | Refills: 2 | Status: SHIPPED | OUTPATIENT
Start: 2025-04-02

## 2025-04-02 RX ORDER — BUDESONIDE AND FORMOTEROL FUMARATE DIHYDRATE 160; 4.5 UG/1; UG/1
2 AEROSOL RESPIRATORY (INHALATION)
Qty: 1 EACH | Refills: 8 | Status: SHIPPED | OUTPATIENT
Start: 2025-04-02

## 2025-04-02 NOTE — PROGRESS NOTES
"Chief Complaint  Chronic bronchitis, unspecified chronic bronchitis type    Subjective          Ana Ortiz presents to [facility] for   History of Present Illness    Ms. Ortiz is a 49 year old female with a medical history significant for arthritis, COPD, CHF, diabetes, GERD, hyperlipidemia, hypertension and sleep apnea.    She presents today for follow-up on COPD as well as a hospital follow-up.  She was in the hospital in March for pneumonia as well as a COPD exacerbation.  She reports that during this hospitalization she was also placed on life support.  She has had pneumonia several times over the last year.  She is currently taking Spiriva once daily, Symbicort twice daily, albuterol inhaler as needed and nebulizer treatments as needed.  She is using supplemental oxygen during the day as needed.  She does use a CPAP with supplemental oxygen during the nighttime.  She is also currently taking trazodone at night for insomnia.  She reports that she has started back smoking and is smoking 2 to 3 cigarettes/day.        Objective   Vital Signs:   /84   Pulse 110   Temp 99.2 °F (37.3 °C)   Ht 165.1 cm (65\")   Wt 126 kg (277 lb 3.2 oz)   SpO2 91%   BMI 46.13 kg/m²         Physical Exam    GENERAL APPEARANCE: Well developed, well nourished, alert and cooperative, and appears to be in no acute distress.    HEAD: normocephalic. Atraumatic.    EYES: PERRL, EOMI. Vision is grossly intact.    THROAT: Oral cavity and pharynx normal. No inflammation, swelling, exudate, or lesions.     NECK: Neck supple.  No thyromegaly.    CARDIAC: Normal S1 and S2. No S3, S4 or murmurs. Rhythm is regular.     RESPIRATORY:Bilateral air entry positive.  No wheezing, crackles or rhonchi noted.    GI: Positive bowel sounds. Soft, nondistended, nontender.     MUSCULOSKELETAL: No significant deformity or joint abnormality. No edema. Peripheral pulses intact. No " "varicosities.    NEUROLOGICAL: Strength and sensation symmetric and intact throughout.     PSYCHIATRIC: The mental examination revealed the patient was oriented to person, place, and time.       Estimated body mass index is 46.13 kg/m² as calculated from the following:    Height as of this encounter: 165.1 cm (65\").    Weight as of this encounter: 126 kg (277 lb 3.2 oz).        Result Review :   The following data was reviewed by: CHAZ Erwin on 04/02/2025:  Common labs          3/11/2025    00:34 3/12/2025    00:40 3/13/2025    00:14   Common Labs   Glucose 153  167  327    BUN 21  17  17    Creatinine 0.83  0.70  0.77    Sodium 138  136  138    Potassium 4.3  4.3  4.7    Chloride 100  97  96    Calcium 9.1  8.9  9.6    Albumin 3.1      Total Bilirubin 0.2      Alkaline Phosphatase 88      AST (SGOT) 22      ALT (SGPT) 18      WBC 15.37  8.29  11.50    Hemoglobin 11.5  12.8  13.4    Hematocrit 36.0  39.7  41.7    Platelets 202  204  249           PFT:6/24/21     Flow volume loop was assessed.  Spirometry showed no obstruction.  There is no significant bronchodilator response.  Diffusing capacity, uncorrected for hemoglobin, is mildly reduced.  Lung volumes are normal.  Significant air trapping noted         Low dose lung cancer screening:NA     Previous chest imaging:     CT Angiogram Chest 3/8/25       FINDINGS:     Enlarged heart size.  Airspace disease in the upper and lower lobes consistent with multifocal  pneumonia.  Endotracheal tube in place with tip noted to terminate above the level  of the judah.  Enteric drain in place with tip to the stomach.  No pleural effusion.  No pneumothorax.  Normal thyroid gland.  Small paratracheal and hilar nodes  No free fluid or free air in the upper abdomen.  No thoracic aortic aneurysm or dissection.  No pulmonary embolus.     IMPRESSION:     No thoracic aortic aneurysm or dissection.  No pulmonary embolus  Enlarged heart size  Trace volume of pericardial " "fluid.  Multifocal pneumonia in the upper and lower lobes with multiple air  bronchograms.  No significant mediastinal or hilar adenopathy.  No free fluid or free air in the upper abdomen.     This report was finalized on 3/8/2025 8:48 PM by Kenny Baca MD.     Alpha-1 antitrypsin screening:NA     STOP-Bang Score:   NA  [place] Sleepiness Scale:   NA      ABG:    pH No results found for: \"PHART\"   pO2 No results found for: \"PO2ART\"   pCO2 No results found for: \"KZF2IGQ\"   HCO3 No results found for: \"OND2JJU\"                      Assessment and Plan    Problem List Items Addressed This Visit          Pulmonary and Pneumonias    Chronic bronchitis - Primary    Relevant Medications    tiotropium (Spiriva HandiHaler) 18 MCG per inhalation capsule    budesonide-formoterol (Symbicort) 160-4.5 MCG/ACT inhaler    albuterol sulfate HFA (Ventolin HFA) 108 (90 Base) MCG/ACT inhaler    Chronic respiratory failure with hypoxia       Sleep    JOANN (obstructive sleep apnea)       Tobacco    Cigarette nicotine dependence without complication    Relevant Medications    nicotine (NICODERM CQ) 21 MG/24HR patch     Other Visit Diagnoses         Pulmonary infiltrates        Relevant Medications    tiotropium (Spiriva HandiHaler) 18 MCG per inhalation capsule    budesonide-formoterol (Symbicort) 160-4.5 MCG/ACT inhaler    albuterol sulfate HFA (Ventolin HFA) 108 (90 Base) MCG/ACT inhaler    Other Relevant Orders    CT Chest Without Contrast            Ana Ortiz  reports that she has been smoking cigarettes. She started smoking about 29 years ago. She has a 44.7 pack-year smoking history. She has been exposed to tobacco smoke. She has never used smokeless tobacco. I have educated her on the risk of diseases from using tobacco products such as cancer, COPD, and heart disease.     I advised her to quit and she is willing to quit. We have discussed the following method/s for tobacco cessation:  Counseling and OTC Cessation Products.  " Together we have set a quit date for 1 month from today.  She will follow up with me in a few month or sooner to check on her progress.    I spent >10 minutes counseling the patient.    Sent in 21 mg nicotine patches.    CT chest was  reviewed.  We will plan to repeat CT scan in 3 months to assess resolution of pneumonia.      Continue albuterol inhaler as needed.  Continue Symbicort twice daily.  Continue Spiriva once daily.  Sent refills.    Continue DuoNebs as needed.    She is compliant with use of supplemental oxygen during the day as needed.  He is also compliant with use of supplemental oxygen and CPAP at night.  She continues to take trazodone nightly for insomnia.        Follow Up   Return in about 3 months (around 7/2/2025).  Patient was given instructions and counseling regarding her condition or for health maintenance advice. Please see specific information pulled into the AVS if appropriate.

## 2025-04-04 ENCOUNTER — READMISSION MANAGEMENT (OUTPATIENT)
Dept: CALL CENTER | Facility: HOSPITAL | Age: 50
End: 2025-04-04
Payer: MEDICAID

## 2025-04-04 NOTE — OUTREACH NOTE
COPD/PN Week 3 Survey      Flowsheet Row Responses   [facility] facility patient discharged from? [facility]   Does the patient have one of the following disease processes/diagnoses(primary or secondary)? Pneumonia   Week 3 attempt successful? No   Unsuccessful attempts Attempt 1   oke Mary Ann JACKSON - Registered Nurse   Declined

## 2025-05-07 ENCOUNTER — OFFICE VISIT (OUTPATIENT)
Dept: ENDOCRINOLOGY | Facility: CLINIC | Age: 50
End: 2025-05-07
Payer: MEDICAID

## 2025-05-07 VITALS
DIASTOLIC BLOOD PRESSURE: 86 MMHG | OXYGEN SATURATION: 94 % | BODY MASS INDEX: 43.43 KG/M2 | WEIGHT: 261 LBS | HEART RATE: 107 BPM | SYSTOLIC BLOOD PRESSURE: 132 MMHG

## 2025-05-07 DIAGNOSIS — E11.65 TYPE 2 DIABETES MELLITUS WITH HYPERGLYCEMIA, WITH LONG-TERM CURRENT USE OF INSULIN: Primary | ICD-10-CM

## 2025-05-07 DIAGNOSIS — Z79.4 TYPE 2 DIABETES MELLITUS WITH HYPERGLYCEMIA, WITH LONG-TERM CURRENT USE OF INSULIN: Primary | ICD-10-CM

## 2025-05-07 LAB
EXPIRATION DATE: ABNORMAL
HBA1C MFR BLD: 13.1 % (ref 4.5–5.7)
Lab: ABNORMAL

## 2025-05-07 RX ORDER — ACYCLOVIR 400 MG/1
TABLET ORAL
COMMUNITY
Start: 2025-05-05

## 2025-05-07 RX ORDER — GLIPIZIDE 10 MG/1
10 TABLET ORAL 2 TIMES DAILY
Qty: 60 TABLET | Refills: 11 | Status: SHIPPED | OUTPATIENT
Start: 2025-05-07

## 2025-05-07 NOTE — PROGRESS NOTES
Chief Complaint   Patient presents with    Diabetes     F/u        Referring Provider  No ref. provider found     HPI   Ana Ortiz is a 49 y.o. female had concerns including Diabetes (F/u).    T2DM.    Patient reports that since her last visit, she has been noted BG's above goal.  She is taking her meds regularly without missing doses.     Diabetes:  Diabetes was diagnosed [id number], she had abnormal labs, but was starting to feel ill as well.  Complications include neuropathy, no stroke/cardiovascular issues.  Last ophtho exam was [facility] in [place], [place].  Current medications for diabetes include Tresiba 90 units QHS, Novolog 15 units TID with meals, Mounjaro 5 mg weekly--missed her last dose due to stock issues, Glipizide 10 mg BID.  Past meds: Metformin-diarrhea, glipizide-changed therapy d/t not working anymore, Trulicity-constipation/nausea, Ozempic-GI intolerance  She checks her blood sugar with Dexcom CGM.   Hypos: none  FSB+  PPD: 250+    ACE/ARB:yes, Statin: yes  Labs:  A1C:10.6 (2023), 10.4 (2023), 8.7 (2023)  Lipid Panel:utd  MELLO: utd    The following portions of the patient's history were reviewed and updated as appropriate: allergies, current medications, past family history, past medical history, past social history, past surgical history, and problem list.    Diet: she doesn't limit her diet much    Past Medical History:   Diagnosis Date    Arthritis     CHF (congestive heart failure)     COPD (chronic obstructive pulmonary disease)     Diabetes insipidus     Diabetes mellitus     Elevated cholesterol     GERD (gastroesophageal reflux disease)     Hyperlipemia     Hypertension     JOANN (obstructive sleep apnea)     Pneumonia     Type 2 diabetes mellitus 2013    Vulvar cancer      Past Surgical History:   Procedure Laterality Date    HYSTERECTOMY      INCISION AND DRAINAGE TRUNK N/A 2022    Procedure: INCISION AND DRAINAGE BACK;  Surgeon: Tamera Saez  MD;  Location: [facility];  Service: General;  Laterality: N/A;    MASTECTOMY      VULVA SURGERY      reconstruction due ca      Family History   Problem Relation Age of Onset    Cancer Mother     Hypertension Mother     Parkinsonism Father     Diabetes Father     Hypertension Father     Obesity Father     Cancer Maternal Aunt     Diabetes Paternal Aunt     Diabetes Paternal Grandmother     Cancer Maternal Aunt     Diabetes Brother     Obesity Brother       Social History     Socioeconomic History    Marital status:    Tobacco Use    Smoking status: Some Days     Current packs/day: 1.00     Average packs/day: 1.4 packs/day for 31.2 years (44.8 ttl pk-yrs)     Types: Cigarettes     Start date: 5/18/1995     Passive exposure: Current    Smokeless tobacco: Never    Tobacco comments:     Down to 2 cigarettes per day    Vaping Use    Vaping status: Former    Substances: Nicotine, Flavoring    Devices: RefOpsonable tank   Substance and Sexual Activity    Alcohol use: Never    Drug use: Never    Sexual activity: Not Currently     Partners: Male     Birth control/protection: Condom      Allergies   Allergen Reactions    Codeine Nausea Only    Metformin GI Intolerance     Extreme diarrhea      Current Outpatient Medications on File Prior to Visit   Medication Sig Dispense Refill    albuterol sulfate HFA (Ventolin HFA) 108 (90 Base) MCG/ACT inhaler Inhale 2 puffs Every 4 (Four) Hours As Needed for Wheezing or Shortness of Air. 18 g 12    budesonide-formoterol (Symbicort) 160-4.5 MCG/ACT inhaler Inhale 2 puffs 2 (Two) Times a Day. 1 each 8    cetirizine (zyrTEC) 10 MG tablet Take 1 tablet by mouth Daily.      Cholecalciferol 10 MCG (400 UNIT) tablet Take 1 tablet by mouth Daily.      Continuous Glucose Sensor (Dexcom G7 Sensor) misc       fluticasone (FLONASE) 50 MCG/ACT nasal spray Administer 2 sprays into the nostril(s) as directed by provider Daily As Needed for Rhinitis or Allergies.      hydroCHLOROthiazide  (HYDRODIURIL) 25 MG tablet Take 1 tablet by mouth Every Morning.      ibuprofen (ADVIL,MOTRIN) 800 MG tablet Take 1 tablet by mouth Every 8 (Eight) Hours As Needed for Mild Pain.      insulin aspart (NovoLOG FlexPen) 100 UNIT/ML solution pen-injector sc pen Inject 15 Units under the skin into the appropriate area as directed 3 (Three) Times a Day With Meals. 15 mL 2    Insulin Degludec (Tresiba FlexTouch) 200 UNIT/ML solution pen-injector pen injection Inject 90 Units under the skin into the appropriate area as directed Daily. 15 mL 0    ipratropium-albuterol (DUO-NEB) 0.5-2.5 mg/3 ml nebulizer Take 3 mL by nebulization Every 6 (Six) Hours As Needed for Wheezing or Shortness of Air. 360 mL 5    ipratropium-albuterol (DUO-NEB) 0.5-2.5 mg/3 ml nebulizer Take 3 mL by neb every 30 minutes as needed for shortness of air for up to 6 doses. Part of COPD Rescue Kit. (Only Start if in YELLOW ZONE.) 18 mL 0    lidocaine (LIDODERM) 5 % Place 1 patch on the skin as directed by provider Daily. Remove & Discard patch within 12 hours or as directed by MD      lisinopril (PRINIVIL,ZESTRIL) 5 MG tablet Take 0.5 tablets by mouth Daily.      nicotine (NICODERM CQ) 21 MG/24HR patch Place 1 patch on the skin as directed by provider Daily. 28 each 2    nicotine polacrilex (NICORETTE) 4 MG gum Chew 1 each As Needed for Smoking Cessation. 220 each 5    omeprazole (priLOSEC) 20 MG capsule Take 1 capsule by mouth Daily.      ondansetron ODT (ZOFRAN-ODT) 8 MG disintegrating tablet Place 1 tablet on the tongue Daily As Needed for Nausea or Vomiting.      simvastatin (ZOCOR) 80 MG tablet Take 1 tablet by mouth every night at bedtime.      tiotropium (Spiriva HandiHaler) 18 MCG per inhalation capsule Place 1 capsule into inhaler and inhale Daily. 30 capsule 8    [DISCONTINUED] Tirzepatide 5 MG/0.5ML solution auto-injector Inject 5 mg under the skin into the appropriate area as directed 1 (One) Time Per Week. 2 mL 1    [DISCONTINUED] clonazePAM  (KlonoPIN) 1 MG tablet Take 1 tablet by mouth 4 (Four) Times a Day As Needed for Anxiety. (Patient not taking: Reported on 5/7/2025)      [DISCONTINUED] cyclobenzaprine (FLEXERIL) 5 MG tablet Take 1 tablet by mouth 3 (Three) Times a Day As Needed for Muscle Spasms. (Patient not taking: Reported on 4/2/2025)      [DISCONTINUED] dicyclomine (BENTYL) 10 MG capsule Take 1 capsule by mouth 4 (Four) Times a Day As Needed for Abdominal Cramping. (Patient not taking: Reported on 5/7/2025)      [DISCONTINUED] doxycycline (MONODOX) 100 MG capsule Take 1 capsule by mouth every 12 hours for 5 days as part of COPD Rescue Kit. (Only Start if in YELLOW ZONE.) (Patient not taking: Reported on 5/7/2025) 10 capsule 0    [DISCONTINUED] gabapentin (NEURONTIN) 800 MG tablet Take 1 tablet by mouth 3 (Three) Times a Day. (Patient not taking: Reported on 5/7/2025)      [DISCONTINUED] imiquimod (ALDARA) 5 % cream Apply 1 Application topically to the appropriate area as directed 3 (Three) Times a Week. (Patient not taking: Reported on 5/7/2025)      [DISCONTINUED] metoclopramide (REGLAN) 5 MG tablet Take 1 tablet by mouth 3 (Three) Times a Day Before Meals. (Patient not taking: Reported on 5/7/2025) 90 tablet 0     No current facility-administered medications on file prior to visit.        Review of Systems   Constitutional:  Positive for fatigue and unexpected weight gain.   Eyes: Negative.    Gastrointestinal:  Positive for constipation.   Endocrine: Positive for polydipsia, polyphagia and polyuria.   Genitourinary:         Itching and burning.   Psychiatric/Behavioral:  Positive for sleep disturbance.    All other systems reviewed and are negative.    /86 (BP Location: Right arm, Patient Position: Sitting, Cuff Size: Adult)   Pulse 107   Wt 118 kg (261 lb)   SpO2 94%   BMI 43.43 kg/m²      Physical Exam  Vitals reviewed.   Constitutional:       Appearance: Normal appearance.   Eyes:      Extraocular Movements: Extraocular  "movements intact.   Cardiovascular:      Rate and Rhythm: Normal rate.   Pulmonary:      Effort: Pulmonary effort is normal.   Skin:     General: Skin is warm.   Neurological:      General: No focal deficit present.      Mental Status: She is alert and oriented to person, place, and time.   Psychiatric:         Mood and Affect: Mood normal.         Behavior: Behavior normal.         Thought Content: Thought content normal.         Judgment: Judgment normal.       CMP:  Lab Results   Component Value Date    Glucose 199 (H) 03/13/2025    Glucose, UA >=1000 mg/dL (3+) (A) 03/08/2025    BUN 17 03/13/2025    BUN/Creatinine Ratio 22.1 03/13/2025    Creatinine 0.77 03/13/2025    Creatinine, Urine 51.9 11/03/2023    Ketones, UA Trace (A) 03/08/2025    CO2 28.3 03/13/2025    CO2 Content 36.4 (H) 03/11/2025    Calcium 9.6 03/13/2025    Albumin 3.1 (L) 03/11/2025    AST (SGOT) 22 03/11/2025    Astrovirus Not Detected 03/18/2024    ALT (SGPT) 18 03/11/2025     Lipid Panel:  No results found for: \"CHOL\", \"TRIG\", \"HDL\", \"VLDL\", \"LDL\"  HbA1c:  Lab Results   Component Value Date    HGBA1C 13.1 (A) 05/07/2025     Glucose:  Lab Results   Component Value Date    POCGLU 199 (H) 03/13/2025     Microalbumin:  Lab Results   Component Value Date    MALBCRERATIO 23 11/03/2023     TSH:  Lab Results   Component Value Date    TSH 0.978 03/08/2025       Assessment and Plan    Diagnoses and all orders for this visit:    1. Type 2 diabetes mellitus with hyperglycemia, with long-term current use of insulin (Primary)  Assessment & Plan:  -Diabetes is above goal with A1c 13.1.  -Discussed dietary and exercise guidelines with patient.    -Discussed the importance of yearly eye exams.  -Discussed the importance of checking BG's regularly.  Continue using CGM.  2 week data download is as follows:  Very High: 90%  High:9%  In Range:1%  Low: 0%  Very Low:<1%  Trend shows overall hypers with some mild post meal hypers as well.  -Start back on Mounjaro 5 " mg weekly. Once the last injection is taken, will increase to 5 mg weekly.  Patient has no personal history of pancreatitis, no family history of MEN syndrome or medullary thyroid cancer. Possible side effects including nausea, bloating, other GI upset and rarely pancreatitis were discussed. She was advised to call the office with any symptoms or concerns.   Discussed importance of increasing water intake and fiber in diet and taking stool softeners as needed to relieve constipation.  -Increase Tresiba 60 units BID.   -Continue Glipizide 10 mg BID.  -Continue Novolog 30 units TID with meals for hyperglycemia.  -We discussed use of insulin pump.  She is interested in using inviting insulin pump.  Will get paperwork started be submitted for patient.  -S/S hypoglycemia reviewed with Rule of 15's advised.  -Follow-up in 3 months.    Orders:  -     POC Glycosylated Hemoglobin (Hb A1C)    Other orders  -     Tirzepatide 5 MG/0.5ML solution auto-injector; Inject 5 mg under the skin into the appropriate area as directed 1 (One) Time Per Week.  Dispense: 6 mL; Refill: 1  -     glipizide (GLUCOTROL) 10 MG tablet; Take 1 tablet by mouth 2 (Two) Times a Day.  Dispense: 60 tablet; Refill: 11                 Return in about 3 months (around 8/7/2025) for Follow-up appointment, A1C. The patient was instructed to contact the clinic with any interval questions or concerns.        This document has been electronically signed by CHAZ Mcintosh  May 7, 2025 15:03 EDT   Endocrinology    Please note that portions of this document were completed with a voice recognition program. Efforts were made to edit the dictations, but occasionally words are mis-transcribed.

## 2025-05-07 NOTE — PATIENT INSTRUCTIONS
Tresiba 60 units twice a day  Novolog 30 units with meals  Glipizide 10 mg twice a day  Mounjaro 5 mg weekly  Insulin Pump

## 2025-05-07 NOTE — ASSESSMENT & PLAN NOTE
-Diabetes is above goal with A1c 13.1.  -Discussed dietary and exercise guidelines with patient.    -Discussed the importance of yearly eye exams.  -Discussed the importance of checking BG's regularly.  Continue using CGM.  2 week data download is as follows:  Very High: 90%  High:9%  In Range:1%  Low: 0%  Very Low:<1%  Trend shows overall hypers with some mild post meal hypers as well.  -Start back on Mounjaro 5 mg weekly. Once the last injection is taken, will increase to 5 mg weekly.  Patient has no personal history of pancreatitis, no family history of MEN syndrome or medullary thyroid cancer. Possible side effects including nausea, bloating, other GI upset and rarely pancreatitis were discussed. She was advised to call the office with any symptoms or concerns.   Discussed importance of increasing water intake and fiber in diet and taking stool softeners as needed to relieve constipation.  -Increase Tresiba 60 units BID.   -Continue Glipizide 10 mg BID.  -Continue Novolog 30 units TID with meals for hyperglycemia.  -We discussed use of insulin pump.  She is interested in using inviting insulin pump.  Will get paperwork started be submitted for patient.  -S/S hypoglycemia reviewed with Rule of 15's advised.  -Follow-up in 3 months.

## 2025-05-08 RX ORDER — INSULIN DEGLUDEC 200 U/ML
90 INJECTION, SOLUTION SUBCUTANEOUS
Qty: 15 ML | Refills: 0 | Status: SHIPPED | OUTPATIENT
Start: 2025-05-08

## 2025-05-08 RX ORDER — INSULIN ASPART 100 [IU]/ML
15 INJECTION, SOLUTION INTRAVENOUS; SUBCUTANEOUS
Qty: 15 ML | Refills: 2 | Status: SHIPPED | OUTPATIENT
Start: 2025-05-08

## 2025-05-20 ENCOUNTER — TELEPHONE (OUTPATIENT)
Dept: ENDOCRINOLOGY | Facility: CLINIC | Age: 50
End: 2025-05-20
Payer: MEDICAID

## 2025-05-20 DIAGNOSIS — E11.65 TYPE 2 DIABETES MELLITUS WITH HYPERGLYCEMIA, WITH LONG-TERM CURRENT USE OF INSULIN: Primary | ICD-10-CM

## 2025-05-20 DIAGNOSIS — Z79.4 TYPE 2 DIABETES MELLITUS WITH HYPERGLYCEMIA, WITH LONG-TERM CURRENT USE OF INSULIN: Primary | ICD-10-CM

## 2025-05-20 NOTE — TELEPHONE ENCOUNTER
Patients insurance company called and requested patient have a refresher course for Diabetic Education. Patient had education done 10/2023.

## 2025-06-09 ENCOUNTER — PRIOR AUTHORIZATION (OUTPATIENT)
Dept: ENDOCRINOLOGY | Facility: CLINIC | Age: 50
End: 2025-06-09
Payer: MEDICAID

## 2025-06-20 ENCOUNTER — TELEPHONE (OUTPATIENT)
Dept: DIABETES SERVICES | Facility: HOSPITAL | Age: 50
End: 2025-06-20
Payer: MEDICAID

## 2025-06-20 ENCOUNTER — EDUCATION (OUTPATIENT)
Dept: DIABETES SERVICES | Facility: HOSPITAL | Age: 50
End: 2025-06-20
Payer: MEDICAID

## 2025-06-20 NOTE — CONSULTS
Diabetes Education  Assessment/Teaching    Patient Name:  Ana Ortiz  YOB: 1975  MRN: [id number]  Admit Date:  (Not on file)      Assessment Date:  6/20/2025      Flowsheet Row Most Recent Value   DM Education Needs    Meter Has own   Meter Type Other (comment)  [Dexcom]   Frequency of Testing Other (comment)  [CGM]   Medication Oral, Insulin   Problem Solving Hypoglycemia, Hyperglycemia, Sick days, Signs, Symptoms, Treatment   Reducing Risks A1C testing, Cardiovascular, Neuropathy, Lipids, Blood pressure, Retinopathy, Eye exam, Dental exam, Foot care, Immunizations   Healthy Eating Basic meal plan provided   Physical Activity Walking   Physical Activity Frequency Occasionally   Healthy Coping Appropriate   Discharge Plan Follow-up with endocrinolgoist, Follow-up with PCP   Motivation Moderate, Engaged   Teaching Method Explanation, Discussion, Handouts   Patient Response Verbalized understanding              Other Comments:  Patient is a 49 year old female with a history of CHF, COPD, diabetes, HTN, HLD, and JOANN. A1C 13.1   Healthy eating:  Patient states she will decrease carb intake and will try to eat healthier. Plate method was explained to patient.   Being active:   Patient states she will walk at least 30 minutes three times a week and increase the times in two weeks if she can tolerate.   Monitoring:   Patient states that she is taking her blood glucose. Patient she has a Dexcom CGM and has been able to check blood glucose when needed to see if high or low.   Medication:  Patient will take medications as prescribed. Patient was able to list her medications and the dose as prescribed.  Mounjaro 5 mg weekly  Tresiba 60 units BID.               Glipizide 10 mg BID.  Novolog 30 units TID with meals for hyperglycemia.  Patient is looking into an insulin pump. Carb counting was explained and information sent to her.   Problem solving:  Patient states the signs and symptoms of  hypo/hyperglycemia and the treatment for both.   Reducing risk:  Patient states she checks her feet nightly and does not walk without something to protect her feet. Patient states she goes to her follow up appointments and goes to the eye doctor and dentist when needed and on annual visits.   Healthy coping:  Patient has a good support team at home and is able to talk to them for help when needed.   Patient received a call and was educated on diet, activity, checking blood glucose, taking medication as prescribed, checking feet daily and S/S of hypo/hyperglycemia. Patient was educated on sick rule days. A packet of information will be mailed, that includes ADA handouts, AADE 7 handout, fast food book and grocery shopping book for help when shopping along with office contact number for questions. Patient had no questions or concerns. Thank you.        Electronically signed by:  Joy Lee RN  06/20/25 16:30 EDT

## 2025-07-03 ENCOUNTER — HOSPITAL ENCOUNTER (OUTPATIENT)
Dept: CT IMAGING | Facility: HOSPITAL | Age: 50
Discharge: HOME | End: 2025-07-03
Admitting: NURSE PRACTITIONER
Payer: MEDICAID

## 2025-07-03 DIAGNOSIS — R91.8 PULMONARY INFILTRATES: ICD-10-CM

## 2025-07-03 PROCEDURE — 71250 CT THORAX DX C-: CPT

## 2025-07-08 RX ORDER — INSULIN DEGLUDEC 200 U/ML
120 INJECTION, SOLUTION SUBCUTANEOUS
Qty: 15 ML | Refills: 1 | Status: SHIPPED | OUTPATIENT
Start: 2025-07-08

## 2025-07-09 ENCOUNTER — OFFICE VISIT (OUTPATIENT)
Dept: PULMONOLOGY | Facility: CLINIC | Age: 50
End: 2025-07-09
Payer: MEDICAID

## 2025-07-09 VITALS
HEART RATE: 78 BPM | SYSTOLIC BLOOD PRESSURE: 126 MMHG | DIASTOLIC BLOOD PRESSURE: 78 MMHG | HEIGHT: 65 IN | BODY MASS INDEX: 43.84 KG/M2 | TEMPERATURE: 97.6 F | WEIGHT: 263.14 LBS | OXYGEN SATURATION: 92 %

## 2025-07-09 DIAGNOSIS — J96.11 CHRONIC RESPIRATORY FAILURE WITH HYPOXIA AND HYPERCAPNIA: ICD-10-CM

## 2025-07-09 DIAGNOSIS — F17.210 CIGARETTE NICOTINE DEPENDENCE WITHOUT COMPLICATION: ICD-10-CM

## 2025-07-09 DIAGNOSIS — G47.33 OSA (OBSTRUCTIVE SLEEP APNEA): ICD-10-CM

## 2025-07-09 DIAGNOSIS — Z23 IMMUNIZATION DUE: ICD-10-CM

## 2025-07-09 DIAGNOSIS — J44.9 CHRONIC OBSTRUCTIVE PULMONARY DISEASE (COPD): Primary | ICD-10-CM

## 2025-07-09 DIAGNOSIS — J96.12 CHRONIC RESPIRATORY FAILURE WITH HYPOXIA AND HYPERCAPNIA: ICD-10-CM

## 2025-07-09 NOTE — PROGRESS NOTES
"Chief Complaint  Chronic bronchitis, unspecified chronic bronchitis type    Subjective          Ana Ortiz presents to [facility] for   History of Present Illness    Ms. Ortiz 49-year-old female with a medical history significant for arthritis, CHF, COPD, diabetes, GERD, hyperlipidemia, hypertension and sleep apnea.    She presents today for follow-up on chronic bronchitis.  She reports that she has overall been doing well since her last visit.  She states that her breathing has been at baseline.  She is currently taking Symbicort twice daily, Spiriva once daily, and albuterol inhaler as needed and nebulizer treatments as needed.  It is noted that she was in the hospital in ICU in March 2025 for pneumonia, COPD exacerbation and acute on chronic hypercarbic and hypoxic respiratory failure.  She is currently using a CPAP nightly with supplemental oxygen.  She remains a current smoker.    Objective   Vital Signs:   /78   Pulse 78   Temp 97.6 °F (36.4 °C)   Ht 165.1 cm (65\")   Wt 119 kg (263 lb 2.2 oz)   SpO2 92%   BMI 43.79 kg/m²         Physical Exam    GENERAL APPEARANCE: Well developed, well nourished, alert and cooperative, and appears to be in no acute distress.    HEAD: normocephalic. Atraumatic.    EYES: PERRL, EOMI. Vision is grossly intact.    THROAT: Oral cavity and pharynx normal. No inflammation, swelling, exudate, or lesions.     NECK: Neck supple.  No thyromegaly.    CARDIAC: Normal S1 and S2. No S3, S4 or murmurs. Rhythm is regular.     RESPIRATORY:Bilateral air entry positive.  No wheezing, crackles or rhonchi noted.    GI: Positive bowel sounds. Soft, nondistended, nontender.     MUSCULOSKELETAL: No significant deformity or joint abnormality. No edema. Peripheral pulses intact. No varicosities.    NEUROLOGICAL: Strength and sensation symmetric and intact throughout.     PSYCHIATRIC: The mental examination revealed the patient was " "oriented to person, place, and time.       Estimated body mass index is 43.79 kg/m² as calculated from the following:    Height as of this encounter: 165.1 cm (65\").    Weight as of this encounter: 119 kg (263 lb 2.2 oz).        Result Review :   The following data was reviewed by: CHAZ Erwin on 07/09/2025:  Common labs          3/12/2025    00:40 3/13/2025    00:14 5/7/2025    14:31   Common Labs   Glucose 167  327     BUN 17  17     Creatinine 0.70  0.77     Sodium 136  138     Potassium 4.3  4.7     Chloride 97  96     Calcium 8.9  9.6     WBC 8.29  11.50     Hemoglobin 12.8  13.4     Hematocrit 39.7  41.7     Platelets 204  249     Hemoglobin A1C   13.1         PFT:6/24/21     Flow volume loop was assessed.  Spirometry showed no obstruction.  There is no significant bronchodilator response.  Diffusing capacity, uncorrected for hemoglobin, is mildly reduced.  Lung volumes are normal.  Significant air trapping noted         Low dose lung cancer screening:NA     Previous chest imaging:     CT chest 7/3/2025  FINDINGS:    Limitations:  Please note that reported measurements are made  manually, as computer generated (AI) measurements can over measure  lesions. Additionally, lesions identified by AI may have been present  presently, significant nodules will be discussed in the report and the  visual depictions of lesions provided by AI are for general reference  only.    Lungs and pleural spaces:  Unremarkable as visualized.  No mass.  No  consolidation.  No significant effusion.  No pneumothorax.    Heart:  Unremarkable as visualized.  No cardiomegaly.  No significant  pericardial effusion.  No significant coronary artery calcifications.    Bones/joints:  Unremarkable as visualized.  No acute fracture.    Soft tissues:  Unremarkable as visualized.    Vasculature:  Unremarkable as visualized.  No thoracic aortic  aneurysm.    Lymph nodes:  Unremarkable as visualized.  No enlarged lymph nodes.   " "  IMPRESSION:    No acute findings in the chest.     This report was finalized on 7/3/2025 2:51 PM by Dr. Олег Aviles MD.       Alpha-1 antitrypsin screening:NA     STOP-Bang Score:   NA  [place] Sleepiness Scale:   NA      ABG:    pH No results found for: \"PHART\"   pO2 No results found for: \"PO2ART\"   pCO2 No results found for: \"MEO4JWO\"   HCO3 No results found for: \"KLQ5SGC\"                      Assessment and Plan    Problem List Items Addressed This Visit          Pulmonary and Pneumonias    Respiratory failure    Relevant Orders    DME NIPPV - IPPV/BiPAP/CPAP       Sleep    JOANN (obstructive sleep apnea)       Tobacco    Cigarette nicotine dependence without complication     Other Visit Diagnoses         Chronic obstructive pulmonary disease (COPD)    -  Primary    Relevant Orders    DME NIPPV - IPPV/BiPAP/CPAP      Immunization due        Relevant Orders    Pneumococcal Conjugate Vaccine 21 (18+ yrs) (Completed)            Ana Ortiz  reports that she has been smoking cigarettes. She started smoking about 30 years ago. She has a 44.9 pack-year smoking history. She has been exposed to tobacco smoke. She has never used smokeless tobacco. I have educated her on the risk of diseases from using tobacco products such as cancer, COPD, and heart disease.     I advised her to quit and she is not willing to quit.      CT imaging was reviewed.  No acute findings noted.    Continue albuterol inhaler as needed.  Continue Symbicort twice daily.  Continue Spiriva once daily.  Continue DuoNebs as needed.      She is compliant with use of supplemental oxygen as needed during the day.    Only has supplemental oxygen with a CPAP at night.  Given her recent hospitalization for acute on chronic hypercarbic and hypoxic respiratory failure I feel that she would be better suited with a trilogy machine.    Home Bipap and cpap considered and ruled out, due to the need for adjustable pressure support provided by Non-invasive " ventilator (NIV). Given her continued shortness of breath with exertion due to her COPD, she is at risk for worsening chronic respiratory failure.  Chronic respiratory failure require NIV with AVAP/AE mode with target tidal volume and auto back up rate.  This will assist to decrease pCO2, prevent life threatening air trapping and breath stacking that a Bipap or cpap cannot provide.  She also requires a device that will not fail in the event of a power outage and one that is portable for mobility in the home when needed.  Due to her worsening condition and frequent hospitalizations, I am prescribing NIV therapy to decrease the need for unplanned expensive medical  encounters including ED visits and hospital readmissions.       Continue trazodone nightly for insomnia.    She was given pneumonia vaccine in office.      Follow Up   Return in about 3 months (around 10/9/2025).  Patient was given instructions and counseling regarding her condition or for health maintenance advice. Please see specific information pulled into the AVS if appropriate.

## 2025-07-28 RX ORDER — BLOOD SUGAR DIAGNOSTIC
1 STRIP MISCELLANEOUS 4 TIMES DAILY
Qty: 150 EACH | Refills: 2 | Status: SHIPPED | OUTPATIENT
Start: 2025-07-28

## 2025-08-14 RX ORDER — INSULIN ASPART 100 [IU]/ML
15 INJECTION, SOLUTION INTRAVENOUS; SUBCUTANEOUS
Qty: 15 ML | Refills: 2 | Status: SHIPPED | OUTPATIENT
Start: 2025-08-14

## 2025-08-18 ENCOUNTER — PRIOR AUTHORIZATION (OUTPATIENT)
Dept: ENDOCRINOLOGY | Facility: CLINIC | Age: 50
End: 2025-08-18
Payer: MEDICAID

## (undated) DEVICE — SPNG GZ WOVN 4X4IN 12PLY 10/BX STRL

## (undated) DEVICE — DRP UTIL 2/LAYR W/TP 15X26IN STRL PK/4

## (undated) DEVICE — TRY SKINPREP PVP SCRB W PAINT

## (undated) DEVICE — HOLDER: Brand: DEROYAL

## (undated) DEVICE — GLV SURG PREMIERPRO MIC LTX PF SZ7 BRN

## (undated) DEVICE — PATIENT RETURN ELECTRODE, SINGLE-USE, CONTACT QUALITY MONITORING, ADULT, WITH 9FT CORD, FOR PATIENTS WEIGING OVER 33LBS. (15KG): Brand: MEGADYNE

## (undated) DEVICE — CURITY AMD ANTIMICROBIAL PACKING STRIPS: Brand: CURITY

## (undated) DEVICE — DRSNG PAD ABD 8X10IN STRL

## (undated) DEVICE — ANTIBACTERIAL UNDYED BRAIDED (POLYGLACTIN 910), SYNTHETIC ABSORBABLE SUTURE: Brand: COATED VICRYL

## (undated) DEVICE — DRN PENRS RO SILCNE 1/4X12IN LF STRL

## (undated) DEVICE — DBD-DRAPE,LAP,CHOLE,W/TROUGHS,STERILE: Brand: MEDLINE

## (undated) DEVICE — PK BASIC 70